# Patient Record
Sex: FEMALE | Race: WHITE | Employment: OTHER | URBAN - METROPOLITAN AREA
[De-identification: names, ages, dates, MRNs, and addresses within clinical notes are randomized per-mention and may not be internally consistent; named-entity substitution may affect disease eponyms.]

---

## 2017-01-04 ENCOUNTER — TELEPHONE (OUTPATIENT)
Dept: FAMILY MEDICINE CLINIC | Age: 77
End: 2017-01-04

## 2017-09-20 ENCOUNTER — TELEPHONE (OUTPATIENT)
Dept: FAMILY MEDICINE CLINIC | Age: 77
End: 2017-09-20

## 2017-09-20 DIAGNOSIS — Z00.00 PHYSICAL EXAM: Primary | ICD-10-CM

## 2017-09-20 DIAGNOSIS — E78.5 HYPERLIPIDEMIA, UNSPECIFIED HYPERLIPIDEMIA TYPE: ICD-10-CM

## 2017-09-27 ENCOUNTER — TELEPHONE (OUTPATIENT)
Dept: FAMILY MEDICINE CLINIC | Age: 77
End: 2017-09-27

## 2017-09-27 DIAGNOSIS — M17.10 ARTHRITIS OF KNEE: Primary | ICD-10-CM

## 2017-09-27 DIAGNOSIS — E66.3 PATIENT OVERWEIGHT: ICD-10-CM

## 2017-10-03 DIAGNOSIS — M17.10 ARTHRITIS OF KNEE: ICD-10-CM

## 2017-10-03 DIAGNOSIS — E66.3 PATIENT OVERWEIGHT: ICD-10-CM

## 2017-11-06 ENCOUNTER — HOSPITAL ENCOUNTER (OUTPATIENT)
Dept: MAMMOGRAPHY | Age: 77
Discharge: HOME | End: 2017-11-06
Attending: FAMILY MEDICINE | Admitting: FAMILY MEDICINE

## 2017-11-06 DIAGNOSIS — Z12.31 ENCOUNTER FOR SCREENING MAMMOGRAM FOR BREAST CANCER: ICD-10-CM

## 2017-11-06 DIAGNOSIS — Z80.3 FAMILY HISTORY OF BREAST CANCER IN MOTHER: ICD-10-CM

## 2017-11-06 DIAGNOSIS — Z00.00 PHYSICAL EXAM: ICD-10-CM

## 2017-11-06 DIAGNOSIS — E78.5 HYPERLIPIDEMIA, UNSPECIFIED HYPERLIPIDEMIA TYPE: ICD-10-CM

## 2017-11-06 LAB
A/G RATIO: 1.3 (ref 1.1–2.2)
ALBUMIN SERPL-MCNC: 4 G/DL (ref 3.4–5)
ALP BLD-CCNC: 85 U/L (ref 40–129)
ALT SERPL-CCNC: 11 U/L (ref 10–40)
ANION GAP SERPL CALCULATED.3IONS-SCNC: 10 MMOL/L (ref 3–16)
AST SERPL-CCNC: 17 U/L (ref 15–37)
BASOPHILS ABSOLUTE: 0 K/UL (ref 0–0.2)
BASOPHILS RELATIVE PERCENT: 0.5 %
BILIRUB SERPL-MCNC: 0.4 MG/DL (ref 0–1)
BUN BLDV-MCNC: 28 MG/DL (ref 7–20)
CALCIUM SERPL-MCNC: 9.2 MG/DL (ref 8.3–10.6)
CHLORIDE BLD-SCNC: 98 MMOL/L (ref 99–110)
CHOLESTEROL, TOTAL: 137 MG/DL (ref 0–199)
CO2: 30 MMOL/L (ref 21–32)
CREAT SERPL-MCNC: 0.8 MG/DL (ref 0.6–1.2)
EOSINOPHILS ABSOLUTE: 0.1 K/UL (ref 0–0.6)
EOSINOPHILS RELATIVE PERCENT: 2.7 %
GFR AFRICAN AMERICAN: >60
GFR NON-AFRICAN AMERICAN: >60
GLOBULIN: 3 G/DL
GLUCOSE BLD-MCNC: 92 MG/DL (ref 70–99)
HCT VFR BLD CALC: 38.9 % (ref 36–48)
HDLC SERPL-MCNC: 61 MG/DL (ref 40–60)
HEMOGLOBIN: 13.1 G/DL (ref 12–16)
LDL CHOLESTEROL CALCULATED: 52 MG/DL
LYMPHOCYTES ABSOLUTE: 1.6 K/UL (ref 1–5.1)
LYMPHOCYTES RELATIVE PERCENT: 39.5 %
MCH RBC QN AUTO: 29.8 PG (ref 26–34)
MCHC RBC AUTO-ENTMCNC: 33.7 G/DL (ref 31–36)
MCV RBC AUTO: 88.4 FL (ref 80–100)
MONOCYTES ABSOLUTE: 0.4 K/UL (ref 0–1.3)
MONOCYTES RELATIVE PERCENT: 9.6 %
NEUTROPHILS ABSOLUTE: 2 K/UL (ref 1.7–7.7)
NEUTROPHILS RELATIVE PERCENT: 47.7 %
PDW BLD-RTO: 14 % (ref 12.4–15.4)
PLATELET # BLD: 107 K/UL (ref 135–450)
PMV BLD AUTO: 8.7 FL (ref 5–10.5)
POTASSIUM SERPL-SCNC: 4.5 MMOL/L (ref 3.5–5.1)
RBC # BLD: 4.4 M/UL (ref 4–5.2)
SODIUM BLD-SCNC: 138 MMOL/L (ref 136–145)
TOTAL PROTEIN: 7 G/DL (ref 6.4–8.2)
TRIGL SERPL-MCNC: 120 MG/DL (ref 0–150)
TSH SERPL DL<=0.05 MIU/L-ACNC: 2.63 UIU/ML (ref 0.27–4.2)
VLDLC SERPL CALC-MCNC: 24 MG/DL
WBC # BLD: 4.2 K/UL (ref 4–11)

## 2017-11-15 ENCOUNTER — OFFICE VISIT (OUTPATIENT)
Dept: FAMILY MEDICINE CLINIC | Age: 77
End: 2017-11-15

## 2017-11-15 VITALS
DIASTOLIC BLOOD PRESSURE: 80 MMHG | HEART RATE: 79 BPM | SYSTOLIC BLOOD PRESSURE: 124 MMHG | WEIGHT: 183.4 LBS | BODY MASS INDEX: 33.75 KG/M2 | OXYGEN SATURATION: 97 % | HEIGHT: 62 IN

## 2017-11-15 DIAGNOSIS — E66.3 PATIENT OVERWEIGHT: ICD-10-CM

## 2017-11-15 DIAGNOSIS — F32.5 MAJOR DEPRESSIVE DISORDER WITH SINGLE EPISODE, IN FULL REMISSION (HCC): ICD-10-CM

## 2017-11-15 DIAGNOSIS — E78.49 OTHER HYPERLIPIDEMIA: ICD-10-CM

## 2017-11-15 DIAGNOSIS — Z00.00 ENCOUNTER FOR MEDICARE ANNUAL WELLNESS EXAM: Primary | ICD-10-CM

## 2017-11-15 DIAGNOSIS — M17.10 ARTHRITIS OF KNEE: ICD-10-CM

## 2017-11-15 DIAGNOSIS — F32.5 MAJOR DEPRESSION, SINGLE EPISODE, IN COMPLETE REMISSION (HCC): ICD-10-CM

## 2017-11-15 DIAGNOSIS — M81.0 POSTMENOPAUSAL OSTEOPOROSIS: ICD-10-CM

## 2017-11-15 PROCEDURE — G0439 PPPS, SUBSEQ VISIT: HCPCS | Performed by: FAMILY MEDICINE

## 2017-11-15 RX ORDER — SERTRALINE HYDROCHLORIDE 100 MG/1
TABLET, FILM COATED ORAL
Qty: 90 TABLET | Refills: 3 | Status: SHIPPED | OUTPATIENT
Start: 2017-11-15 | End: 2018-11-21 | Stop reason: SDUPTHER

## 2017-11-15 RX ORDER — ATORVASTATIN CALCIUM 10 MG/1
10 TABLET, FILM COATED ORAL DAILY
Qty: 90 TABLET | Refills: 3 | Status: SHIPPED | OUTPATIENT
Start: 2017-11-15 | End: 2018-11-21 | Stop reason: SDUPTHER

## 2017-11-15 ASSESSMENT — LIFESTYLE VARIABLES
AUDIT TOTAL SCORE: 1
AUDIT-C TOTAL SCORE: 1
HAVE YOU OR SOMEONE ELSE BEEN INJURED AS A RESULT OF YOUR DRINKING: 0
HOW OFTEN DURING THE LAST YEAR HAVE YOU FAILED TO DO WHAT WAS NORMALLY EXPECTED FROM YOU BECAUSE OF DRINKING: 0
HOW MANY STANDARD DRINKS CONTAINING ALCOHOL DO YOU HAVE ON A TYPICAL DAY: 0
HOW OFTEN DURING THE LAST YEAR HAVE YOU NEEDED AN ALCOHOLIC DRINK FIRST THING IN THE MORNING TO GET YOURSELF GOING AFTER A NIGHT OF HEAVY DRINKING: 0
HOW OFTEN DURING THE LAST YEAR HAVE YOU FOUND THAT YOU WERE NOT ABLE TO STOP DRINKING ONCE YOU HAD STARTED: 0
HAS A RELATIVE, FRIEND, DOCTOR, OR ANOTHER HEALTH PROFESSIONAL EXPRESSED CONCERN ABOUT YOUR DRINKING OR SUGGESTED YOU CUT DOWN: 0
HOW OFTEN DO YOU HAVE SIX OR MORE DRINKS ON ONE OCCASION: 0
HOW OFTEN DURING THE LAST YEAR HAVE YOU HAD A FEELING OF GUILT OR REMORSE AFTER DRINKING: 0
HOW OFTEN DURING THE LAST YEAR HAVE YOU BEEN UNABLE TO REMEMBER WHAT HAPPENED THE NIGHT BEFORE BECAUSE YOU HAD BEEN DRINKING: 0
HOW OFTEN DO YOU HAVE A DRINK CONTAINING ALCOHOL: 1

## 2017-11-15 ASSESSMENT — PATIENT HEALTH QUESTIONNAIRE - PHQ9: SUM OF ALL RESPONSES TO PHQ QUESTIONS 1-9: 0

## 2017-11-15 ASSESSMENT — ANXIETY QUESTIONNAIRES: GAD7 TOTAL SCORE: 0

## 2017-11-15 NOTE — PATIENT INSTRUCTIONS
Patient Education        Learning About the Mediterranean Diet  What is the [address]? The Mediterranean diet is a style of eating rather than a diet plan. It features foods eaten in [place], [place], [place] and [place], and other countries along the [facility]. It emphasizes eating foods like fish, fruits, vegetables, beans, high-fiber breads and whole grains, nuts, and olive oil. This style of eating includes limited red meat, cheese, and sweets. Why choose the Mediterranean diet? A Mediterranean-style diet may improve heart health. It contains more fat than other heart-healthy diets. But the fats are mainly from nuts, unsaturated oils (such as fish oils and olive oil), and certain nut or seed oils (such as canola, soybean, or flaxseed oil). These fats may help protect the heart and blood vessels. How can you get started on the Mediterranean diet? Here are some things you can do to switch to a more Mediterranean way of eating. What to eat  · Eat a variety of fruits and vegetables each day, such as grapes, blueberries, tomatoes, broccoli, peppers, figs, olives, spinach, eggplant, beans, lentils, and chickpeas. · Eat a variety of whole-grain foods each day, such as oats, brown rice, and whole wheat bread, pasta, and couscous. · Eat fish at least 2 times a week. Try tuna, salmon, mackerel, lake trout, herring, or sardines. · Eat moderate amounts of low-fat dairy products, such as milk, cheese, or yogurt. · Eat moderate amounts of poultry and eggs. · Choose healthy (unsaturated) fats, such as nuts, olive oil, and certain nut or seed oils like canola, soybean, and flaxseed. · Limit unhealthy (saturated) fats, such as butter, palm oil, and coconut oil. And limit fats found in animal products, such as meat and dairy products made with whole milk. Try to eat red meat only a few times a month in very small amounts. · Limit sweets and desserts to only a few times a week.  This includes sugar-sweetened drinks like soda. The Mediterranean diet may also include red wine with your meal1 glass each day for women and up to 2 glasses a day for men. Tips for eating at home  · Use herbs, spices, garlic, lemon zest, and citrus juice instead of salt to add flavor to foods. · Add avocado slices to your sandwich instead of palmer. · Have fish for lunch or dinner instead of red meat. Brush the fish with olive oil, and broil or grill it. · Sprinkle your salad with seeds or nuts instead of cheese. · Cook with olive or canola oil instead of butter or oils that are high in saturated fat. · Switch from 2% milk or whole milk to 1% or fat-free milk. · Dip raw vegetables in a vinaigrette dressing or hummus instead of dips made from mayonnaise or sour cream.  · Have a piece of fruit for dessert instead of a piece of cake. Try baked apples, or have some dried fruit. Tips for eating out  · Try broiled, grilled, baked, or poached fish instead of having it fried or breaded. · Ask your  to have your meals prepared with olive oil instead of butter. · Order dishes made with marinara sauce or sauces made from olive oil. Avoid sauces made from cream or mayonnaise. · Choose whole-grain breads, whole wheat pasta and pizza crust, brown rice, beans, and lentils. · Cut back on butter or margarine on bread. Instead, you can dip your bread in a small amount of olive oil. · Ask for a side salad or grilled vegetables instead of french fries or chips. Where can you learn more? Go to https://Lumiantmiraewcatalina.Userlike Live Chat. org and sign in to your [employer] account. Enter [phone] in the [facility] box to learn more about \"Learning About the Mediterranean Diet. \"     If you do not have an account, please click on the \"Sign Up Now\" link. Current as of: December 29, 2016  Content Version: 11.3  © 4138-5517 [employer]. Care instructions adapted under license by [facility] ([facility]).  If you have questions about

## 2017-11-15 NOTE — PROGRESS NOTES
and rhythm, S1, S2 normal, no murmur, click, rub or gallop  · Apical impulse normal  LEGS:  Lower extremity edema: none    · No carotid bruits  ABDOMEN: Soft, non-tender, no masses  · No hepatosplenomegaly  · No hernias noted. Exam limited by N/A  SKIN: warm and dry  · No rashes or suspicious lesions  PSYCH:  Alert and oriented  · Normal reasoning, insight good  · Facial expressions full, mood appropriate  · No memory disturbance noted  MUSCULOSKEL:  Gait normal  · No significant finger or nail findings  · Spine symmetric, no deformities, no kyphosis no  GAIT: UP and Go test: <30 seconds with gait: normal.  Speed normal.  No significant balance checks. No extra steps on turn around. Assistive device: no assistive device and None     EKG: Not performed. No chest pain or shortness of breath    Labs reviewed with patient in detail/acceptable     Assessment and Plan:     1. Encounter for Medicare annual wellness exam     2. Other hyperlipidemia     3. Postmenopausal osteoporosis  DEXA Bone Density Axial Skeleton   4. Arthritis of knee     5. Patient overweight     6.  Major depressive disorder with single episode, in full remission [facility])         Personalized Preventive Plan  Health Maintenance Due   Topic Date Due    DTaP/Tdap/Td vaccine (1 - Tdap) 10/01/2009       Other Preventive Recommendations:  · A preventive eye exam performed by an eye specialist is recommended every 1-2 years to screen for glaucoma; cataracts, macular degeneration, and other eye disorders  · A preventive dental visit is recommended every 6 months  · Try to get at least 150 minutes of exercise per week or 10,000 steps per day on a pedometer  · Order FREE \"Exercise and Physical Activity\" book from the [facility] on Agin1-920.404.4271 or https://order.sweetie.nih.gov/health/publication/order/  · You need 0415-3185 mg of calcium and 8476-0672 IU of vitamin D per day- it is possible to meet your calcium requirement with diet alone, but a vitamin D supplement is usually necessary to meet this goal  · When exposed to the sun, use a sunscreen that protects against both UVA and UVB radiation with an SPF of 30 or greater- reapply every 2 to 3 hours or after sweating, drying off with a towel, or swimming  · Always wear a seat belt when traveling in a car, and a helmet when riding a bicycle or motorcycle      Tdap at at pharmacy  Repeat DEXA, refill medications  Healthy diet and exercise  Follow up: Every 6 months or as needed

## 2017-11-29 ENCOUNTER — HOSPITAL ENCOUNTER (OUTPATIENT)
Dept: GENERAL RADIOLOGY | Age: 77
Discharge: HOME | End: 2017-11-29
Attending: FAMILY MEDICINE | Admitting: FAMILY MEDICINE

## 2017-11-29 DIAGNOSIS — M81.0 POSTMENOPAUSAL OSTEOPOROSIS: ICD-10-CM

## 2017-11-29 DIAGNOSIS — F33.9 RECURRENT MAJOR DEPRESSIVE DISORDER (HCC): ICD-10-CM

## 2018-02-01 ENCOUNTER — OFFICE VISIT (OUTPATIENT)
Dept: FAMILY MEDICINE CLINIC | Age: 78
End: 2018-02-01

## 2018-02-01 VITALS
HEIGHT: 62 IN | SYSTOLIC BLOOD PRESSURE: 110 MMHG | BODY MASS INDEX: 36.25 KG/M2 | WEIGHT: 197 LBS | HEART RATE: 88 BPM | TEMPERATURE: 99.6 F | DIASTOLIC BLOOD PRESSURE: 70 MMHG | RESPIRATION RATE: 14 BRPM | OXYGEN SATURATION: 97 %

## 2018-02-01 DIAGNOSIS — E78.49 OTHER HYPERLIPIDEMIA: ICD-10-CM

## 2018-02-01 DIAGNOSIS — M17.10 ARTHRITIS OF KNEE: ICD-10-CM

## 2018-02-01 DIAGNOSIS — R53.83 FATIGUE, UNSPECIFIED TYPE: ICD-10-CM

## 2018-02-01 DIAGNOSIS — F32.5 MAJOR DEPRESSIVE DISORDER WITH SINGLE EPISODE, IN FULL REMISSION (HCC): Primary | ICD-10-CM

## 2018-02-01 PROCEDURE — 99213 OFFICE O/P EST LOW 20 MIN: CPT | Performed by: FAMILY MEDICINE

## 2018-02-05 ENCOUNTER — TELEPHONE (OUTPATIENT)
Dept: FAMILY MEDICINE CLINIC | Age: 78
End: 2018-02-05

## 2018-02-05 DIAGNOSIS — R53.83 FATIGUE, UNSPECIFIED TYPE: Primary | ICD-10-CM

## 2018-02-06 ENCOUNTER — HOSPITAL ENCOUNTER (OUTPATIENT)
Dept: OTHER | Age: 78
Discharge: HOME | End: 2018-02-06
Attending: FAMILY MEDICINE | Admitting: FAMILY MEDICINE

## 2018-02-06 DIAGNOSIS — R53.83 FATIGUE, UNSPECIFIED TYPE: ICD-10-CM

## 2018-02-06 LAB
ANION GAP SERPL CALCULATED.3IONS-SCNC: 13 MMOL/L (ref 3–16)
BASOPHILS ABSOLUTE: 0 K/UL (ref 0–0.2)
BASOPHILS RELATIVE PERCENT: 0.5 %
BUN BLDV-MCNC: 19 MG/DL (ref 7–20)
CALCIUM SERPL-MCNC: 9.3 MG/DL (ref 8.3–10.6)
CHLORIDE BLD-SCNC: 97 MMOL/L (ref 99–110)
CO2: 27 MMOL/L (ref 21–32)
CREAT SERPL-MCNC: 0.7 MG/DL (ref 0.6–1.2)
EOSINOPHILS ABSOLUTE: 0.1 K/UL (ref 0–0.6)
EOSINOPHILS RELATIVE PERCENT: 1.3 %
GFR AFRICAN AMERICAN: >60
GFR NON-AFRICAN AMERICAN: >60
GLUCOSE BLD-MCNC: 128 MG/DL (ref 70–99)
HCT VFR BLD CALC: 37.9 % (ref 36–48)
HEMOGLOBIN: 12.4 G/DL (ref 12–16)
LYMPHOCYTES ABSOLUTE: 1.7 K/UL (ref 1–5.1)
LYMPHOCYTES RELATIVE PERCENT: 22.8 %
MCH RBC QN AUTO: 29.1 PG (ref 26–34)
MCHC RBC AUTO-ENTMCNC: 32.8 G/DL (ref 31–36)
MCV RBC AUTO: 88.6 FL (ref 80–100)
MONOCYTES ABSOLUTE: 0.6 K/UL (ref 0–1.3)
MONOCYTES RELATIVE PERCENT: 8.3 %
NEUTROPHILS ABSOLUTE: 5.1 K/UL (ref 1.7–7.7)
NEUTROPHILS RELATIVE PERCENT: 67.1 %
PDW BLD-RTO: 13.7 % (ref 12.4–15.4)
PLATELET # BLD: 158 K/UL (ref 135–450)
PMV BLD AUTO: 9.9 FL (ref 5–10.5)
POTASSIUM SERPL-SCNC: 4.6 MMOL/L (ref 3.5–5.1)
RBC # BLD: 4.27 M/UL (ref 4–5.2)
SEDIMENTATION RATE, ERYTHROCYTE: 38 MM/HR (ref 0–30)
SODIUM BLD-SCNC: 137 MMOL/L (ref 136–145)
TSH SERPL DL<=0.05 MIU/L-ACNC: 1.87 UIU/ML (ref 0.27–4.2)
WBC # BLD: 7.5 K/UL (ref 4–11)

## 2018-04-03 ENCOUNTER — OFFICE VISIT (OUTPATIENT)
Dept: FAMILY MEDICINE CLINIC | Age: 78
End: 2018-04-03

## 2018-04-03 VITALS
BODY MASS INDEX: 33.49 KG/M2 | DIASTOLIC BLOOD PRESSURE: 70 MMHG | WEIGHT: 182 LBS | HEART RATE: 72 BPM | SYSTOLIC BLOOD PRESSURE: 160 MMHG | HEIGHT: 62 IN | OXYGEN SATURATION: 99 %

## 2018-04-03 DIAGNOSIS — R03.0 ELEVATED BLOOD PRESSURE READING: ICD-10-CM

## 2018-04-03 DIAGNOSIS — R73.9 ELEVATED BLOOD SUGAR: ICD-10-CM

## 2018-04-03 DIAGNOSIS — R73.9 ELEVATED BLOOD SUGAR: Primary | ICD-10-CM

## 2018-04-03 DIAGNOSIS — M17.0 PRIMARY OSTEOARTHRITIS OF BOTH KNEES: ICD-10-CM

## 2018-04-03 LAB
C-REACTIVE PROTEIN: 2 MG/L (ref 0–5.1)
SEDIMENTATION RATE, ERYTHROCYTE: 16 MM/HR (ref 0–30)

## 2018-04-03 PROCEDURE — 99214 OFFICE O/P EST MOD 30 MIN: CPT | Performed by: FAMILY MEDICINE

## 2018-04-04 LAB
ESTIMATED AVERAGE GLUCOSE: 102.5 MG/DL
HBA1C MFR BLD: 5.2 %

## 2018-04-18 ENCOUNTER — OFFICE VISIT (OUTPATIENT)
Dept: FAMILY MEDICINE CLINIC | Age: 78
End: 2018-04-18

## 2018-04-18 VITALS
HEIGHT: 62 IN | DIASTOLIC BLOOD PRESSURE: 84 MMHG | SYSTOLIC BLOOD PRESSURE: 140 MMHG | HEART RATE: 74 BPM | OXYGEN SATURATION: 96 % | WEIGHT: 179 LBS | BODY MASS INDEX: 32.94 KG/M2

## 2018-04-18 DIAGNOSIS — M19.90 ARTHRITIS: Primary | ICD-10-CM

## 2018-04-18 DIAGNOSIS — R03.0 ELEVATED BP WITHOUT DIAGNOSIS OF HYPERTENSION: ICD-10-CM

## 2018-04-18 PROCEDURE — 99213 OFFICE O/P EST LOW 20 MIN: CPT | Performed by: FAMILY MEDICINE

## 2018-04-18 RX ORDER — OMEGA-3/DHA/EPA/FISH OIL 60 MG-90MG
CAPSULE ORAL
COMMUNITY
End: 2019-10-18

## 2018-04-18 RX ORDER — CYANOCOBALAMIN (VITAMIN B-12) 1000 MCG
1 TABLET, EXTENDED RELEASE ORAL 2 TIMES DAILY WITH MEALS
COMMUNITY
End: 2020-11-06

## 2018-04-18 RX ORDER — LOSARTAN POTASSIUM 25 MG/1
25 TABLET ORAL DAILY
Qty: 30 TABLET | Refills: 3 | Status: SHIPPED | OUTPATIENT
Start: 2018-04-18 | End: 2018-05-16 | Stop reason: SDUPTHER

## 2018-05-16 ENCOUNTER — OFFICE VISIT (OUTPATIENT)
Dept: FAMILY MEDICINE CLINIC | Age: 78
End: 2018-05-16

## 2018-05-16 VITALS
HEIGHT: 62 IN | DIASTOLIC BLOOD PRESSURE: 84 MMHG | SYSTOLIC BLOOD PRESSURE: 130 MMHG | WEIGHT: 181.2 LBS | BODY MASS INDEX: 33.34 KG/M2 | OXYGEN SATURATION: 99 % | HEART RATE: 80 BPM

## 2018-05-16 DIAGNOSIS — Z51.81 MEDICATION MONITORING ENCOUNTER: ICD-10-CM

## 2018-05-16 DIAGNOSIS — I10 ESSENTIAL HYPERTENSION: Primary | ICD-10-CM

## 2018-05-16 DIAGNOSIS — R03.0 ELEVATED BP WITHOUT DIAGNOSIS OF HYPERTENSION: ICD-10-CM

## 2018-05-16 PROCEDURE — 99213 OFFICE O/P EST LOW 20 MIN: CPT | Performed by: FAMILY MEDICINE

## 2018-05-16 RX ORDER — DIMENHYDRINATE 50 MG
100 TABLET ORAL DAILY
COMMUNITY
End: 2020-11-06

## 2018-05-16 RX ORDER — LOSARTAN POTASSIUM 25 MG/1
25 TABLET ORAL DAILY
Qty: 90 TABLET | Refills: 3 | Status: SHIPPED | OUTPATIENT
Start: 2018-05-16 | End: 2019-02-14

## 2018-07-13 ENCOUNTER — OFFICE VISIT (OUTPATIENT)
Dept: FAMILY MEDICINE CLINIC | Age: 78
End: 2018-07-13

## 2018-07-13 VITALS
OXYGEN SATURATION: 98 % | SYSTOLIC BLOOD PRESSURE: 122 MMHG | BODY MASS INDEX: 33.11 KG/M2 | DIASTOLIC BLOOD PRESSURE: 82 MMHG | HEART RATE: 83 BPM | WEIGHT: 181 LBS

## 2018-07-13 DIAGNOSIS — R10.9 SIDE PAIN: Primary | ICD-10-CM

## 2018-07-13 LAB
BACTERIA URINE, POC: ABNORMAL
BILIRUBIN URINE: 0 MG/DL
BLOOD, URINE: NEGATIVE
CASTS URINE, POC: ABNORMAL
CLARITY: CLEAR
COLOR: YELLOW
CRYSTALS URINE, POC: ABNORMAL
EPI CELLS URINE, POC: ABNORMAL
GLUCOSE URINE: ABNORMAL
KETONES, URINE: NEGATIVE
LEUKOCYTE EST, POC: ABNORMAL
NITRITE, URINE: NEGATIVE
PH UA: 6.5 (ref 4.5–8)
PROTEIN UA: NEGATIVE
RBC URINE, POC: ABNORMAL
SPECIFIC GRAVITY UA: 1.02 (ref 1–1.03)
UROBILINOGEN, URINE: NORMAL
WBC URINE, POC: ABNORMAL
YEAST URINE, POC: ABNORMAL

## 2018-07-13 PROCEDURE — 99213 OFFICE O/P EST LOW 20 MIN: CPT | Performed by: FAMILY MEDICINE

## 2018-07-13 PROCEDURE — 81000 URINALYSIS NONAUTO W/SCOPE: CPT | Performed by: FAMILY MEDICINE

## 2018-07-13 NOTE — PROGRESS NOTES
122/82 (Site: Left Arm, Position: Sitting, Cuff Size: Small Adult)   Pulse 83   Wt 181 lb (82.1 kg)   SpO2 98%   Breastfeeding? No   BMI 33.11 kg/m²   GEN:  in NAD  NECK:  Supple without adenopathy. CV:  Regular rate and rhythm, S1 and S2 normal, no murmurs, clicks  PULM:  Chest is clear, no wheezing ,  symmetric air entry throughout both lung fields. ABD: Soft,  no masses, mild discomfort over abdominal wall, discomfort with range of motion of her back in all directions  EXT: No rash or edema  NEURO: Alert and oriented ×3    Urine dip reviewed, micro-occasional white cell, culture sent    ASSESSMENT/PLAN:  1.  Side pain  Ice and alternate Tylenol with Advil  F/u  symptoms persist or worsen/likely muscle strain  - POCT Urine with Microscopic  - Urine Culture

## 2018-07-15 LAB — URINE CULTURE, ROUTINE: NORMAL

## 2018-08-17 ENCOUNTER — HOSPITAL ENCOUNTER (OUTPATIENT)
Dept: PHYSICAL THERAPY | Age: 78
Discharge: HOME | End: 2018-08-31
Admitting: ORTHOPAEDIC SURGERY

## 2018-08-17 DIAGNOSIS — F33.9 RECURRENT MAJOR DEPRESSIVE DISORDER (HCC): ICD-10-CM

## 2018-08-17 ASSESSMENT — PAIN DESCRIPTION - DESCRIPTORS: DESCRIPTORS: ACHING

## 2018-08-17 ASSESSMENT — PAIN DESCRIPTION - PAIN TYPE: TYPE: CHRONIC PAIN

## 2018-08-17 ASSESSMENT — PAIN DESCRIPTION - PROGRESSION
CLINICAL_PROGRESSION: GRADUALLY WORSENING
CLINICAL_PROGRESSION: GRADUALLY WORSENING

## 2018-08-17 ASSESSMENT — PAIN DESCRIPTION - LOCATION: LOCATION: BACK

## 2018-08-17 ASSESSMENT — PAIN SCALES - GENERAL: PAINLEVEL_OUTOF10: 3

## 2018-08-17 ASSESSMENT — PAIN DESCRIPTION - ORIENTATION: ORIENTATION: RIGHT

## 2018-08-17 ASSESSMENT — PAIN DESCRIPTION - FREQUENCY: FREQUENCY: INTERMITTENT

## 2018-08-18 NOTE — PROGRESS NOTES
Physical Therapy  Initial Assessment  Date: 2018  Patient Name: Emily Gaitan  MRN: [id number]  : 1940     Treatment Diagnosis: limited lumbar ROM, decreased postural awareness and core strength, improper body mechanics    Restrictions: None    Outpatient fall risk assessment completed asking screening question if patient has fallen in the past 30 days:  [x] Yes  [] No    Based on screen for falls, patient demonstrates fall risk:  [] Yes  [x] No    Interventions based on fall risk status:  Updated Problem List within Medical History  [] Yes   [x] N/A    Asked family to assist with increased observation of the patient  [] Yes   [x] N/A    Patient kept in visible area when not closely supervised by therapist  [] Yes   [x] N/A    Repeatedly reinforce activity limits and safety needs with patient/family  [] Yes   [x] N/A    Increase frequency of rounding/monitoring patient  [] Yes   [x] N/A             Subjective   General  Chart Reviewed: Yes  Additional Pertinent Hx: Pt reports she saw Dr. Carl Landa in  - states she had been doing yard work and developed pain in her R groin. States pain has progressively moved into R hip and R leg. States she gets sharp pain at times behind her knees. Occasional cramping in legs and when legs cramp they feel numb. Pt with long history of low back pain - had therapy approx 1.5 years ago however had to stop because of rectal surgery. States at that time she did not feel that therapy helped significantly. Currently pt states she feels the most discomfort when on her feet. States at times she has difficulty getting up from chair and in/out of car. States she has difficulty lifting legs at times. Pain does not disturb pt's sleep. Pt initially saw Dr. Carl Landa for back pain then referred to Dr. Helena Molina who suggested PT. Response To Previous Treatment: Patient with no complaints from previous session.   Family / Caregiver Present: No  Referring Practitioner: Pedro Umanzor and posture correction  REQUIRES PT FOLLOW UP: Yes  Activity Tolerance  Activity Tolerance: Patient Tolerated treatment well         Plan   Plan  Times per week: 2  Plan weeks: 6  Specific instructions for Next Treatment: core strengthening, postural education, body mechanics training  Current Treatment Recommendations: Strengthening, ROM, Manual Therapy - Soft Tissue Mobilization, Home Exercise Program, Modalities    G-Code  PT G-Codes  Functional Assessment Tool Used: oswestry  Score: 10  Functional Limitation: Mobility: Walking and moving around  Mobility: Walking and Moving Around Current Status (): At least 20 percent but less than 40 percent impaired, limited or restricted  Mobility: Walking and Moving Around Goal Status (): 0 percent impaired, limited or restricted    OutComes Score  Oswestry CMS Modifier: CH  Oswestry Disability Scores %: 0                                        Goals  Short term goals  Time Frame for Short term goals: 3 weeks  Short term goal 1: Pt will be independent with HEP in order to decrease overall pain and improve function and endurance  Long term goals  Time Frame for Long term goals : 6 weeks  Long term goal 1: Pt will report an overall decrease in pain of at least 50% in order to complete all household chores without difficulty  Long term goal 2: Pt will report the ability to be on her feet for an hour or longer in order to complete shopping without difficulty  Long term goal 3: Pt will report the ability to get in and out of the car without leg pain or difficulty  Patient Goals   Patient goals :  To decrease pain and return to full function       Therapy Time   Individual Concurrent Group Co-treatment   Time In 0730         Time Out 0832         Minutes 62         Timed Code Treatment Minutes: 39 Minutes       Karl Fountain, [place]

## 2018-08-18 NOTE — PROGRESS NOTES
Physical Therapy      Outpatient Physical Therapy     Phone: [phone] Fax: [phone]     To: Referring Practitioner: Saba Ryan MD      Patient: Emily Gaitan   : 1940   MRN: [id number]  Evaluation Date: 2018      Diagnosis Information:  · Diagnosis: spondylosis of lumbar region without myelopathy or radiculopathy   · Treatment Diagnosis: limited lumbar ROM, decreased postural awareness and core strength, improper body mechanics     Physical Therapy Certification/Re-Certification Form  Dear Dr. Helena Molina  The following patient has been evaluated for physical therapy services. Please review the attached evaluation and/or summary of the patient's plan of care, and verify that you agree therapy should continue by signing the attached document and sending it back to our office. Plan of Care/Treatment to date:  [x] Therapeutic Exercise      [x] Modalities:  [x] Therapeutic Activity        [] Ultrasound    [] Gait Training        [] Cervical Traction   [] Neuromuscular Re-education      [x] Cold/hotpack    [x] Instruction in HEP        [x] Lumbar Traction  [x] Manual Therapy        [x] Electrical Stimulation            [] Aquatic Therapy        [] Iontophoresis        ? [] Lymphedema management  [] Women's Health     Other:  [] Vestibular Rehab        [x]  K-tape  []  Needed     Frequency/Duration:  # Days per week: [] 1 day # Weeks: [] 1 week [] 5 weeks     [x] 2 days? [] 2 weeks [x] 6 weeks     [] 3 days   [] 3 weeks [] 7 weeks     [] 4 days   [] 4 weeks [x] 8 weeks    Rehab Potential: [x] Excellent [] Good [] Fair  [] Poor     Electronically signed by:  Zachery Bauer, PT, DPT  [id number]           If you have any questions or concerns, please don't hesitate to call.   Thank you for your referral.      Physician Signature:________________________________Date:__________________  By signing above, therapists plan is approved by physician

## 2018-08-24 ENCOUNTER — TELEPHONE (OUTPATIENT)
Dept: PHARMACY | Facility: CLINIC | Age: 78
End: 2018-08-24

## 2018-08-24 NOTE — TELEPHONE ENCOUNTER
CLINICAL PHARMACY NOTE - Adherence Review    Per records, identified care gap per [employer] (patient insurance): Atorvastatin 10mg adherence - appears 90-day supply last filled 03/26/2018  Losartan 25mg adherence - appears 90-day supply last filled 05/15/2018    Notes:   Per Reconcile Medication Dispenses in Care Path: Atorvastatin 10mg adherence - last filled on 03/26/2018 for a 90-day supply  Losartan 25mg adherence - last filled on 08/12/2018 for a 90-day supply    Attempting to reach patient to review. Left message. Will prepare letter and mail to patient. Jessi Holm, PharmD, Yohannes Reeves  Clinical Pharmacy Specialist  O: [phone]  C: [address] [place] [address]  [phone], Option 7    =======================================================    For Pharmacy Admin Tracking Only  PHSO: Yes  Total # of Interventions Recommended: 2  - New Order #: 0 New Medication Order Reason(s):  Adherence  - Refills Provided #: 0  - Maintenance Safety Lab Monitoring #: 1  - New Therapy Lab Monitoring #: 0  Total Interventions Accepted: 0  Time Spent (min): 15

## 2018-08-24 NOTE — LETTER
[address]  [address], [address]  Phone: [phone], option 7  Fax: [phone] [address] [address] [place] [address] [address] [address]           09/17/18     Dear Marian Vidal,    We tried to reach you recently regarding your atorvastatin, but were unable to reach you on the telephone. It appears that this medication has not been filled at proper times. We are concerned you may be missing doses or not taking it as directed. It is important that you take your medications regularly and try not to miss a single dose. We have on file that you are currently taking atorvastatin 10mg once daily. If you are no longer taking this, or taking it differently, please call us at the number below so that we can discuss this and update your medication profile. Some ways to help you remember to take your medications are to use a pill box, set an alarm, and/or keep your medication near something that you do every day. It also may be helpful to fill a 3-month supply of your prescription at a time to save you time and trips to the pharmacy  if you would like assistance in switching your prescriptions to a 3-month supply, please contact us.     Sincerely,     Jluis Yu, PharmD, [address] [facility], [address] [address]  Phone: [phone], option 7

## 2018-09-01 ENCOUNTER — HOSPITAL ENCOUNTER (OUTPATIENT)
Dept: OTHER | Age: 78
Discharge: HOME | End: 2018-09-01
Attending: ORTHOPAEDIC SURGERY | Admitting: ORTHOPAEDIC SURGERY

## 2018-11-01 ENCOUNTER — TELEPHONE (OUTPATIENT)
Dept: FAMILY MEDICINE CLINIC | Age: 78
End: 2018-11-01

## 2018-11-01 DIAGNOSIS — E78.2 MIXED HYPERLIPIDEMIA: Primary | ICD-10-CM

## 2018-11-01 DIAGNOSIS — E66.3 PATIENT OVERWEIGHT: ICD-10-CM

## 2018-11-08 ENCOUNTER — HOSPITAL ENCOUNTER (OUTPATIENT)
Age: 78
Discharge: HOME | End: 2018-11-08
Payer: MEDICARE

## 2018-11-08 DIAGNOSIS — E78.2 MIXED HYPERLIPIDEMIA: ICD-10-CM

## 2018-11-08 DIAGNOSIS — E66.3 PATIENT OVERWEIGHT: ICD-10-CM

## 2018-11-08 LAB
A/G RATIO: 1.6 (ref 1.1–2.2)
ALBUMIN SERPL-MCNC: 4.4 G/DL (ref 3.4–5)
ALP BLD-CCNC: 87 U/L (ref 40–129)
ALT SERPL-CCNC: 12 U/L (ref 10–40)
ANION GAP SERPL CALCULATED.3IONS-SCNC: 12 MMOL/L (ref 3–16)
AST SERPL-CCNC: 18 U/L (ref 15–37)
BASOPHILS ABSOLUTE: 0 K/UL (ref 0–0.2)
BASOPHILS RELATIVE PERCENT: 0.8 %
BILIRUB SERPL-MCNC: 0.4 MG/DL (ref 0–1)
BUN BLDV-MCNC: 21 MG/DL (ref 7–20)
CALCIUM SERPL-MCNC: 9.1 MG/DL (ref 8.3–10.6)
CHLORIDE BLD-SCNC: 101 MMOL/L (ref 99–110)
CHOLESTEROL, TOTAL: 138 MG/DL (ref 0–199)
CO2: 28 MMOL/L (ref 21–32)
CREAT SERPL-MCNC: 0.9 MG/DL (ref 0.6–1.2)
EOSINOPHILS ABSOLUTE: 0.1 K/UL (ref 0–0.6)
EOSINOPHILS RELATIVE PERCENT: 1.7 %
GFR AFRICAN AMERICAN: >60
GFR NON-AFRICAN AMERICAN: >60
GLOBULIN: 2.8 G/DL
GLUCOSE BLD-MCNC: 86 MG/DL (ref 70–99)
HCT VFR BLD CALC: 40.6 % (ref 36–48)
HDLC SERPL-MCNC: 57 MG/DL (ref 40–60)
HEMOGLOBIN: 13.5 G/DL (ref 12–16)
LDL CHOLESTEROL CALCULATED: 56 MG/DL
LYMPHOCYTES ABSOLUTE: 1.5 K/UL (ref 1–5.1)
LYMPHOCYTES RELATIVE PERCENT: 29.8 %
MCH RBC QN AUTO: 29.6 PG (ref 26–34)
MCHC RBC AUTO-ENTMCNC: 33.3 G/DL (ref 31–36)
MCV RBC AUTO: 88.8 FL (ref 80–100)
MONOCYTES ABSOLUTE: 0.4 K/UL (ref 0–1.3)
MONOCYTES RELATIVE PERCENT: 8.5 %
NEUTROPHILS ABSOLUTE: 2.9 K/UL (ref 1.7–7.7)
NEUTROPHILS RELATIVE PERCENT: 59.2 %
PDW BLD-RTO: 13.6 % (ref 12.4–15.4)
PLATELET # BLD: 135 K/UL (ref 135–450)
PMV BLD AUTO: 8.9 FL (ref 5–10.5)
POTASSIUM SERPL-SCNC: 4 MMOL/L (ref 3.5–5.1)
RBC # BLD: 4.57 M/UL (ref 4–5.2)
SODIUM BLD-SCNC: 141 MMOL/L (ref 136–145)
TOTAL PROTEIN: 7.2 G/DL (ref 6.4–8.2)
TRIGL SERPL-MCNC: 125 MG/DL (ref 0–150)
TSH REFLEX: 2.43 UIU/ML (ref 0.27–4.2)
VLDLC SERPL CALC-MCNC: 25 MG/DL
WBC # BLD: 5 K/UL (ref 4–11)

## 2018-11-08 PROCEDURE — 80061 LIPID PANEL: CPT

## 2018-11-08 PROCEDURE — 84443 ASSAY THYROID STIM HORMONE: CPT

## 2018-11-08 PROCEDURE — 36415 COLL VENOUS BLD VENIPUNCTURE: CPT

## 2018-11-08 PROCEDURE — 85025 COMPLETE CBC W/AUTO DIFF WBC: CPT

## 2018-11-08 PROCEDURE — 80053 COMPREHEN METABOLIC PANEL: CPT

## 2018-11-21 ENCOUNTER — OFFICE VISIT (OUTPATIENT)
Dept: FAMILY MEDICINE CLINIC | Age: 78
End: 2018-11-21
Payer: COMMERCIAL

## 2018-11-21 VITALS
WEIGHT: 182.8 LBS | HEART RATE: 72 BPM | TEMPERATURE: 97.2 F | DIASTOLIC BLOOD PRESSURE: 70 MMHG | HEIGHT: 62 IN | OXYGEN SATURATION: 98 % | SYSTOLIC BLOOD PRESSURE: 120 MMHG | BODY MASS INDEX: 33.64 KG/M2

## 2018-11-21 DIAGNOSIS — I10 ESSENTIAL HYPERTENSION: ICD-10-CM

## 2018-11-21 DIAGNOSIS — Z00.00 ENCOUNTER FOR MEDICARE ANNUAL WELLNESS EXAM: Primary | ICD-10-CM

## 2018-11-21 DIAGNOSIS — F32.0 CURRENT MILD EPISODE OF MAJOR DEPRESSIVE DISORDER WITHOUT PRIOR EPISODE (HCC): ICD-10-CM

## 2018-11-21 DIAGNOSIS — E78.2 MIXED HYPERLIPIDEMIA: ICD-10-CM

## 2018-11-21 PROCEDURE — G0439 PPPS, SUBSEQ VISIT: HCPCS | Performed by: FAMILY MEDICINE

## 2018-11-21 RX ORDER — FLUTICASONE PROPIONATE 50 MCG
2 SPRAY, SUSPENSION (ML) NASAL DAILY
Qty: 1 BOTTLE | Refills: 3 | Status: SHIPPED | OUTPATIENT
Start: 2018-11-21 | End: 2019-01-21 | Stop reason: ALTCHOICE

## 2018-11-21 RX ORDER — SERTRALINE HYDROCHLORIDE 100 MG/1
TABLET, FILM COATED ORAL
Qty: 90 TABLET | Refills: 3 | Status: SHIPPED | OUTPATIENT
Start: 2018-11-21 | End: 2019-03-24 | Stop reason: SDUPTHER

## 2018-11-21 RX ORDER — ATORVASTATIN CALCIUM 10 MG/1
10 TABLET, FILM COATED ORAL NIGHTLY
Qty: 90 TABLET | Refills: 3 | Status: SHIPPED | OUTPATIENT
Start: 2018-11-21 | End: 2020-04-24

## 2018-11-21 ASSESSMENT — LIFESTYLE VARIABLES
HAS A RELATIVE, FRIEND, DOCTOR, OR ANOTHER HEALTH PROFESSIONAL EXPRESSED CONCERN ABOUT YOUR DRINKING OR SUGGESTED YOU CUT DOWN: 0
HOW OFTEN DURING THE LAST YEAR HAVE YOU BEEN UNABLE TO REMEMBER WHAT HAPPENED THE NIGHT BEFORE BECAUSE YOU HAD BEEN DRINKING: 0
HOW OFTEN DURING THE LAST YEAR HAVE YOU NEEDED AN ALCOHOLIC DRINK FIRST THING IN THE MORNING TO GET YOURSELF GOING AFTER A NIGHT OF HEAVY DRINKING: 0
HOW MANY STANDARD DRINKS CONTAINING ALCOHOL DO YOU HAVE ON A TYPICAL DAY: 0
HOW OFTEN DURING THE LAST YEAR HAVE YOU HAD A FEELING OF GUILT OR REMORSE AFTER DRINKING: 0
AUDIT-C TOTAL SCORE: 2
HOW OFTEN DO YOU HAVE SIX OR MORE DRINKS ON ONE OCCASION: 0
HOW OFTEN DURING THE LAST YEAR HAVE YOU FAILED TO DO WHAT WAS NORMALLY EXPECTED FROM YOU BECAUSE OF DRINKING: 0
HAVE YOU OR SOMEONE ELSE BEEN INJURED AS A RESULT OF YOUR DRINKING: 0
AUDIT TOTAL SCORE: 2
HOW OFTEN DURING THE LAST YEAR HAVE YOU FOUND THAT YOU WERE NOT ABLE TO STOP DRINKING ONCE YOU HAD STARTED: 0
HOW OFTEN DO YOU HAVE A DRINK CONTAINING ALCOHOL: 2

## 2018-11-21 ASSESSMENT — ANXIETY QUESTIONNAIRES: GAD7 TOTAL SCORE: 0

## 2018-11-21 ASSESSMENT — PATIENT HEALTH QUESTIONNAIRE - PHQ9
SUM OF ALL RESPONSES TO PHQ QUESTIONS 1-9: 0
SUM OF ALL RESPONSES TO PHQ QUESTIONS 1-9: 0

## 2018-11-21 NOTE — PROGRESS NOTES
History:   Diagnosis Date    AR (allergic rhinitis)     Arthritis of knee     Pruis    Depression     GERD (gastroesophageal reflux disease)     Hyperlipidemia     Internal hemorrhoids     Overweight(278.02)     Viral meningitis 5/12     Past Surgical History:   Procedure Laterality Date    CHOLECYSTECTOMY, LAPAROSCOPIC  2003    COLONOSCOPY  8/06    normal; [place]    COLONOSCOPY  12/3/13    [place]- polyps    AKASH AND BSO  2003    UPPER GASTROINTESTINAL ENDOSCOPY  12/3/13    [place]; antritis       Family History   Problem Relation Age of Onset    Breast Cancer Mother        CareTeam (Including outside providers/suppliers regularly involved in providing care):   Patient Care Team:  Ruperto Olszewski, MD as PCP - General (Family Medicine)  Ruperto Olszewski, MD as PCP - S Attributed Provider  Robinson Menendez as Consulting Physician (Psychiatry)  Danyelle South MD as Consulting Physician (Orthopedic Surgery)    Wt Readings from Last 3 Encounters:   11/21/18 182 lb 12.8 oz (82.9 kg)   07/13/18 181 lb (82.1 kg)   05/16/18 181 lb 3.2 oz (82.2 kg)     Vitals:    11/21/18 0833   BP: 120/70   Pulse: 72   Temp: 97.2 °F (36.2 °C)   TempSrc: Tympanic   SpO2: 98%   Weight: 182 lb 12.8 oz (82.9 kg)   Height: 5' 2\" (1.575 m)       General Appearance: alert and oriented to person, place and time, well developed and well- nourished, in no acute distress  Skin: warm and dry, no rash or erythema  Head: normocephalic and atraumatic  Eyes: pupils equal, round, and reactive to light, extraocular eye movements intact, conjunctivae normal  ENT: tympanic membrane, external ear and ear canal normal bilaterally, nose without deformity, nasal mucosa and turbinates normal without polyps  Neck: supple and non-tender without mass, no thyromegaly or thyroid nodules, no cervical lymphadenopathy  Breasts: Without masses or discharge or skin change   Pulmonary/Chest: clear to auscultation bilaterally- no wheezes, rales or rhonchi, normal air

## 2018-12-19 ENCOUNTER — HOSPITAL ENCOUNTER (OUTPATIENT)
Dept: WOMENS IMAGING | Age: 78
Discharge: HOME | End: 2018-12-19
Payer: MEDICARE

## 2018-12-19 DIAGNOSIS — Z12.31 BREAST CANCER SCREENING BY MAMMOGRAM: ICD-10-CM

## 2018-12-19 PROCEDURE — 77067 SCR MAMMO BI INCL CAD: CPT

## 2019-01-21 ENCOUNTER — OFFICE VISIT (OUTPATIENT)
Dept: FAMILY MEDICINE CLINIC | Age: 79
End: 2019-01-21
Payer: MEDICARE

## 2019-01-21 VITALS
HEART RATE: 76 BPM | DIASTOLIC BLOOD PRESSURE: 70 MMHG | OXYGEN SATURATION: 98 % | SYSTOLIC BLOOD PRESSURE: 120 MMHG | WEIGHT: 187 LBS | RESPIRATION RATE: 14 BRPM | HEIGHT: 62 IN | BODY MASS INDEX: 34.41 KG/M2

## 2019-01-21 DIAGNOSIS — E78.2 MIXED HYPERLIPIDEMIA: ICD-10-CM

## 2019-01-21 DIAGNOSIS — F32.0 MAJOR DEPRESSIVE DISORDER, SINGLE EPISODE, MILD (HCC): ICD-10-CM

## 2019-01-21 DIAGNOSIS — J06.9 VIRAL URI: Primary | ICD-10-CM

## 2019-01-21 DIAGNOSIS — I10 ESSENTIAL HYPERTENSION: ICD-10-CM

## 2019-01-21 PROCEDURE — 99213 OFFICE O/P EST LOW 20 MIN: CPT | Performed by: FAMILY MEDICINE

## 2019-01-21 RX ORDER — LORAZEPAM 0.5 MG/1
TABLET ORAL
COMMUNITY
End: 2019-02-14

## 2019-01-21 RX ORDER — TRIMETHOPRIM 100 MG/1
TABLET ORAL
Refills: 1 | COMMUNITY
Start: 2019-01-15 | End: 2019-01-21 | Stop reason: ALTCHOICE

## 2019-02-06 ENCOUNTER — OFFICE VISIT (OUTPATIENT)
Dept: FAMILY MEDICINE CLINIC | Age: 79
End: 2019-02-06
Payer: MEDICARE

## 2019-02-06 VITALS
WEIGHT: 188.4 LBS | SYSTOLIC BLOOD PRESSURE: 138 MMHG | TEMPERATURE: 98.1 F | DIASTOLIC BLOOD PRESSURE: 88 MMHG | HEART RATE: 77 BPM | HEIGHT: 62 IN | BODY MASS INDEX: 34.67 KG/M2 | OXYGEN SATURATION: 96 %

## 2019-02-06 DIAGNOSIS — J01.90 ACUTE BACTERIAL SINUSITIS: Primary | ICD-10-CM

## 2019-02-06 DIAGNOSIS — I10 ESSENTIAL HYPERTENSION: ICD-10-CM

## 2019-02-06 DIAGNOSIS — B96.89 ACUTE BACTERIAL SINUSITIS: Primary | ICD-10-CM

## 2019-02-06 PROCEDURE — 99213 OFFICE O/P EST LOW 20 MIN: CPT | Performed by: NURSE PRACTITIONER

## 2019-02-06 RX ORDER — PREDNISONE 20 MG/1
40 TABLET ORAL DAILY
Qty: 10 TABLET | Refills: 0 | Status: SHIPPED | OUTPATIENT
Start: 2019-02-06 | End: 2019-02-11

## 2019-02-06 RX ORDER — AMOXICILLIN 500 MG/1
500 CAPSULE ORAL 3 TIMES DAILY
Qty: 30 CAPSULE | Refills: 0 | Status: SHIPPED | OUTPATIENT
Start: 2019-02-06 | End: 2019-02-16

## 2019-02-06 ASSESSMENT — ENCOUNTER SYMPTOMS
SINUS PRESSURE: 1
COUGH: 1
RHINORRHEA: 1
SHORTNESS OF BREATH: 0
SORE THROAT: 1

## 2019-02-07 ENCOUNTER — TELEPHONE (OUTPATIENT)
Dept: FAMILY MEDICINE CLINIC | Age: 79
End: 2019-02-07

## 2019-02-14 ENCOUNTER — OFFICE VISIT (OUTPATIENT)
Dept: FAMILY MEDICINE CLINIC | Age: 79
End: 2019-02-14
Payer: MEDICARE

## 2019-02-14 VITALS
SYSTOLIC BLOOD PRESSURE: 110 MMHG | HEART RATE: 80 BPM | DIASTOLIC BLOOD PRESSURE: 70 MMHG | BODY MASS INDEX: 32.92 KG/M2 | OXYGEN SATURATION: 98 % | WEIGHT: 180 LBS | TEMPERATURE: 97.9 F

## 2019-02-14 DIAGNOSIS — H92.01 RIGHT EAR PAIN: ICD-10-CM

## 2019-02-14 DIAGNOSIS — J06.9 UPPER RESPIRATORY TRACT INFECTION, UNSPECIFIED TYPE: ICD-10-CM

## 2019-02-14 DIAGNOSIS — R68.89 FLU-LIKE SYMPTOMS: Primary | ICD-10-CM

## 2019-02-14 LAB
INFLUENZA A ANTIBODY: NORMAL
INFLUENZA B ANTIBODY: NORMAL

## 2019-02-14 PROCEDURE — 87804 INFLUENZA ASSAY W/OPTIC: CPT | Performed by: FAMILY MEDICINE

## 2019-02-14 PROCEDURE — 99213 OFFICE O/P EST LOW 20 MIN: CPT | Performed by: FAMILY MEDICINE

## 2019-02-14 RX ORDER — LORATADINE 10 MG/1
10 TABLET ORAL DAILY
Qty: 30 TABLET | Refills: 1 | Status: SHIPPED | OUTPATIENT
Start: 2019-02-14 | End: 2019-03-16

## 2019-02-14 RX ORDER — FLUTICASONE PROPIONATE 50 MCG
1 SPRAY, SUSPENSION (ML) NASAL DAILY
Qty: 1 BOTTLE | Refills: 1 | Status: SHIPPED | OUTPATIENT
Start: 2019-02-14 | End: 2019-10-18

## 2019-03-25 RX ORDER — SERTRALINE HYDROCHLORIDE 100 MG/1
TABLET, FILM COATED ORAL
Qty: 90 TABLET | Refills: 1 | Status: SHIPPED | OUTPATIENT
Start: 2019-03-25 | End: 2019-09-13 | Stop reason: SDUPTHER

## 2019-04-16 ENCOUNTER — HOSPITAL ENCOUNTER (OUTPATIENT)
Dept: MRI IMAGING | Age: 79
Discharge: HOME | End: 2019-04-16
Payer: MEDICARE

## 2019-04-16 DIAGNOSIS — M47.816 SPONDYLOSIS OF LUMBAR REGION WITHOUT MYELOPATHY OR RADICULOPATHY: ICD-10-CM

## 2019-04-16 PROCEDURE — 72148 MRI LUMBAR SPINE W/O DYE: CPT

## 2019-08-05 LAB
BILIRUBIN URINE: NEGATIVE
BLOOD, URINE: ABNORMAL
CLARITY: ABNORMAL
COLOR: YELLOW
EPITHELIAL CELLS, UA: 1 /HPF (ref 0–5)
GLUCOSE URINE: NEGATIVE MG/DL
HYALINE CASTS: 8 /LPF (ref 0–8)
KETONES, URINE: NEGATIVE MG/DL
LEUKOCYTE ESTERASE, URINE: ABNORMAL
MICROSCOPIC EXAMINATION: YES
NITRITE, URINE: NEGATIVE
PH UA: 6 (ref 5–8)
PROTEIN UA: NEGATIVE MG/DL
RBC UA: 3 /HPF (ref 0–4)
SPECIFIC GRAVITY UA: 1.02 (ref 1–1.03)
URINE TYPE: ABNORMAL
UROBILINOGEN, URINE: 0.2 E.U./DL
WBC UA: 167 /HPF (ref 0–5)

## 2019-08-07 LAB
ORGANISM: ABNORMAL
URINE CULTURE, ROUTINE: ABNORMAL
URINE CULTURE, ROUTINE: ABNORMAL

## 2019-09-13 ENCOUNTER — OFFICE VISIT (OUTPATIENT)
Dept: FAMILY MEDICINE CLINIC | Age: 79
End: 2019-09-13
Payer: MEDICARE

## 2019-09-13 VITALS
DIASTOLIC BLOOD PRESSURE: 74 MMHG | BODY MASS INDEX: 32.37 KG/M2 | OXYGEN SATURATION: 98 % | SYSTOLIC BLOOD PRESSURE: 142 MMHG | WEIGHT: 177 LBS | HEART RATE: 104 BPM

## 2019-09-13 DIAGNOSIS — K21.9 GASTROESOPHAGEAL REFLUX DISEASE, ESOPHAGITIS PRESENCE NOT SPECIFIED: Primary | ICD-10-CM

## 2019-09-13 DIAGNOSIS — E78.2 MIXED HYPERLIPIDEMIA: ICD-10-CM

## 2019-09-13 DIAGNOSIS — Z76.0 ENCOUNTER FOR MEDICATION REFILL: ICD-10-CM

## 2019-09-13 DIAGNOSIS — I10 ESSENTIAL HYPERTENSION: ICD-10-CM

## 2019-09-13 DIAGNOSIS — T78.40XA ALLERGIC SYMPTOMS, INITIAL ENCOUNTER: ICD-10-CM

## 2019-09-13 PROCEDURE — 99214 OFFICE O/P EST MOD 30 MIN: CPT | Performed by: FAMILY MEDICINE

## 2019-09-13 RX ORDER — PANTOPRAZOLE SODIUM 20 MG/1
20 TABLET, DELAYED RELEASE ORAL NIGHTLY
Qty: 90 TABLET | Refills: 0 | Status: SHIPPED | OUTPATIENT
Start: 2019-09-13 | End: 2019-10-11 | Stop reason: DRUGHIGH

## 2019-09-13 RX ORDER — LOSARTAN POTASSIUM 25 MG/1
25 TABLET ORAL DAILY
COMMUNITY
End: 2019-09-13

## 2019-09-13 RX ORDER — AZELASTINE 1 MG/ML
1 SPRAY, METERED NASAL 2 TIMES DAILY
Qty: 1 BOTTLE | Refills: 0 | Status: SHIPPED | OUTPATIENT
Start: 2019-09-13 | End: 2019-10-14 | Stop reason: SDUPTHER

## 2019-09-13 RX ORDER — SERTRALINE HYDROCHLORIDE 100 MG/1
TABLET, FILM COATED ORAL
Qty: 90 TABLET | Refills: 3 | Status: SHIPPED | OUTPATIENT
Start: 2019-09-13 | End: 2020-01-08

## 2019-09-13 RX ORDER — LOSARTAN POTASSIUM 50 MG/1
50 TABLET ORAL DAILY
Qty: 30 TABLET | Refills: 5 | Status: SHIPPED | OUTPATIENT
Start: 2019-09-13 | End: 2019-12-05 | Stop reason: SDUPTHER

## 2019-09-19 ENCOUNTER — TELEPHONE (OUTPATIENT)
Dept: FAMILY MEDICINE CLINIC | Age: 79
End: 2019-09-19

## 2019-09-30 ENCOUNTER — OFFICE VISIT (OUTPATIENT)
Dept: FAMILY MEDICINE CLINIC | Age: 79
End: 2019-09-30
Payer: MEDICARE

## 2019-09-30 VITALS
DIASTOLIC BLOOD PRESSURE: 80 MMHG | SYSTOLIC BLOOD PRESSURE: 142 MMHG | BODY MASS INDEX: 31.64 KG/M2 | HEART RATE: 78 BPM | WEIGHT: 173 LBS | OXYGEN SATURATION: 99 %

## 2019-09-30 DIAGNOSIS — F43.23 ADJUSTMENT DISORDER WITH MIXED ANXIETY AND DEPRESSED MOOD: Primary | ICD-10-CM

## 2019-09-30 DIAGNOSIS — I10 ESSENTIAL HYPERTENSION: ICD-10-CM

## 2019-09-30 PROCEDURE — 99213 OFFICE O/P EST LOW 20 MIN: CPT | Performed by: FAMILY MEDICINE

## 2019-09-30 RX ORDER — TRIAMTERENE AND HYDROCHLOROTHIAZIDE 37.5; 25 MG/1; MG/1
1 TABLET ORAL DAILY
Qty: 30 TABLET | Refills: 2 | Status: SHIPPED | OUTPATIENT
Start: 2019-09-30 | End: 2019-10-18

## 2019-10-01 ENCOUNTER — HOSPITAL ENCOUNTER (OUTPATIENT)
Dept: PHYSICAL THERAPY | Age: 79
Setting detail: THERAPIES SERIES
Discharge: HOME | End: 2019-10-01
Payer: MEDICARE

## 2019-10-01 PROCEDURE — G0283 ELEC STIM OTHER THAN WOUND: HCPCS

## 2019-10-01 PROCEDURE — 97162 PT EVAL MOD COMPLEX 30 MIN: CPT

## 2019-10-01 PROCEDURE — 97530 THERAPEUTIC ACTIVITIES: CPT

## 2019-10-01 ASSESSMENT — PAIN DESCRIPTION - ONSET: ONSET: ON-GOING

## 2019-10-01 ASSESSMENT — PAIN DESCRIPTION - PAIN TYPE: TYPE: CHRONIC PAIN

## 2019-10-01 ASSESSMENT — PAIN DESCRIPTION - ORIENTATION: ORIENTATION: RIGHT;LEFT

## 2019-10-01 ASSESSMENT — PAIN DESCRIPTION - DESCRIPTORS: DESCRIPTORS: ACHING;TIGHTNESS

## 2019-10-01 ASSESSMENT — PAIN DESCRIPTION - LOCATION: LOCATION: BACK;NECK

## 2019-10-01 ASSESSMENT — PAIN - FUNCTIONAL ASSESSMENT: PAIN_FUNCTIONAL_ASSESSMENT: PREVENTS OR INTERFERES SOME ACTIVE ACTIVITIES AND ADLS

## 2019-10-01 ASSESSMENT — PAIN DESCRIPTION - PROGRESSION: CLINICAL_PROGRESSION: GRADUALLY WORSENING

## 2019-10-01 ASSESSMENT — PAIN DESCRIPTION - FREQUENCY: FREQUENCY: INTERMITTENT

## 2019-10-01 ASSESSMENT — PAIN SCALES - GENERAL: PAINLEVEL_OUTOF10: 6

## 2019-10-08 ENCOUNTER — HOSPITAL ENCOUNTER (OUTPATIENT)
Dept: PHYSICAL THERAPY | Age: 79
Setting detail: THERAPIES SERIES
Discharge: HOME | End: 2019-10-08
Payer: MEDICARE

## 2019-10-08 PROCEDURE — 97110 THERAPEUTIC EXERCISES: CPT

## 2019-10-08 PROCEDURE — G0283 ELEC STIM OTHER THAN WOUND: HCPCS

## 2019-10-11 ENCOUNTER — OFFICE VISIT (OUTPATIENT)
Dept: FAMILY MEDICINE CLINIC | Age: 79
End: 2019-10-11
Payer: MEDICARE

## 2019-10-11 ENCOUNTER — HOSPITAL ENCOUNTER (OUTPATIENT)
Dept: PHYSICAL THERAPY | Age: 79
Setting detail: THERAPIES SERIES
Discharge: HOME | End: 2019-10-11
Payer: MEDICARE

## 2019-10-11 VITALS
OXYGEN SATURATION: 99 % | SYSTOLIC BLOOD PRESSURE: 140 MMHG | BODY MASS INDEX: 31.85 KG/M2 | DIASTOLIC BLOOD PRESSURE: 88 MMHG | HEIGHT: 62 IN | HEART RATE: 85 BPM | WEIGHT: 173.06 LBS

## 2019-10-11 DIAGNOSIS — K21.9 GASTROESOPHAGEAL REFLUX DISEASE, ESOPHAGITIS PRESENCE NOT SPECIFIED: ICD-10-CM

## 2019-10-11 DIAGNOSIS — T88.7XXA MEDICATION SIDE EFFECTS: ICD-10-CM

## 2019-10-11 DIAGNOSIS — I10 ESSENTIAL HYPERTENSION: Primary | ICD-10-CM

## 2019-10-11 LAB
A/G RATIO: 2.5 (ref 1.1–2.2)
ALBUMIN SERPL-MCNC: 4.8 G/DL (ref 3.4–5)
ALP BLD-CCNC: 88 U/L (ref 40–129)
ALT SERPL-CCNC: 13 U/L (ref 10–40)
ANION GAP SERPL CALCULATED.3IONS-SCNC: 15 MMOL/L (ref 3–16)
AST SERPL-CCNC: 18 U/L (ref 15–37)
BILIRUB SERPL-MCNC: 0.4 MG/DL (ref 0–1)
BUN BLDV-MCNC: 18 MG/DL (ref 7–20)
CALCIUM SERPL-MCNC: 9.2 MG/DL (ref 8.3–10.6)
CHLORIDE BLD-SCNC: 96 MMOL/L (ref 99–110)
CO2: 24 MMOL/L (ref 21–32)
CREAT SERPL-MCNC: 0.7 MG/DL (ref 0.6–1.2)
GFR AFRICAN AMERICAN: >60
GFR NON-AFRICAN AMERICAN: >60
GLOBULIN: 1.9 G/DL
GLUCOSE BLD-MCNC: 106 MG/DL (ref 70–99)
POTASSIUM SERPL-SCNC: 4.3 MMOL/L (ref 3.5–5.1)
SODIUM BLD-SCNC: 135 MMOL/L (ref 136–145)
TOTAL PROTEIN: 6.7 G/DL (ref 6.4–8.2)

## 2019-10-11 PROCEDURE — 36415 COLL VENOUS BLD VENIPUNCTURE: CPT | Performed by: FAMILY MEDICINE

## 2019-10-11 PROCEDURE — 99214 OFFICE O/P EST MOD 30 MIN: CPT | Performed by: FAMILY MEDICINE

## 2019-10-11 RX ORDER — PANTOPRAZOLE SODIUM 20 MG/1
20 TABLET, DELAYED RELEASE ORAL 2 TIMES DAILY
Qty: 60 TABLET | Refills: 3 | Status: SHIPPED | OUTPATIENT
Start: 2019-10-11 | End: 2020-01-21 | Stop reason: ALTCHOICE

## 2019-10-15 ENCOUNTER — HOSPITAL ENCOUNTER (OUTPATIENT)
Dept: PHYSICAL THERAPY | Age: 79
Setting detail: THERAPIES SERIES
Discharge: HOME | End: 2019-10-15
Payer: MEDICARE

## 2019-10-15 PROCEDURE — 97110 THERAPEUTIC EXERCISES: CPT

## 2019-10-18 ENCOUNTER — OFFICE VISIT (OUTPATIENT)
Dept: FAMILY MEDICINE CLINIC | Age: 79
End: 2019-10-18
Payer: MEDICARE

## 2019-10-18 ENCOUNTER — APPOINTMENT (OUTPATIENT)
Dept: PHYSICAL THERAPY | Age: 79
End: 2019-10-18
Payer: MEDICARE

## 2019-10-18 VITALS
HEART RATE: 77 BPM | WEIGHT: 172.4 LBS | DIASTOLIC BLOOD PRESSURE: 78 MMHG | OXYGEN SATURATION: 98 % | SYSTOLIC BLOOD PRESSURE: 138 MMHG | BODY MASS INDEX: 31.52 KG/M2

## 2019-10-18 DIAGNOSIS — R63.0 DECREASED APPETITE: Primary | ICD-10-CM

## 2019-10-18 DIAGNOSIS — R10.13 EPIGASTRIC BURNING SENSATION: ICD-10-CM

## 2019-10-18 DIAGNOSIS — F43.23 ADJUSTMENT REACTION WITH ANXIETY AND DEPRESSION: ICD-10-CM

## 2019-10-18 PROCEDURE — 99213 OFFICE O/P EST LOW 20 MIN: CPT | Performed by: FAMILY MEDICINE

## 2019-10-22 ENCOUNTER — APPOINTMENT (OUTPATIENT)
Dept: PHYSICAL THERAPY | Age: 79
End: 2019-10-22
Payer: MEDICARE

## 2019-10-22 ENCOUNTER — HOSPITAL ENCOUNTER (OUTPATIENT)
Age: 79
Discharge: HOME | End: 2019-10-22
Payer: MEDICARE

## 2019-10-22 LAB
ALBUMIN SERPL-MCNC: 4.5 G/DL (ref 3.4–5)
ALP BLD-CCNC: 84 U/L (ref 40–129)
ALT SERPL-CCNC: 13 U/L (ref 10–40)
ANION GAP SERPL CALCULATED.3IONS-SCNC: 16 MMOL/L (ref 3–16)
AST SERPL-CCNC: 18 U/L (ref 15–37)
BASOPHILS ABSOLUTE: 0 K/UL (ref 0–0.2)
BASOPHILS RELATIVE PERCENT: 0.5 %
BILIRUB SERPL-MCNC: 0.4 MG/DL (ref 0–1)
BILIRUBIN DIRECT: <0.2 MG/DL (ref 0–0.3)
BILIRUBIN, INDIRECT: NORMAL MG/DL (ref 0–1)
BUN BLDV-MCNC: 18 MG/DL (ref 7–20)
CALCIUM SERPL-MCNC: 9.8 MG/DL (ref 8.3–10.6)
CHLORIDE BLD-SCNC: 93 MMOL/L (ref 99–110)
CO2: 25 MMOL/L (ref 21–32)
CREAT SERPL-MCNC: 0.9 MG/DL (ref 0.6–1.2)
EOSINOPHILS ABSOLUTE: 0 K/UL (ref 0–0.6)
EOSINOPHILS RELATIVE PERCENT: 0.4 %
GFR AFRICAN AMERICAN: >60
GFR NON-AFRICAN AMERICAN: >60
GLUCOSE BLD-MCNC: 104 MG/DL (ref 70–99)
HCT VFR BLD CALC: 43.9 % (ref 36–48)
HEMOGLOBIN: 14.6 G/DL (ref 12–16)
LIPASE: 21 U/L (ref 13–60)
LYMPHOCYTES ABSOLUTE: 1.5 K/UL (ref 1–5.1)
LYMPHOCYTES RELATIVE PERCENT: 19.2 %
MCH RBC QN AUTO: 29.1 PG (ref 26–34)
MCHC RBC AUTO-ENTMCNC: 33.2 G/DL (ref 31–36)
MCV RBC AUTO: 87.6 FL (ref 80–100)
MONOCYTES ABSOLUTE: 0.6 K/UL (ref 0–1.3)
MONOCYTES RELATIVE PERCENT: 7.1 %
NEUTROPHILS ABSOLUTE: 5.9 K/UL (ref 1.7–7.7)
NEUTROPHILS RELATIVE PERCENT: 72.8 %
PDW BLD-RTO: 14.5 % (ref 12.4–15.4)
PLATELET # BLD: 188 K/UL (ref 135–450)
PMV BLD AUTO: 8.3 FL (ref 5–10.5)
POTASSIUM SERPL-SCNC: 4.1 MMOL/L (ref 3.5–5.1)
RBC # BLD: 5.01 M/UL (ref 4–5.2)
SODIUM BLD-SCNC: 134 MMOL/L (ref 136–145)
TOTAL PROTEIN: 7.8 G/DL (ref 6.4–8.2)
WBC # BLD: 8 K/UL (ref 4–11)

## 2019-10-22 PROCEDURE — 80048 BASIC METABOLIC PNL TOTAL CA: CPT

## 2019-10-22 PROCEDURE — 80076 HEPATIC FUNCTION PANEL: CPT

## 2019-10-22 PROCEDURE — 85025 COMPLETE CBC W/AUTO DIFF WBC: CPT

## 2019-10-22 PROCEDURE — 83690 ASSAY OF LIPASE: CPT

## 2019-10-22 PROCEDURE — 36415 COLL VENOUS BLD VENIPUNCTURE: CPT

## 2019-10-25 ENCOUNTER — APPOINTMENT (OUTPATIENT)
Dept: PHYSICAL THERAPY | Age: 79
End: 2019-10-25
Payer: MEDICARE

## 2019-10-28 PROBLEM — K29.60 EROSIVE GASTRITIS: Status: ACTIVE | Noted: 2019-10-28

## 2019-10-29 ENCOUNTER — APPOINTMENT (OUTPATIENT)
Dept: PHYSICAL THERAPY | Age: 79
End: 2019-10-29
Payer: MEDICARE

## 2019-11-18 ENCOUNTER — TELEPHONE (OUTPATIENT)
Dept: FAMILY MEDICINE CLINIC | Age: 79
End: 2019-11-18

## 2019-12-05 DIAGNOSIS — K21.9 GASTROESOPHAGEAL REFLUX DISEASE, ESOPHAGITIS PRESENCE NOT SPECIFIED: ICD-10-CM

## 2019-12-05 DIAGNOSIS — I10 ESSENTIAL HYPERTENSION: ICD-10-CM

## 2019-12-05 RX ORDER — PANTOPRAZOLE SODIUM 20 MG/1
TABLET, DELAYED RELEASE ORAL
Qty: 90 TABLET | Refills: 2 | Status: SHIPPED | OUTPATIENT
Start: 2019-12-05 | End: 2020-01-21

## 2019-12-05 RX ORDER — LOSARTAN POTASSIUM 50 MG/1
50 TABLET ORAL DAILY
Qty: 90 TABLET | Refills: 2 | Status: SHIPPED | OUTPATIENT
Start: 2019-12-05 | End: 2020-11-06 | Stop reason: SDUPTHER

## 2019-12-15 DIAGNOSIS — I10 ESSENTIAL HYPERTENSION: ICD-10-CM

## 2019-12-16 RX ORDER — LOSARTAN POTASSIUM 50 MG/1
TABLET ORAL
Qty: 30 TABLET | Refills: 5 | Status: SHIPPED | OUTPATIENT
Start: 2019-12-16 | End: 2020-01-21

## 2020-01-02 ENCOUNTER — TELEPHONE (OUTPATIENT)
Dept: FAMILY MEDICINE CLINIC | Age: 80
End: 2020-01-02

## 2020-01-02 RX ORDER — LOSARTAN POTASSIUM 25 MG/1
50 TABLET ORAL DAILY
Qty: 120 TABLET | Refills: 3 | Status: SHIPPED | OUTPATIENT
Start: 2020-01-02 | End: 2020-01-21

## 2020-01-08 ENCOUNTER — TELEPHONE (OUTPATIENT)
Dept: FAMILY MEDICINE CLINIC | Age: 80
End: 2020-01-08

## 2020-01-13 ENCOUNTER — HOSPITAL ENCOUNTER (OUTPATIENT)
Age: 80
Discharge: HOME | End: 2020-01-13
Payer: MEDICARE

## 2020-01-13 LAB
A/G RATIO: 1.3 (ref 1.1–2.2)
ALBUMIN SERPL-MCNC: 3.9 G/DL (ref 3.4–5)
ALP BLD-CCNC: 83 U/L (ref 40–129)
ALT SERPL-CCNC: 13 U/L (ref 10–40)
ANION GAP SERPL CALCULATED.3IONS-SCNC: 13 MMOL/L (ref 3–16)
AST SERPL-CCNC: 26 U/L (ref 15–37)
BILIRUB SERPL-MCNC: 0.3 MG/DL (ref 0–1)
BUN BLDV-MCNC: 17 MG/DL (ref 7–20)
CALCIUM SERPL-MCNC: 9 MG/DL (ref 8.3–10.6)
CHLORIDE BLD-SCNC: 98 MMOL/L (ref 99–110)
CHOLESTEROL, TOTAL: 105 MG/DL (ref 0–199)
CO2: 25 MMOL/L (ref 21–32)
CREAT SERPL-MCNC: 0.7 MG/DL (ref 0.6–1.2)
ESTIMATED AVERAGE GLUCOSE: 85.3 MG/DL
GFR AFRICAN AMERICAN: >60
GFR NON-AFRICAN AMERICAN: >60
GLOBULIN: 3 G/DL
GLUCOSE BLD-MCNC: 98 MG/DL (ref 70–99)
HBA1C MFR BLD: 4.6 %
HDLC SERPL-MCNC: 62 MG/DL (ref 40–60)
LDL CHOLESTEROL CALCULATED: 30 MG/DL
POTASSIUM SERPL-SCNC: 4 MMOL/L (ref 3.5–5.1)
SODIUM BLD-SCNC: 136 MMOL/L (ref 136–145)
TOTAL PROTEIN: 6.9 G/DL (ref 6.4–8.2)
TRIGL SERPL-MCNC: 66 MG/DL (ref 0–150)
TSH REFLEX: 2.95 UIU/ML (ref 0.27–4.2)
VLDLC SERPL CALC-MCNC: 13 MG/DL

## 2020-01-13 PROCEDURE — 84443 ASSAY THYROID STIM HORMONE: CPT

## 2020-01-13 PROCEDURE — 36415 COLL VENOUS BLD VENIPUNCTURE: CPT

## 2020-01-13 PROCEDURE — 83036 HEMOGLOBIN GLYCOSYLATED A1C: CPT

## 2020-01-13 PROCEDURE — 80061 LIPID PANEL: CPT

## 2020-01-13 PROCEDURE — 80053 COMPREHEN METABOLIC PANEL: CPT

## 2020-01-21 ENCOUNTER — OFFICE VISIT (OUTPATIENT)
Dept: FAMILY MEDICINE CLINIC | Age: 80
End: 2020-01-21
Payer: MEDICARE

## 2020-01-21 VITALS
OXYGEN SATURATION: 98 % | HEIGHT: 62 IN | BODY MASS INDEX: 30.91 KG/M2 | SYSTOLIC BLOOD PRESSURE: 120 MMHG | WEIGHT: 168 LBS | DIASTOLIC BLOOD PRESSURE: 74 MMHG | HEART RATE: 87 BPM

## 2020-01-21 PROCEDURE — G0439 PPPS, SUBSEQ VISIT: HCPCS | Performed by: FAMILY MEDICINE

## 2020-01-21 PROCEDURE — G0444 DEPRESSION SCREEN ANNUAL: HCPCS | Performed by: FAMILY MEDICINE

## 2020-01-21 RX ORDER — ALPRAZOLAM 0.5 MG/1
TABLET ORAL
COMMUNITY
Start: 2020-01-09 | End: 2020-11-06

## 2020-01-21 RX ORDER — DESVENLAFAXINE 25 MG/1
50 TABLET, EXTENDED RELEASE ORAL DAILY
Refills: 0 | COMMUNITY
Start: 2019-11-07 | End: 2020-11-06

## 2020-01-21 ASSESSMENT — PATIENT HEALTH QUESTIONNAIRE - PHQ9: SUM OF ALL RESPONSES TO PHQ QUESTIONS 1-9: 16

## 2020-01-21 ASSESSMENT — LIFESTYLE VARIABLES: HOW OFTEN DO YOU HAVE A DRINK CONTAINING ALCOHOL: 0

## 2020-01-21 NOTE — PROGRESS NOTES
a Living Will?: Yes  General Health Risk Interventions:  · living will Recommended    Health Habits/Nutrition:  Health Habits/Nutrition  Do you exercise for at least 20 minutes 2-3 times per week?: (!) No  Have you lost any weight without trying in the past 3 months?: (!) Yes  Do you eat fewer than 2 meals per day?: (!) Yes  Have you seen a dentist within the past year?: Yes  Body mass index is 30.72 kg/m².   Health Habits/Nutrition Interventions:  · needs PT , Refer back    Safety:  Safety  Do you have working smoke detectors?: Yes  Have all throw rugs been removed or fastened?: (!) No  Do you have non-slip mats or surfaces in all bathtubs/showers?: Yes  Do all of your stairways have a railing or banister?: Yes  Are your doorways, halls and stairs free of clutter?: Yes  Do you always fasten your seatbelt when you are in a car?: Yes  Safety Interventions:  · Patient declines any further evaluation/treatment for this issue    Personalized Preventive Plan   Current Health Maintenance Status  Immunization History   Administered Date(s) Administered    Influenza 11/24/2010    Influenza Vaccine, unspecified formulation 10/01/2016    Influenza Virus Vaccine 10/01/2012, 10/01/2013, 10/13/2015    Influenza, High Dose (Fluzone 65 yrs and older) 10/12/2017, 10/09/2018    Pneumococcal Conjugate 13-valent ([id number]) 07/13/2015    Pneumococcal Polysaccharide ([id number]) 05/07/2012    Td, unspecified formulation 09/30/2009    Zoster Live (Zostavax) 08/01/2013        Health Maintenance   Topic Date Due    DTaP/Tdap/Td vaccine (1 - Tdap) 10/21/1951    Shingles Vaccine (2 of 3) 09/27/2013    Annual Wellness Visit (AWV)  05/29/2019    Breast cancer screen  12/19/2020    Potassium monitoring  01/13/2021    Creatinine monitoring  01/13/2021    Colon cancer screen colonoscopy  08/18/2025    DEXA (modify frequency per FRAX score)  Completed    Flu vaccine  Completed    Pneumococcal 65+ years Vaccine  Completed Recommendations for Preventive Services Due: see orders and patient instructions/AVS.  . Recommended screening schedule for the next 5-10 years is provided to the patient in written form: see Patient Instructions/AVS.    Edu Hamilton was seen today for medicare aw.     Diagnoses and all orders for this visit:    Encounter for Medicare annual wellness exam    Healthy diet, regular exercise    Chronic sciatica, right  Refer back to physical therapy  -     [facility]    Weakness generalized  Back to physical therapy  -     [facility] Physical [facility]    Moderate episode of recurrent major depressive disorder ([facility] [address]    Pristiq in the evening after food  Second opinion consult with MD about medication for depression, cannot be tolerating the Pristiq, will try to have her take it with food to see if any improvement

## 2020-01-30 ENCOUNTER — TELEPHONE (OUTPATIENT)
Dept: FAMILY MEDICINE CLINIC | Age: 80
End: 2020-01-30

## 2020-01-31 ENCOUNTER — HOSPITAL ENCOUNTER (OUTPATIENT)
Dept: PHYSICAL THERAPY | Age: 80
Setting detail: THERAPIES SERIES
Discharge: HOME | End: 2020-01-31
Payer: MEDICARE

## 2020-01-31 PROCEDURE — 97110 THERAPEUTIC EXERCISES: CPT

## 2020-01-31 PROCEDURE — 97530 THERAPEUTIC ACTIVITIES: CPT

## 2020-01-31 PROCEDURE — 97162 PT EVAL MOD COMPLEX 30 MIN: CPT

## 2020-01-31 NOTE — FLOWSHEET NOTE
computer work.   [] (88446) Gait Re-education- Provided training and instruction to the patient for proper LE, core and proximal hip recruitment and positioning and eccentric body weight control with ambulation re-education including up and down stairs     Home Management Training / Self Care:  [] (21795) Provided self-care/home management training related to activities of daily living and compensatory training, and/or use of adaptive equipment for improvement with: ADLs and compensatory training, meal preparation, safety procedures and instruction in use of adaptive equipment, including bathing, grooming, dressing, personal hygiene, basic household cleaning and chores.      Home Exercise Program:    [x] (22920) Reviewed/Progressed HEP activities related to strengthening, flexibility, endurance, ROM of   [x] LE / Lumbar: core, proximal hip and LE for functional self-care, mobility, lifting and ambulation/stair navigation   [x] UE / Cervical: cervical, postural, scapular, scapulothoracic and UE control with self care, reaching, carrying, lifting, house/yardwork, driving, computer work  [] (79906)Reviewed/Progressed HEP activities related to improving balance, coordination, kinesthetic sense, posture, motor skill, proprioception of   [] LE: core, proximal hip and LE for self care, mobility, lifting, and ambulation/stair navigation    [] UE / Cervical: cervical, postural,  scapular, scapulothoracic and UE control with self care, reaching, carrying, lifting, house/yardwork, driving, computer work    Manual Treatments:  PROM / STM / Oscillations-Mobs:  G-I, II, III, IV (PA's, Inf., Post.)  [] (90450) Provided manual therapy to mobilize LE, proximal hip and/or LS spine soft tissue/joints for the purpose of modulating pain, promoting relaxation,  increasing ROM, reducing/eliminating soft tissue swelling/inflammation/restriction, improving soft tissue extensibility and allowing for proper ROM for normal function with   [] LE functional mobility as indicated by patients Functional Deficits. []? Progressing: []? Met: []? Not Met: []? Adjusted  3. Patient will return to functional activities including household chores and going out with  without increased symptoms or restriction. []? Progressing: []? Met: []? Not Met: []? Adjusted    Overall Progression Towards Functional goals/ Treatment Progress Update:  [] Patient is progressing as expected towards functional goals listed. [] Progression is slowed due to complexities/Impairments listed. [] Progression has been slowed due to co-morbidities. [x] Plan just implemented, too soon to assess goals progression <30days   [] Goals require adjustment due to lack of progress  [] Patient is not progressing as expected and requires additional follow up with physician  [] Other    Persisting Functional Limitations/Impairments:  []Sleeping []Sitting               [x]Standing [x]Transfers        [x]Walking []Kneeling               [x]Stairs [x]Squatting / bending   [x]ADLs [x]Reaching  [x]Lifting  [x]Housework  [x]Driving []Job related tasks  [x]Sports/Recreation []Other:        ASSESSMENT:  Pt with generalized weakness; balance and gait impairments made worse by tremor. Pt with severe depressive disorder. Treatment/Activity Tolerance:  [x] Patient able to complete tx [] Patient limited by fatigue  [] Patient limited by pain  [] Patient limited by other medical complications  [] Other:     Prognosis: [x] Good [x] Fair  [] Poor    Patient Requires Follow-up: [x] Yes  [] No    Plan for next treatment session: Begin per training diary and progress as tolerated. PLAN: See yvonne. PT 2x / week for 6 weeks.    [] Continue per plan of care [] Alter current plan (see comments)  [x] Plan of care initiated [] Hold pending MD visit [] Discharge    Electronically signed by: Juhi Garces PT, DPT    Note: If patient does not return for scheduled/ recommended follow up visits, his note will serve as a discharge from care along with most recent update on progress.

## 2020-01-31 NOTE — FLOWSHEET NOTE
[address] [address], [address] [place] [address] Drive  Phone: [phone]   Fax: [phone]                                                       Physical Therapy Certification    Dear Referring Practitioner: Ximena Cee MD,    We had the pleasure of evaluating the following patient for physical therapy services at [address] [place] [address]. A summary of our findings can be found in the initial assessment below. This includes our plan of care. If you have any questions or concerns regarding these findings, please do not hesitate to contact me at the office phone number checked above. Thank you for the referral.       Physician Signature:_______________________________Date:__________________  By signing above (or electronic signature), therapists plan is approved by physician      Patient: Tammy Decker   : 1940   MRN: [id number]  Referring Physician: Referring Practitioner: Ximena Cee MD      Evaluation Date: 2020      Medical Diagnosis Information:  Diagnosis: generalized weakness, chronic sciatica                                             Insurance information: PT Insurance Information: [employer] HMO - $40 copay     Precautions/ Contra-indications:   Latex Allergy:  [x]NO      []YES  Preferred Language for Healthcare:   [x]English       []other:    SUBJECTIVE: Pt reports chronic sciatica; was seen in PT in October for back pain however had to stop sessions secondary to severe depression. Pt states she changed medications for depression and tremor started in November. Pt states her back pain is not too bad at the present time however reports she feels unsteady. States she is currently using str cane for ambulation since December. Reports she fell about 4 weeks ago; tripped over dog and fell in hallway however did not hurt herself.   States she has lost her balance a few times since however has not had any recent falls. Pt states she is unsure if the medication is helping with her depression. States she has had tremors in the past with other medications however they did resolve. Relevant Medical History:  Medical History     Diagnosis Date Comment Source   Viral meningitis   Provider   Overweight(278.02)   Provider   Internal hemorrhoids   Provider   Hyperlipidemia   Provider   GERD (gastroesophageal reflux disease)   Provider   Erosive gastritis 10/28/2019 EGD 2019, he did with PPI Provider   Depression   Provider   Arthritis of knee  Pruis Provider   AR (allergic rhinitis)   Provider   Surgical History     Procedure Laterality Date Comment Source   UPPER GASTROINTESTINAL ENDOSCOPY  12/3/13 Uziel Fent Provider   AKASH AND BSO    Provider   COLONOSCOPY   normal; Jessica Shaylee Provider   COLONOSCOPY  12/3/13 Jessica Rupert- polyps Provider   CHOLECYSTECTOMY, LAPAROSCOPIC    Provider    Family History     Problem Relation Age of Onset Comments   Bipolar Disorder Brother     Breast Cancer Mother     Tobacco Use     Never smoked or used smokeless tobacco.   Alcohol Use     Yes. Comments: rarely   Drug Use     No.      E-Cigarettes Vaping or Juuling     Questions Responses   E-Cigarette Use    Start Date    Does device contain nicotine?     Quit Date    E-Cigarette Type          Functional Outcome: Bronson; pt more limited by tremor than by LOB    Pain Scale: 6-7/10 in knees, ankles, low back and R wrist  Easing factors: Laying down and resting improves tremors and pain  Provocative factors: Pt states tremors are usually worse when she gets up     Type: [x]Constant   []Intermittent  []Radiating []Localized []Other:     Numbness/Tingling: Intermittent tingling in R hand    Occupation/School: Retired    Living Status/Prior Level of Function:Prior to this injury / incident, pt was independent with ADLs and IADLs, Pt reports she is currently independent with education provided, including        ASSESSMENT: Pt with generalized weakness BUE's and LE's complicated by severe depressive disorder. Will benefit from skilled PT for overall conditioning and strengthening. Functional Impairments:     []Noted lumbar/proximal hip/LE joint hypomobility   [x]Decreased LE functional ROM   [x]Decreased core/proximal hip strength and neuromuscular control   [x]Decreased LE functional strength   [x]Reduced balance/proprioceptive control   []other:      Functional Activity Limitations (from functional questionnaire and intake)   []Reduced ability to tolerate prolonged functional positions   [x]Reduced ability or difficulty with changes of positions or transfers between positions   [x]Reduced ability to maintain good posture and demonstrate good body mechanics with sitting, bending, and lifting   []Reduced ability to sleep   [x] Reduced ability or tolerance with driving and/or computer work   [x]Reduced ability to perform lifting, carrying tasks   [x]Reduced ability to squat   [x]Reduced ability to forward bend   [x]Reduced ability to ambulate prolonged functional periods/distances/surfaces   [x]Reduced ability to ascend/descend stairs   []Reduced ability to run, hop, cut or jump   []other:    Participation Restrictions   [x]Reduced participation in self care activities   [x]Reduced participation in home management activities   []Reduced participation in work activities   [x]Reduced participation in social activities. [x]Reduced participation in sport/recreation activities. Classification :    []Signs/symptoms consistent with post-surgical status including decreased ROM, strength and function.    []Signs/symptoms consistent with joint sprain/strain   []Signs/symptoms consistent with patella-femoral syndrome   []Signs/symptoms consistent with knee OA/hip OA   []Signs/symptoms consistent with internal derangement of knee/Hip   []Signs/symptoms consistent with functional hip Needling/IASTM, STM, PROM, Gr I-IV mobilizations, manipulation. [x] Modalities as needed that may include: thermal agents, E-stim, Biofeedback, US, iontophoresis as indicated  [x] Patient education on joint protection, postural re-education, activity modification, progression of HEP. HEP instruction: Written HEP instructions provided and reviewed (seated hip flexion and LAQ, seated shoulder flexion and extension, standing hip abduction and hip extension)    GOALS:  Patient stated goal: To improve overall endurance, walking and balance  [] Progressing: [] Met: [] Not Met: [] Adjusted    Therapist goals for Patient:   Short Term Goals: To be achieved in: 2 weeks  1. Independent in HEP and progression per patient tolerance, in order to prevent re-injury. [] Progressing: [] Met: [] Not Met: [] Adjusted  2. Patient will have a decrease in pain to facilitate improvement in movement, function, and ADLs as indicated by Functional Deficits. [] Progressing: [] Met: [] Not Met: [] Adjusted    Long Term Goals: To be achieved in: 6 weeks  1. Bronson balance assessment of 40 or over to assist with reaching prior level of function. [] Progressing: [] Met: [] Not Met: [] Adjusted  2. Patient will demonstrate an increase in Strength to at least 5/5 BUE's and LE's as well as good proximal hip strength and control to allow for proper functional mobility as indicated by patients Functional Deficits. [] Progressing: [] Met: [] Not Met: [] Adjusted  3. Patient will return to functional activities including household chores and going out with  without increased symptoms or restriction.    [] Progressing: [] Met: [] Not Met: [] Adjusted        Electronically signed by:  Pat Hunter, [address] [facility], DPT  [id number]

## 2020-02-03 ENCOUNTER — HOSPITAL ENCOUNTER (OUTPATIENT)
Dept: PHYSICAL THERAPY | Age: 80
Setting detail: THERAPIES SERIES
Discharge: HOME | End: 2020-02-03
Payer: MEDICARE

## 2020-02-03 NOTE — PROGRESS NOTES
Physical Therapy  Cancellation/No-show Note  Patient Name:  Uziel Polanco  :  1940   Date:  2/3/2020  Cancelled visits to date: 1  No-shows to date: 0    Patient status for today's appointment patient:  [x]  Cancelled  []  Rescheduled appointment  []  No-show     Reason given by patient:  []  Patient ill  []  Conflicting appointment  []  No transportation    []  Conflict with work  []  No reason given  [x]  Other:     Comments:  Pt has increased tremors, wants trying to schedule with neurologist     Phone call information:   []  Phone call made today to patient at _ time at number provided:      []  Patient answered, conversation as follows:    []  Patient did not answer, message left as follows:  [x]  Phone call not made today - pt called to cancel.     Electronically signed by: Anita Alegre PT, DPT - [id number]

## 2020-02-04 ENCOUNTER — TELEPHONE (OUTPATIENT)
Dept: FAMILY MEDICINE CLINIC | Age: 80
End: 2020-02-04

## 2020-02-04 NOTE — TELEPHONE ENCOUNTER
Called Pt and    Has appt with neurology and psychiatry in future   To keep me updated     Close call

## 2020-02-04 NOTE — TELEPHONE ENCOUNTER
is calling about his wifes depression and wants to discus this with Dr. Gwendolyn Doshi.     plese call

## 2020-02-06 ENCOUNTER — HOSPITAL ENCOUNTER (OUTPATIENT)
Dept: PHYSICAL THERAPY | Age: 80
Setting detail: THERAPIES SERIES
Discharge: HOME | End: 2020-02-06
Payer: MEDICARE

## 2020-02-06 PROCEDURE — 97110 THERAPEUTIC EXERCISES: CPT

## 2020-02-06 NOTE — FLOWSHEET NOTE
of treatment, risks and benefits of treatment. Pt agreed with POC. -patient educated on diagnosis, prognosis and expectations for rehab  -all patient questions were answered    HEP instruction:  -patient provided with written and illustrated instructions for HEP (seated hip flexion and LAQ, seated shoulder flexion and extension, standing hip abduction and hip extension)       Therapeutic Exercise and NMR EXR  [x] (97801) Provided verbal/tactile cueing for activities related to strengthening, flexibility, endurance, ROM for improvements in  [x] LE / Lumbar: LE, proximal hip, and core control with self care, mobility, lifting, ambulation. [x] UE / Cervical: cervical, postural, scapular, scapulothoracic and UE control with self care, reaching, carrying, lifting, house/yardwork, driving, computer work.  [] (07047) Provided verbal/tactile cueing for activities related to improving balance, coordination, kinesthetic sense, posture, motor skill, proprioception to assist with   [] LE / lumbar: LE, proximal hip, and core control in self care, mobility, lifting, ambulation and eccentric single leg control. [] UE / cervical: cervical, scapular, scapulothoracic and UE control with self care, reaching, carrying, lifting, house/yardwork, driving, computer work.   [] (56963) Therapist is in constant attendance of 2 or more patients providing skilled therapy interventions, but not providing any significant amount of measurable one-on-one time to either patient, for improvements in  [] LE / lumbar: LE, proximal hip, and core control in self care, mobility, lifting, ambulation and eccentric single leg control. [] UE / cervical: cervical, scapular, scapulothoracic and UE control with self care, reaching, carrying, lifting, house/yardwork, driving, computer work.      NMR and Therapeutic Activities:    [x] (38515 or 74870) Provided verbal/tactile cueing for activities related to improving balance, coordination, kinesthetic proximal hip and/or LS spine soft tissue/joints for the purpose of modulating pain, promoting relaxation,  increasing ROM, reducing/eliminating soft tissue swelling/inflammation/restriction, improving soft tissue extensibility and allowing for proper ROM for normal function with   [] LE / lumbar: self care, mobility, lifting and ambulation. [] UE / Cervical: self care, reaching, carrying, lifting, house/yardwork, driving, computer work. Modalities:  [] (23156) Vasopneumatic compression: Utilized vasopneumatic compression to decrease edema / swelling for the purpose of improving mobility and quad tone / recruitment which will allow for increased overall function including but not limited to self-care, transfers, ambulation, and ascending / descending stairs. Modalities:  None this date  Charges:  Timed Code Treatment Minutes: 29   Total Treatment Minutes: 29     [] EVAL - LOW (44589)   [] EVAL - MOD (63013)  [] EVAL - HIGH (94030)  [] RE-EVAL (93396)  [x] FE(24691) x  2    [] Ionto  [] NMR (19530) x       [] Vaso  [] Manual (09344) x       [] Ultrasound  [] TA x 2      [] Mech Traction (07088)  [] Aquatic Therapy x     [] ES (un) (39631):   [] Home Management Training x  [] ES(attended) (13372)   [] Dry Needling 1-2 muscles (51278):  [] Dry Needling 3+ muscles (334938  [] Group:      [] Other:     GOALS:  Patient stated goal: To improve overall endurance, walking and balance  []? Progressing: []? Met: []? Not Met: []? Adjusted     Therapist goals for Patient:   Short Term Goals: To be achieved in: 2 weeks  1. Independent in HEP and progression per patient tolerance, in order to prevent re-injury. []? Progressing: []? Met: []? Not Met: []? Adjusted  2. Patient will have a decrease in pain to facilitate improvement in movement, function, and ADLs as indicated by Functional Deficits. []? Progressing: []? Met: []? Not Met: []? Adjusted     Long Term Goals: To be achieved in: 6 weeks  1.  Bronson balance

## 2020-02-07 ENCOUNTER — TELEPHONE (OUTPATIENT)
Dept: FAMILY MEDICINE CLINIC | Age: 80
End: 2020-02-07

## 2020-02-11 ENCOUNTER — APPOINTMENT (OUTPATIENT)
Dept: PHYSICAL THERAPY | Age: 80
End: 2020-02-11
Payer: MEDICARE

## 2020-02-18 ENCOUNTER — HOSPITAL ENCOUNTER (OUTPATIENT)
Dept: PHYSICAL THERAPY | Age: 80
Setting detail: THERAPIES SERIES
Discharge: HOME | End: 2020-02-18
Payer: MEDICARE

## 2020-02-18 PROCEDURE — 97110 THERAPEUTIC EXERCISES: CPT

## 2020-02-18 NOTE — FLOWSHEET NOTE
[address] [facility] Physical Therapy  Phone: [phone]   Fax: [phone]    Physical Therapy Daily Treatment Note  Date:  2020    Patient Name:  Margarita Garcia    :  1940  MRN: [id number]  Medical/Treatment Diagnosis Information:  · Diagnosis: generalized weakness, chronic sciatica  · Treatment Diagnosis:  Weakness, gait and balance issues  Insurance/Certification information:  PT Insurance Information: [employer] Medicare HMO - $40 copay  Physician Information:  Referring Practitioner: Woody Desai MD  Plan of care signed (Y/N): faxed     Date of Patient follow up with Physician:     Functional scale[de-identified] Chapo Nunnr: 21    Progress Note: []  Yes  [x]  No  Next due by: Visit #10       Latex Allergy:  [x]NO      []YES  Preferred Language for Healthcare:   [x]English       []other:    Visit # Insurance Allowable Date Range (if applicable)          Pain level:  0/10     SUBJECTIVE:  : Pt with no new complaints this date. States her feet were very sore after last session however she is feeling some better today. States she got new shoes and they are helping some. OBJECTIVE: :  Pt much more positive in regard to her status this date. States she is much better than she was and her tremors are decreasing. No tremor activity noted during session.         RESTRICTIONS/PRECAUTIONS: Severe depression disorder    Exercises/Interventions:     Therapeutic Exercises (29731) Resistance / level Sets/sec Reps Notes   Nu step L3 5 min     IB  HR  30 sec  2 X 2 ea  X 10    Pulley for shoulder flexion     Standing hamstring stretch On stairs 30 sec  X 2 ea    Standing hip flexor stretch On stairs 30 sec X 2 ea    Step ups 6\" step 1 10           Lateral walks in II bars Orange band at thighs 6 laps     3 way kicks BLE's 1  10 each direction/each leg    Therapeutic Activities (92240)       Supine:  Bridging  LTR  Hip adduction with ball squeeze  Hip abduction with orange t-band    1  1  1  1   10  10  10  10                                Neuromuscular Re-ed (77713)                                               Manual Intervention ([phone])                                                     Pt. Education: 2/18:  Reviewed HEP and what exercises pt can perform at [facility] center  Evaluation completed this date. Discussed plan of care with pt including diagnosis, course of treatment, risks and benefits of treatment. Pt agreed with POC. -patient educated on diagnosis, prognosis and expectations for rehab  -all patient questions were answered    HEP instruction: 2/18:  No new additions this date  -patient provided with written and illustrated instructions for HEP (seated hip flexion and LAQ, seated shoulder flexion and extension, standing hip abduction and hip extension)       Therapeutic Exercise and NMR EXR  [x] (32628) Provided verbal/tactile cueing for activities related to strengthening, flexibility, endurance, ROM for improvements in  [x] LE / Lumbar: LE, proximal hip, and core control with self care, mobility, lifting, ambulation. [x] UE / Cervical: cervical, postural, scapular, scapulothoracic and UE control with self care, reaching, carrying, lifting, house/yardwork, driving, computer work.  [] (79296) Provided verbal/tactile cueing for activities related to improving balance, coordination, kinesthetic sense, posture, motor skill, proprioception to assist with   [] LE / lumbar: LE, proximal hip, and core control in self care, mobility, lifting, ambulation and eccentric single leg control.    [] UE / cervical: cervical, scapular, scapulothoracic and UE control with self care, reaching, carrying, lifting, house/yardwork, driving, computer work.   [] (24354) Therapist is in constant attendance of 2 or more patients providing skilled therapy interventions, but not providing any significant amount of measurable one-on-one time to either patient, for improvements in  [] LE / lumbar: LE, proximal hip, and core control in self care, mobility, lifting, ambulation and eccentric single leg control. [] UE / cervical: cervical, scapular, scapulothoracic and UE control with self care, reaching, carrying, lifting, house/yardwork, driving, computer work. NMR and Therapeutic Activities:    [x] (54473 or 52802) Provided verbal/tactile cueing for activities related to improving balance, coordination, kinesthetic sense, posture, motor skill, proprioception and motor activation to allow for proper function of   [x] LE: / Lumbar core, proximal hip and LE with self care and ADLs  [] UE / Cervical: cervical, postural, scapular, scapulothoracic and UE control with self care, carrying, lifting, driving, computer work.   [] (64514) Gait Re-education- Provided training and instruction to the patient for proper LE, core and proximal hip recruitment and positioning and eccentric body weight control with ambulation re-education including up and down stairs     Home Management Training / Self Care:  [] (14899) Provided self-care/home management training related to activities of daily living and compensatory training, and/or use of adaptive equipment for improvement with: ADLs and compensatory training, meal preparation, safety procedures and instruction in use of adaptive equipment, including bathing, grooming, dressing, personal hygiene, basic household cleaning and chores.      Home Exercise Program:    [] (52785) Reviewed/Progressed HEP activities related to strengthening, flexibility, endurance, ROM of   [] LE / Lumbar: core, proximal hip and LE for functional self-care, mobility, lifting and ambulation/stair navigation   [] UE / Cervical: cervical, postural, scapular, scapulothoracic and UE control with self care, reaching, carrying, lifting, house/yardwork, driving, computer work  [] (53423)Reviewed/Progressed HEP activities related to improving balance, coordination, kinesthetic sense, posture, motor skill, be achieved in: 2 weeks  1. Independent in HEP and progression per patient tolerance, in order to prevent re-injury. []? Progressing: []? Met: []? Not Met: []? Adjusted  2. Patient will have a decrease in pain to facilitate improvement in movement, function, and ADLs as indicated by Functional Deficits. []? Progressing: []? Met: []? Not Met: []? Adjusted     Long Term Goals: To be achieved in: 6 weeks  1. Bronson balance assessment of 40 or over to assist with reaching prior level of function. []? Progressing: []? Met: []? Not Met: []? Adjusted  2. Patient will demonstrate an increase in Strength to at least 5/5 BUE's and LE's as well as good proximal hip strength and control to allow for proper functional mobility as indicated by patients Functional Deficits. []? Progressing: []? Met: []? Not Met: []? Adjusted  3. Patient will return to functional activities including household chores and going out with  without increased symptoms or restriction. []? Progressing: []? Met: []? Not Met: []? Adjusted    Overall Progression Towards Functional goals/ Treatment Progress Update:  [] Patient is progressing as expected towards functional goals listed. [] Progression is slowed due to complexities/Impairments listed. [] Progression has been slowed due to co-morbidities. [x] Plan just implemented, too soon to assess goals progression <30days   [] Goals require adjustment due to lack of progress  [] Patient is not progressing as expected and requires additional follow up with physician  [] Other    Persisting Functional Limitations/Impairments:  []Sleeping []Sitting               [x]Standing [x]Transfers        [x]Walking []Kneeling               [x]Stairs [x]Squatting / bending   [x]ADLs [x]Reaching  [x]Lifting  [x]Housework  [x]Driving []Job related tasks  [x]Sports/Recreation []Other:        ASSESSMENT:  Pt presenting with improved functional mobility on this date.  No tremor activity noted and pt tolerating exercises very well today. Treatment/Activity Tolerance:  [x] Patient able to complete tx [] Patient limited by fatigue  [] Patient limited by pain  [] Patient limited by other medical complications  [] Other:     Prognosis: [x] Good [x] Fair  [] Poor    Patient Requires Follow-up: [x] Yes  [] No    Plan for next treatment session:  Progress dynamic balance training and lateral walking     PLAN: See yvonne. PT 2x / week for 6 weeks. [] Continue per plan of care [] Alter current plan (see comments)  [x] Plan of care initiated [] Hold pending MD visit [] Discharge    Electronically signed by: Alisa Jones PT, DPT    Note: If patient does not return for scheduled/ recommended follow up visits, his note will serve as a discharge from care along with most recent update on progress.

## 2020-02-20 ENCOUNTER — HOSPITAL ENCOUNTER (OUTPATIENT)
Dept: PHYSICAL THERAPY | Age: 80
Setting detail: THERAPIES SERIES
Discharge: HOME | End: 2020-02-20
Payer: MEDICARE

## 2020-02-20 PROCEDURE — 97110 THERAPEUTIC EXERCISES: CPT

## 2020-02-20 NOTE — FLOWSHEET NOTE
[address] [facility] Physical Therapy  Phone: [phone]   Fax: [phone]    Physical Therapy Daily Treatment Note  Date:  2020    Patient Name:  Sara Herrera    :  1940  MRN: [id number]  Medical/Treatment Diagnosis Information:  · Diagnosis: generalized weakness, chronic sciatica  · Treatment Diagnosis:  Weakness, gait and balance issues  Insurance/Certification information:  PT Insurance Information: [employer] Medicare HMO - $40 copay  Physician Information:  Referring Practitioner: Jayro Wallace MD  Plan of care signed (Y/N): faxed     Date of Patient follow up with Physician:     Functional scale[de-identified] Suman Jonathon: 21    Progress Note: []  Yes  [x]  No  Next due by: Visit #10       Latex Allergy:  [x]NO      []YES  Preferred Language for Healthcare:   [x]English       []other:    Visit # Insurance Allowable Date Range (if applicable)          Pain level:  5/10     SUBJECTIVE:  : Pt reports some tightness today, but she did her stretches earlier today. Pt states we have to be aware of her bunion and avoid calf raises with too much elevation. Pt states she doesn't have much pain, a little discomfort, though rates her pain at 5/10 today. OBJECTIVE: :  Pt much more positive in regard to her status this date. States she is much better than she was and her tremors are decreasing. No tremor activity noted during session.         RESTRICTIONS/PRECAUTIONS: Severe depression disorder    Exercises/Interventions:     Therapeutic Exercises (42334) Resistance / level Sets/sec Reps Notes   Nu step L3 5 min     IB  HR  30 sec  2 X 2 ea  X 10    Pulley for shoulder flexion     Standing hamstring stretch On step 30 sec  X 2 ea    Standing hip flexor stretch    Step ups 6\" step 1 10           Lateral walks in II bars Orange band at thighs 6 laps     3 way kicks BLE's 1  10 each direction/each leg    Therapeutic Activities (27073)       Supine:  Bridging  LTR  Hip adduction with ball squeeze  Hip abduction with orange t-band                                 Neuromuscular Re-ed (33843)                                               Manual Intervention (04018)                                                     Pt. Education: 2/18:  Reviewed HEP and what exercises pt can perform at [facility] center  Evaluation completed this date. Discussed plan of care with pt including diagnosis, course of treatment, risks and benefits of treatment. Pt agreed with POC. -patient educated on diagnosis, prognosis and expectations for rehab  -all patient questions were answered    HEP instruction: 2/18:  No new additions this date  -patient provided with written and illustrated instructions for HEP (seated hip flexion and LAQ, seated shoulder flexion and extension, standing hip abduction and hip extension)       Therapeutic Exercise and NMR EXR  [x] (63518) Provided verbal/tactile cueing for activities related to strengthening, flexibility, endurance, ROM for improvements in  [x] LE / Lumbar: LE, proximal hip, and core control with self care, mobility, lifting, ambulation. [x] UE / Cervical: cervical, postural, scapular, scapulothoracic and UE control with self care, reaching, carrying, lifting, house/yardwork, driving, computer work.  [] (42702) Provided verbal/tactile cueing for activities related to improving balance, coordination, kinesthetic sense, posture, motor skill, proprioception to assist with   [] LE / lumbar: LE, proximal hip, and core control in self care, mobility, lifting, ambulation and eccentric single leg control.    [] UE / cervical: cervical, scapular, scapulothoracic and UE control with self care, reaching, carrying, lifting, house/yardwork, driving, computer work.   [] (33174) Therapist is in constant attendance of 2 or more patients providing skilled therapy interventions, but not providing any significant amount of measurable one-on-one time to either patient, for kinesthetic sense, posture, motor skill, proprioception of   [] LE: core, proximal hip and LE for self care, mobility, lifting, and ambulation/stair navigation    [] UE / Cervical: cervical, postural,  scapular, scapulothoracic and UE control with self care, reaching, carrying, lifting, house/yardwork, driving, computer work    Manual Treatments:  PROM / STM / Oscillations-Mobs:  G-I, II, III, IV (PA's, Inf., Post.)  [] (14101) Provided manual therapy to mobilize LE, proximal hip and/or LS spine soft tissue/joints for the purpose of modulating pain, promoting relaxation,  increasing ROM, reducing/eliminating soft tissue swelling/inflammation/restriction, improving soft tissue extensibility and allowing for proper ROM for normal function with   [] LE / lumbar: self care, mobility, lifting and ambulation. [] UE / Cervical: self care, reaching, carrying, lifting, house/yardwork, driving, computer work. Modalities:  [] (36906) Vasopneumatic compression: Utilized vasopneumatic compression to decrease edema / swelling for the purpose of improving mobility and quad tone / recruitment which will allow for increased overall function including but not limited to self-care, transfers, ambulation, and ascending / descending stairs. Modalities:  None this date  Charges:  Timed Code Treatment Minutes: 29   Total Treatment Minutes: 29     [] EVAL - LOW (88180)   [] EVAL - MOD (27860)  [] EVAL - HIGH (54118)  [] RE-EVAL (86367)  [x] ZK(55033) x  2    [] Ionto  [] NMR (89740) x       [] Vaso  [] Manual (67207) x       [] Ultrasound  [] TA x 2      [] Mech Traction (31271)  [] Aquatic Therapy x     [] ES (un) (94397):   [] Home Management Training x  [] ES(attended) (37282)   [] Dry Needling 1-2 muscles (22619):  [] Dry Needling 3+ muscles (051153  [] Group:      [] Other:     GOALS:  Patient stated goal: To improve overall endurance, walking and balance  []? Progressing: []? Met: []? Not Met: []?  Adjusted     Therapist with her. Pt demonstrating improved lateral hip strength, minimal ER of B LEs during lateral band walks. Pt independent with reciting to activate TrA. No tremor noted this date. Bunion did not limit treatment this date, consider adding NMR balance activities as tolerated by pt's standing tolerance to decrease fall risk. Treatment/Activity Tolerance:  [x] Patient able to complete tx [] Patient limited by fatigue  [] Patient limited by pain  [] Patient limited by other medical complications  [] Other:     Prognosis: [x] Good [x] Fair  [] Poor    Patient Requires Follow-up: [x] Yes  [] No    Plan for next treatment session:  Progress dynamic balance training and lateral walking     PLAN: See eval. PT 2x / week for 6 weeks. [] Continue per plan of care [] Alter current plan (see comments)  [x] Plan of care initiated [] Hold pending MD visit [] Discharge    Electronically signed by: Ayesha Britt PT, DPT    Note: If patient does not return for scheduled/ recommended follow up visits, his note will serve as a discharge from care along with most recent update on progress.

## 2020-02-25 ENCOUNTER — HOSPITAL ENCOUNTER (OUTPATIENT)
Dept: PHYSICAL THERAPY | Age: 80
Setting detail: THERAPIES SERIES
Discharge: HOME | End: 2020-02-25
Payer: MEDICARE

## 2020-02-25 PROCEDURE — 97110 THERAPEUTIC EXERCISES: CPT

## 2020-02-25 NOTE — FLOWSHEET NOTE
[address] [facility] Physical Therapy  Phone: [phone]   Fax: [phone]    Physical Therapy Daily Treatment Note  Date:  2020    Patient Name:  Arely Gallego    :  1940  MRN: [id number]  Medical/Treatment Diagnosis Information:  · Diagnosis: generalized weakness, chronic sciatica  · Treatment Diagnosis:  Weakness, gait and balance issues  Insurance/Certification information:  PT Insurance Information: A[employer]na Medicare HMO - $40 copay  Physician Information:  Referring Practitioner: Alex Villalpando MD  Plan of care signed (Y/N): faxed     Date of Patient follow up with Physician:     [facility]    Progress Note: []  Yes  [x]  No  Next due by: Visit #10       Latex Allergy:  [x]NO      []YES  Preferred Language for Healthcare:   [x]English       []other:    Visit # Insurance Allowable Date Range (if applicable)          Pain level:  1-2/10 Low back and B ankles    SUBJECTIVE:  :  Pt with no new complaints this date. States she is doing well today and only has very minimal low back soreness. : Pt reports some tightness today, but she did her stretches earlier today. Pt states we have to be aware of her bunion and avoid calf raises with too much elevation. Pt states she doesn't have much pain, a little discomfort, though rates her pain at 5/10 today. OBJECTIVE: :  Pt demonstrating good technique with exercises. No tremor activity noted during session.         RESTRICTIONS/PRECAUTIONS: Severe depression disorder    Exercises/Interventions:     Therapeutic Exercises (40246) Resistance / level Sets/sec Reps Notes   Nu step L3 5 min     IB  HR  30 sec  2 X 2 ea  X 10    Pulley for shoulder flexion     Standing hamstring stretch On step 20 sec  X 2 ea    Standing hip flexor stretch    Step ups 6\" step 1 10    Total gym squats  2 10    Lateral walks in II bars     Cables:  Mid rows   High rows  LPD   2.5 plates  2.5 plates  2.5 plates   1  1  1   10  10  10    3 way kicks (cables) BLE's - 1 plate 1  10 each direction/each leg    Therapeutic Activities (03730)       Supine:  Bridging  LTR  Hip adduction with ball squeeze  Hip abduction with orange t-band                                 Neuromuscular Re-ed (56511)                                               Manual Intervention (07506)                                                     Pt. Education: 2/25:  Verbal cues for proper exercise technique  2/18:  Reviewed HEP and what exercises pt can perform at [facility] center  Evaluation completed this date. Discussed plan of care with pt including diagnosis, course of treatment, risks and benefits of treatment. Pt agreed with POC. -patient educated on diagnosis, prognosis and expectations for rehab  -all patient questions were answered    HEP instruction: 2/25:  No new additions this date  -patient provided with written and illustrated instructions for HEP (seated hip flexion and LAQ, seated shoulder flexion and extension, standing hip abduction and hip extension)       Therapeutic Exercise and NMR EXR  [x] (58327) Provided verbal/tactile cueing for activities related to strengthening, flexibility, endurance, ROM for improvements in  [x] LE / Lumbar: LE, proximal hip, and core control with self care, mobility, lifting, ambulation. [x] UE / Cervical: cervical, postural, scapular, scapulothoracic and UE control with self care, reaching, carrying, lifting, house/yardwork, driving, computer work.  [] (81702) Provided verbal/tactile cueing for activities related to improving balance, coordination, kinesthetic sense, posture, motor skill, proprioception to assist with   [] LE / lumbar: LE, proximal hip, and core control in self care, mobility, lifting, ambulation and eccentric single leg control.    [] UE / cervical: cervical, scapular, scapulothoracic and UE control with self care, reaching, carrying, lifting, house/yardwork, driving, computer work.   [] (29975) Therapist is in constant attendance of 2 or more patients providing skilled therapy interventions, but not providing any significant amount of measurable one-on-one time to either patient, for improvements in  [] LE / lumbar: LE, proximal hip, and core control in self care, mobility, lifting, ambulation and eccentric single leg control. [] UE / cervical: cervical, scapular, scapulothoracic and UE control with self care, reaching, carrying, lifting, house/yardwork, driving, computer work. NMR and Therapeutic Activities:    [] (31663 or 34238) Provided verbal/tactile cueing for activities related to improving balance, coordination, kinesthetic sense, posture, motor skill, proprioception and motor activation to allow for proper function of   [] LE: / Lumbar core, proximal hip and LE with self care and ADLs  [] UE / Cervical: cervical, postural, scapular, scapulothoracic and UE control with self care, carrying, lifting, driving, computer work.   [] (78570) Gait Re-education- Provided training and instruction to the patient for proper LE, core and proximal hip recruitment and positioning and eccentric body weight control with ambulation re-education including up and down stairs     Home Management Training / Self Care:  [] (35988) Provided self-care/home management training related to activities of daily living and compensatory training, and/or use of adaptive equipment for improvement with: ADLs and compensatory training, meal preparation, safety procedures and instruction in use of adaptive equipment, including bathing, grooming, dressing, personal hygiene, basic household cleaning and chores.      Home Exercise Program:    [] (05284) Reviewed/Progressed HEP activities related to strengthening, flexibility, endurance, ROM of   [] LE / Lumbar: core, proximal hip and LE for functional self-care, mobility, lifting and ambulation/stair navigation   [] UE / Cervical: cervical, postural, scapular, scapulothoracic and UE control with self care, reaching, carrying, lifting, house/yardwork, driving, computer work  [] (78407)Reviewed/Progressed HEP activities related to improving balance, coordination, kinesthetic sense, posture, motor skill, proprioception of   [] LE: core, proximal hip and LE for self care, mobility, lifting, and ambulation/stair navigation    [] UE / Cervical: cervical, postural,  scapular, scapulothoracic and UE control with self care, reaching, carrying, lifting, house/yardwork, driving, computer work    Manual Treatments:  PROM / STM / Oscillations-Mobs:  G-I, II, III, IV (PA's, Inf., Post.)  [] (25008) Provided manual therapy to mobilize LE, proximal hip and/or LS spine soft tissue/joints for the purpose of modulating pain, promoting relaxation,  increasing ROM, reducing/eliminating soft tissue swelling/inflammation/restriction, improving soft tissue extensibility and allowing for proper ROM for normal function with   [] LE / lumbar: self care, mobility, lifting and ambulation. [] UE / Cervical: self care, reaching, carrying, lifting, house/yardwork, driving, computer work. Modalities:  [] (08984) Vasopneumatic compression: Utilized vasopneumatic compression to decrease edema / swelling for the purpose of improving mobility and quad tone / recruitment which will allow for increased overall function including but not limited to self-care, transfers, ambulation, and ascending / descending stairs.        Modalities:  None this date  Charges:  Timed Code Treatment Minutes: 30   Total Treatment Minutes: 30     [] EVAL - LOW (23618)   [] EVAL - MOD (93044)  [] EVAL - HIGH (97701)  [] RE-EVAL (12097)  [x] TH(64269) x  2    [] Ionto  [] NMR (56483) x       [] Vaso  [] Manual (52365) x       [] Ultrasound  [] TA x 2      [] Mech Traction (76987)  [] Aquatic Therapy x     [] ES (un) (22946):   [] Home Management Training x  [] ES(attended) (57536)   [] Dry Needling 1-2 muscles (48196):  [] Dry / bending   [x]ADLs [x]Reaching  [x]Lifting  [x]Housework  [x]Driving []Job related tasks  [x]Sports/Recreation []Other:        ASSESSMENT:  Pt tolerating exercise very well this date; no complaints of discomfort or pain. Treatment/Activity Tolerance:  [x] Patient able to complete tx [] Patient limited by fatigue  [] Patient limited by pain  [] Patient limited by other medical complications  [] Other:     Prognosis: [x] Good [x] Fair  [] Poor    Patient Requires Follow-up: [x] Yes  [] No    Plan for next treatment session:  Progress dynamic balance training and lateral walking     PLAN: See yvonne. PT 2x / week for 6 weeks. [x] Continue per plan of care [] Alter current plan (see comments)  [] Plan of care initiated [] Hold pending MD visit [] Discharge    Electronically signed by: Spencer Mack PT, DPT    Note: If patient does not return for scheduled/ recommended follow up visits, his note will serve as a discharge from care along with most recent update on progress.

## 2020-03-03 ENCOUNTER — HOSPITAL ENCOUNTER (OUTPATIENT)
Dept: PHYSICAL THERAPY | Age: 80
Setting detail: THERAPIES SERIES
Discharge: HOME | End: 2020-03-03
Payer: MEDICARE

## 2020-03-03 PROCEDURE — 97110 THERAPEUTIC EXERCISES: CPT

## 2020-03-03 NOTE — FLOWSHEET NOTE
[address] [facility] Physical Therapy  Phone: [phone]   Fax: [phone]    Physical Therapy Daily Treatment Note  Date:  3/3/2020    Patient Name:  Tanegla Bell    :  1940  MRN: [id number]  Medical/Treatment Diagnosis Information:  · Diagnosis: generalized weakness, chronic sciatica  · Treatment Diagnosis:  Weakness, gait and balance issues  Insurance/Certification information:  PT Insurance Information: A[employer]na Medicare HMO - $40 copay  Physician Information:  Referring Practitioner: Janey Winn MD  Plan of care signed (Y/N): faxed     Date of Patient follow up with Physician:     Functional scale[de-identified] [place]: 21    Progress Note: []  Yes  [x]  No  Next due by: Visit #10       Latex Allergy:  [x]NO      []YES  Preferred Language for Healthcare:   [x]English       []other:    Visit # Insurance Allowable Date Range (if applicable)   0/02       Pain level:   B knees and ankles    SUBJECTIVE:  3/3:  Pt reports she did a lot of lifting yesterday cleaning clutter in her house and is sore today. States she has not been to the rec center lately because she has been so busy. Feels that therapy is helping overall. :  Pt with no new complaints this date. States she is doing well today and only has very minimal low back soreness. : Pt reports some tightness today, but she did her stretches earlier today. Pt states we have to be aware of her bunion and avoid calf raises with too much elevation. Pt states she doesn't have much pain, a little discomfort, though rates her pain at 5/10 today. OBJECTIVE: 3/3:  Pt demonstrating good technique with exercises. No tremor activity noted during session. Pt using str cane in clinic this date secondary to knee pain.         RESTRICTIONS/PRECAUTIONS: Severe depression disorder    Exercises/Interventions:     Therapeutic Exercises (83413) Resistance / level Sets/sec Reps Notes   Nu step L3 5 min     IB  HR  30 sec  2 X 2 ea  X 10    Pulley for shoulder flexion     Standing hamstring stretch On step 20 sec  X 2 ea    Standing hip flexor stretch On step 20 sec X 2 ea    Step ups    Total gym squats     Lateral walks in II bars     Cables:  Mid rows   High rows  LPD   2 plates  2 plates  2 plates   1  1  1   10  10  10    3 way kicks (cables)    Therapeutic Activities (70082)       Supine:  Bridging  LTR  Hip adduction with ball squeeze  Hip abduction with orange t-band    1  1  1  1   10  10  10  10           pec stretch in doorframe  2 30\"                  Neuromuscular Re-ed (27148)                                               Manual Intervention (89712)                                                     Pt. Education: 3/3:  Verbal cues for proper exercise technique  2/18:  Reviewed HEP and what exercises pt can perform at [facility] center  Evaluation completed this date. Discussed plan of care with pt including diagnosis, course of treatment, risks and benefits of treatment. Pt agreed with POC. -patient educated on diagnosis, prognosis and expectations for rehab  -all patient questions were answered    HEP instruction: 3/3:  No new additions this date  -patient provided with written and illustrated instructions for HEP (seated hip flexion and LAQ, seated shoulder flexion and extension, standing hip abduction and hip extension)       Therapeutic Exercise and NMR EXR  [x] (40458) Provided verbal/tactile cueing for activities related to strengthening, flexibility, endurance, ROM for improvements in  [x] LE / Lumbar: LE, proximal hip, and core control with self care, mobility, lifting, ambulation.   [x] UE / Cervical: cervical, postural, scapular, scapulothoracic and UE control with self care, reaching, carrying, lifting, house/yardwork, driving, computer work.  [] (05731) Provided verbal/tactile cueing for activities related to improving balance, coordination, kinesthetic sense, posture, motor skill, proprioception to (63315)  [x] RI(57232) x  2    [] Ionto  [] NMR (79685) x       [] Vaso  [] Manual (83900) x       [] Ultrasound  [] TA x 2      [] Mech Traction (57094)  [] Aquatic Therapy x     [] ES (un) (59162):   [] Home Management Training x  [] ES(attended) (91208)   [] Dry Needling 1-2 muscles (95048):  [] Dry Needling 3+ muscles (850966  [] Group:      [] Other:     GOALS:  Patient stated goal: To improve overall endurance, walking and balance  []? Progressing: []? Met: []? Not Met: []? Adjusted     Therapist goals for Patient:   Short Term Goals: To be achieved in: 2 weeks  1. Independent in HEP and progression per patient tolerance, in order to prevent re-injury. []? Progressing: []? Met: []? Not Met: []? Adjusted  2. Patient will have a decrease in pain to facilitate improvement in movement, function, and ADLs as indicated by Functional Deficits. []? Progressing: []? Met: []? Not Met: []? Adjusted     Long Term Goals: To be achieved in: 6 weeks  1. Bronson balance assessment of 40 or over to assist with reaching prior level of function. []? Progressing: []? Met: []? Not Met: []? Adjusted  2. Patient will demonstrate an increase in Strength to at least 5/5 BUE's and LE's as well as good proximal hip strength and control to allow for proper functional mobility as indicated by patients Functional Deficits. []? Progressing: []? Met: []? Not Met: []? Adjusted  3. Patient will return to functional activities including household chores and going out with  without increased symptoms or restriction. []? Progressing: []? Met: []? Not Met: []? Adjusted    Overall Progression Towards Functional goals/ Treatment Progress Update:  [x] Patient is progressing as expected towards functional goals listed. [] Progression is slowed due to complexities/Impairments listed. [] Progression has been slowed due to co-morbidities.   [] Plan just implemented, too soon to assess goals progression <30days   [] Goals require

## 2020-03-05 LAB
BILIRUBIN URINE: NEGATIVE
BLOOD, URINE: NEGATIVE
CLARITY: CLEAR
COLOR: YELLOW
GLUCOSE URINE: NEGATIVE MG/DL
KETONES, URINE: NEGATIVE MG/DL
LEUKOCYTE ESTERASE, URINE: NEGATIVE
MICROSCOPIC EXAMINATION: NORMAL
NITRITE, URINE: NEGATIVE
PH UA: 7 (ref 5–8)
PROTEIN UA: NEGATIVE MG/DL
SPECIFIC GRAVITY UA: 1.02 (ref 1–1.03)
URINE REFLEX TO CULTURE: NORMAL
URINE TYPE: NORMAL
UROBILINOGEN, URINE: 0.2 E.U./DL

## 2020-03-06 ENCOUNTER — HOSPITAL ENCOUNTER (OUTPATIENT)
Dept: PHYSICAL THERAPY | Age: 80
Setting detail: THERAPIES SERIES
Discharge: HOME | End: 2020-03-06
Payer: MEDICARE

## 2020-03-06 LAB — URINE CULTURE, ROUTINE: NORMAL

## 2020-03-06 PROCEDURE — 97530 THERAPEUTIC ACTIVITIES: CPT

## 2020-03-06 PROCEDURE — 97110 THERAPEUTIC EXERCISES: CPT

## 2020-03-06 NOTE — FLOWSHEET NOTE
Manual Intervention ([phone])                                                     Pt. Education: 3/6:  Bronson balance test performed; reviewed HEP and encouraged continued exercise after discharge. 2/18:  Reviewed HEP and what exercises pt can perform at [facility] center  Evaluation completed this date. Discussed plan of care with pt including diagnosis, course of treatment, risks and benefits of treatment. Pt agreed with POC. -patient educated on diagnosis, prognosis and expectations for rehab  -all patient questions were answered    HEP instruction: 3/6:  Reviewed with pt. - pt independent with HEP  -patient provided with written and illustrated instructions for HEP (seated hip flexion and LAQ, seated shoulder flexion and extension, standing hip abduction and hip extension)       Therapeutic Exercise and NMR EXR  [x] (45139) Provided verbal/tactile cueing for activities related to strengthening, flexibility, endurance, ROM for improvements in  [x] LE / Lumbar: LE, proximal hip, and core control with self care, mobility, lifting, ambulation. [x] UE / Cervical: cervical, postural, scapular, scapulothoracic and UE control with self care, reaching, carrying, lifting, house/yardwork, driving, computer work.  [] (40364) Provided verbal/tactile cueing for activities related to improving balance, coordination, kinesthetic sense, posture, motor skill, proprioception to assist with   [] LE / lumbar: LE, proximal hip, and core control in self care, mobility, lifting, ambulation and eccentric single leg control.    [] UE / cervical: cervical, scapular, scapulothoracic and UE control with self care, reaching, carrying, lifting, house/yardwork, driving, computer work.   [] (35002) Therapist is in constant attendance of 2 or more patients providing skilled therapy interventions, but not providing any significant amount of measurable one-on-one time to either patient, for improvements in  [] posture, motor skill, proprioception of   [] LE: core, proximal hip and LE for self care, mobility, lifting, and ambulation/stair navigation    [] UE / Cervical: cervical, postural,  scapular, scapulothoracic and UE control with self care, reaching, carrying, lifting, house/yardwork, driving, computer work    Manual Treatments:  PROM / STM / Oscillations-Mobs:  G-I, II, III, IV (PA's, Inf., Post.)  [] (42599) Provided manual therapy to mobilize LE, proximal hip and/or LS spine soft tissue/joints for the purpose of modulating pain, promoting relaxation,  increasing ROM, reducing/eliminating soft tissue swelling/inflammation/restriction, improving soft tissue extensibility and allowing for proper ROM for normal function with   [] LE / lumbar: self care, mobility, lifting and ambulation. [] UE / Cervical: self care, reaching, carrying, lifting, house/yardwork, driving, computer work. Modalities:  [] (17859) Vasopneumatic compression: Utilized vasopneumatic compression to decrease edema / swelling for the purpose of improving mobility and quad tone / recruitment which will allow for increased overall function including but not limited to self-care, transfers, ambulation, and ascending / descending stairs. Modalities:  3/6:  None this date  Charges:  Timed Code Treatment Minutes: 32   Total Treatment Minutes: 32     [] EVAL - LOW (13836)   [] EVAL - MOD (60583)  [] EVAL - HIGH (72031)  [] RE-EVAL (36828)  [x] WETZEL(28399) x  1    [] Ionto  [] NMR (65594) x       [] Vaso  [] Manual (20550) x       [] Ultrasound  [x] TA x 1      [] Mech Traction (32030)  [] Aquatic Therapy x     [] ES (un) (26025):   [] Home Management Training x  [] ES(attended) (71730)   [] Dry Needling 1-2 muscles (78375):  [] Dry Needling 3+ muscles (306039  [] Group:      [] Other:     GOALS:  Patient stated goal: To improve overall endurance, walking and balance  []? Progressing: [x]? Met: []? Not Met: []?  Adjusted     Therapist goals for Patient:   Short Term Goals: To be achieved in: 2 weeks  1. Independent in HEP and progression per patient tolerance, in order to prevent re-injury. []? Progressing: [x]? Met: []? Not Met: []? Adjusted  2. Patient will have a decrease in pain to facilitate improvement in movement, function, and ADLs as indicated by Functional Deficits. []? Progressing: [x]? Met: []? Not Met: []? Adjusted     Long Term Goals: To be achieved in: 6 weeks  1. Bronson balance assessment of 40 or over to assist with reaching prior level of function. []? Progressing: [x]? Met: []? Not Met: []? Adjusted  2. Patient will demonstrate an increase in Strength to at least 5/5 BUE's and LE's as well as good proximal hip strength and control to allow for proper functional mobility as indicated by patients Functional Deficits. []? Progressing: [x]? Met: []? Not Met: []? Adjusted  3. Patient will return to functional activities including household chores and going out with  without increased symptoms or restriction. []? Progressing: [x]? Met: []? Not Met: []? Adjusted    Overall Progression Towards Functional goals/ Treatment Progress Update:  [x] Patient is progressing as expected towards functional goals listed. [] Progression is slowed due to complexities/Impairments listed. [] Progression has been slowed due to co-morbidities.   [] Plan just implemented, too soon to assess goals progression <30days   [] Goals require adjustment due to lack of progress  [] Patient is not progressing as expected and requires additional follow up with physician  [] Other    Persisting Functional Limitations/Impairments:  []Sleeping []Sitting               [x]Standing [x]Transfers        [x]Walking []Kneeling               [x]Stairs [x]Squatting / bending   [x]ADLs [x]Reaching  [x]Lifting  [x]Housework  [x]Driving []Job related tasks  [x]Sports/Recreation []Other:        ASSESSMENT:  Pt has progressed well and feels that she can continue independently with exercises at this time. Will discharge pt from PT. Treatment/Activity Tolerance:  [x] Patient able to complete tx [] Patient limited by fatigue  [] Patient limited by pain  [] Patient limited by other medical complications  [] Other:     Prognosis: [x] Good [x] Fair  [] Poor    Patient Requires Follow-up: [] Yes  [x] No    Plan for next treatment session:  Will discharge pt from PT at this time. [] Continue per plan of care [] Alter current plan (see comments)  [] Plan of care initiated [] Hold pending MD visit [x] Discharge    Electronically signed by: Nicolasa James PT, DPT    Note: If patient does not return for scheduled/ recommended follow up visits, his note will serve as a discharge from care along with most recent update on progress.

## 2020-03-09 ENCOUNTER — HOSPITAL ENCOUNTER (OUTPATIENT)
Dept: WOMENS IMAGING | Age: 80
Discharge: HOME | End: 2020-03-09
Payer: MEDICARE

## 2020-03-09 PROCEDURE — 77063 BREAST TOMOSYNTHESIS BI: CPT

## 2020-04-24 RX ORDER — ATORVASTATIN CALCIUM 10 MG/1
TABLET, FILM COATED ORAL
Qty: 90 TABLET | Refills: 2 | Status: SHIPPED | OUTPATIENT
Start: 2020-04-24 | End: 2020-09-09 | Stop reason: SDUPTHER

## 2020-09-09 RX ORDER — ATORVASTATIN CALCIUM 10 MG/1
10 TABLET, FILM COATED ORAL EVERY OTHER DAY
Qty: 45 TABLET | Refills: 1 | Status: SHIPPED | OUTPATIENT
Start: 2020-09-09 | End: 2021-03-22

## 2020-09-22 NOTE — TELEPHONE ENCOUNTER
Incoming call received from the patient - states that she did  the new updated prescription for atorvastatin and states that she did  losartan recently. Called [facility]:   Atorvastatin last picked up on 9/18/2020 for 90 day supply. SIG: 10 mg every other day. Billed through [employer]. Losartan last picked up 9/18/2020 for 90 day supply. Billed through [employer]. Patient has been set up with automatic refills. Will sign off at this time. Logan Garcia, PharmD, [facility]  Direct: [phone]  Department, toll free [phone], option 7           For Pharmacy Admin Tracking Only    PHSO: Yes  Total # of Interventions Recommended: 2  - New Order #: 0 New Medication Order Reason(s): Adherence  - Refills Provided #: 0  - Updated Order #: 0 Updated Order Reason(s):  Other  - Maintenance Safety Lab Monitoring #: 1  - New Therapy Lab Monitoring #: 1  Recommended intervention potential cost savings: 1  Total Interventions Accepted: 0  Time Spent (min): 30
Thank you for the quick response! Will sign off at this time.      Renee Maloney, TodD, [facility]  Direct: [phone]  Department, toll free [phone], option 7
The ASCVD Risk score (Rema Subramanian., et al., 2013) failed to calculate for the following reasons: The 2013 ASCVD risk score is only valid for ages 36 to 78     PLAN  Reached patient to review. Patient reports that she was instructed to take atorvastatin every other day by her PCP. Patient does not need a refill at this time, but will pend updated prescription request for PCP to match how she is currently taking at home.      Darci Da Silva, PharmD, [facility]  Direct: [phone]  Department, toll free [phone], option 7

## 2020-10-23 ENCOUNTER — HOSPITAL ENCOUNTER (OUTPATIENT)
Age: 80
Discharge: HOME | End: 2020-10-23
Payer: MEDICARE

## 2020-10-23 ENCOUNTER — TELEPHONE (OUTPATIENT)
Dept: FAMILY MEDICINE CLINIC | Age: 80
End: 2020-10-23

## 2020-10-23 LAB
A/G RATIO: 1.5 (ref 1.1–2.2)
ALBUMIN SERPL-MCNC: 4.1 G/DL (ref 3.4–5)
ALP BLD-CCNC: 82 U/L (ref 40–129)
ALT SERPL-CCNC: 12 U/L (ref 10–40)
ANION GAP SERPL CALCULATED.3IONS-SCNC: 9 MMOL/L (ref 3–16)
AST SERPL-CCNC: 20 U/L (ref 15–37)
BASOPHILS ABSOLUTE: 0 K/UL (ref 0–0.2)
BASOPHILS RELATIVE PERCENT: 0.7 %
BILIRUB SERPL-MCNC: 0.4 MG/DL (ref 0–1)
BUN BLDV-MCNC: 19 MG/DL (ref 7–20)
CALCIUM SERPL-MCNC: 9.2 MG/DL (ref 8.3–10.6)
CHLORIDE BLD-SCNC: 103 MMOL/L (ref 99–110)
CHOLESTEROL, TOTAL: 117 MG/DL (ref 0–199)
CO2: 28 MMOL/L (ref 21–32)
CREAT SERPL-MCNC: 0.6 MG/DL (ref 0.6–1.2)
EOSINOPHILS ABSOLUTE: 0.2 K/UL (ref 0–0.6)
EOSINOPHILS RELATIVE PERCENT: 4.1 %
GFR AFRICAN AMERICAN: >60
GFR NON-AFRICAN AMERICAN: >60
GLOBULIN: 2.7 G/DL
GLUCOSE BLD-MCNC: 95 MG/DL (ref 70–99)
HCT VFR BLD CALC: 39.4 % (ref 36–48)
HDLC SERPL-MCNC: 60 MG/DL (ref 40–60)
HEMOGLOBIN: 13.2 G/DL (ref 12–16)
LDL CHOLESTEROL CALCULATED: 38 MG/DL
LYMPHOCYTES ABSOLUTE: 1.8 K/UL (ref 1–5.1)
LYMPHOCYTES RELATIVE PERCENT: 36.8 %
MCH RBC QN AUTO: 29.7 PG (ref 26–34)
MCHC RBC AUTO-ENTMCNC: 33.4 G/DL (ref 31–36)
MCV RBC AUTO: 88.7 FL (ref 80–100)
MONOCYTES ABSOLUTE: 0.5 K/UL (ref 0–1.3)
MONOCYTES RELATIVE PERCENT: 9.4 %
NEUTROPHILS ABSOLUTE: 2.4 K/UL (ref 1.7–7.7)
NEUTROPHILS RELATIVE PERCENT: 49 %
PDW BLD-RTO: 13.4 % (ref 12.4–15.4)
PLATELET # BLD: 141 K/UL (ref 135–450)
PMV BLD AUTO: 8.5 FL (ref 5–10.5)
POTASSIUM SERPL-SCNC: 4.7 MMOL/L (ref 3.5–5.1)
RBC # BLD: 4.44 M/UL (ref 4–5.2)
SODIUM BLD-SCNC: 140 MMOL/L (ref 136–145)
TOTAL PROTEIN: 6.8 G/DL (ref 6.4–8.2)
TRIGL SERPL-MCNC: 94 MG/DL (ref 0–150)
TSH REFLEX: 1.71 UIU/ML (ref 0.27–4.2)
VLDLC SERPL CALC-MCNC: 19 MG/DL
WBC # BLD: 4.9 K/UL (ref 4–11)

## 2020-10-23 PROCEDURE — 85025 COMPLETE CBC W/AUTO DIFF WBC: CPT

## 2020-10-23 PROCEDURE — 80061 LIPID PANEL: CPT

## 2020-10-23 PROCEDURE — 36415 COLL VENOUS BLD VENIPUNCTURE: CPT

## 2020-10-23 PROCEDURE — 84443 ASSAY THYROID STIM HORMONE: CPT

## 2020-10-23 PROCEDURE — 80053 COMPREHEN METABOLIC PANEL: CPT

## 2020-10-23 NOTE — TELEPHONE ENCOUNTER
Patient is at [facility] lab right now to have blood work done for her annual wellness with Dr. Benny Laguna but there are no orders in Epic. Please place orders asap. Patient is waiting. No need to fax.

## 2020-11-06 ENCOUNTER — OFFICE VISIT (OUTPATIENT)
Dept: FAMILY MEDICINE CLINIC | Age: 80
End: 2020-11-06
Payer: MEDICARE

## 2020-11-06 VITALS
BODY MASS INDEX: 32.57 KG/M2 | SYSTOLIC BLOOD PRESSURE: 120 MMHG | DIASTOLIC BLOOD PRESSURE: 76 MMHG | HEART RATE: 92 BPM | HEIGHT: 62 IN | WEIGHT: 177 LBS | OXYGEN SATURATION: 98 % | TEMPERATURE: 97.1 F

## 2020-11-06 PROCEDURE — 99214 OFFICE O/P EST MOD 30 MIN: CPT | Performed by: FAMILY MEDICINE

## 2020-11-06 RX ORDER — LOSARTAN POTASSIUM 50 MG/1
50 TABLET ORAL DAILY
Qty: 90 TABLET | Refills: 3 | Status: SHIPPED | OUTPATIENT
Start: 2020-11-06 | End: 2021-01-04

## 2020-11-06 RX ORDER — SERTRALINE HYDROCHLORIDE 25 MG/1
TABLET, FILM COATED ORAL
COMMUNITY
Start: 2020-08-24 | End: 2021-02-03 | Stop reason: ALTCHOICE

## 2020-11-06 NOTE — PROGRESS NOTES
Noel Escalera is a 78 y.o. female. HPI:  Here for med check and lab review  Depression is stable as is blood pressure and hyperlipidemia    Overall doing so much better    Complaint is her arthritis which she takes Tylenol and aspirin  Would like to revisit physical therapy which helped her stay strong and for fall prevention    Recommend she follow back up with psych [facility] Tuesday plug-in continue vitamin D this winter    Discussed tetanus and shingles vaccine with her    Labs reviewed with patient in detail/all acceptable    Dog= aging,  12 yrs old     Meds, vitamins and allergies reviewed with pt    Wt Readings from Last 3 Encounters:   11/06/20 177 lb (80.3 kg)   01/21/20 168 lb (76.2 kg)   10/18/19 172 lb 6.4 oz (78.2 kg)       REVIEW OF SYSTEMS:   CONSTITUTIONAL: See history of present illness,   Weight noted   HEENT: No new vision difficulties or ringing in the ears. RESPIRATORY: No new SOB, PND, orthopnea or cough. CARDIOVASCULAR: no CP, palpitations or SOB with exertion  GI: No nausea, vomiting, diarrhea, constipation, abdominal pain or changes in bowel habits. : No urinary frequency, urgency, incontinence hematuria or dysuria. SKIN: No cyanosis or skin lesions. MUSCULOSKELETAL: Arthritis in her back and knees  NEUROLOGICAL: No syncope or TIA-like symptoms. PSYCHIATRIC: No anxiety, insomnia or depression     No Known Allergies    Prior to Visit Medications    Medication Sig Taking?  Authorizing Provider   sertraline (ZOLOFT) 25 MG tablet TAKE 1 TABLET BY MOUTH EVERY DAY Yes Historical Provider, MD   losartan (COZAAR) 50 MG tablet Take 1 tablet by mouth daily Yes Ramesh Lopez MD   atorvastatin (LIPITOR) 10 MG tablet Take 1 tablet by mouth every other day ++NEW DOSE++ Yes Ramesh Lopez MD   Lift Chair MISC by Does not apply route Yes Ramesh Lopez MD   estradiol (ESTRACE) 0.1 MG/GM vaginal cream Place 2 g vaginally Twice a Week  Yes Historical Provider, MD   aspirin 81 MG EC tablet Take 81 mg by mouth daily. Yes Historical Provider, MD   VITAMIN D PO Take  by mouth. Yes Historical Provider, MD       Past Medical History:   Diagnosis Date    AR (allergic rhinitis)     Arthritis of knee     Pruis    Depression     Erosive gastritis 10/28/2019    EGD October 2019, he did with PPI    GERD (gastroesophageal reflux disease)     Hyperlipidemia     Internal hemorrhoids     Overweight(278.02)     Viral meningitis 5/12       Social History     Tobacco Use    Smoking status: Never Smoker    Smokeless tobacco: Never Used   Substance Use Topics    Alcohol use: Yes     Comment: rarely       Family History   Problem Relation Age of Onset    Breast Cancer Mother     Bipolar Disorder Brother        OBJECTIVE:  /76   Pulse 92   Temp 97.1 °F (36.2 °C)   Ht 5' 2\" (1.575 m)   Wt 177 lb (80.3 kg)   SpO2 98%   BMI 32.37 kg/m²   GEN:  in NAD, pleasant  HEENT:  NCAT, TMs:normal and throat: Examined due to Covid/mask  NECK:  Supple without adenopathy. CV:  Regular rate and rhythm, S1 and S2 normal, no murmurs, clicks  PULM:  Chest is clear, no wheezing ,  symmetric air entry throughout both lung fields.   Breasts: No masses discharge or skin changes bilaterally  ABD: Soft, NT, no masses appreciated, no CVAT  EXT: No rash or edema  NEURO: Alert oriented ×3, no change, uses cane for arthritis back and knees    Lab Results   Component Value Date    WBC 4.9 10/23/2020    HGB 13.2 10/23/2020    HCT 39.4 10/23/2020    MCV 88.7 10/23/2020     10/23/2020     Lab Results   Component Value Date    CHOL 117 10/23/2020    CHOL 105 01/13/2020    CHOL 138 11/08/2018     Lab Results   Component Value Date    TRIG 94 10/23/2020    TRIG 66 01/13/2020    TRIG 125 11/08/2018     Lab Results   Component Value Date    HDL 60 10/23/2020    HDL 62 (H) 01/13/2020    HDL 57 11/08/2018     Lab Results   Component Value Date    LDLCALC 38 10/23/2020    LDLCALC 30 01/13/2020    [address] 56 11/08/2018     Lab Results Component Value Date    LABVLDL 19 10/23/2020    LABVLDL 13 01/13/2020    LABVLDL 25 11/08/2018     ASSESSMENT/PLAN:  1. Current mild episode of major depressive disorder without prior episode ([facility] [address]  stable, continue Zoloft at 25 mg    2. Essential hypertension  Stable, continue losartan, refill  - losartan (COZAAR) 50 MG tablet; Take 1 tablet by mouth daily  Dispense: 90 tablet; Refill: 3    3. Erosive gastritis  Stable not an issue    4. Mixed hyperlipidemia  Stable continue statin    5.  Generalized weakness  Refer  - [address] [place] [address]      25 minutes spent with the pt face-to-face,  >50% spent reviewing test results and discussing plan of care and counseled on healthy diet, stay active, continue meds,  Consider tetanus and shingles through her pharmacy    Follow-up with Dr. Nguyen Bring in the spring or as needed

## 2020-11-06 NOTE — PATIENT INSTRUCTIONS
[facility]  [phone]    [facility]   [phone]     outpt psychiatry  [phone]

## 2020-11-10 ENCOUNTER — HOSPITAL ENCOUNTER (OUTPATIENT)
Dept: PHYSICAL THERAPY | Age: 80
Setting detail: THERAPIES SERIES
Discharge: HOME | End: 2020-11-10
Payer: MEDICARE

## 2020-11-10 PROCEDURE — 97161 PT EVAL LOW COMPLEX 20 MIN: CPT

## 2020-11-10 PROCEDURE — 97110 THERAPEUTIC EXERCISES: CPT

## 2020-11-10 PROCEDURE — 97530 THERAPEUTIC ACTIVITIES: CPT

## 2020-11-10 NOTE — FLOWSHEET NOTE
[facility]  Phone: [phone]   Fax: [phone]    Physical Therapy Daily Treatment Note  Date:  11/10/2020    Patient Name:  Noel Escalera    :  1940  MRN: [id number]  Medical/Treatment Diagnosis Information:  · Diagnosis: Generalized weakness R53.1  · Treatment Diagnosis: generalized weakness causing overall decreased functional mobility  Insurance/Certification information:  PT Insurance Information: [employer] Medicare - medical necessity  Physician Information:  Referring Practitioner: Shadia Meng MD  Plan of care signed (Y/N): faxed 11/10    Date of Patient follow up with Physician:     Functional scale::LEFS:   raw score = 37 ; dysfunction = 40-59 %; TU.72 seconds without device   Progress Report: [x]  Yes - evaluation  []  No     Date Range for reporting period:  Beginning 11/10/20  Ending    Progress report due (10 Rx/or 30 days whichever is less): visit #52      Recertification due (POC duration/ or 90 days whichever is less):    Visit # Insurance Allowable Auth required?  Date Range    Medical necessity []  Yes  [x]  No         Latex Allergy:  [x]NO      []YES  Preferred Language for Healthcare:   [x]English       []other:      Pain level:  6/10     SUBJECTIVE:  See eval    OBJECTIVE: See eval      RESTRICTIONS/PRECAUTIONS: none    Exercises/Interventions:     Therapeutic Exercises (78748) Resistance / level Sets/sec Reps Notes   Nu step 1  X 4 min    IB/HR  2 x 30\"/2 x 10     Standing hamstring stretch  2 x 30\" B     Standing hip flexor stretch  2 x 30\" B                                        Therapeutic Activities (20832)       Discussed sitting with lumbar towel roll for posture and proper body mechanics                                   Neuromuscular Re-ed (41215)                                                 Manual Intervention ([phone])                                                     Pt. Education: Educated on proper body mechanics and posture correction, reviewed current exercises she is performing at home and reviewed new exercises provided this date.    -patient educated on diagnosis, prognosis and expectations for rehab  -all patient questions were answered    HEP instruction:  Written HEP instructions provided and reviewed (standing heel raises, standing hamstring stretch, standing hip flexor stretch, bridge, LTR, standing lumbar extension)      Therapeutic Exercise and NMR EXR  [x] (54023) Provided verbal/tactile cueing for activities related to strengthening, flexibility, endurance, ROM for improvements in  [x] LE / Lumbar: LE, proximal hip, and core control with self care, mobility, lifting, ambulation. [] UE / Cervical: cervical, postural, scapular, scapulothoracic and UE control with self care, reaching, carrying, lifting, house/yardwork, driving, computer work.  [] (62023) Provided verbal/tactile cueing for activities related to improving balance, coordination, kinesthetic sense, posture, motor skill, proprioception to assist with   [] LE / lumbar: LE, proximal hip, and core control in self care, mobility, lifting, ambulation and eccentric single leg control. [] UE / cervical: cervical, scapular, scapulothoracic and UE control with self care, reaching, carrying, lifting, house/yardwork, driving, computer work.   [] (02553) Therapist is in constant attendance of 2 or more patients providing skilled therapy interventions, but not providing any significant amount of measurable one-on-one time to either patient, for improvements in  [] LE / lumbar: LE, proximal hip, and core control in self care, mobility, lifting, ambulation and eccentric single leg control. [] UE / cervical: cervical, scapular, scapulothoracic and UE control with self care, reaching, carrying, lifting, house/yardwork, driving, computer work.      NMR and Therapeutic Activities:    [] (73163 or 81384) Provided verbal/tactile cueing for activities related to improving balance, coordination, kinesthetic sense, posture, motor skill, proprioception and motor activation to allow for proper function of   [] LE: / Lumbar core, proximal hip and LE with self care and ADLs  [] UE / Cervical: cervical, postural, scapular, scapulothoracic and UE control with self care, carrying, lifting, driving, computer work.   [] (13335) Gait Re-education- Provided training and instruction to the patient for proper LE, core and proximal hip recruitment and positioning and eccentric body weight control with ambulation re-education including up and down stairs     Home Management Training / Self Care:  [x] (93530) Provided self-care/home management training related to activities of daily living and compensatory training, and/or use of adaptive equipment for improvement with: ADLs and compensatory training, meal preparation, safety procedures and instruction in use of adaptive equipment, including bathing, grooming, dressing, personal hygiene, basic household cleaning and chores.      Home Exercise Program:    [x] (11982) Reviewed/Progressed HEP activities related to strengthening, flexibility, endurance, ROM of   [x] LE / Lumbar: core, proximal hip and LE for functional self-care, mobility, lifting and ambulation/stair navigation   [x] UE / Cervical: cervical, postural, scapular, scapulothoracic and UE control with self care, reaching, carrying, lifting, house/yardwork, driving, computer work  [] (78652)Reviewed/Progressed HEP activities related to improving balance, coordination, kinesthetic sense, posture, motor skill, proprioception of   [] LE: core, proximal hip and LE for self care, mobility, lifting, and ambulation/stair navigation    [] UE / Cervical: cervical, postural,  scapular, scapulothoracic and UE control with self care, reaching, carrying, lifting, house/yardwork, driving, computer work    Manual Treatments:  PROM / STM / Oscillations-Mobs:  G-I, II, III, IV (PA's, Inf., Post.)  [] (40947) Provided manual therapy to mobilize LE, proximal hip and/or LS spine soft tissue/joints for the purpose of modulating pain, promoting relaxation,  increasing ROM, reducing/eliminating soft tissue swelling/inflammation/restriction, improving soft tissue extensibility and allowing for proper ROM for normal function with   [] LE / lumbar: self care, mobility, lifting and ambulation. [] UE / Cervical: self care, reaching, carrying, lifting, house/yardwork, driving, computer work. Modalities:  [] (52190) Vasopneumatic compression: Utilized vasopneumatic compression to decrease edema / swelling for the purpose of improving mobility and quad tone / recruitment which will allow for increased overall function including but not limited to self-care, transfers, ambulation, and ascending / descending stairs. Modalities:  None this date    Charges:  Timed Code Treatment Minutes: 30   Total Treatment Minutes: 45     [x] EVAL - LOW (31327)   [] EVAL - MOD (22480)  [] EVAL - HIGH (25443)  [] RE-EVAL (46652)  [x] MV(31207) x 1      [] Ionto  [] NMR (89213) x       [] Vaso  [] Manual (86420) x       [] Ultrasound  [x] TA x 1       [] Mech Traction (10647)  [] Aquatic Therapy x     [] ES (un) (51284):   [] Home Management Training x  [] ES(attended) (74814)   [] Dry Needling 1-2 muscles (37265):  [] Dry Needling 3+ muscles (384956  [] Group:      [] Other:     GOALS:   Patient stated goal: To move better, improve balance and decrease pain     Therapist goals for Patient:   Short Term Goals: To be achieved in: 2 weeks  1. Independent in HEP and progression per patient tolerance, in order to prevent re-injury. 2. Patient will have a decrease in pain to facilitate improvement in movement, function, and ADLs as indicated by Functional Deficits.     Long Term Goals: To be achieved in: 2 weeks  1.  Disability index score of 20% or less for the LEFS to assist with reaching prior level of function. 2. Patient will demonstrate increased AROM to [facility] to allow for proper joint functioning as indicated by patients Functional Deficits. 3. Patient will demonstrate an increase in postural awareness and control to allow for proper functional mobility as indicated by patients Functional Deficits. 4. Patient will demonstrate an increase in Strength to  5/5 to allow for proper functional mobility as indicated by patients Functional Deficits. 5. Patient will return to functional activities including walking in the grocery store without increased symptoms or restriction.                      Overall Progression Towards Functional goals/ Treatment Progress Update:  [] Patient is progressing as expected towards functional goals listed. [] Progression is slowed due to complexities/Impairments listed. [] Progression has been slowed due to co-morbidities.   [x] Plan just implemented, too soon to assess goals progression <30days   [] Goals require adjustment due to lack of progress  [] Patient is not progressing as expected and requires additional follow up with physician  [] Other    Persisting Functional Limitations/Impairments:  []Sleeping [x]Sitting               [x]Standing []Transfers        [x]Walking []Kneeling               [x]Stairs [x]Squatting / bending   [x]ADLs []Reaching  [x]Lifting  [x]Housework  []Driving []Job related tasks  [x]Sports/Recreation []Other:        ASSESSMENT:  Pt with chronic back pain and generalized weakness interfering with functional mobility; will benefit from skilled PT for posture correction, education on body mechanics, strengthening and balance training  Treatment/Activity Tolerance:  [x] Patient able to complete tx [] Patient limited by fatigue  [] Patient limited by pain  [] Patient limited by other medical complications  [] Other:     Prognosis: [x] Good [] Fair  [] Poor    Patient Requires Follow-up: [x] Yes  [] No    Plan for next treatment session: Begin per training diary and progress as tolerated. PLAN: See eval. PT 2x / week for 6 weeks. [] Continue per plan of care [] Alter current plan (see comments)  [x] Plan of care initiated [] Hold pending MD visit [] Discharge    Electronically signed by: Kwaku Walter PT, DPT    Note: If patient does not return for scheduled/ recommended follow up visits, this note will serve as a discharge from care along with most recent update on progress.

## 2020-11-10 NOTE — FLOWSHEET NOTE
[facility]  [address] [place], [address]  Phone: [phone]   Fax:     [phone]                                                       Physical Therapy Certification    Dear Referring Practitioner: Leonardo Nunez MD,    We had the pleasure of evaluating the following patient for physical therapy services at [address] [place] [address]. A summary of our findings can be found in the initial assessment below. This includes our plan of care. If you have any questions or concerns regarding these findings, please do not hesitate to contact me at the office phone number checked above. Thank you for the referral.       Physician Signature:_______________________________Date:__________________  By signing above (or electronic signature), therapists plan is approved by physician      Patient: Dylan Peralta   : 1940   MRN: [id number]  Referring Physician: Referring Practitioner: Leonardo Nunez MD      Evaluation Date: 11/10/2020      Medical Diagnosis Information:  Diagnosis: Generalized weakness R53.1   Treatment Diagnosis: generalized weakness causing overall decreased functional mobility                                         Insurance information: PT Insurance Information: [employer] Medicare - medical necessity    Precautions/ Contra-indications: None  Latex Allergy:  [x]NO      []YES  Preferred Language for Healthcare:   [x]English       []other:    SUBJECTIVE: Patient reports she developed overall weakness and some aching about 6 weeks ago. States her back has been hurting some on and off and she has had some cramping in her legs. States she has been lifting on her elderly dog that has been sick and they have been doing home renovations so she has been lifting a little more than normal.  Reports she has been putting ice on her back some and that alleviates her pain. States her sleep has not been affected by pain.   States she has not had any falls but feels her balance is a little off at times. Working out at [employer] as she can (silver sneakers classes) however has to limit some exercises because of pain. Reports she can be on her feet for approx 30 minutes prior to requiring a rest break. Reports she is climbing stairs one at a time rather than using reciprocal pattern. Relevant Medical History:  Medical History     Diagnosis  Date  Comment  Source    Hyperlipidemia    Provider    Arthritis of knee   [facility]  Provider       Other Medical History     Diagnosis  Date  Comment  Source    Viral meningitis     Provider    Overweight(278.02)    Provider    Internal hemorrhoids    Provider    GERD (gastroesophageal reflux disease)    Provider    Erosive gastritis  10/28/2019  EGD 2019, he did with PPI  Provider    Depression    Provider    AR (allergic rhinitis)    Provider       Family History     Problem  Relation  Age of Onset  Comments    Bipolar Disorder  Brother      Breast Cancer  Mother      Social History     Tobacco History     Smoking Status   Never Smoker    Smokeless Tobacco Use   Never Used    Alcohol History     Alcohol Use Status   Yes  Comment   rarely    Drug Use     Drug Use Status   No    Sexual Activity     Sexually Active   Not Asked        Functional Outcome: LEFS:   raw score = 37 ; dysfunction = 40-59 %; TU.72 seconds without device    Pain Scale: 6/10  Easing factors: ice on back, hot shower  Provocative factors: lifting, bending     Type: []Constant   [x]Intermittent  []Radiating []Localized []other:     Numbness/Tingling: no reports of numbness and tingling    Occupation/School: retired     Living Status/Prior Level of Function: Prior to this injury / incident, pt was independent with ADLs and IADLs, States she is currently independent with all activities however things take longer because of pain.       OBJECTIVE:     Palpation: slightly tender to palpation along center of lumbar spine    Functional Musculoskeletal conditions   []Disc pathology   []Congenital spine pathologies   []Prior surgical intervention  []Osteoporosis (M81.8)  []Osteopenia (M85.8)   Endocrine conditions   []Hypothyroid (E03.9)  []Hyperthyroid Gastrointestinal conditions   []Constipation (U29.33)   Metabolic conditions   []Morbid obesity (E66.01)  []Diabetes type 1(E10.65) or 2 (E11.65)   []Neuropathy (G60.9)     Pulmonary conditions   []Asthma (J45)  []Coughing   []COPD (J44.9)   Psychological Disorders  []Anxiety (F41.9)  []Depression (F32.9)   []Other:   []Other:          Barriers to/and or personal factors that will affect rehab potential:              []Age  []Sex   []Smoker              [x]Motivation/Lack of Motivation                        []Co-Morbidities              []Cognitive Function, education/learning barriers              []Environmental, home barriers              []profession/work barriers  []past PT/medical experience  []other:  Justification: Pt motivated for therapy and should perform well    Falls Risk Assessment (30 days):   [x] Falls Risk assessed and no intervention required.   [] Falls Risk assessed and Patient requires intervention due to being higher risk   TUG score (>12s at risk):     [] Falls education provided, including         ASSESSMENT: Pt with chronic back pain and generalized weakness interfering with functional mobility; will benefit from skilled PT for posture correction, education on body mechanics, strengthening and balance training     Functional Impairments:     [x]Noted  joint hypomobility   []Noted  joint hypermobility   []Decreased functional ROM   []Abnormal reflexes/sensation/myotomal/dermatomal deficits   [x]Decreased strength and neuromuscular control    [x]Decreased functional strength   []other:      Functional Activity Limitations (from functional questionnaire and intake)   [x]Reduced ability to tolerate prolonged functional positions   []Reduced ability or difficulty with changes of positions or transfers between positions   [x]Reduced ability to maintain good posture and demonstrate good body mechanics with sitting, bending, and lifting   [] Reduced ability or tolerance with driving and/or computer work   [x]Reduced ability to perform lifting, reaching, carrying tasks   []Reduced ability to concentrate   []Reduced ability to sleep    []Reduced ability to tolerate any impact through    [x]Reduced ability to ambulate prolonged functional periods/distances   []other:    Participation Restrictions   []Reduced participation in self care activities   [x]Reduced participation in home management activities   []Reduced participation in work activities   [x]Reduced participation in social activities. [x]Reduced participation in sport/recreational activities.     Classification/Subgrouping:   [x]signs/symptoms consistent with chronic back pain, generalized weakness    Prognosis/Rehab Potential:      []Excellent   [x]Good    []Fair   []Poor    Tolerance of evaluation/treatment:    []Excellent   [x]Good    []Fair   []Poor    Physical Therapy Evaluation Complexity Justification  [x] A history of present problem with:  [x] no personal factors and/or comorbidities that impact the plan of care;  []1-2 personal factors and/or comorbidities that impact the plan of care  []3 personal factors and/or comorbidities that impact the plan of care  [x] An examination of body systems using standardized tests and measures addressing any of the following: body structures and functions (impairments), activity limitations, and/or participation restrictions;:  [x] a total of 1-2 or more elements   [] a total of 3 or more elements   [] a total of 4 or more elements   [x] A clinical presentation with:  [x] stable and/or uncomplicated characteristics   [] evolving clinical presentation with changing characteristics  [] unstable and unpredictable characteristics;   [x] Clinical decision making of [] low, [] moderate, [] high complexity using standardized patient assessment instrument and/or measurable assessment of functional outcome. [x] EVAL (LOW) 92724 (typically 20 minutes face-to-face)  [] EVAL (MOD) 06253 (typically 30 minutes face-to-face)  [] EVAL (HIGH) 41961 (typically 45 minutes face-to-face)  [] RE-EVAL     PLAN:   Frequency/Duration:  2 days per week for 6 Weeks:  Interventions:  [x]  Therapeutic exercise including: strength training, ROM, including postural re-education. [x]  NMR activation and proprioception, including postural re-education. [x]  Manual therapy as indicated to include: Dry Needling/IASTM, STM, PROM, Gr I-IV mobilizations, manipulation. [x] Modalities as needed that may include: thermal agents, E-stim, Biofeedback, US, iontophoresis as indicated  [x] Patient education on joint protection, postural re-education, activity modification, progression of HEP. HEP instruction: Written HEP instructions provided and reviewed (standing heel raises, standing hamstring stretch, standing hip flexor stretch, bridge, LTR, standing lumbar extension)    GOALS:  Patient stated goal: To move better, improve balance and decrease pain    Therapist goals for Patient:   Short Term Goals: To be achieved in: 2 weeks  1. Independent in HEP and progression per patient tolerance, in order to prevent re-injury. 2. Patient will have a decrease in pain to facilitate improvement in movement, function, and ADLs as indicated by Functional Deficits. Long Term Goals: To be achieved in: 2 weeks  1. Disability index score of 20% or less for the LEFS to assist with reaching prior level of function. 2. Patient will demonstrate increased AROM to [facility] to allow for proper joint functioning as indicated by patients Functional Deficits. 3. Patient will demonstrate an increase in postural awareness and control to allow for proper functional mobility as indicated by patients Functional Deficits.    4. Patient will demonstrate an increase in Strength to  5/5 to allow for proper functional mobility as indicated by patients Functional Deficits. 5. Patient will return to functional activities including walking in the grocery store without increased symptoms or restriction.         Electronically signed by:  Tamie Cavanaugh, [address] [facility], DPT  [id number]

## 2020-11-13 ENCOUNTER — HOSPITAL ENCOUNTER (OUTPATIENT)
Dept: PHYSICAL THERAPY | Age: 80
Setting detail: THERAPIES SERIES
Discharge: HOME | End: 2020-11-13
Payer: MEDICARE

## 2020-11-13 PROCEDURE — 97110 THERAPEUTIC EXERCISES: CPT

## 2020-11-13 NOTE — FLOWSHEET NOTE
(50150)       Reviewed sitting with lumbar towel roll for posture and proper body mechanics              Seated on SB (green): shifts a/p, lateral, cw/ccw, marches, OH arms  X 10 each                   Neuromuscular Re-ed (84925)                                                 Manual Intervention (25566)                                                     Pt. Education: 11/13:  Verbal cues for proper exercise technique   Educated on proper body mechanics and posture correction, reviewed current exercises she is performing at home and reviewed new exercises provided this date.    -patient educated on diagnosis, prognosis and expectations for rehab  -all patient questions were answered    HEP instruction: 11/13:  No new additions this date   Written HEP instructions provided and reviewed (standing heel raises, standing hamstring stretch, standing hip flexor stretch, bridge, LTR, standing lumbar extension)      Therapeutic Exercise and NMR EXR  [x] (16646) Provided verbal/tactile cueing for activities related to strengthening, flexibility, endurance, ROM for improvements in  [x] LE / Lumbar: LE, proximal hip, and core control with self care, mobility, lifting, ambulation. [] UE / Cervical: cervical, postural, scapular, scapulothoracic and UE control with self care, reaching, carrying, lifting, house/yardwork, driving, computer work.  [] (37935) Provided verbal/tactile cueing for activities related to improving balance, coordination, kinesthetic sense, posture, motor skill, proprioception to assist with   [] LE / lumbar: LE, proximal hip, and core control in self care, mobility, lifting, ambulation and eccentric single leg control.    [] UE / cervical: cervical, scapular, scapulothoracic and UE control with self care, reaching, carrying, lifting, house/yardwork, driving, computer work.   [] (44823) Therapist is in constant attendance of 2 or more patients providing skilled therapy interventions, but not providing any significant amount of measurable one-on-one time to either patient, for improvements in  [] LE / lumbar: LE, proximal hip, and core control in self care, mobility, lifting, ambulation and eccentric single leg control. [] UE / cervical: cervical, scapular, scapulothoracic and UE control with self care, reaching, carrying, lifting, house/yardwork, driving, computer work. NMR and Therapeutic Activities:    [] (55723 or 98555) Provided verbal/tactile cueing for activities related to improving balance, coordination, kinesthetic sense, posture, motor skill, proprioception and motor activation to allow for proper function of   [] LE: / Lumbar core, proximal hip and LE with self care and ADLs  [] UE / Cervical: cervical, postural, scapular, scapulothoracic and UE control with self care, carrying, lifting, driving, computer work.   [] (17378) Gait Re-education- Provided training and instruction to the patient for proper LE, core and proximal hip recruitment and positioning and eccentric body weight control with ambulation re-education including up and down stairs     Home Management Training / Self Care:  [] (98518) Provided self-care/home management training related to activities of daily living and compensatory training, and/or use of adaptive equipment for improvement with: ADLs and compensatory training, meal preparation, safety procedures and instruction in use of adaptive equipment, including bathing, grooming, dressing, personal hygiene, basic household cleaning and chores.      Home Exercise Program:    [] (62520) Reviewed/Progressed HEP activities related to strengthening, flexibility, endurance, ROM of   [] LE / Lumbar: core, proximal hip and LE for functional self-care, mobility, lifting and ambulation/stair navigation   [] UE / Cervical: cervical, postural, scapular, scapulothoracic and UE control with self care, reaching, carrying, lifting, house/yardwork, driving, computer work  [] (42221)Reviewed/Progressed HEP activities related to improving balance, coordination, kinesthetic sense, posture, motor skill, proprioception of   [] LE: core, proximal hip and LE for self care, mobility, lifting, and ambulation/stair navigation    [] UE / Cervical: cervical, postural,  scapular, scapulothoracic and UE control with self care, reaching, carrying, lifting, house/yardwork, driving, computer work    Manual Treatments:  PROM / STM / Oscillations-Mobs:  G-I, II, III, IV (PA's, Inf., Post.)  [] (70177) Provided manual therapy to mobilize LE, proximal hip and/or LS spine soft tissue/joints for the purpose of modulating pain, promoting relaxation,  increasing ROM, reducing/eliminating soft tissue swelling/inflammation/restriction, improving soft tissue extensibility and allowing for proper ROM for normal function with   [] LE / lumbar: self care, mobility, lifting and ambulation. [] UE / Cervical: self care, reaching, carrying, lifting, house/yardwork, driving, computer work. Modalities:  [] (32909) Vasopneumatic compression: Utilized vasopneumatic compression to decrease edema / swelling for the purpose of improving mobility and quad tone / recruitment which will allow for increased overall function including but not limited to self-care, transfers, ambulation, and ascending / descending stairs.        Modalities:  11/13:  None this date    Charges:  Timed Code Treatment Minutes: 43   Total Treatment Minutes: 43     [] EVAL - LOW (75334)   [] EVAL - MOD (66850)  [] EVAL - HIGH (99143)  [] RE-EVAL (63058)  [x] XO(46479) x 3      [] Ionto  [] NMR (99947) x       [] Vaso  [] Manual (89110) x       [] Ultrasound  [] TA x        [] Mech Traction (18762)  [] Aquatic Therapy x     [] ES (un) (47478):   [] Home Management Training x  [] ES(attended) (05341)   [] Dry Needling 1-2 muscles (77216):  [] Dry Needling 3+ muscles (150643  [] Group:      [] Other:     GOALS:   Patient stated goal: To move better, improve balance and decrease pain     Therapist goals for Patient:   Short Term Goals: To be achieved in: 2 weeks  1. Independent in HEP and progression per patient tolerance, in order to prevent re-injury. 2. Patient will have a decrease in pain to facilitate improvement in movement, function, and ADLs as indicated by Functional Deficits.     Long Term Goals: To be achieved in: 2 weeks  1. Disability index score of 20% or less for the LEFS to assist with reaching prior level of function. 2. Patient will demonstrate increased AROM to [facility] to allow for proper joint functioning as indicated by patients Functional Deficits. 3. Patient will demonstrate an increase in postural awareness and control to allow for proper functional mobility as indicated by patients Functional Deficits. 4. Patient will demonstrate an increase in Strength to  5/5 to allow for proper functional mobility as indicated by patients Functional Deficits. 5. Patient will return to functional activities including walking in the grocery store without increased symptoms or restriction.                      Overall Progression Towards Functional goals/ Treatment Progress Update:  [] Patient is progressing as expected towards functional goals listed. [] Progression is slowed due to complexities/Impairments listed. [] Progression has been slowed due to co-morbidities. [x] Plan just implemented, too soon to assess goals progression <30days   [] Goals require adjustment due to lack of progress  [] Patient is not progressing as expected and requires additional follow up with physician  [] Other    Persisting Functional Limitations/Impairments:  []Sleeping [x]Sitting               [x]Standing []Transfers        [x]Walking []Kneeling               [x]Stairs [x]Squatting / bending   [x]ADLs []Reaching  [x]Lifting  [x]Housework  []Driving []Job related tasks  [x]Sports/Recreation []Other:        ASSESSMENT:   11/13:   Tolerated session well this date

## 2020-11-17 ENCOUNTER — HOSPITAL ENCOUNTER (OUTPATIENT)
Dept: PHYSICAL THERAPY | Age: 80
Setting detail: THERAPIES SERIES
Discharge: HOME | End: 2020-11-17
Payer: MEDICARE

## 2020-11-17 ENCOUNTER — APPOINTMENT (OUTPATIENT)
Dept: PHYSICAL THERAPY | Age: 80
End: 2020-11-17
Payer: MEDICARE

## 2020-11-17 PROCEDURE — 97110 THERAPEUTIC EXERCISES: CPT

## 2020-11-17 NOTE — FLOWSHEET NOTE
[address] [facility] Physical Therapy  Phone: [phone]   Fax: [phone]    Physical Therapy Daily Treatment Note  Date:  2020    Patient Name:  Kaelyn Alvarado    :  1940  MRN: [id number]  Medical/Treatment Diagnosis Information:  · Diagnosis: Generalized weakness R53.1  · Treatment Diagnosis: generalized weakness causing overall decreased functional mobility  Insurance/Certification information:  PT Insurance Information: A[employer]na Medicare - medical necessity  Physician Information:  Referring Practitioner: Kem Stubbs MD  Plan of care signed (Y/N): faxed 11/10    Date of Patient follow up with Physician:     Functional scale::LEFS:   raw score = 37 ; dysfunction = 40-59 %; TU.72 seconds without device   Progress Report: [x]  Yes - evaluation  []  No     Date Range for reporting period:  Beginning 11/10/20  Ending    Progress report due (10 Rx/or 30 days whichever is less): visit #88      Recertification due (POC duration/ or 90 days whichever is less):    Visit # Insurance Allowable Auth required? Date Range   3/12 Medical necessity []  Yes  [x]  No         Latex Allergy:  [x]NO      []YES  Preferred Language for Healthcare:   [x]English       []other:      Pain level:  6/10  Mostly just stiffness. SUBJECTIVE:    Pt states that she has really bad toes due to bunions and corns. Did 40n mn chair yoga class today and went to grocery store so is a little more fatigued. :  Pt reports no sharp pain however is achy today.   States \"I'm just getting older\"     OBJECTIVE: :  Pt ambulating without device this date; still with slightly shortened step length and decreased toe off B      RESTRICTIONS/PRECAUTIONS: none    Exercises/Interventions:     Therapeutic Exercises (35385) Resistance / level Sets/sec Reps Notes   Nu step 1  X 4:45     IB/HR  2 x 30\"/   Having too much toe pain   Standing hamstring stretch  2 x 30\" B     Standing hip flexor stretch  2 x 30\" B            Step ups forward  6\"  X 10            Cables:  Walkouts    Rows:  Mid, high and LPD   2 plates      2 plates   X 3 each directions    X 10 each            Therapeutic Activities (98490)       Reviewed sitting with lumbar towel roll for posture and proper body mechanics              Seated on SB (green): shifts a/p, lateral, cw/ccw, marches, OH arms  X 10 each                   Neuromuscular Re-ed (17138)                                                 Manual Intervention (82745)                                                     Pt. Education: 11/13:  Verbal cues for proper exercise technique   Educated on proper body mechanics and posture correction, reviewed current exercises she is performing at home and reviewed new exercises provided this date.    -patient educated on diagnosis, prognosis and expectations for rehab  -all patient questions were answered    HEP instruction: 11/13:  No new additions this date   Written HEP instructions provided and reviewed (standing heel raises, standing hamstring stretch, standing hip flexor stretch, bridge, LTR, standing lumbar extension)      Therapeutic Exercise and NMR EXR  [x] (55575) Provided verbal/tactile cueing for activities related to strengthening, flexibility, endurance, ROM for improvements in  [x] LE / Lumbar: LE, proximal hip, and core control with self care, mobility, lifting, ambulation. [] UE / Cervical: cervical, postural, scapular, scapulothoracic and UE control with self care, reaching, carrying, lifting, house/yardwork, driving, computer work.  [] (09432) Provided verbal/tactile cueing for activities related to improving balance, coordination, kinesthetic sense, posture, motor skill, proprioception to assist with   [] LE / lumbar: LE, proximal hip, and core control in self care, mobility, lifting, ambulation and eccentric single leg control.    [] UE / cervical: cervical, scapular, scapulothoracic and UE control with self care, reaching, carrying, lifting, house/yardwork, driving, computer work.   [] (05957) Therapist is in constant attendance of 2 or more patients providing skilled therapy interventions, but not providing any significant amount of measurable one-on-one time to either patient, for improvements in  [] LE / lumbar: LE, proximal hip, and core control in self care, mobility, lifting, ambulation and eccentric single leg control. [] UE / cervical: cervical, scapular, scapulothoracic and UE control with self care, reaching, carrying, lifting, house/yardwork, driving, computer work. NMR and Therapeutic Activities:    [] (51751 or 99643) Provided verbal/tactile cueing for activities related to improving balance, coordination, kinesthetic sense, posture, motor skill, proprioception and motor activation to allow for proper function of   [] LE: / Lumbar core, proximal hip and LE with self care and ADLs  [] UE / Cervical: cervical, postural, scapular, scapulothoracic and UE control with self care, carrying, lifting, driving, computer work.   [] (57598) Gait Re-education- Provided training and instruction to the patient for proper LE, core and proximal hip recruitment and positioning and eccentric body weight control with ambulation re-education including up and down stairs     Home Management Training / Self Care:  [] (69717) Provided self-care/home management training related to activities of daily living and compensatory training, and/or use of adaptive equipment for improvement with: ADLs and compensatory training, meal preparation, safety procedures and instruction in use of adaptive equipment, including bathing, grooming, dressing, personal hygiene, basic household cleaning and chores.      Home Exercise Program:    [] (33724) Reviewed/Progressed HEP activities related to strengthening, flexibility, endurance, ROM of   [] LE / Lumbar: core, proximal hip and LE for functional self-care, mobility, lifting and ambulation/stair navigation   [] UE / Cervical: cervical, postural, scapular, scapulothoracic and UE control with self care, reaching, carrying, lifting, house/yardwork, driving, computer work  [] (09162)Reviewed/Progressed HEP activities related to improving balance, coordination, kinesthetic sense, posture, motor skill, proprioception of   [] LE: core, proximal hip and LE for self care, mobility, lifting, and ambulation/stair navigation    [] UE / Cervical: cervical, postural,  scapular, scapulothoracic and UE control with self care, reaching, carrying, lifting, house/yardwork, driving, computer work    Manual Treatments:  PROM / STM / Oscillations-Mobs:  G-I, II, III, IV (PA's, Inf., Post.)  [] (65189) Provided manual therapy to mobilize LE, proximal hip and/or LS spine soft tissue/joints for the purpose of modulating pain, promoting relaxation,  increasing ROM, reducing/eliminating soft tissue swelling/inflammation/restriction, improving soft tissue extensibility and allowing for proper ROM for normal function with   [] LE / lumbar: self care, mobility, lifting and ambulation. [] UE / Cervical: self care, reaching, carrying, lifting, house/yardwork, driving, computer work. Modalities:  [] (08201) Vasopneumatic compression: Utilized vasopneumatic compression to decrease edema / swelling for the purpose of improving mobility and quad tone / recruitment which will allow for increased overall function including but not limited to self-care, transfers, ambulation, and ascending / descending stairs.        Modalities:  11/13:  None this date    Charges:  Timed Code Treatment Minutes: 35   Total Treatment Minutes: 35     [] EVAL - LOW (85712)   [] EVAL - MOD (23388)  [] EVAL - HIGH (13231)  [] RE-EVAL (36169)  [x] YY(04655) x 2      [] Ionto  [] NMR (77816) x       [] Vaso  [] Manual (84920) x       [] Ultrasound  [] TA x        [] Mech Traction (49255)  [] Aquatic Therapy x     [] ES (un) (36402):   [] Home Management Training x  [] ES(attended) (20882)   [] Dry Needling 1-2 muscles (20032):  [] Dry Needling 3+ muscles (842625  [] Group:      [] Other:     GOALS:   Patient stated goal: To move better, improve balance and decrease pain     Therapist goals for Patient:   Short Term Goals: To be achieved in: 2 weeks  1. Independent in HEP and progression per patient tolerance, in order to prevent re-injury. 2. Patient will have a decrease in pain to facilitate improvement in movement, function, and ADLs as indicated by Functional Deficits.     Long Term Goals: To be achieved in: 2 weeks  1. Disability index score of 20% or less for the LEFS to assist with reaching prior level of function. 2. Patient will demonstrate [facility] to allow for proper joint functioning as indicated by patients Functional Deficits. 3. Patient will demonstrate an increase in postural awareness and control to allow for proper functional mobility as indicated by patients Functional Deficits. 4. Patient will demonstrate an increase in Strength to  5/5 to allow for proper functional mobility as indicated by patients Functional Deficits. 5. Patient will return to functional activities including walking in the grocery store without increased symptoms or restriction.                      Overall Progression Towards Functional goals/ Treatment Progress Update:  [] Patient is progressing as expected towards functional goals listed. [] Progression is slowed due to complexities/Impairments listed. [] Progression has been slowed due to co-morbidities.   [x] Plan just implemented, too soon to assess goals progression <30days   [] Goals require adjustment due to lack of progress  [] Patient is not progressing as expected and requires additional follow up with physician  [] Other    Persisting Functional Limitations/Impairments:  []Sleeping [x]Sitting               [x]Standing []Transfers        [x]Walking []Kneeling               [x]Stairs [x]Squatting / bending   [x]ADLs []Reaching  [x]Lifting  [x]Housework  []Driving []Job related tasks  [x]Sports/Recreation []Other:        ASSESSMENT:   11/13: Tolerated session well this date without any increase in pain    Pt with chronic back pain and generalized weakness interfering with functional mobility; will benefit from skilled PT for posture correction, education on body mechanics, strengthening and balance training    Treatment/Activity Tolerance:  [x] Patient able to complete tx [] Patient limited by fatigue  [] Patient limited by pain  [] Patient limited by other medical complications  [] Other:     Prognosis: [x] Good [] Fair  [] Poor    Patient Requires Follow-up: [x] Yes  [] No    Plan for next treatment session: Begin per training diary and progress as tolerated. PLAN: See eval. PT 2x / week for 6 weeks. [x] Continue per plan of care [] Alter current plan (see comments)  [] Plan of care initiated [] Hold pending MD visit [] Discharge    Electronically signed by: Chuck Adams PT, DPT    Note: If patient does not return for scheduled/ recommended follow up visits, this note will serve as a discharge from care along with most recent update on progress.

## 2020-12-01 ENCOUNTER — HOSPITAL ENCOUNTER (OUTPATIENT)
Dept: PHYSICAL THERAPY | Age: 80
Setting detail: THERAPIES SERIES
Discharge: HOME | End: 2020-12-01
Payer: MEDICARE

## 2020-12-01 PROCEDURE — 97110 THERAPEUTIC EXERCISES: CPT

## 2020-12-01 NOTE — FLOWSHEET NOTE
[address] [facility] Physical Therapy  Phone: [phone]   Fax: [phone]    Physical Therapy Daily Treatment Note  Date:  2020    Patient Name:  Justin Sifuentes    :  1940  MRN: [id number]  Medical/Treatment Diagnosis Information:  · Diagnosis: Generalized weakness R53.1  · Treatment Diagnosis: generalized weakness causing overall decreased functional mobility  Insurance/Certification information:  PT Insurance Information: A[employer]na Medicare - medical necessity  Physician Information:  Referring Practitioner: Tab Allen MD  Plan of care signed (Y/N): faxed 11/10    Date of Patient follow up with Physician:     Functional scale::LEFS:   raw score = 37 ; dysfunction = 40-59 %; TU.72 seconds without device   Progress Report: [x]  Yes - evaluation  []  No     Date Range for reporting period:  Beginning 11/10/20  Ending    Progress report due (10 Rx/or 30 days whichever is less): visit #53      Recertification due (POC duration/ or 90 days whichever is less):    Visit # Insurance Allowable Auth required? Date Range    Medical necessity []  Yes  [x]  No         Latex Allergy:  [x]NO      []YES  Preferred Language for Healthcare:   [x]English       []other:      Pain level:  4/10 aching all over      SUBJECTIVE:  :  Pt reports she is doing well overall; sore and achy all over today - feels it is related to cold weather and lifting her dog a lot.   Pt states that she has really bad toes due to bunions and corns. Did 40n mn chair yoga class today and went to grocery store so is a little more fatigued. :  Pt reports no sharp pain however is achy today.   States \"I'm just getting older\"     OBJECTIVE: :  Pt ambulating without device this date; still with slightly shortened step length and decreased toe off B      RESTRICTIONS/PRECAUTIONS: none    Exercises/Interventions:     Therapeutic Exercises (37453) Resistance / level Sets/sec Reps Notes   Nu step 1  X 5 min    IB/HR  2 x 30\"/2 x 10     Standing hamstring stretch  2 x 30\" B     Standing hip flexor stretch  2 x 30\" B            Step ups forward  6\"  X 10            Cables:  Walkouts    Rows:  Mid, high and LPD         2.5 plates       X 10 each            Therapeutic Activities (71592)       Reviewed sitting with lumbar towel roll for posture and proper body mechanics              Seated on SB (green): shifts a/p, lateral, cw/ccw, marches, OH arms, rotation B  X 10 each                   Neuromuscular Re-ed (64442)       Airex:  NBOS, with arms OH     Stagger stance B  SLS B    X 30 sec/x 10  2 x 30\"  2 x 30\"                                         Manual Intervention (53932)                                                     Pt. Education: 12/1:  Verbal cues for proper exercise technique   Educated on proper body mechanics and posture correction, reviewed current exercises she is performing at home and reviewed new exercises provided this date.    -patient educated on diagnosis, prognosis and expectations for rehab  -all patient questions were answered    HEP instruction: 12/1:  No new additions this date   Written HEP instructions provided and reviewed (standing heel raises, standing hamstring stretch, standing hip flexor stretch, bridge, LTR, standing lumbar extension)      Therapeutic Exercise and NMR EXR  [x] (50360) Provided verbal/tactile cueing for activities related to strengthening, flexibility, endurance, ROM for improvements in  [x] LE / Lumbar: LE, proximal hip, and core control with self care, mobility, lifting, ambulation.   [] UE / Cervical: cervical, postural, scapular, scapulothoracic and UE control with self care, reaching, carrying, lifting, house/yardwork, driving, computer work.  [] (89405) Provided verbal/tactile cueing for activities related to improving balance, coordination, kinesthetic sense, posture, motor skill, proprioception to assist with   [] LE / lumbar: LE, proximal hip, and core control in self care, mobility, lifting, ambulation and eccentric single leg control. [] UE / cervical: cervical, scapular, scapulothoracic and UE control with self care, reaching, carrying, lifting, house/yardwork, driving, computer work.   [] (83495) Therapist is in constant attendance of 2 or more patients providing skilled therapy interventions, but not providing any significant amount of measurable one-on-one time to either patient, for improvements in  [] LE / lumbar: LE, proximal hip, and core control in self care, mobility, lifting, ambulation and eccentric single leg control. [] UE / cervical: cervical, scapular, scapulothoracic and UE control with self care, reaching, carrying, lifting, house/yardwork, driving, computer work. NMR and Therapeutic Activities:    [] (17588 or 74531) Provided verbal/tactile cueing for activities related to improving balance, coordination, kinesthetic sense, posture, motor skill, proprioception and motor activation to allow for proper function of   [] LE: / Lumbar core, proximal hip and LE with self care and ADLs  [] UE / Cervical: cervical, postural, scapular, scapulothoracic and UE control with self care, carrying, lifting, driving, computer work.   [] (23027) Gait Re-education- Provided training and instruction to the patient for proper LE, core and proximal hip recruitment and positioning and eccentric body weight control with ambulation re-education including up and down stairs     Home Management Training / Self Care:  [] (26147) Provided self-care/home management training related to activities of daily living and compensatory training, and/or use of adaptive equipment for improvement with: ADLs and compensatory training, meal preparation, safety procedures and instruction in use of adaptive equipment, including bathing, grooming, dressing, personal hygiene, basic household cleaning and chores.      Home Exercise Program:    [] (23066) [] Ionto  [] NMR (31267) x       [] Vaso  [] Manual (01769) x       [] Ultrasound  [] TA x        [] Mech Traction (66308)  [] Aquatic Therapy x     [] ES (un) (43970):   [] Home Management Training x  [] ES(attended) (31579)   [] Dry Needling 1-2 muscles (35059):  [] Dry Needling 3+ muscles (779443  [] Group:      [] Other:     GOALS:   Patient stated goal: To move better, improve balance and decrease pain     Therapist goals for Patient:   Short Term Goals: To be achieved in: 2 weeks  1. Independent in HEP and progression per patient tolerance, in order to prevent re-injury. 2. Patient will have a decrease in pain to facilitate improvement in movement, function, and ADLs as indicated by Functional Deficits.     Long Term Goals: To be achieved in: 2 weeks  1. Disability index score of 20% or less for the LEFS to assist with reaching prior level of function. 2. Patient will demonstrate increased AROM to [facility] to allow for proper joint functioning as indicated by patients Functional Deficits. 3. Patient will demonstrate an increase in postural awareness and control to allow for proper functional mobility as indicated by patients Functional Deficits. 4. Patient will demonstrate an increase in Strength to  5/5 to allow for proper functional mobility as indicated by patients Functional Deficits. 5. Patient will return to functional activities including walking in the grocery store without increased symptoms or restriction.                      Overall Progression Towards Functional goals/ Treatment Progress Update:  [] Patient is progressing as expected towards functional goals listed. [] Progression is slowed due to complexities/Impairments listed. [] Progression has been slowed due to co-morbidities.   [x] Plan just implemented, too soon to assess goals progression <30days   [] Goals require adjustment due to lack of progress  [] Patient is not progressing as expected and requires additional follow up with physician  [] Other    Persisting Functional Limitations/Impairments:  []Sleeping [x]Sitting               [x]Standing []Transfers        [x]Walking []Kneeling               [x]Stairs [x]Squatting / bending   [x]ADLs []Reaching  [x]Lifting  [x]Housework  []Driving []Job related tasks  [x]Sports/Recreation []Other:        ASSESSMENT:   12/1:  Tolerated session well this date without any increase in pain; fatigued by end of session    Pt with chronic back pain and generalized weakness interfering with functional mobility; will benefit from skilled PT for posture correction, education on body mechanics, strengthening and balance training    Treatment/Activity Tolerance:  [x] Patient able to complete tx [] Patient limited by fatigue  [] Patient limited by pain  [] Patient limited by other medical complications  [] Other:     Prognosis: [x] Good [] Fair  [] Poor    Patient Requires Follow-up: [x] Yes  [] No    Plan for next treatment session: Continue per training diary and progress as tolerated. PLAN: See eval. PT 2x / week for 6 weeks. [x] Continue per plan of care [] Alter current plan (see comments)  [] Plan of care initiated [] Hold pending MD visit [] Discharge    Electronically signed by: Fredis Nettles PT, DPT    Note: If patient does not return for scheduled/ recommended follow up visits, this note will serve as a discharge from care along with most recent update on progress.

## 2020-12-04 ENCOUNTER — HOSPITAL ENCOUNTER (OUTPATIENT)
Dept: PHYSICAL THERAPY | Age: 80
Setting detail: THERAPIES SERIES
Discharge: HOME | End: 2020-12-04
Payer: MEDICARE

## 2020-12-04 PROCEDURE — 97112 NEUROMUSCULAR REEDUCATION: CPT

## 2020-12-04 PROCEDURE — 97110 THERAPEUTIC EXERCISES: CPT

## 2020-12-04 NOTE — FLOWSHEET NOTE
[address] [facility] Physical Therapy  Phone: [phone]   Fax: [phone]    Physical Therapy Daily Treatment Note  Date:  2020    Patient Name:  Carolina Aguilar    :  1940  MRN: [id number]  Medical/Treatment Diagnosis Information:  · Diagnosis: Generalized weakness R53.1  · Treatment Diagnosis: generalized weakness causing overall decreased functional mobility  Insurance/Certification information:  PT Insurance Information: A[employer]na Medicare - medical necessity  Physician Information:  Referring Practitioner: Becki Desai MD  Plan of care signed (Y/N): faxed 11/10    Date of Patient follow up with Physician:     Functional scale::LEFS:   raw score = 37 ; dysfunction = 40-59 %; TU.72 seconds without device   Progress Report: []  Yes   [x]  No     Date Range for reporting period:  Beginning 11/10/20  Ending    Progress report due (10 Rx/or 30 days whichever is less): visit #38      Recertification due (POC duration/ or 90 days whichever is less):    Visit # Insurance Allowable Auth required? Date Range    Medical necessity []  Yes  [x]  No         Latex Allergy:  [x]NO      []YES  Preferred Language for Healthcare:   [x]English       []other:      Pain level: 10 back and legs on and off     SUBJECTIVE:  :  Pt with no new complaints this date. States she is slightly stiff today however feels better than she did yesterday  :  Pt reports she is doing well overall; sore and achy all over today - feels it is related to cold weather and lifting her dog a lot.   Pt states that she has really bad toes due to bunions and corns. Did 40n mn chair yoga class today and went to grocery store so is a little more fatigued. :  Pt reports no sharp pain however is achy today.   States \"I'm just getting older\"     OBJECTIVE: :  Pt ambulating without device this date; still with slightly shortened step length and decreased toe off THORACIC SURGERY    58 elisabet left pneumonia, now has multiloculated parapneumonic effusion possible empyema.  Thoracentesis only 20 ml, exudate cultures negative so far.    Reviewed findings and options of treatment.  recommend left vats possible left thoracotomy.  Operation, goals and risks discussed in details.  Will do tomorrow afternoon.    Orders written    NPO at midnight.    STEVE BARTLETT MD [facility] ONCOLOGY THORACIC SURGERY  CELL:  [phone]  OFFICE: [phone]   B      RESTRICTIONS/PRECAUTIONS: none    Exercises/Interventions:     Therapeutic Exercises (98144) Resistance / level Sets/sec Reps Notes   Nu step 1  X 5 min    IB/HR  2 x 30\"/2 x 10     Standing hamstring stretch  2 x 30\" B     Standing hip flexor stretch  2 x 30\" B            Step ups forward  6\"  X 10            Cables:  Walkouts    Rows:  Mid, high and LPD         2.5 plates       X 15 each            Therapeutic Activities (47611)       Reviewed sitting with lumbar towel roll for posture and proper body mechanics              Seated on SB (green): shifts a/p, lateral, cw/ccw, marches, OH arms, rotation B  X 10 each            Total gym squats  X 15     Neuromuscular Re-ed (20764)       Airex:  NBOS, with arms OH, rotation B     Stagger stance B  SLS B    X 30 sec/x 10/x 10  2 x 30\"  2 x 30\"      1/2 foam: perpendicular to foam in DLS, Parallel on foam in tandem stance  2 x 30\"  each            Seated:    Hip adduction with ball squeeze  Hip abduction with green band  Ham curls with green band B    X 10   X 10  X 10                     Manual Intervention (71998)                                                     Pt. Education: 12/4:  Verbal cues for proper exercise technique   Educated on proper body mechanics and posture correction, reviewed current exercises she is performing at home and reviewed new exercises provided this date.    -patient educated on diagnosis, prognosis and expectations for rehab  -all patient questions were answered    HEP instruction: 12/4:  No new additions this date   Written HEP instructions provided and reviewed (standing heel raises, standing hamstring stretch, standing hip flexor stretch, bridge, LTR, standing lumbar extension)      Therapeutic Exercise and NMR EXR  [x] (72277) Provided verbal/tactile cueing for activities related to strengthening, flexibility, endurance, ROM for improvements in  [x] LE / Lumbar: LE, proximal hip, and core control with self care, mobility, lifting, ambulation. [] UE / Cervical: cervical, postural, scapular, scapulothoracic and UE control with self care, reaching, carrying, lifting, house/yardwork, driving, computer work.  [] (22591) Provided verbal/tactile cueing for activities related to improving balance, coordination, kinesthetic sense, posture, motor skill, proprioception to assist with   [] LE / lumbar: LE, proximal hip, and core control in self care, mobility, lifting, ambulation and eccentric single leg control. [] UE / cervical: cervical, scapular, scapulothoracic and UE control with self care, reaching, carrying, lifting, house/yardwork, driving, computer work.   [] (05030) Therapist is in constant attendance of 2 or more patients providing skilled therapy interventions, but not providing any significant amount of measurable one-on-one time to either patient, for improvements in  [] LE / lumbar: LE, proximal hip, and core control in self care, mobility, lifting, ambulation and eccentric single leg control. [] UE / cervical: cervical, scapular, scapulothoracic and UE control with self care, reaching, carrying, lifting, house/yardwork, driving, computer work.      NMR and Therapeutic Activities:    [] (21943 or 33732) Provided verbal/tactile cueing for activities related to improving balance, coordination, kinesthetic sense, posture, motor skill, proprioception and motor activation to allow for proper function of   [] LE: / Lumbar core, proximal hip and LE with self care and ADLs  [] UE / Cervical: cervical, postural, scapular, scapulothoracic and UE control with self care, carrying, lifting, driving, computer work.   [] (96400) Gait Re-education- Provided training and instruction to the patient for proper LE, core and proximal hip recruitment and positioning and eccentric body weight control with ambulation re-education including up and down stairs     Home Management Training / Self Care:  [] (70214) Provided self-care/home management training related to activities of daily living and compensatory training, and/or use of adaptive equipment for improvement with: ADLs and compensatory training, meal preparation, safety procedures and instruction in use of adaptive equipment, including bathing, grooming, dressing, personal hygiene, basic household cleaning and chores. Home Exercise Program:    [] (21729) Reviewed/Progressed HEP activities related to strengthening, flexibility, endurance, ROM of   [] LE / Lumbar: core, proximal hip and LE for functional self-care, mobility, lifting and ambulation/stair navigation   [] UE / Cervical: cervical, postural, scapular, scapulothoracic and UE control with self care, reaching, carrying, lifting, house/yardwork, driving, computer work  [] (60461)Reviewed/Progressed HEP activities related to improving balance, coordination, kinesthetic sense, posture, motor skill, proprioception of   [] LE: core, proximal hip and LE for self care, mobility, lifting, and ambulation/stair navigation    [] UE / Cervical: cervical, postural,  scapular, scapulothoracic and UE control with self care, reaching, carrying, lifting, house/yardwork, driving, computer work    Manual Treatments:  PROM / STM / Oscillations-Mobs:  G-I, II, III, IV (PA's, Inf., Post.)  [] (63348) Provided manual therapy to mobilize LE, proximal hip and/or LS spine soft tissue/joints for the purpose of modulating pain, promoting relaxation,  increasing ROM, reducing/eliminating soft tissue swelling/inflammation/restriction, improving soft tissue extensibility and allowing for proper ROM for normal function with   [] LE / lumbar: self care, mobility, lifting and ambulation. [] UE / Cervical: self care, reaching, carrying, lifting, house/yardwork, driving, computer work.      Modalities:  [] (49517) Vasopneumatic compression: Utilized vasopneumatic compression to decrease edema / swelling for the purpose of improving mobility and quad tone / recruitment which will allow for increased overall function including but not limited to self-care, transfers, ambulation, and ascending / descending stairs. Modalities:  12/4:  None this date    Charges:  Timed Code Treatment Minutes: 45   Total Treatment Minutes: 45     [] EVAL - LOW (59597)   [] EVAL - MOD (73671)  [] EVAL - HIGH (14823)  [] RE-EVAL (47613)  [x] HI(44198) x 2      [] Ionto  [x] NMR (82240) x 1      [] Vaso  [] Manual (30544) x       [] Ultrasound  [] TA x        [] Mech Traction (94375)  [] Aquatic Therapy x     [] ES (un) (57954):   [] Home Management Training x  [] ES(attended) (90974)   [] Dry Needling 1-2 muscles (66456):  [] Dry Needling 3+ muscles (355600  [] Group:      [] Other:     GOALS:   Patient stated goal: To move better, improve balance and decrease pain     Therapist goals for Patient:   Short Term Goals: To be achieved in: 2 weeks  1. Independent in HEP and progression per patient tolerance, in order to prevent re-injury. 2. Patient will have a decrease in pain to facilitate improvement in movement, function, and ADLs as indicated by Functional Deficits.     Long Term Goals: To be achieved in: 2 weeks  1. Disability index score of 20% or less for the LEFS to assist with reaching prior level of function. 2. Patient will demonstrate increased AROM to [facility] to allow for proper joint functioning as indicated by patients Functional Deficits. 3. Patient will demonstrate an increase in postural awareness and control to allow for proper functional mobility as indicated by patients Functional Deficits. 4. Patient will demonstrate an increase in Strength to  5/5 to allow for proper functional mobility as indicated by patients Functional Deficits.    5. Patient will return to functional activities including walking in the grocery store without increased symptoms or restriction.                      Overall Progression Towards Functional goals/ Treatment Progress Update:  [] Patient is

## 2020-12-08 ENCOUNTER — HOSPITAL ENCOUNTER (OUTPATIENT)
Dept: PHYSICAL THERAPY | Age: 80
Setting detail: THERAPIES SERIES
Discharge: HOME | End: 2020-12-08
Payer: MEDICARE

## 2020-12-08 PROCEDURE — 97110 THERAPEUTIC EXERCISES: CPT

## 2020-12-08 NOTE — FLOWSHEET NOTE
[address] [facility] Physical Therapy  Phone: [phone]   Fax: [phone]    Physical Therapy Daily Treatment Note  Date:  2020    Patient Name:  Alfonzo Ramon    :  1940  MRN: [id number]  Medical/Treatment Diagnosis Information:  · Diagnosis: Generalized weakness R53.1  · Treatment Diagnosis: generalized weakness causing overall decreased functional mobility  Insurance/Certification information:  PT Insurance Information: A[employer]na Medicare - medical necessity  Physician Information:  Referring Practitioner: Sadia Faith MD  Plan of care signed (Y/N): faxed 11/10    Date of Patient follow up with Physician:     Functional scale::LEFS:   raw score = 37 ; dysfunction = 40-59 %; TU.72 seconds without device   Progress Report: []  Yes   [x]  No     Date Range for reporting period:  Beginning 11/10/20  Ending    Progress report due (10 Rx/or 30 days whichever is less): visit #93      Recertification due (POC duration/ or 90 days whichever is less):    Visit # Insurance Allowable Auth required? Date Range    Medical necessity []  Yes  [x]  No         Latex Allergy:  [x]NO      []YES  Preferred Language for Healthcare:   [x]English       []other:      Pain level: 4/10 back and legs on and off     SUBJECTIVE:  : Pt reports she has had a rough morning - will likely have to put her dog to sleep today. :  Pt with no new complaints this date. States she is slightly stiff today however feels better than she did yesterday  :  Pt reports she is doing well overall; sore and achy all over today - feels it is related to cold weather and lifting her dog a lot.   Pt states that she has really bad toes due to bunions and corns. Did 40n mn chair yoga class today and went to grocery store so is a little more fatigued. :  Pt reports no sharp pain however is achy today.   States \"I'm just getting older\"     OBJECTIVE: :  Pt ambulating without device this date; continues with slightly shortened step length and decreased toe off B      RESTRICTIONS/PRECAUTIONS: none    Exercises/Interventions:     Therapeutic Exercises (94320) Resistance / level Sets/sec Reps Notes   Nu step 1  X 5 min    IB/HR  2 x 30\"/2 x 10     Standing hamstring stretch  2 x 30\" B     Standing hip flexor stretch  2 x 30\" B            Step ups forward  6\"  X 10            Cables:  Walkouts    3 way kicks    Rows:  Mid, high and LPD 2.5 plates1 plate  X 3 each directions  X 10 B                  Therapeutic Activities (45717)       Reviewed sitting with lumbar towel roll for posture and proper body mechanics              Seated on SB (green): shifts a/p, lateral, cw/ccw, marches, OH arms, rotation B              Total gym squats  X 15     Neuromuscular Re-ed (65771)       Airex:  NBOS, with arms OH, rotation B     Stagger stance B  SLS B      1/2 foam: perpendicular to foam in DLS, Parallel on foam in tandem stance             Seated:    Hip adduction with ball squeeze  Hip abduction with green band  Ham curls with green band B                         Manual Intervention (65219)                                                     Pt. Education: 12/8:  Verbal cues for proper exercise technique   Educated on proper body mechanics and posture correction, reviewed current exercises she is performing at home and reviewed new exercises provided this date.    -patient educated on diagnosis, prognosis and expectations for rehab  -all patient questions were answered    HEP instruction: 12/8:  No new additions this date   Written HEP instructions provided and reviewed (standing heel raises, standing hamstring stretch, standing hip flexor stretch, bridge, LTR, standing lumbar extension)      Therapeutic Exercise and NMR EXR  [x] (74610) Provided verbal/tactile cueing for activities related to strengthening, flexibility, endurance, ROM for improvements in  [x] LE / Lumbar: LE, proximal hip, and core control with self care, mobility, lifting, ambulation. [] UE / Cervical: cervical, postural, scapular, scapulothoracic and UE control with self care, reaching, carrying, lifting, house/yardwork, driving, computer work.  [] (44299) Provided verbal/tactile cueing for activities related to improving balance, coordination, kinesthetic sense, posture, motor skill, proprioception to assist with   [] LE / lumbar: LE, proximal hip, and core control in self care, mobility, lifting, ambulation and eccentric single leg control. [] UE / cervical: cervical, scapular, scapulothoracic and UE control with self care, reaching, carrying, lifting, house/yardwork, driving, computer work.   [] (87418) Therapist is in constant attendance of 2 or more patients providing skilled therapy interventions, but not providing any significant amount of measurable one-on-one time to either patient, for improvements in  [] LE / lumbar: LE, proximal hip, and core control in self care, mobility, lifting, ambulation and eccentric single leg control. [] UE / cervical: cervical, scapular, scapulothoracic and UE control with self care, reaching, carrying, lifting, house/yardwork, driving, computer work.      NMR and Therapeutic Activities:    [] (75881 or 52033) Provided verbal/tactile cueing for activities related to improving balance, coordination, kinesthetic sense, posture, motor skill, proprioception and motor activation to allow for proper function of   [] LE: / Lumbar core, proximal hip and LE with self care and ADLs  [] UE / Cervical: cervical, postural, scapular, scapulothoracic and UE control with self care, carrying, lifting, driving, computer work.   [] (17349) Gait Re-education- Provided training and instruction to the patient for proper LE, core and proximal hip recruitment and positioning and eccentric body weight control with ambulation re-education including up and down stairs     Home Management Training / Self Care:  [] (42428) Provided self-care/home management training related to activities of daily living and compensatory training, and/or use of adaptive equipment for improvement with: ADLs and compensatory training, meal preparation, safety procedures and instruction in use of adaptive equipment, including bathing, grooming, dressing, personal hygiene, basic household cleaning and chores. Home Exercise Program:    [] (16770) Reviewed/Progressed HEP activities related to strengthening, flexibility, endurance, ROM of   [] LE / Lumbar: core, proximal hip and LE for functional self-care, mobility, lifting and ambulation/stair navigation   [] UE / Cervical: cervical, postural, scapular, scapulothoracic and UE control with self care, reaching, carrying, lifting, house/yardwork, driving, computer work  [] (68601)Reviewed/Progressed HEP activities related to improving balance, coordination, kinesthetic sense, posture, motor skill, proprioception of   [] LE: core, proximal hip and LE for self care, mobility, lifting, and ambulation/stair navigation    [] UE / Cervical: cervical, postural,  scapular, scapulothoracic and UE control with self care, reaching, carrying, lifting, house/yardwork, driving, computer work    Manual Treatments:  PROM / STM / Oscillations-Mobs:  G-I, II, III, IV (PA's, Inf., Post.)  [] (56044) Provided manual therapy to mobilize LE, proximal hip and/or LS spine soft tissue/joints for the purpose of modulating pain, promoting relaxation,  increasing ROM, reducing/eliminating soft tissue swelling/inflammation/restriction, improving soft tissue extensibility and allowing for proper ROM for normal function with   [] LE / lumbar: self care, mobility, lifting and ambulation. [] UE / Cervical: self care, reaching, carrying, lifting, house/yardwork, driving, computer work.      Modalities:  [] (54197) Vasopneumatic compression: Utilized vasopneumatic compression to decrease edema / swelling for the purpose of improving mobility and quad tone / recruitment which will allow for increased overall function including but not limited to self-care, transfers, ambulation, and ascending / descending stairs. Modalities:  12/8:  None this date    Charges:  Timed Code Treatment Minutes: 30   Total Treatment Minutes: 30     [] EVAL - LOW (79244)   [] EVAL - MOD (45388)  [] EVAL - HIGH (44480)  [] RE-EVAL (79796)  [x] PD(66912) x 2      [] Ionto  [] NMR (12072) x       [] Vaso  [] Manual (89533) x       [] Ultrasound  [] TA x        [] Mech Traction (18331)  [] Aquatic Therapy x     [] ES (un) (60135):   [] Home Management Training x  [] ES(attended) (29599)   [] Dry Needling 1-2 muscles (24478):  [] Dry Needling 3+ muscles (854268  [] Group:      [] Other:     GOALS:   Patient stated goal: To move better, improve balance and decrease pain     Therapist goals for Patient:   Short Term Goals: To be achieved in: 2 weeks  1. Independent in HEP and progression per patient tolerance, in order to prevent re-injury. 2. Patient will have a decrease in pain to facilitate improvement in movement, function, and ADLs as indicated by Functional Deficits.     Long Term Goals: To be achieved in: 2 weeks  1. Disability index score of 20% or less for the LEFS to assist with reaching prior level of function. 2. Patient will demonstrate increased AROM to [facility] to allow for proper joint functioning as indicated by patients Functional Deficits. 3. Patient will demonstrate an increase in postural awareness and control to allow for proper functional mobility as indicated by patients Functional Deficits. 4. Patient will demonstrate an increase in Strength to  5/5 to allow for proper functional mobility as indicated by patients Functional Deficits.    5. Patient will return to functional activities including walking in the grocery store without increased symptoms or restriction.                      Overall Progression Towards Functional goals/ Treatment Progress Update:  [] Patient is progressing as expected towards functional goals listed. [] Progression is slowed due to complexities/Impairments listed. [] Progression has been slowed due to co-morbidities. [x] Plan just implemented, too soon to assess goals progression <30days   [] Goals require adjustment due to lack of progress  [] Patient is not progressing as expected and requires additional follow up with physician  [] Other    Persisting Functional Limitations/Impairments:  []Sleeping [x]Sitting               [x]Standing []Transfers        [x]Walking []Kneeling               [x]Stairs [x]Squatting / bending   [x]ADLs []Reaching  [x]Lifting  [x]Housework  []Driving []Job related tasks  [x]Sports/Recreation []Other:        ASSESSMENT:   12/8: Tolerated session well this date without any increase in pain; good endurance this date. Slightly limited session time this date - pt needing to take dog to vet    Pt with chronic back pain and generalized weakness interfering with functional mobility; will benefit from skilled PT for posture correction, education on body mechanics, strengthening and balance training    Treatment/Activity Tolerance:  [x] Patient able to complete tx [] Patient limited by fatigue  [] Patient limited by pain  [] Patient limited by other medical complications  [] Other:     Prognosis: [x] Good [] Fair  [] Poor    Patient Requires Follow-up: [x] Yes  [] No    Plan for next treatment session: Continue per training diary and progress as tolerated. PLAN: See eval. PT 2x / week for 6 weeks. [x] Continue per plan of care [] Alter current plan (see comments)  [] Plan of care initiated [] Hold pending MD visit [] Discharge    Electronically signed by: Bk Jensen PT, DPT    Note: If patient does not return for scheduled/ recommended follow up visits, this note will serve as a discharge from care along with most recent update on progress.

## 2020-12-11 ENCOUNTER — HOSPITAL ENCOUNTER (OUTPATIENT)
Dept: PHYSICAL THERAPY | Age: 80
Setting detail: THERAPIES SERIES
Discharge: HOME | End: 2020-12-11
Payer: MEDICARE

## 2020-12-11 PROCEDURE — 97110 THERAPEUTIC EXERCISES: CPT

## 2020-12-11 NOTE — FLOWSHEET NOTE
[address] [facility]  Phone: [phone]   Fax: [phone]    Physical Therapy Daily Treatment Note  Date:  2020    Patient Name:  aMry Thakkar    :  1940  MRN: [id number]  Medical/Treatment Diagnosis Information:  · Diagnosis: Generalized weakness R53.1  · Treatment Diagnosis: generalized weakness causing overall decreased functional mobility  Insurance/Certification information:  PT Insurance Information: [employer] Medicare - medical necessity  Physician Information:  Referring Practitioner: Delilah Calderon MD  Plan of care signed (Y/N): faxed 11/10    Date of Patient follow up with Physician:     Functional scale::LEFS:   raw score = 37 ; dysfunction = 40-59 %; TU.72 seconds without device   Progress Report: []  Yes   [x]  No     Date Range for reporting period:  Beginning 11/10/20  Ending    Progress report due (10 Rx/or 30 days whichever is less): visit #22      Recertification due (POC duration/ or 90 days whichever is less):    Visit # Insurance Allowable Auth required? Date Range    Medical necessity []  Yes  [x]  No         Latex Allergy:  [x]NO      []YES  Preferred Language for Healthcare:   [x]English       []other:      Pain level: 10 back and legs on and off     SUBJECTIVE:  : Pt states she is doing well today. Has been staying busy; had to put her dog to sleep on Tuesday so is trying to keep her mind off things. : Pt reports she has had a rough morning - will likely have to put her dog to sleep today. :  Pt with no new complaints this date. States she is slightly stiff today however feels better than she did yesterday  :  Pt reports she is doing well overall; sore and achy all over today - feels it is related to cold weather and lifting her dog a lot.   Pt states that she has really bad toes due to bunions and corns.   Did 40n mn chair yoga class today and went to grocery store so is a little more fatigued. 11/13:  Pt reports no sharp pain however is achy today.   States \"I'm just getting older\"     OBJECTIVE: 12/11:  Pt ambulating without device this date; continues with slightly shortened step length and decreased toe off B      RESTRICTIONS/PRECAUTIONS: none    Exercises/Interventions:     Therapeutic Exercises (38059) Resistance / level Sets/sec Reps Notes   Nu step 1  X 5 min    IB/HR  2 x 30\"/2 x 10     Standing hamstring stretch  2 x 30\" B     Standing hip flexor stretch  2 x 30\" B            Step ups forward  6\"  X 10            Cables:  Walkouts        Rows:  Mid, high and LPD   2.5 plates          2.5 plates   X 3 each directions        X 15 each            Therapeutic Activities (16908)       Reviewed sitting with lumbar towel roll for posture and proper body mechanics              Seated on SB (green): shifts a/p, lateral, cw/ccw, marches, OH arms, rotation B              Total gym squats  X 20     Neuromuscular Re-ed (20424)       Airex:  NBOS, with arms OH, rotation B     Stagger stance B  SLS B      1/2 foam: perpendicular to foam in DLS, Parallel on foam in tandem stance                        Supine:  Bridging  LTR  Abdominal set    Abdominal set with alt march  Hip adduction with ball squeeze  SLR    X 10  X 10   x10 with 5 sec hold  X 10  X 20  X 10B            Manual Intervention (71322)                                                     Pt. Education: 12/11:  Verbal cues for proper exercise technique   Educated on proper body mechanics and posture correction, reviewed current exercises she is performing at home and reviewed new exercises provided this date.    -patient educated on diagnosis, prognosis and expectations for rehab  -all patient questions were answered    HEP instruction: 12/11:  No new additions this date; reviewed current HEP   Written HEP instructions provided and reviewed (standing heel raises, standing hamstring stretch, standing hip flexor stretch, bridge, LTR, standing lumbar extension)      Therapeutic Exercise and NMR EXR  [x] (46771) Provided verbal/tactile cueing for activities related to strengthening, flexibility, endurance, ROM for improvements in  [x] LE / Lumbar: LE, proximal hip, and core control with self care, mobility, lifting, ambulation. [] UE / Cervical: cervical, postural, scapular, scapulothoracic and UE control with self care, reaching, carrying, lifting, house/yardwork, driving, computer work.  [] (21563) Provided verbal/tactile cueing for activities related to improving balance, coordination, kinesthetic sense, posture, motor skill, proprioception to assist with   [] LE / lumbar: LE, proximal hip, and core control in self care, mobility, lifting, ambulation and eccentric single leg control. [] UE / cervical: cervical, scapular, scapulothoracic and UE control with self care, reaching, carrying, lifting, house/yardwork, driving, computer work.   [] (61500) Therapist is in constant attendance of 2 or more patients providing skilled therapy interventions, but not providing any significant amount of measurable one-on-one time to either patient, for improvements in  [] LE / lumbar: LE, proximal hip, and core control in self care, mobility, lifting, ambulation and eccentric single leg control. [] UE / cervical: cervical, scapular, scapulothoracic and UE control with self care, reaching, carrying, lifting, house/yardwork, driving, computer work.      NMR and Therapeutic Activities:    [] (85774 or 86295) Provided verbal/tactile cueing for activities related to improving balance, coordination, kinesthetic sense, posture, motor skill, proprioception and motor activation to allow for proper function of   [] LE: / Lumbar core, proximal hip and LE with self care and ADLs  [] UE / Cervical: cervical, postural, scapular, scapulothoracic and UE control with self care, carrying, lifting, driving, computer work.   [] (22284) Gait Re-education- Provided training and instruction to the patient for proper LE, core and proximal hip recruitment and positioning and eccentric body weight control with ambulation re-education including up and down stairs     Home Management Training / Self Care:  [] (65325) Provided self-care/home management training related to activities of daily living and compensatory training, and/or use of adaptive equipment for improvement with: ADLs and compensatory training, meal preparation, safety procedures and instruction in use of adaptive equipment, including bathing, grooming, dressing, personal hygiene, basic household cleaning and chores. Home Exercise Program:    [] (23095) Reviewed/Progressed HEP activities related to strengthening, flexibility, endurance, ROM of   [] LE / Lumbar: core, proximal hip and LE for functional self-care, mobility, lifting and ambulation/stair navigation   [] UE / Cervical: cervical, postural, scapular, scapulothoracic and UE control with self care, reaching, carrying, lifting, house/yardwork, driving, computer work  [] (81422)Reviewed/Progressed HEP activities related to improving balance, coordination, kinesthetic sense, posture, motor skill, proprioception of   [] LE: core, proximal hip and LE for self care, mobility, lifting, and ambulation/stair navigation    [] UE / Cervical: cervical, postural,  scapular, scapulothoracic and UE control with self care, reaching, carrying, lifting, house/yardwork, driving, computer work    Manual Treatments:  PROM / STM / Oscillations-Mobs:  G-I, II, III, IV (PA's, Inf., Post.)  [] (26711) Provided manual therapy to mobilize LE, proximal hip and/or LS spine soft tissue/joints for the purpose of modulating pain, promoting relaxation,  increasing ROM, reducing/eliminating soft tissue swelling/inflammation/restriction, improving soft tissue extensibility and allowing for proper ROM for normal function with   [] LE / lumbar: self care, mobility, lifting and ambulation. [] UE / Cervical: self care, reaching, carrying, lifting, house/yardwork, driving, computer work. Modalities:  [] (95331) Vasopneumatic compression: Utilized vasopneumatic compression to decrease edema / swelling for the purpose of improving mobility and quad tone / recruitment which will allow for increased overall function including but not limited to self-care, transfers, ambulation, and ascending / descending stairs. Modalities:  12/11:  None this date    Charges:  Timed Code Treatment Minutes: 43   Total Treatment Minutes: 43     [] EVAL - LOW (11291)   [] EVAL - MOD (32987)  [] EVAL - HIGH (83383)  [] RE-EVAL (78546)  [x] RB(06950) x 3      [] Ionto  [] NMR (54859) x       [] Vaso  [] Manual (44945) x       [] Ultrasound  [] TA x        [] Mech Traction (72008)  [] Aquatic Therapy x     [] ES (un) (29822):   [] Home Management Training x  [] ES(attended) (07803)   [] Dry Needling 1-2 muscles (44529):  [] Dry Needling 3+ muscles (965099  [] Group:      [] Other:     GOALS:   Patient stated goal: To move better, improve balance and decrease pain     Therapist goals for Patient:   Short Term Goals: To be achieved in: 2 weeks  1. Independent in HEP and progression per patient tolerance, in order to prevent re-injury. 2. Patient will have a decrease in pain to facilitate improvement in movement, function, and ADLs as indicated by Functional Deficits.     Long Term Goals: To be achieved in: 2 weeks  1. Disability index score of 20% or less for the LEFS to assist with reaching prior level of function. 2. Patient will demonstrate increased AROM to [facility] to allow for proper joint functioning as indicated by patients Functional Deficits. 3. Patient will demonstrate an increase in postural awareness and control to allow for proper functional mobility as indicated by patients Functional Deficits.    4. Patient will demonstrate an increase in Strength to  5/5 to allow for proper functional mobility as pending MD visit [] Discharge    Electronically signed by: Chelly Chiang PT, DPT    Note: If patient does not return for scheduled/ recommended follow up visits, this note will serve as a discharge from care along with most recent update on progress.

## 2020-12-15 ENCOUNTER — HOSPITAL ENCOUNTER (OUTPATIENT)
Dept: PHYSICAL THERAPY | Age: 80
Setting detail: THERAPIES SERIES
Discharge: HOME | End: 2020-12-15
Payer: MEDICARE

## 2020-12-15 PROCEDURE — 97110 THERAPEUTIC EXERCISES: CPT

## 2020-12-15 NOTE — FLOWSHEET NOTE
[address] [facility]  Phone: [phone]   Fax: [phone]    Physical Therapy Daily Treatment Note  Date:  12/15/2020    Patient Name:  Shraddha Morales    :  1940  MRN: [id number]  Medical/Treatment Diagnosis Information:  · Diagnosis: Generalized weakness R53.1  · Treatment Diagnosis: generalized weakness causing overall decreased functional mobility  Insurance/Certification information:  PT Insurance Information: A[employer] Medicare - medical necessity  Physician Information:  Referring Practitioner: Anson Sosa MD  Plan of care signed (Y/N): faxed 11/10    Date of Patient follow up with Physician:     Functional scale::LEFS:   raw score = 37 ; dysfunction = 40-59 %; TU.72 seconds without device   Progress Report: []  Yes   [x]  No     Date Range for reporting period:  Beginning 11/10/20  Ending    Progress report due (10 Rx/or 30 days whichever is less): visit #43      Recertification due (POC duration/ or 90 days whichever is less):    Visit # Insurance Allowable Auth required? Date Range    Medical necessity []  Yes  [x]  No         Latex Allergy:  [x]NO      []YES  Preferred Language for Healthcare:   [x]English       []other:      Pain level: 0/10 back and legs on and off     SUBJECTIVE:  12/15:  Pt reports no pain this date however states her \"legs feel heavy\". States she has been sad this week because of her dog.    : Pt states she is doing well today. Has been staying busy; had to put her dog to sleep on Tuesday so is trying to keep her mind off things. : Pt reports she has had a rough morning - will likely have to put her dog to sleep today. :  Pt with no new complaints this date. States she is slightly stiff today however feels better than she did yesterday  :  Pt reports she is doing well overall; sore and achy all over today - feels it is related to cold weather and lifting her dog a lot. 11/17  Pt states that she has really bad toes due to bunions and corns. Did 40n mn chair yoga class today and went to grocery store so is a little more fatigued. 11/13:  Pt reports no sharp pain however is achy today. States \"I'm just getting older\"     OBJECTIVE: 12/15:  Pt ambulating without device this date; continues with slightly shortened step length and decreased toe off B. Did not have str cane with her today.         RESTRICTIONS/PRECAUTIONS: none    Exercises/Interventions:     Therapeutic Exercises (94589) Resistance / level Sets/sec Reps Notes   Nu step 1  X 5 min    IB/HR  2 x 30\"/2 x 10     Standing hamstring stretch  2 x 30\" B     Standing hip flexor stretch  2 x 30\" B            Step ups forward  6\"  X 10            Cables:  Walkouts    3 way kicks    Cable rows:  Mid, high and LPD       1# at ankle      2.5 plates     X 10 B      X 15 each            Therapeutic Activities (50508)       Reviewed sitting with lumbar towel roll for posture and proper body mechanics              Seated on SB (green): shifts a/p, lateral, cw/ccw, marches, OH arms, rotation B  X 10 each            Total gym squats  X 20     Neuromuscular Re-ed (72926)       Airex:  NBOS, with arms OH, rotation B     Stagger stance B  SLS B      1/2 foam: perpendicular to foam in DLS, Parallel on foam in tandem stance                        Supine:  Bridging  LTR  Abdominal set    Abdominal set with alt march  Hip adduction with ball squeeze  SLR             Manual Intervention (95140)                                                     Pt. Education: 12/15:  Verbal cues for proper exercise technique   Educated on proper body mechanics and posture correction, reviewed current exercises she is performing at home and reviewed new exercises provided this date.    -patient educated on diagnosis, prognosis and expectations for rehab  -all patient questions were answered HEP instruction: 12/15:  No new additions this date; reviewed current HEP   Written HEP instructions provided and reviewed (standing heel raises, standing hamstring stretch, standing hip flexor stretch, bridge, LTR, standing lumbar extension)      Therapeutic Exercise and NMR EXR  [x] (54064) Provided verbal/tactile cueing for activities related to strengthening, flexibility, endurance, ROM for improvements in  [x] LE / Lumbar: LE, proximal hip, and core control with self care, mobility, lifting, ambulation. [] UE / Cervical: cervical, postural, scapular, scapulothoracic and UE control with self care, reaching, carrying, lifting, house/yardwork, driving, computer work.  [] (16558) Provided verbal/tactile cueing for activities related to improving balance, coordination, kinesthetic sense, posture, motor skill, proprioception to assist with   [] LE / lumbar: LE, proximal hip, and core control in self care, mobility, lifting, ambulation and eccentric single leg control. [] UE / cervical: cervical, scapular, scapulothoracic and UE control with self care, reaching, carrying, lifting, house/yardwork, driving, computer work.   [] (47482) Therapist is in constant attendance of 2 or more patients providing skilled therapy interventions, but not providing any significant amount of measurable one-on-one time to either patient, for improvements in  [] LE / lumbar: LE, proximal hip, and core control in self care, mobility, lifting, ambulation and eccentric single leg control. [] UE / cervical: cervical, scapular, scapulothoracic and UE control with self care, reaching, carrying, lifting, house/yardwork, driving, computer work.      NMR and Therapeutic Activities:    [] (70281 or 78121) Provided verbal/tactile cueing for activities related to improving balance, coordination, kinesthetic sense, posture, motor skill, proprioception and motor activation to allow for proper function of [] LE: / Lumbar core, proximal hip and LE with self care and ADLs  [] UE / Cervical: cervical, postural, scapular, scapulothoracic and UE control with self care, carrying, lifting, driving, computer work.   [] (11316) Gait Re-education- Provided training and instruction to the patient for proper LE, core and proximal hip recruitment and positioning and eccentric body weight control with ambulation re-education including up and down stairs     Home Management Training / Self Care:  [] (61037) Provided self-care/home management training related to activities of daily living and compensatory training, and/or use of adaptive equipment for improvement with: ADLs and compensatory training, meal preparation, safety procedures and instruction in use of adaptive equipment, including bathing, grooming, dressing, personal hygiene, basic household cleaning and chores.      Home Exercise Program:    [] (97492) Reviewed/Progressed HEP activities related to strengthening, flexibility, endurance, ROM of   [] LE / Lumbar: core, proximal hip and LE for functional self-care, mobility, lifting and ambulation/stair navigation   [] UE / Cervical: cervical, postural, scapular, scapulothoracic and UE control with self care, reaching, carrying, lifting, house/yardwork, driving, computer work  [] (74140)Reviewed/Progressed HEP activities related to improving balance, coordination, kinesthetic sense, posture, motor skill, proprioception of   [] LE: core, proximal hip and LE for self care, mobility, lifting, and ambulation/stair navigation    [] UE / Cervical: cervical, postural,  scapular, scapulothoracic and UE control with self care, reaching, carrying, lifting, house/yardwork, driving, computer work    Manual Treatments:  PROM / STM / Oscillations-Mobs:  G-I, II, III, IV (PA's, Inf., Post.) [] (12784) Provided manual therapy to mobilize LE, proximal hip and/or LS spine soft tissue/joints for the purpose of modulating pain, promoting relaxation,  increasing ROM, reducing/eliminating soft tissue swelling/inflammation/restriction, improving soft tissue extensibility and allowing for proper ROM for normal function with   [] LE / lumbar: self care, mobility, lifting and ambulation. [] UE / Cervical: self care, reaching, carrying, lifting, house/yardwork, driving, computer work. Modalities:  [] (56331) Vasopneumatic compression: Utilized vasopneumatic compression to decrease edema / swelling for the purpose of improving mobility and quad tone / recruitment which will allow for increased overall function including but not limited to self-care, transfers, ambulation, and ascending / descending stairs. Modalities:  12/15:  None this date    Charges:  Timed Code Treatment Minutes: 30   Total Treatment Minutes: 30     [] EVAL - LOW (41409)   [] EVAL - MOD (31052)  [] EVAL - HIGH (81719)  [] RE-EVAL (11959)  [x] MZ(12423) x 2     [] Ionto  [] NMR (66452) x       [] Vaso  [] Manual (05276) x       [] Ultrasound  [] TA x        [] Mech Traction (57363)  [] Aquatic Therapy x     [] ES (un) (34438):   [] Home Management Training x  [] ES(attended) (92903)   [] Dry Needling 1-2 muscles (67664):  [] Dry Needling 3+ muscles (125144  [] Group:      [] Other:     GOALS:   Patient stated goal: To move better, improve balance and decrease pain     Therapist goals for Patient:   Short Term Goals: To be achieved in: 2 weeks  1. Independent in HEP and progression per patient tolerance, in order to prevent re-injury. 2. Patient will have a decrease in pain to facilitate improvement in movement, function, and ADLs as indicated by Functional Deficits.     Long Term Goals: To be achieved in: 2 weeks  1. Disability index score of 20% or less for the LEFS to assist with reaching prior level of function. 2. Patient will demonstrate increased AROM to [facility] to allow for proper joint functioning as indicated by patients Functional Deficits. 3. Patient will demonstrate an increase in postural awareness and control to allow for proper functional mobility as indicated by patients Functional Deficits. 4. Patient will demonstrate an increase in Strength to  5/5 to allow for proper functional mobility as indicated by patients Functional Deficits. 5. Patient will return to functional activities including walking in the grocery store without increased symptoms or restriction.                      Overall Progression Towards Functional goals/ Treatment Progress Update:  [] Patient is progressing as expected towards functional goals listed. [] Progression is slowed due to complexities/Impairments listed. [] Progression has been slowed due to co-morbidities. [x] Plan just implemented, too soon to assess goals progression <30days   [] Goals require adjustment due to lack of progress  [] Patient is not progressing as expected and requires additional follow up with physician  [] Other    Persisting Functional Limitations/Impairments:  []Sleeping [x]Sitting               [x]Standing []Transfers        [x]Walking []Kneeling               [x]Stairs [x]Squatting / bending   [x]ADLs []Reaching  [x]Lifting  [x]Housework  []Driving []Job related tasks  [x]Sports/Recreation []Other:        ASSESSMENT:   12/15:  Pt tolerated session well this date; requested 30 min visit this date as she states she is tired and have a lot to do.    12/8: Tolerated session well this date without any increase in pain; good endurance this date.   Slightly limited session time this date - pt needing to take dog to vet    Pt with chronic back pain and generalized weakness interfering with functional mobility; will benefit from skilled PT for posture correction, education on body mechanics, strengthening and balance training Treatment/Activity Tolerance:  [x] Patient able to complete tx [] Patient limited by fatigue  [] Patient limited by pain  [] Patient limited by other medical complications  [] Other:     Prognosis: [x] Good [] Fair  [] Poor    Patient Requires Follow-up: [x] Yes  [] No    Plan for next treatment session: 12/15:  Continue per training diary and progress as tolerated. PLAN: See eval. PT 2x / week for 6 weeks. [x] Continue per plan of care [] Alter current plan (see comments)  [] Plan of care initiated [] Hold pending MD visit [] Discharge    Electronically signed by: Nila Langford PT, DPT    Note: If patient does not return for scheduled/ recommended follow up visits, this note will serve as a discharge from care along with most recent update on progress.

## 2020-12-18 ENCOUNTER — HOSPITAL ENCOUNTER (OUTPATIENT)
Dept: PHYSICAL THERAPY | Age: 80
Setting detail: THERAPIES SERIES
Discharge: HOME | End: 2020-12-18
Payer: MEDICARE

## 2020-12-18 PROCEDURE — 97112 NEUROMUSCULAR REEDUCATION: CPT

## 2020-12-18 PROCEDURE — 97110 THERAPEUTIC EXERCISES: CPT

## 2020-12-18 NOTE — FLOWSHEET NOTE
[facility]  Phone: [phone]   Fax: [phone]    Physical Therapy Daily Treatment Note  Date:  2020    Patient Name:  Daryle Fowler    :  1940  MRN: [id number]  Medical/Treatment Diagnosis Information:  · Diagnosis: Generalized weakness R53.1  · Treatment Diagnosis: generalized weakness causing overall decreased functional mobility  Insurance/Certification information:  PT Insurance Information: [employer] - medical necessity  Physician Information:  Referring Practitioner: Slim Sepulveda MD  Plan of care signed (Y/N): faxed 11/10    Date of Patient follow up with Physician:     Functional scale::LEFS:   raw score = 37 ; dysfunction = 40-59 %; TU.72 seconds without device   Progress Report: []  Yes   [x]  No     Date Range for reporting period:  Beginning 11/10/20  Ending    Progress report due (10 Rx/or 30 days whichever is less): visit #29      Recertification due (POC duration/ or 90 days whichever is less):    Visit # Insurance Allowable Auth required? Date Range    Medical necessity []  Yes  [x]  No         Latex Allergy:  [x]NO      []YES  Preferred Language for Healthcare:   [x]English       []other:      Pain level: 5-6/10 back and legs on and off; stiff and tender     SUBJECTIVE:  : States she did her chair yoga yesterday. States she has cramping in her legs that works it self out. 12/15:  Pt reports no pain this date however states her \"legs feel heavy\". States she has been sad this week because of her dog.    : Pt states she is doing well today. Has been staying busy; had to put her dog to sleep on Tuesday so is trying to keep her mind off things. : Pt reports she has had a rough morning - will likely have to put her dog to sleep today. :  Pt with no new complaints this date.   States she is slightly stiff today however feels better than she did yesterday 12/1:  Pt reports she is doing well overall; sore and achy all over today - feels it is related to cold weather and lifting her dog a lot. 11/17  Pt states that she has really bad toes due to bunions and corns. Did 40n mn chair yoga class today and went to grocery store so is a little more fatigued. 11/13:  Pt reports no sharp pain however is achy today. States \"I'm just getting older\"     OBJECTIVE: 12/18:  Pt ambulating with str cane this date; continues with slightly shortened step length and decreased toe off B.         RESTRICTIONS/PRECAUTIONS: none    Exercises/Interventions:     Therapeutic Exercises (64351) Resistance / level Sets/sec Reps Notes   Nu step 1  X 5 min    IB/HR  2 x 30\"/2 x 10     Standing hamstring stretch  2 x 30\" B     Standing hip flexor stretch  2 x 30\" B            Step ups forward   Step ups lateral  6\"   6 in  X 10 B   X 10 B            Cables:  Walkouts    3 way kicks    Cable rows:  Mid, high and LPD       1 plate at ankle    2.5 plates     X 10 B      X 15 each            Therapeutic Activities (47870)       Reviewed sitting with lumbar towel roll for posture and proper body mechanics              Seated on SB (green): shifts a/p, lateral, cw/ccw,,   2 X 10 each            Total gym squats  X 20     Neuromuscular Re-ed (52891)       Airex:  NBOS, with arms OH, rotation B     Stagger stance B  SLS B  Narrow STEPHANIA   X 30 sec/x 10/x 10  2 x 30\"  30 sec     1/2 foam: perpendicular to foam in DLS, Parallel on foam in tandem stance                        Supine:  Bridging  LTR  Abdominal set    Abdominal set with alt march  Hip adduction with ball squeeze  SLR             Manual Intervention (13798)                                                     Pt. Education: 12/18:  Verbal cues for proper exercise technique   Educated on proper body mechanics and posture correction, reviewed current exercises she is performing at home and reviewed new exercises provided this date. -patient educated on diagnosis, prognosis and expectations for rehab  -all patient questions were answered    HEP instruction: 12/18:  No new additions this date; reviewed current HEP   Written HEP instructions provided and reviewed (standing heel raises, standing hamstring stretch, standing hip flexor stretch, bridge, LTR, standing lumbar extension)      Therapeutic Exercise and NMR EXR  [x] (34425) Provided verbal/tactile cueing for activities related to strengthening, flexibility, endurance, ROM for improvements in  [x] LE / Lumbar: LE, proximal hip, and core control with self care, mobility, lifting, ambulation. [] UE / Cervical: cervical, postural, scapular, scapulothoracic and UE control with self care, reaching, carrying, lifting, house/yardwork, driving, computer work.  [] (26824) Provided verbal/tactile cueing for activities related to improving balance, coordination, kinesthetic sense, posture, motor skill, proprioception to assist with   [] LE / lumbar: LE, proximal hip, and core control in self care, mobility, lifting, ambulation and eccentric single leg control. [] UE / cervical: cervical, scapular, scapulothoracic and UE control with self care, reaching, carrying, lifting, house/yardwork, driving, computer work.   [] (32841) Therapist is in constant attendance of 2 or more patients providing skilled therapy interventions, but not providing any significant amount of measurable one-on-one time to either patient, for improvements in  [] LE / lumbar: LE, proximal hip, and core control in self care, mobility, lifting, ambulation and eccentric single leg control. [] UE / cervical: cervical, scapular, scapulothoracic and UE control with self care, reaching, carrying, lifting, house/yardwork, driving, computer work.      NMR and Therapeutic Activities: [x] (67783 or 71133) Provided verbal/tactile cueing for activities related to improving balance, coordination, kinesthetic sense, posture, motor skill, proprioception and motor activation to allow for proper function of   [x] LE: / Lumbar core, proximal hip and LE with self care and ADLs  [] UE / Cervical: cervical, postural, scapular, scapulothoracic and UE control with self care, carrying, lifting, driving, computer work.   [] (66491) Gait Re-education- Provided training and instruction to the patient for proper LE, core and proximal hip recruitment and positioning and eccentric body weight control with ambulation re-education including up and down stairs     Home Management Training / Self Care:  [] (53864) Provided self-care/home management training related to activities of daily living and compensatory training, and/or use of adaptive equipment for improvement with: ADLs and compensatory training, meal preparation, safety procedures and instruction in use of adaptive equipment, including bathing, grooming, dressing, personal hygiene, basic household cleaning and chores.      Home Exercise Program:    [] (60428) Reviewed/Progressed HEP activities related to strengthening, flexibility, endurance, ROM of   [] LE / Lumbar: core, proximal hip and LE for functional self-care, mobility, lifting and ambulation/stair navigation   [] UE / Cervical: cervical, postural, scapular, scapulothoracic and UE control with self care, reaching, carrying, lifting, house/yardwork, driving, computer work  [] (50153)Reviewed/Progressed HEP activities related to improving balance, coordination, kinesthetic sense, posture, motor skill, proprioception of   [] LE: core, proximal hip and LE for self care, mobility, lifting, and ambulation/stair navigation    [] UE / Cervical: cervical, postural,  scapular, scapulothoracic and UE control with self care, reaching, carrying, lifting, house/yardwork, driving, computer work Manual Treatments:  PROM / STM / Oscillations-Mobs:  G-I, II, III, IV (PA's, Inf., Post.)  [] (59489) Provided manual therapy to mobilize LE, proximal hip and/or LS spine soft tissue/joints for the purpose of modulating pain, promoting relaxation,  increasing ROM, reducing/eliminating soft tissue swelling/inflammation/restriction, improving soft tissue extensibility and allowing for proper ROM for normal function with   [] LE / lumbar: self care, mobility, lifting and ambulation. [] UE / Cervical: self care, reaching, carrying, lifting, house/yardwork, driving, computer work. Modalities:  [] (82829) Vasopneumatic compression: Utilized vasopneumatic compression to decrease edema / swelling for the purpose of improving mobility and quad tone / recruitment which will allow for increased overall function including but not limited to self-care, transfers, ambulation, and ascending / descending stairs. Modalities:  12/18:  None this date    Charges:  Timed Code Treatment Minutes: 46   Total Treatment Minutes: 46     [] EVAL - LOW (16647)   [] EVAL - MOD (38144)  [] EVAL - HIGH (53927)  [] RE-EVAL (85048)  [x] MB(89809) x 2     [] Ionto  [x] NMR (65397) x 1      [] Vaso  [] Manual (39450) x       [] Ultrasound  [] TA x        [] Mech Traction (64071)  [] Aquatic Therapy x     [] ES (un) (68747):   [] Home Management Training x  [] ES(attended) (78583)   [] Dry Needling 1-2 muscles (71712):  [] Dry Needling 3+ muscles (365517  [] Group:      [] Other:     GOALS:   Patient stated goal: To move better, improve balance and decrease pain     Therapist goals for Patient:   Short Term Goals: To be achieved in: 2 weeks  1. Independent in HEP and progression per patient tolerance, in order to prevent re-injury. 2. Patient will have a decrease in pain to facilitate improvement in movement, function, and ADLs as indicated by Functional Deficits.     Long Term Goals:  To be achieved in: 2 weeks 1. Disability index score of 20% or less for the LEFS to assist with reaching prior level of function. 2. Patient will demonstrate increased AROM to [facility] [address] to allow for proper joint functioning as indicated by patients Functional Deficits. 3. Patient will demonstrate an increase in postural awareness and control to allow for proper functional mobility as indicated by patients Functional Deficits. 4. Patient will demonstrate an increase in Strength to  5/5 to allow for proper functional mobility as indicated by patients Functional Deficits. 5. Patient will return to functional activities including walking in the grocery store without increased symptoms or restriction.                      Overall Progression Towards Functional goals/ Treatment Progress Update:  [] Patient is progressing as expected towards functional goals listed. [] Progression is slowed due to complexities/Impairments listed. [] Progression has been slowed due to co-morbidities. [x] Plan just implemented, too soon to assess goals progression <30days   [] Goals require adjustment due to lack of progress  [] Patient is not progressing as expected and requires additional follow up with physician  [] Other    Persisting Functional Limitations/Impairments:  []Sleeping [x]Sitting               [x]Standing []Transfers        [x]Walking []Kneeling               [x]Stairs [x]Squatting / bending   [x]ADLs []Reaching  [x]Lifting  [x]Housework  []Driving []Job related tasks  [x]Sports/Recreation []Other:        ASSESSMENT:  12/18: Initiated lateral step ups this date. Pt required verbal and tactile cues to deactivate UT and retract scapulas with cable column rows and lat pull downs. Pt will continue to benefit from OP PT services to improve strength and balance and return to PLOF without limitations    12/15:  Pt tolerated session well this date; requested 30 min visit this date as she states she is tired and have a lot to do. 12/8:  Tolerated session well this date without any increase in pain; good endurance this date. Slightly limited session time this date - pt needing to take dog to vet    Pt with chronic back pain and generalized weakness interfering with functional mobility; will benefit from skilled PT for posture correction, education on body mechanics, strengthening and balance training    Treatment/Activity Tolerance:  [x] Patient able to complete tx [] Patient limited by fatigue  [] Patient limited by pain  [] Patient limited by other medical complications  [] Other:     Prognosis: [x] Good [] Fair  [] Poor    Patient Requires Follow-up: [x] Yes  [] No    Plan for next treatment session: 12/18:  Continue per training diary and progress as tolerated. PLAN: See eval. PT 2x / week for 6 weeks. [x] Continue per plan of care [] Alter current plan (see comments)  [] Plan of care initiated [] Hold pending MD visit [] Discharge    Electronically signed by: Roque Reagan PT, DPT  Therapist was present, directed the patient's care, made skilled judgement, and was responsible for assessment and treatment of the patient. Deandre Mancuso, SPT      Note: If patient does not return for scheduled/ recommended follow up visits, this note will serve as a discharge from care along with most recent update on progress.

## 2020-12-22 ENCOUNTER — HOSPITAL ENCOUNTER (OUTPATIENT)
Dept: PHYSICAL THERAPY | Age: 80
Setting detail: THERAPIES SERIES
Discharge: HOME | End: 2020-12-22
Payer: MEDICARE

## 2020-12-22 PROCEDURE — 97110 THERAPEUTIC EXERCISES: CPT

## 2020-12-22 PROCEDURE — 97530 THERAPEUTIC ACTIVITIES: CPT

## 2020-12-22 NOTE — FLOWSHEET NOTE
[facility]  Outpatient Physical Therapy  Phone: [phone]   Fax: [phone]      Physical Therapy Re-Certification Plan of Care    Dear Dr. Gabriele Regan,    We had the pleasure of treating the following patient for physical therapy services at [facility] Outpatient Physical Therapy. A summary of our findings can be found in the updated assessment below. This includes our plan of care. If you have any questions or concerns regarding these findings, please do not hesitate to contact me at the office phone number checked above.   Thank you for the referral.     Physician Signature:________________________________Date:__________________  By signing above (or electronic signature), therapists plan is approved by physician      Functional Outcome: LEFS: 47; 40-59% dysfunction     Overall Response to Treatment:   [x]Patient is responding well to treatment and improvement is noted with regards  to goals   []Patient should continue to improve in reasonable time if they continue HEP   []Patient has plateaued and is no longer responding to skilled PT intervention    []Patient is getting worse and would benefit from return to referring MD   []Patient unable to adhere to initial POC   []Other:     Date range of Visits: 11/10/20 - 20  Total Visits: 10    Recommendation:     [x]Continue PT  X 2 more visits (on current script); then will discharge with HEP   []Hold PT, pending MD visit        Physical Therapy Daily Treatment Note  Date:  2020    Patient Name:  Ney Barrera    :  1940  MRN: [id number]  Medical/Treatment Diagnosis Information:  · Diagnosis: Generalized weakness R53.1  · Treatment Diagnosis: generalized weakness causing overall decreased functional mobility  Insurance/Certification information:  PT Insurance Information: [employer] - medical necessity  Physician Information:  Referring Practitioner: Viral Soto MD Plan of care signed (Y/N): faxed 11/10    Date of Patient follow up with Physician:     Functional scale::LEFS:   raw score = 37 ; dysfunction = 40-59 %; TU.72 seconds without device   Progress Report: [x]  Yes   []  No     Date Range for reporting period:  Beginning 11/10/20  Ending 20    Progress report due (10 Rx/or 30 days whichever is less):last visit     Recertification due (POC duration/ or 90 days whichever is less):    Visit # Insurance Allowable Auth required? Date Range   10/12 Medical necessity []  Yes  [x]  No         Latex Allergy:  [x]NO      []YES  Preferred Language for Healthcare:   [x]English       []other:      Pain level: 5/10 back and legs on and off; stiff and tender     SUBJECTIVE:  :  Pt reports she is feeling some better overall, \"legs are getting stronger\". States she is consistent with her stretching daily. \"Legs are hurting less at night\" per pt.    : States she did her chair yoga yesterday. States she has cramping in her legs that works it self out. 12/15:  Pt reports no pain this date however states her \"legs feel heavy\". States she has been sad this week because of her dog.    : Pt states she is doing well today. Has been staying busy; had to put her dog to sleep on Tuesday so is trying to keep her mind off things. : Pt reports she has had a rough morning - will likely have to put her dog to sleep today. :  Pt with no new complaints this date. States she is slightly stiff today however feels better than she did yesterday  :  Pt reports she is doing well overall; sore and achy all over today - feels it is related to cold weather and lifting her dog a lot.   Pt states that she has really bad toes due to bunions and corns. Did 40n mn chair yoga class today and went to grocery store so is a little more fatigued. :  Pt reports no sharp pain however is achy today.   States \"I'm just getting older\"     OBJECTIVE: 12/22:  Pt ambulating without device this date; continues with slightly shortened step length and decreased toe off B.   MMT BLE's: 5/5 throughout  SLS B x 5 sec without UE support          RESTRICTIONS/PRECAUTIONS: none    Exercises/Interventions:     Therapeutic Exercises (34870) Resistance / level Sets/sec Reps Notes   Nu step 1  X 5 min    IB/HR  2 x 30\"/2 x 10     Standing hamstring stretch  2 x 30\" B     Standing hip flexor stretch  2 x 30\" B            Step ups forward   Step ups lateral  6\"   6 in  X 10 B   X 10 B            Cables:  Walkouts    3 way kicks    Cable rows:  Mid, high and LPD            Therapeutic Activities (01701)       Reviewed sitting with lumbar towel roll for posture and proper body mechanics              Seated on SB (green): shifts a/p, lateral, cw/ccw,,               Total gym squats       Neuromuscular Re-ed (11471)       Airex:  NBOS, with arms OH, rotation B     Stagger stance B (on floor)  SLS B (on floor)    X 30 sec/x 10/x 10  2 x 30\"  2 x 30\"      1/2 foam: perpendicular to foam in DLS, Parallel on foam in tandem stance  2 x 30\"  each                      Supine:  Bridging  LTR  Abdominal set    Abdominal set with alt march  Hip adduction with ball squeeze  SLR             Manual Intervention (82621)                                                     Pt. Education: 12/22:  Verbal cues for proper exercise technique   Educated on proper body mechanics and posture correction, reviewed current exercises she is performing at home and reviewed new exercises provided this date.    -patient educated on diagnosis, prognosis and expectations for rehab  -all patient questions were answered    HEP instruction: 12/22:  No new additions this date; reviewed current HEP and stressed importance   Written HEP instructions provided and reviewed (standing heel raises, standing hamstring stretch, standing hip flexor stretch, bridge, LTR, standing lumbar extension) Therapeutic Exercise and NMR EXR  [x] (25820) Provided verbal/tactile cueing for activities related to strengthening, flexibility, endurance, ROM for improvements in  [x] LE / Lumbar: LE, proximal hip, and core control with self care, mobility, lifting, ambulation. [] UE / Cervical: cervical, postural, scapular, scapulothoracic and UE control with self care, reaching, carrying, lifting, house/yardwork, driving, computer work.  [] (76768) Provided verbal/tactile cueing for activities related to improving balance, coordination, kinesthetic sense, posture, motor skill, proprioception to assist with   [] LE / lumbar: LE, proximal hip, and core control in self care, mobility, lifting, ambulation and eccentric single leg control. [] UE / cervical: cervical, scapular, scapulothoracic and UE control with self care, reaching, carrying, lifting, house/yardwork, driving, computer work.   [] (48809) Therapist is in constant attendance of 2 or more patients providing skilled therapy interventions, but not providing any significant amount of measurable one-on-one time to either patient, for improvements in  [] LE / lumbar: LE, proximal hip, and core control in self care, mobility, lifting, ambulation and eccentric single leg control. [] UE / cervical: cervical, scapular, scapulothoracic and UE control with self care, reaching, carrying, lifting, house/yardwork, driving, computer work. NMR and Therapeutic Activities:    [x] (90724 or 02186) Provided verbal/tactile cueing for activities related to improving balance, coordination, kinesthetic sense, posture, motor skill, proprioception and motor activation to allow for proper function of   [x] LE: / Lumbar core, proximal hip and LE with self care and ADLs  [] UE / Cervical: cervical, postural, scapular, scapulothoracic and UE control with self care, carrying, lifting, driving, computer work. [] LE / lumbar: self care, mobility, lifting and ambulation. [] UE / Cervical: self care, reaching, carrying, lifting, house/yardwork, driving, computer work. Modalities:  [] (73864) Vasopneumatic compression: Utilized vasopneumatic compression to decrease edema / swelling for the purpose of improving mobility and quad tone / recruitment which will allow for increased overall function including but not limited to self-care, transfers, ambulation, and ascending / descending stairs. Modalities:  12/22:  None this date    Charges:  Timed Code Treatment Minutes: 43   Total Treatment Minutes: 43     [] EVAL - LOW (35950)   [] EVAL - MOD (64564)  [] EVAL - HIGH (45782)  [] RE-EVAL (94358)  [x] IQ(71577) x 2     [] Ionto  [] NMR (51271) x 1      [] Vaso  [] Manual (14228) x       [] Ultrasound  [x] TA x 1       [] Mech Traction (37510)  [] Aquatic Therapy x     [] ES (un) (53062):   [] Home Management Training x  [] ES(attended) (64379)   [] Dry Needling 1-2 muscles (90322):  [] Dry Needling 3+ muscles (958684  [] Group:      [] Other:     GOALS:   Patient stated goal: To move better, improve balance and decrease pain     Therapist goals for Patient:   Short Term Goals: To be achieved in: 2 weeks  1. Independent in HEP and progression per patient tolerance, in order to prevent re-injury. - MET  2. Patient will have a decrease in pain to facilitate improvement in movement, function, and ADLs as indicated by Functional Deficits. - MET     Long Term Goals: To be achieved in: 2 weeks  1. Disability index score of 20% or less for the LEFS to assist with reaching prior level of function.  - PROGRESSING  2. Patient will demonstrate increased AROM to [facility] to allow for proper joint functioning as indicated by patients Functional Deficits. - MET  3. Patient will demonstrate an increase in postural awareness and control to allow for proper functional mobility as indicated by patients Functional Deficits.  - PROGRESSING 4. Patient will demonstrate an increase in Strength to  5/5 to allow for proper functional mobility as indicated by patients Functional Deficits. - MET  5. Patient will return to functional activities including walking in the grocery store without increased symptoms or restriction. - MET                     Overall Progression Towards Functional goals/ Treatment Progress Update:  [x] Patient is progressing as expected towards functional goals listed. [] Progression is slowed due to complexities/Impairments listed. [] Progression has been slowed due to co-morbidities. [] Plan just implemented, too soon to assess goals progression <30days   [] Goals require adjustment due to lack of progress  [] Patient is not progressing as expected and requires additional follow up with physician  [] Other    Persisting Functional Limitations/Impairments:  []Sleeping [x]Sitting               [x]Standing []Transfers        [x]Walking []Kneeling               [x]Stairs [x]Squatting / bending   [x]ADLs []Reaching  [x]Lifting  [x]Housework  []Driving []Job related tasks  [x]Sports/Recreation []Other:        ASSESSMENT:  12/22:  Pt is progressing well overall; improved strength and balance noted. 12/18: Initiated lateral step ups this date. Pt required verbal and tactile cues to deactivate UT and retract scapulas with cable column rows and lat pull downs. Pt will continue to benefit from OP PT services to improve strength and balance and return to PLOF without limitations    12/15:  Pt tolerated session well this date; requested 30 min visit this date as she states she is tired and have a lot to do.    12/8: Tolerated session well this date without any increase in pain; good endurance this date.   Slightly limited session time this date - pt needing to take dog to vet Pt with chronic back pain and generalized weakness interfering with functional mobility; will benefit from skilled PT for posture correction, education on body mechanics, strengthening and balance training    Treatment/Activity Tolerance:  [x] Patient able to complete tx [] Patient limited by fatigue  [] Patient limited by pain  [] Patient limited by other medical complications  [] Other:     Prognosis: [x] Good [] Fair  [] Poor    Patient Requires Follow-up: [x] Yes  [] No    Plan for next treatment session: 12/22:  Continue per training diary and progress as tolerated. PLAN: See eval. PT 2x / week for 6 weeks. [x] Continue per plan of care [] Alter current plan (see comments)  [] Plan of care initiated [] Hold pending MD visit [] Discharge    Electronically signed by: Rob Tompkins PT, DPT  Therapist was present, directed the patient's care, made skilled judgement, and was responsible for assessment and treatment of the patient. Sweetie Garcia, SPT      Note: If patient does not return for scheduled/ recommended follow up visits, this note will serve as a discharge from care along with most recent update on progress.

## 2020-12-29 ENCOUNTER — HOSPITAL ENCOUNTER (OUTPATIENT)
Dept: PHYSICAL THERAPY | Age: 80
Setting detail: THERAPIES SERIES
Discharge: HOME | End: 2020-12-29
Payer: MEDICARE

## 2020-12-29 PROCEDURE — 97112 NEUROMUSCULAR REEDUCATION: CPT

## 2020-12-29 PROCEDURE — 97110 THERAPEUTIC EXERCISES: CPT

## 2020-12-29 NOTE — FLOWSHEET NOTE
dog.    12/11: Pt states she is doing well today. Has been staying busy; had to put her dog to sleep on Tuesday so is trying to keep her mind off things. 12/8: Pt reports she has had a rough morning - will likely have to put her dog to sleep today. 12/4:  Pt with no new complaints this date. States she is slightly stiff today however feels better than she did yesterday  12/1:  Pt reports she is doing well overall; sore and achy all over today - feels it is related to cold weather and lifting her dog a lot. 11/17  Pt states that she has really bad toes due to bunions and corns. Did 40n mn chair yoga class today and went to grocery store so is a little more fatigued. 11/13:  Pt reports no sharp pain however is achy today.   States \"I'm just getting older\"     OBJECTIVE:   12/29: TUG = 14sec  12/22:  Pt ambulating without device this date; continues with slightly shortened step length and decreased toe off B.   MMT BLE's: 5/5 throughout  SLS B x 5 sec without UE support       RESTRICTIONS/PRECAUTIONS: none    Exercises/Interventions:     Therapeutic Exercises (67716) Resistance / level Sets/sec Reps Notes   Nu step R Bike    12/29 - ATTEMPTED; unable to tolerate this date due to knee flexion depths    IB/HR  2 x 30\" /   2 x 10     Standing hamstring stretch  2 x 30\" B     Standing hip flexor stretch              Step ups forward   Step ups lateral  6\"    X 10 B               Cables:  Walkouts    3 way kicks    Cable rows:  Mid, high and LPD     Leg press (B1, L4) 50# 2 10 12/29 - added   Seated SLR Flexion  2 10, R/L 12/29 - added   TRX Squats  2 10 12/29 - added          Therapeutic Activities (62805)       Reviewed sitting with lumbar towel roll for posture and proper body mechanics              Seated on SB (green): shifts a/p, lateral, cw/ccw,,               Total gym squats       Neuromuscular Re-ed (12880)       Airex:  NarrowBOS, with arms OH, rotation B   Narrow STEPHANIA, Head Turns/Pitches  Narrow STEPHANIA, EC    Airex:  Stagger stance R/L w:  BUE flexion  Trunk Twists R/L  Head Turns R/L    SLS B (on floor)     10x  10x ea  3 x 10''        10x ea  10x ea  10x ea          1/2 foam: perpendicular to foam in DLS, Parallel on foam in tandem stance     TUG test  1x  12/29 - added   [place] Step [facility]) - Single Leg  10x R/L  12/29 - added              Supine:  Bridging  LTR  Abdominal set    Abdominal set with alt march  Hip adduction with ball squeeze  SLR             Manual Intervention (74241)                                                     Pt. Education: 12/22:  Verbal cues for proper exercise technique   Educated on proper body mechanics and posture correction, reviewed current exercises she is performing at home and reviewed new exercises provided this date.    -patient educated on diagnosis, prognosis and expectations for rehab  -all patient questions were answered    HEP instruction: 12/22:  No new additions this date; reviewed current HEP and stressed importance   Written HEP instructions provided and reviewed (standing heel raises, standing hamstring stretch, standing hip flexor stretch, bridge, LTR, standing lumbar extension)      Therapeutic Exercise and NMR EXR  [x] (96792) Provided verbal/tactile cueing for activities related to strengthening, flexibility, endurance, ROM for improvements in  [x] LE / Lumbar: LE, proximal hip, and core control with self care, mobility, lifting, ambulation. [] UE / Cervical: cervical, postural, scapular, scapulothoracic and UE control with self care, reaching, carrying, lifting, house/yardwork, driving, computer work. [x] (71380) Provided verbal/tactile cueing for activities related to improving balance, coordination, kinesthetic sense, posture, motor skill, proprioception to assist with   [x] LE / lumbar: LE, proximal hip, and core control in self care, mobility, lifting, ambulation and eccentric single leg control.    [] UE / cervical: cervical, scapular, scapulothoracic and UE control with self care, reaching, carrying, lifting, house/yardwork, driving, computer work.   [] (12335) Therapist is in constant attendance of 2 or more patients providing skilled therapy interventions, but not providing any significant amount of measurable one-on-one time to either patient, for improvements in  [] LE / lumbar: LE, proximal hip, and core control in self care, mobility, lifting, ambulation and eccentric single leg control. [] UE / cervical: cervical, scapular, scapulothoracic and UE control with self care, reaching, carrying, lifting, house/yardwork, driving, computer work. NMR and Therapeutic Activities:    [x] (04849 or 47960) Provided verbal/tactile cueing for activities related to improving balance, coordination, kinesthetic sense, posture, motor skill, proprioception and motor activation to allow for proper function of   [x] LE: / Lumbar core, proximal hip and LE with self care and ADLs  [] UE / Cervical: cervical, postural, scapular, scapulothoracic and UE control with self care, carrying, lifting, driving, computer work.   [] (51199) Gait Re-education- Provided training and instruction to the patient for proper LE, core and proximal hip recruitment and positioning and eccentric body weight control with ambulation re-education including up and down stairs     Home Management Training / Self Care:  [] (67892) Provided self-care/home management training related to activities of daily living and compensatory training, and/or use of adaptive equipment for improvement with: ADLs and compensatory training, meal preparation, safety procedures and instruction in use of adaptive equipment, including bathing, grooming, dressing, personal hygiene, basic household cleaning and chores.      Home Exercise Program:    [] (90732) Reviewed/Progressed HEP activities related to strengthening, flexibility, endurance, ROM of   [] LE / Lumbar: core, proximal hip and LE for functional ES (un) (27052):   [] Home Management Training x  [] ES(attended) (42690)   [] Dry Needling 1-2 muscles (64989):  [] Dry Needling 3+ muscles (434045  [] Group:      [] Other:     GOALS:   Patient stated goal: To move better, improve balance and decrease pain     Therapist goals for Patient:   Short Term Goals: To be achieved in: 2 weeks  1. Independent in HEP and progression per patient tolerance, in order to prevent re-injury. - MET  2. Patient will have a decrease in pain to facilitate improvement in movement, function, and ADLs as indicated by Functional Deficits. - MET     Long Term Goals: To be achieved in: 2 weeks  1. Disability index score of 20% or less for the LEFS to assist with reaching prior level of function.  - PROGRESSING  2. [facility] will [facility] to allow for proper joint functioning as indicated by patients Functional Deficits. - MET  3. Patient will demonstrate an increase in postural awareness and control to allow for proper functional mobility as indicated by patients Functional Deficits. - PROGRESSING  4. Patient will demonstrate an increase in Strength to  5/5 to allow for proper functional mobility as indicated by patients Functional Deficits. - MET  5. Patient will return to functional activities including walking in the grocery store without increased symptoms or restriction. - MET                Overall Progression Towards Functional goals/ Treatment Progress Update:  [x] Patient is progressing as expected towards functional goals listed. [] Progression is slowed due to complexities/Impairments listed. [] Progression has been slowed due to co-morbidities.   [] Plan just implemented, too soon to assess goals progression <30days   [] Goals require adjustment due to lack of progress  [] Patient is not progressing as expected and requires additional follow up with physician  [] Other    Persisting Functional Limitations/Impairments:  []Sleeping [x]Sitting               [x]Standing []Transfers        [x]Walking []Kneeling               [x]Stairs [x]Squatting / bending   [x]ADLs []Reaching  [x]Lifting  [x]Housework  []Driving []Job related tasks  [x]Sports/Recreation []Other:        ASSESSMENT:    12/29 - patient challenged by exercises, pace and activities of this date, but was able to perform with only 2 rest breaks. The patient was unable to cycle around on bike due to decreased knee ROM on L > R. The patient was also challenged by balance tasks, and required SBA/CGA when airex and head movements were combined. The patient's TUG indicates she may benefit from ongoing mobility and balance training to improve safety and independence with mobility. 12/22:  Pt is progressing well overall; improved strength and balance noted. 12/18: Initiated lateral step ups this date. Pt required verbal and tactile cues to deactivate UT and retract scapulas with cable column rows and lat pull downs. Pt will continue to benefit from OP PT services to improve strength and balance and return to PLOF without limitations    12/15:  Pt tolerated session well this date; requested 30 min visit this date as she states she is tired and have a lot to do.    12/8: Tolerated session well this date without any increase in pain; good endurance this date.   Slightly limited session time this date - pt needing to take dog to vet    Pt with chronic back pain and generalized weakness interfering with functional mobility; will benefit from skilled PT for posture correction, education on body mechanics, strengthening and balance training    Treatment/Activity Tolerance:  [x] Patient able to complete tx [] Patient limited by fatigue  [] Patient limited by pain  [] Patient limited by other medical complications  [] Other:     Prognosis: [x] Good [] Fair  [] Poor    Patient Requires Follow-up: [x] Yes  [] No    Plan for next treatment session: DC NEXT VISIT    PLAN: See eval. PT 2x / week for 6 weeks. DC  [x] Continue per plan of care [] Alter current plan (see comments)  [] Plan of care initiated [] Hold pending MD visit [] Discharge    Electronically signed by: Elliott Delgado PT, DPT, OMT-C    Note: If patient does not return for scheduled/ recommended follow up visits, this note will serve as a discharge from care along with most recent update on progress.

## 2021-01-02 ENCOUNTER — NURSE TRIAGE (OUTPATIENT)
Dept: OTHER | Facility: CLINIC | Age: 81
End: 2021-01-02

## 2021-01-02 NOTE — TELEPHONE ENCOUNTER
Patient reports having dizziness in the past week that has now resolved. Patient reports having a dull headache. Patient reports last Monday. Patient has lost a pet within the last 4 weeks and has been stressed and sad about that. Reason for Disposition   [1] MILD-MODERATE headache AND [2] present > 72 hours    Answer Assessment - Initial Assessment Questions  1. LOCATION: \"Where does it hurt? \"       Frontal forehead. 2. ONSET: \"When did the headache start? \" (Minutes, hours or days)       Last week. 3. PATTERN: \"Does the pain come and go, or has it been constant since it started? \"      Constant    4. SEVERITY: \"How bad is the pain? \" and \"What does it keep you from doing? \"  (e.g., Scale 1-10; mild, moderate, or severe)    - MILD (1-3): doesn't interfere with normal activities     - MODERATE (4-7): interferes with normal activities or awakens from sleep     - SEVERE (8-10): excruciating pain, unable to do any normal activities         5/10    5. RECURRENT SYMPTOM: \"Have you ever had headaches before? \" If so, ask: \"When was the last time? \" and \"What happened that time? \"       Denies    6. CAUSE: \"What do you think is causing the headache? \"      Not sure. 7. MIGRAINE: \"Have you been diagnosed with migraine headaches? \" If so, ask: \"Is this headache similar? \"       Denies    8. HEAD INJURY: \"Has there been any recent injury to the head? \"       Denies    9. OTHER SYMPTOMS: \"Do you have any other symptoms? \" (fever, stiff neck, eye pain, sore throat, cold symptoms)      Denies    10. PREGNANCY: \"Is there any chance you are pregnant? \" \"When was your last menstrual period? \"        N/A    Protocols used: HEADACHE-ADULT-AH    Patient called pre-service center [facility]) to schedule appointment, with red flag complaint, transferred to RN access for triage. See above questions and answers. Caller talking full sentences without any distress on phone. Discussed disposition and patient agreeable.   Discussed potential consequences for not following disposition recommendation. Aware to call back with any concerns or persistent, worsening, or new symptoms develop. Warm transfer to [facility] scheduling for appointment. Attention Provider: Thank you for allowing me to participate in the care of your patient. The  patient was connected to triage in response to information provided to the E[facility]. Please do not respond through this encounter as the response is not directed to a shared pool.

## 2021-01-04 ENCOUNTER — OFFICE VISIT (OUTPATIENT)
Dept: FAMILY MEDICINE CLINIC | Age: 81
End: 2021-01-04
Payer: MEDICARE

## 2021-01-04 VITALS
DIASTOLIC BLOOD PRESSURE: 80 MMHG | SYSTOLIC BLOOD PRESSURE: 150 MMHG | OXYGEN SATURATION: 95 % | HEART RATE: 90 BPM | BODY MASS INDEX: 32.57 KG/M2 | WEIGHT: 177 LBS | TEMPERATURE: 97.2 F | HEIGHT: 62 IN

## 2021-01-04 DIAGNOSIS — K21.9 GASTROESOPHAGEAL REFLUX DISEASE, UNSPECIFIED WHETHER ESOPHAGITIS PRESENT: Primary | ICD-10-CM

## 2021-01-04 DIAGNOSIS — I10 ESSENTIAL HYPERTENSION: ICD-10-CM

## 2021-01-04 PROCEDURE — 99213 OFFICE O/P EST LOW 20 MIN: CPT | Performed by: FAMILY MEDICINE

## 2021-01-04 RX ORDER — FAMOTIDINE 20 MG/1
20 TABLET, FILM COATED ORAL 2 TIMES DAILY PRN
Qty: 60 TABLET | Refills: 3 | Status: SHIPPED | OUTPATIENT
Start: 2021-01-04 | End: 2021-01-18 | Stop reason: SDUPTHER

## 2021-01-04 RX ORDER — LOSARTAN POTASSIUM 50 MG/1
50 TABLET ORAL 2 TIMES DAILY WITH MEALS
Qty: 180 TABLET | Refills: 3 | Status: SHIPPED | OUTPATIENT
Start: 2021-01-04 | End: 2022-01-18

## 2021-01-04 NOTE — PROGRESS NOTES
Lucia Wilcox is a 78 y.o. female. HPI:  Here for BP check   Systolic blood pressure has been running high at home 140s 150s  GE reflux, not taking any meds for this  Recommend she watch her caffeine  Not Taking anti-inflammatory    Meds, vitamins and allergies reviewed with pt    Wt Readings from Last 3 Encounters:   01/04/21 177 lb (80.3 kg)   11/06/20 177 lb (80.3 kg)   01/21/20 168 lb (76.2 kg)       REVIEW OF SYSTEMS:   CONSTITUTIONAL: See history of present illness,   Weight noted   HEENT: No new vision difficulties or ringing in the ears. RESPIRATORY: No new SOB, PND, orthopnea or cough. CARDIOVASCULAR: no CP, palpitations or SOB with exertion  GI: No nausea, vomiting, diarrhea, constipation, abdominal pain or changes in bowel habits. : No urinary frequency, urgency, incontinence hematuria or dysuria. SKIN: No cyanosis or skin lesions. MUSCULOSKELETAL: No new muscle or joint pain. NEUROLOGICAL: No syncope or TIA-like symptoms. PSYCHIATRIC: No anxiety, insomnia or depression     No Known Allergies    Prior to Visit Medications    Medication Sig Taking? Authorizing Provider   B Complex-C (VITAMIN B + C COMPLEX PO) Take by mouth Yes Historical Provider, MD   famotidine (PEPCID) 20 MG tablet Take 1 tablet by mouth 2 times daily as needed (ge reflux) Yes Mayra Maki MD   losartan (COZAAR) 50 MG tablet Take 1 tablet by mouth 2 times daily (with meals) Yes Mayra Maki MD   sertraline (ZOLOFT) 25 MG tablet TAKE 1 TABLET BY MOUTH EVERY DAY Yes Historical Provider, MD   atorvastatin (LIPITOR) 10 MG tablet Take 1 tablet by mouth every other day ++NEW DOSE++ Yes Mayra Maki MD   Lift Chair MISC by Does not apply route Yes Mayra Maki MD   estradiol (ESTRACE) 0.1 MG/GM vaginal cream Place 2 g vaginally Twice a Week  Yes Historical Provider, MD   aspirin 81 MG EC tablet Take 81 mg by mouth daily. Yes Historical Provider, MD   VITAMIN D PO Take  by mouth.  Yes Historical Provider, MD Past Medical History:   Diagnosis Date    AR (allergic rhinitis)     Arthritis of knee     Pruis    Depression     Erosive gastritis 10/28/2019    EGD October 2019, he did with PPI    GERD (gastroesophageal reflux disease)     Hyperlipidemia     Internal hemorrhoids     Overweight(278.02)     Viral meningitis 5/12       Social History     Tobacco Use    Smoking status: Never Smoker    Smokeless tobacco: Never Used   Substance Use Topics    Alcohol use: Yes     Comment: rarely       Family History   Problem Relation Age of Onset    Breast Cancer Mother     Bipolar Disorder Brother        OBJECTIVE:  BP (!) 150/80   Pulse 90   Temp 97.2 °F (36.2 °C)   Ht 5' 2\" (1.575 m)   Wt 177 lb (80.3 kg)   SpO2 95%   BMI 32.37 kg/m²   GEN:  in NAD  HEENT:  NCAT, TMs:normal and throat: Not examined due to Covid/mask  NECK:  Supple without adenopathy. CV:  Regular rate and rhythm, S1 and S2 normal, no murmurs, clicks  PULM:  Chest is clear, no wheezing ,  symmetric air entry throughout both lung fields. ABD: Soft, NT, no masses appreciated  EXT: No rash or edema  NEURO: Alert oriented ×3, nonfocal     ASSESSMENT/PLAN:  1. Essential hypertension  Increase losartan to 50 mg twice daily  Start checking blood pressure in about 10 days and report to Dr. Angel Underwood  - losartan (COZAAR) 50 MG tablet; Take 1 tablet by mouth 2 times daily (with meals)  Dispense: 180 tablet; Refill: 3    2.  Gastroesophageal reflux disease, unspecified whether esophagitis present  Pepcid AC twice daily  Watch NSAID  Follow-up as needed

## 2021-01-05 ENCOUNTER — HOSPITAL ENCOUNTER (OUTPATIENT)
Dept: PHYSICAL THERAPY | Age: 81
Setting detail: THERAPIES SERIES
Discharge: HOME | End: 2021-01-05
Payer: MEDICARE

## 2021-01-05 PROCEDURE — 97110 THERAPEUTIC EXERCISES: CPT

## 2021-01-05 PROCEDURE — 97530 THERAPEUTIC ACTIVITIES: CPT

## 2021-01-05 NOTE — FLOWSHEET NOTE
[facility]  Phone: [phone]   Fax: [phone]    Physical Therapy Discharge Summary    Dear Dr. Juliano Guzman,    We had the pleasure of treating the following patient for physical therapy services at [facility]. A summary of our findings can be found in the discharge summary below. If you have any questions or concerns regarding these findings, please do not hesitate to contact me at the office phone number checked above. Thank you for the referral.     Physician Signature:________________________________Date:__________________  By signing above (or electronic signature), therapists plan is approved by physician      Functional Outcome: Bronson balance 52/56  LEFS: 47     Overall Response to Treatment:   [x]Patient is responding well to treatment and improvement is noted with regards  to goals   []Patient should continue to improve in reasonable time if they continue HEP   []Patient has plateaued and is no longer responding to skilled PT intervention    []Patient is getting worse and would benefit from return to referring MD   []Patient unable to adhere to initial POC   []Other:     Date range of Visits:  11/10/20 - 21  Total Visits: 11    Recommendation:  [x] Discharge to [facility]. Follow up with PT PRN.      Physical Therapy Daily Treatment Note  Date:  2021    Patient Name:  Aniceto Russ    :  1940  MRN: [id number]  Medical/Treatment Diagnosis Information:  · Diagnosis: Generalized weakness R53.1  · Treatment Diagnosis: generalized weakness causing overall decreased functional mobility  Insurance/Certification information:  PT Insurance Information: [employer] Medicare - medical necessity  Physician Information:  Referring Practitioner: Regis Bruno MD  Plan of care signed (Y/N): faxed 11/10    Date of Patient follow up with Physician:     Functional scale:  LEFS:   raw score = 37 ; dysfunction = 40-59 %; TU.72 seconds without device   LEFS: 47; 40-59% dysfunction 20    Progress Report: [x]  Yes   []  No     Date Range for reporting period:  Beginning 11/10/20  Ending 20    Progress report due (10 Rx/or 30 days whichever is less):last visit     Recertification due (POC duration/ or 90 days whichever is less):    Visit # Insurance Allowable Auth required? Date Range    Medical necessity []  Yes  [x]  No      Latex Allergy:  [x]NO      []YES  Preferred Language for Healthcare:   [x]English       []other:    Pain level: 5/10 back and legs on and off; stiff and tender     SUBJECTIVE:   21:  Pt with no new complaints this date. States she feels she is ready for discharge at this time. Reports she has been running around too much and feels that she needs a break - plans to continue with her workouts at the [facility]. States she feels like she has improved significantly with PT however still needs to \"practice her balance\".  - the patient reports legs/knees feel tight and stiff today, and she could not tolerate R bike this date. She reports the cold temperatures may have impact on energy levels. :  Pt reports she is feeling some better overall, \"legs are getting stronger\". States she is consistent with her stretching daily. \"Legs are hurting less at night\" per pt.    : States she did her chair yoga yesterday. States she has cramping in her legs that works it self out. 12/15:  Pt reports no pain this date however states her \"legs feel heavy\". States she has been sad this week because of her dog.    : Pt states she is doing well today. Has been staying busy; had to put her dog to sleep on Tuesday so is trying to keep her mind off things. : Pt reports she has had a rough morning - will likely have to put her dog to sleep today. :  Pt with no new complaints this date.   States she is slightly stiff today however feels better than she did yesterday  12/1:  Pt reports she is doing well overall; sore and achy all over today - feels it is related to cold weather and lifting her dog a lot. 11/17  Pt states that she has really bad toes due to bunions and corns. Did 40n mn chair yoga class today and went to grocery store so is a little more fatigued. 11/13:  Pt reports no sharp pain however is achy today.   States \"I'm just getting older\"     OBJECTIVE:   1/5/21:  Malgorzata Leal balance test 52/56  MMT BLE's:  5/5 throughout  SLS B x 3-5 sec without UE support       12/29: TUG = 14sec  12/22:  Pt ambulating without device this date; continues with slightly shortened step length and decreased toe off B.   MMT BLE's: 5/5 throughout  SLS B x 5 sec without UE support       RESTRICTIONS/PRECAUTIONS: none    Exercises/Interventions:     Therapeutic Exercises (24986) Resistance / level Sets/sec Reps Notes   Nu step 1X 4 minR Bike    12/29 - ATTEMPTED; unable to tolerate this date due to knee flexion depths    IB/HR  2 x 30\"/   2 x 10     Standing hamstring stretch  2 x 30\" B     Standing hip flexor stretch  2 x 30\" B            Step ups forward   Step ups lateral  6\"   6 in  X 10 B   X 10 B            Cables:  Walkouts    3 way kicks    Cable rows:  Mid, high and LPD     Leg press (B1, L4) Seated SLR Flexion TRX Squats        Therapeutic Activities (61387)       Reviewed sitting with lumbar towel roll for posture and proper body mechanics              Seated on SB (green): shifts a/p, lateral, cw/ccw,,               Total gym squats       Neuromuscular Re-ed (08378)       Airex:  NarrowBOS, with arms OH, rotation B   Narrow STEPHANIA, Head Turns/Pitches  Narrow STEPHANIA, EC    Airex:  Stagger stance R/L w:  BUE flexion  Trunk Twists R/L  Head Turns R/L    SLS B (on floor)            1/2 foam: perpendicular to foam in DLS, Parallel on foam in tandem stance     TUG test   12/29 - added   Corbin Step Over [facility]) - Single Leg   12/29 - added Supine:  Bridging  LTR  Abdominal set    Abdominal set with alt march  Hip adduction with ball squeeze  SLR             Manual Intervention (38584)                                                     Pt. Education: 1/5/21:  Provided trial membership to  for continued exercises as needed in addition to [employer], reviewed HEP and discussed continued balance training. Educated on proper body mechanics and posture correction, reviewed current exercises she is performing at home and reviewed new exercises provided this date.    -patient educated on diagnosis, prognosis and expectations for rehab  -all patient questions were answered    HEP instruction: 1/5/21:  Pt declined written copies of HEP stating she already had copies and was comfortable with program.    12/22:  No new additions this date; reviewed current HEP and stressed importance   Written HEP instructions provided and reviewed (standing heel raises, standing hamstring stretch, standing hip flexor stretch, bridge, LTR, standing lumbar extension)      Therapeutic Exercise and NMR EXR  [x] (66127) Provided verbal/tactile cueing for activities related to strengthening, flexibility, endurance, ROM for improvements in  [x] LE / Lumbar: LE, proximal hip, and core control with self care, mobility, lifting, ambulation. [] UE / Cervical: cervical, postural, scapular, scapulothoracic and UE control with self care, reaching, carrying, lifting, house/yardwork, driving, computer work. [x] (07459) Provided verbal/tactile cueing for activities related to improving balance, coordination, kinesthetic sense, posture, motor skill, proprioception to assist with   [x] LE / lumbar: LE, proximal hip, and core control in self care, mobility, lifting, ambulation and eccentric single leg control.    [] UE / cervical: cervical, scapular, scapulothoracic and UE control with self care, reaching, carrying, lifting, house/yardwork, driving, computer work.   [] (90302) Therapist is in constant attendance of 2 or more patients providing skilled therapy interventions, but not providing any significant amount of measurable one-on-one time to either patient, for improvements in  [] LE / lumbar: LE, proximal hip, and core control in self care, mobility, lifting, ambulation and eccentric single leg control. [] UE / cervical: cervical, scapular, scapulothoracic and UE control with self care, reaching, carrying, lifting, house/yardwork, driving, computer work. NMR and Therapeutic Activities:    [x] (90414 or 02769) Provided verbal/tactile cueing for activities related to improving balance, coordination, kinesthetic sense, posture, motor skill, proprioception and motor activation to allow for proper function of   [x] LE: / Lumbar core, proximal hip and LE with self care and ADLs  [] UE / Cervical: cervical, postural, scapular, scapulothoracic and UE control with self care, carrying, lifting, driving, computer work.   [] (66466) Gait Re-education- Provided training and instruction to the patient for proper LE, core and proximal hip recruitment and positioning and eccentric body weight control with ambulation re-education including up and down stairs     Home Management Training / Self Care:  [x] (88972) Provided self-care/home management training related to activities of daily living and compensatory training, and/or use of adaptive equipment for improvement with: ADLs and compensatory training, meal preparation, safety procedures and instruction in use of adaptive equipment, including bathing, grooming, dressing, personal hygiene, basic household cleaning and chores.      Home Exercise Program:    [x] (96395) Reviewed/Progressed HEP activities related to strengthening, flexibility, endurance, ROM of   [x] LE / Lumbar: core, proximal hip and LE for functional self-care, mobility, lifting and ambulation/stair navigation   [x] UE / Cervical: cervical, postural, scapular, scapulothoracic and UE control with self care, reaching, carrying, lifting, house/yardwork, driving, computer work  [] (23534)Reviewed/Progressed HEP activities related to improving balance, coordination, kinesthetic sense, posture, motor skill, proprioception of   [] LE: core, proximal hip and LE for self care, mobility, lifting, and ambulation/stair navigation    [] UE / Cervical: cervical, postural,  scapular, scapulothoracic and UE control with self care, reaching, carrying, lifting, house/yardwork, driving, computer work    Manual Treatments:  PROM / STM / Oscillations-Mobs:  G-I, II, III, IV (PA's, Inf., Post.)  [] (26781) Provided manual therapy to mobilize LE, proximal hip and/or LS spine soft tissue/joints for the purpose of modulating pain, promoting relaxation,  increasing ROM, reducing/eliminating soft tissue swelling/inflammation/restriction, improving soft tissue extensibility and allowing for proper ROM for normal function with   [] LE / lumbar: self care, mobility, lifting and ambulation. [] UE / Cervical: self care, reaching, carrying, lifting, house/yardwork, driving, computer work. Modalities:  [] (95993) Vasopneumatic compression: Utilized vasopneumatic compression to decrease edema / swelling for the purpose of improving mobility and quad tone / recruitment which will allow for increased overall function including but not limited to self-care, transfers, ambulation, and ascending / descending stairs.      Modalities:  1/5/21:  None this date    Charges:  Timed Code Treatment Minutes: 42   Total Treatment Minutes: 42     [] EVAL - LOW (25211)   [] EVAL - MOD (86163)  [] EVAL - HIGH (63004)  [] RE-EVAL (69439)  [x] MW(54776) x 2     [] Ionto  [] NMR (25174)       [] Vaso  [] Manual (28364) x       [] Ultrasound  [x] TA x 1      [] Mech Traction (32555)  [] Aquatic Therapy x     [] ES (un) (19868):   [] Home Management Training x  [] ES(attended) (73141)   [] Dry Needling 1-2 muscles (42400):  [] Dry Needling 3+ muscles [x]Housework  []Driving []Job related tasks  [x]Sports/Recreation []Other:        ASSESSMENT:  1/5/21:  Pt has progressed well with PT to this point, ready for discharge with HEP.    12/29 - patient challenged by exercises, pace and activities of this date, but was able to perform with only 2 rest breaks. The patient was unable to cycle around on bike due to decreased knee ROM on L > R. The patient was also challenged by balance tasks, and required SBA/CGA when airex and head movements were combined. The patient's TUG indicates she may benefit from ongoing mobility and balance training to improve safety and independence with mobility. 12/22:  Pt is progressing well overall; improved strength and balance noted. 12/18: Initiated lateral step ups this date. Pt required verbal and tactile cues to deactivate UT and retract scapulas with cable column rows and lat pull downs. Pt will continue to benefit from OP PT services to improve strength and balance and return to PLOF without limitations    12/15:  Pt tolerated session well this date; requested 30 min visit this date as she states she is tired and have a lot to do.    12/8: Tolerated session well this date without any increase in pain; good endurance this date.   Slightly limited session time this date - pt needing to take dog to vet    Pt with chronic back pain and generalized weakness interfering with functional mobility; will benefit from skilled PT for posture correction, education on body mechanics, strengthening and balance training    Treatment/Activity Tolerance:  [x] Patient able to complete tx [] Patient limited by fatigue  [] Patient limited by pain  [] Patient limited by other medical complications  [] Other:     Prognosis: [x] Good [] Fair  [] Poor    Patient Requires Follow-up: [x] Yes  [] No    Plan for next treatment session: Will discharge pt from PT at this time    PLAN:   [] Continue per plan of care [] Alter current plan (see comments)  [] Plan of care initiated [] Hold pending MD visit [x] Discharge    Electronically signed by: Víctor Mendez PT, DPT [id number]    Note: If patient does not return for scheduled/ recommended follow up visits, this note will serve as a discharge from care along with most recent update on progress.

## 2021-01-18 RX ORDER — FAMOTIDINE 20 MG/1
20 TABLET, FILM COATED ORAL 2 TIMES DAILY PRN
Qty: 180 TABLET | Refills: 0 | Status: SHIPPED | OUTPATIENT
Start: 2021-01-18 | End: 2021-02-22 | Stop reason: ALTCHOICE

## 2021-01-18 NOTE — TELEPHONE ENCOUNTER
RX for Famotidine 20mg tab was sent for a 30 day supply on 1-4-21 by Dr. Lyudmila Meléndez  [facility] requesting a 90 day supply  Review and if appropriate send 90 day supply

## 2021-01-28 ENCOUNTER — HOSPITAL ENCOUNTER (EMERGENCY)
Age: 81
Discharge: HOME | End: 2021-01-28
Payer: MEDICARE

## 2021-01-28 ENCOUNTER — APPOINTMENT (OUTPATIENT)
Dept: GENERAL RADIOLOGY | Age: 81
End: 2021-01-28
Payer: MEDICARE

## 2021-01-28 ENCOUNTER — NURSE TRIAGE (OUTPATIENT)
Dept: OTHER | Facility: CLINIC | Age: 81
End: 2021-01-28

## 2021-01-28 VITALS
WEIGHT: 173 LBS | BODY MASS INDEX: 31.83 KG/M2 | HEIGHT: 62 IN | RESPIRATION RATE: 23 BRPM | TEMPERATURE: 97.1 F | OXYGEN SATURATION: 95 % | HEART RATE: 79 BPM | SYSTOLIC BLOOD PRESSURE: 143 MMHG | DIASTOLIC BLOOD PRESSURE: 63 MMHG

## 2021-01-28 DIAGNOSIS — R07.9 CHEST PAIN, UNSPECIFIED TYPE: Primary | ICD-10-CM

## 2021-01-28 LAB
A/G RATIO: 1.5 (ref 1.1–2.2)
ALBUMIN SERPL-MCNC: 4.2 G/DL (ref 3.4–5)
ALP BLD-CCNC: 79 U/L (ref 40–129)
ALT SERPL-CCNC: 10 U/L (ref 10–40)
ANION GAP SERPL CALCULATED.3IONS-SCNC: 12 MMOL/L (ref 3–16)
AST SERPL-CCNC: 17 U/L (ref 15–37)
BASOPHILS ABSOLUTE: 0 K/UL (ref 0–0.2)
BASOPHILS RELATIVE PERCENT: 0.5 %
BILIRUB SERPL-MCNC: 0.3 MG/DL (ref 0–1)
BUN BLDV-MCNC: 23 MG/DL (ref 7–20)
CALCIUM SERPL-MCNC: 9.1 MG/DL (ref 8.3–10.6)
CHLORIDE BLD-SCNC: 102 MMOL/L (ref 99–110)
CO2: 25 MMOL/L (ref 21–32)
CREAT SERPL-MCNC: 0.7 MG/DL (ref 0.6–1.2)
EOSINOPHILS ABSOLUTE: 0.1 K/UL (ref 0–0.6)
EOSINOPHILS RELATIVE PERCENT: 1.4 %
GFR AFRICAN AMERICAN: >60
GFR NON-AFRICAN AMERICAN: >60
GLOBULIN: 2.8 G/DL
GLUCOSE BLD-MCNC: 122 MG/DL (ref 70–99)
HCT VFR BLD CALC: 40.9 % (ref 36–48)
HEMOGLOBIN: 13.6 G/DL (ref 12–16)
LYMPHOCYTES ABSOLUTE: 1.2 K/UL (ref 1–5.1)
LYMPHOCYTES RELATIVE PERCENT: 27.5 %
MCH RBC QN AUTO: 29.4 PG (ref 26–34)
MCHC RBC AUTO-ENTMCNC: 33.2 G/DL (ref 31–36)
MCV RBC AUTO: 88.4 FL (ref 80–100)
MONOCYTES ABSOLUTE: 0.4 K/UL (ref 0–1.3)
MONOCYTES RELATIVE PERCENT: 8.5 %
NEUTROPHILS ABSOLUTE: 2.8 K/UL (ref 1.7–7.7)
NEUTROPHILS RELATIVE PERCENT: 62.1 %
PDW BLD-RTO: 13.8 % (ref 12.4–15.4)
PLATELET # BLD: 131 K/UL (ref 135–450)
PMV BLD AUTO: 7.9 FL (ref 5–10.5)
POTASSIUM REFLEX MAGNESIUM: 4.2 MMOL/L (ref 3.5–5.1)
PRO-BNP: 198 PG/ML (ref 0–449)
RBC # BLD: 4.63 M/UL (ref 4–5.2)
SODIUM BLD-SCNC: 139 MMOL/L (ref 136–145)
TOTAL PROTEIN: 7 G/DL (ref 6.4–8.2)
TROPONIN: <0.01 NG/ML
WBC # BLD: 4.5 K/UL (ref 4–11)

## 2021-01-28 PROCEDURE — 85025 COMPLETE CBC W/AUTO DIFF WBC: CPT

## 2021-01-28 PROCEDURE — 80053 COMPREHEN METABOLIC PANEL: CPT

## 2021-01-28 PROCEDURE — 84484 ASSAY OF TROPONIN QUANT: CPT

## 2021-01-28 PROCEDURE — 6370000000 HC RX 637 (ALT 250 FOR IP): Performed by: PHYSICIAN ASSISTANT

## 2021-01-28 PROCEDURE — 99283 EMERGENCY DEPT VISIT LOW MDM: CPT

## 2021-01-28 PROCEDURE — 93005 ELECTROCARDIOGRAM TRACING: CPT | Performed by: EMERGENCY MEDICINE

## 2021-01-28 PROCEDURE — 83880 ASSAY OF NATRIURETIC PEPTIDE: CPT

## 2021-01-28 PROCEDURE — 71045 X-RAY EXAM CHEST 1 VIEW: CPT

## 2021-01-28 RX ADMIN — LIDOCAINE HYDROCHLORIDE: 20 SOLUTION ORAL; TOPICAL at 11:50

## 2021-01-28 ASSESSMENT — ENCOUNTER SYMPTOMS
CONSTIPATION: 0
SHORTNESS OF BREATH: 0
TROUBLE SWALLOWING: 0
DIARRHEA: 0
BACK PAIN: 0
COUGH: 0
NAUSEA: 0
SORE THROAT: 0
VOMITING: 0
ABDOMINAL PAIN: 0
COLOR CHANGE: 0

## 2021-01-28 ASSESSMENT — PAIN DESCRIPTION - LOCATION: LOCATION: CHEST;HEAD

## 2021-01-28 NOTE — TELEPHONE ENCOUNTER
Reason for Disposition   Dizziness or lightheadedness    Answer Assessment - Initial Assessment Questions  1. BLOOD PRESSURE: \"What is the blood pressure? \" \"Did you take at least two measurements 5 minutes apart?\"  153/83, taken in her arm yesterday 1/27  158/106 BP, 86bmp at 835.        2. ONSET: \"When did you take your blood pressure? Lien Reilly, [address] 1/28        3. HOW: \"How did you obtain the blood pressure? \" (e.g., visiting nurse, automatic home BP monitor)      Home BP monitor    4. HISTORY: \"Do you have a history of high blood pressure? \"      Yes, but its been controlled with medication. 5. MEDICATIONS: Brooke Slater you taking any medications for blood pressure? \" \"Have you missed any doses recently? \"  Takes lorsartan 50mg BID.          6. OTHER SYMPTOMS: \"Do you have any symptoms? \" (e.g., headache, chest pain, blurred vision, difficulty breathing, weakness)      Broke out into a sweat which prompted her to call, nausea, headache, soreness in the middle of my chest. Chest is tender in sternum area. Chest pain is similar to pain a week ago when she had heartburn. The pain is not as severe, that feels like mild burning. 7. PREGNANCY: \"Is there any chance you are pregnant? \" \"When was your last menstrual period? \"      N/A    Answer Assessment - Initial Assessment Questions  1. LOCATION: \"Where does it hurt? \"    In the middle chest, in sternum area. 2. RADIATION: \"Does the pain go anywhere else? \" (e.g., into neck, jaw, arms, back)      \"I don't think so. \"    3. ONSET: \"When did the chest pain begin? \" (Minutes, hours or days)       Began after eating oatmeal.    4. PATTERN \"Does the pain come and go, or has it been constant since it started? \"  \"Does it get worse with exertion? \"       Constant. 5. DURATION: \"How long does it last\" (e.g., seconds, minutes, hours)      Present since eating oatmeal, right before call. 6. SEVERITY: \"How bad is the pain? \"  (e.g., Scale 1-10; mild, moderate, or severe)     - MILD (1-3): doesn't interfere with normal activities      - MODERATE (4-7): interferes with normal activities or awakens from sleep     - SEVERE (8-10): excruciating pain, unable to do any normal activities        Burning, \"6-7.\"    7. CARDIAC RISK FACTORS: \"Do you have any history of heart problems or risk factors for heart disease? \" (e.g., angina, prior heart attack; diabetes, high blood pressure, high cholesterol, smoker, or strong family history of heart disease)      High blood pressure. 8. PULMONARY RISK FACTORS: \"Do you have any history of lung disease? \"  (e.g., blood clots in lung, asthma, emphysema, birth control pills)  Denies. 9. CAUSE: \"What do you think is causing the chest pain? \"      \"Heartburn could be a thing. \"    10. OTHER SYMPTOMS: \"Do you have any other symptoms? \" (e.g., dizziness, nausea, vomiting, sweating, fever, difficulty breathing, cough)      Endorses nausea and sweating. Protocols used: CHEST PAIN-ADULT-[place], HIGH BLOOD PRESSURE-ADULT-[place]    Patient called [facility] at [address] [place] [address])  with red flag complaint. Brief description of triage: Pt called stating her BP was high, felt \"sweaty\", headache, and nausea. During assessment of BP, she stated her chest was hurting her. She confirmed it felt like previous heart burn and the pain was getting worse. She endorsed feeling light-headed while standing. Writer contacted NP for recommendation and instructed Pt to please take BP again. Writer was told to sent Pt to ER. Upon sharing recommendation with Pt, she stated Her BP was 138/76 0849, but Pt stated chest pain and light-headedness was still present and unchanged. Triage indicates for patient to go to ER per RYAN Whaley. Writer emphasized for her not to drive or if she had no ride to call 911. Pt stated she would have a ride to the ER within the hour, currently it is 0852 at time of this statement.  Writer reminded Pt that she can always call 911 if

## 2021-01-28 NOTE — ED PROVIDER NOTES
**EVALUATED BY MELLY**    [address] [facility]  eMERGENCY dEPARTMENT eNCOUnter    Pt Name: Bay Enrique  MRN: [id number]  Mohit 1940  Date of evaluation: 1/28/2021  Provider: MOODY Vergara    Chief Complaint:    Chief Complaint   Patient presents with    Headache     pt states she has been having headache, heartburn and elevated bp since yesterday    Chest Pain       Nursing Notes, Past Medical Hx, Past Surgical Hx, Social Hx, Allergies, and Family Hx were all reviewed and agreedwith or any disagreements were addressed in the HPI.    HPI:  (Location, Duration, Timing, Severity, Quality, Assoc Sx, Context, Modifying factors)  This is a  78 y.o. female who presents to the emergency department with complaints of this morning waking up with burning sensation within her chest in into her throat. Patient states that's been going on for 3 weeks. Saw her PCP for this. Patient states her blood pressure was elevated too and her PCP changed her blood pressure medications. This morning concerned more so because of diaphoresis. This also occurred yesterday. No history of cardiac disease. Patient states that she has been recently started taking famotidine 2 days ago. This may be helping with her symptoms mildly. She states that when she had the burning sensation still in her chest along with a diaphoresis this morning she was concerned that this could be something else. Denies any aggravating or alleviating factors to her symptoms today. All other systems were reviewed and are negative.      Past Medical/Surgical History:      Diagnosis Date    AR (allergic rhinitis)     Arthritis of knee     Pruis    Depression     Erosive gastritis 10/28/2019    EGD October 2019, he did with PPI    GERD (gastroesophageal reflux disease)     Hyperlipidemia     Internal hemorrhoids     Overweight(278.02)     Viral meningitis 5/12         Procedure Laterality Date    CHOLECYSTECTOMY, Exam  Vitals signs and nursing note reviewed. Constitutional:       Appearance: Normal appearance. She is well-developed. She is not toxic-appearing or diaphoretic. HENT:      Head: Normocephalic. Right Ear: External ear normal.      Left Ear: External ear normal.      Nose: Nose normal.   Eyes:      General:         Right eye: No discharge. Left eye: No discharge. Conjunctiva/sclera: Conjunctivae normal.   Neck:      Musculoskeletal: Normal range of motion and neck supple. Cardiovascular:      Rate and Rhythm: Normal rate and regular rhythm. Heart sounds: Normal heart sounds. No murmur. No friction rub. No gallop. Pulmonary:      Effort: Pulmonary effort is normal. No respiratory distress. Breath sounds: Normal breath sounds. No wheezing or rales. Musculoskeletal: Normal range of motion. Skin:     General: Skin is warm and dry. Coloration: Skin is not pale. Neurological:      Mental Status: She is alert and oriented to person, place, and time. Psychiatric:         Behavior: Behavior normal. Behavior is cooperative.        MEDICAL DECISION MAKING    Vitals:    Vitals:    01/28/21 1025 01/28/21 1100 01/28/21 1130 01/28/21 1153   BP:    (!) 143/63   Pulse: 76 73 67 79   Resp: 13 16 12 23   Temp:       TempSrc:       SpO2: 98% 95%     Weight:       Height:         LABS:   Labs Reviewed   CBC WITH AUTO DIFFERENTIAL - Abnormal; Notable for the following components:       Result Value    Platelets 581 (*)     All other components within normal limits    Narrative:     Performed at:  [facility]  [address] [facility], [address]   Phone [phone]   COMPREHENSIVE METABOLIC PANEL W/ REFLEX TO MG FOR LOW K - Abnormal; Notable for the following components:    Glucose 122 (*)     BUN 23 (*)     All other components within normal limits    Narrative:     Performed at:  [facility]  [address] [facility] [address]   Phone [phone]   TROPONIN    Narrative:     Performed at:  [facility]  [address]   Phone [phone]   BRAIN NATRIURETIC PEPTIDE    Narrative:     Performed at:  [facility]  [address]   Phone [phone] of labs reviewed and were negative at this time or not returned at the time of this note. RADIOLOGY:   Non-plain film images suchas CT, Ultrasound and MRI are read by the radiologist. Graciela AGUILERA PA have directly visualized the radiologic plain film image(s) with the below findings:    Interpretation per the Radiologist below, if available at the time of this note:    XR CHEST PORTABLE   Final Result   Cardiomegaly. No evidence of pneumonia or pulmonary edema. MEDICAL DECISION MAKING / ED COURSE:      PROCEDURES:   Procedures    Patient was given:  Medications   aluminum & magnesium hydroxide-simethicone (MAALOX) 30 mL, lidocaine viscous hcl (XYLOCAINE) 5 mL (GI COCKTAIL) ( Oral Given 1/28/21 1150)   This is a  78 y.o. female who presents to the emergency department with complaints of this morning waking up with burning sensation within her chest in into her throat. Patient states that's been going on for 3 weeks. Saw her PCP for this. Patient states her blood pressure was elevated too and her PCP changed her blood pressure medications. This morning concerned more so because of diaphoresis. This also occurred yesterday. No history of cardiac disease. Patient states that she has been recently started taking famotidine 2 days ago. This may be helping with her symptoms mildly. She states that when she had the burning sensation still in her chest along with a diaphoresis this morning she was concerned that this could be something else. Denies any aggravating or alleviating factors to her symptoms today.     Patient's blood pressure 143/63. No neurological symptoms. Patient symptoms sound more GI related, encouraged switching to a PPI such as Prilosec and discontinuing famotidine. GI cocktail did help with her symptoms today. As she was describing a chest burning sensation cardiac work-up was also obtained however troponin and EKG as well as chest x-ray are all normal in appearance. I have low suspicion for cardiac etiology. Patient will follow-up outpatient with primary care physician. The patient presents with chest pain. The patient has been evaluated and the history and physical exam suggest a benign etiology. I see nothing to suggest coronary ischemia, myocardial infarction, pulmonary embolism, thoracic aortic dissection, significant pericarditis, pneumonia, pneumothorax, or acute abdomen. I feel the patient can be safely discharged to home with outpatient follow up. Instructions have been given for the patient to return to the ED for any worsening of the symptoms, including but not limited to increased pain, shortness of breath, abdominal pain or weakness. The patient tolerated their visit well. I have evaluated the patient with physician available for consultation as needed. I have discussed the findings of today's workup with the patient and addressed the patient's questions and concerns. Important warning signs as well as new or worsening symptoms which wouldnecessitate immediate return to the ED were discussed. The plan is to discharge from the ED at this time, and the patient is in stable condition. The patient acknowledged understanding is agreeable with this plan. CLINICAL IMPRESSION:  1. Chest pain, unspecified type        DISPOSITION Decision To Discharge 01/28/2021 12:03:49 PM      PATIENT REFERRED TO:  Twan Delaney MD  [address] [place] [address] RD.   [address]  [phone]    Schedule an appointment as soon as possible for a visit       [facility] Emergency Department  [place] [address] [address] [address] [phone]  Go to   If symptoms worsen      DISCHARGE MEDICATIONS:  Discharge Medication List as of 1/28/2021 12:22 PM                 (Please note the MDM and HPI sections of this note were completed with avoice recognition program.  Efforts were made to edit the dictations but occasionally words are mis-transcribed.)    Electronically signed, MOODY Cole,            MOODY Chakraborty  01/28/21 8900

## 2021-01-28 NOTE — ED PROVIDER NOTES
EKG is reviewed by myself. Dated today at 80. Rate 70. Sinus rhythm. Left axis deviation. No priors.   Normal EKG     Kian Peña MD  01/28/21 1024

## 2021-01-29 LAB
EKG ATRIAL RATE: 78 BPM
EKG DIAGNOSIS: NORMAL
EKG P AXIS: 56 DEGREES
EKG P-R INTERVAL: 150 MS
EKG Q-T INTERVAL: 378 MS
EKG QRS DURATION: 86 MS
EKG QTC CALCULATION (BAZETT): 430 MS
EKG R AXIS: -34 DEGREES
EKG T AXIS: 37 DEGREES
EKG VENTRICULAR RATE: 78 BPM

## 2021-01-29 PROCEDURE — 93010 ELECTROCARDIOGRAM REPORT: CPT | Performed by: INTERNAL MEDICINE

## 2021-02-02 NOTE — PROGRESS NOTES
Evelia Jean is a 78 y.o. female. HPI:  F/u ER visit for HTN and CP   Labs all ok , chest x-ray shows some cardiomegaly  Atypical chest pain  Grief reaction, tired, lost her dog , low energy    Meds, vitamins and allergies reviewed with pt    Wt Readings from Last 3 Encounters:   02/03/21 174 lb 3.2 oz (79 kg)   01/28/21 173 lb (78.5 kg)   01/04/21 177 lb (80.3 kg)       REVIEW OF SYSTEMS:   CONSTITUTIONAL: See history of present illness,   Weight noted   HEENT: No new vision difficulties or ringing in the ears. RESPIRATORY: No new SOB, PND, orthopnea or cough. CARDIOVASCULAR: no CP, palpitations or SOB with exertion  GI: No nausea, vomiting, diarrhea, constipation, abdominal pain or changes in bowel habits. : No urinary frequency, urgency, incontinence hematuria or dysuria. SKIN: No cyanosis or skin lesions. MUSCULOSKELETAL: No new muscle or joint pain. NEUROLOGICAL: No syncope or TIA-like symptoms. PSYCHIATRIC: No anxiety, insomnia or depression     No Known Allergies    Prior to Visit Medications    Medication Sig Taking? Authorizing Provider   FLUoxetine (PROZAC) 10 MG capsule Take 1 capsule by mouth daily Yes Saloni Mohamud MD   famotidine (PEPCID) 20 MG tablet Take 1 tablet by mouth 2 times daily as needed (ge reflux) Yes Saloni Mohamud MD   B Complex-C (VITAMIN B + C COMPLEX PO) Take by mouth Yes Historical Provider, MD   losartan (COZAAR) 50 MG tablet Take 1 tablet by mouth 2 times daily (with meals) Yes Saloni Mohamud MD   atorvastatin (LIPITOR) 10 MG tablet Take 1 tablet by mouth every other day ++NEW DOSE++ Yes Saloni Mohamud MD   Lift Chair MISC by Does not apply route Yes Saloni Mohamud MD   estradiol (ESTRACE) 0.1 MG/GM vaginal cream Place 2 g vaginally Twice a Week  Yes Historical Provider, MD   aspirin 81 MG EC tablet Take 81 mg by mouth daily. Yes Historical Provider, MD   VITAMIN D PO Take  by mouth.  Yes Historical Provider, MD       Past Medical History:   Diagnosis Date    AR (allergic rhinitis)     Arthritis of knee     Pruis    Depression     Erosive gastritis 10/28/2019    EGD October 2019, he did with PPI    GERD (gastroesophageal reflux disease)     Hyperlipidemia     Internal hemorrhoids     Overweight(278.02)     Viral meningitis 5/12       Social History     Tobacco Use    Smoking status: Never Smoker    Smokeless tobacco: Never Used   Substance Use Topics    Alcohol use: Yes     Comment: rarely       Family History   Problem Relation Age of Onset    Breast Cancer Mother     Bipolar Disorder Brother        OBJECTIVE:  /82   Pulse 97   Temp 97.3 °F (36.3 °C)   Ht 5' 2\" (1.575 m)   Wt 174 lb 3.2 oz (79 kg)   SpO2 98%   BMI 31.86 kg/m²   GEN: Sayed today  HEENT:  NCAT, TMs:normal and throat: Examined due to Covid/mask  NECK:  Supple without adenopathy. CV:  Regular rate and rhythm, S1 and S2 normal, no murmurs, clicks  PULM:  Chest is clear, no wheezing ,  symmetric air entry throughout both lung fields. ABD: Soft, NT, no masses appreciated  EXT: No rash or edema  NEURO: Alert oriented ×3, nonfocal , no assistive device    ASSESSMENT/PLAN:  1. Grief reaction  Appointment with psychology  - External Referral To Psychology    2. Encounter for examination following treatment at hospital  Labs and imaging reviewed    3. Chest pain, unspecified type  Rest echo in the future  - ECHO Exercise Stress Test; Future    4. Essential hypertension  , Continue medication    5.  Current mild episode of major depressive disorder without prior episode (HCC)  Low-dose Zoloft and switch to Prozac 10 mg daily take with breakfast or lunch  Follow-up with Dr. Telly Hawkins 2 to 3 weeks      30  minutes spent with the pt face-to-face,  >50% spent reviewing test results and discussing plan of care and counseled on healthy diet, stay active, switching to Prozac, second in the future  Psychology referral given for here

## 2021-02-03 ENCOUNTER — OFFICE VISIT (OUTPATIENT)
Dept: FAMILY MEDICINE CLINIC | Age: 81
End: 2021-02-03
Payer: MEDICARE

## 2021-02-03 VITALS
WEIGHT: 174.2 LBS | TEMPERATURE: 97.3 F | BODY MASS INDEX: 32.06 KG/M2 | SYSTOLIC BLOOD PRESSURE: 124 MMHG | HEART RATE: 97 BPM | OXYGEN SATURATION: 98 % | DIASTOLIC BLOOD PRESSURE: 82 MMHG | HEIGHT: 62 IN

## 2021-02-03 DIAGNOSIS — I10 ESSENTIAL HYPERTENSION: ICD-10-CM

## 2021-02-03 DIAGNOSIS — F43.21 GRIEF REACTION: Primary | ICD-10-CM

## 2021-02-03 DIAGNOSIS — R07.9 CHEST PAIN, UNSPECIFIED TYPE: ICD-10-CM

## 2021-02-03 DIAGNOSIS — Z09 ENCOUNTER FOR EXAMINATION FOLLOWING TREATMENT AT HOSPITAL: ICD-10-CM

## 2021-02-03 DIAGNOSIS — F32.0 CURRENT MILD EPISODE OF MAJOR DEPRESSIVE DISORDER WITHOUT PRIOR EPISODE (HCC): ICD-10-CM

## 2021-02-03 PROCEDURE — 3288F FALL RISK ASSESSMENT DOCD: CPT | Performed by: FAMILY MEDICINE

## 2021-02-03 PROCEDURE — 99214 OFFICE O/P EST MOD 30 MIN: CPT | Performed by: FAMILY MEDICINE

## 2021-02-03 RX ORDER — FLUOXETINE 10 MG/1
10 CAPSULE ORAL DAILY
Qty: 30 CAPSULE | Refills: 3 | Status: SHIPPED | OUTPATIENT
Start: 2021-02-03 | End: 2021-02-22 | Stop reason: SINTOL

## 2021-02-15 ENCOUNTER — HOSPITAL ENCOUNTER (OUTPATIENT)
Dept: NON INVASIVE DIAGNOSTICS | Age: 81
Discharge: HOME | End: 2021-02-15
Payer: MEDICARE

## 2021-02-15 DIAGNOSIS — R07.9 CHEST PAIN, UNSPECIFIED TYPE: ICD-10-CM

## 2021-02-15 LAB
LV EF: 50 %
LVEF MODALITY: NORMAL

## 2021-02-15 PROCEDURE — 93320 DOPPLER ECHO COMPLETE: CPT

## 2021-02-15 PROCEDURE — 93351 STRESS TTE COMPLETE: CPT

## 2021-02-21 NOTE — PROGRESS NOTES
Tyrell Corbin is a 78 y.o. female. HPI:  Here with  for a few issues  Here for recheck BP and review stress test  Also depression anxiety flaring again after the death of her dog  She put a lot of love and energy into taking care of her dog . .. passed away over a month ago  Struggling with grief issues    Jittery with decreased appetite on Prozac, requesting to switch back to low-dose Zoloft    Stress test was normal as was echo, reassured patient and her     Meds, vitamins and allergies reviewed with pt    Wt Readings from Last 3 Encounters:   02/22/21 167 lb 9.6 oz (76 kg)   02/03/21 174 lb 3.2 oz (79 kg)   01/28/21 173 lb (78.5 kg)       REVIEW OF SYSTEMS:   CONSTITUTIONAL: See history of present illness,   Weight loss noted   HEENT: No new vision difficulties or ringing in the ears. RESPIRATORY: No new SOB, PND, orthopnea or cough. CARDIOVASCULAR: no CP, palpitations or SOB with exertion  GI: No nausea, vomiting, diarrhea, constipation, abdominal pain or changes in bowel habits. : No urinary frequency, urgency, incontinence hematuria or dysuria. SKIN: No cyanosis or skin lesions. MUSCULOSKELETAL: No new muscle or joint pain. NEUROLOGICAL: No syncope or TIA-like symptoms. PSYCH: Struggling with anxiety and depression      No Known Allergies    Prior to Visit Medications    Medication Sig Taking?  Authorizing Provider   omeprazole (PRILOSEC) 20 MG delayed release capsule Take 1 capsule by mouth nightly Yes Lucio Villalpando MD   B Complex-C (VITAMIN B + C COMPLEX PO) Take by mouth Yes Historical Provider, MD   losartan (COZAAR) 50 MG tablet Take 1 tablet by mouth 2 times daily (with meals) Yes Lucio Villalpando MD   atorvastatin (LIPITOR) 10 MG tablet Take 1 tablet by mouth every other day ++NEW DOSE++ Yes Lucio Villalpando MD   Lift Chair MISC by Does not apply route Yes Lucio Villalpando MD   estradiol (ESTRACE) 0.1 MG/GM vaginal cream Place 2 g vaginally Twice a Week  Yes Historical Provider, MD   aspirin 81 MG EC tablet Take 81 mg by mouth daily. Yes Historical Provider, MD   VITAMIN D PO Take  by mouth. Yes Historical Provider, MD       Past Medical History:   Diagnosis Date    AR (allergic rhinitis)     Arthritis of knee     Pruis    Depression     Erosive gastritis 10/28/2019    EGD October 2019, he did with PPI    GERD (gastroesophageal reflux disease)     Hyperlipidemia     Internal hemorrhoids     Overweight(278.02)     Viral meningitis 5/12       Social History     Tobacco Use    Smoking status: Never Smoker    Smokeless tobacco: Never Used   Substance Use Topics    Alcohol use: Yes     Comment: rarely       Family History   Problem Relation Age of Onset    Breast Cancer Mother     Bipolar Disorder Brother        OBJECTIVE:  /76   Pulse 92   Temp 96.4 °F (35.8 °C)   Ht 5' 2\" (1.575 m)   Wt 167 lb 9.6 oz (76 kg)   SpO2 99%   BMI 30.65 kg/m²   GEN:  anxoius , is jittery with decreased appetite  HEENT:  NCAT, TMs:normal and throat: Covid/mask on  NECK:  Supple without adenopathy. CV:  Regular rate and rhythm, S1 and S2 normal, no murmurs, clicks  PULM:  Chest is clear, no wheezing ,  symmetric air entry throughout both lung fields. ABD: Soft, NT  EXT: No rash or edema  NEURO: Alert oriented ×3, nonfocal     Mihai 7=17    PHQ9=20    Lab Results   Component Value Date    CHOL 117 10/23/2020    CHOL 105 01/13/2020    CHOL 138 11/08/2018     Lab Results   Component Value Date    TRIG 94 10/23/2020    TRIG 66 01/13/2020    TRIG 125 11/08/2018     Lab Results   Component Value Date    HDL 60 10/23/2020    HDL 62 (H) 01/13/2020    HDL 57 11/08/2018     Lab Results   Component Value Date    LDLCALC 38 10/23/2020    LDLCALC 30 01/13/2020    LDLCALC 56 11/08/2018     Lab Results   Component Value Date    LABVLDL 19 10/23/2020    LABVLDL 13 01/13/2020    LABVLDL 25 11/08/2018     ASSESSMENT/PLAN:  1.  Adjustment disorder with mixed anxiety and depressed mood  Switch to 25 mg

## 2021-02-22 ENCOUNTER — OFFICE VISIT (OUTPATIENT)
Dept: FAMILY MEDICINE CLINIC | Age: 81
End: 2021-02-22
Payer: MEDICARE

## 2021-02-22 VITALS
DIASTOLIC BLOOD PRESSURE: 76 MMHG | HEIGHT: 62 IN | HEART RATE: 92 BPM | BODY MASS INDEX: 30.84 KG/M2 | TEMPERATURE: 96.4 F | WEIGHT: 167.6 LBS | OXYGEN SATURATION: 99 % | SYSTOLIC BLOOD PRESSURE: 122 MMHG

## 2021-02-22 DIAGNOSIS — E78.2 MIXED HYPERLIPIDEMIA: ICD-10-CM

## 2021-02-22 DIAGNOSIS — F43.23 ADJUSTMENT DISORDER WITH MIXED ANXIETY AND DEPRESSED MOOD: Primary | ICD-10-CM

## 2021-02-22 DIAGNOSIS — I10 ESSENTIAL HYPERTENSION: ICD-10-CM

## 2021-02-22 PROCEDURE — 99213 OFFICE O/P EST LOW 20 MIN: CPT | Performed by: FAMILY MEDICINE

## 2021-02-22 RX ORDER — OMEPRAZOLE 20 MG/1
20 CAPSULE, DELAYED RELEASE ORAL NIGHTLY
Qty: 90 CAPSULE | Refills: 1 | Status: SHIPPED | OUTPATIENT
Start: 2021-02-22 | End: 2021-03-01

## 2021-02-25 ENCOUNTER — OFFICE VISIT (OUTPATIENT)
Dept: PSYCHOLOGY | Age: 81
End: 2021-02-25
Payer: MEDICARE

## 2021-02-25 DIAGNOSIS — F33.2 SEVERE EPISODE OF RECURRENT MAJOR DEPRESSIVE DISORDER, WITHOUT PSYCHOTIC FEATURES (HCC): Primary | ICD-10-CM

## 2021-02-25 PROCEDURE — 90791 PSYCH DIAGNOSTIC EVALUATION: CPT | Performed by: PSYCHOLOGIST

## 2021-02-25 NOTE — PROGRESS NOTES
Behavioral Health Consultation  Janessa Bryant M.A. Psychology Assistant  Emeli Strange, Ph.D. Supervising Psychologist  2/25/2021   10:44 AM EST       Time spent with Patient: 30 minutes  This is patient's first [facility] appointment. Reason for Consult:    Chief Complaint   Patient presents with    Depression    Anxiety     Referring Provider: Dr. Richa Aldrich    Pt provided informed consent for the behavioral health program. Discussed with patient model of service to include the limits of confidentiality (i.e. abuse reporting, suicide  intervention, etc.) and short-term intervention focused approach. Pt indicated understanding. Feedback given to PCP. S:  Pt seen per PCP re: depression, anxiety    Pt seen for intake and reported sxs consistent w/ Major Depressive Disorder with anxious features. Pt specifically endorsed depressed mood, deactivation, anhedonia, poor self-worth, social isolation, decreased appetite, insomnia, fatigue, psychomotor retardation, and poor concentration/focus as well as anxious, racing, uncontrollable thoughts, restlessness, irritability, trouble relaxing. Pt reported that their sxs began around Xmas 2020 and attributed onset to loss of dog and exhaustion in caring for dog. Sxs are exacerbated by stress, anxiety grief. Pt has positive hx of depression, last episode of which was in the fall of 2019 thru early last yr. Pt finished physical therapy in Jan 2020. Pt has physical limitations (e.g., using cane) and feels guilty re her physical sxs and inability to do tasks/activities as she used to. When pt experiences physical sxs (e.g., shakiness, instability), pt tends to seek information online. Pt has had tests run (e.g. for Parkinson's) and results appear normal, as per pt and 's report.  Pt reported feeling empty, and not engaging in previous activities, such as shopping, prepping meals, laundry, yoga, going to [facility] center to work on plants either d/t embarrassment and current limitations. Pt on Sertraline 25 mg. Focused intervention on psychoed re: depression, anxiety, cycle of deactivation, bx activation, challenging thoughts, perspective taking. Discussed pain pacing (taking breaks and breaking up tasks), calling Dr. Lady Florence directly when questions arise re sxs or medication, and minimizing checking WebMD. Pt will try doing the laundry and returning to rec center. See pt instructions for specific behavioral goals.      O:  MSE:    Appearance: good hygiene   Attitude: cooperative and friendly  Consciousness: alert  Orientation: oriented to person, place, time, general circumstance  Memory: recent and remote memory intact  Attention/Concentration: intact during session  Psychomotor Activity:normal  Eye Contact: normal  Speech: normal rate and volume, well-articulated  Mood: dysphoric  Affect: dysphoric and anxious  Perception: within normal limits  Thought Content: within normal limits  Thought Process: logical, coherent and tangential  Insight: fair  Judgment: intact  Ability to understand instructions: Yes  Morbid Ideation: no   Suicide Assessment: no suicidal ideation, plan, or intent  Homicidal Ideation: no     History:    Social History:   Social History     Socioeconomic History    Marital status:      Spouse name: Merlinda Saunas Number of children: 4    Years of education: Not on file    Highest education level: Not on file   Occupational History    Not on file   Social Needs    Financial resource strain: Not on file    Food insecurity     Worry: Not on file     Inability: Not on file   [employer] needs     Medical: Not on file     Non-medical: Not on file   Tobacco Use    Smoking status: Never Smoker    Smokeless tobacco: Never Used   Substance and Sexual Activity    Alcohol use: Yes     Comment: rarely    Drug use: No    Sexual activity: Not on file   Lifestyle    Physical activity     Days per week: Not on file     Minutes per session: Not on file    Stress: Not on file   Relationships    Social connections     Talks on phone: Not on file     Gets together: Not on file     Attends Church service: Not on file     Active member of club or organization: Not on file     Attends meetings of clubs or organizations: Not on file     Relationship status: Not on file    Intimate partner violence     Fear of current or ex partner: Not on file     Emotionally abused: Not on file     Physically abused: Not on file     Forced sexual activity: Not on file   Other Topics Concern    Not on file   Social History Narrative    Not on file     TOBACCO:   reports that she has never smoked. She has never used smokeless tobacco.  ETOH:   reports current alcohol use. A:  Administered PHQ-9 (see below). Patient endorses 8 symptoms of depression. Denies  SI/HI. PHQ Scores 2/26/2021 1/21/2020 11/21/2018 11/15/2017 11/14/2016 5/30/2014   PHQ2 Score - 6 0 0 0 0   PHQ9 Score 20 16 0 0 0 0     Interpretation of Total Score Depression Severity: 1-4 = Minimal depression, 5-9 = Mild depression, 10-14 = Moderate depression, 15-19 = Moderately severe depression, 20-27 = Severe depression    Pt completed the JIGAR and scored a 17. Interpretation of JIGAR-7 score: 5-9 = mild anxiety, 10-14 = moderate anxiety, 15+ = severe anxiety. Recommend referral to behavioral health for scores 10 or greater. Diagnosis:    1. Moderate episode of recurrent major depressive disorder [facility])        Plan:    Patient Instructions       Pt interventions:  Established rapport, [place]-setting to identify pt's primary goals for [facility] visit / overall health, Supportive techniques, Discussed and set plan for behavioral activation and Discussed behavioral strategies for managing chronic pain including pain pacing    Return in about 4 weeks (around 3/25/2021).

## 2021-02-26 ASSESSMENT — PATIENT HEALTH QUESTIONNAIRE - PHQ9
6. FEELING BAD ABOUT YOURSELF - OR THAT YOU ARE A FAILURE OR HAVE LET YOURSELF OR YOUR FAMILY DOWN: 3
10. IF YOU CHECKED OFF ANY PROBLEMS, HOW DIFFICULT HAVE THESE PROBLEMS MADE IT FOR YOU TO DO YOUR WORK, TAKE CARE OF THINGS AT HOME, OR GET ALONG WITH OTHER PEOPLE: 2
3. TROUBLE FALLING OR STAYING ASLEEP: 2
8. MOVING OR SPEAKING SO SLOWLY THAT OTHER PEOPLE COULD HAVE NOTICED. OR THE OPPOSITE, BEING SO FIGETY OR RESTLESS THAT YOU HAVE BEEN MOVING AROUND A LOT MORE THAN USUAL: 3
9. THOUGHTS THAT YOU WOULD BE BETTER OFF DEAD, OR OF HURTING YOURSELF: 0

## 2021-03-01 ENCOUNTER — TELEPHONE (OUTPATIENT)
Dept: FAMILY MEDICINE CLINIC | Age: 81
End: 2021-03-01

## 2021-03-09 ENCOUNTER — TELEPHONE (OUTPATIENT)
Dept: FAMILY MEDICINE CLINIC | Age: 81
End: 2021-03-09

## 2021-03-09 NOTE — TELEPHONE ENCOUNTER
Pt spouse called asking for dr swift to call him back, would not elaborate, states dr Winnie Doran will know what its about

## 2021-03-10 NOTE — TELEPHONE ENCOUNTER
Called and answered all questions   Follow-up with geriatric psychiatrist next week  Recommend she try to get out of the house to get some fresh air and walk      Close this call

## 2021-03-22 ENCOUNTER — HOSPITAL ENCOUNTER (OUTPATIENT)
Age: 81
Discharge: HOME | End: 2021-03-22
Payer: MEDICARE

## 2021-03-22 LAB
FOLATE: >20 NG/ML (ref 4.78–24.2)
T4 FREE: 1.2 NG/DL (ref 0.9–1.8)
VITAMIN B-12: 470 PG/ML (ref 211–911)

## 2021-03-22 PROCEDURE — 82746 ASSAY OF FOLIC ACID SERUM: CPT

## 2021-03-22 PROCEDURE — 84439 ASSAY OF FREE THYROXINE: CPT

## 2021-03-22 PROCEDURE — 82607 VITAMIN B-12: CPT

## 2021-03-22 PROCEDURE — 36415 COLL VENOUS BLD VENIPUNCTURE: CPT

## 2021-03-22 RX ORDER — ATORVASTATIN CALCIUM 10 MG/1
TABLET, FILM COATED ORAL
Qty: 45 TABLET | Refills: 3 | Status: SHIPPED | OUTPATIENT
Start: 2021-03-22 | End: 2022-03-21

## 2021-03-22 NOTE — TELEPHONE ENCOUNTER
Medication:   Requested Prescriptions     Pending Prescriptions Disp Refills    atorvastatin (LIPITOR) 10 MG tablet [Pharmacy Med Name: ATORVASTATIN 10 MG TABLET] 45 tablet 1     Sig: TAKE 1 TABLET BY MOUTH EVERY OTHER DAY **CHANGE IN DOSE**       Last Filled:  09/09/2020 #45 1rf     Patient Phone Number: [phone] (home)     Last appt: 2/22/2021   Next appt: Visit date not found    Last Lipid:   Lab Results   Component Value Date    CHOL 117 10/23/2020    TRIG 94 10/23/2020    HDL 60 10/23/2020    HDL 62 02/13/2012    LDLCALC 38 10/23/2020

## 2021-03-24 NOTE — TELEPHONE ENCOUNTER
Medication:   Requested Prescriptions     Pending Prescriptions Disp Refills    sertraline (ZOLOFT) 50 MG tablet [Pharmacy Med Name: SERTRALINE HCL 50 MG TABLET] 30 tablet 2     Sig: TAKE 1 TABLET BY MOUTH EVERY DAY        Last Filled:  03/01/2021 #30 2rf     Patient Phone Number: [phone] (home)     Last appt: 2/22/2021   Next appt: Visit date not found    Last OARRS: No flowsheet data found.

## 2021-03-29 ENCOUNTER — TELEPHONE (OUTPATIENT)
Dept: ADMINISTRATIVE | Age: 81
End: 2021-03-29

## 2021-03-29 NOTE — TELEPHONE ENCOUNTER
Called and spoke with Pt to make sure I send what they are needing.   Printed out [employer] report and will fax it over today to fax number [phone]

## 2021-03-29 NOTE — TELEPHONE ENCOUNTER
Patient's psychiatrist office is calling to get a copy of her gene blood work faxed over, fax # [phone].

## 2021-03-30 NOTE — TELEPHONE ENCOUNTER
Johnathan Berg from [address] [place] [address] called and said they have not received the [employer] report. Was this sent via [employer] or printed out and faxed directly to [phone]? She states that they can only accept the direct fax. Please advise.

## 2021-04-26 ENCOUNTER — TELEPHONE (OUTPATIENT)
Dept: FAMILY MEDICINE CLINIC | Age: 81
End: 2021-04-26

## 2021-05-19 ENCOUNTER — HOSPITAL ENCOUNTER (OUTPATIENT)
Dept: WOMENS IMAGING | Age: 81
Discharge: HOME | End: 2021-05-19
Payer: MEDICARE

## 2021-05-19 VITALS — WEIGHT: 162 LBS | HEIGHT: 62 IN | BODY MASS INDEX: 29.81 KG/M2

## 2021-05-19 DIAGNOSIS — Z12.31 BREAST CANCER SCREENING BY MAMMOGRAM: ICD-10-CM

## 2021-05-19 PROCEDURE — 77063 BREAST TOMOSYNTHESIS BI: CPT

## 2021-06-10 ENCOUNTER — TELEPHONE (OUTPATIENT)
Dept: FAMILY MEDICINE CLINIC | Age: 81
End: 2021-06-10

## 2021-06-10 NOTE — TELEPHONE ENCOUNTER
Okay to write handicap letter for this patient, difficulty walking due to osteoarthritis of knee  Dr. Pina Patience to sign if signature needed

## 2021-06-10 NOTE — LETTER
[address]  Phone: [phone]  Fax: [phone]           Susan 10, 2021     Patient: Cindi Olsen   YOB: 1940   Date of Visit: 6/10/2021       To Whom It May Concern: It is my medical opinion that Yair Contreras requires a disability parking placard for the following reasons:  She cannot walk 200 feet without stopping to rest.  Duration of need: 5 years    If you have any questions or concerns, please don't hesitate to call.     Sincerely,        Joy Irving MD

## 2021-06-10 NOTE — TELEPHONE ENCOUNTER
----- Message from Rissa Lao sent at 6/10/2021  9:18 AM EDT -----  Subject: Message to Provider    QUESTIONS  Information for Provider? Patient needs new prescription for handicap   placard which expires in August. She would like to know if she can pick it   up or needs to have an appt. Please call patient to advise.  ---------------------------------------------------------------------------  --------------  CALL BACK INFO  What is the best way for the office to contact you? OK to leave message on   voicemail  Preferred Call Back Phone Number? [phone]  ---------------------------------------------------------------------------  --------------  SCRIPT ANSWERS  Relationship to Patient?  Self

## 2021-06-15 ENCOUNTER — TELEPHONE (OUTPATIENT)
Dept: FAMILY MEDICINE CLINIC | Age: 81
End: 2021-06-15

## 2021-06-15 ENCOUNTER — NURSE TRIAGE (OUTPATIENT)
Dept: OTHER | Facility: CLINIC | Age: 81
End: 2021-06-15

## 2021-06-15 NOTE — TELEPHONE ENCOUNTER
Received call from pre-service center [facility]) [place] with Red Flag Complaint. Brief description of triage: numbness and tingling in right arm and hand for 10-12 days    Triage indicates for patient to go to the office now    Care advice provided, patient verbalizes understanding; denies any other questions or concerns; instructed to call back for any new or worsening symptoms. Writer provided warm transfer to Kerrie Carson at [facility] for appointment scheduling. Attention Provider: Thank you for allowing me to participate in the care of your patient. The patient was connected to triage in response to information provided to the [facility]. Please do not respond through this encounter as the response is not directed to a shared pool. Reason for Disposition   Tingling (e.g., pins and needles) of the face, arm or leg on one side of the body, that is  present now (Exceptions: chronic/recurrent symptom lasting > 4 weeks or tingling from known cause, such as: bumped elbow, carpal tunnel syndrome, pinched nerve, frostbite)    Answer Assessment - Initial Assessment Questions  1. SYMPTOM: \"What is the main symptom you are concerned about? \" (e.g., weakness, numbness)      Numbness and tingling in right arm and hand, fingers feel stff    2. ONSET: \"When did this start? \" (minutes, hours, days; while sleeping)      10-12 days ago    3. LAST NORMAL: \"When was the last time you were normal (no symptoms)? \"      See above     4. PATTERN \"Does this come and go, or has it been constant since it started? \"  \"Is it present now? \"      Comes and goes    5. CARDIAC SYMPTOMS: \"Have you had any of the following symptoms: chest pain, difficulty breathing, palpitations? \"      Denies    6. NEUROLOGIC SYMPTOMS: \"Have you had any of the following symptoms: headache, dizziness, vision loss, double vision, changes in speech, unsteady on your feet? \"      Denies    7. OTHER SYMPTOMS: \"Do you have any other symptoms? \"      Denies    8.

## 2021-06-15 NOTE — TELEPHONE ENCOUNTER
Pt c/o numbness and tingling down right arm, goes down to hand, started 10 days ago. No injury, she lifted up a lawn chair in her garage and she felt some soreness in clavical area.

## 2021-06-15 NOTE — PROGRESS NOTES
Kristina Caballero is a 78 y.o. female. HPI:  Here for blood pressure check and right hand numbness and tingling  Helped her  with chores around the house which required pulling and lifting  Now with right wrist discomfort with radiation to her right hand  Had injury to that wrist in the past  Blood pressure elevated today but came down nicely    Feels much better on present medication, seeing  geriatric psychiatrist, medication helping  Remeron at bedtime, Abilify and Zoloft    Again,  recommend counseling for her  She is hesitant because her  has to drive her to all her appointment and asks why she is being seen, struggles with being so dependent on her     Recent labs reviewed/all acceptable    Meds, vitamins and allergies reviewed with pt    Wt Readings from Last 3 Encounters:   06/16/21 161 lb 12.8 oz (73.4 kg)   05/19/21 162 lb (73.5 kg)   02/22/21 167 lb 9.6 oz (76 kg)       REVIEW OF SYSTEMS:   CONSTITUTIONAL: See history of present illness,   Weight noted   HEENT: No new vision difficulties or ringing in the ears. RESPIRATORY: No new SOB, PND, orthopnea or cough. CARDIOVASCULAR: no CP, palpitations or SOB with exertion  GI: No nausea, vomiting, diarrhea, constipation, abdominal pain or changes in bowel habits. : No urinary frequency, urgency, incontinence hematuria or dysuria. SKIN: No cyanosis or skin lesions. MUSCULOSKELETAL: No new muscle or joint pain. NEUROLOGICAL: No syncope or TIA-like symptoms. PSYCHIATRIC: Overall seems improved, less tearful, calm    No Known Allergies    Prior to Visit Medications    Medication Sig Taking? Authorizing Provider   ARIPiprazole (ABILIFY) 2 MG tablet TAKE 1 TABLET (2 MG TOTAL) BY MOUTH DAILY. INDICATIONS  CALM MIND, MOOD Yes Historical Provider, MD   ARIPiprazole (ABILIFY) 5 MG tablet  Yes Historical Provider, MD   mirtazapine (REMERON) 15 MG tablet TAKE 1 TABLET (15 MG TOTAL) BY MOUTH AT BEDTIME FOR 30 DAYS.  INDICATIONS DEPRESSION Yes Historical Provider, MD   sertraline (ZOLOFT) 50 MG tablet TAKE 1 TABLET BY MOUTH EVERY DAY Yes Fred Benites MD   atorvastatin (LIPITOR) 10 MG tablet TAKE 1 TABLET BY MOUTH EVERY OTHER DAY **CHANGE IN DOSE** Yes Fred Benites MD   B Complex-C (VITAMIN B + C COMPLEX PO) Take by mouth Yes Historical Provider, MD   losartan (COZAAR) 50 MG tablet Take 1 tablet by mouth 2 times daily (with meals) Yes Fred Benites MD   Lift Chair MISC by Does not apply route Yes Fred Benites MD   estradiol (ESTRACE) 0.1 MG/GM vaginal cream Place 2 g vaginally Twice a Week  Yes Historical Provider, MD   aspirin 81 MG EC tablet Take 81 mg by mouth daily. Yes Historical Provider, MD   VITAMIN D PO Take  by mouth. Yes Historical Provider, MD       Past Medical History:   Diagnosis Date    AR (allergic rhinitis)     Arthritis of knee     Pruis    Depression     Erosive gastritis 10/28/2019    EGD October 2019, he did with PPI    GERD (gastroesophageal reflux disease)     Hyperlipidemia     Internal hemorrhoids     Overweight(278.02)     Viral meningitis 5/12       Social History     Tobacco Use    Smoking status: Never Smoker    Smokeless tobacco: Never Used   Substance Use Topics    Alcohol use: Yes     Comment: rarely       Family History   Problem Relation Age of Onset    Breast Cancer Mother     Bipolar Disorder Brother        OBJECTIVE:  /76   Pulse 81   Temp 97.2 °F (36.2 °C)   Ht 5' 1\" (1.549 m)   Wt 161 lb 12.8 oz (73.4 kg)   SpO2 99%   BMI 30.57 kg/m²   GEN:  in NAD  HEENT:  NCAT, TMs:normal and throat: Examined due to Covid/mask  NECK:  Supple without adenopathy. CV:  Regular rate and rhythm, S1 and S2 normal, no murmurs, clicks  PULM:  Chest is clear, no wheezing ,  symmetric air entry throughout both lung fields.   ABD: Soft, NT, no masses appreciated  EXT: Right wrist tenderness without bony tenderness to palpation, mild positive Tinel's over right wrist  NEURO: Alert oriented ×3, nonfocal, no assistive device    Lab Results   Component Value Date    CHOL 117 10/23/2020    CHOL 105 01/13/2020    CHOL 138 11/08/2018     Lab Results   Component Value Date    TRIG 94 10/23/2020    TRIG 66 01/13/2020    TRIG 125 11/08/2018     Lab Results   Component Value Date    HDL 60 10/23/2020    HDL 62 (H) 01/13/2020    HDL 57 11/08/2018     Lab Results   Component Value Date    LDLCALC 38 10/23/2020    LDLCALC 30 01/13/2020    LDLCALC 56 11/08/2018     Lab Results   Component Value Date    LABVLDL 19 10/23/2020    LABVLDL 13 01/13/2020    LABVLDL 25 11/08/2018     No results found for: CHOLHDLRATIO    ASSESSMENT/PLAN:  1. Right wrist sprain, initial encounter  Ice, rest, compression, alternate Tylenol with Advil  While of wrist splint  Follow-up if symptoms persist    2. Essential hypertension  Stable continue medication    3. Mixed hyperlipidemia  Table continue statin    4.  Severe episode of recurrent major depressive disorder, without psychotic features ([address]  Improved on meds  Recommend counseling if she is able      25 Total Minutes spent pre charting (reviewing problem list, meds, any test results, consultant and hospital notes ) and  obtaining present visit history, performing appropriate medical exam/evaluation, counseling and educating the patient (and family), ordering medications ,tests, and procedures as needed, refilling medication(s), placing referral(s) when needed in addition to coordinating care for this patient and documenting in electronic health record

## 2021-06-15 NOTE — TELEPHONE ENCOUNTER
Patient scheduled. She noted that her hand is cold (per patient is new). She pressed nail and noted blood return the same on both hands. She has numbness but still is has feeling in arm and hands.

## 2021-06-16 ENCOUNTER — OFFICE VISIT (OUTPATIENT)
Dept: FAMILY MEDICINE CLINIC | Age: 81
End: 2021-06-16
Payer: MEDICARE

## 2021-06-16 VITALS
SYSTOLIC BLOOD PRESSURE: 134 MMHG | BODY MASS INDEX: 30.55 KG/M2 | TEMPERATURE: 97.2 F | HEART RATE: 81 BPM | WEIGHT: 161.8 LBS | DIASTOLIC BLOOD PRESSURE: 76 MMHG | HEIGHT: 61 IN | OXYGEN SATURATION: 99 %

## 2021-06-16 DIAGNOSIS — F33.2 SEVERE EPISODE OF RECURRENT MAJOR DEPRESSIVE DISORDER, WITHOUT PSYCHOTIC FEATURES (HCC): ICD-10-CM

## 2021-06-16 DIAGNOSIS — E78.2 MIXED HYPERLIPIDEMIA: ICD-10-CM

## 2021-06-16 DIAGNOSIS — S63.501A RIGHT WRIST SPRAIN, INITIAL ENCOUNTER: Primary | ICD-10-CM

## 2021-06-16 DIAGNOSIS — I10 ESSENTIAL HYPERTENSION: ICD-10-CM

## 2021-06-16 PROCEDURE — 99213 OFFICE O/P EST LOW 20 MIN: CPT | Performed by: FAMILY MEDICINE

## 2021-06-16 RX ORDER — ARIPIPRAZOLE 2 MG/1
TABLET ORAL
COMMUNITY
Start: 2021-04-16 | End: 2021-08-04

## 2021-06-16 RX ORDER — ARIPIPRAZOLE 5 MG/1
TABLET ORAL
COMMUNITY
Start: 2021-05-29 | End: 2021-08-04

## 2021-06-16 RX ORDER — MIRTAZAPINE 15 MG/1
7.5 TABLET, FILM COATED ORAL
COMMUNITY
Start: 2021-05-29 | End: 2022-06-22 | Stop reason: ALTCHOICE

## 2021-06-16 SDOH — ECONOMIC STABILITY: FOOD INSECURITY: WITHIN THE PAST 12 MONTHS, THE FOOD YOU BOUGHT JUST DIDN'T LAST AND YOU DIDN'T HAVE MONEY TO GET MORE.: NEVER TRUE

## 2021-06-16 SDOH — ECONOMIC STABILITY: FOOD INSECURITY: WITHIN THE PAST 12 MONTHS, YOU WORRIED THAT YOUR FOOD WOULD RUN OUT BEFORE YOU GOT MONEY TO BUY MORE.: NEVER TRUE

## 2021-06-16 SDOH — ECONOMIC STABILITY: TRANSPORTATION INSECURITY
IN THE PAST 12 MONTHS, HAS THE LACK OF TRANSPORTATION KEPT YOU FROM MEDICAL APPOINTMENTS OR FROM GETTING MEDICATIONS?: NO

## 2021-06-16 SDOH — ECONOMIC STABILITY: TRANSPORTATION INSECURITY
IN THE PAST 12 MONTHS, HAS LACK OF TRANSPORTATION KEPT YOU FROM MEETINGS, WORK, OR FROM GETTING THINGS NEEDED FOR DAILY LIVING?: NO

## 2021-06-16 ASSESSMENT — SOCIAL DETERMINANTS OF HEALTH (SDOH): HOW HARD IS IT FOR YOU TO PAY FOR THE VERY BASICS LIKE FOOD, HOUSING, MEDICAL CARE, AND HEATING?: NOT HARD AT ALL

## 2021-07-27 ENCOUNTER — APPOINTMENT (OUTPATIENT)
Dept: CT IMAGING | Age: 81
End: 2021-07-27
Payer: MEDICARE

## 2021-07-27 ENCOUNTER — APPOINTMENT (OUTPATIENT)
Dept: GENERAL RADIOLOGY | Age: 81
End: 2021-07-27
Payer: MEDICARE

## 2021-07-27 ENCOUNTER — HOSPITAL ENCOUNTER (EMERGENCY)
Age: 81
Discharge: HOME | End: 2021-07-27
Payer: MEDICARE

## 2021-07-27 VITALS
OXYGEN SATURATION: 96 % | BODY MASS INDEX: 29.27 KG/M2 | RESPIRATION RATE: 10 BRPM | TEMPERATURE: 98.4 F | HEART RATE: 68 BPM | HEIGHT: 61 IN | SYSTOLIC BLOOD PRESSURE: 122 MMHG | WEIGHT: 155 LBS | DIASTOLIC BLOOD PRESSURE: 67 MMHG
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
## 2021-07-27 DIAGNOSIS — N39.0 URINARY TRACT INFECTION WITHOUT HEMATURIA, SITE UNSPECIFIED: Primary | ICD-10-CM

## 2021-07-27 LAB
A/G RATIO: 1.2 (ref 1.1–2.2)
ALBUMIN SERPL-MCNC: 3.7 G/DL (ref 3.4–5)
ALP BLD-CCNC: 85 U/L (ref 40–129)
ALT SERPL-CCNC: 8 U/L (ref 10–40)
ANION GAP SERPL CALCULATED.3IONS-SCNC: 12 MMOL/L (ref 3–16)
AST SERPL-CCNC: 16 U/L (ref 15–37)
BACTERIA: ABNORMAL /HPF
BASOPHILS ABSOLUTE: 0.1 K/UL (ref 0–0.2)
BASOPHILS RELATIVE PERCENT: 0.6 %
BILIRUB SERPL-MCNC: 0.5 MG/DL (ref 0–1)
BILIRUBIN URINE: NEGATIVE
BLOOD, URINE: NEGATIVE
BUN BLDV-MCNC: 23 MG/DL (ref 7–20)
CALCIUM SERPL-MCNC: 9.1 MG/DL (ref 8.3–10.6)
CHLORIDE BLD-SCNC: 100 MMOL/L (ref 99–110)
CLARITY: ABNORMAL
CO2: 25 MMOL/L (ref 21–32)
COLOR: YELLOW
CREAT SERPL-MCNC: 0.8 MG/DL (ref 0.6–1.2)
EOSINOPHILS ABSOLUTE: 0.1 K/UL (ref 0–0.6)
EOSINOPHILS RELATIVE PERCENT: 1 %
EPITHELIAL CELLS, UA: 7 /HPF (ref 0–5)
GFR AFRICAN AMERICAN: >60
GFR NON-AFRICAN AMERICAN: >60
GLOBULIN: 3.1 G/DL
GLUCOSE BLD-MCNC: 142 MG/DL (ref 70–99)
GLUCOSE URINE: NEGATIVE MG/DL
HCT VFR BLD CALC: 38.7 % (ref 36–48)
HEMOGLOBIN: 13 G/DL (ref 12–16)
HYALINE CASTS: 11 /LPF (ref 0–8)
KETONES, URINE: NEGATIVE MG/DL
LEUKOCYTE ESTERASE, URINE: ABNORMAL
LYMPHOCYTES ABSOLUTE: 2 K/UL (ref 1–5.1)
LYMPHOCYTES RELATIVE PERCENT: 23.7 %
MCH RBC QN AUTO: 30.1 PG (ref 26–34)
MCHC RBC AUTO-ENTMCNC: 33.7 G/DL (ref 31–36)
MCV RBC AUTO: 89.3 FL (ref 80–100)
MICROSCOPIC EXAMINATION: YES
MONOCYTES ABSOLUTE: 0.7 K/UL (ref 0–1.3)
MONOCYTES RELATIVE PERCENT: 8 %
NEUTROPHILS ABSOLUTE: 5.7 K/UL (ref 1.7–7.7)
NEUTROPHILS RELATIVE PERCENT: 66.7 %
NITRITE, URINE: NEGATIVE
PDW BLD-RTO: 13.5 % (ref 12.4–15.4)
PH UA: 6 (ref 5–8)
PLATELET # BLD: 191 K/UL (ref 135–450)
PMV BLD AUTO: 7.7 FL (ref 5–10.5)
POTASSIUM REFLEX MAGNESIUM: 4.2 MMOL/L (ref 3.5–5.1)
PRO-BNP: 220 PG/ML (ref 0–449)
PROTEIN UA: NEGATIVE MG/DL
RBC # BLD: 4.33 M/UL (ref 4–5.2)
RBC UA: 2 /HPF (ref 0–4)
SODIUM BLD-SCNC: 137 MMOL/L (ref 136–145)
SPECIFIC GRAVITY UA: 1.02 (ref 1–1.03)
TOTAL PROTEIN: 6.8 G/DL (ref 6.4–8.2)
TROPONIN: <0.01 NG/ML
URINE REFLEX TO CULTURE: YES
URINE TYPE: ABNORMAL
UROBILINOGEN, URINE: 0.2 E.U./DL
WBC # BLD: 8.6 K/UL (ref 4–11)
WBC UA: 70 /HPF (ref 0–5)

## 2021-07-27 PROCEDURE — 70450 CT HEAD/BRAIN W/O DYE: CPT

## 2021-07-27 PROCEDURE — 85025 COMPLETE CBC W/AUTO DIFF WBC: CPT

## 2021-07-27 PROCEDURE — 80053 COMPREHEN METABOLIC PANEL: CPT

## 2021-07-27 PROCEDURE — 72125 CT NECK SPINE W/O DYE: CPT

## 2021-07-27 PROCEDURE — 84484 ASSAY OF TROPONIN QUANT: CPT

## 2021-07-27 PROCEDURE — 72100 X-RAY EXAM L-S SPINE 2/3 VWS: CPT

## 2021-07-27 PROCEDURE — 71046 X-RAY EXAM CHEST 2 VIEWS: CPT

## 2021-07-27 PROCEDURE — 93005 ELECTROCARDIOGRAM TRACING: CPT | Performed by: GENERAL ACUTE CARE HOSPITAL

## 2021-07-27 PROCEDURE — 83880 ASSAY OF NATRIURETIC PEPTIDE: CPT

## 2021-07-27 PROCEDURE — 81001 URINALYSIS AUTO W/SCOPE: CPT

## 2021-07-27 PROCEDURE — 6370000000 HC RX 637 (ALT 250 FOR IP): Performed by: GENERAL ACUTE CARE HOSPITAL

## 2021-07-27 PROCEDURE — 96374 THER/PROPH/DIAG INJ IV PUSH: CPT

## 2021-07-27 PROCEDURE — 87086 URINE CULTURE/COLONY COUNT: CPT

## 2021-07-27 PROCEDURE — 99283 EMERGENCY DEPT VISIT LOW MDM: CPT

## 2021-07-27 PROCEDURE — 6360000002 HC RX W HCPCS: Performed by: GENERAL ACUTE CARE HOSPITAL

## 2021-07-27 RX ORDER — VILAZODONE HYDROCHLORIDE 10 MG/1
20 TABLET ORAL DAILY
COMMUNITY
Start: 2021-06-28 | End: 2022-09-26 | Stop reason: SDUPTHER

## 2021-07-27 RX ORDER — CEFUROXIME AXETIL 250 MG/1
250 TABLET ORAL 2 TIMES DAILY
Qty: 14 TABLET | Refills: 0 | Status: SHIPPED | OUTPATIENT
Start: 2021-07-27 | End: 2021-08-03

## 2021-07-27 RX ORDER — 0.9 % SODIUM CHLORIDE 0.9 %
500 INTRAVENOUS SOLUTION INTRAVENOUS ONCE
Status: DISCONTINUED | OUTPATIENT
Start: 2021-07-27 | End: 2021-07-27 | Stop reason: HOSPADM

## 2021-07-27 RX ORDER — ACETAMINOPHEN 500 MG
1000 TABLET ORAL ONCE
Status: COMPLETED | OUTPATIENT
Start: 2021-07-27 | End: 2021-07-27

## 2021-07-27 RX ADMIN — Medication 1000 MG: at 19:55

## 2021-07-27 RX ADMIN — ACETAMINOPHEN 1000 MG: 500 TABLET ORAL at 16:58

## 2021-07-27 ASSESSMENT — PAIN SCALES - GENERAL
PAINLEVEL_OUTOF10: 8
PAINLEVEL_OUTOF10: 8

## 2021-07-27 ASSESSMENT — PAIN DESCRIPTION - LOCATION: LOCATION: HEAD

## 2021-07-27 ASSESSMENT — ENCOUNTER SYMPTOMS
SORE THROAT: 0
ABDOMINAL PAIN: 0
NAUSEA: 0
BACK PAIN: 0
VOMITING: 0
CHEST TIGHTNESS: 0
SHORTNESS OF BREATH: 0

## 2021-07-27 ASSESSMENT — PAIN DESCRIPTION - PAIN TYPE: TYPE: ACUTE PAIN

## 2021-07-27 NOTE — ED NOTES
Bed: 01  Expected date:   Expected time:   Means of arrival:   Comments:  Kate Dandy, RN  07/27/21 6701

## 2021-07-27 NOTE — ED PROVIDER NOTES
I did not see this patient personally. I only interpreted their EKG. Reveals:   Tachy sinus rhythm  No ST changes  Heart rate 107    Similar to prior EKG performed in 1/21       Heriberto Hutchins MD  07/27/21 1900

## 2021-07-27 NOTE — ED PROVIDER NOTES
[facility]        Pt Name: Ivett Francois  MRN: [id number]  [place] 1940  Date of evaluation: 7/27/2021  Provider: CIERRA Tipton CNP  PCP: Maria Esther Ortiz MD  Note Started: 7:30 PM EDT       MELLY. I have evaluated this patient. My supervising physician was available for consultation. CHIEF COMPLAINT       Chief Complaint   Patient presents with    Fall     Pateint states she fell last night on her head. States she doesn't know if she was dizzy prior to falling. Complains of a dull headache. HISTORY OF PRESENT ILLNESS   (Location, Timing/Onset, Context/Setting, Quality, Duration, Modifying Factors, Severity, Associated Signs and Symptoms)  Note limiting factors. Chief Complaint: Fall, headache, fatigue    Ivett Francois is a 78 y.o. female with history of arthritis, depression, hyperlipidemia, and GERD. She presents to the emergency department today for evaluation of a headache after a fall which occurred last night. Patient states that she typically ambulates with a cane. Patient states that she was not using her cane at the time and states that she was walking through her kitchen and fell backwards striking her head on the tile kiera. There was no loss of consciousness. She states that she takes a daily baby aspirin but no other blood thinners. She states that she has had a dull headache since then. She denies having any dizziness, blurred vision, or extremity numbness or tingling. She reports mild right-sided neck pain. She adamantly denies having any chest pain or shortness of breath. She denies palpitations. She reports low back pain. Patient does report mild pain in her left hip. She is able to ambulate. Patient currently reports a pain level of 6 out of 10. She describes the pain as constant dull and aching. She has not taken anything for the symptoms.   She states that she is otherwise felt well and has been without fever, chills, or other symptoms. Nursing Notes were all reviewed and agreed with or any disagreements were addressed in the HPI. REVIEW OF SYSTEMS    (2-9 systems for level 4, 10 or more for level 5)     Review of Systems   Constitutional: Positive for fatigue. Negative for chills, fever and unexpected weight change. HENT: Negative for congestion and sore throat. Eyes: Negative for visual disturbance. Respiratory: Negative for chest tightness and shortness of breath. Cardiovascular: Negative for chest pain and palpitations. Gastrointestinal: Negative for abdominal pain, nausea and vomiting. Endocrine: Negative for polydipsia and polyuria. Genitourinary: Positive for frequency. Negative for difficulty urinating and dysuria. Musculoskeletal: Positive for gait problem, myalgias and neck pain. Negative for back pain, joint swelling and neck stiffness. Skin: Negative for rash and wound. Allergic/Immunologic: Negative for immunocompromised state. Neurological: Positive for headaches. Negative for dizziness, facial asymmetry and light-headedness. Hematological: Does not bruise/bleed easily. Psychiatric/Behavioral: Negative for suicidal ideas. Positives and Pertinent negatives as per HPI. Except as noted above in the ROS, all other systems were reviewed and negative.        PAST MEDICAL HISTORY     Past Medical History:   Diagnosis Date    AR (allergic rhinitis)     Arthritis of knee     Pruis    Depression     Erosive gastritis 10/28/2019    EGD October 2019, he did with PPI    GERD (gastroesophageal reflux disease)     Hyperlipidemia     Internal hemorrhoids     Overweight(278.02)     Viral meningitis 5/12         SURGICAL HISTORY     Past Surgical History:   Procedure Laterality Date    BREAST BIOPSY      CHOLECYSTECTOMY, LAPAROSCOPIC  2003    COLONOSCOPY  8/06    normal; Ortega    COLONOSCOPY  12/3/13    Ortega- polyps    HYSTERECTOMY  AKASH AND FER  2003    UPPER GASTROINTESTINAL ENDOSCOPY  12/3/13    Media Fleet; antritis         CURRENTMEDICATIONS       Previous Medications    ARIPIPRAZOLE (ABILIFY) 2 MG TABLET    TAKE 1 TABLET (2 MG TOTAL) BY MOUTH DAILY. INDICATIONS  CALM MIND, MOOD    ARIPIPRAZOLE (ABILIFY) 5 MG TABLET        ASPIRIN 81 MG EC TABLET    Take 81 mg by mouth daily. ATORVASTATIN (LIPITOR) 10 MG TABLET    TAKE 1 TABLET BY MOUTH EVERY OTHER DAY **CHANGE IN DOSE**    B COMPLEX-C (VITAMIN B + C COMPLEX PO)    Take by mouth    ESTRADIOL (ESTRACE) 0.1 MG/GM VAGINAL CREAM    Place 2 g vaginally Twice a Week     LIFT CHAIR MISC    by Does not apply route    LOSARTAN (COZAAR) 50 MG TABLET    Take 1 tablet by mouth 2 times daily (with meals)    MIRTAZAPINE (REMERON) 15 MG TABLET    TAKE 1 TABLET (15 MG TOTAL) BY MOUTH AT BEDTIME FOR 30 DAYS. INDICATIONS  DEPRESSION    SERTRALINE (ZOLOFT) 50 MG TABLET    TAKE 1 TABLET BY MOUTH EVERY DAY    VILAZODONE HCL (VIIBRYD) 10 MG TABS    Take 10 mg by mouth daily    VITAMIN D PO    Take  by mouth. ALLERGIES     Patient has no known allergies. FAMILYHISTORY       Family History   Problem Relation Age of Onset    Breast Cancer Mother     Bipolar Disorder Brother           SOCIAL HISTORY       Social History     Tobacco Use    Smoking status: Never Smoker    Smokeless tobacco: Never Used   Substance Use Topics    Alcohol use: Yes     Comment: rarely    Drug use: No       SCREENINGS             PHYSICAL EXAM    (up to 7 for level 4, 8 or more for level 5)     ED Triage Vitals [07/27/21 1639]   BP Temp Temp Source Pulse Resp SpO2 Height Weight   134/76 98.4 °F (36.9 °C) Oral 109 13 96 % 5' 1\" (1.549 m) 155 lb (70.3 kg)       Physical Exam  Vitals and nursing note reviewed. Constitutional:       General: She is not in acute distress. Appearance: Normal appearance. She is not ill-appearing, toxic-appearing or diaphoretic.       Comments: BMI is 29.3   HENT:      Head: Normocephalic and atraumatic. Right Ear: External ear normal.      Left Ear: External ear normal.      Nose: Nose normal.      Mouth/Throat:      Mouth: Mucous membranes are moist.   Eyes:      General:         Right eye: No discharge. Left eye: No discharge. Extraocular Movements: Extraocular movements intact. Cardiovascular:      Rate and Rhythm: Normal rate and regular rhythm. Pulses: Normal pulses. Heart sounds: Normal heart sounds. Pulmonary:      Effort: Pulmonary effort is normal. No respiratory distress. Breath sounds: Normal breath sounds. Abdominal:      General: Bowel sounds are normal.      Palpations: Abdomen is soft. Tenderness: There is no abdominal tenderness. Musculoskeletal:      Cervical back: Normal range of motion and neck supple. Lumbar back: Tenderness and bony tenderness present. No swelling, edema, deformity, signs of trauma, lacerations or spasms. Normal range of motion. No scoliosis. Right hip: Normal.      Left hip: Tenderness present. No deformity, lacerations, bony tenderness or crepitus. Normal range of motion. Right upper leg: Normal.      Left upper leg: Normal.      Right knee: Swelling present. No deformity. Normal range of motion. No tenderness. Left knee: Swelling present. No deformity. Normal range of motion. No tenderness. Skin:     General: Skin is warm and dry. Neurological:      General: No focal deficit present. Mental Status: She is alert and oriented to person, place, and time. Mental status is at baseline. Psychiatric:         Mood and Affect: Mood normal.         Behavior: Behavior normal.         Thought Content:  Thought content normal.         Judgment: Judgment normal.         DIAGNOSTIC RESULTS   LABS:    Labs Reviewed   COMPREHENSIVE METABOLIC PANEL W/ REFLEX TO MG FOR LOW K - Abnormal; Notable for the following components:       Result Value    Glucose 142 (*)     BUN 23 (*)     ALT 8 (*) All other components within normal limits    Narrative:     Performed at:  [facility] [address]  [place], [address]   Phone [phone]   URINE RT REFLEX TO CULTURE - Abnormal; Notable for the following components:    Clarity, UA CLOUDY (*)     Leukocyte Esterase, Urine LARGE (*)     All other components within normal limits    Narrative:     Performed at:  [facility] [address]  [place]   Phone [phone]   MICROSCOPIC URINALYSIS - Abnormal; Notable for the following components:    Bacteria, UA RARE (*)     Hyaline Casts, UA 11 (*)     WBC, UA 70 (*)     Epithelial Cells, UA 7 (*)     All other components within normal limits    Narrative:     Performed at:  [facility] [address]  [place]   Phone [phone]   CULTURE, URINE   CBC WITH AUTO DIFFERENTIAL    Narrative:     Performed at:  [facility] [address]  [place]   Phone [phone] [address]    Narrative:     Performed at:  [facility] [address]  [place], [place]   Phone [phone]   TROPONIN    Narrative:     Performed at:  [facility] [address]  [place]   Phone [phone]       When ordered only abnormal lab results are displayed. All other labs were within normal range or not returned as of this dictation. EKG: When ordered, EKG's are interpreted by the Emergency Department Physician in the absence of a cardiologist.  Please see their note for interpretation of EKG.     RADIOLOGY:   Non-plain film images such as CT, Ultrasound and MRI are read by the radiologist. Plain radiographic images are visualized and preliminarily interpreted by the ED Provider with the below findings:        Interpretation per the Radiologist below, if available at the time of this note:    CT HEAD WO CONTRAST   Preliminary Result   1. No acute intracranial abnormality. 2. Straightening of the normal cervical lordosis, without acute fracture or   subluxation of the cervical spine. CT CERVICAL SPINE WO CONTRAST   Preliminary Result   1. No acute intracranial abnormality. 2. Straightening of the normal cervical lordosis, without acute fracture or   subluxation of the cervical spine. XR CHEST (2 VW)   Preliminary Result   No acute cardiopulmonary disease. XR LUMBAR SPINE (2-3 VIEWS)   Final Result   Moderately severe multilevel degenerative disc disease and moderate scoliosis   with no acute abnormality seen. Moderate osteoarthritic changes of the facets throughout with no pars defects. XR CHEST (2 VW)    Result Date: 7/27/2021  EXAMINATION: TWO XRAY VIEWS OF THE CHEST 7/27/2021 5:16 pm COMPARISON: 01/28/2021, 05/31/2012 HISTORY: ORDERING SYSTEM PROVIDED HISTORY: fatigue/syncope TECHNOLOGIST PROVIDED HISTORY: Reason for exam:->fatigue/syncope Reason for Exam: Fall (Pateint states she fell last night on her head. States she doesn't know if she was dizzy prior to falling. Complains of a dull headache. ) Acuity: Acute Type of Exam: Initial FINDINGS: Frontal and lateral views of the chest were performed. There is no acute skeletal abnormality. The heart size and mediastinal contours are stable, and within normal limits. There is mild elevation of the hemidiaphragm, unchanged from prior exams. Lungs are clear, without evidence of acute airspace consolidation, pneumothorax, or pleural effusion. No acute cardiopulmonary disease. XR LUMBAR SPINE (2-3 VIEWS)    Result Date: 7/27/2021  EXAMINATION: THREE XRAY VIEWS OF THE LUMBAR SPINE 7/27/2021 5:16 pm COMPARISON: None.  HISTORY: ORDERING SYSTEM PROVIDED HISTORY: back pain/fall TECHNOLOGIST PROVIDED HISTORY: Reason for exam:->back pain/fall Reason for Exam: Fall (Pateint states she fell last night on her head. States she doesn't know if she was dizzy prior to falling. Complains of a dull headache. ) Acuity: Acute Type of Exam: Initial FINDINGS: There is moderately severe disc space narrowing throughout with prominent osteophytes. No fracture or subluxation is seen. There is moderate scoliosis, convex the left centered along the mid lumbar region. The vertebral body height is well maintained throughout. There are moderate sclerotic and hypertrophic changes of the facets throughout with no pars defects. The bones are osteopenic. There surgical clips along the right upper quadrant. Moderately severe multilevel degenerative disc disease and moderate scoliosis with no acute abnormality seen. Moderate osteoarthritic changes of the facets throughout with no pars defects. CT HEAD WO CONTRAST    Result Date: 7/27/2021  EXAMINATION: CT OF THE HEAD WITHOUT CONTRAST; CT OF THE CERVICAL SPINE WITHOUT CONTRAST 7/27/2021 5:02 pm; 7/27/2021 5:13 pm TECHNIQUE: CT of the head was performed without the administration of intravenous contrast. Dose modulation, iterative reconstruction, and/or weight based adjustment of the mA/kV was utilized to reduce the radiation dose to as low as reasonably achievable.; CT of the cervical spine was performed without the administration of intravenous contrast. Multiplanar reformatted images are provided for review. Dose modulation, iterative reconstruction, and/or weight based adjustment of the mA/kV was utilized to reduce the radiation dose to as low as reasonably achievable. COMPARISON: Head CT 05/31/2012 HISTORY: ORDERING SYSTEM PROVIDED HISTORY: syncope/headache TECHNOLOGIST PROVIDED HISTORY: Reason for exam:->syncope/headache Has a \"code stroke\" or \"stroke alert\" been called? ->No Decision Support Exception - unselect if not a suspected or confirmed emergency medical condition->Emergency Medical Condition (MA) Reason for Exam: syncope, neck discomfort Acuity: Acute Type of Exam: Initial; ORDERING SYSTEM PROVIDED HISTORY: syncope, neck discomfort TECHNOLOGIST PROVIDED HISTORY: Reason for exam:->syncope, neck discomfort Decision Support Exception - unselect if not a suspected or confirmed emergency medical condition->Emergency Medical Condition (MA) Reason for Exam: syncope, neck discomfort Acuity: Acute Type of Exam: Initial FINDINGS: BRAIN/VENTRICLES: There is no acute intracranial hemorrhage, mass effect or midline shift. No abnormal extra-axial fluid collection. The gray-white differentiation is maintained without evidence of an acute infarct. There is no evidence of hydrocephalus. There is age-appropriate atrophy. No focus of acute abnormal brain attenuation is identified. ORBITS: The visualized portion of the orbits demonstrate no acute abnormality. SINUSES: The visualized paranasal sinuses and mastoid air cells demonstrate no acute abnormality. SOFT TISSUES/SKULL:  No acute abnormality of the visualized skull or soft tissues. CERVICAL BONES/ALIGNMENT: There is straightening of the normal cervical lordosis. There is no acute fracture or subluxation. There is a mild 2 mm degenerative retrolisthesis of C5. No additional abnormal listhesis is identified. Vertebral bodies are otherwise normal in height and alignment. The posterior elements are intact and aligned. No destructive osseous lesion is seen. CERVICAL DEGENERATIVE CHANGES: There is anterior endplate spondylosis at C5 through C7. There is disc space loss and endplate sclerosis at G6-R6 and C6-C7. There is a chronic disc osteophyte protrusion at C5-C6, with mild chronic central canal stenosis at this level. There is also multilevel bilateral uncovertebral and facet hypertrophy, leading to varying degrees of multilevel bilateral neural foraminal stenosis. CERVICAL SOFT TISSUES: The cervical soft tissues are unremarkable. The lung apices are clear.      1. No acute intracranial abnormality. 2. Straightening of the normal cervical lordosis, without acute fracture or subluxation of the cervical spine. CT CERVICAL SPINE WO CONTRAST    Result Date: 7/27/2021  EXAMINATION: CT OF THE HEAD WITHOUT CONTRAST; CT OF THE CERVICAL SPINE WITHOUT CONTRAST 7/27/2021 5:02 pm; 7/27/2021 5:13 pm TECHNIQUE: CT of the head was performed without the administration of intravenous contrast. Dose modulation, iterative reconstruction, and/or weight based adjustment of the mA/kV was utilized to reduce the radiation dose to as low as reasonably achievable.; CT of the cervical spine was performed without the administration of intravenous contrast. Multiplanar reformatted images are provided for review. Dose modulation, iterative reconstruction, and/or weight based adjustment of the mA/kV was utilized to reduce the radiation dose to as low as reasonably achievable. COMPARISON: Head CT 05/31/2012 HISTORY: ORDERING SYSTEM PROVIDED HISTORY: syncope/headache TECHNOLOGIST PROVIDED HISTORY: Reason for exam:->syncope/headache Has a \"code stroke\" or \"stroke alert\" been called? ->No Decision Support Exception - unselect if not a suspected or confirmed emergency medical condition->Emergency Medical Condition (MA) Reason for Exam: syncope, neck discomfort Acuity: Acute Type of Exam: Initial; ORDERING SYSTEM PROVIDED HISTORY: syncope, neck discomfort TECHNOLOGIST PROVIDED HISTORY: Reason for exam:->syncope, neck discomfort Decision Support Exception - unselect if not a suspected or confirmed emergency medical condition->Emergency Medical Condition (MA) Reason for Exam: syncope, neck discomfort Acuity: Acute Type of Exam: Initial FINDINGS: BRAIN/VENTRICLES: There is no acute intracranial hemorrhage, mass effect or midline shift. No abnormal extra-axial fluid collection. The gray-white differentiation is maintained without evidence of an acute infarct. There is no evidence of hydrocephalus. There is age-appropriate atrophy. No focus of acute abnormal brain attenuation is identified. ORBITS: The visualized portion of the orbits demonstrate no acute abnormality. SINUSES: The visualized paranasal sinuses and mastoid air cells demonstrate no acute abnormality. SOFT TISSUES/SKULL:  No acute abnormality of the visualized skull or soft tissues. CERVICAL BONES/ALIGNMENT: There is straightening of the normal cervical lordosis. There is no acute fracture or subluxation. There is a mild 2 mm degenerative retrolisthesis of C5. No additional abnormal listhesis is identified. Vertebral bodies are otherwise normal in height and alignment. The posterior elements are intact and aligned. No destructive osseous lesion is seen. CERVICAL DEGENERATIVE CHANGES: There is anterior endplate spondylosis at C5 through C7. There is disc space loss and endplate sclerosis at C7-G2 and C6-C7. There is a chronic disc osteophyte protrusion at C5-C6, with mild chronic central canal stenosis at this level. There is also multilevel bilateral uncovertebral and facet hypertrophy, leading to varying degrees of multilevel bilateral neural foraminal stenosis. CERVICAL SOFT TISSUES: The cervical soft tissues are unremarkable. The lung apices are clear. 1. No acute intracranial abnormality. 2. Straightening of the normal cervical lordosis, without acute fracture or subluxation of the cervical spine.            PROCEDURES   Unless otherwise noted below, none     Procedures    CRITICAL CARE TIME   N/A    CONSULTS:  None      EMERGENCY DEPARTMENT COURSE and DIFFERENTIAL DIAGNOSIS/MDM:   Vitals:    Vitals:    07/27/21 1639 07/27/21 1845 07/27/21 1900 07/27/21 1915   BP: 134/76 131/77 120/73 107/75   Pulse: 109 78 73 81   Resp: 13 21 19 16   Temp: 98.4 °F (36.9 °C)      TempSrc: Oral      SpO2: 96% 93% 94% 95%   Weight: 155 lb (70.3 kg)      Height: 5' 1\" (1.549 m)          Patient was given the following medications:  Medications   cefTRIAXone (ROCEPHIN) 1000 mg in sterile water 10 mL IV syringe (has no administration in time range)   0.9 % sodium chloride bolus (has no administration in time range)   acetaminophen (TYLENOL) tablet 1,000 mg (1,000 mg Oral Given 7/27/21 0556)       Previous records reviewed in order to gain further information regarding patient's PMH as well as her HPI. Nursing notes reviewed. This is an 68-year-old  female who presents to the emergency department today for evaluation after a fall last night in her kitchen. Patient states that she was not using her cane at the time. She denies syncope. There was no loss of consciousness. Patient did strike her head on the kitchen floor. She takes a daily baby aspirin. Patient states that she has had a dull headache since then. She also reports low back pain. Patient states that she also feels somewhat worn down and fatigued. Physical exam complete. Patient is nontoxic, afebrile, normotensive. GCS is 15. She is without any focal neurologic deficits. Patient is medicated with Tylenol for pain. EKG interpreted by ED physician reviewed by myself is negative for acute ST elevation. CT head and neck interpreted by radiologist are negative for acute findings. Lumbar x-ray interpreted by radiologist and reviewed by myself is also negative. Urinalysis is consistent with UTI. Patient is medicated with IV Rocephin while here in the emergency department. She is reassessed and does report significant relief of symptoms after intervention. She is able to ambulate. At this time there is no evidence of any life-threatening or emergent conditions requiring immediate intervention. Low suspicion for ACS, CVA, sepsis or other acute pathology. Patient will be discharged with emphasis on close outpatient follow-up. A prescription for Ceftin is provided. She is encouraged to increase her fluid intake.   She states that she will contact her PCP as well as her urologist tomorrow to arrange for outpatient follow-up. She is encouraged to return to nearest ED for high fever, incessant vomiting, severe pain, or any other worsening symptoms. Patient is discharged home in stable condition. FINAL IMPRESSION      1. Urinary tract infection without hematuria, site unspecified    2. Fall, initial encounter          DISPOSITION/PLAN   DISPOSITION        PATIENT REFERRED TO:  Bereket Edouard MD  [address] [place] [address] RD.   [facility]  [phone]    In 2 days        DISCHARGE MEDICATIONS:  New Prescriptions    CEFUROXIME (CEFTIN) 250 MG TABLET    Take 1 tablet by mouth 2 times daily for 7 days       DISCONTINUED MEDICATIONS:  Discontinued Medications    No medications on file              (Please note that portions of this note were completed with a voice recognition program.  Efforts were made to edit the dictations but occasionally words are mis-transcribed.)    CIERRA Gould CNP (electronically signed)           CIERRA Gould CNP  07/27/21 1951

## 2021-07-28 LAB
EKG ATRIAL RATE: 107 BPM
EKG DIAGNOSIS: NORMAL
EKG P AXIS: 58 DEGREES
EKG P-R INTERVAL: 142 MS
EKG Q-T INTERVAL: 344 MS
EKG QRS DURATION: 82 MS
EKG QTC CALCULATION (BAZETT): 459 MS
EKG R AXIS: -31 DEGREES
EKG T AXIS: 23 DEGREES
EKG VENTRICULAR RATE: 107 BPM
URINE CULTURE, ROUTINE: NORMAL

## 2021-07-28 PROCEDURE — 93010 ELECTROCARDIOGRAM REPORT: CPT | Performed by: INTERNAL MEDICINE

## 2021-07-28 NOTE — ED NOTES
Discharge instructions given, patient acknowledged understanding, rx x1 sent to pharmacy, patient was taken to car by wheelchair Sean Juarez RN  07/27/21 3631

## 2021-07-30 ENCOUNTER — TELEPHONE (OUTPATIENT)
Dept: FAMILY MEDICINE CLINIC | Age: 81
End: 2021-07-30

## 2021-07-30 NOTE — TELEPHONE ENCOUNTER
Likely antibiotic induced    Stop antibiotic for now  Follow-up with Dr. Holden Tovar next week for recheck of urine  Ok  use Imodium A-D for loose stools 2-3 times a day

## 2021-07-30 NOTE — TELEPHONE ENCOUNTER
----- Message from Andreina Louise sent at 7/30/2021  8:28 AM EDT -----  Subject: Message to Provider    QUESTIONS  Information for Provider? Patient recently seen in ED for UTI/bladder   infection at [facility] on 7/27; was given meds and is now   experiencing diarrhea as a potential side effect as of yesterday (7/29); Wanting to know if needing an appt to come in to address that or if   something can be called in to help. Not sure if it is the new medication   that is causing that.  ---------------------------------------------------------------------------  --------------  CALL BACK INFO  What is the best way for the office to contact you? OK to leave message on   voicemail  Preferred Call Back Phone Number? [phone]  ---------------------------------------------------------------------------  --------------  SCRIPT ANSWERS  Relationship to Patient? Self  Are you having severe back pain with your urinary symptoms? No  Are you having vomiting or nausea? No  Is there blood in your urine? No  Are you having fevers (100.4), chills, or sweats? No  Have you recently (14 days) seen a provider for this issue?  Yes

## 2021-08-03 NOTE — PROGRESS NOTES
Jose Edwards is a 78 y.o. female. HPI:  After being seen in the emergency room 7/27/2021  Think she was dehydrated in addition to new psych meds, she passed out, fell backward and hit her head  No Preceding chest pain shortness of breath palpitations etc.  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    Recommend she move her Viibryd to after dinner if she is having decreased appetite during the day    Mild headaches, likely postconcussive    Pt here for Hospital Follow Up after a fall  Seen  Date: 7/27/21  Notes, labs, imaging and any procedures= reviewed from recent hospitalization  Meds, vitamins and allergies reviewed with pt    Ridging including chest x-ray head CT neck films lumbar spine films all okay    New psych meds   viibryd + abilify    Wt Readings from Last 3 Encounters:   08/04/21 158 lb (71.7 kg)   07/27/21 155 lb (70.3 kg)   06/16/21 161 lb 12.8 oz (73.4 kg)       REVIEW OF SYSTEMS:   CONSTITUTIONAL: See history of present illness,   Weight noted/stable  HEENT: No new vision difficulties or ringing in the ears. RESPIRATORY: No new SOB, PND, orthopnea or cough. CARDIOVASCULAR: no CP, palpitations or SOB with exertion  GI: No nausea, vomiting, diarrhea, constipation, abdominal pain or changes in bowel habits. : No urinary frequency, urgency, incontinence hematuria or dysuria. SKIN: No cyanosis or skin lesions. MUSCULOSKELETAL: No new muscle or joint pain. NEUROLOGICAL: No syncope or TIA-like symptoms. PSYCHIATRIC: No anxiety, insomnia or depression     No Known Allergies    Prior to Visit Medications    Medication Sig Taking?  Authorizing Provider   ARIPiprazole (ABILIFY) 15 MG tablet Take 7.5 mg by mouth daily Yes Historical Provider, MD   vilazodone HCl (VIIBRYD) 10 MG TABS Take 20 mg by mouth daily  Yes Historical Provider, MD   mirtazapine (REMERON) 15 MG tablet 7.5 mg  Yes Historical Provider, MD   atorvastatin (LIPITOR) 10 MG tablet TAKE 1 TABLET BY MOUTH EVERY OTHER DAY **CHANGE IN DOSE** Yes Neftali Ritter MD   B Complex-C (VITAMIN B + C COMPLEX PO) Take by mouth Yes Historical Provider, MD   losartan (COZAAR) 50 MG tablet Take 1 tablet by mouth 2 times daily (with meals) Yes Neftali Ritter MD   Lift Chair MISC by Does not apply route Yes Neftali Ritter MD   estradiol (ESTRACE) 0.1 MG/GM vaginal cream Place 2 g vaginally Twice a Week  Yes Historical Provider, MD   aspirin 81 MG EC tablet Take 81 mg by mouth daily. Yes Historical Provider, MD   VITAMIN D PO Take  by mouth. Yes Historical Provider, MD       Past Medical History:   Diagnosis Date    AR (allergic rhinitis)     Arthritis of knee     Pruis    Depression     Erosive gastritis 10/28/2019    EGD October 2019, he did with PPI    GERD (gastroesophageal reflux disease)     Hyperlipidemia     Internal hemorrhoids     Overweight(278.02)     Viral meningitis 5/12       Social History     Tobacco Use    Smoking status: Never Smoker    Smokeless tobacco: Never Used   Substance Use Topics    Alcohol use: Yes     Comment: rarely       Family History   Problem Relation Age of Onset    Breast Cancer Mother     Bipolar Disorder Brother        OBJECTIVE:  /70   Pulse 101   Ht 5' 1\" (1.549 m)   Wt 158 lb (71.7 kg)   SpO2 98%   BMI 29.85 kg/m²   GEN:  in NAD, somewhat sad  HEENT:  NCAT, TMs:normal and throat: Examined due to Covid/mask  NECK:  Supple without adenopathy. CV:  Regular rate and rhythm, S1 and S2 normal, no murmurs, clicks  PULM:  Chest is clear, no wheezing ,  symmetric air entry throughout both lung fields. ABD: Soft, NT no masses appreciated  EXT: No rash or edema  NEURO: Alert oriented ×3, nonfocal, using cane    Urine culture from the emergency room did not grow any organism but she finished her Ceftin    Joaquim UA today is unremarkable    ASSESSMENT/PLAN:  1.  Encounter for examination following treatment at hospital  Recent imaging reviewed  Recommend hydration, also recommend taking Viibryd after dinner    2. Postconcussion syndrome  Rest and Tylenol    3.  Urinary tract infection with hematuria, site unspecified  UA today is unremarkable  - POCT Urinalysis no Micro      30 Total Minutes spent pre charting (reviewing problem list, meds, any test results, health maintenance, consultant and hospital notes ) and  obtaining present visit history, performing appropriate medical exam/evaluation, counseling and educating the patient (and family), ordering medications ,tests, and procedures as needed, refilling medication(s), placing referral(s) when needed in addition to coordinating care for this patient and documenting in electronic health record

## 2021-08-04 ENCOUNTER — OFFICE VISIT (OUTPATIENT)
Dept: FAMILY MEDICINE CLINIC | Age: 81
End: 2021-08-04
Payer: MEDICARE

## 2021-08-04 VITALS
WEIGHT: 158 LBS | HEIGHT: 61 IN | DIASTOLIC BLOOD PRESSURE: 70 MMHG | BODY MASS INDEX: 29.83 KG/M2 | SYSTOLIC BLOOD PRESSURE: 124 MMHG | HEART RATE: 101 BPM | OXYGEN SATURATION: 98 %

## 2021-08-04 DIAGNOSIS — R31.9 URINARY TRACT INFECTION WITH HEMATURIA, SITE UNSPECIFIED: ICD-10-CM

## 2021-08-04 DIAGNOSIS — N39.0 URINARY TRACT INFECTION WITH HEMATURIA, SITE UNSPECIFIED: ICD-10-CM

## 2021-08-04 DIAGNOSIS — Z09 ENCOUNTER FOR EXAMINATION FOLLOWING TREATMENT AT HOSPITAL: Primary | ICD-10-CM

## 2021-08-04 DIAGNOSIS — F07.81 POSTCONCUSSION SYNDROME: ICD-10-CM

## 2021-08-04 LAB
BILIRUBIN, POC: NORMAL
BLOOD URINE, POC: NORMAL
CLARITY, POC: CLEAR
COLOR, POC: NORMAL
GLUCOSE URINE, POC: NORMAL
KETONES, POC: NORMAL
LEUKOCYTE EST, POC: NORMAL
NITRITE, POC: NORMAL
PH, POC: 6
PROTEIN, POC: NORMAL
SPECIFIC GRAVITY, POC: 1.02
UROBILINOGEN, POC: 0.2

## 2021-08-04 PROCEDURE — 99214 OFFICE O/P EST MOD 30 MIN: CPT | Performed by: FAMILY MEDICINE

## 2021-08-04 PROCEDURE — 81002 URINALYSIS NONAUTO W/O SCOPE: CPT | Performed by: FAMILY MEDICINE

## 2021-08-04 RX ORDER — ARIPIPRAZOLE 15 MG/1
7.5 TABLET ORAL DAILY
COMMUNITY
Start: 2021-07-20 | End: 2021-08-19

## 2021-08-11 ENCOUNTER — TELEPHONE (OUTPATIENT)
Dept: FAMILY MEDICINE CLINIC | Age: 81
End: 2021-08-11

## 2021-08-11 DIAGNOSIS — M25.579 CHRONIC ANKLE PAIN, UNSPECIFIED LATERALITY: ICD-10-CM

## 2021-08-11 DIAGNOSIS — G89.29 CHRONIC PAIN OF BOTH KNEES: Primary | ICD-10-CM

## 2021-08-11 DIAGNOSIS — M25.562 CHRONIC PAIN OF BOTH KNEES: Primary | ICD-10-CM

## 2021-08-11 DIAGNOSIS — G89.29 CHRONIC ANKLE PAIN, UNSPECIFIED LATERALITY: ICD-10-CM

## 2021-08-11 DIAGNOSIS — M25.572 LEFT ANKLE PAIN, UNSPECIFIED CHRONICITY: ICD-10-CM

## 2021-08-11 DIAGNOSIS — M17.10 ARTHRITIS OF KNEE: Primary | ICD-10-CM

## 2021-08-11 DIAGNOSIS — M25.561 CHRONIC PAIN OF BOTH KNEES: Primary | ICD-10-CM

## 2021-08-11 NOTE — TELEPHONE ENCOUNTER
Please call patient with specialist name phone number  Referral placed to ortho   Dr. Daljit Marshall

## 2021-08-11 NOTE — TELEPHONE ENCOUNTER
----- Message from Lainey Haney sent at 8/11/2021 10:28 AM EDT -----  Subject: Referral Request    QUESTIONS   Reason for referral request? Pt is requesting a referral to an orthopedist   for left ankle and her knees. Has the physician seen you for this condition before? No   Preferred Specialist (if applicable)? Do you already have an appointment scheduled? No  Additional Information for Provider?   ---------------------------------------------------------------------------  --------------  CALL BACK INFO  What is the best way for the office to contact you? OK to leave message on   voicemail  Preferred Call Back Phone Number?  [phone]

## 2021-09-17 ENCOUNTER — TELEPHONE (OUTPATIENT)
Dept: FAMILY MEDICINE CLINIC | Age: 81
End: 2021-09-17

## 2021-09-17 NOTE — TELEPHONE ENCOUNTER
Patient is scheduled
No  (Service Expert  click yes below to proceed with [employer] As Usual   Scheduling)?  Yes

## 2021-09-27 ENCOUNTER — OFFICE VISIT (OUTPATIENT)
Dept: FAMILY MEDICINE CLINIC | Age: 81
End: 2021-09-27
Payer: MEDICARE

## 2021-09-27 VITALS
SYSTOLIC BLOOD PRESSURE: 114 MMHG | DIASTOLIC BLOOD PRESSURE: 76 MMHG | HEART RATE: 88 BPM | OXYGEN SATURATION: 97 % | WEIGHT: 153.6 LBS | BODY MASS INDEX: 29 KG/M2 | TEMPERATURE: 96.8 F | HEIGHT: 61 IN

## 2021-09-27 DIAGNOSIS — I10 ESSENTIAL HYPERTENSION: ICD-10-CM

## 2021-09-27 DIAGNOSIS — R73.09 ELEVATED GLUCOSE: ICD-10-CM

## 2021-09-27 DIAGNOSIS — F32.0 CURRENT MILD EPISODE OF MAJOR DEPRESSIVE DISORDER WITHOUT PRIOR EPISODE (HCC): ICD-10-CM

## 2021-09-27 DIAGNOSIS — E78.2 MIXED HYPERLIPIDEMIA: ICD-10-CM

## 2021-09-27 DIAGNOSIS — Z00.00 MEDICARE ANNUAL WELLNESS VISIT, SUBSEQUENT: Primary | ICD-10-CM

## 2021-09-27 PROCEDURE — G0439 PPPS, SUBSEQ VISIT: HCPCS | Performed by: FAMILY MEDICINE

## 2021-09-27 RX ORDER — BUPROPION HYDROCHLORIDE 150 MG/1
TABLET ORAL
COMMUNITY
Start: 2021-09-08 | End: 2022-09-26 | Stop reason: SDUPTHER

## 2021-09-27 ASSESSMENT — PATIENT HEALTH QUESTIONNAIRE - PHQ9
1. LITTLE INTEREST OR PLEASURE IN DOING THINGS: 1
SUM OF ALL RESPONSES TO PHQ9 QUESTIONS 1 & 2: 2
SUM OF ALL RESPONSES TO PHQ QUESTIONS 1-9: 2
SUM OF ALL RESPONSES TO PHQ QUESTIONS 1-9: 2
2. FEELING DOWN, DEPRESSED OR HOPELESS: 1
SUM OF ALL RESPONSES TO PHQ QUESTIONS 1-9: 2

## 2021-09-27 ASSESSMENT — LIFESTYLE VARIABLES: HOW OFTEN DO YOU HAVE A DRINK CONTAINING ALCOHOL: 0

## 2021-09-27 NOTE — PATIENT INSTRUCTIONS
Personalized Preventive Plan for Luis Kamara - 9/27/2021  Medicare offers a range of preventive health benefits. Some of the tests and screenings are paid in full while other may be subject to a deductible, co-insurance, and/or copay. Some of these benefits include a comprehensive review of your medical history including lifestyle, illnesses that may run in your family, and various assessments and screenings as appropriate. After reviewing your medical record and screening and assessments performed today your provider may have ordered immunizations, labs, imaging, and/or referrals for you. A list of these orders (if applicable) as well as your Preventive Care list are included within your After Visit Summary for your review. Other Preventive Recommendations:    · A preventive eye exam performed by an eye specialist is recommended every 1-2 years to screen for glaucoma; cataracts, macular degeneration, and other eye disorders. · A preventive dental visit is recommended every 6 months. · Try to get at least 150 minutes of exercise per week or 10,000 steps per day on a pedometer . · Order or download the FREE \"Exercise & Physical Activity: Your Everyday Guide\" from [employer]. Call 8-927.826.5320 or search [employer] online. · You need 8800-3024 mg of calcium and 7357-0627 IU of vitamin D per day. It is possible to meet your calcium requirement with diet alone, but a vitamin D supplement is usually necessary to meet this goal.  · When exposed to the sun, use a sunscreen that protects against both UVA and UVB radiation with an SPF of 30 or greater. Reapply every 2 to 3 hours or after sweating, drying off with a towel, or swimming. · Always wear a seat belt when traveling in a car. Always wear a helmet when riding a bicycle or motorcycle.

## 2021-09-27 NOTE — PROGRESS NOTES
Medicare Annual Wellness Visit  Name: Paola Aggarwal Date: 2021   MRN: [id number] Sex: Female   Age: 78 y.o. Ethnicity: Non- / Non    : 1940 Race: White (non-)      Alverto Cancino is here for Medicare AWV    Screenings for behavioral, psychosocial and functional/safety risks, and cognitive dysfunction are all negative except as indicated below. These results, as well as other patient data from the [address] form, are documented in Flowsheets linked to this Encounter. Signed up to get her flu vaccine at the [facility] early October  Overall doing fairly well  Has bad bunions that she is contemplating on getting fix, seeing  Dr. Katarzyna Inman grp     No Known Allergies      Prior to Visit Medications    Medication Sig Taking? Authorizing Provider   buPROPion (WELLBUTRIN XL) 150 MG extended release tablet TAKE 1 TABLET (150 MG TOTAL) BY MOUTH DAILY. INDICATIONS  DEPRESSION Yes Historical Provider, MD   vilazodone HCl (VIIBRYD) 10 MG TABS Take 20 mg by mouth daily  Yes Historical Provider, MD   mirtazapine (REMERON) 15 MG tablet 7.5 mg  Yes Historical Provider, MD   atorvastatin (LIPITOR) 10 MG tablet TAKE 1 TABLET BY MOUTH EVERY OTHER DAY **CHANGE IN DOSE** Yes Soham Mcdonald MD   B Complex-C (VITAMIN B + C COMPLEX PO) Take by mouth Yes Historical Provider, MD   losartan (COZAAR) 50 MG tablet Take 1 tablet by mouth 2 times daily (with meals) Yes Soham Mcdonald MD   Lift Chair MISC by Does not apply route Yes Soham Mcdonald MD   estradiol (ESTRACE) 0.1 MG/GM vaginal cream Place 2 g vaginally Twice a Week  Yes Historical Provider, MD   aspirin 81 MG EC tablet Take 81 mg by mouth daily. Yes Historical Provider, MD   VITAMIN D PO Take  by mouth.  Yes Historical Provider, MD         Past Medical History:   Diagnosis Date    AR (allergic rhinitis)     Arthritis of knee     Pruis    Depression     Erosive gastritis 10/28/2019    EGD 2019, he did with PPI    GERD (gastroesophageal reflux disease)     Hyperlipidemia     Internal hemorrhoids     Overweight(278.02)     Viral meningitis 5/12       Past Surgical History:   Procedure Laterality Date    BREAST BIOPSY      CHOLECYSTECTOMY, LAPAROSCOPIC  2003    COLONOSCOPY  8/06    normal; Ortega    COLONOSCOPY  12/3/13    Ortega- polyps    HYSTERECTOMY      AKASH AND BSO  2003    UPPER GASTROINTESTINAL ENDOSCOPY  12/3/13    Daniel Carlson; antritis         Family History   Problem Relation Age of Onset    Breast Cancer Mother     Bipolar Disorder Brother        CareTeam (Including outside providers/suppliers regularly involved in providing care):   Patient Care Team:  Conrad Croft MD as PCP - General (Family Medicine)  Conrad Croft MD as PCP - [facility] Empaneled Provider  Lina Cervantes as Consulting Physician (Psychiatry)  Lucho Allen MD as Consulting Physician (Orthopedic Surgery)    Wt Readings from Last 3 Encounters:   09/27/21 153 lb 9.6 oz (69.7 kg)   08/04/21 158 lb (71.7 kg)   07/27/21 155 lb (70.3 kg)     Vitals:    09/27/21 1332   BP: 114/76   Pulse: 88   Temp: 96.8 °F (36 °C)   SpO2: 97%   Weight: 153 lb 9.6 oz (69.7 kg)   Height: 5' 1\" (1.549 m)     Body mass index is 29.02 kg/m². Based upon direct observation of the patient, evaluation of cognition reveals recent and remote memory intact.     General Appearance: alert and oriented to person, place and time, well developed and well- nourished, in no acute distress, always pleasant, depression seems improved  Skin: warm and dry, no rash or erythema  Head: normocephalic and atraumatic  Eyes: pupils equal, round, and reactive to light, extraocular eye movements intact, conjunctivae normal  ENT: tympanic membrane, external ear and ear canal normal bilaterally, nose without deformity, nasal mucosa and turbinates normal without polyps  Neck: supple and non-tender without mass, no thyromegaly or thyroid nodules, no cervical lymphadenopathy  Pulmonary/Chest: clear to auscultation bilaterally- no wheezes, rales or rhonchi, normal air movement, no respiratory distress  Cardiovascular: normal rate, regular rhythm, normal S1 and S2, no murmurs, rubs, clicks, or gallops, distal pulses intact, no carotid bruits  Abdomen: soft, non-tender, non-distended, normal bowel sounds, no masses or organomegaly  Breast: appear normal, no suspicious masses, no skin or nipple changes or axillary nodes, well-healed scar left breast  Extremities: no cyanosis, clubbing or edema  Musculoskeletal: normal range of motion, no joint swelling, deformity or tenderness  Significant bunions bilaterally  Neurologic: reflexes normal and symmetric, no cranial nerve deficit, gait, coordination and speech normal    Patient's complete Health Risk Assessment and screening values have been reviewed and are found in Flowsheets. The following problems were reviewed today and where indicated follow up appointments were made and/or referrals ordered. Positive Risk Factor Screenings with Interventions:            General Health and ACP:  General  In general, how would you say your health is?: Good  In the past 7 days, have you experienced any of the following?  New or Increased Pain, New or Increased Fatigue, Loneliness, Social Isolation, Stress or Anger?: (!) Stress  Do you get the social and emotional support that you need?: Yes  Do you have a Living Will?: Yes  Advance Directives     Power of  Living Will ACP-Advance Directive ACP-Power of     Not on File Not on File Not on File Not on File      General Health Risk Interventions:  · recommend copy to us    Health Habits/Nutrition:  Health Habits/Nutrition  Do you exercise for at least 20 minutes 2-3 times per week?: (!) No  Have you lost any weight without trying in the past 3 months?: (!) Yes  Do you eat only one meal per day?: No  Have you seen the dentist within the past year?: Yes  Body mass index: (!) 29.02  Health Habits/Nutrition Interventions:  · healthy diet and exrcise and dentist   · Exercise more if able, she has bad bunion    Hearing/Vision:  No exam data present  Hearing/Vision  Do you or your family notice any trouble with your hearing that hasn't been managed with hearing aids?: No  Do you have difficulty driving, watching TV, or doing any of your daily activities because of your eyesight?: (!) Yes  Have you had an eye exam within the past year?: Yes  Hearing/Vision Interventions:  · doing well, no complaint    Safety:  Safety  Do you have working smoke detectors?: Yes  Have all throw rugs been removed or fastened?: (!) No  Do you have non-slip mats or surfaces in all bathtubs/showers?: Yes  Do all of your stairways have a railing or banister?: Yes  Are your doorways, halls and stairs free of clutter?: Yes  Do you always fasten your seatbelt when you are in a car?: Yes  Safety Interventions:  · Home safety tips provided, tacked down rugs     Personalized Preventive Plan   Current Health Maintenance Status  Immunization History   Administered Date(s) Administered    COVID-19, Moderna, PF, 100mcg/0.5mL 01/21/2021, 02/18/2021    Influenza 11/24/2010    Influenza Vaccine, unspecified formulation 10/01/2016    Influenza Virus Vaccine 10/01/2012, 10/01/2013, 10/13/2015    Influenza, High Dose (Fluzone 65 yrs and older) 10/12/2017, 10/09/2018    Influenza, Quadv, adjuvanted, 65 yrs +, IM, PF (Fluad) 10/12/2020    Pneumococcal Conjugate 13-valent ([id number]) 07/13/2015    Pneumococcal Polysaccharide ([id number]) 05/07/2012    Td, unspecified formulation 09/30/2009    Tdap (Boostrix, Adacel) 01/08/2021    Zoster Live (Zostavax) 08/01/2013    Zoster Recombinant (Shingrix) 01/08/2021, 04/24/2021        Health Maintenance   Topic Date Due    Annual Wellness Visit (AWV)  Never done    Flu vaccine (1) 09/01/2021    Lipid screen  10/23/2021    Potassium monitoring  07/27/2022    Creatinine monitoring  07/27/2022    Breast cancer screen  05/19/2023    Colon cancer screen colonoscopy  08/18/2025    DTaP/Tdap/Td vaccine (2 - Td or Tdap) 01/08/2031    DEXA (modify frequency per FRAX score)  Completed    Shingles Vaccine  Completed    Pneumococcal 65+ years Vaccine  Completed    COVID-19 Vaccine  Completed    Hepatitis A vaccine  Aged Out    Hepatitis B vaccine  Aged Out    Hib vaccine  Aged Out    Meningococcal (ACWY) vaccine  Aged Out     Recommendations for [place] Due: see orders and patient instructions/AVS.  . Recommended screening schedule for the next 5-10 years is provided to the patient in written form: see Patient Instructions/AVS.    Hectornicole Dong was seen today for medicare awv. Diagnoses and all orders for this visit:    Medicare annual wellness visit, subsequent  Healthy diet, stay as active as possible  Flu vaccine next month, scheduled at her [facility]  Otherwise up-to-date with immunization  Third Moderna  vaccine when available      Essential hypertension  Stable, continue medication    Current mild episode of major depressive disorder without prior episode ([facility] [address]  Stable, seeing psychiatry    Elevated glucose  Repeat fasting labs in the future  -     Glucose; Future  -     Hemoglobin A1C; Future    Mixed hyperlipidemia  Repeat in the future fast-     Lipid Panel;  Future    Follow-up every 6 to 12 months or as needed

## 2021-10-20 LAB
BILIRUBIN URINE: NEGATIVE
BLOOD, URINE: ABNORMAL
CLARITY: ABNORMAL
COLOR: YELLOW
EPITHELIAL CELLS, UA: 0 /HPF (ref 0–5)
GLUCOSE URINE: NEGATIVE MG/DL
HYALINE CASTS: 0 /LPF (ref 0–8)
KETONES, URINE: NEGATIVE MG/DL
LEUKOCYTE ESTERASE, URINE: ABNORMAL
MICROSCOPIC EXAMINATION: YES
NITRITE, URINE: NEGATIVE
PH UA: 6.5 (ref 5–8)
PROTEIN UA: NEGATIVE MG/DL
RBC UA: 3 /HPF (ref 0–4)
SPECIFIC GRAVITY UA: 1.02 (ref 1–1.03)
URINE TYPE: ABNORMAL
UROBILINOGEN, URINE: 0.2 E.U./DL
WBC UA: 304 /HPF (ref 0–5)

## 2021-10-22 LAB
ORGANISM: ABNORMAL
URINE CULTURE, ROUTINE: ABNORMAL

## 2021-10-28 ENCOUNTER — HOSPITAL ENCOUNTER (OUTPATIENT)
Age: 81
Discharge: HOME | End: 2021-10-28
Payer: MEDICARE

## 2021-10-28 LAB — VITAMIN D 25-HYDROXY: 61.7 NG/ML

## 2021-10-28 PROCEDURE — 36415 COLL VENOUS BLD VENIPUNCTURE: CPT

## 2021-10-28 PROCEDURE — 82306 VITAMIN D 25 HYDROXY: CPT

## 2021-11-09 LAB
BACTERIA: ABNORMAL /HPF
BILIRUBIN URINE: NEGATIVE
BLOOD, URINE: ABNORMAL
CLARITY: ABNORMAL
COLOR: YELLOW
COMMENT UA: ABNORMAL
EPITHELIAL CELLS, UA: 1 /HPF (ref 0–5)
GLUCOSE URINE: NEGATIVE MG/DL
KETONES, URINE: NEGATIVE MG/DL
LEUKOCYTE ESTERASE, URINE: ABNORMAL
MICROSCOPIC EXAMINATION: YES
NITRITE, URINE: NEGATIVE
PH UA: 6.5 (ref 5–8)
PROTEIN UA: 30 MG/DL
RBC UA: 4 /HPF (ref 0–4)
SPECIFIC GRAVITY UA: 1.01 (ref 1–1.03)
URINE REFLEX TO CULTURE: YES
URINE TYPE: ABNORMAL
UROBILINOGEN, URINE: 0.2 E.U./DL
WBC UA: 549 /HPF (ref 0–5)

## 2021-11-11 LAB
ORGANISM: ABNORMAL
URINE CULTURE, ROUTINE: ABNORMAL

## 2021-12-06 ENCOUNTER — TELEPHONE (OUTPATIENT)
Dept: FAMILY MEDICINE CLINIC | Age: 81
End: 2021-12-06

## 2021-12-06 NOTE — TELEPHONE ENCOUNTER
Give her same day or VV slot on Wednesday with Dr. Cyn Bronson,,, have her come into the office
Patient is having back pain and would like to come in as soon as possible
Patient is scheduled
No  (Service Expert  click yes below to proceed with [employer] As Usual   Scheduling)?  Yes

## 2021-12-07 NOTE — PROGRESS NOTES
Lg Suarez is a 78 y.o. female. HPI:  Exhausted   Sees Psych soon     Also seeing neurology     Low back pain for a few  days   X rays in 7/2021 . .. DDD    No radiating pain   Midline LBP  No difficulty with urin/ BMs     Meds, vitamins and allergies reviewed with pt    Wt Readings from Last 3 Encounters:   12/08/21 148 lb (67.1 kg)   09/27/21 153 lb 9.6 oz (69.7 kg)   08/04/21 158 lb (71.7 kg)       REVIEW OF SYSTEMS:   CONSTITUTIONAL: See history of present illness,   Weight noted   HEENT: No new vision difficulties or ringing in the ears. RESPIRATORY: No new SOB, PND, orthopnea or cough. CARDIOVASCULAR: no CP, palpitations or SOB with exertion  GI: No nausea, vomiting, diarrhea, constipation, abdominal pain or changes in bowel habits. : No urinary frequency, urgency, incontinence hematuria or dysuria. SKIN: No cyanosis or skin lesions. MUSCULOSKELETAL: No new muscle or joint pain. NEUROLOGICAL: No syncope or TIA-like symptoms. PSYCHIATRIC: No anxiety, insomnia or depression     No Known Allergies    Prior to Visit Medications    Medication Sig Taking? Authorizing Provider   sulfamethoxazole-trimethoprim (BACTRIM;SEPTRA) 400-80 MG per tablet  Yes Historical Provider, MD   naproxen (NAPROSYN) 375 MG tablet Take 1 tablet by mouth 2 times daily (with meals) Yes Malik Freeman MD   buPROPion (WELLBUTRIN XL) 150 MG extended release tablet TAKE 1 TABLET (150 MG TOTAL) BY MOUTH DAILY.  INDICATIONS  DEPRESSION Yes Historical Provider, MD   vilazodone HCl (VIIBRYD) 10 MG TABS Take 20 mg by mouth daily  Yes Historical Provider, MD   mirtazapine (REMERON) 15 MG tablet 7.5 mg  Yes Historical Provider, MD   atorvastatin (LIPITOR) 10 MG tablet TAKE 1 TABLET BY MOUTH EVERY OTHER DAY **CHANGE IN DOSE** Yes Malik Freeman MD   B Complex-C (VITAMIN B + C COMPLEX PO) Take by mouth Yes Historical Provider, MD   losartan (COZAAR) 50 MG tablet Take 1 tablet by mouth 2 times daily (with meals) Yes Malik Freeman MD   Lift Chair MISC by Does not apply route Yes Estefania Slater MD   estradiol (ESTRACE) 0.1 MG/GM vaginal cream Place 2 g vaginally Twice a Week  Yes Historical Provider, MD   aspirin 81 MG EC tablet Take 81 mg by mouth daily. Yes Historical Provider, MD   VITAMIN D PO Take  by mouth. Yes Historical Provider, MD       Past Medical History:   Diagnosis Date    AR (allergic rhinitis)     Arthritis of knee     Pruis    Depression     Erosive gastritis 10/28/2019    EGD October 2019, he did with PPI    GERD (gastroesophageal reflux disease)     Hyperlipidemia     Internal hemorrhoids     Overweight(278.02)     Viral meningitis 5/12       Social History     Tobacco Use    Smoking status: Never Smoker    Smokeless tobacco: Never Used   Substance Use Topics    Alcohol use: Yes     Comment: rarely       Family History   Problem Relation Age of Onset    Breast Cancer Mother     Bipolar Disorder Brother        OBJECTIVE:  BP (!) 143/83   Pulse 94   Temp 96.4 °F (35.8 °C)   Wt 148 lb (67.1 kg)   SpO2 98%   BMI 27.96 kg/m²   GEN:  Looks sad , tremor right hand   NECK:  Supple without adenopathy. CV:  Regular rate and rhythm, S1 and S2 normal, no murmurs, clicks  PULM:  Chest is clear, no wheezing ,  symmetric air entry throughout both lung fields. ABD: Soft, NT, no masses appreciated   EXT: No rash or edema  NEURO: Alert oriented ×3, slow moving , sit to standing = slow, using cane     ASSESSMENT/PLAN:  1. Acute midline low back pain without sciatica  Heat alt [address]  Refer to PY   Alt naprosyn and tylenol every 6-8 hrs   - [address]    2. Leg weakness, bilateral  Refer   - [address]    3. Essential hypertension  Stable , monitor     4. Mixed hyperlipidemia  Stable, on statin     5.  Current mild episode of major depressive disorder without prior episode [facility])  Seeing psychiatry     Had covid booster       30 Total Minutes spent pre charting (reviewing problem list, meds, any test results, consultant and hospital notes, HM reviewed  ) and  obtaining present visit history, performing appropriate medical exam/evaluation, counseling and educating the patient (and family), ordering medications ,tests, and procedures as needed, refilling medication(s), placing referral(s) when needed in addition to coordinating care for this patient and documenting in electronic health record

## 2021-12-08 ENCOUNTER — OFFICE VISIT (OUTPATIENT)
Dept: FAMILY MEDICINE CLINIC | Age: 81
End: 2021-12-08
Payer: MEDICARE

## 2021-12-08 VITALS
HEART RATE: 94 BPM | TEMPERATURE: 96.4 F | BODY MASS INDEX: 27.96 KG/M2 | OXYGEN SATURATION: 98 % | SYSTOLIC BLOOD PRESSURE: 143 MMHG | WEIGHT: 148 LBS | DIASTOLIC BLOOD PRESSURE: 83 MMHG

## 2021-12-08 DIAGNOSIS — M54.50 ACUTE MIDLINE LOW BACK PAIN WITHOUT SCIATICA: Primary | ICD-10-CM

## 2021-12-08 DIAGNOSIS — I10 ESSENTIAL HYPERTENSION: ICD-10-CM

## 2021-12-08 DIAGNOSIS — E78.2 MIXED HYPERLIPIDEMIA: ICD-10-CM

## 2021-12-08 DIAGNOSIS — R29.898 LEG WEAKNESS, BILATERAL: ICD-10-CM

## 2021-12-08 DIAGNOSIS — F32.0 CURRENT MILD EPISODE OF MAJOR DEPRESSIVE DISORDER WITHOUT PRIOR EPISODE (HCC): ICD-10-CM

## 2021-12-08 PROCEDURE — 99214 OFFICE O/P EST MOD 30 MIN: CPT | Performed by: FAMILY MEDICINE

## 2021-12-08 RX ORDER — NAPROXEN 375 MG/1
375 TABLET ORAL 2 TIMES DAILY WITH MEALS
Qty: 30 TABLET | Refills: 0 | Status: ON HOLD | OUTPATIENT
Start: 2021-12-08 | End: 2022-05-17 | Stop reason: HOSPADM

## 2021-12-08 RX ORDER — SULFAMETHOXAZOLE AND TRIMETHOPRIM 400; 80 MG/1; MG/1
TABLET ORAL
Status: ON HOLD | COMMUNITY
Start: 2021-11-11 | End: 2022-05-18 | Stop reason: HOSPADM

## 2021-12-16 ENCOUNTER — HOSPITAL ENCOUNTER (OUTPATIENT)
Dept: PHYSICAL THERAPY | Age: 81
Setting detail: THERAPIES SERIES
Discharge: HOME | End: 2021-12-16
Payer: MEDICARE

## 2021-12-16 PROCEDURE — 97530 THERAPEUTIC ACTIVITIES: CPT

## 2021-12-16 PROCEDURE — 97162 PT EVAL MOD COMPLEX 30 MIN: CPT

## 2021-12-16 NOTE — PLAN OF CARE
[address] [address], [address] [place] [address] Drive  Phone: [phone]   Fax: [phone]     Physical Therapy Certification    Dear Referring Practitioner: Luis Troncoso MD,    We had the pleasure of evaluating the following patient for physical therapy services at [address] [place] [address]. A summary of our findings can be found in the initial assessment below. This includes our plan of care. If you have any questions or concerns regarding these findings, please do not hesitate to contact me at the office phone number checked above. Thank you for the referral.       Physician Signature:_______________________________Date:__________________  By signing above (or electronic signature), therapists plan is approved by physician      Patient: Marybeth Cardenas   : 1940   MRN: [id number]  Referring Physician: Referring Practitioner: Luis Troncoso MD      Evaluation Date: 2021      Medical Diagnosis Information:  Diagnosis: R29.898 (ICD-10-CM) - Leg weakness, [id number] (ICD-10-CM) - Acute midline low back pain without sciatica   Treatment Diagnosis: significantly decreased HF flexibility B, poor strength ernesto in post chair, poor lumbar ROM and pain                                         Insurance information: PT Insurance Information: [employer]     Precautions/ Contra-indications: osteoporosis, hypertension  Latex Allergy:  [x]NO      []YES  Preferred Language for Healthcare:   [x]English       []Other:    C-SSRS Triggered by Intake questionnaire (Past 2 wk assessment ):   [x] No, Questionnaire did not trigger screening.   [] Yes, Patient intake triggered C-SSRS Screening     [] Completed, no further action required. [] Completed, PCP notified via Epic    SUBJECTIVE: Patient stated complaint: History of back pain for many years. 10 days ago pain worsened significantly.  Pt reports that she feels depressed but is under the care of a professional.  Pain is a dull achy sensation in the upper R hip, cramp sensation. Standing is painful, sitting is okay if she is reclined, laying down on her back or side is most comfortable. Pain is most severe around dinner time when she is standing a lot. Fear avoidance: I should not do physical activities that (might) make my pain worse   [] True   [x] False     Relevant Medical History:hypertension, OA, Osteoporosis, Depression. Functional Outcome:   Oswestry: raw score = 13; dysfunction = 34%  LEFS: raw score = 34; dysfunction = 40-60%      Pain Scale: 5-6/10  Easing factors: Pain patch helps with the hip  Provocative factors: standing,upright sitting. Type: []Constant   [x]Intermittent  []Radiating []Localized []other:     Numbness/Tingling: When the pain is there \"it feels kind of numbing\". Occupation/School: retired, dental hygienist.      Living Status/Prior Level of Function: Prior to this injury / incident, pt was independent with ADLs and IADLs, pt has had pain, has difficulty stating a baseline function. OBJECTIVE:   Palpation: very TTP in the R lumbar paraspinals, R piriformis, R quadratus lumborum, R glutes. Functional Mobility/Transfers: slow and antalgic    Posture: stiff    Inspection: WFL    Gait: (include devices/WB status): R LE ER. Decreased speed, uses cane. Cane initially too high, PT adjusted to appropriate length.      Bandages/Dressings/Incisions: NA    Dermatomes Normal Abnormal Comments   inguinal area (L1)       anterior mid-thigh (L2)      distal ant thigh/med knee (L3)      medial lower leg and foot (L4)      lateral lower leg and foot (L5)      posterior calf (S1)      medial calcaneus (S2)          Reflexes Normal Abnormal Comments   S1-2 Seated achilles      S1-2 Prone knee bend      L3-4 Patellar tendon      Clonus      Babinski          ROM  Comments   Lumbar Flex 35 deg with pain throughout    Lumbar Ext 15 deg with stiffness      ROM LEFT RIGHT Comments   Lumbar Side Bend Limited 40% with pain  Limited 10%    Lumbar Rotation Limited 20% with tightness Limited 50% with pain    Hip Flexion      Hip Abd      Hip ER      Hip IR      Hip Extension      Knee Ext      Knee Flex      Hamstring Flex      Piriformis                      Joint mobility: NT   []Normal    []Hypo   []Hyper      Strength / Myotomes LEFT RIGHT Comments   Multifidus      Transverse Ab      Hip Flexors (L1-2) 4- 4- In sitting   Hip Abductors 4 4 In sitting   Hip Extensors 3- 3- In prone, increased pain in the R thigh   Hip Internal Rotators      Hip External Rotators      Quads (L2-4) 4 4+    Hamstrings  4 4+    Ankle Plantarflexion (S1-2)      Ankle Dorsiflexion (L4-5) 10 DL HR 10 DL HR    Ankle Inversion      Ankle Eversion (S1-2)      Great Toe Extension (L5)                Neural dynamic tension testing Normal Abnormal Comments   Slump Test  - Degree of knee flexion:  X     SLR       0-30      30-70      Femoral nerve (L2-4)          Orthopedic Special Tests:    Normal Abnormal N/A Comments   Toe walk         Heel Walk       Fwd Bend-aberrant or innominate mvmt)       Trendelenburg       Kemps/Quadrant       Stork       GERMAN/Sheldon       Hip scour       SLR       Crossed SLR       Supine to sit       Hip thrust       SI distraction/compression       PA/Spring       Prone Instability test       Prone knee bend       Sacral Spring/thrust                  [x] Patient history, allergies, meds reviewed. Medical chart reviewed. See intake form. Review Of Systems (ROS):  [x]Performed Review of systems (Integumentary, CardioPulmonary, Neurological) by intake and observation. Intake form has been scanned into medical record. Patient has been instructed to contact their primary care physician regarding ROS issues if not already being addressed at this time.       Co-morbidities/Complexities (which will affect course of rehabilitation):   []None        []Hx of COVID   Arthritic conditions   []Rheumatoid arthritis (M05.9)  []Osteoarthritis (M19.91)  []Gout   Cardiovascular conditions   []Hypertension (I10)  []Hyperlipidemia (E78.5)  []Angina pectoris (I20)  []Atherosclerosis (I70)  []Pacemaker  []Hx of CABG/stent/  cardiac surgeries   Musculoskeletal conditions   []Disc pathology   []Congenital spine pathologies   []Osteoporosis (M81.8)  []Osteopenia (M85.8)  []Scoliosis       Endocrine conditions   []Hypothyroid (E03.9)  []Hyperthyroid Gastrointestinal conditions   []Constipation (Q52.58)   Metabolic conditions   []Morbid obesity (E66.01)  []Diabetes type 1(E10.65) or 2 (E11.65)   []Neuropathy (G60.9)     Cardio/Pulmonary conditions   []Asthma (J45)  []Coughing   []COPD (J44.9)  []CHF  []A-fib   Psychological Disorders  []Anxiety (F41.9)  []Depression (F32.9)   []Other:   Developmental Disorders  []Autism (F84.0)  []CP (G80)  []Down Syndrome (Q90.9)  []Developmental delay     Neurological conditions  []Prior Stroke (I69.30)  []Parkinson's (G20)  []Encephalopathy (G93.40)  []MS (G35)  []Post-polio (G14)  []SCI  []TBI  []ALS Other conditions  []Fibromyalgia (M79.7)  []Vertigo  []Syncope  []Kidney Failure  []Cancer      []currently undergoing                treatment  []Pregnancy  []Incontinence   Prior surgeries  []involved limb  []previous spinal surgery  [] section birth  []hysterectomy  []bowel / bladder surgery  []other relevant surgeries   []Other:              Barriers to/and or personal factors that will affect rehab potential:              [x]Age  []Sex    []Smoker              []Motivation/Lack of Motivation                        [x]Co-Morbidities              []Cognitive Function, education/learning barriers              []Environmental, home barriers              []profession/work barriers  []past PT/medical experience  []other:  Justification:     Falls Risk Assessment (30 days):   [x] Falls Risk assessed and no intervention required.   [] Falls Risk assessed and Patient requires intervention due to being higher risk   TUG score (>12s at risk):     [] Falls education provided, including         ASSESSMENT: Pt is an 78 yr old female presenting with low back pain consistent with generalized low back pain. Pt demo significantly decreased HF flexibility B, poor strength ernesto in post chair, poor lumbar ROM and pain. Pt would benefit from skilled physical therapy to address these impairments and allow pt to return to PLOF. Functional Impairments:     [x]Noted lumbar/proximal hip hypomobility   []Noted lumbosacral and/or generalized hypermobility   [x]Decreased Lumbosacral/hip/LE functional ROM   [x]Decreased core/proximal hip strength and neuromuscular control    [x]Decreased LE functional strength    [x]Abnormal reflexes/sensation/myotomal/dermatomal deficits  [x]Reduced balance/proprioceptive control    []other:      Functional Activity Limitations (from functional questionnaire and intake)   [x]Reduced ability to tolerate prolonged functional positions   [x]Reduced ability or difficulty with changes of positions or transfers between positions   [x]Reduced ability to maintain good posture and demonstrate good body mechanics with sitting, bending, and lifting   []Reduced ability to sleep   [] Reduced ability or tolerance with driving and/or computer work   [x]Reduced ability to perform lifting, reaching, carrying tasks   [x]Reduced ability to squat   [x]Reduced ability to forward bend   [x]Reduced ability to ambulate prolonged functional periods/distances/surfaces   [x]Reduced ability to ascend/descend stairs   []other:       Participation Restrictions   [x]Reduced participation in self care activities   [x]Reduced participation in home management activities   [x]Reduced participation in work activities   [x]Reduced participation in social activities. [x]Reduced participation in sport/recreational activities.     Classification:   [x]Signs/symptoms consistent with generalized low back pain with possible sciatic irritation. []Signs/symptoms consistent with Lumbar mobilization/manipulation subgroup, myotomes and dermatomes intact. Meets manipulation criteria. []Signs/symptoms consistent with Lumbar direction specific/centralization subgroup   []Signs/symptoms consistent with Lumbar traction subgroup     []Signs/symptoms consistent with lumbar facet dysfunction   []Signs/symptoms consistent with lumbar stenosis type dysfunction   []Signs/symptoms consistent with nerve root involvement including myotome & dermatome dysfunction   []Signs/symptoms consistent with post-surgical status including: decreased ROM, strength and function. []signs/symptoms consistent with pathology which may benefit from Dry needling     []other:      Prognosis/Rehab Potential:      []Excellent   [x]Good    []Fair   []Poor    Tolerance of evaluation/treatment:    []Excellent   [x]Good    []Fair   []Poor     Physical Therapy Evaluation Complexity Justification  [x] A history of present problem with:  [] no personal factors and/or comorbidities that impact the plan of care;  [x]1-2 personal factors and/or comorbidities that impact the plan of care  []3 personal factors and/or comorbidities that impact the plan of care  [x] An examination of body systems using standardized tests and measures addressing any of the following: body structures and functions (impairments), activity limitations, and/or participation restrictions;:  [] a total of 1-2 or more elements   [x] a total of 3 or more elements   [] a total of 4 or more elements   [x] A clinical presentation with:  [] stable and/or uncomplicated characteristics   [x] evolving clinical presentation with changing characteristics  [] unstable and unpredictable characteristics;   [x] Clinical decision making of [] low, [x] moderate, [] high complexity using standardized patient assessment instrument and/or measurable assessment of functional outcome.     [] EVAL (LOW) 45250 (typically 15 minutes face-to-face)  [x] EVAL (MOD) 99835 (typically 30 minutes face-to-face)  [] EVAL (HIGH) 03546 (typically 45 minutes face-to-face)  [] RE-EVAL     PLAN: Begin PT focusing on: proximal hip mobilizations, LB mobs, LB core activation, proximal hip activation, and HEP    Frequency/Duration:  2 days per week for 6 Weeks:  Interventions:  [x]  Therapeutic exercise including: strength training, ROM, for LE, Glutes and core   [x]  NMR activation and proprioception for glutes , LE and Core   [x]  Manual therapy as indicated for Hip complex, LE and spine to include: Dry Needling/IASTM, STM, PROM, Gr I-IV mobilizations, manipulation. [x]  Modalities as needed that may include: thermal agents, E-stim, Biofeedback, US, iontophoresis as indicated  [x]  Patient education on joint protection, postural re-education, activity modification, progression of HEP. HEP instruction: Written HEP instructions provided and reviewed     GOALS:  Patient stated goal: to gain flexibility  [] Progressing: [] Met: [] Not Met: [] Adjusted    Therapist goals for Patient:   Short Term Goals: To be achieved in: 2 weeks  1. Independent in HEP and progression per patient tolerance, in order to prevent re-injury. [] Progressing: [] Met: [] Not Met: [] Adjusted  2. Patient will have a decrease in pain to facilitate improvement in movement, function, and ADLs as indicated by Functional Deficits. [] Progressing: [] Met: [] Not Met: [] Adjusted    Long Term Goals: To be achieved in: 6 weeks  1. Disability index score of 20% or less for the FRANCO and LEFS to assist with reaching prior level of function. [] Progressing: [] Met: [] Not Met: [] Adjusted  2. Patient will demonstrate increased AROM to WNL, good LS mobility, good hip ROM to allow for proper joint functioning as indicated by patients Functional Deficits. [] Progressing: [] Met: [] Not Met: [] Adjusted  3.  Patient will demonstrate an increase in Strength to 5/5 proximal hip and core activation to allow for proper functional mobility as indicated by patients Functional Deficits. [] Progressing: [] Met: [] Not Met: [] Adjusted  4. Patient will return to functional activities including walking for 400' independnetly without increased symptoms or restriction. [] Progressing: [] Met: [] Not Met: [] Adjusted  5. Pt will report pain decreased to 2/10 at worst for improved activity tolerance.    [] Progressing: [] Met: [] Not Met: [] Adjusted     Electronically signed by:  Ngoc Nguyen, PT, DPT

## 2021-12-16 NOTE — FLOWSHEET NOTE
[address] [facility] Physical Therapy  Phone: [phone]   Fax: [phone]    Physical Therapy Treatment Note/ Progress Report:     Date:  2021    Patient Name:  Jenniffer Reddy    :  1940  MRN: [id number]  Restrictions/Precautions:    Medical/Treatment Diagnosis Information:  · Diagnosis: R29.898 (ICD-10-CM) - Leg weakness, [id number] (ICD-10-CM) - Acute midline low back pain without sciatica  · Treatment Diagnosis: significantly decreased HF flexibility B, poor strength ernesto in post chair, poor lumbar ROM and pain  Insurance/Certification information:  PT Insurance Information: [facility] [place]  Medicare  Physician Information:  Referring Practitioner: Maynard Olszewski, MD  Plan of care signed (Y/N): []  Yes [x]  No    Date of Patient follow up with Physician:      Progress Report: []  Yes  [x]  No     Date Range for reporting period:  Beginnin21  PN:  Ending:     Progress report due (10 Rx/or 30 days whichever is less): visit #10 or  (date)     Recertification due (POC duration/ or 90 days whichever is less): visit #12 or 3/16/22 (date)     Visit # Insurance Allowable Auth required?  Date Range    [place], [place] []  Yes  [x]  No NA       Latex Allergy:  [x]NO      []YES  Preferred Language for Healthcare:   [x]English       []other:    Functional Scale:        Date assessed:  FRANCO: raw score =13 ; dysfunction = 34%    21  LEFS: raw score = 34; dysfunction = 40-60%   21    Pain level:  5-6/10     SUBJECTIVE:  See eval    OBJECTIVE: See eval   Observation:    Test measurements:      RESTRICTIONS/PRECAUTIONS: osteoporosis, hypertension, depression    Exercises/Interventions:     Therapeutic Exercise (53339) Resistance / level Sets / Seconds Reps Notes / Cues          Seated stepper              Ballet bar marches       Lateral stepping       Hip abduction       HS curl                     Therapeutic Activities (30640)              Walking laps for endurance Neuromuscular Re-ed (31341)       rhomberg       tandem                            Manual Intervention (57525)       Manual HSS       Manual HFS       STM R piriformis, glutes, lumbar paraspinals, quadratus lumborum. Modalities:     Pt. Education:  -pt educated on diagnosis, prognosis and expectations for rehab  -all pt questions were answered    Home Exercise Program:      Therapeutic Exercise and NMR EXR  [] (25052) Provided verbal/tactile cueing for activities related to strengthening, flexibility, endurance, ROM  for improvements in proximal hip and core control with self care, mobility, lifting and ambulation.  [] (25265) Provided verbal/tactile cueing for activities related to improving balance, coordination, kinesthetic sense, posture, motor skill, proprioception  to assist with core control in self care, mobility, lifting, and ambulation.   [] (41203) Therapist is in constant attendance of 2 or more patients providing skilled therapy interventions, but not providing any significant amount of measurable one-on-one time to either patient, for improvements in LE, proximal hip, and core control in self care, mobility, lifting, ambulation and eccentric single leg control.      Therapeutic Activities:    [] (65920 or 16945) Provided verbal/tactile cueing for activities related to improving balance, coordination, kinesthetic sense, posture, motor skill, proprioception and motor activation to allow for proper function  with self care and ADLs  [] (59362) Provided training and instruction to the patient for proper core and proximal hip recruitment and positioning with ambulation re-education     Home Exercise Program:    [x] (66617) Reviewed/Progressed HEP activities related to strengthening, flexibility, endurance, ROM of core, proximal hip and LE for functional self-care, mobility, lifting and ambulation   [] (61144) Reviewed/Progressed HEP activities related to improving balance, coordination, kinesthetic sense, posture, motor skill, proprioception of core, proximal hip and LE for self care, mobility, lifting, and ambulation      Manual Treatments:  PROM / STM / Oscillations-Mobs:  G-I, II, III, IV (PA's, Inf., Post.)  [] (14655) Provided manual therapy to mobilize proximal hip and LS spine soft tissue/joints for the purpose of modulating pain, promoting relaxation,  increasing ROM, reducing/eliminating soft tissue swelling/inflammation/restriction, improving soft tissue extensibility and allowing for proper ROM for normal function with self care, mobility, lifting and ambulation. Charges:  Timed Code Treatment Minutes: 25   Total Treatment Minutes: 45       [] EVAL - LOW (32748)   [x] EVAL - MOD (44272)  [] EVAL - HIGH (72781)  [] RE-EVAL (01494)  [] HY(22119) x      [] Ionto  [] NMR (82047) x      [] Vaso  [] Manual (13828) x      [] Ultrasound  [x] TA x 2      [] Mech Traction (53539)  [] GaitTraining x     [] ES (un) (49934):   [] Home Management Training [] ES(attended) (46583)             [] Aquatics    [] Group  [] Other    Goals:   Patient stated goal: to gain flexibility  []? Progressing: []? Met: []? Not Met: []? Adjusted     Therapist goals for Patient:   Short Term Goals: To be achieved in: 2 weeks  1. Independent in HEP and progression per patient tolerance, in order to prevent re-injury. []? Progressing: []? Met: []? Not Met: []? Adjusted  2. Patient will have a decrease in pain to facilitate improvement in movement, function, and ADLs as indicated by Functional Deficits. []? Progressing: []? Met: []? Not Met: []? Adjusted     Long Term Goals: To be achieved in: 6 weeks  1. Disability index score of 20% or less for the FRANCO and LEFS to assist with reaching prior level of function. []? Progressing: []? Met: []? Not Met: []? Adjusted  2.  Patient will demonstrate increased AROM to WNL, good LS mobility, good hip ROM to allow for Hugh, PT, DPT      Note: If patient does not return for scheduled/ recommended follow up visits, this note will serve as a discharge from care along with most recent update on progress.

## 2021-12-29 ENCOUNTER — HOSPITAL ENCOUNTER (OUTPATIENT)
Dept: PHYSICAL THERAPY | Age: 81
Setting detail: THERAPIES SERIES
End: 2021-12-29
Payer: MEDICARE

## 2022-01-04 ENCOUNTER — HOSPITAL ENCOUNTER (OUTPATIENT)
Dept: PHYSICAL THERAPY | Age: 82
Setting detail: THERAPIES SERIES
Discharge: HOME | End: 2022-01-04
Payer: MEDICARE

## 2022-01-04 PROCEDURE — 97110 THERAPEUTIC EXERCISES: CPT

## 2022-01-04 PROCEDURE — 97140 MANUAL THERAPY 1/> REGIONS: CPT

## 2022-01-04 PROCEDURE — 97112 NEUROMUSCULAR REEDUCATION: CPT

## 2022-01-04 NOTE — FLOWSHEET NOTE
[facility] [facility]  Phone: [phone]   Fax: [phone]    Physical Therapy Treatment Note/ Progress Report:     Date:  2022    Patient Name:  Alfred Salazar    :  1940  MRN: [id number]  Restrictions/Precautions:    Medical/Treatment Diagnosis Information:  · Diagnosis: R29.898 (ICD-10-CM) - Leg weakness, [id number] (ICD-10-CM) - Acute midline low back pain without sciatica  · Treatment Diagnosis: significantly decreased HF flexibility B, poor strength ernesto in post chair, poor lumbar ROM and pain  Insurance/Certification information:  PT Insurance Information: [address] [place] [address]  Medicare  Physician Information:  Referring Practitioner: Ivelisse Rodriguez MD  Plan of care signed (Y/N): []  Yes [x]  No    Date of Patient follow up with Physician:      Progress Report: []  Yes  [x]  No     Date Range for reporting period:  Beginnin21  PN:  Ending:     Progress report due (10 Rx/or 30 days whichever is less): visit #10 or  (date)     Recertification due (POC duration/ or 90 days whichever is less): visit #12 or 3/16/22 (date)     Visit # Insurance Allowable Auth required? Date Range    [place] []  Yes  [x]  No NA       Latex Allergy:  [x]NO      []YES  Preferred Language for Healthcare:   [x]English       []other:    Functional Scale:        Date assessed:  FRANCO: raw score =13 ; dysfunction = 34%    21  LEFS: raw score = 34; dysfunction = 40-60%   21    Pain level:  5-6/10     SUBJECTIVE:  Pt reports that she twisted her knee getting into a car ~ 3 weeks ago. Pain today is 6/10.       OBJECTIVE: See eval   Observation:    Test measurements:      RESTRICTIONS/PRECAUTIONS: osteoporosis, hypertension, depression    Exercises/Interventions:     Therapeutic Exercise (70511) Resistance / level Sets / Seconds Reps Notes / Cues          Seated stepper  5'            Ballet bar marches  2 10    Lateral stepping peach  3 laps at ballet bar    Hip positioning with ambulation re-education     Home Exercise Program:    [x] (91576) Reviewed/Progressed HEP activities related to strengthening, flexibility, endurance, ROM of core, proximal hip and LE for functional self-care, mobility, lifting and ambulation   [] (71388) Reviewed/Progressed HEP activities related to improving balance, coordination, kinesthetic sense, posture, motor skill, proprioception of core, proximal hip and LE for self care, mobility, lifting, and ambulation      Manual Treatments:  PROM / STM / Oscillations-Mobs:  G-I, II, III, IV (PA's, Inf., Post.)  [] (91534) Provided manual therapy to mobilize proximal hip and LS spine soft tissue/joints for the purpose of modulating pain, promoting relaxation,  increasing ROM, reducing/eliminating soft tissue swelling/inflammation/restriction, improving soft tissue extensibility and allowing for proper ROM for normal function with self care, mobility, lifting and ambulation. Charges:  Timed Code Treatment Minutes: 45   Total Treatment Minutes: 45       [] EVAL - LOW (52744)   [] EVAL - MOD (55277)  [] EVAL - HIGH (65775)  [] RE-EVAL (91252)  [x] SI(57373) x 1    [] Ionto  [x] NMR (68733) x 1   [] Vaso  [x] Manual (32247) x 1     [] Ultrasound  [] TA x 2      [] Mech Traction (80683)  [] GaitTraining x     [] ES (un) (55552):   [] Home Management Training [] ES(attended) (13306)             [] Aquatics    [] Group  [] Other    Goals:   Patient stated goal: to gain flexibility  []? Progressing: []? Met: []? Not Met: []? Adjusted     Therapist goals for Patient:   Short Term Goals: To be achieved in: 2 weeks  1. Independent in HEP and progression per patient tolerance, in order to prevent re-injury. []? Progressing: []? Met: []? Not Met: []? Adjusted  2. Patient will have a decrease in pain to facilitate improvement in movement, function, and ADLs as indicated by Functional Deficits. []? Progressing: []? Met: []? Not Met: []?  Adjusted     Long Term Goals: To be achieved in: 6 weeks  1. Disability index score of 20% or less for the FRANCO and LEFS to assist with reaching prior level of function. []? Progressing: []? Met: []? Not Met: []? Adjusted  2. Patient will demonstrate increased AROM to WNL, good LS mobility, good hip ROM to allow for proper joint functioning as indicated by patients Functional Deficits. []? Progressing: []? Met: []? Not Met: []? Adjusted  3. Patient will demonstrate an increase in Strength to 5/5 proximal hip and core activation to allow for proper functional mobility as indicated by patients Functional Deficits. []? Progressing: []? Met: []? Not Met: []? Adjusted  4. Patient will return to functional activities including walking for 400' independnetly without increased symptoms or restriction. []? Progressing: []? Met: []? Not Met: []? Adjusted  5. Pt will report pain decreased to 2/10 at worst for improved activity tolerance. []? Progressing: []? Met: []? Not Met: []? Adjusted       Overall Progression Towards Functional goals/ Treatment Progress Update:  [] Patient is progressing as expected towards functional goals listed. [] Progression is slowed due to complexities/Impairments listed. [] Progression has been slowed due to co-morbidities. [x] Plan just implemented, too soon to assess goals progression <30days   [] Goals require adjustment due to lack of progress  [] Patient is not progressing as expected and requires additional follow up with physician  [] Other    Persisting Functional Limitations/Impairments:  [x]Sitting [x]Standing   [x]Walking [x]Squatting/bending    [x]Stairs []ADL's    [x]Transfers [x]Reaching  [x]Housework [x]Job related tasks  [x]Driving []Sports/Recreation   []Sleeping []Other:    ASSESSMENT:  Pt with decreased endurance however willing to complete all exercises. Reports leg fatigue post session. PT educated pt on appropriate soreness levels, will monitor for prolonged soreness NV.   HF stretching improved symptoms however HF remains very tight B. Will continue to progress but will likely require slowed progression. Treatment/Activity Tolerance:  [] Patient able to complete tx  [x] Patient limited by fatique  [] Patient limited by pain  [] Patient limited by other medical complications  [] Other:     Prognosis: [x] Good [] Fair  [] Poor    Patient Requires Follow-up: [x] Yes  [] No    PLAN: See eval. PT 2x / week for 6 weeks. [] Continue per plan of care [] Alter current plan (see comments)  [x] Plan of care initiated [] Hold pending MD visit [] Discharge    Electronically signed by: Renan Sumner, PT, DPT      Note: If patient does not return for scheduled/ recommended follow up visits, this note will serve as a discharge from care along with most recent update on progress.

## 2022-01-06 ENCOUNTER — HOSPITAL ENCOUNTER (OUTPATIENT)
Dept: PHYSICAL THERAPY | Age: 82
Setting detail: THERAPIES SERIES
Discharge: HOME | End: 2022-01-06
Payer: MEDICARE

## 2022-01-06 PROCEDURE — 97110 THERAPEUTIC EXERCISES: CPT

## 2022-01-06 PROCEDURE — 97530 THERAPEUTIC ACTIVITIES: CPT

## 2022-01-06 PROCEDURE — 97112 NEUROMUSCULAR REEDUCATION: CPT

## 2022-01-06 NOTE — FLOWSHEET NOTE
[address] [facility] Physical Therapy  Phone: [phone]   Fax: [phone]    Physical Therapy Treatment Note/ Progress Report:     Date:  2022    Patient Name:  Sulaiman Renteria    :  1940  MRN: [id number]  Restrictions/Precautions:    Medical/Treatment Diagnosis Information:  · Diagnosis: R29.898 (ICD-10-CM) - Leg weakness, [id number] (ICD-10-CM) - Acute midline low back pain without sciatica  · Treatment Diagnosis: significantly decreased HF flexibility B, poor strength ernesto in post chair, poor lumbar ROM and pain  Insurance/Certification information:  PT Insurance Information: [address] [place] [address]  Medicare  Physician Information:  Referring Practitioner: Eddy Becker MD  Plan of care signed (Y/N): []  Yes [x]  No    Date of Patient follow up with Physician:      Progress Report: []  Yes  [x]  No     Date Range for reporting period:  Beginnin21  PN:  Ending:     Progress report due (10 Rx/or 30 days whichever is less): visit #10 or  (date)     Recertification due (POC duration/ or 90 days whichever is less): visit #12 or 3/16/22 (date)     Visit # Insurance Allowable Auth required? Date Range   3/12 [place], [place] []  Yes  [x]  No NA       Latex Allergy:  [x]NO      []YES  Preferred Language for Healthcare:   [x]English       []other:    Functional Scale:        Date assessed:  FARNCO: raw score =13 ; dysfunction = 34%    21  LEFS: raw score = 34; dysfunction = 40-60%   21    Pain level:  5-6/10     SUBJECTIVE:  Pt reports that she twisted her knee getting into a car ~ 3 weeks ago. Pain today is 6/10.       OBJECTIVE: See eval   Observation:    Test measurements:      RESTRICTIONS/PRECAUTIONS: osteoporosis, hypertension, depression    Exercises/Interventions:     Therapeutic Exercise (39658) Resistance / level Sets / Seconds Reps Notes / Cues          Seated stepper  5'            Ballet bar marches  2 10    Lateral stepping peach  3 laps at // bar    Hip abduction peach 2 10    HS curl 0# 2 10                  Therapeutic Activities (67083)              Walking laps for endurance   267' in 1.34\"  267' in 1.38\"                           Neuromuscular Re-ed (78082)       rhomberg   Firm head turns vert  Firm head turns hor  Firm, EC   X30\"  X30\"  x30\"     tandem firm 2 x 30\"     Step taps No UE assist, CGA  10 B (fwd, lat)    Step ups  6\" 1 UE assist  6\" 1 UE assist  4\" B UE assist  10 B fwd  10 R lat  10 lat            Manual Intervention (40019)       Manual HSS       Manual HFS     STM R piriformis, glutes, lumbar paraspinals, quadratus lumborum. Modalities:     Pt. Education:  -pt educated on diagnosis, prognosis and expectations for rehab  -all pt questions were answered    Home Exercise Program:      Therapeutic Exercise and NMR EXR  [] (25061) Provided verbal/tactile cueing for activities related to strengthening, flexibility, endurance, ROM  for improvements in proximal hip and core control with self care, mobility, lifting and ambulation.  [] (01328) Provided verbal/tactile cueing for activities related to improving balance, coordination, kinesthetic sense, posture, motor skill, proprioception  to assist with core control in self care, mobility, lifting, and ambulation.   [] (48828) Therapist is in constant attendance of 2 or more patients providing skilled therapy interventions, but not providing any significant amount of measurable one-on-one time to either patient, for improvements in LE, proximal hip, and core control in self care, mobility, lifting, ambulation and eccentric single leg control.      Therapeutic Activities:    [] (34745 or 59858) Provided verbal/tactile cueing for activities related to improving balance, coordination, kinesthetic sense, posture, motor skill, proprioception and motor activation to allow for proper function  with self care and ADLs  [] (49372) Provided training and instruction to the patient for proper core and proximal hip recruitment and positioning with ambulation re-education     Home Exercise Program:    [x] (23706) Reviewed/Progressed HEP activities related to strengthening, flexibility, endurance, ROM of core, proximal hip and LE for functional self-care, mobility, lifting and ambulation   [] (56067) Reviewed/Progressed HEP activities related to improving balance, coordination, kinesthetic sense, posture, motor skill, proprioception of core, proximal hip and LE for self care, mobility, lifting, and ambulation      Manual Treatments:  PROM / STM / Oscillations-Mobs:  G-I, II, III, IV (PA's, Inf., Post.)  [] (00209) Provided manual therapy to mobilize proximal hip and LS spine soft tissue/joints for the purpose of modulating pain, promoting relaxation,  increasing ROM, reducing/eliminating soft tissue swelling/inflammation/restriction, improving soft tissue extensibility and allowing for proper ROM for normal function with self care, mobility, lifting and ambulation. Charges:  Timed Code Treatment Minutes: 45   Total Treatment Minutes: 45       [] EVAL - LOW (41569)   [] EVAL - MOD (34380)  [] EVAL - HIGH (51678)  [] RE-EVAL (88203)  [x] KD(12767) x 1    [] Ionto  [x] NMR (60504) x 1   [] Vaso  [] Manual (04486) x 1     [] Ultrasound  [x] TA x 1      [] Mech Traction (93364)  [] GaitTraining x     [] ES (un) (43290):   [] Home Management Training [] ES(attended) (42235)             [] Aquatics    [] Group  [] Other    Goals:   Patient stated goal: to gain flexibility  []? Progressing: []? Met: []? Not Met: []? Adjusted     Therapist goals for Patient:   Short Term Goals: To be achieved in: 2 weeks  1. Independent in HEP and progression per patient tolerance, in order to prevent re-injury. []? Progressing: []? Met: []? Not Met: []? Adjusted  2.  Patient will have a decrease in pain to facilitate improvement in movement, function, and ADLs as indicated by Functional Deficits. []? Progressing: []? Met: []? Not Met: []? Adjusted     Long Term Goals: To be achieved in: 6 weeks  1. Disability index score of 20% or less for the FRANCO and LEFS to assist with reaching prior level of function. []? Progressing: []? Met: []? Not Met: []? Adjusted  2. Patient will demonstrate increased AROM to WNL, good LS mobility, good hip ROM to allow for proper joint functioning as indicated by patients Functional Deficits. []? Progressing: []? Met: []? Not Met: []? Adjusted  3. Patient will demonstrate an increase in Strength to 5/5 proximal hip and core activation to allow for proper functional mobility as indicated by patients Functional Deficits. []? Progressing: []? Met: []? Not Met: []? Adjusted  4. Patient will return to functional activities including walking for 400' independnetly without increased symptoms or restriction. []? Progressing: []? Met: []? Not Met: []? Adjusted  5. Pt will report pain decreased to 2/10 at worst for improved activity tolerance. []? Progressing: []? Met: []? Not Met: []? Adjusted       Overall Progression Towards Functional goals/ Treatment Progress Update:  [] Patient is progressing as expected towards functional goals listed. [] Progression is slowed due to complexities/Impairments listed. [] Progression has been slowed due to co-morbidities. [x] Plan just implemented, too soon to assess goals progression <30days   [] Goals require adjustment due to lack of progress  [] Patient is not progressing as expected and requires additional follow up with physician  [] Other    Persisting Functional Limitations/Impairments:  [x]Sitting [x]Standing   [x]Walking [x]Squatting/bending    [x]Stairs []ADL's    [x]Transfers [x]Reaching  [x]Housework [x]Job related tasks  [x]Driving []Sports/Recreation   []Sleeping []Other:    ASSESSMENT:  Pt with increased exercise tolerance this date.   Pt cut her ambulation time by 25% and balance exercises were progressed with good tolerance. Pt's L knee limits her ability to complete step ups. Treatment/Activity Tolerance:  [] Patient able to complete tx  [x] Patient limited by fatique  [] Patient limited by pain  [] Patient limited by other medical complications  [] Other:     Prognosis: [x] Good [] Fair  [] Poor    Patient Requires Follow-up: [x] Yes  [] No    PLAN: See eval. PT 2x / week for 6 weeks. [] Continue per plan of care [] Alter current plan (see comments)  [x] Plan of care initiated [] Hold pending MD visit [] Discharge    Electronically signed by: Rhonda Garza, PT, DPT      Note: If patient does not return for scheduled/ recommended follow up visits, this note will serve as a discharge from care along with most recent update on progress.

## 2022-01-07 ENCOUNTER — HOSPITAL ENCOUNTER (OUTPATIENT)
Dept: NUCLEAR MEDICINE | Age: 82
Discharge: HOME | End: 2022-01-07
Payer: MEDICARE

## 2022-01-07 DIAGNOSIS — R26.9 ABNORMAL GAIT: ICD-10-CM

## 2022-01-07 DIAGNOSIS — M48.061 FORAMINAL STENOSIS OF LUMBAR REGION: ICD-10-CM

## 2022-01-07 DIAGNOSIS — R41.3 MEMORY LOSS: ICD-10-CM

## 2022-01-07 DIAGNOSIS — F32.A DEPRESSION, UNSPECIFIED DEPRESSION TYPE: ICD-10-CM

## 2022-01-07 PROCEDURE — 6370000000 HC RX 637 (ALT 250 FOR IP): Performed by: PSYCHIATRY & NEUROLOGY

## 2022-01-07 PROCEDURE — 3430000000 HC RX DIAGNOSTIC RADIOPHARMACEUTICAL: Performed by: PSYCHIATRY & NEUROLOGY

## 2022-01-07 PROCEDURE — A9584 IODINE I-123 IOFLUPANE: HCPCS | Performed by: PSYCHIATRY & NEUROLOGY

## 2022-01-07 PROCEDURE — 2580000003 HC RX 258: Performed by: PSYCHIATRY & NEUROLOGY

## 2022-01-07 PROCEDURE — 78803 RP LOCLZJ TUM SPECT 1 AREA: CPT

## 2022-01-07 RX ORDER — IODINE SOLUTION STRONG 5% (LUGOL'S) 5 %
4 SOLUTION ORAL ONCE
Status: COMPLETED | OUTPATIENT
Start: 2022-01-07 | End: 2022-01-07

## 2022-01-07 RX ORDER — SODIUM CHLORIDE 0.9 % (FLUSH) 0.9 %
5-40 SYRINGE (ML) INJECTION PRN
Status: DISCONTINUED | OUTPATIENT
Start: 2022-01-07 | End: 2022-01-08 | Stop reason: HOSPADM

## 2022-01-07 RX ADMIN — Medication 10 ML: at 09:25

## 2022-01-07 RX ADMIN — IOFLUPANE I-123 4.9 MILLICURIE: 2 INJECTION, SOLUTION INTRAVENOUS at 09:24

## 2022-01-07 RX ADMIN — IODINE SOLUTION STRONG 5% (LUGOL'S) 0.2 ML: 5 SOLUTION at 07:47

## 2022-01-11 ENCOUNTER — HOSPITAL ENCOUNTER (OUTPATIENT)
Dept: PHYSICAL THERAPY | Age: 82
Setting detail: THERAPIES SERIES
Discharge: HOME | End: 2022-01-11
Payer: MEDICARE

## 2022-01-11 PROCEDURE — 97140 MANUAL THERAPY 1/> REGIONS: CPT

## 2022-01-11 PROCEDURE — 97112 NEUROMUSCULAR REEDUCATION: CPT

## 2022-01-11 PROCEDURE — 97110 THERAPEUTIC EXERCISES: CPT

## 2022-01-11 NOTE — FLOWSHEET NOTE
[address] [facility] Physical Therapy  Phone: [phone]   Fax: [phone]    Physical Therapy Treatment Note/ Progress Report:     Date:  2022    Patient Name:  Alfred Salazar    :  1940  MRN: [id number]  Restrictions/Precautions:    Medical/Treatment Diagnosis Information:  · Diagnosis: R29.898 (ICD-10-CM) - Leg weakness, [id number] (ICD-10-CM) - Acute midline low back pain without sciatica  · Treatment Diagnosis: significantly decreased HF flexibility B, poor strength ernesto in post chain, poor lumbar ROM and pain  Insurance/Certification information:  PT Insurance Information: [facility] Medicare  Physician Information:  Referring Practitioner: Ivelisse Rodriguez MD  Plan of care signed (Y/N): []  Yes [x]  No    Date of Patient follow up with Physician:      Progress Report: []  Yes  [x]  No     Date Range for reporting period:  Beginnin21  PN:  Ending:     Progress report due (10 Rx/or 30 days whichever is less): visit #10 or 54 (date)     Recertification due (POC duration/ or 90 days whichever is less): visit #12 or 3/16/22 (date)     Visit # Insurance Allowable Auth required? Date Range    [place] []  Yes  [x]  No NA       Latex Allergy:  [x]NO      []YES  Preferred Language for Healthcare:   [x]English       []other:    Functional Scale:        Date assessed:  FRANCO: raw score =13 ; dysfunction = 34%    21  LEFS: raw score = 34; dysfunction = 40-60%   21    Pain level:  7/10     SUBJECTIVE:  Pt reports that she is tired today, can't seem to wake all the way up. L leg feels a little weak and painful. L knee has hurt ever since twisting it getting out of the car, feels like her bones are rubbing together.        OBJECTIVE: See eval   Observation:    Test measurements:      RESTRICTIONS/PRECAUTIONS: osteoporosis, hypertension, depression    Exercises/Interventions:     Therapeutic Exercise (22557) Resistance / level Sets / Seconds Reps Notes / Cues          Seated stepper  5'            Ballet bar marches     Lateral stepping peach  3 laps at // bar    Hip abduction peach 2 10    HS curl 0# 2 10    Mini squat (for HEP)    1/11: Attempted, unable to tolerate d/t knee pain. Hip 3 way (for HEP)  2 10           SB pelvic tilts                     Therapeutic Activities (01295)              Walking laps for endurance                              Neuromuscular Re-ed (52186)       rhomberg Firm, EC2x30\"   tandem firm 2 x 30\" B    Step taps    Step ups           Manual Intervention (30268)       Manual HSS       Manual HFS x5' man stretching in prone    STM R piriformis, glutes, lumbar paraspinals, quadratus lumborum. x5'     Hip PROM IR/ER  x8'                                          Modalities:     Pt. Education:  -pt educated on diagnosis, prognosis and expectations for rehab  -all pt questions were answered    Home Exercise Program:   1/11: Access Code: [email or web address]  URL: [email or web address]  Date: 01/11/2022  Prepared by: Chuck Conte    Exercises  Standing Heel Raise with Support - 1 x daily - 5 x weekly - 2 sets - 10 reps   HELD d/t poor tolerance d/t knee pain. Standing Knee Flexion AROM with Chair Support - 1 x daily - 5 x weekly - 2 sets - 10 reps  Small Range Straight Leg Raise - 1 x daily - 7 x weekly - 3 sets - 10 reps  Prone Hip Extension - 1 x daily - 7 x weekly - 3 sets - 10 reps  Sidelying Hip Abduction - 1 x daily - 7 x weekly - 3 sets - 10 reps        Therapeutic Exercise and NMR EXR  [] (36085) Provided verbal/tactile cueing for activities related to strengthening, flexibility, endurance, ROM  for improvements in proximal hip and core control with self care, mobility, lifting and ambulation.  [] (64656) Provided verbal/tactile cueing for activities related to improving balance, coordination, kinesthetic sense, posture, motor skill, proprioception  to assist with core control in self care, mobility, lifting, and ambulation.    [] (71709) Therapist is in constant attendance of 2 or more patients providing skilled therapy interventions, but not providing any significant amount of measurable one-on-one time to either patient, for improvements in LE, proximal hip, and core control in self care, mobility, lifting, ambulation and eccentric single leg control. Therapeutic Activities:    [] (04386 or 90558) Provided verbal/tactile cueing for activities related to improving balance, coordination, kinesthetic sense, posture, motor skill, proprioception and motor activation to allow for proper function  with self care and ADLs  [] (00725) Provided training and instruction to the patient for proper core and proximal hip recruitment and positioning with ambulation re-education     Home Exercise Program:    [x] (08801) Reviewed/Progressed HEP activities related to strengthening, flexibility, endurance, ROM of core, proximal hip and LE for functional self-care, mobility, lifting and ambulation   [] (77177) Reviewed/Progressed HEP activities related to improving balance, coordination, kinesthetic sense, posture, motor skill, proprioception of core, proximal hip and LE for self care, mobility, lifting, and ambulation      Manual Treatments:  PROM / STM / Oscillations-Mobs:  G-I, II, III, IV (PA's, Inf., Post.)  [] (46957) Provided manual therapy to mobilize proximal hip and LS spine soft tissue/joints for the purpose of modulating pain, promoting relaxation,  increasing ROM, reducing/eliminating soft tissue swelling/inflammation/restriction, improving soft tissue extensibility and allowing for proper ROM for normal function with self care, mobility, lifting and ambulation.        Charges:  Timed Code Treatment Minutes: 45   Total Treatment Minutes: 45       [] EVAL - LOW (16142)   [] EVAL - MOD (84740)  [] EVAL - HIGH (25982)  [] RE-EVAL (23830)  [x] BX(41345) x 1    [] Ionto  [x] NMR (56057) x 1   [] Vaso  [x] Manual (35138) x 1     [] Ultrasound  [] TA x 1      [] [facility] Traction (79483)  [] GaitTraining x     [] ES (un) (93093):   [] Home Management Training [] ES(attended) (21302)             [] Aquatics    [] Group  [] Other    Goals:   Patient stated goal: to gain flexibility  []? Progressing: []? Met: []? Not Met: []? Adjusted     Therapist goals for Patient:   Short Term Goals: To be achieved in: 2 weeks  1. Independent in HEP and progression per patient tolerance, in order to prevent re-injury. []? Progressing: []? Met: []? Not Met: []? Adjusted  2. Patient will have a decrease in pain to facilitate improvement in movement, function, and ADLs as indicated by Functional Deficits. []? Progressing: []? Met: []? Not Met: []? Adjusted     Long Term Goals: To be achieved in: 6 weeks  1. Disability index score of 20% or less for the FRANCO and LEFS to assist with reaching prior level of function. []? Progressing: []? Met: []? Not Met: []? Adjusted  2. Patient will demonstrate increased AROM to WNL, good LS mobility, good hip ROM to allow for proper joint functioning as indicated by patients Functional Deficits. []? Progressing: []? Met: []? Not Met: []? Adjusted  3. Patient will demonstrate an increase in Strength to 5/5 proximal hip and core activation to allow for proper functional mobility as indicated by patients Functional Deficits. []? Progressing: []? Met: []? Not Met: []? Adjusted  4. Patient will return to functional activities including walking for 400' independnetly without increased symptoms or restriction. []? Progressing: []? Met: []? Not Met: []? Adjusted  5. Pt will report pain decreased to 2/10 at worst for improved activity tolerance. []? Progressing: []? Met: []? Not Met: []? Adjusted       Overall Progression Towards Functional goals/ Treatment Progress Update:  [] Patient is progressing as expected towards functional goals listed. [] Progression is slowed due to complexities/Impairments listed.   [] Progression has been slowed due to co-morbidities. [x] Plan just implemented, too soon to assess goals progression <30days   [] Goals require adjustment due to lack of progress  [] Patient is not progressing as expected and requires additional follow up with physician  [] Other    Persisting Functional Limitations/Impairments:  [x]Sitting [x]Standing   [x]Walking [x]Squatting/bending    [x]Stairs []ADL's    [x]Transfers [x]Reaching  [x]Housework [x]Job related tasks  [x]Driving []Sports/Recreation   []Sleeping []Other:    ASSESSMENT:  Knee pain a limiting factor this date. HEP issued, mat based to accommodate knee pain. Pt reports that she will likely be having the knee assessed by a physician. Treatment/Activity Tolerance:  [] Patient able to complete tx  [x] Patient limited by fatique  [x] Patient limited by pain  [] Patient limited by other medical complications  [] Other:     Prognosis: [x] Good [] Fair  [] Poor    Patient Requires Follow-up: [x] Yes  [] No    PLAN: See eval. PT 2x / week for 6 weeks. [] Continue per plan of care [] Alter current plan (see comments)  [x] Plan of care initiated [] Hold pending MD visit [] Discharge    Electronically signed by: Antonio Bronson, PT, DPT      Note: If patient does not return for scheduled/ recommended follow up visits, this note will serve as a discharge from care along with most recent update on progress.

## 2022-01-13 ENCOUNTER — HOSPITAL ENCOUNTER (OUTPATIENT)
Dept: PHYSICAL THERAPY | Age: 82
Setting detail: THERAPIES SERIES
Discharge: HOME | End: 2022-01-13
Payer: MEDICARE

## 2022-01-13 PROCEDURE — 97110 THERAPEUTIC EXERCISES: CPT

## 2022-01-13 PROCEDURE — 97530 THERAPEUTIC ACTIVITIES: CPT

## 2022-01-13 PROCEDURE — 97112 NEUROMUSCULAR REEDUCATION: CPT

## 2022-01-13 NOTE — FLOWSHEET NOTE
[address] [facility] Physical Therapy  Phone: [phone]   Fax: [phone]    Physical Therapy Treatment Note/ Progress Report:     Date:  2022    Patient Name:  Aliya Pineda    :  1940  MRN: [id number]  Restrictions/Precautions:    Medical/Treatment Diagnosis Information:  · Diagnosis: R29.898 (ICD-10-CM) - Leg weakness, [id number] (ICD-10-CM) - Acute midline low back pain without sciatica  · Treatment Diagnosis: significantly decreased HF flexibility B, poor strength ernesto in post chain, poor lumbar ROM and pain  Insurance/Certification information:  PT Insurance Information: [facility] Medicare  Physician Information:  Referring Practitioner: Alfred Gandhi MD  Plan of care signed (Y/N): []  Yes [x]  No    Date of Patient follow up with Physician:      Progress Report: []  Yes  [x]  No     Date Range for reporting period:  Beginnin21  PN:  Ending:     Progress report due (10 Rx/or 30 days whichever is less): visit #10 or 3/70/35 (date)     Recertification due (POC duration/ or 90 days whichever is less): visit #12 or 3/16/22 (date)     Visit # Insurance Allowable Auth required? Date [place]    [place] []  Yes  [x]  No NA       Latex Allergy:  [x]NO      []YES  Preferred Language for Healthcare:   [x]English       []other:    Functional Scale:        Date assessed:  FRANCO: raw score =13 ; dysfunction = 34%    21  LEFS: raw score = 34; dysfunction = 40-60%   21    Pain level:  7/10     SUBJECTIVE:  Pt reports that knee continues to bother her, hasn't seen the doctor about it yet, feels like she sees so many doctors, waiting for her schedule to slow down a bit.         OBJECTIVE: See eval   Observation:    Test measurements:      RESTRICTIONS/PRECAUTIONS: osteoporosis, hypertension, depression    Exercises/Interventions:     Therapeutic Exercise (32116) Resistance / level Sets / Seconds Reps Notes / Cues          Seated stepper  5'            [address] [facility] bar marches     Lateral stepping    Hip abduction    HS curl    Mini squat (for HEP)    1/11: Attempted, unable to tolerate d/t knee pain. Hip 3 way (for HEP)           SB pelvic tilts       Standing hip 3 way peach 2 10           Therapeutic Activities (53978)              Walking laps for endurance   267' in 1.32\"  400' in 2' 30\"                             Neuromuscular Re-ed (43357)       rhomberg Foam head turns horFoam, EC  X30\"x30\"   tandem firm 2 x 30\" B    Step taps    Step ups           Incline  decline Foam  Foam  30\"  30\"  CGA  CGA   Biodex  -limits of stability    x1                   Manual Intervention (93957)       Manual HSS       Manual HFS     STM R piriformis, glutes, lumbar paraspinals, quadratus lumborum. Hip PROM IR/ER                                           Modalities:     Pt. Education:  -pt educated on diagnosis, prognosis and expectations for rehab  -all pt questions were answered    Home Exercise Program:   1/11: Access Code: [email or web address]  URL: [email or web address]  Date: 01/11/2022  Prepared by: Chau Mast    Exercises  Standing Heel Raise with Support - 1 x daily - 5 x weekly - 2 sets - 10 reps   HELD d/t poor tolerance d/t knee pain.    Standing Knee Flexion AROM with Chair Support - 1 x daily - 5 x weekly - 2 sets - 10 reps  Small Range Straight Leg Raise - 1 x daily - 7 x weekly - 3 sets - 10 reps  Prone Hip Extension - 1 x daily - 7 x weekly - 3 sets - 10 reps  Sidelying Hip Abduction - 1 x daily - 7 x weekly - 3 sets - 10 reps        Therapeutic Exercise and NMR EXR  [] (56309) Provided verbal/tactile cueing for activities related to strengthening, flexibility, endurance, ROM  for improvements in proximal hip and core control with self care, mobility, lifting and ambulation.  [] (91392) Provided verbal/tactile cueing for activities related to improving balance, coordination, kinesthetic sense, posture, motor skill, proprioception  to assist with core control in self care, mobility, lifting, and ambulation.   [] (78273) Therapist is in constant attendance of 2 or more patients providing skilled therapy interventions, but not providing any significant amount of measurable one-on-one time to either patient, for improvements in LE, proximal hip, and core control in self care, mobility, lifting, ambulation and eccentric single leg control. Therapeutic Activities:    [] (51649 or 84666) Provided verbal/tactile cueing for activities related to improving balance, coordination, kinesthetic sense, posture, motor skill, proprioception and motor activation to allow for proper function  with self care and ADLs  [] (38944) Provided training and instruction to the patient for proper core and proximal hip recruitment and positioning with ambulation re-education     Home Exercise Program:    [x] (93204) Reviewed/Progressed HEP activities related to strengthening, flexibility, endurance, ROM of core, proximal hip and LE for functional self-care, mobility, lifting and ambulation   [] (02126) Reviewed/Progressed HEP activities related to improving balance, coordination, kinesthetic sense, posture, motor skill, proprioception of core, proximal hip and LE for self care, mobility, lifting, and ambulation      Manual Treatments:  PROM / STM / Oscillations-Mobs:  G-I, II, III, IV (PA's, Inf., Post.)  [] (04185) Provided manual therapy to mobilize proximal hip and LS spine soft tissue/joints for the purpose of modulating pain, promoting relaxation,  increasing ROM, reducing/eliminating soft tissue swelling/inflammation/restriction, improving soft tissue extensibility and allowing for proper ROM for normal function with self care, mobility, lifting and ambulation.        Charges:  Timed Code Treatment Minutes: 45   Total Treatment Minutes: 45       [] EVAL - LOW (54698)   [] EVAL - MOD (45949)  [] EVAL - HIGH (13964)  [] RE-EVAL (13366)  [x] FL(83432) x 1    [] Ionto  [x] NMR (93844) x 1   [] Vaso  [] Manual (09301) x 1     [] Ultrasound  [x] TA x 1      [] Mech Traction (86647)  [] GaitTraining x     [] ES (un) (98874):   [] Home Management Training [] ES(attended) (45458)             [] Aquatics    [] Group  [] Other    Goals:   Patient stated goal: to gain flexibility  []? Progressing: []? Met: []? Not Met: []? Adjusted     Therapist goals for Patient:   Short Term Goals: To be achieved in: 2 weeks  1. Independent in HEP and progression per patient tolerance, in order to prevent re-injury. []? Progressing: []? Met: []? Not Met: []? Adjusted  2. Patient will have a decrease in pain to facilitate improvement in movement, function, and ADLs as indicated by Functional Deficits. []? Progressing: []? Met: []? Not Met: []? Adjusted     Long Term Goals: To be achieved in: 6 weeks  1. Disability index score of 20% or less for the FRANCO and LEFS to assist with reaching prior level of function. []? Progressing: []? Met: []? Not Met: []? Adjusted  2. Patient will demonstrate increased AROM to WNL, good LS mobility, good hip ROM to allow for proper joint functioning as indicated by patients Functional Deficits. []? Progressing: []? Met: []? Not Met: []? Adjusted  3. Patient will demonstrate an increase in Strength to 5/5 proximal hip and core activation to allow for proper functional mobility as indicated by patients Functional Deficits. []? Progressing: []? Met: []? Not Met: []? Adjusted  4. Patient will return to functional activities including walking for 400' independnetly without increased symptoms or restriction. []? Progressing: []? Met: []? Not Met: []? Adjusted  5. Pt will report pain decreased to 2/10 at worst for improved activity tolerance. []? Progressing: []? Met: []? Not Met: []? Adjusted       Overall Progression Towards Functional goals/ Treatment Progress Update:  [] Patient is progressing as expected towards functional goals listed.     [] Progression is slowed due to

## 2022-01-18 ENCOUNTER — HOSPITAL ENCOUNTER (OUTPATIENT)
Dept: PHYSICAL THERAPY | Age: 82
Setting detail: THERAPIES SERIES
Discharge: HOME | End: 2022-01-18
Payer: MEDICARE

## 2022-01-18 DIAGNOSIS — I10 ESSENTIAL HYPERTENSION: ICD-10-CM

## 2022-01-18 PROCEDURE — 97112 NEUROMUSCULAR REEDUCATION: CPT

## 2022-01-18 PROCEDURE — 97530 THERAPEUTIC ACTIVITIES: CPT

## 2022-01-18 PROCEDURE — 97110 THERAPEUTIC EXERCISES: CPT

## 2022-01-18 RX ORDER — LOSARTAN POTASSIUM 50 MG/1
TABLET ORAL
Qty: 180 TABLET | Refills: 3 | Status: ON HOLD | OUTPATIENT
Start: 2022-01-18 | End: 2022-05-18 | Stop reason: SDUPTHER

## 2022-01-18 NOTE — TELEPHONE ENCOUNTER
Medication:   Requested Prescriptions     Pending Prescriptions Disp Refills    losartan (COZAAR) 50 MG tablet [Pharmacy Med Name: LOSARTAN POTASSIUM 50 MG TAB] 180 tablet 3     Sig: TAKE 1 TABLET BY MOUTH TWICE A DAY WITH MEALS       Last Filled:  01/04/2021 #180 3rf     Patient Phone Number: [phone] (home)     Last appt: 12/8/2021   Next appt: Visit date not found    Lab Results   Component Value Date     07/27/2021    K 4.2 07/27/2021     07/27/2021    CO2 25 07/27/2021    BUN 23 (H) 07/27/2021    CREATININE 0.8 07/27/2021    GLUCOSE 78 10/06/2021    CALCIUM 9.1 07/27/2021    PROT 6.8 07/27/2021    LABALBU 3.7 07/27/2021    BILITOT 0.5 07/27/2021    ALKPHOS 85 07/27/2021    AST 16 07/27/2021    ALT 8 (L) 07/27/2021    LABGLOM >60 07/27/2021    GFRAA >60 07/27/2021    AGRATIO 1.2 07/27/2021    GLOB 3.1 07/27/2021

## 2022-01-18 NOTE — FLOWSHEET NOTE
[address] [facility]  Phone: [phone]   Fax: [phone]    Physical Therapy Treatment Note/ Progress Report:     Date:  2022    Patient Name:  Doug Faith    :  1940  MRN: [id number]  Restrictions/Precautions:    Medical/Treatment Diagnosis Information:  · Diagnosis: R29.898 (ICD-10-CM) - Leg weakness, [id number] (ICD-10-CM) - Acute midline low back pain without sciatica  · Treatment Diagnosis: significantly decreased HF flexibility B, poor strength ernesto in post chain, poor lumbar ROM and pain  Insurance/Certification information:  PT Insurance Information: [facility] [employer] [facility]  Physician Information:  Referring Practitioner: Adriana Castano MD  Plan of care signed (Y/N): []  Yes [x]  No    Date of Patient follow up with Physician:      Progress Report: []  Yes  [x]  No     Date Range for reporting period:  Beginnin21  PN:  Ending:     Progress report due (10 Rx/or 30 days whichever is less): visit #10 or 7/15/42 (date)     Recertification due (POC duration/ or 90 days whichever is less): visit #12 or 3/16/22 (date)     Visit # Insurance Allowable Auth required? Date Range    [place] []  Yes  [x]  No NA       Latex Allergy:  [x]NO      []YES  Preferred Language for Healthcare:   [x]English       []other:    Functional Scale:        Date assessed:  FRANCO: raw score =13 ; dysfunction = 34%    21  LEFS: raw score = 34; dysfunction = 40-60%   21    Pain level:  3/10     SUBJECTIVE:  Pt reports that her knee is not bothering her nearly as much, does have some pain on her toe as her toes overlap.          OBJECTIVE: See eval   Observation:     Test measurements:      RESTRICTIONS/PRECAUTIONS: osteoporosis, hypertension, depression    Exercises/Interventions:     Therapeutic Exercise (85503) Resistance / level Sets / Seconds Reps Notes / Cues          Seated stepper       Walking for warm up  400'     IB  2 x 30\" 2 x 10 HR    Ballet bar marches     Lateral stepping    Hip abduction    HS curl    Mini squat (for HEP)    1/11: Attempted, unable to tolerate d/t knee pain. Hip 3 way (for HEP)           SB pelvic tilts       Standing hip 3 way peach 2 10           LAQ 4# 2 10                         Therapeutic Activities (52192)              Walking laps for endurance     400' in 2' 30\"  133' in 53.71\"        Marching walking  133'     Lateral stepping  2 x 45'            Neuromuscular Re-ed (47392)       rhomberg Foam head turns horFoam, EC  X30\"x30\"   tandem Firm  Foam 2 x 30\" B  2 x 30\" B    Step taps    Step ups           Incline  decline CGA  CGA   Biodex  -limits of stability                  Manual Intervention (74747)       Manual HSS       Manual HFS     STM R piriformis, glutes, lumbar paraspinals, quadratus lumborum. Hip PROM IR/ER                                           Modalities:     Pt. Education:  -pt educated on diagnosis, prognosis and expectations for rehab  -all pt questions were answered    Home Exercise Program:   1/11: Access Code: [email or web address]  URL: [email or web address]  Date: 01/11/2022  Prepared by: Jose Martin Ahr    Exercises  Standing Heel Raise with Support - 1 x daily - 5 x weekly - 2 sets - 10 reps   HELD d/t poor tolerance d/t knee pain.    Standing Knee Flexion AROM with Chair Support - 1 x daily - 5 x weekly - 2 sets - 10 reps  Small Range Straight Leg Raise - 1 x daily - 7 x weekly - 3 sets - 10 reps  Prone Hip Extension - 1 x daily - 7 x weekly - 3 sets - 10 reps  Sidelying Hip Abduction - 1 x daily - 7 x weekly - 3 sets - 10 reps        Therapeutic Exercise and NMR EXR  [] (45080) Provided verbal/tactile cueing for activities related to strengthening, flexibility, endurance, ROM  for improvements in proximal hip and core control with self care, mobility, lifting and ambulation.  [] (33068) Provided verbal/tactile cueing for activities related to improving balance, coordination, kinesthetic sense, posture, motor skill, proprioception  to assist with core control in self care, mobility, lifting, and ambulation.   [] (15763) Therapist is in constant attendance of 2 or more patients providing skilled therapy interventions, but not providing any significant amount of measurable one-on-one time to either patient, for improvements in LE, proximal hip, and core control in self care, mobility, lifting, ambulation and eccentric single leg control. Therapeutic Activities:    [] (55153 or 32266) Provided verbal/tactile cueing for activities related to improving balance, coordination, kinesthetic sense, posture, motor skill, proprioception and motor activation to allow for proper function  with self care and ADLs  [] (56804) Provided training and instruction to the patient for proper core and proximal hip recruitment and positioning with ambulation re-education     Home Exercise Program:    [x] (55338) Reviewed/Progressed HEP activities related to strengthening, flexibility, endurance, ROM of core, proximal hip and LE for functional self-care, mobility, lifting and ambulation   [] (84913) Reviewed/Progressed HEP activities related to improving balance, coordination, kinesthetic sense, posture, motor skill, proprioception of core, proximal hip and LE for self care, mobility, lifting, and ambulation      Manual Treatments:  PROM / STM / Oscillations-Mobs:  G-I, II, III, IV (PA's, Inf., Post.)  [] (23223) Provided manual therapy to mobilize proximal hip and LS spine soft tissue/joints for the purpose of modulating pain, promoting relaxation,  increasing ROM, reducing/eliminating soft tissue swelling/inflammation/restriction, improving soft tissue extensibility and allowing for proper ROM for normal function with self care, mobility, lifting and ambulation.        Charges:  Timed Code Treatment Minutes: 45   Total Treatment Minutes: 45       [] EVAL - LOW (15048)   [] EVAL - MOD (03615)  [] EVAL - HIGH (72429)  [] RE-EVAL (63846)  [x] NF(93781) x 1    [] Ionto  [x] NMR (93440) x 1   [] Vaso  [] Manual (25426) x 1     [] Ultrasound  [x] TA x 1      [] Mech Traction (04294)  [] GaitTraining x     [] ES (un) (32795):   [] Home Management Training [] ES(attended) (40405)             [] Aquatics    [] Group  [] Other    Goals:   Patient stated goal: to gain flexibility  []? Progressing: []? Met: []? Not Met: []? Adjusted     Therapist goals for Patient:   Short Term Goals: To be achieved in: 2 weeks  1. Independent in HEP and progression per patient tolerance, in order to prevent re-injury. []? Progressing: []? Met: []? Not Met: []? Adjusted  2. Patient will have a decrease in pain to facilitate improvement in movement, function, and ADLs as indicated by Functional Deficits. []? Progressing: []? Met: []? Not Met: []? Adjusted     Long Term Goals: To be achieved in: 6 weeks  1. Disability index score of 20% or less for the FRANCO and LEFS to assist with reaching prior level of function. []? Progressing: []? Met: []? Not Met: []? Adjusted  2. Patient will demonstrate increased AROM to WNL, good LS mobility, good hip ROM to allow for proper joint functioning as indicated by patients Functional Deficits. []? Progressing: []? Met: []? Not Met: []? Adjusted  3. Patient will demonstrate an increase in Strength to 5/5 proximal hip and core activation to allow for proper functional mobility as indicated by patients Functional Deficits. []? Progressing: []? Met: []? Not Met: []? Adjusted  4. Patient will return to functional activities including walking for 400' independnetly without increased symptoms or restriction. []? Progressing: []? Met: []? Not Met: []? Adjusted  5. Pt will report pain decreased to 2/10 at worst for improved activity tolerance. []? Progressing: []? Met: []? Not Met: []?  Adjusted       Overall Progression Towards Functional goals/ Treatment Progress Update:  [] Patient is progressing as expected towards functional goals listed. [] Progression is slowed due to complexities/Impairments listed. [] Progression has been slowed due to co-morbidities. [x] Plan just implemented, too soon to assess goals progression <30days   [] Goals require adjustment due to lack of progress  [] Patient is not progressing as expected and requires additional follow up with physician  [] Other    Persisting Functional Limitations/Impairments:  [x]Sitting [x]Standing   [x]Walking [x]Squatting/bending    [x]Stairs []ADL's    [x]Transfers [x]Reaching  [x]Housework [x]Job related tasks  [x]Driving []Sports/Recreation   []Sleeping []Other:    ASSESSMENT:  Knee pain less limiting this date. Pt with improved endurance, only 2 rest breaks required during session. Pt reports sufficient difficutly during session however reports that \"this is the most difficult therapy I've ever had\". Encouragement provided as pt's perception of improvement with therapy is likely skewed as pt currently in a depressive episode. Treatment/Activity Tolerance:  [] Patient able to complete tx  [x] Patient limited by fatique  [x] Patient limited by pain  [] Patient limited by other medical complications  [] Other:     Prognosis: [x] Good [] Fair  [] Poor    Patient Requires Follow-up: [x] Yes  [] No    PLAN: See eval. PT 2x / week for 6 weeks. [] Continue per plan of care [] Alter current plan (see comments)  [x] Plan of care initiated [] Hold pending MD visit [] Discharge    Electronically signed by: Antonio Bronson, PT, DPT      Note: If patient does not return for scheduled/ recommended follow up visits, this note will serve as a discharge from care along with most recent update on progress.

## 2022-01-20 ENCOUNTER — HOSPITAL ENCOUNTER (OUTPATIENT)
Dept: PHYSICAL THERAPY | Age: 82
Setting detail: THERAPIES SERIES
Discharge: HOME | End: 2022-01-20
Payer: MEDICARE

## 2022-01-20 PROCEDURE — 97110 THERAPEUTIC EXERCISES: CPT

## 2022-01-20 PROCEDURE — 97112 NEUROMUSCULAR REEDUCATION: CPT

## 2022-01-20 NOTE — FLOWSHEET NOTE
[address] [facility]  Phone: [phone]   Fax: [phone]    Physical Therapy Treatment Note/ Progress Report:     Date:  2022    Patient Name:  Jimena Maier    :  1940  MRN: [id number]  Restrictions/Precautions:    Medical/Treatment Diagnosis Information:  · Diagnosis: R29.898 (ICD-10-CM) - Leg weakness, [id number] (ICD-10-CM) - Acute midline low back pain without sciatica  · Treatment Diagnosis: significantly decreased HF flexibility B, poor strength ernesto in post chain, poor lumbar ROM and pain  Insurance/Certification information:  PT Insurance Information: [employer]  Physician Information:  Referring Practitioner: Farshad Langford MD  Plan of care signed (Y/N): []  Yes [x]  No    Date of Patient follow up with Physician:      Progress Report: []  Yes  [x]  No      Date Range for reporting period:  Beginnin21  PN:  Ending:     Progress report due (10 Rx/or 30 days whichever is less): visit #10 or  (date)     Recertification due (POC duration/ or 90 days whichever is less): visit #12 or 3/16/22 (date)     Visit # Insurance Allowable Auth required? Date Range    [place] []  Yes  [x]  No NA       Latex Allergy:  [x]NO      []YES  Preferred Language for Healthcare:   [x]English       []other:    Functional Scale:        Date assessed:  FRANCO: raw score =13 ; dysfunction = 34%    21  LEFS: raw score = 34; dysfunction = 40-60%   21    Pain level:  0/10     SUBJECTIVE:  Pt reports that her toe is a little tender from going to the foot doctor, feeling tired today but the pain isn't too bad. OBJECTIVE: See eval   Observation:     Test measurements:     : BP taken after 400' ambulation 143/92.       RESTRICTIONS/PRECAUTIONS: osteoporosis, hypertension, depression    Exercises/Interventions:     Therapeutic Exercise (00851) Resistance / level Sets / Seconds Reps Notes / Cues          Seated stepper Walking for warm up  400'     IB     Ballet bar marches     Lateral stepping    Hip abduction    HS curl    Mini squat (for HEP)    1/11: Attempted, unable to tolerate d/t knee pain. Hip 3 way (for HEP)           SB pelvic tilts       Standing hip 3 way  2 10           LAQ  2 10    Bent over hip ext  2 10    HS curl in sitting orange 2 15 B Limited range   Hip flexion in sitting with forward lean  2 10 B    Heel raise with ball between knees (in sitting)  2 10                                Therapeutic Activities (71358)              Walking laps for endurance     267' in 1.28\"        Marching walking      Lateral stepping             Neuromuscular Re-ed (70910)       rhomberg Foam head turns horFoam, EC  X30\"x30\"   tandem   Foam   2 x 30\" B    Step taps    Step ups           Incline  decline CGA  CGA   Biodex  -limits of stability                  Manual Intervention (35892)       Manual HSS       Manual HFS     STM R piriformis, glutes, lumbar paraspinals, quadratus lumborum. Hip PROM IR/ER                                           Modalities:     Pt. Education:  -pt educated on diagnosis, prognosis and expectations for rehab  -all pt questions were answered    Home Exercise Program:   1/11: Access Code: [email or web address]  URL: [email or web address]  Date: 01/11/2022  Prepared by: Pako Given    Exercises  Standing Heel Raise with Support - 1 x daily - 5 x weekly - 2 sets - 10 reps   HELD d/t poor tolerance d/t knee pain.    Standing Knee Flexion AROM with Chair Support - 1 x daily - 5 x weekly - 2 sets - 10 reps  Small Range Straight Leg Raise - 1 x daily - 7 x weekly - 3 sets - 10 reps  Prone Hip Extension - 1 x daily - 7 x weekly - 3 sets - 10 reps  Sidelying Hip Abduction - 1 x daily - 7 x weekly - 3 sets - 10 reps         Therapeutic Exercise and NMR EXR  [] (20400) Provided verbal/tactile cueing for activities related to strengthening, flexibility, endurance, ROM  for improvements in proximal hip and core control with self care, mobility, lifting and ambulation.  [] (39530) Provided verbal/tactile cueing for activities related to improving balance, coordination, kinesthetic sense, posture, motor skill, proprioception  to assist with core control in self care, mobility, lifting, and ambulation.   [] (07871) Therapist is in constant attendance of 2 or more patients providing skilled therapy interventions, but not providing any significant amount of measurable one-on-one time to either patient, for improvements in LE, proximal hip, and core control in self care, mobility, lifting, ambulation and eccentric single leg control.      Therapeutic Activities:    [] (87959 or 67243) Provided verbal/tactile cueing for activities related to improving balance, coordination, kinesthetic sense, posture, motor skill, proprioception and motor activation to allow for proper function  with self care and ADLs  [] (01777) Provided training and instruction to the patient for proper core and proximal hip recruitment and positioning with ambulation re-education     Home Exercise Program:    [x] (06428) Reviewed/Progressed HEP activities related to strengthening, flexibility, endurance, ROM of core, proximal hip and LE for functional self-care, mobility, lifting and ambulation   [] (81181) Reviewed/Progressed HEP activities related to improving balance, coordination, kinesthetic sense, posture, motor skill, proprioception of core, proximal hip and LE for self care, mobility, lifting, and ambulation      Manual Treatments:  PROM / STM / Oscillations-Mobs:  G-I, II, III, IV (PA's, Inf., Post.)  [] (86370) Provided manual therapy to mobilize proximal hip and LS spine soft tissue/joints for the purpose of modulating pain, promoting relaxation,  increasing ROM, reducing/eliminating soft tissue swelling/inflammation/restriction, improving soft tissue extensibility and allowing for proper ROM for normal function with self care, mobility, lifting and ambulation. Charges:  Timed Code Treatment Minutes: 45   Total Treatment Minutes: 45       [] EVAL - LOW (13127)   [] EVAL - MOD (07029)  [] EVAL - HIGH (01411)  [] RE-EVAL (33025)  [x] AJ(85562) x 2   [] Ionto  [x] NMR (72279) x 1   [] Vaso  [] Manual (09630) x 1     [] Ultrasound  [] TA x 1      [] Mech Traction (79597)  [] GaitTraining x     [] ES (un) (25069):   [] Home Management Training [] ES(attended) (79279)             [] Aquatics    [] Group  [] Other    Goals:   Patient stated goal: to gain flexibility  []? Progressing: []? Met: []? Not Met: []? Adjusted     Therapist goals for Patient:   Short Term Goals: To be achieved in: 2 weeks  1. Independent in HEP and progression per patient tolerance, in order to prevent re-injury. []? Progressing: []? Met: []? Not Met: []? Adjusted  2. Patient will have a decrease in pain to facilitate improvement in movement, function, and ADLs as indicated by Functional Deficits. []? Progressing: []? Met: []? Not Met: []? Adjusted     Long Term Goals: To be achieved in: 6 weeks  1. Disability index score of 20% or less for the FRANCO and LEFS to assist with reaching prior level of function. []? Progressing: []? Met: []? Not Met: []? Adjusted  2. Patient will demonstrate increased AROM to WNL, good LS mobility, good hip ROM to allow for proper joint functioning as indicated by patients Functional Deficits. []? Progressing: []? Met: []? Not Met: []? Adjusted  3. Patient will demonstrate an increase in Strength to 5/5 proximal hip and core activation to allow for proper functional mobility as indicated by patients Functional Deficits. []? Progressing: []? Met: []? Not Met: []? Adjusted  4. Patient will return to functional activities including walking for 400' independnetly without increased symptoms or restriction. []? Progressing: []? Met: []? Not Met: []? Adjusted  5. Pt will report pain decreased to 2/10 at worst for improved activity tolerance. []? Progressing: []? Met: []? Not Met: []? Adjusted       Overall Progression Towards Functional goals/ Treatment Progress Update:  [] Patient is progressing as expected towards functional goals listed. [] Progression is slowed due to complexities/Impairments listed. [] Progression has been slowed due to co-morbidities. [x] Plan just implemented, too soon to assess goals progression <30days   [] Goals require adjustment due to lack of progress  [] Patient is not progressing as expected and requires additional follow up with physician  [] Other    Persisting Functional Limitations/Impairments:  [x]Sitting [x]Standing   [x]Walking [x]Squatting/bending    [x]Stairs []ADL's    [x]Transfers [x]Reaching  [x]Housework [x]Job related tasks  [x]Driving []Sports/Recreation   []Sleeping []Other:    ASSESSMENT:  Progressed strengthening this date. Pt reports increased fatigue entering therapy however did not significantly limit session. In the beginning of the session attempted prolonged walking however after 400' pt demo fatigue and mild pallor, BP taken and was normal at 143/92. Pt tolerated the remainder of the session without complication. Treatment/Activity Tolerance:  [] Patient able to complete tx  [x] Patient limited by fatique  [x] Patient limited by pain  [] Patient limited by other medical complications  [] Other:     Prognosis: [x] Good [] Fair  [] Poor    Patient Requires Follow-up: [x] Yes  [] No    PLAN: See eval. PT 2x / week for 6 weeks. [] Continue per plan of care [] Alter current plan (see comments)  [x] Plan of care initiated [] Hold pending MD visit [] Discharge    Electronically signed by: Bay Jain, PT, DPT      Note: If patient does not return for scheduled/ recommended follow up visits, this note will serve as a discharge from care along with most recent update on progress.

## 2022-01-24 ENCOUNTER — TELEPHONE (OUTPATIENT)
Dept: FAMILY MEDICINE CLINIC | Age: 82
End: 2022-01-24

## 2022-01-24 DIAGNOSIS — M25.562 CHRONIC PAIN OF BOTH KNEES: ICD-10-CM

## 2022-01-24 DIAGNOSIS — M17.10 ARTHRITIS OF KNEE: Primary | ICD-10-CM

## 2022-01-24 DIAGNOSIS — M25.561 CHRONIC PAIN OF BOTH KNEES: ICD-10-CM

## 2022-01-24 DIAGNOSIS — M25.562 CHRONIC PAIN OF LEFT KNEE: ICD-10-CM

## 2022-01-24 DIAGNOSIS — G89.29 CHRONIC PAIN OF LEFT KNEE: ICD-10-CM

## 2022-01-24 DIAGNOSIS — G89.29 CHRONIC PAIN OF BOTH KNEES: ICD-10-CM

## 2022-01-24 NOTE — TELEPHONE ENCOUNTER
Patient called in and would like a referral to an Ortho for  left knee pain.   Referral pending please look over and sign

## 2022-01-25 ENCOUNTER — HOSPITAL ENCOUNTER (OUTPATIENT)
Dept: PHYSICAL THERAPY | Age: 82
Setting detail: THERAPIES SERIES
End: 2022-01-25
Payer: MEDICARE

## 2022-01-27 ENCOUNTER — APPOINTMENT (OUTPATIENT)
Dept: PHYSICAL THERAPY | Age: 82
End: 2022-01-27
Payer: MEDICARE

## 2022-02-01 ENCOUNTER — HOSPITAL ENCOUNTER (OUTPATIENT)
Dept: PHYSICAL THERAPY | Age: 82
Setting detail: THERAPIES SERIES
Discharge: HOME | End: 2022-02-01

## 2022-02-01 NOTE — FLOWSHEET NOTE
Physical Therapy  Cancellation/No-show Note  Patient Name:  Chaan Mcghee  :  1940   Date:  2022  Cancelled visits to date: 1  No-shows to date: 0    Patient status for today's appointment patient:  [x]  Cancelled   []  Rescheduled appointment  []  No-show     Reason given by patient:  []  Patient ill  []  Conflicting appointment  []  No transportation    []  Conflict with work  []  No reason given  [x]  Other: can't walk today - wants to see MD prior to returning     Comments:      Phone call information:   []  Phone call made today to patient at _ time at number provided:      []  Patient answered, conversation as follows:    []  Patient did not answer, message left as follows:  []  Phone call not made today  [x]  Phone call not needed - pt contacted us to cancel and provided reason for cancellation.      Electronically signed by:  Yobani Randall, PT , DPT

## 2022-02-08 ENCOUNTER — OFFICE VISIT (OUTPATIENT)
Dept: ORTHOPEDIC SURGERY | Age: 82
End: 2022-02-08
Payer: MEDICARE

## 2022-02-08 VITALS — BODY MASS INDEX: 28.25 KG/M2 | HEIGHT: 61 IN | WEIGHT: 149.6 LBS

## 2022-02-08 DIAGNOSIS — M25.562 LEFT KNEE PAIN, UNSPECIFIED CHRONICITY: ICD-10-CM

## 2022-02-08 DIAGNOSIS — M17.12 PRIMARY OSTEOARTHRITIS OF LEFT KNEE: Primary | ICD-10-CM

## 2022-02-08 PROCEDURE — 99203 OFFICE O/P NEW LOW 30 MIN: CPT | Performed by: ORTHOPAEDIC SURGERY

## 2022-02-08 PROCEDURE — 20610 DRAIN/INJ JOINT/BURSA W/O US: CPT | Performed by: ORTHOPAEDIC SURGERY

## 2022-02-08 RX ORDER — LIDOCAINE HYDROCHLORIDE 10 MG/ML
4 INJECTION, SOLUTION INFILTRATION; PERINEURAL ONCE
Status: COMPLETED | OUTPATIENT
Start: 2022-02-08 | End: 2022-02-08

## 2022-02-08 RX ORDER — TRIAMCINOLONE ACETONIDE 40 MG/ML
40 INJECTION, SUSPENSION INTRA-ARTICULAR; INTRAMUSCULAR ONCE
Status: COMPLETED | OUTPATIENT
Start: 2022-02-08 | End: 2022-02-08

## 2022-02-08 RX ORDER — BUPIVACAINE HYDROCHLORIDE 2.5 MG/ML
4 INJECTION, SOLUTION INFILTRATION; PERINEURAL ONCE
Status: COMPLETED | OUTPATIENT
Start: 2022-02-08 | End: 2022-02-08

## 2022-02-08 RX ADMIN — BUPIVACAINE HYDROCHLORIDE 10 MG: 2.5 INJECTION, SOLUTION INFILTRATION; PERINEURAL at 09:39

## 2022-02-08 RX ADMIN — LIDOCAINE HYDROCHLORIDE 4 ML: 10 INJECTION, SOLUTION INFILTRATION; PERINEURAL at 09:40

## 2022-02-08 RX ADMIN — TRIAMCINOLONE ACETONIDE 40 MG: 40 INJECTION, SUSPENSION INTRA-ARTICULAR; INTRAMUSCULAR at 09:40

## 2022-02-08 NOTE — PROGRESS NOTES
CHIEF COMPLAINT: Left knee pain / weakness    History:   Ana Rosa Bynum is a 78 y.o. female referred by Rox Jarrett MD for evaluation and treatment of left knee pain / injury. The patient complains of left knee pain. This is evaluated as a personal injury. The symptoms began 2 weeks ago. She rates pain 6/10. There was not a history of injury. She states she noted weakness in her left leg. She was sent to physical therapy by Dr. Tae Talbot. She also saw a neurologist.  CT scan of her lumbar spine was ordered. The pain is located medial, lateral. The knee has not given out or felt unstable. The patient cannot bend and straighten the knee fully. There is no swelling in the knee. There was not catching / locking of the knee. The patient has not had an injection recently. The patient has not taken NSAIDs. The patient has tried ice. .    Outside reports reviewed: none.     Past Medical History:   Diagnosis Date    AR (allergic rhinitis)     Arthritis of knee     Pruis    Depression     Erosive gastritis 10/28/2019    EGD October 2019, he did with PPI    GERD (gastroesophageal reflux disease)     Hyperlipidemia     Internal hemorrhoids     Overweight(278.02)     Viral meningitis 5/12       Past Surgical History:   Procedure Laterality Date    BREAST BIOPSY      CHOLECYSTECTOMY, LAPAROSCOPIC  2003    COLONOSCOPY  8/06    normal; [facility]    COLONOSCOPY  12/3/13    Ortega- polyps    HYSTERECTOMY      AKASH AND BSO  2003    UPPER GASTROINTESTINAL ENDOSCOPY  12/3/13    [facility] Given; antritis       Family History   Problem Relation Age of Onset    Breast Cancer Mother     Bipolar Disorder Brother        Social History     Socioeconomic History    Marital status:      Spouse name: Meng Bowie Number of children: 4    Years of education: None    Highest education level: None   Occupational History    None   Tobacco Use    Smoking status: Never Smoker    Smokeless tobacco: Never Used   Vaping Use    Vaping Use: Never used   Substance and Sexual Activity    Alcohol use: Yes     Comment: rarely    Drug use: No    Sexual activity: None   Other Topics Concern    None   Social History Narrative    None     Social Determinants of Health     Financial Resource Strain: Low Risk     Difficulty of Paying Living Expenses: Not hard at all   Food Insecurity: No Food Insecurity    Worried About Running Out of Food in the Last Year: Never true    Shoaib of Food in the Last Year: Never true   Transportation Needs: No Transportation Needs    Lack of Transportation (Medical): No    Lack of Transportation (Non-Medical):  No   Physical Activity:     Days of Exercise per Week: Not on file    Minutes of Exercise per Session: Not on file   Stress:     Feeling of Stress : Not on file   Social Connections:     Frequency of Communication with Friends and Family: Not on file    Frequency of Social Gatherings with Friends and Family: Not on file    Attends Religion Services: Not on file    Active Member of [address] [place] [address] or Organizations: Not on file    Attends Club or Organization Meetings: Not on file    Marital Status: Not on file   Intimate Partner Violence:     Fear of Current or Ex-Partner: Not on file    Emotionally Abused: Not on file    Physically Abused: Not on file    Sexually Abused: Not on file   Housing Stability:     Unable to Pay for Housing in the Last Year: Not on file    Number of [place] in the Last Year: Not on file    Unstable Housing in the Last Year: Not on file       Current Outpatient Medications   Medication Sig Dispense Refill    losartan (COZAAR) 50 MG tablet TAKE 1 TABLET BY MOUTH TWICE A DAY WITH MEALS 180 tablet 3    sulfamethoxazole-trimethoprim (BACTRIM;SEPTRA) 400-80 MG per tablet       naproxen (NAPROSYN) 375 MG tablet Take 1 tablet by mouth 2 times daily (with meals) 30 tablet 0    buPROPion (WELLBUTRIN XL) 150 MG extended release tablet TAKE 1 TABLET (150 MG TOTAL) BY MOUTH DAILY. INDICATIONS  DEPRESSION      vilazodone HCl (VIIBRYD) 10 MG TABS Take 20 mg by mouth daily       mirtazapine (REMERON) 15 MG tablet 7.5 mg       atorvastatin (LIPITOR) 10 MG tablet TAKE 1 TABLET BY MOUTH EVERY OTHER DAY **CHANGE IN DOSE** 45 tablet 3    B Complex-C (VITAMIN B + C COMPLEX PO) Take by mouth      Lift Chair MISC by Does not apply route 1 each 0    estradiol (ESTRACE) 0.1 MG/GM vaginal cream Place 2 g vaginally Twice a Week       aspirin 81 MG EC tablet Take 81 mg by mouth daily.  VITAMIN D PO Take  by mouth. No current facility-administered medications for this visit. No Known Allergies    Review of Systems:  I have reviewed the clinically relevant past medical history, medications, allergies, family history, social history, and 13 point Review of Systems from the patient's recent history form & documented any details relevant to today's presenting complaints in the history above. The patient's self-reported past medical history, medications, allergies, family history, social history, and Review of Systems form from 2/8/22 have been scanned into the chart under the \"Media\" tab. Physical Examination:     Vital signs:   Ht 5' 1\" (1.549 m)   Wt 149 lb 9.6 oz (67.9 kg)   BMI 28.27 kg/m²     General:  alert, appears stated age, cooperative and no distress   Gait:  Normal. The patient can bear weight on the injured extremity.   She is using a cane     Left Knee  Alignment:  neutral   ROM:  5 degrees extension to  degrees flexion   Right knee: 0 degrees extension, 110-120 flexion   Crepitus:  yes patellar   Joint Tenderness:  lateral joint line   Effusion:   0 cc   Patellar excursion:  2 of 4 quadrants    Patellar tilt test:  positive   Patellar facet tenderness:  negative medial   negative lateral   Anterior drawer test:  negative   Right knee: negative   Posterior drawer:   negative   Right knee: negative   Varus laxity at 30 degrees:  negative   Right knee: negative   Valgus laxity at 30 degrees:   negative   Right knee: negative     There is not any cellulitis, lymphedema or cutaneous lesions noted in the lower extremities. Motor exam of the lower extremities show quadriceps, hamstrings, foot dorsiflexion and plantarflexion grossly intact. Sensation to both feet is grossly intact to light touch. The bilateral lower extremities are warm and well-perfused with brisk capillary refill. Imaging:  Left Knee X-Ray: 3 view x-rays of the knee, including bilateral AP and sunrise and lateral of the affected side were obtained and reviewed. No fracture or dislocation. She has moderate to severe medial and lateral tibiofemoral narrowing on the left knee. She has severe lateral compartment narrowing on the right knee. She has moderate bilateral patellofemoral narrowing. Assessment:     Left knee osteoarthritis  GERD      Plan:     Natural history and expected course discussed. Questions answered. Non surgical options including cortisone injection, Visco supplementation injection, Coolief (cooled frequency ablation of the geniculate nerves), stem cell injections, PRP injections were discussed. Surgical option of total knee arthroplasty discussed. Ice prn. Consider Voltaren gel. The risks and benefits of an injection were discussed with the patient. The patient had full opportunity to ask questions and all were answered. The patient then provided verbal informed consent. The skin was then prepped with betadine solution and alcohol. Under aseptic conditions, the left knee was injected with 4cc of 1% xylocaine, 4cc of 0.25% marcaine, and 1cc of Kenalog (40mg/ml). There were no immediate complications following the injection. The patient was advised of the possibility of injection site reaction and instructed to apply ice to the area and take NSAIDs if able. Knee strengthening. Follow up 3 months prn. German Cottrell.  Jair Dumont, MD  Orthopaedic Surgery and Sports Medicine     Disclaimer: This note was generated with use of a verbal recognition program and an attempt was made to check for errors. It is possible that there are still dictated errors within this office note. If so, please bring any significant errors to my attention for an addendum. All efforts were made to ensure that this office note is accurate.

## 2022-02-13 ENCOUNTER — HOSPITAL ENCOUNTER (OUTPATIENT)
Dept: CT IMAGING | Age: 82
Discharge: HOME | End: 2022-02-13
Payer: MEDICARE

## 2022-02-13 DIAGNOSIS — R26.81 GAIT INSTABILITY: ICD-10-CM

## 2022-02-13 DIAGNOSIS — M54.50 LOW BACK PAIN, UNSPECIFIED BACK PAIN LATERALITY, UNSPECIFIED CHRONICITY, UNSPECIFIED WHETHER SCIATICA PRESENT: ICD-10-CM

## 2022-02-13 DIAGNOSIS — R29.898 WEAKNESS OF LOWER EXTREMITY, UNSPECIFIED LATERALITY: ICD-10-CM

## 2022-02-13 PROCEDURE — 72131 CT LUMBAR SPINE W/O DYE: CPT

## 2022-03-21 RX ORDER — ATORVASTATIN CALCIUM 10 MG/1
TABLET, FILM COATED ORAL
Qty: 45 TABLET | Refills: 3 | Status: SHIPPED | OUTPATIENT
Start: 2022-03-21 | End: 2022-07-05 | Stop reason: SDUPTHER

## 2022-03-21 NOTE — TELEPHONE ENCOUNTER
Medication:   Requested Prescriptions     Pending Prescriptions Disp Refills    atorvastatin (LIPITOR) 10 MG tablet [Pharmacy Med Name: ATORVASTATIN 10 MG TABLET] 45 tablet 3     Sig: TAKE 1 TABLET BY MOUTH EVERY OTHER DAY **CHANGE IN DOSE**       Last Filled:  3/22/2021, 45, 3    Patient Phone Number: [phone] (home)     Last appt: 12/8/2021 back pain, [place]  Next appt: Visit date not found    Last Lipid:   Lab Results   Component Value Date    CHOL 117 10/06/2021    TRIG 112 10/06/2021    HDL 51 10/06/2021    HDL 62 02/13/2012    LDLCALC 44 10/06/2021

## 2022-05-15 ENCOUNTER — ANESTHESIA (OUTPATIENT)
Dept: OPERATING ROOM | Age: 82
DRG: 522 | End: 2022-05-15
Payer: MEDICARE

## 2022-05-15 ENCOUNTER — APPOINTMENT (OUTPATIENT)
Dept: CT IMAGING | Age: 82
DRG: 522 | End: 2022-05-15
Payer: MEDICARE

## 2022-05-15 ENCOUNTER — APPOINTMENT (OUTPATIENT)
Dept: GENERAL RADIOLOGY | Age: 82
DRG: 522 | End: 2022-05-15
Payer: MEDICARE

## 2022-05-15 ENCOUNTER — ANESTHESIA EVENT (OUTPATIENT)
Dept: OPERATING ROOM | Age: 82
DRG: 522 | End: 2022-05-15
Payer: MEDICARE

## 2022-05-15 ENCOUNTER — HOSPITAL ENCOUNTER (INPATIENT)
Age: 82
LOS: 5 days | Discharge: NURSING, REHAB OR LONG-TERM CARE | DRG: 522 | End: 2022-05-20
Attending: EMERGENCY MEDICINE | Admitting: INTERNAL MEDICINE
Payer: MEDICARE

## 2022-05-15 DIAGNOSIS — S72.002A LEFT DISPLACED FEMORAL NECK FRACTURE (HCC): Primary | ICD-10-CM

## 2022-05-15 DIAGNOSIS — I10 ESSENTIAL HYPERTENSION: ICD-10-CM

## 2022-05-15 LAB
A/G RATIO: 1.5 (ref 1.1–2.2)
ALBUMIN SERPL-MCNC: 4.2 G/DL (ref 3.4–5)
ALP BLD-CCNC: 97 U/L (ref 40–129)
ALT SERPL-CCNC: 15 U/L (ref 10–40)
ANION GAP SERPL CALCULATED.3IONS-SCNC: 12 MMOL/L (ref 3–16)
AST SERPL-CCNC: 19 U/L (ref 15–37)
BACTERIA: ABNORMAL /HPF
BASOPHILS ABSOLUTE: 0 K/UL (ref 0–0.2)
BASOPHILS RELATIVE PERCENT: 0.7 %
BILIRUB SERPL-MCNC: 0.4 MG/DL (ref 0–1)
BILIRUBIN URINE: NEGATIVE
BLOOD, URINE: NEGATIVE
BUN BLDV-MCNC: 24 MG/DL (ref 7–20)
CALCIUM SERPL-MCNC: 9.5 MG/DL (ref 8.3–10.6)
CHLORIDE BLD-SCNC: 101 MMOL/L (ref 99–110)
CLARITY: CLEAR
CO2: 26 MMOL/L (ref 21–32)
COLOR: YELLOW
CREAT SERPL-MCNC: 0.8 MG/DL (ref 0.6–1.2)
EOSINOPHILS ABSOLUTE: 0.1 K/UL (ref 0–0.6)
EOSINOPHILS RELATIVE PERCENT: 1.4 %
EPITHELIAL CELLS, UA: 0 /HPF (ref 0–5)
GFR AFRICAN AMERICAN: >60
GFR NON-AFRICAN AMERICAN: >60
GLUCOSE BLD-MCNC: 104 MG/DL (ref 70–99)
GLUCOSE URINE: NEGATIVE MG/DL
HCT VFR BLD CALC: 40.8 % (ref 36–48)
HEMOGLOBIN: 13.5 G/DL (ref 12–16)
HYALINE CASTS: 0 /LPF (ref 0–8)
KETONES, URINE: NEGATIVE MG/DL
LEUKOCYTE ESTERASE, URINE: NEGATIVE
LYMPHOCYTES ABSOLUTE: 1.5 K/UL (ref 1–5.1)
LYMPHOCYTES RELATIVE PERCENT: 30.4 %
MCH RBC QN AUTO: 30.5 PG (ref 26–34)
MCHC RBC AUTO-ENTMCNC: 33.1 G/DL (ref 31–36)
MCV RBC AUTO: 92 FL (ref 80–100)
MICROSCOPIC EXAMINATION: YES
MONOCYTES ABSOLUTE: 0.4 K/UL (ref 0–1.3)
MONOCYTES RELATIVE PERCENT: 8.1 %
NEUTROPHILS ABSOLUTE: 2.9 K/UL (ref 1.7–7.7)
NEUTROPHILS RELATIVE PERCENT: 59.4 %
NITRITE, URINE: NEGATIVE
PDW BLD-RTO: 13.7 % (ref 12.4–15.4)
PH UA: 7 (ref 5–8)
PLATELET # BLD: 135 K/UL (ref 135–450)
PMV BLD AUTO: 7.9 FL (ref 5–10.5)
POTASSIUM REFLEX MAGNESIUM: 4.6 MMOL/L (ref 3.5–5.1)
PRO-BNP: 189 PG/ML (ref 0–449)
PROTEIN UA: ABNORMAL MG/DL
RBC # BLD: 4.44 M/UL (ref 4–5.2)
RBC UA: 1 /HPF (ref 0–4)
SODIUM BLD-SCNC: 139 MMOL/L (ref 136–145)
SPECIFIC GRAVITY UA: 1.01 (ref 1–1.03)
TOTAL PROTEIN: 7 G/DL (ref 6.4–8.2)
TROPONIN: <0.01 NG/ML
URINE TYPE: ABNORMAL
UROBILINOGEN, URINE: 0.2 E.U./DL
WBC # BLD: 4.9 K/UL (ref 4–11)
WBC UA: 3 /HPF (ref 0–5)

## 2022-05-15 PROCEDURE — 80053 COMPREHEN METABOLIC PANEL: CPT

## 2022-05-15 PROCEDURE — 87086 URINE CULTURE/COLONY COUNT: CPT

## 2022-05-15 PROCEDURE — 99285 EMERGENCY DEPT VISIT HI MDM: CPT

## 2022-05-15 PROCEDURE — 73501 X-RAY EXAM HIP UNI 1 VIEW: CPT

## 2022-05-15 PROCEDURE — 70450 CT HEAD/BRAIN W/O DYE: CPT

## 2022-05-15 PROCEDURE — 83880 ASSAY OF NATRIURETIC PEPTIDE: CPT

## 2022-05-15 PROCEDURE — 71045 X-RAY EXAM CHEST 1 VIEW: CPT

## 2022-05-15 PROCEDURE — 6360000002 HC RX W HCPCS: Performed by: INTERNAL MEDICINE

## 2022-05-15 PROCEDURE — 96374 THER/PROPH/DIAG INJ IV PUSH: CPT

## 2022-05-15 PROCEDURE — 85025 COMPLETE CBC W/AUTO DIFF WBC: CPT

## 2022-05-15 PROCEDURE — 84484 ASSAY OF TROPONIN QUANT: CPT

## 2022-05-15 PROCEDURE — 36415 COLL VENOUS BLD VENIPUNCTURE: CPT

## 2022-05-15 PROCEDURE — 1200000000 HC SEMI PRIVATE

## 2022-05-15 PROCEDURE — 73502 X-RAY EXAM HIP UNI 2-3 VIEWS: CPT

## 2022-05-15 PROCEDURE — 6370000000 HC RX 637 (ALT 250 FOR IP): Performed by: INTERNAL MEDICINE

## 2022-05-15 PROCEDURE — 73700 CT LOWER EXTREMITY W/O DYE: CPT

## 2022-05-15 PROCEDURE — 93005 ELECTROCARDIOGRAM TRACING: CPT | Performed by: EMERGENCY MEDICINE

## 2022-05-15 PROCEDURE — 6360000002 HC RX W HCPCS: Performed by: EMERGENCY MEDICINE

## 2022-05-15 PROCEDURE — 81001 URINALYSIS AUTO W/SCOPE: CPT

## 2022-05-15 PROCEDURE — 2580000003 HC RX 258: Performed by: INTERNAL MEDICINE

## 2022-05-15 PROCEDURE — 72125 CT NECK SPINE W/O DYE: CPT

## 2022-05-15 RX ORDER — VILAZODONE HYDROCHLORIDE 20 MG/1
20 TABLET ORAL DAILY
Status: DISCONTINUED | OUTPATIENT
Start: 2022-05-16 | End: 2022-05-21 | Stop reason: HOSPADM

## 2022-05-15 RX ORDER — B-COMPLEX WITH VITAMIN C
1 TABLET ORAL DAILY
Status: DISCONTINUED | OUTPATIENT
Start: 2022-05-15 | End: 2022-05-15 | Stop reason: RX

## 2022-05-15 RX ORDER — ACETAMINOPHEN 650 MG/1
650 SUPPOSITORY RECTAL EVERY 6 HOURS PRN
Status: DISCONTINUED | OUTPATIENT
Start: 2022-05-15 | End: 2022-05-21 | Stop reason: HOSPADM

## 2022-05-15 RX ORDER — ESTRADIOL 0.1 MG/G
2 CREAM VAGINAL
Status: DISCONTINUED | OUTPATIENT
Start: 2022-05-16 | End: 2022-05-21 | Stop reason: HOSPADM

## 2022-05-15 RX ORDER — SODIUM CHLORIDE, SODIUM LACTATE, POTASSIUM CHLORIDE, CALCIUM CHLORIDE 600; 310; 30; 20 MG/100ML; MG/100ML; MG/100ML; MG/100ML
INJECTION, SOLUTION INTRAVENOUS CONTINUOUS
Status: DISCONTINUED | OUTPATIENT
Start: 2022-05-15 | End: 2022-05-17

## 2022-05-15 RX ORDER — ENOXAPARIN SODIUM 100 MG/ML
40 INJECTION SUBCUTANEOUS DAILY
Status: DISCONTINUED | OUTPATIENT
Start: 2022-05-17 | End: 2022-05-15

## 2022-05-15 RX ORDER — POLYETHYLENE GLYCOL 3350 17 G/17G
17 POWDER, FOR SOLUTION ORAL DAILY PRN
Status: DISCONTINUED | OUTPATIENT
Start: 2022-05-15 | End: 2022-05-21 | Stop reason: HOSPADM

## 2022-05-15 RX ORDER — MORPHINE SULFATE 2 MG/ML
2 INJECTION, SOLUTION INTRAMUSCULAR; INTRAVENOUS EVERY 4 HOURS PRN
Status: DISCONTINUED | OUTPATIENT
Start: 2022-05-15 | End: 2022-05-15

## 2022-05-15 RX ORDER — ONDANSETRON 2 MG/ML
4 INJECTION INTRAMUSCULAR; INTRAVENOUS EVERY 6 HOURS PRN
Status: DISCONTINUED | OUTPATIENT
Start: 2022-05-15 | End: 2022-05-21 | Stop reason: HOSPADM

## 2022-05-15 RX ORDER — BUPROPION HYDROCHLORIDE 150 MG/1
150 TABLET ORAL DAILY
Status: DISCONTINUED | OUTPATIENT
Start: 2022-05-16 | End: 2022-05-21 | Stop reason: HOSPADM

## 2022-05-15 RX ORDER — SODIUM CHLORIDE 0.9 % (FLUSH) 0.9 %
5-40 SYRINGE (ML) INJECTION EVERY 12 HOURS SCHEDULED
Status: DISCONTINUED | OUTPATIENT
Start: 2022-05-15 | End: 2022-05-21 | Stop reason: HOSPADM

## 2022-05-15 RX ORDER — LOSARTAN POTASSIUM 25 MG/1
50 TABLET ORAL DAILY
Status: DISCONTINUED | OUTPATIENT
Start: 2022-05-16 | End: 2022-05-17

## 2022-05-15 RX ORDER — ARIPIPRAZOLE 2 MG/1
2 TABLET ORAL DAILY
COMMUNITY
End: 2022-07-11 | Stop reason: ALTCHOICE

## 2022-05-15 RX ORDER — MORPHINE SULFATE 4 MG/ML
4 INJECTION, SOLUTION INTRAMUSCULAR; INTRAVENOUS ONCE
Status: COMPLETED | OUTPATIENT
Start: 2022-05-15 | End: 2022-05-15

## 2022-05-15 RX ORDER — HYDROMORPHONE HYDROCHLORIDE 1 MG/ML
0.5 INJECTION, SOLUTION INTRAMUSCULAR; INTRAVENOUS; SUBCUTANEOUS
Status: DISCONTINUED | OUTPATIENT
Start: 2022-05-15 | End: 2022-05-21 | Stop reason: HOSPADM

## 2022-05-15 RX ORDER — OXYCODONE HYDROCHLORIDE AND ACETAMINOPHEN 5; 325 MG/1; MG/1
1 TABLET ORAL EVERY 4 HOURS PRN
Status: DISCONTINUED | OUTPATIENT
Start: 2022-05-15 | End: 2022-05-21 | Stop reason: HOSPADM

## 2022-05-15 RX ORDER — SODIUM CHLORIDE 9 MG/ML
INJECTION, SOLUTION INTRAVENOUS PRN
Status: DISCONTINUED | OUTPATIENT
Start: 2022-05-15 | End: 2022-05-21 | Stop reason: HOSPADM

## 2022-05-15 RX ORDER — ENOXAPARIN SODIUM 100 MG/ML
40 INJECTION SUBCUTANEOUS DAILY
Status: DISCONTINUED | OUTPATIENT
Start: 2022-05-16 | End: 2022-05-21 | Stop reason: HOSPADM

## 2022-05-15 RX ORDER — SODIUM CHLORIDE 0.9 % (FLUSH) 0.9 %
5-40 SYRINGE (ML) INJECTION PRN
Status: DISCONTINUED | OUTPATIENT
Start: 2022-05-15 | End: 2022-05-21 | Stop reason: HOSPADM

## 2022-05-15 RX ORDER — ACETAMINOPHEN 325 MG/1
650 TABLET ORAL EVERY 6 HOURS PRN
Status: DISCONTINUED | OUTPATIENT
Start: 2022-05-15 | End: 2022-05-21 | Stop reason: HOSPADM

## 2022-05-15 RX ORDER — VITAMIN B COMPLEX
1000 TABLET ORAL DAILY
Status: DISCONTINUED | OUTPATIENT
Start: 2022-05-15 | End: 2022-05-21 | Stop reason: HOSPADM

## 2022-05-15 RX ORDER — ATORVASTATIN CALCIUM 10 MG/1
10 TABLET, FILM COATED ORAL EVERY OTHER DAY
Status: DISCONTINUED | OUTPATIENT
Start: 2022-05-15 | End: 2022-05-18

## 2022-05-15 RX ORDER — MIRTAZAPINE 15 MG/1
7.5 TABLET, FILM COATED ORAL NIGHTLY
Status: DISCONTINUED | OUTPATIENT
Start: 2022-05-15 | End: 2022-05-15

## 2022-05-15 RX ORDER — ONDANSETRON 4 MG/1
4 TABLET, ORALLY DISINTEGRATING ORAL EVERY 8 HOURS PRN
Status: DISCONTINUED | OUTPATIENT
Start: 2022-05-15 | End: 2022-05-21 | Stop reason: HOSPADM

## 2022-05-15 RX ORDER — MORPHINE SULFATE 4 MG/ML
4 INJECTION, SOLUTION INTRAMUSCULAR; INTRAVENOUS EVERY 4 HOURS PRN
Status: DISCONTINUED | OUTPATIENT
Start: 2022-05-15 | End: 2022-05-15

## 2022-05-15 RX ORDER — ASPIRIN 81 MG/1
81 TABLET ORAL DAILY
Status: DISCONTINUED | OUTPATIENT
Start: 2022-05-16 | End: 2022-05-17

## 2022-05-15 RX ORDER — CYCLOBENZAPRINE HCL 10 MG
5 TABLET ORAL 3 TIMES DAILY PRN
Status: DISCONTINUED | OUTPATIENT
Start: 2022-05-15 | End: 2022-05-21 | Stop reason: HOSPADM

## 2022-05-15 RX ADMIN — HYDROMORPHONE HYDROCHLORIDE 0.5 MG: 1 INJECTION, SOLUTION INTRAMUSCULAR; INTRAVENOUS; SUBCUTANEOUS at 14:42

## 2022-05-15 RX ADMIN — HYDROMORPHONE HYDROCHLORIDE 0.5 MG: 1 INJECTION, SOLUTION INTRAMUSCULAR; INTRAVENOUS; SUBCUTANEOUS at 19:45

## 2022-05-15 RX ADMIN — MORPHINE SULFATE 4 MG: 4 INJECTION INTRAVENOUS at 09:59

## 2022-05-15 RX ADMIN — ATORVASTATIN CALCIUM 10 MG: 10 TABLET, FILM COATED ORAL at 21:01

## 2022-05-15 RX ADMIN — CYCLOBENZAPRINE 5 MG: 10 TABLET, FILM COATED ORAL at 15:13

## 2022-05-15 RX ADMIN — OXYCODONE AND ACETAMINOPHEN 1 TABLET: 5; 325 TABLET ORAL at 13:54

## 2022-05-15 RX ADMIN — Medication 10 ML: at 14:02

## 2022-05-15 RX ADMIN — OXYCODONE AND ACETAMINOPHEN 1 TABLET: 5; 325 TABLET ORAL at 21:00

## 2022-05-15 RX ADMIN — SODIUM CHLORIDE, POTASSIUM CHLORIDE, SODIUM LACTATE AND CALCIUM CHLORIDE: 600; 310; 30; 20 INJECTION, SOLUTION INTRAVENOUS at 14:01

## 2022-05-15 RX ADMIN — HYDROMORPHONE HYDROCHLORIDE 0.5 MG: 1 INJECTION, SOLUTION INTRAMUSCULAR; INTRAVENOUS; SUBCUTANEOUS at 11:27

## 2022-05-15 ASSESSMENT — PAIN DESCRIPTION - DESCRIPTORS: DESCRIPTORS: SPASM

## 2022-05-15 ASSESSMENT — PAIN DESCRIPTION - ORIENTATION: ORIENTATION: LEFT

## 2022-05-15 ASSESSMENT — PAIN DESCRIPTION - LOCATION
LOCATION: HIP

## 2022-05-15 ASSESSMENT — PAIN SCALES - GENERAL
PAINLEVEL_OUTOF10: 8
PAINLEVEL_OUTOF10: 9
PAINLEVEL_OUTOF10: 10
PAINLEVEL_OUTOF10: 9

## 2022-05-15 NOTE — PROGRESS NOTES
Patient transferred to unit from ER. Admission assessment completed. VSS. Patient alert and oriented. Reporting pain to left hip. Llamas catheter in place. Neuro assessment completed. The care plan and education have been reviewed and mutually agreed upon with the patient. Call light within reach.

## 2022-05-15 NOTE — ACP (ADVANCE CARE PLANNING)
ADVANCED CARE PLANNING    Kyle Wild       :  1940              MRN:  [id number]      Purpose of Encounter: Advanced care planning. Parties in attendance: :Nayely Kendrick MD, spouse. Decisional Capacity:Yes    Diagnosis: Principal Problem:    Left displaced femoral neck fracture (HCC)  Resolved Problems:    * No resolved hospital problems. *    Patients Medical Laury Gaucher is a 78 y.o. female with history of depression, HLD, chronic right upper extremity tremors, gait instability (ambulates with a cane at baseline) presented following a fall with left femoral neck fracture. Goals of Care Determinations: Patient wishes to focus on life-sustaining treatment   Plan: Will notify Ralph Oneal MD of change in care plan. Will look at further interventions as needed. Code Status: At this time patient wishes to be Full Code   POA: Alia Abel (Spouse), [phone]  Time Spent with Patient:16 minutes      Electronically signed by Paolo Robles MD on 5/15/2022 at 12:01 PM  Thank you Ralph Oneal MD for the opportunity to be involved in this patient's care.

## 2022-05-15 NOTE — PROGRESS NOTES
18fr Llamas cath placed, 150mL urine output. Urine clear and straw in colour. Secured to pts leg appropriately, standard bag.

## 2022-05-15 NOTE — ED PROVIDER NOTES
[facility]        Pt Name: Bridger Jaramillo  MRN: [id number]  [place] 1940  Date of evaluation: 5/15/2022  Provider: Hao Garcia MD  PCP: Kenneth Thornton MD    This patient was seen and evaluated by the attending physician Hao Garcia MD.      CHIEF COMPLAINT       No chief complaint on file. HISTORY OF PRESENT ILLNESS   (Location/Symptom, Timing/Onset, Context/Setting, Quality, Duration, Modifying Factors, Severity)  Note limiting factors. Bridger Jaramillo is a 78 y.o. female today with a chief complaint of mechanical fall sustained just this morning she was trying to change laundry over. Injured her left hip could not get up  EMS called and transported here. Received fentanyl and ondansetron in route. No other complaints besides left hip pain. Nursing Notes were all reviewed and agreed with or any disagreements were addressed  in the HPI. REVIEW OF SYSTEMS    (2-9 systems for level 4, 10 or more for level 5)     Review of Systems    Positives and Pertinent negatives as per HPI. Except as noted abovein the ROS, all other systems were reviewed and negative.        PAST MEDICAL HISTORY     Past Medical History:   Diagnosis Date    AR (allergic rhinitis)     Arthritis of knee     Pruis    Depression     Erosive gastritis 10/28/2019    EGD October 2019, he did with PPI    GERD (gastroesophageal reflux disease)     Hyperlipidemia     Internal hemorrhoids     Overweight(278.02)     Viral meningitis 5/12         SURGICAL HISTORY     Past Surgical History:   Procedure Laterality Date    BREAST BIOPSY      CHOLECYSTECTOMY, LAPAROSCOPIC  2003    COLONOSCOPY  8/06    normal; Ortega    COLONOSCOPY  12/3/13    Ortega- polyps    HYSTERECTOMY      AKASH AND BSO  2003    UPPER GASTROINTESTINAL ENDOSCOPY  12/3/13    [facility]; antritis         CURRENTMEDICATIONS       Previous Medications    ASPIRIN 81 MG EC TABLET    Take 81 mg by mouth daily. ATORVASTATIN (LIPITOR) 10 MG TABLET    TAKE 1 TABLET BY MOUTH EVERY OTHER DAY **CHANGE IN DOSE**    B COMPLEX-C (VITAMIN B + C COMPLEX PO)    Take by mouth    BUPROPION (WELLBUTRIN XL) 150 MG EXTENDED RELEASE TABLET    TAKE 1 TABLET (150 MG TOTAL) BY MOUTH DAILY. INDICATIONS  DEPRESSION    ESTRADIOL (ESTRACE) 0.1 MG/GM VAGINAL CREAM    Place 2 g vaginally Twice a Week     LIFT CHAIR MISC    by Does not apply route    LOSARTAN (COZAAR) 50 MG TABLET    TAKE 1 TABLET BY MOUTH TWICE A DAY WITH MEALS    MIRTAZAPINE (REMERON) 15 MG TABLET    7.5 mg     NAPROXEN (NAPROSYN) 375 MG TABLET    Take 1 tablet by mouth 2 times daily (with meals)    SULFAMETHOXAZOLE-TRIMETHOPRIM (BACTRIM;SEPTRA) 400-80 MG PER TABLET        VILAZODONE HCL (VIIBRYD) 10 MG TABS    Take 20 mg by mouth daily     VITAMIN D PO    Take  by mouth. ALLERGIES     Patient has no known allergies.     FAMILYHISTORY       Family History   Problem Relation Age of Onset    Breast Cancer Mother     Bipolar Disorder Brother           SOCIAL HISTORY       Social History     Socioeconomic History    Marital status:      Spouse name: Kevin Montanez Number of children: 4    Years of education: None    Highest education level: None   Occupational History    None   Tobacco Use    Smoking status: Never Smoker    Smokeless tobacco: Never Used   Vaping Use    Vaping Use: Never used   Substance and Sexual Activity    Alcohol use: Yes     Comment: rarely    Drug use: No    Sexual activity: None   Other Topics Concern    None   Social History Narrative    None     Social Determinants of Health     Financial Resource Strain: Low Risk     Difficulty of Paying Living Expenses: Not hard at all   Food Insecurity: No Food Insecurity    Worried About Running Out of Food in the Last Year: Never true    Shoaib of Food in the Last Year: Never true   Transportation Needs: No Transportation Needs    Lack of Transportation (Medical): No    Lack of Transportation (Non-Medical): No   Physical Activity:     Days of Exercise per Week: Not on file    Minutes of Exercise per Session: Not on file   Stress:     Feeling of Stress : Not on file   Social Connections:     Frequency of Communication with Friends and Family: Not on file    Frequency of Social Gatherings with Friends and Family: Not on file    Attends Druze Services: Not on file    Active Member of [address] [place] [address] [employer] or Organizations: Not on file    Attends Club or Organization Meetings: Not on file    Marital Status: Not on file   Intimate Partner Violence:     Fear of Current or Ex-Partner: Not on file    Emotionally Abused: Not on file    Physically Abused: Not on file    Sexually Abused: Not on file   Housing Stability:     Unable to Pay for Housing in the Last Year: Not on file    Number of [place] in the Last Year: Not on file    Unstable Housing in the Last Year: Not on file       SCREENINGS             PHYSICAL EXAM    (up to 7 for level 4, 8 or more for level 5)     ED Triage Vitals   BP Temp Temp src Pulse Resp SpO2 Height Weight   -- -- -- -- -- -- -- --       Physical Exam     General Appearance:  Alert, cooperative, no distress, appears stated age. Head:  Normocephalic, without obvious abnormality, atraumatic. Eyes:  conjunctiva/corneas clear, EOM's intact. Sclera anicteric. ENT: Mucous membranes moist.   Neck: Supple, symmetrical, trachea midline, no adenopathy. No jugular venous distention. Lungs:   No Respiratory Distress. Chest Wall:  Atrauamtic   Heart:  RRR   Abdomen:   Soft, NT, ND   Extremities:  Left hip is shortened and rotated. She is tender in the femoral crease. Pulses: Symmetric x4   Skin:  No rashes or lesions to exposed skin. Neurologic: Alert and oriented X 3. Motor grossly normal.  Speech clear.   Diffuse resting tremor mostly to the right arm         DIAGNOSTIC RESULTS   LABS:    Labs Reviewed   COMPREHENSIVE METABOLIC PANEL W/ REFLEX TO MG FOR LOW K - Abnormal; Notable for the following components:       Result Value    Glucose 104 (*)     BUN 24 (*)     All other components within normal limits   CULTURE, URINE   CBC WITH AUTO DIFFERENTIAL   TROPONIN   BRAIN NATRIURETIC PEPTIDE   URINALYSIS WITH MICROSCOPIC       All other labs were within normal range or not returned as of this dictation. EKG: All EKG's are interpreted by the Emergency Department Physician in the absence of a cardiologist.  Please see their note for interpretation of EKG. EKG is reviewed by myself. Dated today [id number]. Rate 77 sinus rhythm left axis    RADIOLOGY:   Non-plain film images such as CT, Ultrasound and MRI are read by the radiologist. Plain radiographic images are visualized andpreliminarily interpreted by the  ED Provider with the below findings:        Interpretation perthe Radiologist below, if available at the time of this note:    XR HIP LEFT (2-3 VIEWS)   Final Result   Left hip fracture, incompletely evaluated due to rotation. This is likely a   femoral neck fracture although an intertrochanteric component is not   excluded. Consider additional plain films or CT follow-up for more complete   assessment. XR CHEST PORTABLE   Final Result   No acute cardiopulmonary disease. No results found. PROCEDURES   Unless otherwise noted below, none     Procedures    CRITICAL CARE TIME   N/A    CONSULTS:  IP CONSULT TO HOSPITALIST  IP CONSULT TO ORTHOPEDIC SURGERY      EMERGENCY DEPARTMENT COURSE and DIFFERENTIAL DIAGNOSIS/MDM:   Vitals:    Vitals:    05/15/22 0953   BP: (!) 184/67   Pulse: 83   Resp: 18   SpO2: 96%   Weight: 140 lb (63.5 kg)   Height: 5' 1\" (1.549 m)       Patient was given thefollowing medications:  Medications   morphine injection 4 mg (4 mg IntraVENous Given 5/15/22 0959)       Briefly 78-year-old female here today with a mechanical fall sustaining a left-sided hip fracture.   Clinical examination would raise my suspicion for intertrochanteric fracture. We will see what her x-ray shows. We will get a preoperative chest x-ray and EKG. XR confirms fx  CS placed to hospitalist, ortho  Will admit. Is this patient to be included in the SEP-1 Core Measure? No   Exclusion criteria - the patient is NOT to be included for SEP-1 Core Measure due to: Infection is not suspected     FINAL IMPRESSION      1. Left displaced femoral neck fracture [facility])          DISPOSITION/PLAN   DISPOSITION Decision To Admit 05/15/2022 10:33:39 AM      PATIENT REFERREDTO:  No follow-up provider specified.     DISCHARGE MEDICATIONS:  New Prescriptions    No medications on file       DISCONTINUED MEDICATIONS:  Discontinued Medications    No medications on file              (Please note that portions ofthis note were completed with a voice recognition program.  Efforts were made to edit the dictations but occasionally words are mis-transcribed.)    Marybeth Vega MD (electronically signed)           Marybeth Vega MD  05/15/22 6858

## 2022-05-15 NOTE — H&P
HOSPITALISTS HISTORY AND PHYSICAL    5/15/2022 11:57 AM    Patient Information:  Fe Kilgore is a 78 y.o. female [id number]  PCP:  Sandy Delarosa MD (Tel: [phone] )    Chief complaint: Left-sided hip pain following fall    History of Present Illness:  Carolina Aguilar is a 78 y.o. female with history of depression, HLD, chronic right upper extremity tremors, gait instability (ambulates with a cane at baseline) presented following a fall with severe left-sided hip pain. She was doing her laundry this morning when she lost her balance and fell, landing on head left side. She hit her head on a carpeted floor but there was no LOC. She immediately had severe pain in her left hip with inability to bear weight. Pain is localized on the left hip, severe, no radiation, worse with movement, minimally relieved with morphine in the ER. She denied any chest pain, shortness of breath, dizziness or palpitations prior to fall. Imaging done in the ED showed a left hip fracture for which she was admitted for further management. History obtained from patient. REVIEW OF SYSTEMS:   Constitutional: Negative for fever,chills or night sweats  ENT: Negative for rhinorrhea, epistaxis, hoarseness, sore throat. Respiratory: Negative for shortness of breath,wheezing  Cardiovascular: Negative for chest pain, palpitations   Gastrointestinal: Negative for nausea, vomiting, diarrhea  Genitourinary: Negative for polyuria, dysuria   Hematologic/Lymphatic: Negative for bleeding tendency, easy bruising  Musculoskeletal: As above  Neurologic: Negative for confusion,dysarthria. Skin: Negative for itching,rash  Psychiatric: Negative for depression,anxiety, agitation. Endocrine: Negative for polydipsia,polyuria,heat /cold intolerance.     Past Medical History:   has a past medical history of AR (allergic rhinitis), Arthritis of knee, Depression, Erosive gastritis, GERD (gastroesophageal reflux disease), Hyperlipidemia, Internal hemorrhoids, Overweight(278.02), and Viral meningitis. Past Surgical History:   has a past surgical history that includes Cholecystectomy, laparoscopic (2003); lalo and bso (cervix removed) (2003); Colonoscopy (8/06); Colonoscopy (12/3/13); Upper gastrointestinal endoscopy (12/3/13); Breast biopsy; and Hysterectomy. Medications:  No current facility-administered medications on file prior to encounter. Current Outpatient Medications on File Prior to Encounter   Medication Sig Dispense Refill    atorvastatin (LIPITOR) 10 MG tablet TAKE 1 TABLET BY MOUTH EVERY OTHER DAY **CHANGE IN DOSE** 45 tablet 3    losartan (COZAAR) 50 MG tablet TAKE 1 TABLET BY MOUTH TWICE A DAY WITH MEALS 180 tablet 3    sulfamethoxazole-trimethoprim (BACTRIM;SEPTRA) 400-80 MG per tablet       naproxen (NAPROSYN) 375 MG tablet Take 1 tablet by mouth 2 times daily (with meals) 30 tablet 0    buPROPion (WELLBUTRIN XL) 150 MG extended release tablet TAKE 1 TABLET (150 MG TOTAL) BY MOUTH DAILY. INDICATIONS  DEPRESSION      vilazodone HCl (VIIBRYD) 10 MG TABS Take 20 mg by mouth daily       mirtazapine (REMERON) 15 MG tablet 7.5 mg       B Complex-C (VITAMIN B + C COMPLEX PO) Take by mouth      Lift Chair MISC by Does not apply route 1 each 0    estradiol (ESTRACE) 0.1 MG/GM vaginal cream Place 2 g vaginally Twice a Week       aspirin 81 MG EC tablet Take 81 mg by mouth daily.  VITAMIN D PO Take  by mouth. Allergies:  No Known Allergies     Social History:  Patient Lives spouse. reports that she has never smoked. She has never used smokeless tobacco. She reports current alcohol use. She reports that she does not use drugs. Family History:  family history includes Bipolar Disorder in her brother; Breast Cancer in her mother.      Physical Exam:  BP (!) 185/89   Pulse 94   Resp 12   Ht 5' 1\" (1.549 m)   Wt 140 lb (63.5 kg)   SpO2 95%   BMI 26.45 kg/m²     General appearance: In severe pain. Well nourished  Eyes: Sclera clear, pupils equal  ENT: Moist mucus membranes, no thrush. Trachea midline. Cardiovascular: Regular rhythm, normal S1, S2. No murmur, gallop, rub. No edema in lower extremities  Respiratory: Clear to auscultation bilaterally, no wheeze, good inspiratory effort  Gastrointestinal: Abdomen soft, non-tender, not distended, normal bowel sounds  Musculoskeletal: No cyanosis in digits, neck supple. Left lower extremity externally rotated and shortened. Limited range of motion due to pain. Neurology: Cranial nerves grossly intact. Alert and oriented in time, place and person. No speech deficits. Tremors in the right upper extremity. Psychiatry: Appropriate affect. Not agitated  Skin: Warm, dry, normal turgor, no rash  Brisk capillary refill, peripheral pulses palpable       Labs:  CBC:   Lab Results   Component Value Date    WBC 4.9 05/15/2022    RBC 4.44 05/15/2022    HGB 13.5 05/15/2022    HCT 40.8 05/15/2022    MCV 92.0 05/15/2022    MCH 30.5 05/15/2022    MCHC 33.1 05/15/2022    RDW 13.7 05/15/2022     05/15/2022    MPV 7.9 05/15/2022     BMP:    Lab Results   Component Value Date     05/15/2022    K 4.6 05/15/2022     05/15/2022    CO2 26 05/15/2022    BUN 24 05/15/2022    CREATININE 0.8 05/15/2022    CALCIUM 9.5 05/15/2022    GFRAA >60 05/15/2022    GFRAA >60 05/08/2013    LABGLOM >60 05/15/2022    GLUCOSE 104 05/15/2022     XR HIP LEFT (2-3 VIEWS)   Final Result   Left hip fracture, incompletely evaluated due to rotation. This is likely a   femoral neck fracture although an intertrochanteric component is not   excluded. Consider additional plain films or CT follow-up for more complete   assessment. XR CHEST PORTABLE   Final Result   No acute cardiopulmonary disease.          CT HEAD WO CONTRAST    (Results Pending)   CT CERVICAL SPINE WO CONTRAST (Results Pending)   XR HIP LEFT (1 VIEW)    (Results Pending)     Chest Xray: As above  EKG:  NSR, LAD  I visualized CXR images and EKG strips. Problem List  Principal Problem:    Left displaced femoral neck fracture (HCC)  Resolved Problems:    * No resolved hospital problems. *        Assessment/Plan:     Left femoral neck fracture:  Orthopedic consulted: Scheduled for ORIF today. Keep NPO. Continue pain medications and IV fluids. Further recommendations following surgery. Hypertension: Continue losartan. Hyperlipidemia: On statin. Depression: Continue bupropion, Remeron. DVT prophylaxis: Lovenox  Code status: full code  Diet: NPO  IV access: peripheral  Llamas Catheter: None    Admit as inpatient. I anticipate hospitalization spanning more than two midnights for investigation and treatment of the above medically necessary diagnoses. Please note that some part of this chart was generated using Dragon dictation software. Although every effort was made to ensure the accuracy of this automated transcription, some errors in transcription may have occurred inadvertently. If you may need any clarification, please do not hesitate to contact me through [facility].        Burt Ordoñez MD    5/15/2022 11:57 AM

## 2022-05-15 NOTE — PROGRESS NOTES
0.5 mg of Dilaudid administered IV per orders. Red patches noted around the IV site after administration. Pt states mild itching at her L wrist. IV site is not red or showing signs of infiltration.

## 2022-05-16 ENCOUNTER — APPOINTMENT (OUTPATIENT)
Dept: GENERAL RADIOLOGY | Age: 82
DRG: 522 | End: 2022-05-16
Payer: MEDICARE
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
## 2022-05-16 PROBLEM — E78.5 HYPERLIPIDEMIA: Status: ACTIVE | Noted: 2022-05-16

## 2022-05-16 LAB
A/G RATIO: 1.4 (ref 1.1–2.2)
ABO/RH: NORMAL
ALBUMIN SERPL-MCNC: 3.1 G/DL (ref 3.4–5)
ALP BLD-CCNC: 68 U/L (ref 40–129)
ALT SERPL-CCNC: 45 U/L (ref 10–40)
ANION GAP SERPL CALCULATED.3IONS-SCNC: 9 MMOL/L (ref 3–16)
ANTIBODY SCREEN: NORMAL
APTT: 30 SEC (ref 26.2–38.6)
AST SERPL-CCNC: 38 U/L (ref 15–37)
BASOPHILS ABSOLUTE: 0 K/UL (ref 0–0.2)
BASOPHILS RELATIVE PERCENT: 0.4 %
BILIRUB SERPL-MCNC: 0.6 MG/DL (ref 0–1)
BUN BLDV-MCNC: 24 MG/DL (ref 7–20)
CALCIUM SERPL-MCNC: 8.6 MG/DL (ref 8.3–10.6)
CHLORIDE BLD-SCNC: 100 MMOL/L (ref 99–110)
CO2: 26 MMOL/L (ref 21–32)
CREAT SERPL-MCNC: 0.7 MG/DL (ref 0.6–1.2)
EKG ATRIAL RATE: 77 BPM
EKG DIAGNOSIS: NORMAL
EKG P AXIS: 79 DEGREES
EKG P-R INTERVAL: 148 MS
EKG Q-T INTERVAL: 370 MS
EKG QRS DURATION: 84 MS
EKG QTC CALCULATION (BAZETT): 418 MS
EKG R AXIS: -36 DEGREES
EKG T AXIS: 57 DEGREES
EKG VENTRICULAR RATE: 77 BPM
EOSINOPHILS ABSOLUTE: 0.1 K/UL (ref 0–0.6)
EOSINOPHILS RELATIVE PERCENT: 1.8 %
GFR AFRICAN AMERICAN: >60
GFR NON-AFRICAN AMERICAN: >60
GLUCOSE BLD-MCNC: 99 MG/DL (ref 70–99)
HCT VFR BLD CALC: 32.9 % (ref 36–48)
HEMOGLOBIN: 11 G/DL (ref 12–16)
INR BLD: 1.08 (ref 0.88–1.12)
LYMPHOCYTES ABSOLUTE: 1 K/UL (ref 1–5.1)
LYMPHOCYTES RELATIVE PERCENT: 21 %
MAGNESIUM: 1.9 MG/DL (ref 1.8–2.4)
MCH RBC QN AUTO: 31.1 PG (ref 26–34)
MCHC RBC AUTO-ENTMCNC: 33.6 G/DL (ref 31–36)
MCV RBC AUTO: 92.5 FL (ref 80–100)
MONOCYTES ABSOLUTE: 0.4 K/UL (ref 0–1.3)
MONOCYTES RELATIVE PERCENT: 8.9 %
NEUTROPHILS ABSOLUTE: 3.4 K/UL (ref 1.7–7.7)
NEUTROPHILS RELATIVE PERCENT: 67.9 %
PDW BLD-RTO: 13.3 % (ref 12.4–15.4)
PHOSPHORUS: 3.5 MG/DL (ref 2.5–4.9)
PLATELET # BLD: 104 K/UL (ref 135–450)
PMV BLD AUTO: 7.8 FL (ref 5–10.5)
POTASSIUM SERPL-SCNC: 4.5 MMOL/L (ref 3.5–5.1)
PROTHROMBIN TIME: 12.2 SEC (ref 9.9–12.7)
RBC # BLD: 3.55 M/UL (ref 4–5.2)
SODIUM BLD-SCNC: 135 MMOL/L (ref 136–145)
TOTAL PROTEIN: 5.3 G/DL (ref 6.4–8.2)
URINE CULTURE, ROUTINE: NORMAL
WBC # BLD: 4.9 K/UL (ref 4–11)

## 2022-05-16 PROCEDURE — 3600000004 HC SURGERY LEVEL 4 BASE: Performed by: ORTHOPAEDIC SURGERY

## 2022-05-16 PROCEDURE — 1200000000 HC SEMI PRIVATE

## 2022-05-16 PROCEDURE — 2580000003 HC RX 258: Performed by: INTERNAL MEDICINE

## 2022-05-16 PROCEDURE — 2500000003 HC RX 250 WO HCPCS: Performed by: NURSE ANESTHETIST, CERTIFIED REGISTERED

## 2022-05-16 PROCEDURE — 2580000003 HC RX 258: Performed by: ANESTHESIOLOGY

## 2022-05-16 PROCEDURE — 6370000000 HC RX 637 (ALT 250 FOR IP): Performed by: INTERNAL MEDICINE

## 2022-05-16 PROCEDURE — 93010 ELECTROCARDIOGRAM REPORT: CPT | Performed by: INTERNAL MEDICINE

## 2022-05-16 PROCEDURE — 2720000010 HC SURG SUPPLY STERILE: Performed by: ORTHOPAEDIC SURGERY

## 2022-05-16 PROCEDURE — 2580000003 HC RX 258: Performed by: NURSE PRACTITIONER

## 2022-05-16 PROCEDURE — 2709999900 HC NON-CHARGEABLE SUPPLY: Performed by: ORTHOPAEDIC SURGERY

## 2022-05-16 PROCEDURE — 7100000001 HC PACU RECOVERY - ADDTL 15 MIN: Performed by: ORTHOPAEDIC SURGERY

## 2022-05-16 PROCEDURE — C1776 JOINT DEVICE (IMPLANTABLE): HCPCS | Performed by: ORTHOPAEDIC SURGERY

## 2022-05-16 PROCEDURE — 0SRS0J9 REPLACEMENT OF LEFT HIP JOINT, FEMORAL SURFACE WITH SYNTHETIC SUBSTITUTE, CEMENTED, OPEN APPROACH: ICD-10-PCS | Performed by: ORTHOPAEDIC SURGERY

## 2022-05-16 PROCEDURE — 99231 SBSQ HOSP IP/OBS SF/LOW 25: CPT | Performed by: NURSE PRACTITIONER

## 2022-05-16 PROCEDURE — 6360000002 HC RX W HCPCS: Performed by: NURSE PRACTITIONER

## 2022-05-16 PROCEDURE — 36415 COLL VENOUS BLD VENIPUNCTURE: CPT

## 2022-05-16 PROCEDURE — 2580000003 HC RX 258: Performed by: ORTHOPAEDIC SURGERY

## 2022-05-16 PROCEDURE — 85610 PROTHROMBIN TIME: CPT

## 2022-05-16 PROCEDURE — 84100 ASSAY OF PHOSPHORUS: CPT

## 2022-05-16 PROCEDURE — 6360000002 HC RX W HCPCS: Performed by: ORTHOPAEDIC SURGERY

## 2022-05-16 PROCEDURE — 85025 COMPLETE CBC W/AUTO DIFF WBC: CPT

## 2022-05-16 PROCEDURE — 2500000003 HC RX 250 WO HCPCS: Performed by: ORTHOPAEDIC SURGERY

## 2022-05-16 PROCEDURE — 86900 BLOOD TYPING SEROLOGIC ABO: CPT

## 2022-05-16 PROCEDURE — 7100000000 HC PACU RECOVERY - FIRST 15 MIN: Performed by: ORTHOPAEDIC SURGERY

## 2022-05-16 PROCEDURE — 85730 THROMBOPLASTIN TIME PARTIAL: CPT

## 2022-05-16 PROCEDURE — 86901 BLOOD TYPING SEROLOGIC RH(D): CPT

## 2022-05-16 PROCEDURE — 3700000000 HC ANESTHESIA ATTENDED CARE: Performed by: ORTHOPAEDIC SURGERY

## 2022-05-16 PROCEDURE — 86850 RBC ANTIBODY SCREEN: CPT

## 2022-05-16 PROCEDURE — 3700000001 HC ADD 15 MINUTES (ANESTHESIA): Performed by: ORTHOPAEDIC SURGERY

## 2022-05-16 PROCEDURE — 80053 COMPREHEN METABOLIC PANEL: CPT

## 2022-05-16 PROCEDURE — 6360000002 HC RX W HCPCS: Performed by: INTERNAL MEDICINE

## 2022-05-16 PROCEDURE — 6360000002 HC RX W HCPCS: Performed by: NURSE ANESTHETIST, CERTIFIED REGISTERED

## 2022-05-16 PROCEDURE — 3600000014 HC SURGERY LEVEL 4 ADDTL 15MIN: Performed by: ORTHOPAEDIC SURGERY

## 2022-05-16 PROCEDURE — 83735 ASSAY OF MAGNESIUM: CPT

## 2022-05-16 PROCEDURE — 73501 X-RAY EXAM HIP UNI 1 VIEW: CPT

## 2022-05-16 PROCEDURE — C1713 ANCHOR/SCREW BN/BN,TIS/BN: HCPCS | Performed by: ORTHOPAEDIC SURGERY

## 2022-05-16 PROCEDURE — A4217 STERILE WATER/SALINE, 500 ML: HCPCS | Performed by: ORTHOPAEDIC SURGERY

## 2022-05-16 DEVICE — HEAD BPLR OD42MM ID28MM FEM HIP ECC SELF CNTR: Type: IMPLANTABLE DEVICE | Site: HIP | Status: FUNCTIONAL

## 2022-05-16 DEVICE — CENTRALIZER STEM DIA10MM DST FEM CEM MOLD SUMMIT BASIC: Type: IMPLANTABLE DEVICE | Site: HIP | Status: FUNCTIONAL

## 2022-05-16 DEVICE — STEM FEM SZ 3 L108MM NK L36MM 44MM OFFSET 130DEG BILAT HIP: Type: IMPLANTABLE DEVICE | Site: HIP | Status: FUNCTIONAL

## 2022-05-16 DEVICE — CEMENT BIOPREP ST: Type: IMPLANTABLE DEVICE | Site: HIP | Status: FUNCTIONAL

## 2022-05-16 DEVICE — HEAD FEM DIA28MM +12MM OFFSET HIP ULT STD ARTC EZ 12/14: Type: IMPLANTABLE DEVICE | Site: HIP | Status: FUNCTIONAL

## 2022-05-16 DEVICE — CEMENT BNE 40GM HI VISC RADPQ FOR REV SURG: Type: IMPLANTABLE DEVICE | Site: HIP | Status: FUNCTIONAL

## 2022-05-16 RX ORDER — SUCCINYLCHOLINE/SOD CL,ISO/PF 200MG/10ML
SYRINGE (ML) INTRAVENOUS PRN
Status: DISCONTINUED | OUTPATIENT
Start: 2022-05-16 | End: 2022-05-16 | Stop reason: SDUPTHER

## 2022-05-16 RX ORDER — PROPOFOL 10 MG/ML
INJECTION, EMULSION INTRAVENOUS PRN
Status: DISCONTINUED | OUTPATIENT
Start: 2022-05-16 | End: 2022-05-16 | Stop reason: SDUPTHER

## 2022-05-16 RX ORDER — SODIUM CHLORIDE 9 MG/ML
INJECTION, SOLUTION INTRAVENOUS CONTINUOUS
Status: DISCONTINUED | OUTPATIENT
Start: 2022-05-16 | End: 2022-05-16 | Stop reason: HOSPADM

## 2022-05-16 RX ORDER — SODIUM CHLORIDE 9 MG/ML
INJECTION, SOLUTION INTRAVENOUS CONTINUOUS
Status: DISCONTINUED | OUTPATIENT
Start: 2022-05-16 | End: 2022-05-21 | Stop reason: HOSPADM

## 2022-05-16 RX ORDER — VECURONIUM BROMIDE 1 MG/ML
INJECTION, POWDER, LYOPHILIZED, FOR SOLUTION INTRAVENOUS PRN
Status: DISCONTINUED | OUTPATIENT
Start: 2022-05-16 | End: 2022-05-16 | Stop reason: SDUPTHER

## 2022-05-16 RX ORDER — MAGNESIUM HYDROXIDE 1200 MG/15ML
LIQUID ORAL CONTINUOUS PRN
Status: COMPLETED | OUTPATIENT
Start: 2022-05-16 | End: 2022-05-16

## 2022-05-16 RX ORDER — SODIUM CHLORIDE 9 MG/ML
INJECTION, SOLUTION INTRAVENOUS CONTINUOUS
Status: DISCONTINUED | OUTPATIENT
Start: 2022-05-16 | End: 2022-05-19

## 2022-05-16 RX ORDER — ONDANSETRON 2 MG/ML
INJECTION INTRAMUSCULAR; INTRAVENOUS PRN
Status: DISCONTINUED | OUTPATIENT
Start: 2022-05-16 | End: 2022-05-16 | Stop reason: SDUPTHER

## 2022-05-16 RX ORDER — DEXAMETHASONE SODIUM PHOSPHATE 4 MG/ML
INJECTION, SOLUTION INTRA-ARTICULAR; INTRALESIONAL; INTRAMUSCULAR; INTRAVENOUS; SOFT TISSUE PRN
Status: DISCONTINUED | OUTPATIENT
Start: 2022-05-16 | End: 2022-05-16 | Stop reason: SDUPTHER

## 2022-05-16 RX ORDER — LIDOCAINE HYDROCHLORIDE 10 MG/ML
1 INJECTION, SOLUTION EPIDURAL; INFILTRATION; INTRACAUDAL; PERINEURAL
Status: DISCONTINUED | OUTPATIENT
Start: 2022-05-16 | End: 2022-05-16 | Stop reason: HOSPADM

## 2022-05-16 RX ORDER — PHENYLEPHRINE HCL IN 0.9% NACL 1 MG/10 ML
SYRINGE (ML) INTRAVENOUS PRN
Status: DISCONTINUED | OUTPATIENT
Start: 2022-05-16 | End: 2022-05-16 | Stop reason: SDUPTHER

## 2022-05-16 RX ORDER — ONDANSETRON 2 MG/ML
4 INJECTION INTRAMUSCULAR; INTRAVENOUS
Status: DISCONTINUED | OUTPATIENT
Start: 2022-05-16 | End: 2022-05-16 | Stop reason: HOSPADM

## 2022-05-16 RX ORDER — HYDROMORPHONE HCL 110MG/55ML
0.5 PATIENT CONTROLLED ANALGESIA SYRINGE INTRAVENOUS EVERY 5 MIN PRN
Status: DISCONTINUED | OUTPATIENT
Start: 2022-05-16 | End: 2022-05-16 | Stop reason: HOSPADM

## 2022-05-16 RX ORDER — FENTANYL CITRATE 50 UG/ML
INJECTION, SOLUTION INTRAMUSCULAR; INTRAVENOUS PRN
Status: DISCONTINUED | OUTPATIENT
Start: 2022-05-16 | End: 2022-05-16 | Stop reason: SDUPTHER

## 2022-05-16 RX ORDER — HYDROMORPHONE HCL 110MG/55ML
0.25 PATIENT CONTROLLED ANALGESIA SYRINGE INTRAVENOUS EVERY 5 MIN PRN
Status: DISCONTINUED | OUTPATIENT
Start: 2022-05-16 | End: 2022-05-16 | Stop reason: HOSPADM

## 2022-05-16 RX ORDER — OXYCODONE HYDROCHLORIDE 5 MG/1
5 TABLET ORAL
Status: DISCONTINUED | OUTPATIENT
Start: 2022-05-16 | End: 2022-05-16 | Stop reason: HOSPADM

## 2022-05-16 RX ORDER — LABETALOL HYDROCHLORIDE 5 MG/ML
5 INJECTION, SOLUTION INTRAVENOUS
Status: DISCONTINUED | OUTPATIENT
Start: 2022-05-16 | End: 2022-05-16 | Stop reason: HOSPADM

## 2022-05-16 RX ADMIN — Medication 10 ML: at 08:41

## 2022-05-16 RX ADMIN — VECURONIUM BROMIDE 3 MG: 1 INJECTION, POWDER, LYOPHILIZED, FOR SOLUTION INTRAVENOUS at 14:50

## 2022-05-16 RX ADMIN — Medication 100 MCG: at 14:58

## 2022-05-16 RX ADMIN — SUGAMMADEX 200 MG: 100 INJECTION, SOLUTION INTRAVENOUS at 16:23

## 2022-05-16 RX ADMIN — DEXAMETHASONE SODIUM PHOSPHATE 8 MG: 4 INJECTION, SOLUTION INTRAMUSCULAR; INTRAVENOUS at 15:09

## 2022-05-16 RX ADMIN — Medication 120 MG: at 14:50

## 2022-05-16 RX ADMIN — SODIUM CHLORIDE: 9 INJECTION, SOLUTION INTRAVENOUS at 18:18

## 2022-05-16 RX ADMIN — TRANEXAMIC ACID 1000 MG: 1 INJECTION, SOLUTION INTRAVENOUS at 16:01

## 2022-05-16 RX ADMIN — FENTANYL CITRATE 50 MCG: 50 INJECTION, SOLUTION INTRAMUSCULAR; INTRAVENOUS at 15:19

## 2022-05-16 RX ADMIN — HYDROMORPHONE HYDROCHLORIDE 0.5 MG: 1 INJECTION, SOLUTION INTRAMUSCULAR; INTRAVENOUS; SUBCUTANEOUS at 08:58

## 2022-05-16 RX ADMIN — HYDROMORPHONE HYDROCHLORIDE 0.5 MG: 1 INJECTION, SOLUTION INTRAMUSCULAR; INTRAVENOUS; SUBCUTANEOUS at 13:02

## 2022-05-16 RX ADMIN — Medication 100 MCG: at 16:04

## 2022-05-16 RX ADMIN — CYCLOBENZAPRINE 5 MG: 10 TABLET, FILM COATED ORAL at 09:43

## 2022-05-16 RX ADMIN — TRANEXAMIC ACID 1000 MG: 1 INJECTION, SOLUTION INTRAVENOUS at 14:41

## 2022-05-16 RX ADMIN — Medication 100 MCG: at 15:45

## 2022-05-16 RX ADMIN — ONDANSETRON 4 MG: 2 INJECTION INTRAMUSCULAR; INTRAVENOUS at 16:00

## 2022-05-16 RX ADMIN — Medication 10 ML: at 23:26

## 2022-05-16 RX ADMIN — SODIUM CHLORIDE: 9 INJECTION, SOLUTION INTRAVENOUS at 14:25

## 2022-05-16 RX ADMIN — VECURONIUM BROMIDE 4 MG: 1 INJECTION, POWDER, LYOPHILIZED, FOR SOLUTION INTRAVENOUS at 15:08

## 2022-05-16 RX ADMIN — FENTANYL CITRATE 50 MCG: 50 INJECTION, SOLUTION INTRAMUSCULAR; INTRAVENOUS at 14:48

## 2022-05-16 RX ADMIN — HYDROMORPHONE HYDROCHLORIDE 0.5 MG: 1 INJECTION, SOLUTION INTRAMUSCULAR; INTRAVENOUS; SUBCUTANEOUS at 05:50

## 2022-05-16 RX ADMIN — CEFAZOLIN 2000 MG: 10 INJECTION, POWDER, FOR SOLUTION INTRAVENOUS at 14:42

## 2022-05-16 RX ADMIN — CEFAZOLIN SODIUM 2000 MG: 10 INJECTION, POWDER, FOR SOLUTION INTRAVENOUS at 23:25

## 2022-05-16 RX ADMIN — PROPOFOL 120 MG: 10 INJECTION, EMULSION INTRAVENOUS at 14:50

## 2022-05-16 RX ADMIN — Medication 100 MCG: at 14:59

## 2022-05-16 ASSESSMENT — PAIN SCALES - GENERAL
PAINLEVEL_OUTOF10: 0
PAINLEVEL_OUTOF10: 0
PAINLEVEL_OUTOF10: 8
PAINLEVEL_OUTOF10: 0
PAINLEVEL_OUTOF10: 0
PAINLEVEL_OUTOF10: 7
PAINLEVEL_OUTOF10: 0
PAINLEVEL_OUTOF10: 8

## 2022-05-16 ASSESSMENT — PAIN - FUNCTIONAL ASSESSMENT: PAIN_FUNCTIONAL_ASSESSMENT: 0-10

## 2022-05-16 NOTE — PROGRESS NOTES
Phase I discharge criteria met. VSS, O2 sats 97% on 2L NC. Left hip dressing CD&I. Pt denies pain and N&V. Pt will transfer to T in stable condition.

## 2022-05-16 NOTE — CONSULTS
[facility] Orthopedic Surgery  Consult Note    Patient: Giovana Chapin Date: 5/15/2022  Requesting Physician: Gibran Razo MD  Room: [address] [place], [place] [id number]    Chief complaint: LEFT hip pain    HPI: Daryle Fowler is a 78 y.o. female who presented to [facility] yesterday with complaints of left hip pain after she fell at home while doing laundry. Describes pain in the left hip of moderate to severe intensity and of aching nature since the fall which is relieved by narcotics partially. Denies new numbness/tingling. Imaging review of the left hip via CT scan and plain films demonstrated: displaced femoral neck fracture with shortening    Patient lives with her  in private two story home and uses a cane in the right hand to ambulate. She tells me her overall energy level has been decreasing over the last year or so which she feels is related to significant depression. Medical History:  Past Medical History:   Diagnosis Date    AR (allergic rhinitis)     Arthritis of knee     Pruis    Depression     Erosive gastritis 10/28/2019    EGD October 2019, he did with PPI    GERD (gastroesophageal reflux disease)     Hyperlipidemia     Internal hemorrhoids     Overweight(278.02)     Viral meningitis 5/12     Past Surgical History:   Procedure Laterality Date    BREAST BIOPSY      CHOLECYSTECTOMY, LAPAROSCOPIC  2003    COLONOSCOPY  8/06    normal; Ortega    COLONOSCOPY  12/3/13    Ortega- polyps    HYSTERECTOMY      AKASH AND BSO  2003    UPPER GASTROINTESTINAL ENDOSCOPY  12/3/13    Kristene Puls; antritis       Social History:    reports that she has never smoked.  She has never used smokeless tobacco.    Family History:        Problem Relation Age of Onset    Breast Cancer Mother     Bipolar Disorder Brother        Medications:  ALL MEDICATIONS HAVE BEEN REVIEWED:  Scheduled:   ortho mix injection   Injection On Call    tranexamic acid (CYCLOKAPRON) IVPB  1,000 mg IntraVENous On Call to OR    tranexamic acid (CYCLOKAPRON) IVPB  1,000 mg IntraVENous Once    sodium chloride flush  5-40 mL IntraVENous 2 times per day    aspirin  81 mg Oral Daily    atorvastatin  10 mg Oral Every Other Day    buPROPion  150 mg Oral Daily    estradiol  2 g Vaginal Once per day on Mon Thu    losartan  50 mg Oral Daily    Vitamin D  1,000 Units Oral Daily    vilazodone HCl  20 mg Oral Daily    enoxaparin  40 mg SubCUTAneous Daily     Continuous:   sodium chloride      lactated ringers 100 mL/hr at 05/15/22 1401     PRN:sodium chloride flush, sodium chloride, ondansetron **OR** ondansetron, polyethylene glycol, acetaminophen **OR** acetaminophen, HYDROmorphone, oxyCODONE-acetaminophen, cyclobenzaprine    Allergies: No Known Allergies    Review of Systems:  Constitutional: Negative for fever, chills, fatigue. Skin:  Negative for pruritis, rash  Eyes: Negative for photophobia and visual disturbance. ENT:  Negative for rhinorrhea, epistaxis, sore throat  Respiratory:  Negative for cough and shortness of breath. Cardiovascular: Negative for chest pain. Gastrointestinal: Negative for nausea, vomiting, diarrhea. Genitourinary: Negative for dysuria and difficulty urinating. Neurological: Negative for confusion, dysarthria, tremors, seizures. Psychiatric:  Negative for depression or anxiety  Musculoskeletal:  Positive for left hip pain. Objective:  Vitals:    05/16/22 0845   BP: 132/74   Pulse: 83   Resp: 16   Temp: 98.5 °F (36.9 °C)   SpO2: 92%      Physical Examination:  GENERAL: No apparent distress, well-nourished  SKIN:  Warm and dry  EYES: Nonicteric. ENT: Mucous membranes moist  HEAD: Normocephalic, atraumatic  RESPIRATORY: Resp easy and unlabored  CARDIOVASCULAR: Regular rate and rhythm  GI: Abdomen soft, nontender  NEURO: Awake and alert.   No speech defect  PSYCHIATRIC: Appropriate affect; not agitated  MUSCULOSKELETAL:  LEFT hip  Inspection: On exam there are no ulcerations, rashes or lesions about the left hip. There is pain to palpation of the left hip. ROM deferred. Motor: Intact DF/PF on the left. Sensation: Grossly intact to light touch throughout the left lower extremity. Vascular:  2+ left DP pulse. Labs reviewed:  Recent Labs     05/15/22  0956 05/16/22 0533   WBC 4.9 4.9   HGB 13.5 11.0*   HCT 40.8 32.9*    104*     Recent Labs     05/15/22  0956 05/16/22 0533    135*   K 4.6 4.5    100   CO2 26 26   BUN 24* 24*   CREATININE 0.8 0.7   GLUCOSE 104* 99   CALCIUM 9.5 8.6   MG  --  1.90   PHOS  --  3.5     No results for input(s): INR, PROTIME in the last 72 hours. Lab Results   Component Value Date    COLORU Yellow 05/15/2022    CLARITYU Clear 05/15/2022    PHUR 7.0 05/15/2022    GLUCOSEU Negative 05/15/2022    BLOODU Negative 05/15/2022    LEUKOCYTESUR Negative 05/15/2022    NITRITE neg 08/04/2021    BILIRUBINUR Negative 05/15/2022    UROBILINOGEN 0.2 05/15/2022    RBCUA 1 05/15/2022    WBCUA 3 05/15/2022    BACTERIA None Seen 05/15/2022       Imaging:  CT HIP LEFT WO CONTRAST   Final Result   Acute fracture involving the left femoral neck basicervical region with only   some medial intratrochanteric extension along the medial and inferior aspect. Foreshortening of the left femur and minimal lateral displacement. Left   femoral head remains well approximating the left acetabular cup without   dislocation         CT CERVICAL SPINE WO CONTRAST   Final Result      1. No evidence of fracture with multilevel degenerative changes as described. 2.  Approximate 11 mm right thyroid nodule with dense nodular calcification   of indeterminate significance but unchanged from the prior study. CT HEAD WO CONTRAST   Final Result      1. No acute intracranial abnormality is noted. 2.  Prominence of the sulci and/or CSF spaces suggests a degree of cerebral   atrophy. 3.  Mild left maxillary chronic sinusitis.          XR HIP LEFT (1 VIEW) Final Result   Stable left hip series. Left hip fracture, likely a femoral neck fracture   with intertrochanteric component. Recommend orthopedic follow-up to   determine if more imaging is needed. XR HIP LEFT (2-3 VIEWS)   Final Result   Left hip fracture, incompletely evaluated due to rotation. This is likely a   femoral neck fracture although an intertrochanteric component is not   excluded. Consider additional plain films or CT follow-up for more complete   assessment. XR CHEST PORTABLE   Final Result   No acute cardiopulmonary disease. IMPRESSION:  LEFT displaced femoral neck fracture  Depression  HLD  HTN  Principal Problem:    Left displaced femoral neck fracture (HCC)  Resolved Problems:    * No resolved hospital problems. *      RECOMMENDATIONS:  We discussed both operative and non-operative treatments with our recommendation being for operative fixation. After discussion of risks and benefits, the patient agreed to proceed with surgery. - Schedule for LEFT hip hemiarthroplasty with Dr. Leonila Shay 2:30pm today  - NPO  - NWB LLE  - Pain control  - DVT prophylaxis: (Hold anti-coags on day of surgery). - Appreciate pre-op clearance from medicine team  - Verify surgical consent  - Pre op orders placed including STAT labs now  - Dispo: Await post-op therapy eval - likely SNF    Patient denies tobacco use. I have reviewed imaging and plan with Dr. Leonila Shay.       Stephenie Brewer, APRN - CNP  5/16/2022  8:54 AM

## 2022-05-16 NOTE — PROGRESS NOTES
Hospitalist Progress Note      PCP: Rachana Rascon MD    Date of Admission: 5/15/2022    Chief Complaint: Left hip pain    Hospital Course: Ole Lester is a 78 y.o. female with history of depression, HLD, chronic right upper extremity tremors, gait instability (ambulates with a cane at baseline) presented following a fall with severe left-sided hip pain. She was doing her laundry this morning when she lost her balance and fell, landing on head left side. She hit her head on a carpeted floor but there was no LOC. She immediately had severe pain in her left hip with inability to bear weight. Pain is localized on the left hip, severe, no radiation, worse with movement, minimally relieved with morphine in the ER. She denied any chest pain, shortness of breath, dizziness or palpitations prior to fall. Imaging done in the ED showed a left hip fracture for which she was admitted for further management.          Subjective: Patient seen and examined. Scheduled for ORIF today. Left hip pain controlled with pain medications.         Medications:  Reviewed    Infusion Medications    sodium chloride      lactated ringers 100 mL/hr at 05/15/22 1401     Scheduled Medications    ortho mix injection   Injection On Call    tranexamic acid (CYCLOKAPRON) IVPB  1,000 mg IntraVENous On Call to OR    tranexamic acid (CYCLOKAPRON) IVPB  1,000 mg IntraVENous Once    sodium chloride flush  5-40 mL IntraVENous 2 times per day    aspirin  81 mg Oral Daily    atorvastatin  10 mg Oral Every Other Day    buPROPion  150 mg Oral Daily    estradiol  2 g Vaginal Once per day on Mon Thu    losartan  50 mg Oral Daily    Vitamin D  1,000 Units Oral Daily    vilazodone HCl  20 mg Oral Daily    enoxaparin  40 mg SubCUTAneous Daily     PRN Meds: sodium chloride flush, sodium chloride, ondansetron **OR** ondansetron, polyethylene glycol, acetaminophen **OR** acetaminophen, HYDROmorphone, oxyCODONE-acetaminophen, cyclobenzaprine      Intake/Output Summary (Last 24 hours) at 5/16/2022 1051  Last data filed at 5/16/2022 0845  Gross per 24 hour   Intake 0 ml   Output 650 ml   Net -650 ml       Physical Exam Performed:    /74   Pulse 83   Temp 98.5 °F (36.9 °C) (Oral)   Resp 16   Ht 5' 1\" (1.549 m)   Wt 140 lb (63.5 kg)   SpO2 92%   BMI 26.45 kg/m²     General appearance: No apparent distress, appears stated age and cooperative. HEENT: Pupils equal, round, and reactive to light. Conjunctivae/corneas clear. Neck: Supple, with full range of motion. No jugular venous distention. Trachea midline. Respiratory:  Normal respiratory effort. Clear to auscultation, bilaterally without Rales/Wheezes/Rhonchi. Cardiovascular: Regular rate and rhythm with normal S1/S2 without murmurs, rubs or gallops. Abdomen: Soft, non-tender, non-distended with normal bowel sounds. Musculoskeletal: No clubbing, cyanosis or edema bilaterally. Left lower extremity externally rotated and shortened. Skin: Skin color, texture, turgor normal.  No rashes or lesions. Neurologic:  Neurovascularly intact without any focal sensory/motor deficits. Cranial nerves: II-XII intact, grossly non-focal.Tremors in the right upper extremity.   Psychiatric: Alert and oriented, thought content appropriate, normal insight  Capillary Refill: Brisk,3 seconds, normal   Peripheral Pulses: +2 palpable, equal bilaterally       Labs:   Recent Labs     05/15/22  0956 05/16/22 0533   WBC 4.9 4.9   HGB 13.5 11.0*   HCT 40.8 32.9*    104*     Recent Labs     05/15/22  0956 05/16/22  0533    135*   K 4.6 4.5    100   CO2 26 26   BUN 24* 24*   CREATININE 0.8 0.7   CALCIUM 9.5 8.6   PHOS  --  3.5     Recent Labs     05/15/22  0956 05/16/22  0533   AST 19 38*   ALT 15 45*   BILITOT 0.4 0.6   ALKPHOS 97 68     Recent Labs     05/16/22  0906   INR 1.08     Recent Labs     05/15/22  0956   TROPONINI <0.01       Urinalysis:      Lab Results   Component Value Date    NITRU Negative 05/15/2022    WBCUA 3 05/15/2022    BACTERIA None Seen 05/15/2022    RBCUA 1 05/15/2022    BLOODU Negative 05/15/2022    SPECGRAV 1.015 05/15/2022    GLUCOSEU Negative 05/15/2022    GLUCOSEU NEGATIVE 06/01/2012       Radiology:  CT HIP LEFT WO CONTRAST   Final Result   Acute fracture involving the left femoral neck basicervical region with only   some medial intratrochanteric extension along the medial and inferior aspect. Foreshortening of the left femur and minimal lateral displacement. Left   femoral head remains well approximating the left acetabular cup without   dislocation         CT CERVICAL SPINE WO CONTRAST   Final Result      1. No evidence of fracture with multilevel degenerative changes as described. 2.  Approximate 11 mm right thyroid nodule with dense nodular calcification   of indeterminate significance but unchanged from the prior study. CT HEAD WO CONTRAST   Final Result      1. No acute intracranial abnormality is noted. 2.  Prominence of the sulci and/or CSF spaces suggests a degree of cerebral   atrophy. 3.  Mild left maxillary chronic sinusitis. XR HIP LEFT (1 VIEW)   Final Result   Stable left hip series. Left hip fracture, likely a femoral neck fracture   with intertrochanteric component. Recommend orthopedic follow-up to   determine if more imaging is needed. XR HIP LEFT (2-3 VIEWS)   Final Result   Left hip fracture, incompletely evaluated due to rotation. This is likely a   femoral neck fracture although an intertrochanteric component is not   excluded. Consider additional plain films or CT follow-up for more complete   assessment. XR CHEST PORTABLE   Final Result   No acute cardiopulmonary disease. Assessment/Plan:    Active Hospital Problems    Diagnosis     Fall at home [Z09. Yary Chapin, Z32.204]      Priority: Medium    Hyperlipidemia [E78.5]      Priority: Medium    Left displaced femoral neck fracture ([facility] [address] [S72.002A]      Priority: Medium       Left femoral neck fracture:  Orthopedic consulted: Scheduled for ORIF today. Keep NPO. Continue pain medications and IV fluids.   Further recommendations following surgery.        Hypertension: Continue losartan.     Hyperlipidemia: On statin.     Depression: Continue bupropion, Remeron.         DVT Prophylaxis: Lovenox  Diet: Diet NPO Exceptions are: Sips of Water with Meds  Code Status: Full Code    PT/OT Eval Status: Pending    Dispo -once medically stable cleared by Mayr Abdullahi MD

## 2022-05-16 NOTE — PLAN OF CARE
Problem: Discharge Planning  Goal: Discharge to home or other facility with appropriate resources  Outcome: Progressing  Flowsheets (Taken 5/15/2022 2100 by Darya Hooks RN)  Discharge to home or other facility with appropriate resources:   Identify barriers to discharge with patient and caregiver   Arrange for needed discharge resources and transportation as appropriate   Identify discharge learning needs (meds, wound care, etc)   Refer to discharge planning if patient needs post-hospital services based on physician order or complex needs related to functional status, cognitive ability or social support system     Problem: Skin/Tissue Integrity  Goal: Absence of new skin breakdown  Description: 1. Monitor for areas of redness and/or skin breakdown  2. Assess vascular access sites hourly  3. Every 4-6 hours minimum:  Change oxygen saturation probe site  4. Every 4-6 hours:  If on nasal continuous positive airway pressure, respiratory therapy assess nares and determine need for appliance change or resting period.   Outcome: Progressing     Problem: Safety - Adult  Goal: Free from fall injury  Outcome: Progressing     Problem: Pain  Goal: Verbalizes/displays adequate comfort level or baseline comfort level  Outcome: Progressing     Problem: ABCDS Injury Assessment  Goal: Absence of physical injury  Outcome: Progressing

## 2022-05-16 NOTE — ANESTHESIA POSTPROCEDURE EVALUATION
Department of Anesthesiology  Postprocedure Note    Patient: Dylan Peralta  MRN: [id number]  YOB: 1940  Date of evaluation: 5/16/2022  Time:  5:20 PM     Procedure Summary     Date: 05/16/22 Room / Location: [address]    Anesthesia Start: 5010 Anesthesia Stop: 4086    Procedure: LEFT HIP HEMIARTHROPLASTY (Left Hip) Diagnosis: (LEFT HIP FRACTURE)    Surgeons: Erlinda Bergeron MD Responsible Provider: Johnny Caceres MD    Anesthesia Type: general ASA Status: 3          Anesthesia Type: No value filed. Logan Phase I: Logan Score: 9    Logan Phase II:      Last vitals: Reviewed and per EMR flowsheets.        Anesthesia Post Evaluation    Patient location during evaluation: PACU  Patient participation: complete - patient participated  Level of consciousness: awake  Airway patency: patent  Nausea & Vomiting: no vomiting  Complications: no  Cardiovascular status: hemodynamically stable  Respiratory status: acceptable  Hydration status: euvolemic  Multimodal analgesia pain management approach

## 2022-05-16 NOTE — ANESTHESIA PRE PROCEDURE
Department of Anesthesiology  Preprocedure Note       Name:  Vin Cavanaugh   Age:  78 y.o.  :  1940                                          MRN:  [id number]         Date:  2022      Surgeon: Eliceo Penn):  Marcia Gómez MD    Procedure: Procedure(s):  LEFT HIP HEMIARTHROPLASTY    Medications prior to admission:   Prior to Admission medications    Medication Sig Start Date End Date Taking? Authorizing Provider   ARIPiprazole (ABILIFY) 2 MG tablet Take 2 mg by mouth daily   Yes Historical Provider, MD   atorvastatin (LIPITOR) 10 MG tablet TAKE 1 TABLET BY MOUTH EVERY OTHER DAY **CHANGE IN DOSE** 3/21/22   Otto Cox MD   losartan (COZAAR) 50 MG tablet TAKE 1 TABLET BY MOUTH TWICE A DAY WITH MEALS 22   Otto Cox MD   sulfamethoxazole-trimethoprim (BACTRIM;SEPTRA) 400-80 MG per tablet  21   Historical Provider, MD   naproxen (NAPROSYN) 375 MG tablet Take 1 tablet by mouth 2 times daily (with meals)  Patient not taking: Reported on 5/15/2022 12/8/21   Otto Cox MD   buPROPion (WELLBUTRIN XL) 150 MG extended release tablet TAKE 1 TABLET (150 MG TOTAL) BY MOUTH DAILY. INDICATIONS  DEPRESSION 21   Historical Provider, MD   vilazodone HCl (VIIBRYD) 10 MG TABS Take 20 mg by mouth daily  21   Historical Provider, MD   mirtazapine (REMERON) 15 MG tablet 7.5 mg   Patient not taking: Reported on 5/15/2022 5/29/21   Historical Provider, MD   B Complex-C (VITAMIN B + C COMPLEX PO) Take by mouth    Historical Provider, MD   Lift Chair [address] [facility] by Does not apply route 10/3/17   Otto Cox MD   estradiol (ESTRACE) 0.1 MG/GM vaginal cream Place 2 g vaginally Twice a Week     Historical Provider, MD   aspirin 81 MG EC tablet Take 81 mg by mouth daily. Historical Provider, MD   VITAMIN D PO Take  by mouth.     Historical Provider, MD       Current medications:    Current Facility-Administered Medications   Medication Dose Route Frequency Provider Last Rate Last Admin    ortho mix injection   Injection On Call Logan Ritter MD        tranexamic acid (CYKLOKAPRON) 1,000 mg in sodium chloride 0.9 % 50 mL IVPB  1,000 mg IntraVENous On Call to [address] MD        tranexamic acid (CYKLOKAPRON) 1,000 mg in sodium chloride 0.9 % 50 mL IVPB  1,000 mg IntraVENous Once Logan Ritter MD        sodium chloride flush 0.9 % injection 5-40 mL  5-40 mL IntraVENous 2 times per day Grace Reeves MD   10 mL at 05/16/22 0841    sodium chloride flush 0.9 % injection 5-40 mL  5-40 mL IntraVENous PRN Grace Reeves MD        0.9 % sodium chloride infusion   IntraVENous PRN Grace Reeves MD        ondansetron (ZOFRAN-ODT) disintegrating tablet 4 mg  4 mg Oral Q8H PRN Grace Reeves MD        Or    ondansetron [facility]) injection 4 mg  4 mg IntraVENous Q6H PRN Grace Reeves MD        polyethylene glycol (GLYCOLAX) packet 17 g  17 g Oral Daily PRN Grace Reeves MD        acetaminophen (TYLENOL) tablet 650 mg  650 mg Oral Q6H PRN Grace Reeves MD        Or    acetaminophen (TYLENOL) suppository 650 mg  650 mg Rectal Q6H PRN Grace Reeves MD        aspirin EC tablet 81 mg  81 mg Oral Daily Grace Reeves MD        atorvastatin (LIPITOR) tablet 10 mg  10 mg Oral Every Other Day Grace Reeves MD   10 mg at 05/15/22 2101    buPROPion (WELLBUTRIN XL) extended release tablet 150 mg  150 mg Oral Daily Grace Reeves MD        estradiol (ESTRACE) vaginal cream 2 g  2 g Vaginal Once per day on Mon Thu Grace Reeves MD        losartan (COZAAR) tablet 50 mg  50 mg Oral Daily Grace Reeves MD        Vitamin D (CHOLECALCIFEROL) tablet 1,000 Units  1,000 Units Oral Daily Grace Reeves MD        vilazodone HCl (VIIBRYD) TABS 20 mg  20 mg Oral Daily Grace Reeves MD        HYDROmorphone HCl PF (DILAUDID) injection 0.5 mg  0.5 mg IntraVENous Q3H PRN Grace Reeves MD   0.5 mg at 05/16/22 0858    oxyCODONE-acetaminophen (PERCOCET) 5-325 MG per tablet 1 tablet  1 tablet Oral Q4H PRN Karla Willingham MD   1 tablet at 05/15/22 2100    enoxaparin (LOVENOX) injection 40 mg  40 mg SubCUTAneous Daily Karla Willingham MD        lactated ringers infusion   IntraVENous Continuous Karla Willingham  mL/hr at 05/15/22 1401 New Bag at 05/15/22 1401    cyclobenzaprine (FLEXERIL) tablet 5 mg  5 mg Oral TID PRN Karla Willingham MD   5 mg at 05/15/22 1513       Allergies:  No Known Allergies    Problem List:    Patient Active Problem List   Diagnosis Code    Hyperlipidemia E78.5    AR (allergic rhinitis) J30.9    Arthritis of knee M17.10    Patient overweight E66.3    Postmenopausal Z78.0    Essential hypertension I10    Erosive gastritis K29.60    Current mild episode of major depressive disorder without prior episode ([facility] [address] F32.0    Left displaced femoral neck fracture ([facility] [address] S72.002A       Past Medical History:        Diagnosis Date    AR (allergic rhinitis)     Arthritis of knee     Pruis    Depression     Erosive gastritis 10/28/2019    EGD October 2019, he did with PPI    GERD (gastroesophageal reflux disease)     Hyperlipidemia     Internal hemorrhoids     Overweight(278.02)     Viral meningitis 5/12       Past Surgical History:        Procedure Laterality Date    BREAST BIOPSY      CHOLECYSTECTOMY, LAPAROSCOPIC  2003    COLONOSCOPY  8/06    normal; Ortega    COLONOSCOPY  12/3/13    Ortega- polyps    HYSTERECTOMY      AKASH AND BSO  2003    UPPER GASTROINTESTINAL ENDOSCOPY  12/3/13    Skyler Adelaida; antritis       Social History:    Social History     Tobacco Use    Smoking status: Never Smoker    Smokeless tobacco: Never Used   Substance Use Topics    Alcohol use: Yes     Comment: rarely                                Counseling given: Not Answered      Vital Signs (Current):   Vitals:    05/15/22 2100 05/15/22 2130 05/15/22 2330 05/16/22 0845   BP: 138/68  121/70 132/74   Pulse: 80  81 83   Resp: 14 16 16 16   Temp: 97.7 °F (36.5 °C)  98.2 °F (36.8 °C) 98.5 °F (36.9 °C) TempSrc: Oral   Oral   SpO2: 92%   92%   Weight:       Height:                                                  BP Readings from Last 3 Encounters:   05/16/22 132/74   12/08/21 (!) 143/83   09/27/21 114/76       NPO Status: Time of last liquid consumption: 0800                        Time of last solid consumption: 0800                        Date of last liquid consumption: 05/15/22 (about 8 am)                        Date of last solid food consumption: 05/15/22    BMI:   Wt Readings from Last 3 Encounters:   05/15/22 140 lb (63.5 kg)   02/08/22 149 lb 9.6 oz (67.9 kg)   12/08/21 148 lb (67.1 kg)     Body mass index is 26.45 kg/m². CBC:   Lab Results   Component Value Date    WBC 4.9 05/16/2022    RBC 3.55 05/16/2022    HGB 11.0 05/16/2022    HCT 32.9 05/16/2022    MCV 92.5 05/16/2022    RDW 13.3 05/16/2022     05/16/2022       CMP:   Lab Results   Component Value Date     05/16/2022    K 4.5 05/16/2022    K 4.6 05/15/2022     05/16/2022    CO2 26 05/16/2022    BUN 24 05/16/2022    CREATININE 0.7 05/16/2022    GFRAA >60 05/16/2022    GFRAA >60 05/08/2013    AGRATIO 1.4 05/16/2022    LABGLOM >60 05/16/2022    GLUCOSE 99 05/16/2022    PROT 5.3 05/16/2022    PROT 7.3 10/17/2012    CALCIUM 8.6 05/16/2022    BILITOT 0.6 05/16/2022    ALKPHOS 68 05/16/2022    AST 38 05/16/2022    ALT 45 05/16/2022       POC Tests: No results for input(s): POCGLU, POCNA, POCK, POCCL, POCBUN, POCHEMO, POCHCT in the last 72 hours.     Coags:   Lab Results   Component Value Date    PROTIME 12.7 06/01/2012    INR 1.12 06/01/2012    APTT 28.7 06/01/2012       HCG (If Applicable): No results found for: PREGTESTUR, PREGSERUM, HCG, HCGQUANT     ABGs: No results found for: PHART, PO2ART, QRR3SVH, FVG8UHX, BEART, I8AFEFKU     Type & Screen (If Applicable):  No results found for: LABABO, LABRH    Drug/Infectious Status (If Applicable):  No results found for: HIV, HEPCAB    COVID-19 Screening (If Applicable): No results found for: COVID19        Anesthesia Evaluation  Patient summary reviewed and Nursing notes reviewed no history of anesthetic complications:   Airway: Mallampati: III  TM distance: >3 FB   Neck ROM: full  Mouth opening: < 3 FB Dental: normal exam         Pulmonary:       (-) COPD, asthma, rhonchi and wheezes                           Cardiovascular:    (+) hypertension:,     (-) CABG/stent, dysrhythmias and  angina        Rate: normal                 ROS comment: Transthoracic Echocardiography Report (TTE)      Demographics      Patient Name       [facility]      Date of Study      02/15/2021         Gender              Female      Patient Number     [id number]         Date of Birth       1940      Visit Number       [id number]          Age                 78 year(s)      Accession Number   [id number]         Room Number         OP      Corporate ID       [id number]           Sonographer         Clarita Sanchez RVT, BS      Ordering Physician Bri Morillo MD       Physician     Procedure     Type of Study      TTE procedure:CARDIAC DOPPLER. Procedure Date  Date: 02/15/2021 Start: 11:39 AM     Study Location: [facility] - Echo Lab  Technical Quality: Adequate visualization     Indications:Cardiomegaly.     Additional Indications:Enlarged heart on CXR. .     Patient Status: Routine     Height: 62 inches Weight: 174 pounds BSA: 1.8 m2 BMI: 31.83 kg/m2     HR: 79 bpm BP: 144/80 mmHg      Conclusions      Summary   Normal left ventricle size, wall thickness, and systolic function with an   estimated ejection fraction of 55-60%. Abnormal septal motion. Normal diastolic filling pattern for age. The mitral valve leaflets appear myxomatous. The aortic valve is mildly thickened/calcified but opens well with normal   gradients.    Trivial aortic regurgitation. Signature      ------------------------------------------------------------------   Electronically signed by Dakotah Grant DO (Interpreting   physician) on 02/15/2021 at 03:16 PM   ------------------------------------------------------------------    2021 nrl stress echo     Neuro/Psych:   (+) psychiatric history (depression):   (-) seizures, TIA and CVA            ROS comment: Tremor RUE past 6-8 mo. Has been seen by neurology. Per family at bedside thought to be side effect of Ability  Walks with cane  Mechanical fall GI/Hepatic/Renal:   (+) GERD:, PUD,           Endo/Other:    (+) blood dyscrasia: thrombocytopenia:., .                 Abdominal:             Vascular:     - DVT and PE. Other Findings:           Anesthesia Plan      general     ASA 3     (Medical history and Informed consent taken with the help of family at bedside. All questions answered.)  Induction: intravenous. MIPS: Postoperative opioids intended and Prophylactic antiemetics administered. Anesthetic plan and risks discussed with patient (family at bedside). Use of blood products discussed with patient whom consented to blood products. Blood Products Consent Comment: Family at bedside  Plan discussed with CRNA.                   Savi Blair MD   5/16/2022

## 2022-05-16 NOTE — PROGRESS NOTES
Pt transferred from OR to pacu. VSS. O2 sats 97% on 4L NC. Left hip dressing CD&I. Pt awake and alert.   Will monitor

## 2022-05-16 NOTE — PROGRESS NOTES
PM assessment complete, vitals stable, medications given per MAR, pedal pulses present bilaterally.  Llamas patent, patient aware of NPO status at midnight

## 2022-05-17 LAB
A/G RATIO: 1.4 (ref 1.1–2.2)
ALBUMIN SERPL-MCNC: 2.8 G/DL (ref 3.4–5)
ALP BLD-CCNC: 56 U/L (ref 40–129)
ALT SERPL-CCNC: 22 U/L (ref 10–40)
ANION GAP SERPL CALCULATED.3IONS-SCNC: 9 MMOL/L (ref 3–16)
AST SERPL-CCNC: 20 U/L (ref 15–37)
BASOPHILS ABSOLUTE: 0 K/UL (ref 0–0.2)
BASOPHILS RELATIVE PERCENT: 0.1 %
BILIRUB SERPL-MCNC: 0.3 MG/DL (ref 0–1)
BUN BLDV-MCNC: 23 MG/DL (ref 7–20)
CALCIUM SERPL-MCNC: 7.7 MG/DL (ref 8.3–10.6)
CHLORIDE BLD-SCNC: 102 MMOL/L (ref 99–110)
CO2: 23 MMOL/L (ref 21–32)
CREAT SERPL-MCNC: 0.8 MG/DL (ref 0.6–1.2)
EOSINOPHILS ABSOLUTE: 0 K/UL (ref 0–0.6)
EOSINOPHILS RELATIVE PERCENT: 0.1 %
GFR AFRICAN AMERICAN: >60
GFR NON-AFRICAN AMERICAN: >60
GLUCOSE BLD-MCNC: 206 MG/DL (ref 70–99)
HCT VFR BLD CALC: 28.5 % (ref 36–48)
HEMOGLOBIN: 9.4 G/DL (ref 12–16)
LYMPHOCYTES ABSOLUTE: 0.5 K/UL (ref 1–5.1)
LYMPHOCYTES RELATIVE PERCENT: 8 %
MAGNESIUM: 1.7 MG/DL (ref 1.8–2.4)
MCH RBC QN AUTO: 30.6 PG (ref 26–34)
MCHC RBC AUTO-ENTMCNC: 33 G/DL (ref 31–36)
MCV RBC AUTO: 92.8 FL (ref 80–100)
MONOCYTES ABSOLUTE: 0.3 K/UL (ref 0–1.3)
MONOCYTES RELATIVE PERCENT: 5.2 %
NEUTROPHILS ABSOLUTE: 5.6 K/UL (ref 1.7–7.7)
NEUTROPHILS RELATIVE PERCENT: 86.6 %
PDW BLD-RTO: 13.2 % (ref 12.4–15.4)
PHOSPHORUS: 2.6 MG/DL (ref 2.5–4.9)
PLATELET # BLD: 86 K/UL (ref 135–450)
PLATELET SLIDE REVIEW: ABNORMAL
PMV BLD AUTO: 8.1 FL (ref 5–10.5)
POTASSIUM SERPL-SCNC: 3.9 MMOL/L (ref 3.5–5.1)
RBC # BLD: 3.07 M/UL (ref 4–5.2)
REASON FOR REJECTION: NORMAL
REJECTED TEST: NORMAL
SLIDE REVIEW: ABNORMAL
SODIUM BLD-SCNC: 134 MMOL/L (ref 136–145)
TOTAL PROTEIN: 4.8 G/DL (ref 6.4–8.2)
WBC # BLD: 6.5 K/UL (ref 4–11)

## 2022-05-17 PROCEDURE — 36415 COLL VENOUS BLD VENIPUNCTURE: CPT

## 2022-05-17 PROCEDURE — 85025 COMPLETE CBC W/AUTO DIFF WBC: CPT

## 2022-05-17 PROCEDURE — 6370000000 HC RX 637 (ALT 250 FOR IP): Performed by: NURSE PRACTITIONER

## 2022-05-17 PROCEDURE — 97535 SELF CARE MNGMENT TRAINING: CPT

## 2022-05-17 PROCEDURE — 6360000002 HC RX W HCPCS: Performed by: PHYSICIAN ASSISTANT

## 2022-05-17 PROCEDURE — 97530 THERAPEUTIC ACTIVITIES: CPT

## 2022-05-17 PROCEDURE — 83735 ASSAY OF MAGNESIUM: CPT

## 2022-05-17 PROCEDURE — 1200000000 HC SEMI PRIVATE

## 2022-05-17 PROCEDURE — 2580000003 HC RX 258: Performed by: NURSE PRACTITIONER

## 2022-05-17 PROCEDURE — 97165 OT EVAL LOW COMPLEX 30 MIN: CPT

## 2022-05-17 PROCEDURE — 99024 POSTOP FOLLOW-UP VISIT: CPT | Performed by: NURSE PRACTITIONER

## 2022-05-17 PROCEDURE — APPNB45 APP NON BILLABLE 31-45 MINUTES: Performed by: NURSE PRACTITIONER

## 2022-05-17 PROCEDURE — 80053 COMPREHEN METABOLIC PANEL: CPT

## 2022-05-17 PROCEDURE — 84100 ASSAY OF PHOSPHORUS: CPT

## 2022-05-17 PROCEDURE — 6360000002 HC RX W HCPCS: Performed by: NURSE PRACTITIONER

## 2022-05-17 PROCEDURE — 97161 PT EVAL LOW COMPLEX 20 MIN: CPT

## 2022-05-17 PROCEDURE — 6370000000 HC RX 637 (ALT 250 FOR IP): Performed by: PHYSICIAN ASSISTANT

## 2022-05-17 RX ORDER — ARIPIPRAZOLE 2 MG/1
2 TABLET ORAL DAILY
Status: DISCONTINUED | OUTPATIENT
Start: 2022-05-17 | End: 2022-05-21 | Stop reason: HOSPADM

## 2022-05-17 RX ORDER — OXYCODONE HYDROCHLORIDE AND ACETAMINOPHEN 5; 325 MG/1; MG/1
1 TABLET ORAL EVERY 4 HOURS PRN
Qty: 42 TABLET | Refills: 0 | Status: SHIPPED | OUTPATIENT
Start: 2022-05-17 | End: 2022-05-20

## 2022-05-17 RX ORDER — ACETAMINOPHEN 500 MG
1000 TABLET ORAL 3 TIMES DAILY
Status: DISCONTINUED | OUTPATIENT
Start: 2022-05-17 | End: 2022-05-21 | Stop reason: HOSPADM

## 2022-05-17 RX ORDER — LOSARTAN POTASSIUM 25 MG/1
50 TABLET ORAL 2 TIMES DAILY
Status: DISCONTINUED | OUTPATIENT
Start: 2022-05-17 | End: 2022-05-17

## 2022-05-17 RX ORDER — ENOXAPARIN SODIUM 100 MG/ML
40 INJECTION SUBCUTANEOUS DAILY
Qty: 28 EACH | Refills: 0 | Status: SHIPPED | OUTPATIENT
Start: 2022-05-18 | End: 2022-06-22 | Stop reason: ALTCHOICE

## 2022-05-17 RX ORDER — LOSARTAN POTASSIUM 25 MG/1
50 TABLET ORAL DAILY
Status: DISCONTINUED | OUTPATIENT
Start: 2022-05-18 | End: 2022-05-18

## 2022-05-17 RX ORDER — MAGNESIUM SULFATE 1 G/100ML
1000 INJECTION INTRAVENOUS ONCE
Status: COMPLETED | OUTPATIENT
Start: 2022-05-17 | End: 2022-05-17

## 2022-05-17 RX ADMIN — CEFAZOLIN SODIUM 2000 MG: 10 INJECTION, POWDER, FOR SOLUTION INTRAVENOUS at 06:44

## 2022-05-17 RX ADMIN — LOSARTAN POTASSIUM 50 MG: 25 TABLET, FILM COATED ORAL at 09:08

## 2022-05-17 RX ADMIN — ACETAMINOPHEN 1000 MG: 500 TABLET ORAL at 14:44

## 2022-05-17 RX ADMIN — ACETAMINOPHEN 1000 MG: 500 TABLET ORAL at 10:43

## 2022-05-17 RX ADMIN — ARIPIPRAZOLE 2 MG: 2 TABLET ORAL at 10:43

## 2022-05-17 RX ADMIN — BUPROPION HYDROCHLORIDE 150 MG: 150 TABLET, EXTENDED RELEASE ORAL at 09:07

## 2022-05-17 RX ADMIN — ATORVASTATIN CALCIUM 10 MG: 10 TABLET, FILM COATED ORAL at 09:07

## 2022-05-17 RX ADMIN — VILAZODONE HYDROCHLORIDE 20 MG: 20 TABLET ORAL at 09:08

## 2022-05-17 RX ADMIN — ENOXAPARIN SODIUM 40 MG: 100 INJECTION SUBCUTANEOUS at 09:08

## 2022-05-17 RX ADMIN — ACETAMINOPHEN 1000 MG: 500 TABLET ORAL at 22:04

## 2022-05-17 RX ADMIN — Medication 10 ML: at 22:06

## 2022-05-17 RX ADMIN — Medication 1000 UNITS: at 09:08

## 2022-05-17 RX ADMIN — ASPIRIN 81 MG: 81 TABLET, COATED ORAL at 09:08

## 2022-05-17 RX ADMIN — MAGNESIUM SULFATE HEPTAHYDRATE 1000 MG: 1 INJECTION, SOLUTION INTRAVENOUS at 10:42

## 2022-05-17 ASSESSMENT — PAIN DESCRIPTION - ORIENTATION
ORIENTATION: LEFT

## 2022-05-17 ASSESSMENT — PAIN SCALES - GENERAL
PAINLEVEL_OUTOF10: 0
PAINLEVEL_OUTOF10: 3
PAINLEVEL_OUTOF10: 0
PAINLEVEL_OUTOF10: 0

## 2022-05-17 ASSESSMENT — PAIN DESCRIPTION - LOCATION
LOCATION: HIP

## 2022-05-17 NOTE — PROGRESS NOTES
Hospitalist Progress Note      PCP: Alan Perez MD    Date of Admission: 5/15/2022    Chief Complaint: Left hip pain    Hospital Course: Kaelyn Alvarado is a 78 y.o. female with history of depression, HLD, chronic right upper extremity tremors, gait instability (ambulates with a cane at baseline) presented following a fall with severe left-sided hip pain. She was doing her laundry this morning when she lost her balance and fell, landing on head left side. She hit her head on a carpeted floor but there was no LOC. She immediately had severe pain in her left hip with inability to bear weight. Pain is localized on the left hip, severe, no radiation, worse with movement, minimally relieved with morphine in the ER. She denied any chest pain, shortness of breath, dizziness or palpitations prior to fall. Imaging done in the ED showed a left hip fracture for which she was admitted for further management.          Subjective: Patient seen and examined. S/p L hip hemiarthroplasty. Left hip pain controlled with pain medications. Has several concerns about her meds.         Medications:  Reviewed    Infusion Medications    sodium chloride 100 mL/hr at 05/16/22 1818    sodium chloride 100 mL/hr at 05/16/22 1446    sodium chloride       Scheduled Medications    acetaminophen  1,000 mg Oral TID    ARIPiprazole  2 mg Oral Daily    losartan  50 mg Oral BID    sodium chloride flush  5-40 mL IntraVENous 2 times per day    aspirin  81 mg Oral Daily    atorvastatin  10 mg Oral Every Other Day    buPROPion  150 mg Oral Daily    estradiol  2 g Vaginal Once per day on Mon Thu    Vitamin D  1,000 Units Oral Daily    vilazodone HCl  20 mg Oral Daily    enoxaparin  40 mg SubCUTAneous Daily     PRN Meds: sodium chloride flush, sodium chloride, ondansetron **OR** ondansetron, polyethylene glycol, acetaminophen **OR** acetaminophen, HYDROmorphone, oxyCODONE-acetaminophen, cyclobenzaprine      Intake/Output Summary (Last 24 hours) at 5/17/2022 1331  Last data filed at 5/17/2022 1259  Gross per 24 hour   Intake 800 ml   Output 2500 ml   Net -1700 ml       Physical Exam Performed:    BP (!) 95/57   Pulse 89   Temp 98.2 °F (36.8 °C) (Oral)   Resp 16   Ht 5' 1\" (1.549 m)   Wt 140 lb (63.5 kg)   SpO2 95%   BMI 26.45 kg/m²     General appearance: No apparent distress, appears stated age and cooperative. HEENT: Pupils equal, round, and reactive to light. Conjunctivae/corneas clear. Neck: Supple, with full range of motion. No jugular venous distention. Trachea midline. Respiratory:  Normal respiratory effort. Clear to auscultation, bilaterally without Rales/Wheezes/Rhonchi. Cardiovascular: Regular rate and rhythm with normal S1/S2 without murmurs, rubs or gallops. Abdomen: Soft, non-tender, non-distended with normal bowel sounds. Musculoskeletal: No clubbing, cyanosis or edema bilaterally. Left lower extremity externally rotated and shortened. Skin: Skin color, texture, turgor normal.  No rashes or lesions. Neurologic:  Neurovascularly intact without any focal sensory/motor deficits. Cranial nerves: II-XII intact, grossly non-focal.Tremors in the right upper extremity.   Psychiatric: Alert and oriented, thought content appropriate, normal insight  Capillary Refill: Brisk,3 seconds, normal   Peripheral Pulses: +2 palpable, equal bilaterally       Labs:   Recent Labs     05/15/22  0956 05/16/22 0533 05/17/22  0941   WBC 4.9 4.9 6.5   HGB 13.5 11.0* 9.4*   HCT 40.8 32.9* 28.5*    104* 86*     Recent Labs     05/15/22  0956 05/16/22  0533 05/17/22  0941    135* 134*   K 4.6 4.5 3.9    100 102   CO2 26 26 23   BUN 24* 24* 23*   CREATININE 0.8 0.7 0.8   CALCIUM 9.5 8.6 7.7*   PHOS  --  3.5 2.6     Recent Labs     05/15/22  0956 05/16/22  0533 05/17/22  0941   AST 19 38* 20   ALT 15 45* 22   BILITOT 0.4 0.6 0.3   ALKPHOS 97 68 56     Recent Labs     05/16/22  0906   INR 1.08     Recent Labs 05/15/22  0956   TROPONINI <0.01       Urinalysis:      Lab Results   Component Value Date    NITRU Negative 05/15/2022    WBCUA 3 05/15/2022    BACTERIA None Seen 05/15/2022    RBCUA 1 05/15/2022    BLOODU Negative 05/15/2022    SPECGRAV 1.015 05/15/2022    GLUCOSEU Negative 05/15/2022    GLUCOSEU NEGATIVE 06/01/2012       Radiology:  XR HIP 1 VW W PELVIS LEFT   Final Result   Status post recent left hip arthroplasty without complication. CT HIP LEFT WO CONTRAST   Final Result   Acute fracture involving the left femoral neck basicervical region with only   some medial intratrochanteric extension along the medial and inferior aspect. Foreshortening of the left femur and minimal lateral displacement. Left   femoral head remains well approximating the left acetabular cup without   dislocation         CT CERVICAL SPINE WO CONTRAST   Final Result      1. No evidence of fracture with multilevel degenerative changes as described. 2.  Approximate 11 mm right thyroid nodule with dense nodular calcification   of indeterminate significance but unchanged from the prior study. CT HEAD WO CONTRAST   Final Result      1. No acute intracranial abnormality is noted. 2.  Prominence of the sulci and/or CSF spaces suggests a degree of cerebral   atrophy. 3.  Mild left maxillary chronic sinusitis. XR HIP LEFT (1 VIEW)   Final Result   Stable left hip series. Left hip fracture, likely a femoral neck fracture   with intertrochanteric component. Recommend orthopedic follow-up to   determine if more imaging is needed. XR HIP LEFT (2-3 VIEWS)   Final Result   Left hip fracture, incompletely evaluated due to rotation. This is likely a   femoral neck fracture although an intertrochanteric component is not   excluded. Consider additional plain films or CT follow-up for more complete   assessment. XR CHEST PORTABLE   Final Result   No acute cardiopulmonary disease. Assessment/Plan:    Active Hospital Problems    Diagnosis     Fall at home [J80. Kathryn Berry, R04.213]      Priority: Medium    Hyperlipidemia [E78.5]      Priority: Medium    Left displaced femoral neck fracture ([facility] [address] [S72.002A]      Priority: Medium       1. Left femoral neck fracture: s/p L hip hemiarthroplasty. PT/OT to see. Patient has been accepted at [facility]. Awaiting precert. 2. Hypertension: Continue losartan but increase to bid. 3. Depression-resume abilify  4. Hyperlipidemia-On statin-time it for every other evening per patient request.          DVT Prophylaxis: Lovenox  Diet: ADULT DIET;  Regular  Code Status: Full Code        Lazarus Monsalve PA-C

## 2022-05-17 NOTE — CARE COORDINATION
Discharge Planning Assessment  Risk of Readmission Score: 12%    RN discharge planner met with patient and Juan Diego Alberto, son, to discuss reason for admission, current living situation, and potential needs at the time of discharge. Demographics/Insurance verified Yes    Current type of dwelling: Two story home with two steps to enter. The bedroom in on the second floor. Patient from ECF/ confirmed with: N/A    Living arrangements: Spouse    Level of function/Support: Independent, cane to ambulate at times. PCP: Lois Rinne, MD    Last Visit to PCP: December 2021    DME: cane, walk-in shower    Active with any community resources/agencies/skilled home care: N/A    Medication compliance issues: The patient is compliant with her medications.     Financial issues that could impact healthcare: None    Tentative discharge plan: Surgery complete, PT/OT pending, discharge plan TBD    Transportation at the time of discharge: TBD    JUDSON Cox RN    [facility]  Phone: [phone]

## 2022-05-17 NOTE — CARE COORDINATION
Discharge Planning Note:    Met with the patient and Rigoberto Hill, son. An approved SNF list was provided for review. The following referrals were sent at the request of the patient:    - [address] [place] - waiting on response    - [address]    Will continue to follow.     DRISS ShaikhN RN    [facility]  Phone: [phone]

## 2022-05-17 NOTE — PLAN OF CARE
Problem: Discharge Planning  Goal: Discharge to home or other facility with appropriate resources  5/17/2022 0954 by Filipe Carlisle RN  Outcome: Progressing  5/17/2022 0421 by Marian Veloz RN  Outcome: Progressing  Flowsheets (Taken 5/16/2022 2300)  Discharge to home or other facility with appropriate resources:   Identify barriers to discharge with patient and caregiver   Refer to discharge planning if patient needs post-hospital services based on physician order or complex needs related to functional status, cognitive ability or social support system     Problem: Skin/Tissue Integrity  Goal: Absence of new skin breakdown  Description: 1. Monitor for areas of redness and/or skin breakdown  2. Assess vascular access sites hourly  3. Every 4-6 hours minimum:  Change oxygen saturation probe site  4. Every 4-6 hours:  If on nasal continuous positive airway pressure, respiratory therapy assess nares and determine need for appliance change or resting period.   5/17/2022 0954 by Filipe Carlisle RN  Outcome: Progressing  5/17/2022 0421 by Marian Veloz RN  Outcome: Progressing     Problem: Safety - Adult  Goal: Free from fall injury  5/17/2022 0954 by Filipe Carlisle RN  Outcome: Progressing  5/17/2022 0421 by Marian Veloz RN  Outcome: Progressing     Problem: Pain  Goal: Verbalizes/displays adequate comfort level or baseline comfort level  5/17/2022 0954 by Filipe Carlisle RN  Outcome: Progressing  5/17/2022 0421 by Marian Veloz RN  Outcome: Progressing     Problem: ABCDS Injury Assessment  Goal: Absence of physical injury  5/17/2022 0954 by Filipe Carlisle RN  Outcome: Progressing  5/17/2022 0421 by Marian Veloz RN  Outcome: Progressing

## 2022-05-17 NOTE — OP NOTE
Operative Note      Patient: Terra Kilgore  YOB: 1940  MRN: [id number]    Date of Procedure: 5/16/2022    Pre-Op Diagnosis: LEFT HIP FRACTURE    Post-Op Diagnosis: Same       Procedure(s):  LEFT HIP HEMIARTHROPLASTY    Surgeon(s):  Suma Henderson MD    Assistant:   Surgical Assistant: Rama Alva    Anesthesia: General    Estimated Blood Loss (mL): 797     Complications: None    Specimens:   * No specimens in log *    Implants:  Implant Name Type Inv.  Item Serial No.  Lot No. LRB No. Used Action   CEMENT BNE 40GM HI VISC RADPQ FOR REV SURG - [id number]  CEMENT BNE 40GM HI VISC RADPQ FOR REV SURG  [employer]- [id number] Left 1 Implanted   CEMENT BNE 40GM HI VISC RADPQ FOR REV SURG - [id number]  CEMENT BNE 40GM HI VISC RADPQ FOR REV SURG  [employer]-WD [id number] Left 1 Implanted   CEMENT BIOPREP ST - [id number] Cement CEMENT BIOPREP ST  ANNABEL: ORTHOPAEDICS-PMM [id number] Left 1 Implanted   CENTRALIZER STEM [id number] DST FEM LEE MOLD [place] BASIC - [id number]  CENTRALIZER STEM [id number] DST FEM LEE MOLD [place] [employer]  [facility] [id number] Left 1 Implanted   STEM FEM SZ 3 L108MM NK L36MM 44MM OFFSET 130DEG BILAT HIP - [id number]  STEM FEM SZ 3 L108MM NK L36MM 44MM OFFSET 130DEG BILAT HIP  [facility] [id number] Left 1 Implanted   HEAD BPLR OD42MM ID28MM FEM HIP ECC SELF CNTR - [id number]  HEAD BPLR OD42MM ID28MM FEM HIP ECC SELF CNTR  [facility] [id number] Left 1 Implanted   HEAD FEM [id number] +12MM OFFSET HIP ULT STD ARTC EZ 12/14 - [id number]  HEAD FEM [id number] +12MM OFFSET HIP ULT STD ARTC EZ 12/14  [facility] [id number] Left 1 Implanted         Drains:   Urinary Catheter Llamas (Active)   $ Urethral catheter insertion Inserted for procedure 05/17/22 0904   Catheter Indications Perioperative use for selected surgical procedures 05/17/22 0904   Site Assessment No urethral drainage 05/17/22 0904   Urine Color Yellow 05/17/22 0904   Urine Appearance Clear 05/17/22 0904   Urine Odor Other (Comment) 05/17/22 0904   Collection Container Standard 05/17/22 0904   Securement Method Securing device (Describe) 05/17/22 0904   Catheter Best Practices  Drainage tube clipped to bed;Catheter secured to thigh; Tamper seal intact; Bag below bladder;Bag not on floor; Lack of dependent loop in tubing;Drainage bag less than half full 05/17/22 0904   Status Draining 05/17/22 0904   Output (mL) 550 mL 05/17/22 0630       Findings: per dictation    Detailed Description of Procedure: The patient is a 78year-old female who sustained a left femoral neck fracture.  I saw and examined her.  Discussed treatment options.  I did recommend surgical intervention in the form of bipolar hemiarthroplasty of right hip.  Risks were defined as but not limited to infection, bleeding, damage to blood vessels or nerves, need for additional surgery, dislocation, and medical complications.  She did understand like to proceed.     Procedure  The patient was met in the preoperative holding area.  Surgical site was identified and marked.  Informed consent was obtained.  The patient was taken back to the operating room placed on table in supine position. Kelli Crutch was performed he was then placed in the lateral decubitus position.  All bony prominences well-padded.  SCD was placed on the nonoperative leg.  The right lower extremities prepped draped you sterile technique.  Following this a formal timeout is performed which correct patient surgical site and procedure was reaffirmed.  Findings initial decision was made.  This was a curvilinear posterior based incision based on the greater trochanter.  Following the skin incision dissection was carried out through the subcutaneous tissue down to fascia layer.  The fascial layer was incised in line with the skin incision.  The gluteus remedios fibers were split more proximally. piriformis over a bony bridge.  Fascial layer was closed using #1 Vicryl in figure-of-eight fashion.  Subtendinous layer was closed using 2-0 Vicryl in buried fashion.  Skin was closed using staples.  Sterile dressing was applied.  At the end of the procedure all counts correct.  I was present for the entire case.  The patient was successfully transferred to the recovery room in excellent condition.  Postoperative plan.  The patient will be weightbearing as tolerated.      Electronically signed by Cathi Jon MD on 5/17/2022 at 11:00 AM

## 2022-05-17 NOTE — PROGRESS NOTES
[facility] [address]   Phone: [phone]    Occupational Therapy    [] Initial Evaluation            [] Daily Treatment Note         [] Discharge Summary      Patient: Hilary Barry   : 1940   MRN: [id number]   Date of Service:  2022    Admitting Diagnosis:  Left displaced femoral neck fracture ([address]  Current Admission Summary: Hilary Barry is a 78 y.o. female with history of depression, HLD, chronic right upper extremity tremors, gait instability (ambulates with a cane at baseline) presented following a fall with severe left-sided hip pain. She was doing her laundry this morning when she lost her balance and fell, landing on head left side. She hit her head on a carpeted floor but there was no LOC. She immediately had severe pain in her left hip with inability to bear weight. Pain is localized on the left hip, severe, no radiation, worse with movement, minimally relieved with morphine in the ER. She denied any chest pain, shortness of breath, dizziness or palpitations prior to fall. Imaging done in the ED showed a left hip fracture for which she was admitted for further management. Past Medical History:  has a past medical history of AR (allergic rhinitis), Arthritis of knee, Depression, Erosive gastritis, GERD (gastroesophageal reflux disease), Hyperlipidemia, Internal hemorrhoids, Overweight(278.02), and Viral meningitis. Past Surgical History:  has a past surgical history that includes Cholecystectomy, laparoscopic (); lalo and bso (cervix removed) (); Colonoscopy (); Colonoscopy (12/3/13); Upper gastrointestinal endoscopy (12/3/13); Breast biopsy; Hysterectomy; and hip surgery (Left, 2022). Discharge [facility] scored a 12/24 on the AM-PAC ADL Inpatient form. Current research shows that an AM-PAC score of 17 or less is typically not associated with a discharge to the patient's home setting.  Based on the Assessment: Impaired and Vision Corrective Device: wears glasses for distance  Hearing:   WFL  Perception:   WFL  Observation:   General Observation:  forward head flexion; rounded shoulders, thoracic kyphosis  Posture:   Fair--notable posterior lean during stance  Sensation:   WFL  Proprioception:    WFL  Tone:   Normotonic  ROM:   (B) UE AROM WFL  Strength:   (B) UE strength grossly -4    Decision Making: low complexity  Clinical Presentation: stable      Subjective  General: Pt supine in bed upon entry. Son and spouse present in room and very chatty. Pt is pleasant and agreeable to PT/OT evaluations. Pain: 5/10. Location: posterior hip due to swelling  Pain Interventions: pain medication in place prior to arrival--ice pack also provided. Activities of Daily Living  Basic Activities of Daily Living  Feeding: setup assistance  Lower Extremity Dressing: maximum assistance dependent . Comment: Dependent for threading socks with education on avoiding 90 degree hip flexion. Dependent for threading breifs sitting EOB--Max A to pull from knees to hips in stance; however, pt able to initiate activity in stance. .  Equipment: none  Instrumental Activities of Daily Living  No IADL completed on this date. Functional Mobility  Bed Mobility  Supine to Sit: moderate assistance, 2 person assistance with Mod A , . Comment Mod A x2 progressing to Mod A x1 for supine to sit  Scooting: maximum assistance  Comments:  Transfers  Sit to stand transfer:2 person assistance with min A   Stand to sit transfer: 2 person assistance with min A   Stand step transfer: moderate assistance, 2 person assistance with Min A , . Comment Min X x2 for stand step with x1 mod A for one LOB  Comments: sit>stand x 1 trial at EOB progressing to stand/step from EOB to chair. Functional Mobility:  Sitting Balance: stand by assistance, contact guard assistance, . Comment SBA with intermittent CGA for sitting EOB x 6-7 min .     Sitting Balance Comment: sitting EOB  Standing Balance: 2 person assistance with Min A . Other Therapeutic Interventions    Functional Outcomes  AM-PAC Inpatient Daily Activity Raw Score: 12    Cognition  Overall Cognitive Status: WFL  Orientation:    alert and oriented x 4  [facility]  Barriers To Learning: none  Patient Education: patient educated on goals, OT role and benefits, precautions, ADL adaptive strategies, proper use of assistive device/equipment, adaptive device training, family education, discharge recommendations  Learning Assessment:  patient verbalizes understanding, would benefit from continued reinforcement    Assessment  Activity Tolerance: Pt fatigues easily and limited by pain in her L hip. Pt currently unable to tolerate lengthy ADL sessions, especially in stance/ambulation. Impairments Requiring Therapeutic Intervention: decreased functional mobility, decreased ADL status, decreased ROM, decreased strength, decreased safety awareness, decreased cognition, decreased endurance, decreased balance, increased pain  Prognosis: good  Clinical Assessment: Pt presenting below her functional baseline with the above deficits associated with fall--traumatic hip fracture s/p L hemiarthroplasty. Pt is primarily limited by pain, weakness, and quickness to fatigue.  Continued OT indicated in order to maximize safety and independence with all occupational pursuits  Safety Interventions: patient left in chair, chair alarm in place, call light within reach, nurse notified and family/caregiver present    Plan  Frequency: 7 x/week  Current Treatment Recommendations: strengthening, balance training, functional mobility training, transfer training, gait training, stair training, endurance training, modalities, ADL/self-care training, IADL training, home management training and pain management    Goals  Patient Goals: Return to home   Short Term Goals:  Time Frame: Discharge  Patient will complete upper body ADL at stand by assistance   Patient will complete lower body ADL at stand by assistance   Patient will complete toileting at stand by assistance   Patient will complete grooming at stand by assistance   Patient will complete functional transfers at stand by assistance   Patient will complete functional mobility at stand by assistance   Patient will increase functional standing balance to SBA for improved ADL completion    Therapy Session Time     Individual Group Co-treatment   Time In    1031   Time Out    1125   Minutes    54        Timed Code Treatment Minutes:  39 Minutes    Total Treatment Minutes:  54 minutes     Electronically Signed By: DANTE Landa OTR/L  [id number]

## 2022-05-17 NOTE — PLAN OF CARE
Problem: Discharge Planning  Goal: Discharge to home or other facility with appropriate resources  Outcome: Progressing  Flowsheets (Taken 5/16/2022 2300)  Discharge to home or other facility with appropriate resources:   Identify barriers to discharge with patient and caregiver   Refer to discharge planning if patient needs post-hospital services based on physician order or complex needs related to functional status, cognitive ability or social support system     Problem: Skin/Tissue Integrity  Goal: Absence of new skin breakdown  Description: 1. Monitor for areas of redness and/or skin breakdown  2. Assess vascular access sites hourly  3. Every 4-6 hours minimum:  Change oxygen saturation probe site  4. Every 4-6 hours:  If on nasal continuous positive airway pressure, respiratory therapy assess nares and determine need for appliance change or resting period.   Outcome: Progressing     Problem: Safety - Adult  Goal: Free from fall injury  Outcome: Progressing     Problem: Pain  Goal: Verbalizes/displays adequate comfort level or baseline comfort level  Outcome: Progressing     Problem: ABCDS Injury Assessment  Goal: Absence of physical injury  Outcome: Progressing

## 2022-05-17 NOTE — PROGRESS NOTES
[facility] Orthopedic Surgery   Progress Note    CHIEF COMPLAINT/DIAGNOSIS: S/p LEFT hip hemiarthroplasty     SUBJECTIVE: The patient is sitting up in the bed; describes mild hip pain. Denies paresthesias. Son at bedside. She is tolerating breakfast.  Denies other issues. Has not yet seen therapy. OBJECTIVE  Physical    VITALS:  BP (!) 120/55   Pulse 92   Temp 98.4 °F (36.9 °C) (Oral)   Resp 16   Ht 5' 1\" (1.549 m)   Wt 140 lb (63.5 kg)   SpO2 96%   BMI 26.45 kg/m²     GENERAL: Alert and oriented x3, in no acute distress. MUSCULOSKELETAL: Able to dorsi and plantarflex the ankle on operative side without issue. INCISION:  Covered with post-op dressing; clean dry and intact. ROM: Limited secondary pain and swelling. Sensory:  Intact to light touch in peroneal and tibial distributions. Vascular:   2+ DP pulses with brisk cap refill;  calf soft and nontender. Data    ALL MEDICATIONS HAVE BEEN REVIEWED    CBC: Recent Labs     05/15/22  0956 05/16/22  0533   WBC 4.9 4.9   HGB 13.5 11.0*   HCT 40.8 32.9*    104*     BMP:   Recent Labs     05/15/22  0956 05/16/22  0533    135*   K 4.6 4.5    100   CO2 26 26   PHOS  --  3.5   BUN 24* 24*   CREATININE 0.8 0.7     INR:   Recent Labs     05/16/22  0906   INR 1.08     Post-op films show stable cemented bipolar hemiarthroplasty without acute complication. ASSESSMENT:  S/p LEFT hip hemiarthroplasty (5/16/22), POD#1  Depression  HLD  HTN    PLAN:   - WB status:  WBAT through operative extremity; postero-lateral hip precuations; ABD pillow today  - DVT prophylaxis: Lovenox 40mg daily x 28 days.  - PT/OT  - Pain Control: scheduled tylenol added. Due to orthopaedic surgical procedure/condition, patient may require pain medication for up to 6-8 weeks. - Expected post op acute blood loss anemia: H/H today: 9.4/28.5  - ID:  Ancef x 2 doses post-op completed.    - Disposition: await post-op therapy; expect SNF    Follow-up in two weeks with  Pia.   Office # [phone]    CIERRA Carrera - CNP  5/17/2022  9:07 AM

## 2022-05-17 NOTE — PROGRESS NOTES
3165: Shift assessment completed, morning medications given per MAR. VSS, alert and oriented. Call light within reach. The care plan and education has been reviewed and mutually agreed upon with the patient.

## 2022-05-17 NOTE — DISCHARGE INSTR - COC
Continuity of Care Form    Patient Name: Luis Kamara   :  1940  MRN:  [id number]    Admit date:  5/15/2022  Discharge date:  2022    Code Status Order: Full Code   Advance Directives:   [facility] Documentation       Date/Time Healthcare Directive Type of Healthcare Directive Copy in [address] Agent's Name Healthcare Agent's Phone Number    [phone] Yes, patient has an advance directive for healthcare treatment Living will -- -- -- --            Admitting Physician:  Ruth Ferrer MD  PCP: Prashant Parikh MD    Discharging Nurse: Ramiro Unit/Room#: 5ZZ-7653/2892-35  Discharging Unit Phone Number: [phone]    Emergency Contact:   Extended Emergency Contact Information  Primary Emergency Contact: Rayray [facility]  Address: [address] [facility], [address] [place] [address] [place]  Phone: [phone]  Relation: Spouse  Secondary Emergency Contact: Parag Brown  Address: HOME   Home Phone: [phone]  Relation: Child    Past Surgical History:  Past Surgical History:   Procedure Laterality Date    BREAST BIOPSY      CHOLECYSTECTOMY, LAPAROSCOPIC      COLONOSCOPY      normal; Ortega    COLONOSCOPY  12/3/13    Ortega- polyps    HIP SURGERY Left 2022    LEFT HIP HEMIARTHROPLASTY performed by Stefan Mooney MD at [facility]      UPPER GASTROINTESTINAL ENDOSCOPY  12/3/13    Vanesa Jarquin; Renée Sunshine       Immunization History:   Immunization History   Administered Date(s) Administered    PHILLIPID-19, Zachery Gandara, Primary or Immunocompromised, PF, 100mcg/0.5mL 2021, 2021    Influenza 2010    Influenza Vaccine, unspecified formulation 10/01/2016    Influenza Virus Vaccine 10/01/2012, 10/01/2013, 10/13/2015    Influenza, High Dose (Fluzone 65 yrs and older) 10/12/2017, 10/09/2018, 10/28/2021    Influenza, Quadv, adjuvanted, 65 yrs +, IM, PF (Fluad) 10/12/2020    Pneumococcal Conjugate 13-valent ([id number]) 07/13/2015    Pneumococcal Polysaccharide (Hlrlxckti20) 05/07/2012    Td, unspecified formulation 09/30/2009    Tdap (Boostrix, Adacel) 01/08/2021    Zoster Live (Zostavax) 08/01/2013    Zoster Recombinant (Shingrix) 01/08/2021, 04/24/2021       Active Problems:  Patient Active Problem List   Diagnosis Code    AR (allergic rhinitis) J30.9    Arthritis of knee M17.10    Patient overweight E66.3    Postmenopausal Z78.0    Essential hypertension I10    Erosive gastritis K29.60    Current mild episode of major depressive disorder without prior episode ([address] F32.0    Left displaced femoral neck fracture ([facility] [address] S72.002A    Fall at home W19. Pauline Montero, Y92.009    Hyperlipidemia E78.5       Isolation/Infection:   Isolation            No Isolation          Patient Infection Status       None to display            Nurse Assessment:  Last Vital Signs: BP (!) 120/55   Pulse 92   Temp 98.4 °F (36.9 °C) (Oral)   Resp 16   Ht 5' 1\" (1.549 m)   Wt 140 lb (63.5 kg)   SpO2 96%   BMI 26.45 kg/m²     Last documented pain score (0-10 scale): Pain Level: 0  Last Weight:   Wt Readings from Last 1 Encounters:   05/15/22 140 lb (63.5 kg)     Mental Status:  oriented and alert    IV Access:  - None    Nursing Mobility/ADLs:  Walking   Assisted  Transfer  Assisted  Bathing  Assisted  Dressing  Assisted  Toileting  Assisted  Feeding  Independent  Med Admin  Independent  Med Delivery   whole    Wound Care Documentation and Therapy:  Incision 05/16/22 Thigh Anterior;Proximal;Left (Active)   Dressing Status Clean;Dry; Intact 05/16/22 2300   Dressing/Treatment Silver dressing;Surgical glue 05/16/22 2300   Closure Sutures 05/16/22 1619   Incision Assessment Other (Comment) 05/16/22 2300   Drainage Amount None 05/16/22 2300   Number of days: 0        Elimination:  Continence:    Bowel: Yes  Bladder: Yes  Urinary Catheter: None   Colostomy/Ileostomy/Ileal Conduit: No       Date of Last BM: 5/19/2022    Intake/Output Summary (Last 24 hours) at 5/17/2022 0944  Last data filed at 5/17/2022 0630  Gross per 24 hour   Intake 800 ml   Output 1700 ml   Net -900 ml     I/O last 3 completed shifts: In: 800 [I.V.:800]  Out: 2100 [Urine:1850; Blood:250]    Safety Concerns:     None    Impairments/Disabilities:      None    Nutrition Therapy:  Current Nutrition Therapy:   - Oral Diet:  General    Routes of Feeding: Oral  Liquids: Thin Liquids  Daily Fluid Restriction: no  Last Modified Barium Swallow with Video (Video Swallowing Test): not done    Treatments at the Time of Hospital Discharge:   Respiratory Treatments: ***  Oxygen Therapy:  is not on home oxygen therapy. Ventilator:    - No ventilator support    Rehab Therapies: Physical Therapy  Weight Bearing Status/Restrictions: No weight bearing restrictions  Other Medical Equipment (for information only, NOT a DME order):  walker  Other Treatments: Wound Care:   - Remove overdressing in 1 week and leave open to air. If there is any drainage; place a dry dressing over the incision and replace as needed. - Keep incisional area clean at all times.  - KEEP PETS AWAY FROM YOUR INCISION. - May shower; no baths. - Continue use of cooling pad/ice pack as needed for pain. - Wear compression hose during the day until your first post op office appointment. - Do only those exercises prescribed by your therapist while in the hospital.    - Prevent constipation by drinking plenty of water and using stool softeners/laxatives as needed. - If you take narcotic pain medication, start with the lowest dose (1 tablet) and use 2  tablets only for severe breakthrough pain; if you exhaust your supply before your 2-week follow up appointment,  call the office at [phone].  1 tablet is for pain levels between 4-6/10.   2 tablets are for pain levels 7/10. Take nothing or just over the counter medication for pain levels 1-3/10. DVT prophylaxis.    Lovenox subq x 28 days. Follow-Up:  Follow-up in 2 weeks   ([phone])    May discard excess narcotic medications at the following facilities:    [facility]. [facility]. [address] [place] [address]. [place] [address] [facility]    POSTERIOR HIP PRECAUTIONS:  Dont cross your legs. A pillow or triangular-shaped wedge may be used to block the legs from crossing. Don't roll your leg inward. Dont bend the hip past ninety degrees. To avoid bending past ninety degrees when sitting in a chair, lean back slightly. Dont bend over past 90 degrees at the waist, which may occur if you bend over at the waist to tie your shoes or  items off the floor. Instead, use a reacher to put on your shoes and socks or to  items from the floor. Patient's personal belongings (please select all that are sent with patient):  None    RN SIGNATURE:  Electronically signed by Aly Kumar RN on 5/19/22 at 12:00 PM EDT    CASE MANAGEMENT/SOCIAL WORK SECTION    Inpatient Status Date: 05/15/2022    Readmission Risk Assessment Score:  Readmission Risk              Risk of Unplanned Readmission:  12           Discharging to Facility/ Agency   Name: [facility]  Address: [address] [place] [address]  Phone: [phone]  Fax: [phone]      / signature: Electronically signed by Corinne Poot, RN on 5/19/22 at 11:44 AM EDT    PHYSICIAN SECTION    Prognosis: Fair    Condition at Discharge: Stable    Rehab Potential (if transferring to Rehab):  Fair    Recommended Labs or Other Treatments After Discharge: as per urology  Dc amos and start str cath prn bladder scan over 450ml  Can go to rehab today  And they can dc amos and start str cath regimen tomorrow  laxatives  Physician Certification: I certify the above information and transfer of Mardavid Lazarus  is necessary for the continuing treatment of the diagnosis listed and that she requires [place] for greater 30 days.      Update Admission H&P: No change in H&P    PHYSICIAN SIGNATURE:  Electronically signed by Delroy Hobson MD on 5/20/22 at 12:21 PM EDT

## 2022-05-17 NOTE — PROGRESS NOTES
[facility] [address]   Phone: [phone]    Physical Therapy    [x] Initial Evaluation            [] Daily Treatment Note         [] Discharge Summary      Patient: Stella Michel   : 1940   MRN: [id number]   Date of Service:  2022  Admitting Diagnosis: Left displaced femoral neck fracture [facility])  Current Admission Summary: 77 yo female with hx of UE tremors, ambulates with SPC at baseline, presented to [facility] on 5/15 s/p fall at home. Found to have (L) femoral neck fx. Pt underwent (L) hip danny-arthroplasty on  by Dr. Grace Vargas. WBAT with posterior precautions. Past Medical History:  has a past medical history of AR (allergic rhinitis), Arthritis of knee, Depression, Erosive gastritis, GERD (gastroesophageal reflux disease), Hyperlipidemia, Internal hemorrhoids, Overweight(278.02), and Viral meningitis. Past Surgical History:  has a past surgical history that includes Cholecystectomy, laparoscopic (); lalo and bso (cervix removed) (); Colonoscopy (); Colonoscopy (12/3/13); Upper gastrointestinal endoscopy (12/3/13); Breast biopsy; Hysterectomy; and hip surgery (Left, 2022). Discharge Recommendations: Stella Michel scored a 8/24 on the AM-PAC short mobility form. Current research shows that an AM-PAC score of 17 or less is typically not associated with a discharge to the patient's home setting. Based on the patient's AM-PAC score and their current functional mobility deficits, it is recommended that the patient have 3-5 sessions per week of Physical Therapy at d/c to increase the patient's independence. Please see assessment section for further patient specific details. If patient discharges prior to next session this note will serve as a discharge summary. Please see below for the latest assessment towards goals.    DME Required For Discharge: DME to be determined at next level of care     Precautions/Restrictions: high fall risk, weight bearing, ROM restrictions  Weight Bearing Restrictions: weight bearing as tolerated   [x] Left Lower Extremity     Required Braces/Orthotics: no braces required  Positional Restrictions:No Hip Flexion > 90*, No Hip ADduction, No Hip Internal Rotation; hip abduction pillow in place when in bed or turning 1st 48 hours, then regular pillow    Pre-Admission Information   Lives With: spouse    Type of Home: house  Home Layout: two level, laundry in basement, 1/2 bath on main level, bedroom/bathroom upstairs, 6+6 stairs to 2nd level with bilateral HR, . Comment: stair lift to basement  Home Access: 1+1 step to enter without rails   Bathroom Layout: walker accessible, walk in shower  Toilet Height: elevated height  Bathroom Equipment: None  Home Equipment: standard walker, single point cane, lift chair, reacher  Transfer Assistance: Independent without use of device, . Comment: occasional assist getting out of the car  Ambulation Assistance:modified independent with use of SCP intermittently  ADL Assistance: independent with all ADL's  IADL Assistance: independent with homemaking tasks  Active :        [] Yes  [x] No  Hand Dominance: [] Left  [x] Right  Current Employment: retired. Occupation: dental hygeine  Hobbies: Reading, work in the yard  Recent Falls: No falls in last 6 months    Examination   Vision:   Vision Gross Assessment: Impaired and Vision Corrective Device: wears glasses for distance  Hearing:   WFL  Observation:   Incision/Scar:  bandage over (L) lateral hip  Posture: Forward head, rounded shoulders. Pt frequently off loading (L) hip in sitting. Sensation:   WFL  ROM:   (B) LE AROM WFL  (L) LE AROM all painful and end ranges somewhat limited due to pain  Strength:   (R) knee flex/ext, ankle DF all 4+/5, (R) hip deferred due to increased (L) hip pain with (R) hip flexion.  (L) LE MMTs deferred due to s/p danny-arthroplasty  Decision Making: low complexity  Clinical Presentation: stable      Subjective  General: Pt semi-reclined in bed upon therapist arrival. Spouse and son present. Pain: 5/10. Location: (L) hip pain, and in posterior knee due to swelling  Pain Interventions: pain medication in place prior to arrival, RN notified and ice applied       Functional Mobility  Bed Mobility  Supine to Sit: 2 person assistance with Mod A, progressing to Mod A of 1   Rolling Right: 2 person assistance with Mod A, progressing to Mod A of 1   Scooting: moderate assistance  Comments: HOB elevated, use of rail, increased time, and cues for sequencing and precautions throughout  Transfers  Sit to stand transfer: 2 person assistance with Min A   Stand to sit transfer: 2 person assistance with Min A   Stand step transfer: 2 person assistance with CGA-Min A of 2, with one episode of Mod A due to posterior LOB   Comments: Pt with varying degree of posterior lean with all standing this date. Pt took ~6 short, shuffling steps from bed>chair with use of RW. Ambulation  Ambulation not tested on this date. Distance:   Gait Mechanics:   Comments:  Deferred due to pain and fatigue following transfer  Stair Mobility  Stair mobility not completed on this date. Comments:  Wheelchair Mobility:  No w/c mobility completed on this date. Comments:  Balance  Static Sitting Balance: fair (-): maintains balance at SBA/supervision with use of UE support  Dynamic Sitting Balance: fair (-): maintains balance at CGA with use of UE support  Static Standing Balance: poor (+): requires min-mod (A) to maintain balance  Dynamic Standing Balance: poor: requires min-mod (A) to maintain balance  Comments: Pt stood x2 minutes for lower body clothing management and transfer to chair. Pt's tolerance to standing was limited by pain. Varying posterior lean noted with standing requiring CGA-Mod A to maintain balance. Pt with poor forward weight shift despite tactile and verbal cues. Therapist stabilized walker.     Other Therapeutic Interventions  See OT note for assist with lower body dressing. Education on posterior hip precautions. Functional Outcomes  AM-PAC Inpatient Mobility Raw Score : 8              Cognition  WFL  Orientation:    alert and oriented x 4  Command Following:   [facility]  Barriers To Learning: none  Patient Education: patient educated on goals, PT role and benefits, plan of care, precautions, weight-bearing education, general safety, functional mobility training, discharge recommendations  Learning Assessment:  patient verbalizes understanding, would benefit from continued reinforcement    Assessment  Activity Tolerance: Pt with increased pain s/p transfer. Vitals WFL throughout. Supine: /55. Sitting EOB: /65, 91 bpm, SpO2 98% on RA. After transfer: /48,  bpm.  Impairments Requiring Therapeutic Intervention: decreased functional mobility, decreased ADL status, decreased ROM, decreased strength, decreased endurance, decreased balance, increased pain, decreased posture  Prognosis: good  Clinical Assessment: Pt is an 77 yo female seen s/p (L) hip danny-arthroplasty. The patient had increased pain and incresed posterior lean with all standing activities. She currently requires CGA-Mod A for safe transfer to the chair with use of RW. The patient was unable to tolerate ambulation this date due to pain. The patient requires more assistance than family can provide. Recommending 24/7 assist and additional skilled PT to safely progress tolerance to activity and independence with functional mobility back to baseline.    Safety Interventions: patient left in chair, chair alarm in place, call light within reach, gait belt, patient at risk for falls, nurse notified and family/caregiver present    Plan  Frequency: 7 x/week  Current Treatment Recommendations: strengthening, ROM, balance training, functional mobility training, transfer training, gait training, stair training, endurance training, patient/caregiver education, pain management, home exercise program, safety education, equipment evaluation/education and positioning    Goals  Patient Goals: None stated   Short Term Goals:  Time Frame: Before discharge  Patient will complete bed mobility at minimal assistance   Patient will complete sit<>stand transfers at contact guard assistance   Patient will complete bed<>chair transfers at contact guard assistance with RW  Patient will ambulate 20 ft with use of rolling walker at minimal assistance  Patient to maintain standing at contact guard assistance for 5 minutes with (B) UE support    Therapy Session Time      Individual Group Co-treatment   Time In     1031   Time Out     1125   Minutes     54     Timed Code Treatment Minutes:  39 Minutes  Total Treatment Minutes:  54 minutes       Electronically Signed By: Dipak Fuller, PT      Debby Nguyen PT, DPT #[id number]

## 2022-05-18 LAB
A/G RATIO: 1 (ref 1.1–2.2)
ALBUMIN SERPL-MCNC: 2.7 G/DL (ref 3.4–5)
ALP BLD-CCNC: 69 U/L (ref 40–129)
ALT SERPL-CCNC: 13 U/L (ref 10–40)
ANION GAP SERPL CALCULATED.3IONS-SCNC: 9 MMOL/L (ref 3–16)
AST SERPL-CCNC: 21 U/L (ref 15–37)
BASOPHILS ABSOLUTE: 0 K/UL (ref 0–0.2)
BASOPHILS RELATIVE PERCENT: 0.4 %
BILIRUB SERPL-MCNC: 0.4 MG/DL (ref 0–1)
BUN BLDV-MCNC: 19 MG/DL (ref 7–20)
CALCIUM SERPL-MCNC: 8.1 MG/DL (ref 8.3–10.6)
CHLORIDE BLD-SCNC: 106 MMOL/L (ref 99–110)
CO2: 23 MMOL/L (ref 21–32)
CREAT SERPL-MCNC: 0.7 MG/DL (ref 0.6–1.2)
EOSINOPHILS ABSOLUTE: 0.1 K/UL (ref 0–0.6)
EOSINOPHILS RELATIVE PERCENT: 1.4 %
GFR AFRICAN AMERICAN: >60
GFR NON-AFRICAN AMERICAN: >60
GLUCOSE BLD-MCNC: 108 MG/DL (ref 70–99)
HCT VFR BLD CALC: 29.5 % (ref 36–48)
HEMOGLOBIN: 9.7 G/DL (ref 12–16)
LYMPHOCYTES ABSOLUTE: 0.8 K/UL (ref 1–5.1)
LYMPHOCYTES RELATIVE PERCENT: 12 %
MAGNESIUM: 2 MG/DL (ref 1.8–2.4)
MCH RBC QN AUTO: 30.5 PG (ref 26–34)
MCHC RBC AUTO-ENTMCNC: 32.7 G/DL (ref 31–36)
MCV RBC AUTO: 93.3 FL (ref 80–100)
MONOCYTES ABSOLUTE: 0.5 K/UL (ref 0–1.3)
MONOCYTES RELATIVE PERCENT: 7 %
NEUTROPHILS ABSOLUTE: 5.5 K/UL (ref 1.7–7.7)
NEUTROPHILS RELATIVE PERCENT: 79.2 %
PDW BLD-RTO: 13.6 % (ref 12.4–15.4)
PHOSPHORUS: 1.8 MG/DL (ref 2.5–4.9)
PLATELET # BLD: 96 K/UL (ref 135–450)
PMV BLD AUTO: 8.3 FL (ref 5–10.5)
POTASSIUM SERPL-SCNC: 3.8 MMOL/L (ref 3.5–5.1)
RBC # BLD: 3.16 M/UL (ref 4–5.2)
SODIUM BLD-SCNC: 138 MMOL/L (ref 136–145)
TOTAL PROTEIN: 5.3 G/DL (ref 6.4–8.2)
WBC # BLD: 7 K/UL (ref 4–11)

## 2022-05-18 PROCEDURE — 36415 COLL VENOUS BLD VENIPUNCTURE: CPT

## 2022-05-18 PROCEDURE — 6370000000 HC RX 637 (ALT 250 FOR IP): Performed by: NURSE PRACTITIONER

## 2022-05-18 PROCEDURE — 99024 POSTOP FOLLOW-UP VISIT: CPT | Performed by: NURSE PRACTITIONER

## 2022-05-18 PROCEDURE — 6360000002 HC RX W HCPCS: Performed by: NURSE PRACTITIONER

## 2022-05-18 PROCEDURE — 85025 COMPLETE CBC W/AUTO DIFF WBC: CPT

## 2022-05-18 PROCEDURE — 6370000000 HC RX 637 (ALT 250 FOR IP): Performed by: PHYSICIAN ASSISTANT

## 2022-05-18 PROCEDURE — 97530 THERAPEUTIC ACTIVITIES: CPT

## 2022-05-18 PROCEDURE — 1200000000 HC SEMI PRIVATE

## 2022-05-18 PROCEDURE — APPNB45 APP NON BILLABLE 31-45 MINUTES: Performed by: NURSE PRACTITIONER

## 2022-05-18 PROCEDURE — 80053 COMPREHEN METABOLIC PANEL: CPT

## 2022-05-18 PROCEDURE — 84100 ASSAY OF PHOSPHORUS: CPT

## 2022-05-18 PROCEDURE — 2580000003 HC RX 258: Performed by: NURSE PRACTITIONER

## 2022-05-18 PROCEDURE — 97535 SELF CARE MNGMENT TRAINING: CPT

## 2022-05-18 PROCEDURE — 83735 ASSAY OF MAGNESIUM: CPT

## 2022-05-18 RX ORDER — ATORVASTATIN CALCIUM 10 MG/1
10 TABLET, FILM COATED ORAL EVERY EVENING
Status: DISCONTINUED | OUTPATIENT
Start: 2022-05-19 | End: 2022-05-21 | Stop reason: HOSPADM

## 2022-05-18 RX ORDER — LOSARTAN POTASSIUM 50 MG/1
25 TABLET ORAL 2 TIMES DAILY
Qty: 180 TABLET | Refills: 3
Start: 2022-05-18 | End: 2022-05-19 | Stop reason: SDUPTHER

## 2022-05-18 RX ORDER — CYCLOBENZAPRINE HCL 5 MG
5 TABLET ORAL 3 TIMES DAILY PRN
Qty: 10 TABLET | Refills: 0
Start: 2022-05-18 | End: 2022-05-28

## 2022-05-18 RX ORDER — POLYETHYLENE GLYCOL 3350 17 G/17G
17 POWDER, FOR SOLUTION ORAL DAILY
Qty: 527 G | Refills: 1
Start: 2022-05-18 | End: 2022-06-17

## 2022-05-18 RX ORDER — LOSARTAN POTASSIUM 25 MG/1
25 TABLET ORAL 2 TIMES DAILY
Status: DISCONTINUED | OUTPATIENT
Start: 2022-05-18 | End: 2022-05-21 | Stop reason: HOSPADM

## 2022-05-18 RX ADMIN — OXYCODONE AND ACETAMINOPHEN 1 TABLET: 5; 325 TABLET ORAL at 05:53

## 2022-05-18 RX ADMIN — ACETAMINOPHEN 1000 MG: 500 TABLET ORAL at 21:59

## 2022-05-18 RX ADMIN — LOSARTAN POTASSIUM 25 MG: 25 TABLET, FILM COATED ORAL at 21:59

## 2022-05-18 RX ADMIN — LOSARTAN POTASSIUM 50 MG: 25 TABLET, FILM COATED ORAL at 08:40

## 2022-05-18 RX ADMIN — ARIPIPRAZOLE 2 MG: 2 TABLET ORAL at 08:40

## 2022-05-18 RX ADMIN — OXYCODONE AND ACETAMINOPHEN 1 TABLET: 5; 325 TABLET ORAL at 21:59

## 2022-05-18 RX ADMIN — ENOXAPARIN SODIUM 40 MG: 100 INJECTION SUBCUTANEOUS at 08:40

## 2022-05-18 RX ADMIN — Medication 1000 UNITS: at 08:40

## 2022-05-18 RX ADMIN — POLYETHYLENE GLYCOL 3350 17 G: 17 POWDER, FOR SOLUTION ORAL at 04:10

## 2022-05-18 RX ADMIN — VILAZODONE HYDROCHLORIDE 20 MG: 20 TABLET ORAL at 08:39

## 2022-05-18 RX ADMIN — BUPROPION HYDROCHLORIDE 150 MG: 150 TABLET, EXTENDED RELEASE ORAL at 08:39

## 2022-05-18 RX ADMIN — Medication 10 ML: at 08:42

## 2022-05-18 RX ADMIN — ACETAMINOPHEN 1000 MG: 500 TABLET ORAL at 14:34

## 2022-05-18 ASSESSMENT — PAIN SCALES - GENERAL
PAINLEVEL_OUTOF10: 7
PAINLEVEL_OUTOF10: 0
PAINLEVEL_OUTOF10: 7
PAINLEVEL_OUTOF10: 0
PAINLEVEL_OUTOF10: 6

## 2022-05-18 ASSESSMENT — PAIN DESCRIPTION - LOCATION
LOCATION: HIP
LOCATION: HIP

## 2022-05-18 ASSESSMENT — PAIN DESCRIPTION - ORIENTATION
ORIENTATION: LEFT
ORIENTATION: LEFT

## 2022-05-18 NOTE — PROGRESS NOTES
Hospitalist Progress Note      PCP: Diana Calles MD    Date of Admission: 5/15/2022    Chief Complaint: Left hip pain    Hospital Course: Benji Leroy is a 78 y.o. female with history of depression, HLD, chronic right upper extremity tremors, gait instability (ambulates with a cane at baseline) presented following a fall with severe left-sided hip pain. She was doing her laundry this morning when she lost her balance and fell, landing on head left side. She hit her head on a carpeted floor but there was no LOC. She immediately had severe pain in her left hip with inability to bear weight. Pain is localized on the left hip, severe, no radiation, worse with movement, minimally relieved with morphine in the ER. She denied any chest pain, shortness of breath, dizziness or palpitations prior to fall. Imaging done in the ED showed a left hip fracture for which she was admitted for further management.          Subjective: Patient seen and examined. S/p L hip hemiarthroplasty. Left hip pain controlled with pain medications. Complains of constipation. Wants prune juice, wants to hold off on suppository. Awaiting precert.         Medications:  Reviewed    Infusion Medications    sodium chloride Stopped (05/18/22 0523)    sodium chloride 100 mL/hr at 05/16/22 1446    sodium chloride       Scheduled Medications    losartan  25 mg Oral BID    acetaminophen  1,000 mg Oral TID    ARIPiprazole  2 mg Oral Daily    sodium chloride flush  5-40 mL IntraVENous 2 times per day    atorvastatin  10 mg Oral Every Other Day    buPROPion  150 mg Oral Daily    estradiol  2 g Vaginal Once per day on Mon Thu    Vitamin D  1,000 Units Oral Daily    vilazodone HCl  20 mg Oral Daily    enoxaparin  40 mg SubCUTAneous Daily     PRN Meds: sodium chloride flush, sodium chloride, ondansetron **OR** ondansetron, polyethylene glycol, acetaminophen **OR** acetaminophen, HYDROmorphone, oxyCODONE-acetaminophen, cyclobenzaprine      Intake/Output Summary (Last 24 hours) at 5/18/2022 1740  Last data filed at 5/18/2022 1419  Gross per 24 hour   Intake 1087.95 ml   Output 1100 ml   Net -12.05 ml       Physical Exam Performed:    /68   Pulse 91   Temp 97.8 °F (36.6 °C) (Oral)   Resp 17   Ht 5' 1\" (1.549 m)   Wt 140 lb (63.5 kg)   SpO2 94%   BMI 26.45 kg/m²     General appearance: No apparent distress, appears stated age and cooperative. HEENT: Pupils equal, round, and reactive to light. Conjunctivae/corneas clear. Neck: Supple, with full range of motion. No jugular venous distention. Trachea midline. Respiratory:  Normal respiratory effort. Clear to auscultation, bilaterally without Rales/Wheezes/Rhonchi. Cardiovascular: Regular rate and rhythm with normal S1/S2 without murmurs, rubs or gallops. Abdomen: Soft, non-tender, non-distended with normal bowel sounds. Musculoskeletal: No clubbing, cyanosis or edema bilaterally. Left lower extremity externally rotated and shortened. Skin: Skin color, texture, turgor normal.  No rashes or lesions. Neurologic:  Neurovascularly intact without any focal sensory/motor deficits. Cranial nerves: II-XII intact, grossly non-focal.Tremors in the right upper extremity.   Psychiatric: Alert and oriented, thought content appropriate, normal insight  Capillary Refill: Brisk,3 seconds, normal   Peripheral Pulses: +2 palpable, equal bilaterally       Labs:   Recent Labs     05/16/22 0533 05/17/22  0941 05/18/22  0739   WBC 4.9 6.5 7.0   HGB 11.0* 9.4* 9.7*   HCT 32.9* 28.5* 29.5*   * 86* 96*     Recent Labs     05/16/22 0533 05/17/22  0941 05/18/22  0739   * 134* 138   K 4.5 3.9 3.8    102 106   CO2 26 23 23   BUN 24* 23* 19   CREATININE 0.7 0.8 0.7   CALCIUM 8.6 7.7* 8.1*   PHOS 3.5 2.6 1.8*     Recent Labs     05/16/22 0533 05/17/22  0941 05/18/22  0739   AST 38* 20 21   ALT 45* 22 13   BILITOT 0.6 0.3 0.4   ALKPHOS 68 56 69     Recent Labs 05/16/22  0906   INR 1.08     No results for input(s): CKTOTAL, TROPONINI in the last 72 hours. Urinalysis:      Lab Results   Component Value Date    NITRU Negative 05/15/2022    WBCUA 3 05/15/2022    BACTERIA None Seen 05/15/2022    RBCUA 1 05/15/2022    BLOODU Negative 05/15/2022    SPECGRAV 1.015 05/15/2022    GLUCOSEU Negative 05/15/2022    GLUCOSEU NEGATIVE 06/01/2012       Radiology:  XR HIP 1 VW W PELVIS LEFT   Final Result   Status post recent left hip arthroplasty without complication. CT HIP LEFT WO CONTRAST   Final Result   Acute fracture involving the left femoral neck basicervical region with only   some medial intratrochanteric extension along the medial and inferior aspect. Foreshortening of the left femur and minimal lateral displacement. Left   femoral head remains well approximating the left acetabular cup without   dislocation         CT CERVICAL SPINE WO CONTRAST   Final Result      1. No evidence of fracture with multilevel degenerative changes as described. 2.  Approximate 11 mm right thyroid nodule with dense nodular calcification   of indeterminate significance but unchanged from the prior study. CT HEAD WO CONTRAST   Final Result      1. No acute intracranial abnormality is noted. 2.  Prominence of the sulci and/or CSF spaces suggests a degree of cerebral   atrophy. 3.  Mild left maxillary chronic sinusitis. XR HIP LEFT (1 VIEW)   Final Result   Stable left hip series. Left hip fracture, likely a femoral neck fracture   with intertrochanteric component. Recommend orthopedic follow-up to   determine if more imaging is needed. XR HIP LEFT (2-3 VIEWS)   Final Result   Left hip fracture, incompletely evaluated due to rotation. This is likely a   femoral neck fracture although an intertrochanteric component is not   excluded. Consider additional plain films or CT follow-up for more complete   assessment.          XR CHEST PORTABLE   Final Result   No acute cardiopulmonary disease. Assessment/Plan:    Active Hospital Problems    Diagnosis     Fall at home [W50. Jamil Aguilar, U62.677]      Priority: Medium    Hyperlipidemia [E78.5]      Priority: Medium    Left displaced femoral neck fracture ([address] [S72.002A]      Priority: Medium       1. Left femoral neck fracture: s/p L hip hemiarthroplasty. Patient has been accepted at [facility]. Awaiting precert. 2. Hypertension: Continue losartan but decrease to 25 mg bid-normally takes 50 bid but was hypotensive yesterday afternoon. 3. Depression-continue wellbutrin, abilify  4. Hyperlipidemia-On statin-time it for every other evening per patient request.          DVT Prophylaxis: Lovenox  Diet: ADULT DIET;  Regular  Code Status: Full Code        Jyoti Pham PA-C

## 2022-05-18 NOTE — PROGRESS NOTES
PM assessment complete, vitals stable, neuro check charted and unchanged, medications given per [facility] initiated overnight.

## 2022-05-18 NOTE — PROGRESS NOTES
3699: Shift assessment completed, morning medications given per MAR. VSS, alert and oriented. Call light within reach. The care plan and education has been reviewed and mutually agreed upon with the patient.

## 2022-05-18 NOTE — PROGRESS NOTES
[facility] Orthopedic Surgery   Progress Note    CHIEF COMPLAINT/DIAGNOSIS: S/p LEFT hip hemiarthroplasty     SUBJECTIVE: The patient is sitting up in the bed; describes mild hip pain. She reports some gas and hoping to have BM. Eating and drinking ok. OBJECTIVE  Physical    VITALS:  /68   Pulse 93   Temp 97.1 °F (36.2 °C) (Oral)   Resp 16   Ht 5' 1\" (1.549 m)   Wt 140 lb (63.5 kg)   SpO2 95%   BMI 26.45 kg/m²     GENERAL: Alert and oriented x3, in no acute distress. MUSCULOSKELETAL: Able to dorsi and plantarflex the ankle on operative side without issue. INCISION:  Covered with post-op dressing; clean dry and intact. ROM: Limited secondary pain and swelling. Sensory:  Intact to light touch in peroneal and tibial distributions. Vascular:   2+ DP pulses with brisk cap refill;  calf soft and nontender. Data    ALL MEDICATIONS HAVE BEEN REVIEWED    CBC:   Recent Labs     05/16/22  0533 05/17/22  0941 05/18/22  0739   WBC 4.9 6.5 7.0   HGB 11.0* 9.4* 9.7*   HCT 32.9* 28.5* 29.5*   * 86* 96*     BMP:   Recent Labs     05/16/22  0533 05/17/22  0941 05/18/22  0739   * 134* 138   K 4.5 3.9 3.8    102 106   CO2 26 23 23   PHOS 3.5 2.6 1.8*   BUN 24* 23* 19   CREATININE 0.7 0.8 0.7     INR:   Recent Labs     05/16/22  0906   INR 1.08     Post-op films show stable cemented bipolar hemiarthroplasty without acute complication. ASSESSMENT:  S/p LEFT hip hemiarthroplasty (5/16/22), POD#2  Depression  HLD  HTN  Thrombocytopenia    PLAN:   - WB status:  WBAT through operative extremity; postero-lateral hip precuations  - DVT prophylaxis: Lovenox 40mg daily x 28 days.  - PT/OT  - Pain Control: scheduled tylenol. Oxy prn. Due to orthopaedic surgical procedure/condition, patient may require pain medication for up to 6-8 weeks. - Expected post op acute blood loss anemia: H/H today: 9.7/29.5  - Thrombocytopenia: plts - 96, stable  - ID:  Ancef x 2 doses post-op completed.    - Disposition: SNF when medically stable; ok to dc from our end    Follow-up in two weeks with Dr. Jeff Vital.   Office # [phone]    CIERRA Elizondo - CNP  5/18/2022  10:06 AM

## 2022-05-18 NOTE — PROGRESS NOTES
[facility]   Phone: [phone]    Occupational Therapy    [] Initial Evaluation            [x] Daily Treatment Note         [] Discharge Summary      Patient: Romel Qiu   : 1940   MRN: [id number]   Date of Service:  2022    Admitting Diagnosis:  Left displaced femoral neck fracture ([address]  Current Admission Summary: Romel Qiu is a 78 y.o. female with history of depression, HLD, chronic right upper extremity tremors, gait instability (ambulates with a cane at baseline) presented following a fall with severe left-sided hip pain. She was doing her laundry this morning when she lost her balance and fell, landing on head left side. She hit her head on a carpeted floor but there was no LOC. She immediately had severe pain in her left hip with inability to bear weight. Pain is localized on the left hip, severe, no radiation, worse with movement, minimally relieved with morphine in the ER. She denied any chest pain, shortness of breath, dizziness or palpitations prior to fall. Imaging done in the ED showed a left hip fracture for which she was admitted for further management. Past Medical History:  has a past medical history of AR (allergic rhinitis), Arthritis of knee, Depression, Erosive gastritis, GERD (gastroesophageal reflux disease), Hyperlipidemia, Internal hemorrhoids, Overweight(278.02), and Viral meningitis. Past Surgical History:  has a past surgical history that includes Cholecystectomy, laparoscopic (); lalo and bso (cervix removed) (); Colonoscopy (); Colonoscopy (12/3/13); Upper gastrointestinal endoscopy (12/3/13); Breast biopsy; Hysterectomy; and hip surgery (Left, 2022). Discharge [facility] scored a 11/24 on the AM-PAC ADL Inpatient form. Current research shows that an AM-PAC score of 17 or less is typically not associated with a discharge to the patient's home setting.  Based on the patient's AM-PAC score and their current ADL deficits, it is recommended that the patient have 3-5 sessions per week of Occupational Therapy at d/c to increase the patient's independence. Please see assessment section for further patient specific details. If patient discharges prior to next session this note will serve as a discharge summary. Please see below for the latest assessment towards goals. DME Required For Discharge: rolling walker, bedside commode, shower chair with back    Precautions/Restrictions: high fall risk, surgical protocols  Weight Bearing Restrictions: weight bearing as tolerated  [] Right Upper Extremity  [] Left Upper Extremity [] Right Lower Extremity  [x] Left Lower Extremity     Required Braces/Orthotics: no braces required   [] Right  [] Left  Positional Restrictions:No Hip Flexion > 90*, No Hip ADduction, No Hip Internal Rotation -posterior hip precautions. Pre-Admission Information   Lives With: spouse              Type of Home: house  Home Layout: two level, laundry in basement, 1/2 bath on main level, bedroom/bathroom upstairs, 6+6 stairs to 2nd level with bilateral HR, . Comment: stair lift to basement  Home Access: 1+1 step to enter without rails   Bathroom Layout: walker accessible, walk in shower  Toilet Height: elevated height  Bathroom Equipment: None  Home Equipment: standard walker, single point cane, lift chair, reacher  Transfer Assistance: Independent without use of device, . Comment: occasional assist getting out of the car  Ambulation Assistance:modified independent with use of SCP intermittently  ADL Assistance: independent with all ADL's  IADL Assistance: independent with homemaking tasks  Active :        [] Yes            [x] No  Hand Dominance: [] Left            [x] Right  Current Employment: retired.   Occupation: dental hygeine  Hobbies: Reading, work in the yard  Recent Falls: No falls in last 6 months      Subjective  General: Pt supine in bed upon entry. Agreeable to OT/PT co-treatment  Pain: Pt reports no pain, just stiffness in her L LE, stated pain medications were administered prior to arrival.      Activities of Daily Living  Basic Activities of Daily Living  Lower Extremity Dressing: dependent . Comment: depends change. Equipment: none  Toileting: dependent. Equipment: none  Toileting Comments: total A to perform daniel care following voiding urine seated on toilet  General Comments: Pt declined further ADL completion due to fatigue  Instrumental Activities of Daily Living  No IADL completed on this date. Functional Mobility  Bed Mobility  Supine to Sit: moderate assistance  Scooting: moderate assistance  Comments: HOB elevated, use of bed rail, cueing for initiation and sequence  Transfers  Sit to stand transfer:2 person assistance with min A   Stand to sit transfer: 2 person assistance with mod A   Stand step transfer: moderate assistance, 2 person assistance with mod A x2 , . Comment EOB > BSC  Toilet transfer: stedy utilized requiring STEDY utilized to transfers BSC > recliner chair d/t fatigue , 2 person assistance with min A x2 . Mobility technique: stand step transfer. Equipment utilized: standard bedside commode  Comments: significant cueing for RW management and ambulation sequence/weight shifting to advance L LE. Severe forward flexed posture, fatigues quickly  Functional Mobility:  Sitting Balance: stand by assistance, . Comment EOB in preparation for transfer, seated on BSC. Sitting Balance Comment: .  Standing Balance: 2 person assistance with Min A-mod A x2 .       Other Therapeutic Interventions    Functional Outcomes  AM-PAC Inpatient Daily Activity Raw Score: 11    Cognition  Overall Cognitive Status: WFL  Orientation:    alert and oriented x 4  Command Following:   [facility]     Education  Barriers To Learning: none  Patient Education: patient educated on goals, OT role and benefits, precautions, ADL adaptive strategies, proper use of assistive device/equipment, adaptive device training, discharge recommendations  Learning Assessment:  patient verbalizes understanding, would benefit from continued reinforcement    Assessment  Activity Tolerance: Pt fatigues easily and limited by pain in her L hip. Pt currently unable to tolerate lengthy ADL sessions, especially in stance/ambulation. Impairments Requiring Therapeutic Intervention: decreased functional mobility, decreased ADL status, decreased ROM, decreased strength, decreased safety awareness, decreased cognition, decreased endurance, decreased balance, increased pain  Prognosis: good  Clinical Assessment: Pt presenting below her functional baseline with the above deficits associated with fall--traumatic hip fracture s/p L hemiarthroplasty. Pt is primarily limited by pain, weakness, and quickness to fatigue.  Continued OT indicated in order to maximize safety and independence with all occupational pursuits  Safety Interventions: patient left in chair, chair alarm in place, call light within reach, gait belt, patient at risk for falls and nurse notified    Plan  Frequency: 7 x/week  Current Treatment Recommendations: strengthening, balance training, functional mobility training, transfer training, gait training, stair training, endurance training, modalities, ADL/self-care training, IADL training, home management training and pain management    Goals  Patient Goals: Return to home   Short Term Goals:  Time Frame: Discharge (no goals met this date 5/18)  Patient will complete upper body ADL at stand by assistance   Patient will complete lower body ADL at stand by assistance   Patient will complete toileting at stand by assistance   Patient will complete grooming at stand by assistance   Patient will complete functional transfers at stand by assistance   Patient will complete functional mobility at stand by assistance   Patient will increase functional standing balance to SBA for improved ADL completion    Therapy Session Time     Individual Group Co-treatment   Time In    1240   Time Out    1312   Minutes    32        Timed Code Treatment Minutes:  32 Minutes  Total Treatment Minutes:  32 minutes    [place], [id number]  I have reviewed and agree with the above treatment note.  Davi Stiles OTR/L [id number]

## 2022-05-18 NOTE — PLAN OF CARE
Problem: Discharge Planning  Goal: Discharge to home or other facility with appropriate resources  Outcome: Progressing  Flowsheets (Taken 5/17/2022 2200 by Chan Torres RN)  Discharge to home or other facility with appropriate resources: Refer to discharge planning if patient needs post-hospital services based on physician order or complex needs related to functional status, cognitive ability or social support system     Problem: Skin/Tissue Integrity  Goal: Absence of new skin breakdown  Description: 1. Monitor for areas of redness and/or skin breakdown  2. Assess vascular access sites hourly  3. Every 4-6 hours minimum:  Change oxygen saturation probe site  4. Every 4-6 hours:  If on nasal continuous positive airway pressure, respiratory therapy assess nares and determine need for appliance change or resting period.   Outcome: Progressing     Problem: Safety - Adult  Goal: Free from fall injury  Outcome: Progressing     Problem: Pain  Goal: Verbalizes/displays adequate comfort level or baseline comfort level  Outcome: Progressing     Problem: ABCDS Injury Assessment  Goal: Absence of physical injury  Outcome: Progressing

## 2022-05-18 NOTE — PROGRESS NOTES
Physical [facility] Department   Phone: [phone]    Physical Therapy    [] Initial Evaluation            [x] Daily Treatment Note         [] Discharge Summary      Patient: Justin Sifuentes   : 1940   MRN: [id number]   Date of Service:  2022  Admitting Diagnosis: Left displaced femoral neck fracture [facility])  Current Admission Summary: 77 yo female with hx of UE tremors, ambulates with SPC at baseline, presented to [facility] on 5/15 s/p fall at home. Found to have (L) femoral neck fx. Pt underwent (L) hip danny-arthroplasty on  by Dr. Yao Ramirez. WBAT with posterior precautions. Past Medical History:  has a past medical history of AR (allergic rhinitis), Arthritis of knee, Depression, Erosive gastritis, GERD (gastroesophageal reflux disease), Hyperlipidemia, Internal hemorrhoids, Overweight(278.02), and Viral meningitis. Past Surgical History:  has a past surgical history that includes Cholecystectomy, laparoscopic (); lalo and bso (cervix removed) (); Colonoscopy (); Colonoscopy (12/3/13); Upper gastrointestinal endoscopy (12/3/13); Breast biopsy; Hysterectomy; and hip surgery (Left, 2022).    Discharge Recommendations: Justin Sifuentes scored 11/24 on the AM-PAC short mobility form. Current research shows that an AM-PAC score of 17 or less is typically not associated with a discharge to the patient's home setting. Based on the patient's AM-PAC score and their current functional mobility deficits, it is recommended that the patient have 3-5 sessions per week of Physical Therapy at d/c to increase the patient's independence.  Please see assessment section for further patient specific details. If patient discharges prior to next session this note will serve as a discharge summary. Please see below for the latest assessment towards goals.    DME Required For Discharge: DME to be determined at next level of care      Precautions/Restrictions: high fall risk, weight bearing, ROM restrictions  Weight Bearing Restrictions: weight bearing as tolerated   [x]? Left Lower Extremity         Required Braces/Orthotics: no braces required  Positional Restrictions:No Hip Flexion > 90*, No Hip ADduction, No Hip Internal Rotation; hip abduction pillow in place when in bed or turning 1st 48 hours, then regular pillow     Pre-Admission Information   Lives With: spouse              Type of Home: house  Home Layout: two level, laundry in basement, 1/2 bath on main level, bedroom/bathroom upstairs, 6+6 stairs to 2nd level with bilateral HR, . Comment: stair lift to basement  Home Access: 1+1 step to enter without rails   Bathroom Layout: walker accessible, walk in shower  Toilet Height: elevated height  Bathroom Equipment: None  Home Equipment: standard walker, single point cane, lift chair, reacher  Transfer Assistance: Independent without use of device, . Comment: occasional assist getting out of the car  Ambulation Assistance:modified independent with use of SCP intermittently  ADL Assistance: independent with all ADL's  IADL Assistance: independent with homemaking tasks  Active :        []? Yes            [x]? No  Hand Dominance: []? Left            [x]? Right  Current Employment: retired. Occupation: dental hygeine  Hobbies: Reading, work in the yard  Recent Falls: No falls in last 6 months     Examination   Vision:   Vision Gross Assessment: Impaired and Vision Corrective Device: wears glasses for distance  Hearing:   WFL  Observation:   Incision/Scar:  bandage over (L) lateral hip  Posture: Forward head, rounded shoulders. Pt frequently off loading (L) hip in sitting. Sensation:   WFL  ROM:   (B) LE AROM WFL  (L) LE AROM all painful and end ranges somewhat limited due to pain  Strength:   (R) knee flex/ext, ankle DF all 4+/5, (R) hip deferred due to increased (L) hip pain with (R) hip flexion.  (L) LE MMTs deferred due to s/p danny-arthroplasty  Decision Making: low complexity  Clinical Presentation: stable        Subjective  General: Pt semi-reclined in bed upon therapist arrival. Pt reports no pain just stiffness however whimpers in pain throughout session      Functional Mobility  Bed Mobility  Supine to Sit: mod A x1   Scooting: moderate assistance  Comments: HOB elevated, use of rail, increased time, and cues for sequencing and precautions throughout  Transfers  Sit to stand transfer: 2 person assistance with Min A   Stand to sit transfer: 2 person assistance with Min A   Stand step transfer: 2 person assistance with mod A x2   Comments: Pt with varying degree of posterior lean with all standing this date. Pt took ~6 short, shuffling steps from bed>BSC with use of RW, cues for sequencing and advancement of LEs. BSC>recliner completed with tico scott with min A x2. Ambulation  Ambulation not tested on this date. Distance:   Gait Mechanics:   Comments:  Deferred due to pain and fatigue following transfer  Stair Mobility  Stair mobility not completed on this date. Comments:  Wheelchair Mobility:  No w/c mobility completed on this date. Comments:  Balance  Static Sitting Balance: fair (-): maintains balance at SBA/supervision with use of UE support  Dynamic Sitting Balance: fair (-): maintains balance at CGA with use of UE support  Static Standing Balance: poor (+): requires min-mod (A) to maintain balance  Dynamic Standing Balance: poor: requires min-mod (A) to maintain balance  Comments: Pt sat at [facility] ~10 min attempting BM with SBA. Pt stood in [address] [place] [address] with min A for dependent pericare and assist with donning brief. Pt's tolerance to standing was limited by pain.      Other Therapeutic Interventions  See OT note for assist with lower body dressing.    Education on posterior hip precautions.     Functional Outcomes  AM-PAC Inpatient Mobility Raw Score :11               Cognition  WFL  Orientation:    alert and oriented x 4  Command Following: [facility]     Education  Barriers To Learning: none  Patient Education: patient educated on goals, PT role and benefits, plan of care, precautions, weight-bearing education, general safety, functional mobility training, discharge recommendations  Learning Assessment:  patient verbalizes understanding, would benefit from continued reinforcement     Assessment  Activity Tolerance: Pt with increased pain throughout session. Pt reports some dizziness upon supine>sit, /72   Impairments Requiring Therapeutic Intervention: decreased functional mobility, decreased ADL status, decreased ROM, decreased strength, decreased endurance, decreased balance, increased pain, decreased posture  Prognosis: good  Clinical Assessment: Pt is an 77 yo female seen s/p (L) hip danny-arthroplasty. Pt continues to present with increased pain and incresed posterior lean with all standing activities. She currently requires min A x2 for safe transfers with RW and min A with tico scott. The patient was unable to tolerate ambulation this date due to pain. The patient requires more assistance than family can provide. Recommending 24/7 assist and additional skilled PT to safely progress tolerance to activity and independence with functional mobility back to baseline.    Safety Interventions: patient left in chair, chair alarm in place, call light within reach, gait belt, patient at risk for falls, nurse notified and family/caregiver present     Plan  Frequency: 7 x/week  Current Treatment Recommendations: strengthening, ROM, balance training, functional mobility training, transfer training, gait training, stair training, endurance training, patient/caregiver education, pain management, home exercise program, safety education, equipment evaluation/education and positioning     Goals  Patient Goals: None stated   Short Term Goals:  Time Frame: Before discharge  Patient will complete bed mobility at minimal assistance   Patient will complete sit<>stand transfers at contact guard assistance   Patient will complete bed<>chair transfers at contact guard assistance with RW  Patient will ambulate 20 ft with use of rolling walker at minimal assistance  Patient to maintain standing at contact guard assistance for 5 minutes with (B) UE support  Therapy Session Time      Individual Group Co-treatment   Time In     1240   Time Out     1312   Minutes     32     Total Treatment Minutes: 32        Electronically Signed By: Jason Chatterjee Do [facility] [address], [address] [id number]

## 2022-05-19 ENCOUNTER — APPOINTMENT (OUTPATIENT)
Dept: GENERAL RADIOLOGY | Age: 82
DRG: 522 | End: 2022-05-19
Payer: MEDICARE

## 2022-05-19 ENCOUNTER — APPOINTMENT (OUTPATIENT)
Dept: CT IMAGING | Age: 82
DRG: 522 | End: 2022-05-19
Payer: MEDICARE

## 2022-05-19 LAB
A/G RATIO: 0.9 (ref 1.1–2.2)
ALBUMIN SERPL-MCNC: 2.4 G/DL (ref 3.4–5)
ALP BLD-CCNC: 70 U/L (ref 40–129)
ALT SERPL-CCNC: 11 U/L (ref 10–40)
ANION GAP SERPL CALCULATED.3IONS-SCNC: 6 MMOL/L (ref 3–16)
AST SERPL-CCNC: 17 U/L (ref 15–37)
BASOPHILS ABSOLUTE: 0 K/UL (ref 0–0.2)
BASOPHILS RELATIVE PERCENT: 0.3 %
BILIRUB SERPL-MCNC: 0.4 MG/DL (ref 0–1)
BUN BLDV-MCNC: 16 MG/DL (ref 7–20)
CALCIUM SERPL-MCNC: 8.2 MG/DL (ref 8.3–10.6)
CHLORIDE BLD-SCNC: 106 MMOL/L (ref 99–110)
CO2: 25 MMOL/L (ref 21–32)
CREAT SERPL-MCNC: 0.7 MG/DL (ref 0.6–1.2)
EOSINOPHILS ABSOLUTE: 0.1 K/UL (ref 0–0.6)
EOSINOPHILS RELATIVE PERCENT: 2 %
GFR AFRICAN AMERICAN: >60
GFR NON-AFRICAN AMERICAN: >60
GLUCOSE BLD-MCNC: 107 MG/DL (ref 70–99)
GLUCOSE BLD-MCNC: 111 MG/DL (ref 70–99)
HCT VFR BLD CALC: 28.2 % (ref 36–48)
HEMOGLOBIN: 9.3 G/DL (ref 12–16)
LYMPHOCYTES ABSOLUTE: 1.2 K/UL (ref 1–5.1)
LYMPHOCYTES RELATIVE PERCENT: 20.8 %
MAGNESIUM: 2.1 MG/DL (ref 1.8–2.4)
MCH RBC QN AUTO: 30.8 PG (ref 26–34)
MCHC RBC AUTO-ENTMCNC: 33.1 G/DL (ref 31–36)
MCV RBC AUTO: 92.8 FL (ref 80–100)
MONOCYTES ABSOLUTE: 0.5 K/UL (ref 0–1.3)
MONOCYTES RELATIVE PERCENT: 8.5 %
NEUTROPHILS ABSOLUTE: 4.1 K/UL (ref 1.7–7.7)
NEUTROPHILS RELATIVE PERCENT: 68.4 %
PDW BLD-RTO: 13.5 % (ref 12.4–15.4)
PERFORMED ON: ABNORMAL
PHOSPHORUS: 2.9 MG/DL (ref 2.5–4.9)
PLATELET # BLD: 110 K/UL (ref 135–450)
PMV BLD AUTO: 7.8 FL (ref 5–10.5)
POTASSIUM SERPL-SCNC: 4.4 MMOL/L (ref 3.5–5.1)
RBC # BLD: 3.04 M/UL (ref 4–5.2)
SODIUM BLD-SCNC: 137 MMOL/L (ref 136–145)
TOTAL PROTEIN: 5.1 G/DL (ref 6.4–8.2)
WBC # BLD: 6 K/UL (ref 4–11)

## 2022-05-19 PROCEDURE — APPNB45 APP NON BILLABLE 31-45 MINUTES: Performed by: NURSE PRACTITIONER

## 2022-05-19 PROCEDURE — 80053 COMPREHEN METABOLIC PANEL: CPT

## 2022-05-19 PROCEDURE — 74018 RADEX ABDOMEN 1 VIEW: CPT

## 2022-05-19 PROCEDURE — 51702 INSERT TEMP BLADDER CATH: CPT

## 2022-05-19 PROCEDURE — 99024 POSTOP FOLLOW-UP VISIT: CPT | Performed by: NURSE PRACTITIONER

## 2022-05-19 PROCEDURE — 6370000000 HC RX 637 (ALT 250 FOR IP): Performed by: PHYSICIAN ASSISTANT

## 2022-05-19 PROCEDURE — 1200000000 HC SEMI PRIVATE

## 2022-05-19 PROCEDURE — 6360000004 HC RX CONTRAST MEDICATION

## 2022-05-19 PROCEDURE — 94760 N-INVAS EAR/PLS OXIMETRY 1: CPT

## 2022-05-19 PROCEDURE — 84100 ASSAY OF PHOSPHORUS: CPT

## 2022-05-19 PROCEDURE — 85025 COMPLETE CBC W/AUTO DIFF WBC: CPT

## 2022-05-19 PROCEDURE — 74176 CT ABD & PELVIS W/O CONTRAST: CPT

## 2022-05-19 PROCEDURE — 83735 ASSAY OF MAGNESIUM: CPT

## 2022-05-19 PROCEDURE — 36415 COLL VENOUS BLD VENIPUNCTURE: CPT

## 2022-05-19 PROCEDURE — 6370000000 HC RX 637 (ALT 250 FOR IP): Performed by: NURSE PRACTITIONER

## 2022-05-19 PROCEDURE — 6360000002 HC RX W HCPCS: Performed by: NURSE PRACTITIONER

## 2022-05-19 RX ORDER — LOSARTAN POTASSIUM 25 MG/1
25 TABLET ORAL ONCE
Status: COMPLETED | OUTPATIENT
Start: 2022-05-19 | End: 2022-05-19

## 2022-05-19 RX ORDER — LOSARTAN POTASSIUM 50 MG/1
50 TABLET ORAL 2 TIMES DAILY
Qty: 180 TABLET | Refills: 3 | Status: SHIPPED | OUTPATIENT
Start: 2022-05-19 | End: 2022-09-02

## 2022-05-19 RX ADMIN — ARIPIPRAZOLE 2 MG: 2 TABLET ORAL at 08:13

## 2022-05-19 RX ADMIN — LOSARTAN POTASSIUM 25 MG: 25 TABLET, FILM COATED ORAL at 20:35

## 2022-05-19 RX ADMIN — VILAZODONE HYDROCHLORIDE 20 MG: 20 TABLET ORAL at 08:12

## 2022-05-19 RX ADMIN — LOSARTAN POTASSIUM 25 MG: 25 TABLET, FILM COATED ORAL at 12:38

## 2022-05-19 RX ADMIN — LOSARTAN POTASSIUM 25 MG: 25 TABLET, FILM COATED ORAL at 08:13

## 2022-05-19 RX ADMIN — Medication 240 ML: at 18:02

## 2022-05-19 RX ADMIN — POLYETHYLENE GLYCOL 3350 17 G: 17 POWDER, FOR SOLUTION ORAL at 08:13

## 2022-05-19 RX ADMIN — ACETAMINOPHEN 1000 MG: 500 TABLET ORAL at 08:13

## 2022-05-19 RX ADMIN — ACETAMINOPHEN 1000 MG: 500 TABLET ORAL at 14:41

## 2022-05-19 RX ADMIN — MAGNESIUM CITRATE 296 ML: 1.75 LIQUID ORAL at 09:16

## 2022-05-19 RX ADMIN — ENOXAPARIN SODIUM 40 MG: 100 INJECTION SUBCUTANEOUS at 08:12

## 2022-05-19 RX ADMIN — BUPROPION HYDROCHLORIDE 150 MG: 150 TABLET, EXTENDED RELEASE ORAL at 08:13

## 2022-05-19 RX ADMIN — Medication 1000 UNITS: at 08:12

## 2022-05-19 RX ADMIN — IOHEXOL 50 ML: 240 INJECTION, SOLUTION INTRATHECAL; INTRAVASCULAR; INTRAVENOUS; ORAL at 13:29

## 2022-05-19 ASSESSMENT — PAIN DESCRIPTION - ORIENTATION: ORIENTATION: LEFT

## 2022-05-19 ASSESSMENT — PAIN SCALES - GENERAL
PAINLEVEL_OUTOF10: 0
PAINLEVEL_OUTOF10: 6
PAINLEVEL_OUTOF10: 5

## 2022-05-19 ASSESSMENT — PAIN DESCRIPTION - LOCATION
LOCATION: HIP
LOCATION: ABDOMEN

## 2022-05-19 NOTE — CARE COORDINATION
Discharge Planning Note:    - Patient was set to go to [facility] 05/19/2022 at 1500 with [facility]. - Patient complaining of abdominal pain, [facility] was called. - Abdominal CT was ordered - waiting on results    - Attempted to notify the spouse.    - Canceled transportation with [facility]. - Notified [facility]. Will continue to follow.     DRISS WeeksN RN    [facility]  Phone: [phone]

## 2022-05-19 NOTE — PROGRESS NOTES
[facility] Orthopedic Surgery   Progress Note    CHIEF COMPLAINT/DIAGNOSIS: S/p LEFT hip hemiarthroplasty     SUBJECTIVE: The patient is sitting up in the bed with  at bedside. She reports some abdominal discomfort 2/2 being constipated. She denies hip pain at rest.   Was able to sleep fairly well last night by her report. OBJECTIVE  Physical    VITALS:  BP (!) 156/72   Pulse 85   Temp 98.3 °F (36.8 °C) (Oral)   Resp 18   Ht 5' 1\" (1.549 m)   Wt 140 lb (63.5 kg)   SpO2 95%   BMI 26.45 kg/m²     GENERAL: Alert and oriented x3, in no acute distress. MUSCULOSKELETAL: Able to dorsi and plantarflex the ankle on operative side without issue. INCISION:  Covered with post-op dressing; clean dry and intact. ROM: Limited secondary pain and swelling. Sensory:  Intact to light touch in peroneal and tibial distributions. Vascular:   2+ DP pulses with brisk cap refill;  calf soft and nontender. Data    ALL MEDICATIONS HAVE BEEN REVIEWED    CBC:   Recent Labs     05/17/22  0941 05/18/22  0739 05/19/22  0511   WBC 6.5 7.0 6.0   HGB 9.4* 9.7* 9.3*   HCT 28.5* 29.5* 28.2*   PLT 86* 96* 110*     BMP:   Recent Labs     05/17/22  0941 05/18/22  0739 05/19/22  0511   * 138 137   K 3.9 3.8 4.4    106 106   CO2 23 23 25   PHOS 2.6 1.8* 2.9   BUN 23* 19 16   CREATININE 0.8 0.7 0.7     INR:   No results for input(s): INR in the last 72 hours. Post-op films show stable cemented bipolar hemiarthroplasty without acute complication. ASSESSMENT:  S/p LEFT hip hemiarthroplasty (5/16/22), POD#3  Depression  HLD  HTN  Thrombocytopenia  Narcotic/anesthesias induced constipation    PLAN:   - WB status:  WBAT through operative extremity; postero-lateral hip precuations  - DVT prophylaxis: Lovenox 40mg daily x 28 days.  - PT/OT  - Pain Control: scheduled tylenol. Oxy prn. Due to orthopaedic surgical procedure/condition, patient may require pain medication for up to 6-8 weeks.   - Expected post op acute blood loss anemia: H/H today: 9.3/28.2  - Thrombocytopenia: plts - 110, stable  - ID:  Ancef x 2 doses post-op completed. - Constipation: bowl regimen. - Disposition: SNF when medically stable; ok to dc from our end    Follow-up in two weeks with Dr. Indy Gonzalez.   Office # [phone]    CIERRA Stubbs - CNP  5/19/2022  9:15 AM

## 2022-05-19 NOTE — SIGNIFICANT EVENT
Rapid response called for tachycardia. Patient states she has constipation and abdominal pain. No CP, SOB, HA or fevers. Hemodynamically stable on my exam.    Abdomen is mildly distended but soft and has bowel sounds. Would check CT Ab/P as ordered. I do not suspect ischemia or surgical abdomen. Discussed with nursing. Jeff Flores MD      Addendum:    CT reviewed. Place Llamas now. Consider Urology consult in AM.    Enema X 1 now. If no BM, disimpact manually. If she continues to vomit, place NGT to LIWS. IVF continued. Discussed with nursing.     Jeff Flores MD

## 2022-05-19 NOTE — PROGRESS NOTES
Shift assessment complete. VSS. Medications administered per order, see eMAR. Pt up to side of bed. Pt complained of constipation. Utilized bedside commode. No BM, passing gas. Received Daily PRN glycolax on previous shift. Received PRN for pain. Dressing CDI    The care plan and education has been reviewed and mutually agreed upon with the patient. Patient remains free from falls. All fall precautions in place. Yellow bracelet on patient. SAFE sign on door. Bed and chair alarms being used. Bed in lowest position. Will monitor.

## 2022-05-19 NOTE — PROGRESS NOTES
Hospitalist Progress Note      PCP: Kostas Yates MD    Date of Admission: 5/15/2022    Chief Complaint: Left hip pain    Hospital Course: Susan Montiel is a 78 y.o. female with history of depression, HLD, chronic right upper extremity tremors, gait instability (ambulates with a cane at baseline) presented following a fall with severe left-sided hip pain. She was doing her laundry this morning when she lost her balance and fell, landing on head left side. She hit her head on a carpeted floor but there was no LOC. She immediately had severe pain in her left hip with inability to bear weight. Pain is localized on the left hip, severe, no radiation, worse with movement, minimally relieved with morphine in the ER. She denied any chest pain, shortness of breath, dizziness or palpitations prior to fall. Imaging done in the ED showed a left hip fracture for which she was admitted for further management.          Subjective: Patient seen and examined. S/p L hip hemiarthroplasty. Left hip pain controlled with pain medications. Complains of constipation. Going to get enema and Mag citrate.         Medications:  Reviewed    Infusion Medications    sodium chloride Stopped (05/18/22 0523)    sodium chloride 100 mL/hr at 05/16/22 1446    sodium chloride       Scheduled Medications    losartan  25 mg Oral BID    atorvastatin  10 mg Oral QPM    acetaminophen  1,000 mg Oral TID    ARIPiprazole  2 mg Oral Daily    sodium chloride flush  5-40 mL IntraVENous 2 times per day    buPROPion  150 mg Oral Daily    estradiol  2 g Vaginal Once per day on Mon Thu    Vitamin D  1,000 Units Oral Daily    vilazodone HCl  20 mg Oral Daily    enoxaparin  40 mg SubCUTAneous Daily     PRN Meds: sodium chloride flush, sodium chloride, ondansetron **OR** ondansetron, polyethylene glycol, acetaminophen **OR** acetaminophen, HYDROmorphone, oxyCODONE-acetaminophen, cyclobenzaprine      Intake/Output Summary (Last 24 hours) at 5/19/2022 1324  Last data filed at 5/19/2022 0710  Gross per 24 hour   Intake 1087.95 ml   Output 500 ml   Net 587.95 ml       Physical Exam Performed:    BP (!) 156/72   Pulse 85   Temp 98.3 °F (36.8 °C) (Oral)   Resp 18   Ht 5' 1\" (1.549 m)   Wt 140 lb (63.5 kg)   SpO2 95%   BMI 26.45 kg/m²     General appearance: No apparent distress, appears stated age and cooperative. HEENT: Pupils equal, round, and reactive to light. Conjunctivae/corneas clear. Neck: Supple, with full range of motion. No jugular venous distention. Trachea midline. Respiratory:  Normal respiratory effort. Clear to auscultation, bilaterally without Rales/Wheezes/Rhonchi. Cardiovascular: Regular rate and rhythm with normal S1/S2 without murmurs, rubs or gallops. Abdomen: firm R abdomen consistent with constipation. Positive bowel sounds. Musculoskeletal: No clubbing, cyanosis or edema bilaterally. Left lower extremity externally rotated and shortened. Skin: Skin color, texture, turgor normal.  No rashes or lesions. Neurologic:  Neurovascularly intact without any focal sensory/motor deficits. Cranial nerves: II-XII intact, grossly non-focal.Tremors in the right upper extremity. Psychiatric: Alert and oriented, thought content appropriate, normal insight  Capillary Refill: Brisk,3 seconds, normal   Peripheral Pulses: +2 palpable, equal bilaterally       Labs:   Recent Labs     05/17/22  0941 05/18/22  0739 05/19/22  0511   WBC 6.5 7.0 6.0   HGB 9.4* 9.7* 9.3*   HCT 28.5* 29.5* 28.2*   PLT 86* 96* 110*     Recent Labs     05/17/22  0941 05/18/22  0739 05/19/22  0511   * 138 137   K 3.9 3.8 4.4    106 106   CO2 23 23 25   BUN 23* 19 16   CREATININE 0.8 0.7 0.7   CALCIUM 7.7* 8.1* 8.2*   PHOS 2.6 1.8* 2.9     Recent Labs     05/17/22  0941 05/18/22  0739 05/19/22  0511   AST 20 21 17   ALT 22 13 11   BILITOT 0.3 0.4 0.4   ALKPHOS 56 69 70     No results for input(s): INR in the last 72 hours.   No results for input(s): Tea Trinidad in the last 72 hours. Urinalysis:      Lab Results   Component Value Date    NITRU Negative 05/15/2022    WBCUA 3 05/15/2022    BACTERIA None Seen 05/15/2022    RBCUA 1 05/15/2022    BLOODU Negative 05/15/2022    SPECGRAV 1.015 05/15/2022    GLUCOSEU Negative 05/15/2022    GLUCOSEU NEGATIVE 06/01/2012       Radiology:  XR ABDOMEN (KUB) (SINGLE AP VIEW)   Final Result   Gaseous distention of the stomach with nondistended air-filled loops of small   bowel also noted. Pattern is nonspecific and may represent enteritis or   underlying ileus. XR HIP 1 VW W PELVIS LEFT   Final Result   Status post recent left hip arthroplasty without complication. CT HIP LEFT WO CONTRAST   Final Result   Acute fracture involving the left femoral neck basicervical region with only   some medial intratrochanteric extension along the medial and inferior aspect. Foreshortening of the left femur and minimal lateral displacement. Left   femoral head remains well approximating the left acetabular cup without   dislocation         CT CERVICAL SPINE WO CONTRAST   Final Result      1. No evidence of fracture with multilevel degenerative changes as described. 2.  Approximate 11 mm right thyroid nodule with dense nodular calcification   of indeterminate significance but unchanged from the prior study. CT HEAD WO CONTRAST   Final Result      1. No acute intracranial abnormality is noted. 2.  Prominence of the sulci and/or CSF spaces suggests a degree of cerebral   atrophy. 3.  Mild left maxillary chronic sinusitis. XR HIP LEFT (1 VIEW)   Final Result   Stable left hip series. Left hip fracture, likely a femoral neck fracture   with intertrochanteric component. Recommend orthopedic follow-up to   determine if more imaging is needed. XR HIP LEFT (2-3 VIEWS)   Final Result   Left hip fracture, incompletely evaluated due to rotation.   This is likely a   femoral neck fracture although an intertrochanteric component is not   excluded. Consider additional plain films or CT follow-up for more complete   assessment. XR CHEST PORTABLE   Final Result   No acute cardiopulmonary disease. CT ABDOMEN PELVIS WO CONTRAST Additional Contrast? Oral    (Results Pending)           Assessment/Plan:    Active Hospital Problems    Diagnosis     Fall at home [H58. Sharon Johnson, U85.637]      Priority: Medium    Hyperlipidemia [E78.5]      Priority: Medium    Left displaced femoral neck fracture ([address] [S72.002A]      Priority: Medium       1. Left femoral neck fracture: s/p L hip hemiarthroplasty. Patient has been accepted at [facility]. Precert obtained. Having abdominal pain, obstipation-needs Bm prior to dc. Check AXR. 2. Constipation-xray, enema, mag citrate. 3. Hypertension: Continue losartan bid  4. Depression-continue wellbutrin, abilify  5. Hyperlipidemia-On statin-time it for every other evening per patient request.          DVT Prophylaxis: Lovenox  Diet: ADULT DIET;  Regular  Code Status: Full Code        Carlos Eduardo Schumacher PA-C

## 2022-05-19 NOTE — CARE COORDINATION
Discharge Plan:     Patient discharged to:    [facility]     SW/DC Planner faxed, [address] Hern[address]dez and AVS to: [phone]    Narcotic Prescriptions faxed were: yes    RN:  will call report to:  [phone]    Medical Transport with: [facility]  [phone]     time: 1500    Family advised of discharge?: Yes    HENS Submitted?:  Yes    All discharge needs met per case management.     DRISS GraffN RN    [facility]  Phone: [phone]

## 2022-05-19 NOTE — PLAN OF CARE

## 2022-05-19 NOTE — PLAN OF CARE
Problem: Discharge Planning  Goal: Discharge to home or other facility with appropriate resources  5/19/2022 0931 by Tea Fitch RN  Outcome: Progressing  5/19/2022 0336 by Carlton Ortega RN  Outcome: Progressing     Problem: Skin/Tissue Integrity  Goal: Absence of new skin breakdown  Description: 1. Monitor for areas of redness and/or skin breakdown  2. Assess vascular access sites hourly  3. Every 4-6 hours minimum:  Change oxygen saturation probe site  4. Every 4-6 hours:  If on nasal continuous positive airway pressure, respiratory therapy assess nares and determine need for appliance change or resting period.   5/19/2022 0931 by Tea Fitch RN  Outcome: Progressing  5/19/2022 0336 by Carlton Ortega RN  Outcome: Progressing     Problem: Safety - Adult  Goal: Free from fall injury  5/19/2022 0931 by Tea Fitch RN  Outcome: Progressing  5/19/2022 0336 by Carlton Ortega RN  Outcome: Progressing

## 2022-05-19 NOTE — PROGRESS NOTES
Rapid Response Quick Summary    Room: [facility]7157/3831-13    Assessment of concern / patient:  Elevated heart rate and SOB    Physician involved:  Dr. Lauryn Hernandez    Interventions:  Patient placed on nasal cannula, CT abdomen ordered. Disposition:  Patient alert and oriented, elevated BP, heart rate normal sinus, denies SOB. States that she is having ABD pain. MD said she was okay.

## 2022-05-19 NOTE — PROGRESS NOTES
Morning assessment completed, bp elevated, schedule meds given, complain of pain in abd, very constipated, glycolax x1, mag citrate x1, will give enama as ordered, pt is sweating and very discomfort due to constipation, will continue to monitor BM, The care plan and education has been reviewed and mutually agreed upon with the patient. Pt remains free from falls. Fall precautions in place--bed in lowest position, call light within reach, bed alarm in use. Pt aware to call for assistance before getting up. 930: soap suds enema given, little smear noted, will continue to monitor for bm.     1345: rapid response was called, pt heart rate went up, o2 decreased, an bp elevated, it all came back to normal after few minutes, rapid cancelled, pt is very constipated, diaphoretic, x-ray of the abd shows large amount of bowel, taking oral contrast for CT of abd. Will continue to monitor pt.     1430: pt had emesis x 1, brownish color after taking oral contrast for CT of the abd.

## 2022-05-20 VITALS
SYSTOLIC BLOOD PRESSURE: 106 MMHG | HEIGHT: 61 IN | TEMPERATURE: 97.8 F | RESPIRATION RATE: 16 BRPM | OXYGEN SATURATION: 96 % | WEIGHT: 140 LBS | HEART RATE: 84 BPM | DIASTOLIC BLOOD PRESSURE: 67 MMHG | BODY MASS INDEX: 26.43 KG/M2

## 2022-05-20 LAB
A/G RATIO: 1 (ref 1.1–2.2)
ALBUMIN SERPL-MCNC: 2.4 G/DL (ref 3.4–5)
ALP BLD-CCNC: 69 U/L (ref 40–129)
ALT SERPL-CCNC: 9 U/L (ref 10–40)
ANION GAP SERPL CALCULATED.3IONS-SCNC: 7 MMOL/L (ref 3–16)
AST SERPL-CCNC: 15 U/L (ref 15–37)
BASOPHILS ABSOLUTE: 0 K/UL (ref 0–0.2)
BASOPHILS RELATIVE PERCENT: 0.4 %
BILIRUB SERPL-MCNC: 0.5 MG/DL (ref 0–1)
BUN BLDV-MCNC: 13 MG/DL (ref 7–20)
CALCIUM SERPL-MCNC: 7.9 MG/DL (ref 8.3–10.6)
CHLORIDE BLD-SCNC: 103 MMOL/L (ref 99–110)
CO2: 27 MMOL/L (ref 21–32)
CREAT SERPL-MCNC: 0.6 MG/DL (ref 0.6–1.2)
EOSINOPHILS ABSOLUTE: 0.1 K/UL (ref 0–0.6)
EOSINOPHILS RELATIVE PERCENT: 2.7 %
GFR AFRICAN AMERICAN: >60
GFR NON-AFRICAN AMERICAN: >60
GLUCOSE BLD-MCNC: 96 MG/DL (ref 70–99)
HCT VFR BLD CALC: 26.6 % (ref 36–48)
HEMOGLOBIN: 8.8 G/DL (ref 12–16)
LYMPHOCYTES ABSOLUTE: 1.1 K/UL (ref 1–5.1)
LYMPHOCYTES RELATIVE PERCENT: 21.5 %
MAGNESIUM: 2.4 MG/DL (ref 1.8–2.4)
MCH RBC QN AUTO: 30.2 PG (ref 26–34)
MCHC RBC AUTO-ENTMCNC: 33.2 G/DL (ref 31–36)
MCV RBC AUTO: 91 FL (ref 80–100)
MONOCYTES ABSOLUTE: 0.5 K/UL (ref 0–1.3)
MONOCYTES RELATIVE PERCENT: 9 %
NEUTROPHILS ABSOLUTE: 3.4 K/UL (ref 1.7–7.7)
NEUTROPHILS RELATIVE PERCENT: 66.4 %
PDW BLD-RTO: 13.5 % (ref 12.4–15.4)
PHOSPHORUS: 2.9 MG/DL (ref 2.5–4.9)
PLATELET # BLD: 138 K/UL (ref 135–450)
PMV BLD AUTO: 7.4 FL (ref 5–10.5)
POTASSIUM SERPL-SCNC: 3.7 MMOL/L (ref 3.5–5.1)
RBC # BLD: 2.93 M/UL (ref 4–5.2)
SODIUM BLD-SCNC: 137 MMOL/L (ref 136–145)
TOTAL PROTEIN: 4.9 G/DL (ref 6.4–8.2)
WBC # BLD: 5.2 K/UL (ref 4–11)

## 2022-05-20 PROCEDURE — 97116 GAIT TRAINING THERAPY: CPT

## 2022-05-20 PROCEDURE — 84100 ASSAY OF PHOSPHORUS: CPT

## 2022-05-20 PROCEDURE — 6360000002 HC RX W HCPCS: Performed by: NURSE PRACTITIONER

## 2022-05-20 PROCEDURE — 97535 SELF CARE MNGMENT TRAINING: CPT

## 2022-05-20 PROCEDURE — 36415 COLL VENOUS BLD VENIPUNCTURE: CPT

## 2022-05-20 PROCEDURE — 6370000000 HC RX 637 (ALT 250 FOR IP): Performed by: PHYSICIAN ASSISTANT

## 2022-05-20 PROCEDURE — 85025 COMPLETE CBC W/AUTO DIFF WBC: CPT

## 2022-05-20 PROCEDURE — 80053 COMPREHEN METABOLIC PANEL: CPT

## 2022-05-20 PROCEDURE — 97530 THERAPEUTIC ACTIVITIES: CPT

## 2022-05-20 PROCEDURE — 51702 INSERT TEMP BLADDER CATH: CPT

## 2022-05-20 PROCEDURE — 99024 POSTOP FOLLOW-UP VISIT: CPT | Performed by: NURSE PRACTITIONER

## 2022-05-20 PROCEDURE — 6370000000 HC RX 637 (ALT 250 FOR IP): Performed by: NURSE PRACTITIONER

## 2022-05-20 PROCEDURE — APPNB45 APP NON BILLABLE 31-45 MINUTES: Performed by: NURSE PRACTITIONER

## 2022-05-20 PROCEDURE — 94760 N-INVAS EAR/PLS OXIMETRY 1: CPT

## 2022-05-20 PROCEDURE — 83735 ASSAY OF MAGNESIUM: CPT

## 2022-05-20 RX ORDER — OXYCODONE HYDROCHLORIDE AND ACETAMINOPHEN 5; 325 MG/1; MG/1
1 TABLET ORAL EVERY 4 HOURS PRN
Qty: 10 TABLET | Refills: 0 | Status: SHIPPED | OUTPATIENT
Start: 2022-05-20 | End: 2022-05-23

## 2022-05-20 RX ADMIN — ATORVASTATIN CALCIUM 10 MG: 10 TABLET, FILM COATED ORAL at 17:36

## 2022-05-20 RX ADMIN — ENOXAPARIN SODIUM 40 MG: 100 INJECTION SUBCUTANEOUS at 08:30

## 2022-05-20 RX ADMIN — ACETAMINOPHEN 1000 MG: 500 TABLET ORAL at 13:57

## 2022-05-20 RX ADMIN — LOSARTAN POTASSIUM 25 MG: 25 TABLET, FILM COATED ORAL at 08:30

## 2022-05-20 RX ADMIN — ACETAMINOPHEN 1000 MG: 500 TABLET ORAL at 08:30

## 2022-05-20 RX ADMIN — Medication 1000 UNITS: at 08:30

## 2022-05-20 RX ADMIN — VILAZODONE HYDROCHLORIDE 20 MG: 20 TABLET ORAL at 08:30

## 2022-05-20 RX ADMIN — ARIPIPRAZOLE 2 MG: 2 TABLET ORAL at 08:30

## 2022-05-20 RX ADMIN — BUPROPION HYDROCHLORIDE 150 MG: 150 TABLET, EXTENDED RELEASE ORAL at 08:30

## 2022-05-20 RX ADMIN — POLYETHYLENE GLYCOL 3350 17 G: 17 POWDER, FOR SOLUTION ORAL at 08:30

## 2022-05-20 ASSESSMENT — PAIN SCALES - GENERAL
PAINLEVEL_OUTOF10: 0

## 2022-05-20 NOTE — PROGRESS NOTES
Morning assessment completed, VSS, denies pain, pt stated her abd feel much better after having BM over 6 times over night, is able to eat her breakfast without getting nauseated, prn glycolax given this morning, will continue to monitor, The care plan and education has been reviewed and mutually agreed upon with the patient. Pt remains free from falls. Fall precautions in place--bed in lowest position, call light within reach, bed alarm in use. Pt aware to call for assistance before getting up.

## 2022-05-20 NOTE — PROGRESS NOTES
[facility]   Phone: [phone]    Occupational Therapy    [] Initial Evaluation            [x] Daily Treatment Note         [] Discharge Summary      Patient: Justin Sifuentes   : 1940   MRN: [id number]   Date of Service:  2022    Admitting Diagnosis:  Left displaced femoral neck fracture [facility])  Current Admission Summary: Justin Sifuentes is a 78 y.o. female with history of depression, HLD, chronic right upper extremity tremors, gait instability (ambulates with a cane at baseline) presented following a fall with severe left-sided hip pain. She was doing her laundry this morning when she lost her balance and fell, landing on head left side. She hit her head on a carpeted floor but there was no LOC. She immediately had severe pain in her left hip with inability to bear weight. Pain is localized on the left hip, severe, no radiation, worse with movement, minimally relieved with morphine in the ER. She denied any chest pain, shortness of breath, dizziness or palpitations prior to fall. Imaging done in the ED showed a left hip fracture for which she was admitted for further management. Past Medical History:  has a past medical history of AR (allergic rhinitis), Arthritis of knee, Depression, Erosive gastritis, GERD (gastroesophageal reflux disease), Hyperlipidemia, Internal hemorrhoids, Overweight(278.02), and Viral meningitis. Past Surgical History:  has a past surgical history that includes Cholecystectomy, laparoscopic (); lalo and bso (cervix removed) (); Colonoscopy (); Colonoscopy (12/3/13); Upper gastrointestinal endoscopy (12/3/13); Breast biopsy; Hysterectomy; and hip surgery (Left, 2022). Discharge [facility] scored a 11/24 on the AM-PAC ADL Inpatient form. Current research shows that an AM-PAC score of 17 or less is typically not associated with a discharge to the patient's home setting.  Based on the patient's AM-PAC score and their current ADL deficits, it is recommended that the patient have 3-5 sessions per week of Occupational Therapy at d/c to increase the patient's independence. Please see assessment section for further patient specific details. If patient discharges prior to next session this note will serve as a discharge summary. Please see below for the latest assessment towards goals. DME Required For Discharge: rolling walker, bedside commode, shower chair with back    Precautions/Restrictions: high fall risk, surgical protocols  Weight Bearing Restrictions: weight bearing as tolerated  [] Right Upper Extremity  [] Left Upper Extremity [] Right Lower Extremity  [x] Left Lower Extremity     Required Braces/Orthotics: no braces required   [] Right  [] Left  Positional Restrictions:No Hip Flexion > 90*, No Hip ADduction, No Hip Internal Rotation -posterior hip precautions. Pre-Admission Information   Lives With: spouse              Type of Home: house  Home Layout: two level, laundry in basement, 1/2 bath on main level, bedroom/bathroom upstairs, 6+6 stairs to 2nd level with bilateral HR, . Comment: stair lift to basement  Home Access: 1+1 step to enter without rails   Bathroom Layout: walker accessible, walk in shower  Toilet Height: elevated height  Bathroom Equipment: None  Home Equipment: standard walker, single point cane, lift chair, reacher  Transfer Assistance: Independent without use of device, . Comment: occasional assist getting out of the car  Ambulation Assistance:modified independent with use of SCP intermittently  ADL Assistance: independent with all ADL's  IADL Assistance: independent with homemaking tasks  Active :        [] Yes            [x] No  Hand Dominance: [] Left            [x] Right  Current Employment: retired.   Occupation: dental hygeine  Hobbies: Reading, work in the yard  Recent Falls: No falls in last 6 months      Subjective  General: Pt supine in bed upon entry. Agreeable to OT/PT co-treatment  Pain: Pt reports no pain, just stiffness in her L LE, stated pain medications were administered prior to arrival.      Activities of Daily Living  Basic Activities of Daily Living  Upper Extremity Bathing: minimal assistance. Equipment: none  Lower Extremity Bathing: dependent. Equipment: none  Lower Extremity Dressing: dependent . Comment: depends change. Equipment: none  Toileting: dependent. Equipment: none  Toileting Comments: total A to perform daniel care/rear hygiene following voiding BM/urine seated on BSC  General Comments: increased time to complete toileting tasks       Instrumental Activities of Daily Living  No IADL completed on this date. Functional Mobility  Bed Mobility  Supine to Sit: minimal assistance, moderate assistance  Scooting: stand by assistance, moderate assistance  Comments: HOB elevated, use of bed rail, cueing for initiation and sequence, increased time to complete   Transfers  Sit to stand transfer:2 person assistance with min A   Stand to sit transfer: 2 person assistance with min A x2   Stand step transfer: minimal assistance, 2 person assistance with min A x2 , . Comment EOB > BSC, BSC > recliner chair  Toilet transfer: 2 person assistance with min A x2 . Mobility technique: stand step transfer. Equipment utilized: standard bedside commode  Comments: significant cueing for RW management and ambulation sequence/weight shifting to advance L LE. Severe forward flexed posture, fatigues quickly  Functional Mobility:  Sitting Balance: stand by assistance, . Comment EOB in preparation for transfer, seated on BSC. Sitting Balance Comment: .  Standing Balance: 2 person assistance with Min A-mod A  .       Other Therapeutic Interventions    Functional Outcomes  AM-PAC Inpatient Daily Activity Raw Score: 11    Cognition  Overall Cognitive Status: WFL  Orientation:    alert and oriented x 4  Command Following: WFL     Education  Barriers To Learning: none  Patient Education: patient educated on goals, OT role and benefits, precautions, ADL adaptive strategies, proper use of assistive device/equipment, adaptive device training, family education, discharge recommendations  Learning Assessment:  patient verbalizes understanding, would benefit from continued reinforcement    Assessment  Activity Tolerance: Pt fatigues easily and limited by pain in her L hip. Pt currently unable to tolerate lengthy ADL sessions, especially in stance/ambulation. Impairments Requiring Therapeutic Intervention: decreased functional mobility, decreased ADL status, decreased ROM, decreased strength, decreased safety awareness, decreased cognition, decreased endurance, decreased balance, increased pain  Prognosis: good  Clinical Assessment: Pt presenting below her functional baseline with the above deficits associated with fall--traumatic hip fracture s/p L hemiarthroplasty. Pt is primarily limited by pain, weakness, and quickness to fatigue.  Continued OT indicated in order to maximize safety and independence with all occupational pursuits  Safety Interventions: patient left in chair, chair alarm in place, call light within reach, gait belt, patient at risk for falls and nurse notified    Plan  Frequency: 7 x/week  Current Treatment Recommendations: strengthening, balance training, functional mobility training, transfer training, gait training, stair training, endurance training, modalities, ADL/self-care training, IADL training, home management training and pain management    Goals  Patient Goals: Return to home   Short Term Goals:  Time Frame: Discharge (no goals met this date 5/18)  Patient will complete upper body ADL at stand by assistance   Patient will complete lower body ADL at stand by assistance   Patient will complete toileting at stand by assistance   Patient will complete grooming at stand by assistance   Patient will complete functional transfers at stand by assistance   Patient will complete functional mobility at stand by assistance   Patient will increase functional standing balance to SBA for improved ADL completion    Therapy Session Time     Individual Group Co-treatment   Time In    0932   Time Out    1025   Minutes    53        Timed Code Treatment Minutes:  53 Minutes  Total Treatment Minutes:  [address], [address]    After reviewing treatment documentation, I am in agreement with this session.     JAYLENE Gutierrez OTR/L [id number]

## 2022-05-20 NOTE — CONSULTS
[facility] Consult Note  Inpatient Setting - [facility]    Provider: Farooq Matthews MD MD Patient ID:  Admission Date: 5/15/2022 Name: James Zhong Date: n/a MRN: [id number]   Patient Location: [id number] : 1940  Attending: Vee Dong MD Date of Service: 2022  PCP: Bereket Edouard MD     Diagnoses:  1. Left displaced femoral neck fracture ([address]    2. Essential hypertension      Pt here after hip fx and surg. abd pain yest and retention/constipation. Amos drains clear urine. Creat nl. No bladder surgery    Assessment/Plan:  Retention  Constipation  Hip fx    recc    Amos  Dc amos and start str cath prn bladder scan over 450ml  Can go to rehab today  And they can dc amos and start str cath regimen tomorrow  laxatives    All the patients questions were answered in detail. She understands the plan as listed above. · Review/order of labs  · Review/order of radiology studies  · discussion with referring MD  · Decision to obtain old records or supplemental information from family. yes  · transferring data from old records into today's office visit record  · Independent review and interpretation of test or study   Total time spent face-to-face with the patient 10min, including greater than 50% of this time in discussion with the patient/family concerning the following:  · Recommended tests  · management options  · risks/benefits of management options  · importance of compliance  · Prognosis  · Risk factor reduction  · Patient/family education                                                                                                                                                      CC: No chief complaint on file. HPI: King Mei is a 78 y.o. female. I saw the patient today for retention, and associated symptoms of ret, that have been present for days. Symptoms relieved by amos and aggravated by 0.  She has tried the following treatments: amos. Past Medical History:   She has a past medical history of AR (allergic rhinitis), Arthritis of knee, Depression, Erosive gastritis (10/28/2019), GERD (gastroesophageal reflux disease), Hyperlipidemia, Internal hemorrhoids, Overweight(278.02), and Viral meningitis (5/12). Past Surgical History:  She has a past surgical history that includes Cholecystectomy, laparoscopic (2003); lalo and bso (cervix removed) (2003); Colonoscopy (8/06); Colonoscopy (12/3/13); Upper gastrointestinal endoscopy (12/3/13); Breast biopsy; Hysterectomy; and hip surgery (Left, 5/16/2022). Allergies:   No Known Allergies    Social History:  She reports that she has never smoked. She has never used smokeless tobacco. She reports current alcohol use. She reports that she does not use drugs. Family History:  family history includes Bipolar Disorder in her brother; Breast Cancer in her mother.     Medications:   Scheduled Meds:   losartan  25 mg Oral BID    atorvastatin  10 mg Oral QPM    acetaminophen  1,000 mg Oral TID    ARIPiprazole  2 mg Oral Daily    sodium chloride flush  5-40 mL IntraVENous 2 times per day    buPROPion  150 mg Oral Daily    estradiol  2 g Vaginal Once per day on Mon Thu    Vitamin D  1,000 Units Oral Daily    vilazodone HCl  20 mg Oral Daily    enoxaparin  40 mg SubCUTAneous Daily     Continuous Infusions:   sodium chloride 100 mL/hr at 05/16/22 1446    sodium chloride       PRN Meds:sodium chloride flush, sodium chloride, ondansetron **OR** ondansetron, polyethylene glycol, acetaminophen **OR** acetaminophen, HYDROmorphone, oxyCODONE-acetaminophen, cyclobenzaprine    Review of Systems:  Constitutional: Negative for fever    Genitourinary: see HPI  Eyes: negative for sudden change in vision  EENT: no complaints  Cardiovascular: Negative for chest pain  Respiratory: Negative for shortness of breath  Gastrointestinal: Negative for nausea  Musculoskeletal: Negative for back pain Neurological: Negative for weakness  Psychiatric: Negative for anxiety  Integumentary: Negative for rashes or adenopathy     Physical Exam:  Vitals:    05/20/22 0827   BP: 139/72   Pulse: 96   Resp: 18   Temp: 97.6 °F (36.4 °C)   SpO2: 93%     Constitutional: NAD, well-developed, well-nourished. HEENT: MMM. Hearing intact. PERRL  Neck: no thyroid masses appreciated. Trachea is midline. Neck appears unremarkable   Lymph: no palpable adenopathy in supraclavicular, or axillary lymph nodes  Cardiovascular: Regular rate. No peripheral edema  Respiratory: Respirations are even and non-labored. No audible breath sounds. Genitourinary: external genitals show no irritation or erythema, urethral meatus appears normal in size and location, urethra is without tenderness or masses, bladder is non-tender, vagina is without masses or discharge, adnexa is without masses or tenderness. Anus and Perineum are unremarkable, no external lesions appreciated. Abdomen: Soft. No distension, tenderness, hernias, masses or guarding. No CVA tenderness. No hernias appreciated. Liver and spleen appear normal  Psychiatric: A + O x 3, normal affect. Insight appears intact. Muskuloskeletal: WEN x 4   Skin: Pink, warm and dry. No rashes on face and arms.     Labs:  Lab Results   Component Value Date    WBC 5.2 05/20/2022    HGB 8.8 (L) 05/20/2022    HCT 26.6 (L) 05/20/2022    MCV 91.0 05/20/2022     05/20/2022     Lab Results   Component Value Date    CREATININE 0.6 05/20/2022    BUN 13 05/20/2022     05/20/2022    K 3.7 05/20/2022     05/20/2022    CO2 27 05/20/2022       Imaging:   ct    Aure Orozco MD, MD  5/20/2022

## 2022-05-20 NOTE — DISCHARGE SUMMARY
[facility]    Patient Demographics    Jg. Faiza Solorzano  Date of Birth. 1940  MRN. [id number]     Primary care provider. Raad Rose MD  (Tel: [phone])    Admit date: 5/15/2022    Discharge date (blank if same as Note Date): Note Date: 5/20/2022     Reason for Hospitalization. No chief complaint on file. Significant Findings. Principal Problem:    Left displaced femoral neck fracture (HCC)  Active Problems:    Fall at home    Hyperlipidemia  Resolved Problems:    * No resolved hospital problems. *       Problems and results from this hospitalization that need follow up. Left Hip fracture     Significant test results and incidental findings. 1.   CT ABDOMEN PELVIS WO CONTRAST Additional Contrast? Oral   Final Result   1. Moderate stool in the rectosigmoid colon with colonic wall thickening and   infiltration of the pericolonic fat concerning for stercoral colitis. 2. No other acute bowel pathology. Liquid stool in the more proximal colon. No evidence of obstruction. 3. Bilateral hydronephrosis and hydroureter. No obstructing calculi. Findings may be related to bladder outlet obstruction or neurogenic bladder   with moderate distention of the urinary bladder. XR ABDOMEN (KUB) (SINGLE AP VIEW)   Final Result   Gaseous distention of the stomach with nondistended air-filled loops of small   bowel also noted. Pattern is nonspecific and may represent enteritis or   underlying ileus. XR HIP 1 VW W PELVIS LEFT   Final Result   Status post recent left hip arthroplasty without complication. CT HIP LEFT WO CONTRAST   Final Result   Acute fracture involving the left femoral neck basicervical region with only   some medial intratrochanteric extension along the medial and inferior aspect. Foreshortening of the left femur and minimal lateral displacement.  Left   femoral head remains well approximating the left acetabular cup without   dislocation         CT CERVICAL SPINE WO CONTRAST   Final Result      1. No evidence of fracture with multilevel degenerative changes as described. 2.  Approximate 11 mm right thyroid nodule with dense nodular calcification   of indeterminate significance but unchanged from the prior study. CT HEAD WO CONTRAST   Final Result      1. No acute intracranial abnormality is noted. 2.  Prominence of the sulci and/or CSF spaces suggests a degree of cerebral   atrophy. 3.  Mild left maxillary chronic sinusitis. XR HIP LEFT (1 VIEW)   Final Result   Stable left hip series. Left hip fracture, likely a femoral neck fracture   with intertrochanteric component. Recommend orthopedic follow-up to   determine if more imaging is needed. XR HIP LEFT (2-3 VIEWS)   Final Result   Left hip fracture, incompletely evaluated due to rotation. This is likely a   femoral neck fracture although an intertrochanteric component is not   excluded. Consider additional plain films or CT follow-up for more complete   assessment. XR CHEST PORTABLE   Final Result   No acute cardiopulmonary disease. Invasive procedures and treatments. Date of Procedure: 5/16/2022     Pre-Op Diagnosis: LEFT HIP FRACTURE     Post-Op Diagnosis: Same       Procedure(s):  LEFT HIP HEMIARTHROPLASTY     Surgeon(s):  Danielle Aranda MD    [facility] Course. Patient was admitted with a left femoral neck fracture related to mechanical fall. Patient underwent left hip hemiarthroplasty. Postop course was complicated by constipation and urinary retention for which patient did had an rapid response called. Patient did had Llamas catheter placed with good response and also did had manual disimpaction with good result. Urology was consulted during the stay. They did recommended doing voiding trial quickly maybe in a day or 2. Patient will be discharged to [facility].   DVT prophylaxis recommendations and med rec. Recommend good bowel regimen for constipation. Consults. IP CONSULT TO HOSPITALIST  IP CONSULT TO ORTHOPEDIC SURGERY  IP CONSULT TO UROLOGY    Physical examination on discharge day. /73   Pulse 92   Temp 97.5 °F (36.4 °C) (Oral)   Resp 18   Ht 5' 1\" (1.549 m)   Wt 140 lb (63.5 kg)   SpO2 96%   BMI 26.45 kg/m²   General appearance. Alert. Looks comfortable. HEENT. Sclera clear. Moist mucus membranes. Cardiovascular. Regular rate and rhythm, normal S1, S2. No murmur. Respiratory. Not using accessory muscles. Clear to auscultation bilaterally, no wheeze. Gastrointestinal. Abdomen soft, non-tender, not distended, normal bowel sounds    Tremor of the right hand noticed as per family chronic  Condition at time of discharge stable    Medication instructions provided to patient at discharge. Medication List      START taking these medications    cyclobenzaprine 5 MG tablet  Commonly known as: FLEXERIL  Take 1 tablet by mouth 3 times daily as needed for Muscle spasms     enoxaparin 40 MG/0.4ML  Commonly known as: LOVENOX  Inject 0.4 mLs into the skin daily     oxyCODONE-acetaminophen 5-325 MG per tablet  Commonly known as: PERCOCET  Take 1 tablet by mouth every 4 hours as needed for Pain for up to 7 days. polyethylene glycol 17 g packet  Commonly known as: GLYCOLAX  Take 17 g by mouth daily        CHANGE how you take these medications    losartan 50 MG tablet  Commonly known as: COZAAR  Take 1 tablet by mouth in the morning and at bedtime  What changed: See the new instructions.         CONTINUE taking these medications    ARIPiprazole 2 MG tablet  Commonly known as: ABILIFY     aspirin 81 MG EC tablet     atorvastatin 10 MG tablet  Commonly known as: LIPITOR  TAKE 1 TABLET BY MOUTH EVERY OTHER DAY **CHANGE IN DOSE**     buPROPion 150 MG extended release tablet  Commonly known as: WELLBUTRIN XL     estradiol 0.1 MG/GM vaginal cream  Commonly known as: ESTRACE     Lift Chair Misc  by Does not apply route     mirtazapine 15 MG tablet  Commonly known as: REMERON     vilazodone HCl 10 MG Tabs  Commonly known as: VIIBRYD     VITAMIN B + C COMPLEX PO     VITAMIN D PO        STOP taking these medications    naproxen 375 MG tablet  Commonly known as: NAPROSYN     sulfamethoxazole-trimethoprim 400-80 MG per tablet  Commonly known as: BACTRIM;SEPTRA           Where to Get Your Medications      You can get these medications from any pharmacy    Bring a paper prescription for each of these medications  · enoxaparin 40 MG/0.4ML  · losartan 50 MG tablet  · oxyCODONE-acetaminophen 5-325 MG per tablet     Information about where to get these medications is not yet available    Ask your nurse or doctor about these medications  · cyclobenzaprine 5 MG tablet  · polyethylene glycol 17 g packet         Discharge recommendations given to patient. Follow Up. Primary care provider and Ortho  Disposition. SNF  Activity. activity as tolerated  Diet: ADULT DIET; Regular      Spent 35 minutes in discharge process.     Signed:  Mckayla Arevalo MD     5/20/2022 12:22 PM

## 2022-05-20 NOTE — PROGRESS NOTES
[facility] Orthopedic Surgery   Progress Note    CHIEF COMPLAINT/DIAGNOSIS: S/p LEFT hip hemiarthroplasty     SUBJECTIVE: The patient is sitting up in the chair. Constipation yesterday and retention leading to amos placement. She does have a fair amount of baseline anxiety. She was able to have large bM this morning. Denies any current issues. OBJECTIVE  Physical    VITALS:  /72   Pulse 96   Temp 97.6 °F (36.4 °C) (Oral)   Resp 18   Ht 5' 1\" (1.549 m)   Wt 140 lb (63.5 kg)   SpO2 93%   BMI 26.45 kg/m²     GENERAL: Alert and oriented x3, in no acute distress. MUSCULOSKELETAL: Able to dorsi and plantarflex the ankle on operative side without issue. INCISION:  Covered with post-op dressing; clean dry and intact. ROM: Limited secondary pain and swelling. Sensory:  Intact to light touch in peroneal and tibial distributions. Vascular:   2+ DP pulses with brisk cap refill;  calf soft and nontender. Data    ALL MEDICATIONS HAVE BEEN REVIEWED    CBC:   Recent Labs     05/18/22  0739 05/19/22  0511 05/20/22  0514   WBC 7.0 6.0 5.2   HGB 9.7* 9.3* 8.8*   HCT 29.5* 28.2* 26.6*   PLT 96* 110* 138     BMP:   Recent Labs     05/18/22  0739 05/19/22  0511 05/20/22  0514    137 137   K 3.8 4.4 3.7    106 103   CO2 23 25 27   PHOS 1.8* 2.9 2.9   BUN 19 16 13   CREATININE 0.7 0.7 0.6     INR:   No results for input(s): INR in the last 72 hours. Post-op films show stable cemented bipolar hemiarthroplasty without acute complication. ASSESSMENT:  S/p LEFT hip hemiarthroplasty (5/16/22), POD#4  Depression  HLD  HTN  Thrombocytopenia  Narcotic/anesthesias induced constipation    PLAN:   - WB status:  WBAT through operative extremity; postero-lateral hip precuations  - DVT prophylaxis: Lovenox 40mg daily x 28 days.  - PT/OT  - Pain Control: scheduled tylenol. Oxy prn. Due to orthopaedic surgical procedure/condition, patient may require pain medication for up to 6-8 weeks.   - Expected post op acute blood loss anemia: H/H today: 8.8/26.6  - Thrombocytopenia: resolved  - ID:  Ancef x 2 doses post-op completed. - Constipation: bowl regimen. - Disposition: SNF when medically stable; ok to d/c from our end    Follow-up in two weeks with Dr. Peg Ochoa.   Office # [phone]    CIERRA Sadler - CNP  5/20/2022  9:39 AM

## 2022-05-20 NOTE — PROGRESS NOTES
Called Jinny Damian to give report but unable to reach anyone, the voicemail was full and couldn't accept any voice messages, will try again later. 1500: called, Jinny Damian again, and asked to talk to supervisor, but was unable to reach them. Couldn't leave any message since the voicemail is full. 1528: received a call from Jinny Damian, report given to the charge nurse.

## 2022-05-20 NOTE — PROGRESS NOTES
Data- discharge order received, pt * (appointed legal authority) verbalized agreement to discharge, disposition to [facility] #[id number], Caterina Hernández reviewed and signed by physician. Action- AVS prepared, JUSTIN completed/ reported faxed by case management/. Discharge instruction summary: Diet- regular, Activity- as tolerated, immunizations reviewed, medications prescriptions to be filled at receiving facility except for the controlled prescriptions to be sent: [place], Transfer code status: Full Code, LDAs to remain with discharge: none. DME used: walker, stedy. Response- Bedside RN to call report to receiving facility. Pt belongings gathered, peripheral IV and cardiac monitoring removed. Disposition to Discharged via cart/stretcher to skilled nursing by EMS transportation, no complications reported. 1. WEIGHT: Admit Weight: 140 lb (63.5 kg) (05/15/22 0953)        Today  Weight: 140 lb (63.5 kg) (05/15/22 0953)       2.  O2 SAT.: SpO2: 96 % (05/20/22 1149)

## 2022-05-20 NOTE — PROGRESS NOTES
[facility] Department   Phone: [phone]    Physical Therapy    [] Initial Evaluation            [x] Daily Treatment Note         [] Discharge Summary      Patient: Angela Rose   : 1940   MRN: [id number]   Date of Service:  2022  Admitting Diagnosis: Left displaced femoral neck fracture [facility])  Current Admission Summary: 77 yo female with hx of UE tremors, ambulates with SPC at baseline, presented to [facility] on 5/15 s/p fall at home. Found to have (L) femoral neck fx. Pt underwent (L) hip danny-arthroplasty on  by Dr. Jeff Vital. WBAT with posterior precautions. Past Medical History:  has a past medical history of AR (allergic rhinitis), Arthritis of knee, Depression, Erosive gastritis, GERD (gastroesophageal reflux disease), Hyperlipidemia, Internal hemorrhoids, Overweight(278.02), and Viral meningitis. Past Surgical History:  has a past surgical history that includes Cholecystectomy, laparoscopic (); lalo and bso (cervix removed) (); Colonoscopy (); Colonoscopy (12/3/13); Upper gastrointestinal endoscopy (12/3/13); Breast biopsy; Hysterectomy; and hip surgery (Left, 2022).       Discharge Recommendations: Antonette Brown scored 12/24 on the AM-PAC short mobility form. Current research shows that an AM-PAC score of 17 or less is typically not associated with a discharge to the patient's home setting. Based on the patient's AM-PAC score and their current functional mobility deficits, it is recommended that the patient have 3-5 sessions per week of Physical Therapy at d/c to increase the patient's independence.  Please see assessment section for further patient specific details. If patient discharges prior to next session this note will serve as a discharge summary.  Please see below for the latest assessment towards goals.    DME Required For Discharge: DME to be determined at next level of care      Precautions/Restrictions: high fall risk, weight bearing, ROM restrictions  Weight Bearing Restrictions: weight bearing as tolerated   [x]? ? Left Lower Extremity         Required Braces/Orthotics: no braces required  Positional Restrictions:No Hip Flexion > 90*, No Hip ADduction, No Hip Internal Rotation; hip abduction pillow in place when in bed or turning 1st 48 hours, then regular pillow     Pre-Admission Information   Lives With: spouse              Type of Home: house  Home Layout: two level, laundry in basement, 1/2 bath on main level, bedroom/bathroom upstairs, 6+6 stairs to 2nd level with bilateral HR, .  Comment: stair lift to basement  Home Access: 1+1 step to enter without rails   Bathroom Layout: walker accessible, walk in shower  Toilet Height: elevated height  Bathroom Equipment: None  Home Equipment: standard walker, single point cane, lift chair, reacher  Transfer Assistance: Independent without use of device, .  Comment: occasional assist getting out of the car  Ambulation Assistance:modified independent with use of SCP intermittently  ADL Assistance: independent with all ADL's  IADL Assistance: independent with homemaking tasks  Active :        []? ? Yes            [x]? ? No  Hand Dominance: []?? Left            [x]? ? Right  Current Employment: retired.  Occupation: dental hygeine  Hobbies: Reading, work in the [employer] 24/7 95 falls in last 6 months     Examination   Vision:   Vision Gross Assessment: Impaired and Vision Corrective Device: wears glasses for distance  Hearing:   WFL  Observation:   Incision/Scar:  bandage over (L) lateral hip  Posture:   Forward head, rounded shoulders. Pt frequently off loading (L) hip in sitting. Sensation:   WFL  ROM:   (B) LE AROM WFL  (L) LE AROM all painful and end ranges somewhat limited due to pain  Strength:   (R) knee flex/ext, ankle DF all 4+/5, (R) hip deferred due to increased (L) hip pain with (R) hip flexion.  (L) LE MMTs deferred due to s/p danny-arthroplasty  Decision Making: low complexity  Clinical Presentation: stable        Subjective  General: Pt semi-reclined in bed upon therapist arrival. Pt reports no pain just stiffness in L LE and some mild abdominal discomfort      Functional Mobility  Bed Mobility  Supine to Sit: min A x1   Scooting: SBA  Comments: HOB elevated, use of rail, increased time, cues for sequencing and precautions throughout  Transfers  Sit to stand transfer: 2 person assistance with Min A   Stand to sit transfer: 2 person assistance with Min A   Stand step transfer: 2 person assistance with min A x2   Comments: Pt with varying degree of posterior lean with all standing this date. Pt took short, shuffling steps from bed>BSC>recliner with use of RW, cues for sequencing and advancement of LEs.    Ambulation  Ambulation not tested on this date. Distance:   Gait Mechanics:   Comments:  Deferred due to fatigue following transfers  Stair Mobility  Stair mobility not completed on this date. Comments:  Wheelchair Mobility:  No w/c mobility completed on this date. Comments:  Balance  Static Sitting Balance: fair (-): maintains balance at SBA/supervision with use of UE support  Dynamic Sitting Balance: fair (-): maintains balance at CGA with use of UE support  Static Standing Balance: poor (+): requires min-mod (A) to maintain balance  Dynamic Standing Balance: poor: requires min-mod (A) to maintain balance  Comments: Pt sat at [facility] ~20 min having BM with SBA. Pt completed several stands from [facility] with min A x2 to complete pericare/brief change as pt was incontinent during transfers to [facility]. Pt can only tolerate standing ~2 mins before seated rest due to fatigue/anxiety.  Pt HR 130s with standing and pt hyperventilates~cues for PLB and calming words provided      Other Therapeutic Interventions  See OT note for assist with lower body dressing/bathing    Education on posterior hip precautions.     Functional Outcomes  AM-PAC discharge  Patient will complete bed mobility at minimal assistance (MET 5/20/22) Pt will complete bed mobility with supervision    Patient will complete sit<>stand transfers at contact guard assistance   Patient will complete bed<>chair transfers at contact guard assistance with RW  Patient will ambulate 20 ft with use of rolling walker at minimal assistance  Patient to maintain standing at contact guard assistance for 5 minutes with (B) UE support  Therapy Session Time      Individual Group Co-treatment   Time In     0932   Time Out     1025   Minutes     53     Total Treatment Minutes:  53       Electronically Signed By: Kasia Greenberg, [address] [place], [address]

## 2022-05-20 NOTE — CARE COORDINATION
Transportation changed to [email or web address]  ([phone]).   Ryanne notified    Electronically signed by Anjum Goodwin RN on 5/20/2022 at 2:40 PM

## 2022-05-20 NOTE — PLAN OF CARE
Problem: Discharge Planning  Goal: Discharge to home or other facility with appropriate resources  5/20/2022 1004 by Lori Lee RN  Outcome: Progressing  5/20/2022 0146 by Win Fitzpatrick RN  Outcome: Progressing     Problem: Skin/Tissue Integrity  Goal: Absence of new skin breakdown  Description: 1. Monitor for areas of redness and/or skin breakdown  2. Assess vascular access sites hourly  3. Every 4-6 hours minimum:  Change oxygen saturation probe site  4. Every 4-6 hours:  If on nasal continuous positive airway pressure, respiratory therapy assess nares and determine need for appliance change or resting period.   5/20/2022 1004 by Lori Lee RN  Outcome: Progressing  5/20/2022 0146 by Win Fitzpatrick RN  Outcome: Progressing

## 2022-05-20 NOTE — CARE COORDINATION
Discharge note:      CM/SW has been notified of discharge. Patient noted to have the following needs at discharge. CM/SW has coordinated the following services:  Skilled nursing      Discharge Destination:     [facility] AT [place] [address]  Phone: [phone]  Fax: [phone]    Transportation: [address] [facility]  ([phone])  Discharge Time: 5:00pm  AVS faxed and agency notified: The following prescriptions sent with patient: Emmy Whelan  Nurse to call report to facility  Family advised of discharge and in agreement. HENS completed. Comment:      All CM/SW needs met, will sign off.

## 2022-06-02 ENCOUNTER — HOSPITAL ENCOUNTER (OUTPATIENT)
Age: 82
Discharge: HOME | End: 2022-06-02
Payer: MEDICARE

## 2022-06-02 ENCOUNTER — HOSPITAL ENCOUNTER (OUTPATIENT)
Dept: GENERAL RADIOLOGY | Age: 82
Discharge: HOME | End: 2022-06-02
Payer: MEDICARE

## 2022-06-02 DIAGNOSIS — M25.552 LEFT HIP PAIN: Primary | ICD-10-CM

## 2022-06-02 DIAGNOSIS — M25.552 LEFT HIP PAIN: ICD-10-CM

## 2022-06-02 PROCEDURE — 73502 X-RAY EXAM HIP UNI 2-3 VIEWS: CPT

## 2022-06-10 ENCOUNTER — TELEPHONE (OUTPATIENT)
Dept: ORTHOPEDIC SURGERY | Age: 82
End: 2022-06-10

## 2022-06-10 NOTE — TELEPHONE ENCOUNTER
----- Message from Anastasia Kelly MD sent at 6/9/2022 10:03 AM EDT -----  Regarding: RE: XR completed. F/u appt needed. X-ray looks good. Continue weightbearing as tolerated. Patient should return to see me 4 weeks from the date of surgery. Thanks  ----- Message -----  From: Hardeep John ATC  Sent: 6/7/2022  10:56 AM EDT  To: Anastasia Kelly MD  Subject: XR completed. F/u appt needed. S/p danny 5/16/22. Patient staples out by facility and XR were done 6/2/22 and in PACS. I am going to f/u with facility to schedule appt for pt.  Would you like her to f/u this week or wait until 8 weeks PO?

## 2022-06-10 NOTE — TELEPHONE ENCOUNTER
[facility] AT [address], [place] [address]  Phone: [phone]  Fax: [phone]    SPOKE WITH STAFF AND . [facility] PT F/U IN OFFICE FOR Tuesday AT 2:15. SHE HAS XR FROM HOSPITAL ALREADY FOR REVIEW FOR THIS VISIT. FACILITY [facility] 8 WK F/U APPT. I WILL LEAVE THAT IN THERE AND WILL F/U WITH FACILITY IF THIS NEEDS ADJUSTED AFTER VISIT NEXT WEEK.

## 2022-06-14 ENCOUNTER — OFFICE VISIT (OUTPATIENT)
Dept: ORTHOPEDIC SURGERY | Age: 82
End: 2022-06-14
Payer: MEDICARE

## 2022-06-14 VITALS — HEIGHT: 61 IN | BODY MASS INDEX: 26.43 KG/M2 | WEIGHT: 140 LBS

## 2022-06-14 DIAGNOSIS — M54.16 LUMBAR RADICULOPATHY: Primary | ICD-10-CM

## 2022-06-14 DIAGNOSIS — M25.562 LEFT KNEE PAIN, UNSPECIFIED CHRONICITY: ICD-10-CM

## 2022-06-14 PROCEDURE — 20610 DRAIN/INJ JOINT/BURSA W/O US: CPT | Performed by: ORTHOPAEDIC SURGERY

## 2022-06-14 PROCEDURE — 99024 POSTOP FOLLOW-UP VISIT: CPT | Performed by: ORTHOPAEDIC SURGERY

## 2022-06-14 RX ORDER — BUPIVACAINE HYDROCHLORIDE 5 MG/ML
4 INJECTION, SOLUTION PERINEURAL ONCE
Status: COMPLETED | OUTPATIENT
Start: 2022-06-14 | End: 2022-06-14

## 2022-06-14 RX ORDER — METHYLPREDNISOLONE ACETATE 40 MG/ML
40 INJECTION, SUSPENSION INTRA-ARTICULAR; INTRALESIONAL; INTRAMUSCULAR; SOFT TISSUE ONCE
Status: COMPLETED | OUTPATIENT
Start: 2022-06-14 | End: 2022-06-14

## 2022-06-14 RX ADMIN — METHYLPREDNISOLONE ACETATE 40 MG: 40 INJECTION, SUSPENSION INTRA-ARTICULAR; INTRALESIONAL; INTRAMUSCULAR; SOFT TISSUE at 14:46

## 2022-06-14 RX ADMIN — BUPIVACAINE HYDROCHLORIDE 20 MG: 5 INJECTION, SOLUTION PERINEURAL at 14:46

## 2022-06-16 NOTE — PROGRESS NOTES
6/14/2022     Reason for visit:  Status post left hip hemiarthroplasty on 5/16/2022    History of Present Illness:  Patient presents for evaluation. She has been having a difficult time at the rehab facility. She reports left knee pain as well as pain that radiates down the back down the back of the leg. Its been interfering with her ability to progress with therapy. Significant hip pain. No fever or chills. Objective:  Ht 5' 1\" (1.549 m)   Wt 140 lb (63.5 kg)   BMI 26.45 kg/m²      Physical Exam:  The patient is well-appearing and in no apparent distress  Examination of the left hip  Incision is well-healed with no pain with gentle motion of hip  Left knee   There is a small effusion, no gross deformity or skin changes  Range of motion reveals 0 to 120  neg lachman, negative posterior drawer, no pain or laxity with varus or valgus stress at 0 degrees and 30 degrees of flexion  + joint line tenderness  5 out of 5 strength throughout distal muscle groups  Sensation is intact to light touch throughout all distributions  There is no calf swelling or tenderness  Palpable DP pulse, brisk cap refill, 2+ symmetric reflexes    Imaging:  Previous x-rays that were performed at the hospital of the left hip demonstrates overall excellent position of hardware without evidence of loosening or failure. 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 Therefore we discussed cortisone injection for this. I suspect she is also likely experiencing lumbar radiculopathy given her symptoms radiating down the leg. This is despite physical therapy. Therefore would recommend an MRI of the lumbar spine. The patient elected proceed with cortisone injection. Therefore injection was given to the left knee via sterile technique.   The injection consisted of 40 mg of Depo-Medrol combined with 4 mL of 0.5% Marcaine. The patient tolerated this well. She will return to see me in 4 to 6 weeks however she will call for the results of the MRI prior to that. Erlinda Bergeron MD            Orthopaedic Surgery Sports Medicine and [address] and [address] [facility]            Team Physician [facility]      Disclaimer: This note was dictated with voice recognition software. Though review and correction are routine, we apologize for any errors.

## 2022-06-20 ENCOUNTER — TELEPHONE (OUTPATIENT)
Dept: FAMILY MEDICINE CLINIC | Age: 82
End: 2022-06-20

## 2022-06-21 NOTE — PROGRESS NOTES
Moy Hyde is a 78 y.o. female. HPI:  Patient is here with her     Here for follow up s/p L hip fx after a fall(left femoral neck fracture) 5/15/22    Pt here for Hospital Follow Up   DC date: 5/20/22  Notes, labs, imaging and any procedures= reviewed from recent hospitalization  Meds, vitamins and allergies reviewed with pt  BP check and depression check  Sees psychiatrist on Abilify, Wellbutrin, Viibryd      Seeing Tamy Devine /Ludivinalogjose manuel , tremor seems to be worsening/?  Parkinson's    Receiving Home PT/OT    Meds, vitamins and allergies reviewed with pt    Wt Readings from Last 3 Encounters:   06/14/22 140 lb (63.5 kg)   06/02/22 140 lb (63.5 kg)   06/02/22 140 lb (63.5 kg)       REVIEW OF SYSTEMS:   CONSTITUTIONAL: See history of present illness,   Weight noted   HEENT: No new vision difficulties or ringing in the ears. RESPIRATORY: No new SOB, PND, orthopnea or cough. CARDIOVASCULAR: no CP, palpitations or SOB with exertion  GI: No nausea, vomiting, diarrhea, constipation, abdominal pain or changes in bowel habits. : No urinary frequency, urgency, incontinence hematuria or dysuria. SKIN: No cyanosis or skin lesions. MUSCULOSKELETAL: No new muscle or joint pain. NEUROLOGICAL: No syncope or TIA-like symptoms. PSYCHIATRIC: No anxiety, insomnia or depression     No Known Allergies    Prior to Visit Medications    Medication Sig Taking? Authorizing Provider   losartan (COZAAR) 50 MG tablet Take 1 tablet by mouth in the morning and at bedtime Yes Pranay Mendoza PA-C   ARIPiprazole (ABILIFY) 2 MG tablet Take 2 mg by mouth daily Yes Historical Provider, MD   atorvastatin (LIPITOR) 10 MG tablet TAKE 1 TABLET BY MOUTH EVERY OTHER DAY **CHANGE IN DOSE** Yes Cassidy Carr MD   buPROPion (WELLBUTRIN XL) 150 MG extended release tablet TAKE 1 TABLET (150 MG TOTAL) BY MOUTH DAILY.  INDICATIONS  DEPRESSION Yes Historical Provider, MD   vilazodone HCl (VIIBRYD) 10 MG TABS Take 20 mg by mouth daily  Yes Historical Provider, MD   B Complex-C (VITAMIN B + C COMPLEX PO) Take by mouth Yes Historical Provider, MD   Lift Chair [address] [facility] by Does not apply route Yes Matt Jean MD   estradiol (ESTRACE) 0.1 MG/GM vaginal cream Place 2 g vaginally Twice a Week  Yes Historical Provider, MD   aspirin 81 MG EC tablet Take 81 mg by mouth daily. Yes Historical Provider, MD   VITAMIN D PO Take  by mouth. Yes Historical Provider, MD       Past Medical History:   Diagnosis Date    AR (allergic rhinitis)     Arthritis of knee     Pruis    Depression     Erosive gastritis 10/28/2019    EGD October 2019, he did with PPI    GERD (gastroesophageal reflux disease)     Hyperlipidemia     Internal hemorrhoids     Overweight(278.02)     Viral meningitis 5/12       Social History     Tobacco Use    Smoking status: Never Smoker    Smokeless tobacco: Never Used   Substance Use Topics    Alcohol use: Yes     Comment: rarely       Family History   Problem Relation Age of Onset    Breast Cancer Mother     Bipolar Disorder Brother        OBJECTIVE:  /75   Pulse 77   SpO2 99%   GEN: Resting tremor, trying to hang in there  HEENT:  NCAT  NECK:  Supple without adenopathy. CV:  Regular rate and rhythm, S1 and S2 normal, no murmurs, clicks  PULM:  Chest is clear, no wheezing ,  symmetric air entry throughout both lung fields. ABD: Soft, NT, no masses  EXT: No rash or edema  NEURO: Alert oriented ×3, using walker, pression stockings on    ASSESSMENT/PLAN:  1. Hospital discharge follow-up  Stable continue PT and OT at home after surgery for left femoral neck hip fracture    2. Current mild episode of major depressive disorder without prior episode [facility])  Seeing psychiatry, continue meds listed above    3. Left displaced femoral neck fracture (HCC)  Continue PT and OT    4.   Essential hypertension  Pressure has been stable off losartan  Recommend monitor blood pressure at home and restart as needed if blood pressure starts running 135/85 take 1 losartan day    5.   Postop anemia  Healthy diet, recheck CBC with next visit    30 Total Minutes spent pre charting (reviewing problem list, meds, any test results, consultant and hospital notes ) and  obtaining present visit history, performing appropriate medical exam/evaluation, counseling and educating the patient (and family), ordering medications ,tests, and procedures as needed, refilling medication(s), placing referral(s) when needed in addition to coordinating care for this patient and documenting in electronic health record

## 2022-06-22 ENCOUNTER — OFFICE VISIT (OUTPATIENT)
Dept: FAMILY MEDICINE CLINIC | Age: 82
End: 2022-06-22
Payer: MEDICARE

## 2022-06-22 VITALS — OXYGEN SATURATION: 99 % | SYSTOLIC BLOOD PRESSURE: 132 MMHG | HEART RATE: 77 BPM | DIASTOLIC BLOOD PRESSURE: 75 MMHG

## 2022-06-22 DIAGNOSIS — S72.002A LEFT DISPLACED FEMORAL NECK FRACTURE (HCC): ICD-10-CM

## 2022-06-22 DIAGNOSIS — I10 ESSENTIAL HYPERTENSION: ICD-10-CM

## 2022-06-22 DIAGNOSIS — F32.0 CURRENT MILD EPISODE OF MAJOR DEPRESSIVE DISORDER WITHOUT PRIOR EPISODE (HCC): ICD-10-CM

## 2022-06-22 DIAGNOSIS — Z09 HOSPITAL DISCHARGE FOLLOW-UP: Primary | ICD-10-CM

## 2022-06-22 PROCEDURE — 1123F ACP DISCUSS/DSCN MKR DOCD: CPT | Performed by: FAMILY MEDICINE

## 2022-06-22 PROCEDURE — 99214 OFFICE O/P EST MOD 30 MIN: CPT | Performed by: FAMILY MEDICINE

## 2022-06-22 SDOH — ECONOMIC STABILITY: FOOD INSECURITY: WITHIN THE PAST 12 MONTHS, THE FOOD YOU BOUGHT JUST DIDN'T LAST AND YOU DIDN'T HAVE MONEY TO GET MORE.: NEVER TRUE

## 2022-06-22 SDOH — ECONOMIC STABILITY: FOOD INSECURITY: WITHIN THE PAST 12 MONTHS, YOU WORRIED THAT YOUR FOOD WOULD RUN OUT BEFORE YOU GOT MONEY TO BUY MORE.: NEVER TRUE

## 2022-06-22 ASSESSMENT — PATIENT HEALTH QUESTIONNAIRE - PHQ9
SUM OF ALL RESPONSES TO PHQ9 QUESTIONS 1 & 2: 2
8. MOVING OR SPEAKING SO SLOWLY THAT OTHER PEOPLE COULD HAVE NOTICED. OR THE OPPOSITE, BEING SO FIGETY OR RESTLESS THAT YOU HAVE BEEN MOVING AROUND A LOT MORE THAN USUAL: 1
SUM OF ALL RESPONSES TO PHQ QUESTIONS 1-9: 6
SUM OF ALL RESPONSES TO PHQ QUESTIONS 1-9: 6
2. FEELING DOWN, DEPRESSED OR HOPELESS: 1
SUM OF ALL RESPONSES TO PHQ QUESTIONS 1-9: 6
7. TROUBLE CONCENTRATING ON THINGS, SUCH AS READING THE NEWSPAPER OR WATCHING TELEVISION: 1
SUM OF ALL RESPONSES TO PHQ QUESTIONS 1-9: 6
6. FEELING BAD ABOUT YOURSELF - OR THAT YOU ARE A FAILURE OR HAVE LET YOURSELF OR YOUR FAMILY DOWN: 0
1. LITTLE INTEREST OR PLEASURE IN DOING THINGS: 1
5. POOR APPETITE OR OVEREATING: 1
10. IF YOU CHECKED OFF ANY PROBLEMS, HOW DIFFICULT HAVE THESE PROBLEMS MADE IT FOR YOU TO DO YOUR WORK, TAKE CARE OF THINGS AT HOME, OR GET ALONG WITH OTHER PEOPLE: 2
4. FEELING TIRED OR HAVING LITTLE ENERGY: 1
3. TROUBLE FALLING OR STAYING ASLEEP: 0
9. THOUGHTS THAT YOU WOULD BE BETTER OFF DEAD, OR OF HURTING YOURSELF: 0

## 2022-06-22 ASSESSMENT — SOCIAL DETERMINANTS OF HEALTH (SDOH): HOW HARD IS IT FOR YOU TO PAY FOR THE VERY BASICS LIKE FOOD, HOUSING, MEDICAL CARE, AND HEATING?: NOT HARD AT ALL

## 2022-06-24 ENCOUNTER — TELEPHONE (OUTPATIENT)
Dept: ORTHOPEDIC SURGERY | Age: 82
End: 2022-06-24

## 2022-06-24 NOTE — TELEPHONE ENCOUNTER
I apologize. I spoke with Dr. Coty Perrin regarding this patient the other day. She has already had a CT, so they denied the MRI. We tried to do the P2P, but it was not going to overturn the denial. So he wanted her to do the PT. I called . Spoke with patient. Relayed this information. She is currently having someone come out to the house. Which was set up by [facility] as she has been discharged from facility. All questions answered patient expressed understanding. She is getting Home Care with [facility]. I reached out to them to see if we can give a verbal to add exercises for lumbar pain to her current order. They are going to let PT know about this addition. She will be seen on Monday. They will add to order from PCP.

## 2022-06-24 NOTE — TELEPHONE ENCOUNTER
General Question     Subject:     PATIENTS  HAS QUESTIONS REGARDING MRI NEXT THRUSDAY. STATES HE HAS A LETTER FOR THE PATIENT THAT THE MRI GOT DENIED.  PLEASE ADVISE     Patient/: Alia Abel  Contact Number: [phone]

## 2022-07-05 RX ORDER — ATORVASTATIN CALCIUM 10 MG/1
TABLET, FILM COATED ORAL
Qty: 45 TABLET | Refills: 3 | Status: SHIPPED | OUTPATIENT
Start: 2022-07-05 | End: 2022-09-13

## 2022-07-05 NOTE — TELEPHONE ENCOUNTER
Medication and Quantity requested:    atorvastatin (LIPITOR) 10 MG tablet    Last Visit    06/22/2022    Pharmacy and phone number updated in EPIC:  YES      Requesting 100 Day Prescription Refills    Prescription request attached in [address] [place] [address] Street

## 2022-07-05 NOTE — TELEPHONE ENCOUNTER
Medication:   Requested Prescriptions     Pending Prescriptions Disp Refills    atorvastatin (LIPITOR) 10 MG tablet 45 tablet 3     Sig: TAKE 1 TABLET BY MOUTH EVERY OTHER DAY **CHANGE IN DOSE**       Last Filled:  03/21/2022    Patient Phone Number: [phone] (home)     Last appt: 6/22/2022   Next appt: Visit date not found    Last Lipid:   Lab Results   Component Value Date/Time    CHOL 117 10/06/2021 08:16 AM    TRIG 112 10/06/2021 08:16 AM    HDL 51 10/06/2021 08:16 AM    HDL 62 02/13/2012 08:08 AM    LDLCALC 44 10/06/2021 08:16 AM

## 2022-07-06 ENCOUNTER — NURSE TRIAGE (OUTPATIENT)
Dept: OTHER | Facility: CLINIC | Age: 82
End: 2022-07-06

## 2022-07-06 ENCOUNTER — TELEPHONE (OUTPATIENT)
Dept: FAMILY MEDICINE CLINIC | Age: 82
End: 2022-07-06

## 2022-07-06 NOTE — TELEPHONE ENCOUNTER
Received call from [place] at [facility] with Red Flag Complaint. Subjective: Caller states \"advised by urologist today to see pcp, swelling above knees to feet\"     Current Symptoms: swelling    Onset: 7 days ago; gradual    Associated Symptoms: reduced activity    Pain Severity: 0/10; heavy she states; constant    Temperature: denies     What has been tried: elevating legs    LMP: NA Pregnant: NA    Recommended disposition: Go to ED/UCC Now (Or to Office with PCP Approval)    Care advice provided, patient verbalizes understanding; denies any other questions or concerns; instructed to call back for any new or worsening symptoms. Writer provided warm transfer to Marilu Joyce at [facility] for ed /ucc refusal advised by urologist to be seen asap by PCP     Attention Provider: Thank you for allowing me to participate in the care of your patient. The patient was connected to triage in response to information provided to the ECC/PSC. Please do not respond through this encounter as the response is not directed to a shared pool.           Reason for Disposition   SEVERE swelling (e.g., swelling extends above knee, entire leg is swollen, weeping fluid)    Protocols used: LEG SWELLING AND EDEMA-ADULT-OH

## 2022-07-07 DIAGNOSIS — R60.0 LOWER LEG EDEMA: Primary | ICD-10-CM

## 2022-07-07 RX ORDER — FUROSEMIDE 20 MG/1
20 TABLET ORAL DAILY
Qty: 5 TABLET | Refills: 0 | Status: SHIPPED | OUTPATIENT
Start: 2022-07-07 | End: 2022-07-11

## 2022-07-07 RX ORDER — POTASSIUM CHLORIDE 750 MG/1
10 TABLET, EXTENDED RELEASE ORAL DAILY
Qty: 5 TABLET | Refills: 0 | Status: SHIPPED | OUTPATIENT
Start: 2022-07-07 | End: 2022-07-11 | Stop reason: SDUPTHER

## 2022-07-07 NOTE — TELEPHONE ENCOUNTER
Called patient  Recommend she use support stockings most of the day  Trial of Lasix 20 mg with 10 mg of potassium daily. ..  Sent to her pharmacy  Try to keep legs elevated is much as possible  Follow-up call end of the week or early next week with progress report      Close this call

## 2022-07-08 ENCOUNTER — TELEPHONE (OUTPATIENT)
Dept: FAMILY MEDICINE CLINIC | Age: 82
End: 2022-07-08

## 2022-07-09 NOTE — PROGRESS NOTES
Take by mouth Yes Historical Provider, MD   Lift Chair [address] [facility] by Does not apply route Yes Kelsey Cottrell MD   estradiol (ESTRACE) 0.1 MG/GM vaginal cream Place 2 g vaginally Twice a Week  Yes Historical Provider, MD   aspirin 81 MG EC tablet Take 81 mg by mouth daily. Yes Historical Provider, MD   VITAMIN D PO Take  by mouth. Yes Historical Provider, MD       Past Medical History:   Diagnosis Date    AR (allergic rhinitis)     Arthritis of knee     Pruis    Depression     Erosive gastritis 10/28/2019    EGD October 2019, he did with PPI    GERD (gastroesophageal reflux disease)     Hyperlipidemia     Internal hemorrhoids     Overweight(278.02)     Viral meningitis 5/12       Social History     Tobacco Use    Smoking status: Never Smoker    Smokeless tobacco: Never Used   Substance Use Topics    Alcohol use: Yes     Comment: rarely       Family History   Problem Relation Age of Onset    Breast Cancer Mother     Bipolar Disorder Brother        OBJECTIVE:  /70   Pulse 82   Ht 5' 1\" (1.549 m)   Wt 168 lb (76.2 kg)   SpO2 99%   BMI 31.74 kg/m²   GEN:  in NAD, in better spirits, best I have seen her  HEENT:  NCAT  NECK:  Supple without adenopathy. CV:  Regular rate and rhythm, S1 and S2 normal, no murmurs, clicks  PULM:  Chest is clear, no wheezing ,  symmetric air entry throughout both lung fields. ABD: Soft, NT, no masses appreciated  EXT: No rash, compression stockings on legs tense as are her thighs above her knees  NEURO: Alert oriented ×3, using walker, in physical therapy    ASSESSMENT/PLAN:  1. Lower leg edema  Check Dopplers to rule out blood clots  Increase Lasix to 40 mg, increase potassium to 20 equivalents daily  - VL Extremity Venous Bilateral; Future  - furosemide (LASIX) 20 MG tablet; Take 2 tablets by mouth daily  Dispense: 20 tablet;  Refill: 0    Gross report end of the week or early next week via phone call      25 Total Minutes spent pre charting (reviewing problem list, meds, any test results, consultant and hospital notes ) and  obtaining present visit history, performing appropriate medical exam/evaluation, counseling and educating the patient (and family), ordering medications ,tests, and procedures as needed, refilling medication(s), placing referral(s) when needed in addition to coordinating care for this patient and documenting in electronic health record

## 2022-07-11 ENCOUNTER — OFFICE VISIT (OUTPATIENT)
Dept: FAMILY MEDICINE CLINIC | Age: 82
End: 2022-07-11
Payer: MEDICARE

## 2022-07-11 VITALS
WEIGHT: 168 LBS | SYSTOLIC BLOOD PRESSURE: 134 MMHG | OXYGEN SATURATION: 99 % | HEART RATE: 82 BPM | BODY MASS INDEX: 31.72 KG/M2 | HEIGHT: 61 IN | DIASTOLIC BLOOD PRESSURE: 70 MMHG

## 2022-07-11 DIAGNOSIS — R60.0 LOWER LEG EDEMA: Primary | ICD-10-CM

## 2022-07-11 PROCEDURE — 1123F ACP DISCUSS/DSCN MKR DOCD: CPT | Performed by: FAMILY MEDICINE

## 2022-07-11 PROCEDURE — 99213 OFFICE O/P EST LOW 20 MIN: CPT | Performed by: FAMILY MEDICINE

## 2022-07-11 RX ORDER — FUROSEMIDE 20 MG/1
40 TABLET ORAL DAILY
Qty: 20 TABLET | Refills: 0 | Status: SHIPPED | OUTPATIENT
Start: 2022-07-11 | End: 2022-07-18 | Stop reason: SDUPTHER

## 2022-07-11 RX ORDER — POTASSIUM CHLORIDE 750 MG/1
20 TABLET, EXTENDED RELEASE ORAL DAILY
Qty: 20 TABLET | Refills: 0 | Status: SHIPPED | OUTPATIENT
Start: 2022-07-11 | End: 2022-07-18 | Stop reason: SDUPTHER

## 2022-07-12 ENCOUNTER — HOSPITAL ENCOUNTER (OUTPATIENT)
Dept: VASCULAR LAB | Age: 82
Discharge: HOME | End: 2022-07-12
Payer: MEDICARE

## 2022-07-12 DIAGNOSIS — R60.0 LOWER LEG EDEMA: ICD-10-CM

## 2022-07-12 PROCEDURE — 93970 EXTREMITY STUDY: CPT

## 2022-07-15 ENCOUNTER — TELEPHONE (OUTPATIENT)
Dept: ORTHOPEDIC SURGERY | Age: 82
End: 2022-07-15

## 2022-07-17 NOTE — PROGRESS NOTES
Riddhi Colon is a 78 y.o. female. HPI:  Here Follow-up lower extremity edema, Lasix was increased  Doppler showed no evidence of DVTs post surgery    She thinks she is about the same but weight is down 2 pounds  Lasix increased to 40 mg with 20 mEq of potassium daily    Meds, vitamins and allergies reviewed with pt    Wt Readings from Last 3 Encounters:   07/18/22 166 lb 6.4 oz (75.5 kg)   07/11/22 168 lb (76.2 kg)   06/14/22 140 lb (63.5 kg)       REVIEW OF SYSTEMS:   CONSTITUTIONAL: See history of present illness,   Weight noted   HEENT: No new vision difficulties or ringing in the ears. RESPIRATORY: No new SOB, PND, orthopnea or cough. CARDIOVASCULAR: no CP, palpitations or SOB with exertion  GI: No nausea, vomiting, diarrhea, constipation, abdominal pain or changes in bowel habits. : No urinary frequency, urgency, incontinence hematuria or dysuria. SKIN: No cyanosis or skin lesions. MUSCULOSKELETAL: Rehabbing from hip surgery  NEUROLOGICAL: No syncope or TIA-like symptoms. PSYCHIATRIC: No anxiety, insomnia or depression     No Known Allergies    Prior to Visit Medications    Medication Sig Taking? Authorizing Provider   furosemide (LASIX) 20 MG tablet Take 2 tablets by mouth in the morning for 5 days. Yes Britt Mcfarland MD   potassium chloride (KLOR-CON M) 10 MEQ extended release tablet Take 2 tablets by mouth in the morning for 5 days. Yes Britt Mcfarland MD   atorvastatin (LIPITOR) 10 MG tablet TAKE 1 TABLET BY MOUTH EVERY OTHER DAY **CHANGE IN DOSE** Yes Britt Mcfarland MD   losartan (COZAAR) 50 MG tablet Take 1 tablet by mouth in the morning and at bedtime Yes Shey Moore PA-C   buPROPion (WELLBUTRIN XL) 150 MG extended release tablet TAKE 1 TABLET (150 MG TOTAL) BY MOUTH DAILY.  INDICATIONS  DEPRESSION Yes Historical Provider, MD   vilazodone HCl (VIIBRYD) 10 MG TABS Take 20 mg by mouth daily  Yes Historical Provider, MD   B Complex-C (VITAMIN B + C COMPLEX PO) Take by mouth Yes Historical Provider, MD   Lift Chair [facility] by Does not apply route Yes Luis Troncoso MD   estradiol (ESTRACE) 0.1 MG/GM vaginal cream Place 2 g vaginally Twice a Week  Yes Historical Provider, MD   aspirin 81 MG EC tablet Take 81 mg by mouth daily. Yes Historical Provider, MD   VITAMIN D PO Take  by mouth. Yes Historical Provider, MD       Past Medical History:   Diagnosis Date    AR (allergic rhinitis)     Arthritis of knee     Pruis    Depression     Erosive gastritis 10/28/2019    EGD October 2019, he did with PPI    GERD (gastroesophageal reflux disease)     Hyperlipidemia     Internal hemorrhoids     Overweight(278.02)     Viral meningitis 5/12       Social History     Tobacco Use    Smoking status: Never    Smokeless tobacco: Never   Substance Use Topics    Alcohol use: Yes     Comment: rarely       Family History   Problem Relation Age of Onset    Breast Cancer Mother     Bipolar Disorder Brother        OBJECTIVE:  /82 (Site: Left Upper Arm, Position: Sitting, Cuff Size: Small Adult)   Pulse 82   Resp 16   Ht 5' 1\" (1.549 m)   Wt 166 lb 6.4 oz (75.5 kg)   SpO2 98%   BMI 31.44 kg/m²   GEN:  in NAD weight down 2 pounds  HEENT:  NCAT  NECK:  Supple without adenopathy. CV:  Regular rate and rhythm, S1 and S2 normal, no murmurs, clicks  PULM:  Chest is clear, no wheezing ,  symmetric air entry throughout both lung fields. ABD: Soft, NT  EXT: Bilateral lower extremity edema/tightness to above-knee, no left inguinal adenopathy noted  NEURO: Alert oriented ×3,, always pleasant    Doppler showed no evidence of DVT  Positive left Baker's cyst    ASSESSMENT/PLAN:  1. Lower leg edema  Order above the knee compression stockings  Continue with Lasix and potassium for 5-7 more days  - Compression Stocking  - furosemide (LASIX) 20 MG tablet; Take 2 tablets by mouth in the morning for 5 days. Dispense: 10 tablet; Refill: 0    Check 1 week or as needed    2.  Encounter to discuss test results  Doppler study reviewed with patient and is    25 Total Minutes spent pre charting (reviewing problem list, meds, any test results, consultant and hospital notes ) and  obtaining present visit history, performing appropriate medical exam/evaluation, counseling and educating the patient (and family), ordering medications ,tests, and procedures as needed, refilling medication(s), placing referral(s) when needed in addition to coordinating care for this patient and documenting in electronic health record

## 2022-07-18 ENCOUNTER — OFFICE VISIT (OUTPATIENT)
Dept: FAMILY MEDICINE CLINIC | Age: 82
End: 2022-07-18
Payer: MEDICARE

## 2022-07-18 VITALS
RESPIRATION RATE: 16 BRPM | BODY MASS INDEX: 31.42 KG/M2 | HEIGHT: 61 IN | HEART RATE: 82 BPM | WEIGHT: 166.4 LBS | DIASTOLIC BLOOD PRESSURE: 82 MMHG | SYSTOLIC BLOOD PRESSURE: 136 MMHG | OXYGEN SATURATION: 98 %

## 2022-07-18 DIAGNOSIS — R60.0 LOWER LEG EDEMA: Primary | ICD-10-CM

## 2022-07-18 DIAGNOSIS — Z71.2 ENCOUNTER TO DISCUSS TEST RESULTS: ICD-10-CM

## 2022-07-18 PROCEDURE — 1123F ACP DISCUSS/DSCN MKR DOCD: CPT | Performed by: FAMILY MEDICINE

## 2022-07-18 PROCEDURE — 99213 OFFICE O/P EST LOW 20 MIN: CPT | Performed by: FAMILY MEDICINE

## 2022-07-18 RX ORDER — POTASSIUM CHLORIDE 750 MG/1
20 TABLET, EXTENDED RELEASE ORAL DAILY
Qty: 10 TABLET | Refills: 0 | Status: SHIPPED | OUTPATIENT
Start: 2022-07-18 | End: 2022-07-29 | Stop reason: ALTCHOICE

## 2022-07-18 RX ORDER — FUROSEMIDE 20 MG/1
40 TABLET ORAL DAILY
Qty: 10 TABLET | Refills: 0 | Status: SHIPPED | OUTPATIENT
Start: 2022-07-18 | End: 2022-07-29 | Stop reason: ALTCHOICE

## 2022-07-25 ENCOUNTER — TELEPHONE (OUTPATIENT)
Dept: FAMILY MEDICINE CLINIC | Age: 82
End: 2022-07-25

## 2022-07-25 NOTE — TELEPHONE ENCOUNTER
----- Message from Unk Race sent at 7/25/2022  3:17 PM EDT -----  Subject: Message to Provider    QUESTIONS  Information for Provider? Cheryle Rothman would like Dr. Christiano Garcia the swelling   is still there and her weight is about the same please let pt know what to   do.  ---------------------------------------------------------------------------  --------------  [address] [employer]  [phone]; OK to leave message on voicemail  ---------------------------------------------------------------------------  --------------  SCRIPT ANSWERS  Relationship to Patient?  Self

## 2022-07-25 NOTE — TELEPHONE ENCOUNTER
Medication: Lasix and Potassium   Requested Prescriptions      No prescriptions requested or ordered in this encounter        Last Filled: 7/18/2022 both medications     Patient Phone Number: [phone] (home)     Last appt: 7/18/2022   Next appt: 7/29/2022    Last OARRS: No flowsheet data found.

## 2022-07-25 NOTE — TELEPHONE ENCOUNTER
----- Message from Ara Malathi sent at 7/25/2022 11:11 AM EDT -----  Subject: Refill Request    QUESTIONS  Name of Medication? potassium chloride (KLOR-CON M) 10 MEQ extended   release tablet  Patient-reported dosage and instructions? 1 a day  How many days do you have left? 1  Preferred Pharmacy? [id number]  Pharmacy phone number (if available)? [phone]  ---------------------------------------------------------------------------  --------------,  Name of Medication? furosemide (LASIX) 20 MG tablet  Patient-reported dosage and instructions? 1 a day  How many days do you have left? 1  Preferred Pharmacy? [id number]  Pharmacy phone number (if available)? [phone]  Additional Information for Provider? Sagar Sumner would like a call to see if Dr. Rekha López would need her to refill these scripts. She will be out tomorrow   and she has an appointment on Friday at 11:00 am. She can be reached at   [phone] ok leave a message  ---------------------------------------------------------------------------  --------------  [address]  What is the best way for the office to contact you? OK to leave message on   voicemail  Preferred Call Back Phone Number? [phone]  ---------------------------------------------------------------------------  --------------  SCRIPT ANSWERS  Relationship to Patient?  Self

## 2022-07-28 NOTE — PROGRESS NOTES
Zara Erazo is a 78 y.o. female. HPI:  Here to check lower leg edema  No Change in weight   Above the knee compression stockings seem to be helping, continue  Lasix did not seem to help, discontinue    Arthritis both knees are inhibiting her rehab from her hip surgery  Recommend she check back in with her knee physician and discuss any possible treatment of Baker's cyst behind her left knee    Baker's cyst behind left knee, left knee bothering her more than right    Meds, vitamins and allergies reviewed with pt    Wt Readings from Last 3 Encounters:   07/29/22 166 lb (75.3 kg)   07/18/22 166 lb 6.4 oz (75.5 kg)   07/11/22 168 lb (76.2 kg)       REVIEW OF SYSTEMS:   CONSTITUTIONAL: See history of present illness,   Weight noted   HEENT: No new vision difficulties or ringing in the ears. RESPIRATORY: No new SOB, PND, orthopnea or cough. CARDIOVASCULAR: no CP, palpitations or SOB with exertion  GI: No nausea, vomiting, diarrhea, constipation, abdominal pain or changes in bowel habits. : No urinary frequency, urgency, incontinence hematuria or dysuria. SKIN: No cyanosis or skin lesions. MUSCULOSKELETAL: See HPI  NEUROLOGICAL: No syncope or TIA-like symptoms. PSYCHIATRIC: No anxiety, insomnia or depression     No Known Allergies    Prior to Visit Medications    Medication Sig Taking? Authorizing Provider   atorvastatin (LIPITOR) 10 MG tablet TAKE 1 TABLET BY MOUTH EVERY OTHER DAY **CHANGE IN DOSE** Yes Shahram Rawls MD   losartan (COZAAR) 50 MG tablet Take 1 tablet by mouth in the morning and at bedtime Yes Sergei Torres PA-C   buPROPion (WELLBUTRIN XL) 150 MG extended release tablet TAKE 1 TABLET (150 MG TOTAL) BY MOUTH DAILY.  INDICATIONS  DEPRESSION Yes Historical Provider, MD   vilazodone HCl (VIIBRYD) 10 MG TABS Take 20 mg by mouth daily  Yes Historical Provider, MD   B Complex-C (VITAMIN B + C COMPLEX PO) Take by mouth Yes Historical Provider, MD   Lift Chair MISC by Does not apply route Yes Chelsey Walsh MD   estradiol (ESTRACE) 0.1 MG/GM vaginal cream Place 2 g vaginally Twice a Week  Yes Historical Provider, MD   aspirin 81 MG EC tablet Take 81 mg by mouth daily. Yes Historical Provider, MD   VITAMIN D PO Take  by mouth. Yes Historical Provider, MD       Past Medical History:   Diagnosis Date    AR (allergic rhinitis)     Arthritis of knee     Pruis    Depression     Erosive gastritis 10/28/2019    EGD October 2019, he did with PPI    GERD (gastroesophageal reflux disease)     Hyperlipidemia     Internal hemorrhoids     Overweight(278.02)     Viral meningitis 5/12       Social History     Tobacco Use    Smoking status: Never    Smokeless tobacco: Never   Substance Use Topics    Alcohol use: Yes     Comment: rarely       Family History   Problem Relation Age of Onset    Breast Cancer Mother     Bipolar Disorder Brother        OBJECTIVE:  /60 (Site: Left Upper Arm, Position: Sitting, Cuff Size: Large Adult)   Pulse 86   Resp 16   Ht 5' 1\" (1.549 m)   Wt 166 lb (75.3 kg)   SpO2 97%   BMI 31.37 kg/m²   GEN:  in NAD, pleasant, using walker, depression seems good  HEENT:  NCAT  NECK:  Supple without adenopathy. CV:  Regular rate and rhythm, S1 and S2 normal, no murmurs, clicks  PULM:  Chest is clear, no wheezing ,  symmetric air entry throughout both lung fields. ABD: Soft, NT  EXT: Legs puffy but not pitting edema, above the knee compression stockings are not  Arthritis both knees, can feel cyst behind left knee  NEURO: Alert oriented ×3, slow gait with walker    ASSESSMENT/PLAN:  1. Lower leg edema  Continue rehab and continue above the knee compression stockings/helping    2. Left displaced femoral neck fracture (HCC)  Continue PT OT    3. Arthritis of knee  Seeing me physician in future    4.  Baker's cyst of knee, left  Discussed with me [facility]    20 Total Minutes spent pre charting (reviewing problem list, meds, any test results, consultant and hospital notes ) and  obtaining present visit history, performing appropriate medical exam/evaluation, counseling and educating the patient (and family), ordering medications ,tests, and procedures as needed, refilling medication(s), placing referral(s) when needed in addition to coordinating care for this patient and documenting in electronic health record

## 2022-07-29 ENCOUNTER — OFFICE VISIT (OUTPATIENT)
Dept: FAMILY MEDICINE CLINIC | Age: 82
End: 2022-07-29
Payer: MEDICARE

## 2022-07-29 VITALS
OXYGEN SATURATION: 97 % | WEIGHT: 166 LBS | RESPIRATION RATE: 16 BRPM | BODY MASS INDEX: 31.34 KG/M2 | HEART RATE: 86 BPM | SYSTOLIC BLOOD PRESSURE: 134 MMHG | HEIGHT: 61 IN | DIASTOLIC BLOOD PRESSURE: 60 MMHG

## 2022-07-29 DIAGNOSIS — R60.0 LOWER LEG EDEMA: Primary | ICD-10-CM

## 2022-07-29 DIAGNOSIS — S72.002A LEFT DISPLACED FEMORAL NECK FRACTURE (HCC): ICD-10-CM

## 2022-07-29 DIAGNOSIS — M71.22 BAKER'S CYST OF KNEE, LEFT: ICD-10-CM

## 2022-07-29 DIAGNOSIS — M17.10 ARTHRITIS OF KNEE: ICD-10-CM

## 2022-07-29 PROCEDURE — 1123F ACP DISCUSS/DSCN MKR DOCD: CPT | Performed by: FAMILY MEDICINE

## 2022-07-29 PROCEDURE — 99213 OFFICE O/P EST LOW 20 MIN: CPT | Performed by: FAMILY MEDICINE

## 2022-08-02 ENCOUNTER — OFFICE VISIT (OUTPATIENT)
Dept: ORTHOPEDIC SURGERY | Age: 82
End: 2022-08-02

## 2022-08-02 VITALS — HEIGHT: 61 IN | WEIGHT: 166 LBS | BODY MASS INDEX: 31.34 KG/M2

## 2022-08-02 DIAGNOSIS — M54.16 LUMBAR RADICULOPATHY: Primary | ICD-10-CM

## 2022-08-02 PROCEDURE — 99024 POSTOP FOLLOW-UP VISIT: CPT | Performed by: ORTHOPAEDIC SURGERY

## 2022-08-09 NOTE — PROGRESS NOTES
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 She has been having a difficult time at the rehab facility. She reports left knee pain as well as pain that radiates down the back down the back of the leg. Its been interfering with her ability to progress with therapy. Significant hip pain. No fever or chills. Objective:  Ht 5' 1\" (1.549 m)   Wt 166 lb (75.3 kg)   BMI 31.37 kg/m²      Physical Exam:  The patient is well-appearing and in no apparent distress  Examination of the left hip  Incision is well-healed with no pain with gentle motion of hip  Left knee   There is a small effusion, no gross deformity or skin changes  Range of motion reveals 0 to 120  neg lachman, negative posterior drawer, no pain or laxity with varus or valgus stress at 0 degrees and 30 degrees of flexion  + joint line tenderness  5 out of 5 strength throughout distal muscle groups  Sensation is intact to light touch throughout all distributions  There is no calf swelling or tenderness  Palpable DP pulse, brisk cap refill, 2+ symmetric reflexes    Imaging:  Previous x-rays that were performed at the hospital of the left hip demonstrates overall excellent position of hardware without evidence of loosening or failure. 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 I suspect she is also likely experiencing lumbar radiculopathy given her symptoms radiating down the leg. This is despite physical therapy. Therefore would recommend an MRI of the lumbar spine. She will return to see me in 12 weeks however she will call for the results of the MRI prior to that.                        Bradley Elizabeth MD            Orthopaedic Surgery Sports Medicine and [facility],[address] SportsMedicine and [address] ([facility])      Disclaimer: This note was dictated with voice recognition software. Though review and correction are routine, we apologize for any errors.

## 2022-08-15 ENCOUNTER — TELEPHONE (OUTPATIENT)
Dept: PHARMACY | Facility: CLINIC | Age: 82
End: 2022-08-15

## 2022-08-15 NOTE — TELEPHONE ENCOUNTER
Ronnie Keya, she is still taking her atorvastatin. She states she will run out in the next couple weeks. Offered to contact [facility] for her to have refilled, she declines at this time and states she will contact them when needs the medication. No further outreach planned at this time.       Future Appointments   Date Time Provider Michel Shreya   8/16/2022  2:30 PM F MAMMO [facility] [address] [facility]   11/1/2022  1:00 PM Debora Wall MD FF Ortho [facility]       Tod CarterD, [facility], [address]   Department, toll free: [phone], option 1    For [address] [place] [address] in place:  No  Gap Closed?: No   Time Spent (min): 15

## 2022-08-16 ENCOUNTER — TELEPHONE (OUTPATIENT)
Dept: ORTHOPEDIC SURGERY | Age: 82
End: 2022-08-16

## 2022-08-16 ENCOUNTER — TELEPHONE (OUTPATIENT)
Dept: RHEUMATOLOGY | Age: 82
End: 2022-08-16

## 2022-08-16 ENCOUNTER — HOSPITAL ENCOUNTER (OUTPATIENT)
Dept: WOMENS IMAGING | Age: 82
Discharge: HOME | End: 2022-08-16
Payer: MEDICARE

## 2022-08-16 VITALS — WEIGHT: 150 LBS | BODY MASS INDEX: 28.32 KG/M2 | HEIGHT: 61 IN

## 2022-08-16 DIAGNOSIS — Z12.31 BREAST CANCER SCREENING BY MAMMOGRAM: ICD-10-CM

## 2022-08-16 PROCEDURE — 77063 BREAST TOMOSYNTHESIS BI: CPT

## 2022-08-17 ENCOUNTER — TELEPHONE (OUTPATIENT)
Dept: FAMILY MEDICINE CLINIC | Age: 82
End: 2022-08-17

## 2022-08-17 RX ORDER — FUROSEMIDE 20 MG/1
20 TABLET ORAL DAILY
Qty: 30 TABLET | Refills: 0 | Status: SHIPPED | OUTPATIENT
Start: 2022-08-17 | End: 2022-09-02 | Stop reason: ALTCHOICE

## 2022-08-17 NOTE — TELEPHONE ENCOUNTER
I notified pt that you sent in lasix she said she did that and it did not help and dr plata said she did not want her to take this because of her kidneys fyi

## 2022-08-19 ENCOUNTER — TELEPHONE (OUTPATIENT)
Dept: FAMILY MEDICINE CLINIC | Age: 82
End: 2022-08-19

## 2022-08-19 NOTE — TELEPHONE ENCOUNTER
Pt called back and stated [facility] stated to her she just need's a RX script for a walker. Pt gave info who she spoke wit hat [facility].   Dillan Rodriguez [phone]

## 2022-08-19 NOTE — TELEPHONE ENCOUNTER
----- Message from Humza Flores sent at 8/19/2022  1:54 PM EDT -----  Subject: Referral Request    Reason for referral request? order for medical walker  Provider patient wants to be referred to(if known):     Provider Phone Number(if known):[phone]    Additional Information for Provider? Patient needs order for a nitro drive   walker sent to this location asap so she can get a new walker.  Model   number: [id number] Please fax to # [phone] Call patient or pharmacy   with any questions.   ---------------------------------------------------------------------------  --------------  [address] [employer]    [phone]; OK to leave message on voicemail  ---------------------------------------------------------------------------  --------------

## 2022-08-23 ENCOUNTER — TELEPHONE (OUTPATIENT)
Dept: FAMILY MEDICINE CLINIC | Age: 82
End: 2022-08-23

## 2022-08-23 RX ORDER — POTASSIUM CHLORIDE 750 MG/1
10 TABLET, EXTENDED RELEASE ORAL DAILY
Qty: 30 TABLET | Refills: 0 | Status: SHIPPED | OUTPATIENT
Start: 2022-08-23 | End: 2022-09-02 | Stop reason: ALTCHOICE

## 2022-08-23 NOTE — TELEPHONE ENCOUNTER
Batsheva Worleying from [facility] [phone] stated that we  forgot to send her prescription for  potassium chloride to reduce the water off her Legs. Dr. Marvin Sarkar suggested that patient take both.  Please advise

## 2022-08-31 ENCOUNTER — TELEPHONE (OUTPATIENT)
Dept: FAMILY MEDICINE CLINIC | Age: 82
End: 2022-08-31

## 2022-08-31 NOTE — TELEPHONE ENCOUNTER
Pt called back and stated she was called by our office    Informed pt about what was spoke with [facility]    She understands

## 2022-09-01 NOTE — PROGRESS NOTES
Sulema Rothman is a 78 y.o. female. HPI:  Accompanied by   Very uncomfortable with increasing bilateral lower extremity edema and redness  Back of left calf has a pustule forming  Redness bilateral ankles and feet    Patient has failed outpatient therapy, needs admission  Emergency room was very busy, I called ahead to [facility], vitamins and allergies reviewed with pt    Wt Readings from Last 3 Encounters:   09/02/22 181 lb 12.8 oz (82.5 kg)   08/16/22 150 lb (68 kg)   08/02/22 166 lb (75.3 kg)       REVIEW OF SYSTEMS:   CONSTITUTIONAL: See history of present illness,   Weight noted   HEENT: No new vision difficulties or ringing in the ears. RESPIRATORY: No new SOB, PND, orthopnea or cough. CARDIOVASCULAR: no CP, palpitations or SOB with exertion  GI: No nausea, vomiting, diarrhea, constipation, abdominal pain or changes in bowel habits. : No urinary frequency, urgency, incontinence hematuria or dysuria. SKIN: No cyanosis or skin lesions. MUSCULOSKELETAL: No new muscle or joint pain. NEUROLOGICAL: No syncope or TIA-like symptoms. PSYCHIATRIC: No anxiety, insomnia or depression     No Known Allergies    Prior to Visit Medications    Medication Sig Taking? Authorizing Provider   atorvastatin (LIPITOR) 10 MG tablet TAKE 1 TABLET BY MOUTH EVERY OTHER DAY **CHANGE IN DOSE** Yes Niko Lloyd MD   losartan (COZAAR) 50 MG tablet Take 1 tablet by mouth in the morning and at bedtime Yes Enoc Quan PA-C   buPROPion (WELLBUTRIN XL) 150 MG extended release tablet TAKE 1 TABLET (150 MG TOTAL) BY MOUTH DAILY.  INDICATIONS  DEPRESSION Yes Historical Provider, MD   vilazodone HCl (VIIBRYD) 10 MG TABS Take 20 mg by mouth daily  Yes Historical Provider, MD   B Complex-C (VITAMIN B + C COMPLEX PO) Take by mouth Yes Historical Provider, MD   Lift Chair [address] [facility] by Does not apply route Yes Niko Lloyd MD   estradiol (ESTRACE) 0.1 MG/GM vaginal cream Place 2 g vaginally Twice a Week  Yes Historical Provider, MD   aspirin 81 MG EC tablet Take 81 mg by mouth daily. Yes Historical Provider, MD   VITAMIN D PO Take  by mouth. Yes Historical Provider, MD   potassium chloride (KLOR-CON M) 10 MEQ extended release tablet Take 1 tablet by mouth daily  Patient not taking: Reported on 9/2/2022  Sasha Barksdale MD   furosemide (LASIX) 20 MG tablet Take 1 tablet by mouth daily  Patient not taking: Reported on 9/2/2022  Nelson Norris MD       Past Medical History:   Diagnosis Date    AR (allergic rhinitis)     Arthritis of knee     Pruis    Depression     Erosive gastritis 10/28/2019    EGD October 2019, he did with PPI    GERD (gastroesophageal reflux disease)     Hyperlipidemia     Internal hemorrhoids     Overweight(278.02)     Viral meningitis 5/12       Social History     Tobacco Use    Smoking status: Never    Smokeless tobacco: Never   Substance Use Topics    Alcohol use: Yes     Comment: rarely       Family History   Problem Relation Age of Onset    Breast Cancer Mother     Bipolar Disorder Brother        OBJECTIVE:  BP (!) 140/70 (Site: Left Upper Arm, Position: Sitting, Cuff Size: Large Adult)   Pulse 97   Resp 16   Ht 5' 1\" (1.549 m)   Wt 181 lb 12.8 oz (82.5 kg)   SpO2 95%   BMI 34.35 kg/m²   GEN: Very uncomfortable with lower extremity pain and burning and edema, tearful  HEENT:  NCAT  NECK:  Supple without adenopathy. CV:  Regular rate and rhythm, S1 and S2 normal, no murmurs, clicks  PULM:  Chest is clear, no wheezing ,  symmetric air entry throughout both lung fields. ABD: Soft, NT  EXT: Significant lower leg and thigh edema with redness around ankles, pustule forming back of her left calf, tender to palpation both legs  NEURO: Alert oriented ×3, using walker to walk    ASSESSMENT/PLAN:  1. Edema of both lower legs  To ER, will need admission for diuresis    2.  Cellulitis of both lower extremities  To ER will need admission for IV antibiotics    Called ahead to [facility] ER so she does not wait in the waiting room for too long    25 Total Minutes spent pre charting (reviewing problem list, meds, any test results, consultant and hospital notes ) and  obtaining present visit history, performing appropriate medical exam/evaluation, counseling and educating the patient (and family), ordering medications ,tests, and procedures as needed, refilling medication(s), placing referral(s) when needed in addition to coordinating care for this patient and documenting in electronic health record

## 2022-09-02 ENCOUNTER — OFFICE VISIT (OUTPATIENT)
Dept: FAMILY MEDICINE CLINIC | Age: 82
End: 2022-09-02
Payer: MEDICARE

## 2022-09-02 ENCOUNTER — HOSPITAL ENCOUNTER (INPATIENT)
Age: 82
LOS: 12 days | Discharge: NURSING, REHAB OR LONG-TERM CARE | DRG: 602 | End: 2022-09-14
Attending: EMERGENCY MEDICINE | Admitting: HOSPITALIST
Payer: MEDICARE

## 2022-09-02 VITALS
OXYGEN SATURATION: 95 % | BODY MASS INDEX: 34.32 KG/M2 | DIASTOLIC BLOOD PRESSURE: 70 MMHG | HEART RATE: 97 BPM | WEIGHT: 181.8 LBS | HEIGHT: 61 IN | SYSTOLIC BLOOD PRESSURE: 140 MMHG | RESPIRATION RATE: 16 BRPM

## 2022-09-02 DIAGNOSIS — L03.116 CELLULITIS OF BOTH LOWER EXTREMITIES: ICD-10-CM

## 2022-09-02 DIAGNOSIS — L03.115 CELLULITIS OF BOTH LOWER EXTREMITIES: ICD-10-CM

## 2022-09-02 DIAGNOSIS — R23.8 REDNESS AND SWELLING OF LOWER LEG: ICD-10-CM

## 2022-09-02 DIAGNOSIS — L03.115 BILATERAL LOWER LEG CELLULITIS: ICD-10-CM

## 2022-09-02 DIAGNOSIS — R60.0 EDEMA OF BOTH LOWER LEGS: Primary | ICD-10-CM

## 2022-09-02 DIAGNOSIS — R60.9 PERIPHERAL EDEMA: Primary | ICD-10-CM

## 2022-09-02 DIAGNOSIS — M79.89 REDNESS AND SWELLING OF LOWER LEG: ICD-10-CM

## 2022-09-02 DIAGNOSIS — L03.116 BILATERAL LOWER LEG CELLULITIS: ICD-10-CM

## 2022-09-02 PROBLEM — L03.90 CELLULITIS: Status: ACTIVE | Noted: 2022-09-02

## 2022-09-02 LAB
A/G RATIO: 1.6 (ref 1.1–2.2)
ALBUMIN SERPL-MCNC: 4.1 G/DL (ref 3.4–5)
ALP BLD-CCNC: 119 U/L (ref 40–129)
ALT SERPL-CCNC: 15 U/L (ref 10–40)
ANION GAP SERPL CALCULATED.3IONS-SCNC: 6 MMOL/L (ref 3–16)
AST SERPL-CCNC: 20 U/L (ref 15–37)
BACTERIA: ABNORMAL /HPF
BASOPHILS ABSOLUTE: 0 K/UL (ref 0–0.2)
BASOPHILS RELATIVE PERCENT: 0.5 %
BILIRUB SERPL-MCNC: <0.2 MG/DL (ref 0–1)
BILIRUBIN URINE: NEGATIVE
BLOOD, URINE: ABNORMAL
BUN BLDV-MCNC: 27 MG/DL (ref 7–20)
CALCIUM SERPL-MCNC: 9.9 MG/DL (ref 8.3–10.6)
CHLORIDE BLD-SCNC: 99 MMOL/L (ref 99–110)
CLARITY: CLEAR
CO2: 32 MMOL/L (ref 21–32)
COLOR: YELLOW
CREAT SERPL-MCNC: 0.8 MG/DL (ref 0.6–1.2)
EKG ATRIAL RATE: 82 BPM
EKG DIAGNOSIS: NORMAL
EKG P AXIS: 37 DEGREES
EKG P-R INTERVAL: 136 MS
EKG Q-T INTERVAL: 386 MS
EKG QRS DURATION: 86 MS
EKG QTC CALCULATION (BAZETT): 450 MS
EKG R AXIS: -47 DEGREES
EKG T AXIS: 29 DEGREES
EKG VENTRICULAR RATE: 82 BPM
EOSINOPHILS ABSOLUTE: 0.1 K/UL (ref 0–0.6)
EOSINOPHILS RELATIVE PERCENT: 2 %
EPITHELIAL CELLS, UA: 2 /HPF (ref 0–5)
GFR AFRICAN AMERICAN: >60
GFR NON-AFRICAN AMERICAN: >60
GLUCOSE BLD-MCNC: 97 MG/DL (ref 70–99)
GLUCOSE URINE: NEGATIVE MG/DL
HCT VFR BLD CALC: 35.3 % (ref 36–48)
HEMOGLOBIN: 11.6 G/DL (ref 12–16)
HYALINE CASTS: 0 /LPF (ref 0–8)
KETONES, URINE: NEGATIVE MG/DL
LACTIC ACID, SEPSIS: 1.1 MMOL/L (ref 0.4–1.9)
LEUKOCYTE ESTERASE, URINE: ABNORMAL
LYMPHOCYTES ABSOLUTE: 1.7 K/UL (ref 1–5.1)
LYMPHOCYTES RELATIVE PERCENT: 31.8 %
MCH RBC QN AUTO: 28.7 PG (ref 26–34)
MCHC RBC AUTO-ENTMCNC: 32.7 G/DL (ref 31–36)
MCV RBC AUTO: 87.8 FL (ref 80–100)
MICROSCOPIC EXAMINATION: YES
MONOCYTES ABSOLUTE: 0.5 K/UL (ref 0–1.3)
MONOCYTES RELATIVE PERCENT: 9.1 %
NEUTROPHILS ABSOLUTE: 3 K/UL (ref 1.7–7.7)
NEUTROPHILS RELATIVE PERCENT: 56.6 %
NITRITE, URINE: NEGATIVE
PDW BLD-RTO: 14.6 % (ref 12.4–15.4)
PH UA: 8 (ref 5–8)
PLATELET # BLD: 139 K/UL (ref 135–450)
PMV BLD AUTO: 8 FL (ref 5–10.5)
POTASSIUM REFLEX MAGNESIUM: 4.6 MMOL/L (ref 3.5–5.1)
PRO-BNP: 248 PG/ML (ref 0–449)
PROCALCITONIN: 0.04 NG/ML (ref 0–0.15)
PROTEIN UA: NEGATIVE MG/DL
RBC # BLD: 4.02 M/UL (ref 4–5.2)
RBC UA: 18 /HPF (ref 0–4)
SEDIMENTATION RATE, ERYTHROCYTE: 25 MM/HR (ref 0–30)
SODIUM BLD-SCNC: 137 MMOL/L (ref 136–145)
SPECIFIC GRAVITY UA: <=1.005 (ref 1–1.03)
TOTAL CK: 89 U/L (ref 26–192)
TOTAL PROTEIN: 6.7 G/DL (ref 6.4–8.2)
TROPONIN: <0.01 NG/ML
URINE REFLEX TO CULTURE: ABNORMAL
URINE TYPE: ABNORMAL
UROBILINOGEN, URINE: 0.2 E.U./DL
WBC # BLD: 5.3 K/UL (ref 4–11)
WBC UA: 2 /HPF (ref 0–5)

## 2022-09-02 PROCEDURE — 93010 ELECTROCARDIOGRAM REPORT: CPT | Performed by: INTERNAL MEDICINE

## 2022-09-02 PROCEDURE — 81001 URINALYSIS AUTO W/SCOPE: CPT

## 2022-09-02 PROCEDURE — 2580000003 HC RX 258: Performed by: PHYSICIAN ASSISTANT

## 2022-09-02 PROCEDURE — 83880 ASSAY OF NATRIURETIC PEPTIDE: CPT

## 2022-09-02 PROCEDURE — 82550 ASSAY OF CK (CPK): CPT

## 2022-09-02 PROCEDURE — 1123F ACP DISCUSS/DSCN MKR DOCD: CPT | Performed by: FAMILY MEDICINE

## 2022-09-02 PROCEDURE — 99213 OFFICE O/P EST LOW 20 MIN: CPT | Performed by: FAMILY MEDICINE

## 2022-09-02 PROCEDURE — 93970 EXTREMITY STUDY: CPT

## 2022-09-02 PROCEDURE — 6370000000 HC RX 637 (ALT 250 FOR IP): Performed by: PHYSICIAN ASSISTANT

## 2022-09-02 PROCEDURE — 1200000000 HC SEMI PRIVATE

## 2022-09-02 PROCEDURE — 6360000002 HC RX W HCPCS: Performed by: PHYSICIAN ASSISTANT

## 2022-09-02 PROCEDURE — 85025 COMPLETE CBC W/AUTO DIFF WBC: CPT

## 2022-09-02 PROCEDURE — 80053 COMPREHEN METABOLIC PANEL: CPT

## 2022-09-02 PROCEDURE — 84484 ASSAY OF TROPONIN QUANT: CPT

## 2022-09-02 PROCEDURE — 85652 RBC SED RATE AUTOMATED: CPT

## 2022-09-02 PROCEDURE — 83605 ASSAY OF LACTIC ACID: CPT

## 2022-09-02 PROCEDURE — 2580000003 HC RX 258: Performed by: HOSPITALIST

## 2022-09-02 PROCEDURE — 84145 PROCALCITONIN (PCT): CPT

## 2022-09-02 PROCEDURE — 93005 ELECTROCARDIOGRAM TRACING: CPT | Performed by: PHYSICIAN ASSISTANT

## 2022-09-02 PROCEDURE — 99285 EMERGENCY DEPT VISIT HI MDM: CPT

## 2022-09-02 RX ORDER — IBUPROFEN 600 MG/1
600 TABLET ORAL
Status: ACTIVE | OUTPATIENT
Start: 2022-09-02 | End: 2022-09-02

## 2022-09-02 RX ORDER — OXYCODONE HYDROCHLORIDE AND ACETAMINOPHEN 5; 325 MG/1; MG/1
1 TABLET ORAL ONCE
Status: COMPLETED | OUTPATIENT
Start: 2022-09-02 | End: 2022-09-02

## 2022-09-02 RX ORDER — SODIUM CHLORIDE 9 MG/ML
INJECTION, SOLUTION INTRAVENOUS PRN
Status: DISCONTINUED | OUTPATIENT
Start: 2022-09-02 | End: 2022-09-14 | Stop reason: HOSPADM

## 2022-09-02 RX ORDER — POLYETHYLENE GLYCOL 3350 17 G/17G
17 POWDER, FOR SOLUTION ORAL DAILY PRN
Status: DISCONTINUED | OUTPATIENT
Start: 2022-09-02 | End: 2022-09-08

## 2022-09-02 RX ORDER — ENOXAPARIN SODIUM 100 MG/ML
40 INJECTION SUBCUTANEOUS DAILY
Status: DISCONTINUED | OUTPATIENT
Start: 2022-09-02 | End: 2022-09-09

## 2022-09-02 RX ORDER — DIAZEPAM 5 MG/1
5 TABLET ORAL ONCE
Status: COMPLETED | OUTPATIENT
Start: 2022-09-02 | End: 2022-09-02

## 2022-09-02 RX ORDER — ATORVASTATIN CALCIUM 10 MG/1
10 TABLET, FILM COATED ORAL DAILY
Status: DISCONTINUED | OUTPATIENT
Start: 2022-09-03 | End: 2022-09-05 | Stop reason: SDUPTHER

## 2022-09-02 RX ORDER — SODIUM CHLORIDE 0.9 % (FLUSH) 0.9 %
5-40 SYRINGE (ML) INJECTION EVERY 12 HOURS SCHEDULED
Status: DISCONTINUED | OUTPATIENT
Start: 2022-09-02 | End: 2022-09-14 | Stop reason: HOSPADM

## 2022-09-02 RX ORDER — VILAZODONE HYDROCHLORIDE 20 MG/1
20 TABLET ORAL DAILY
Status: DISCONTINUED | OUTPATIENT
Start: 2022-09-03 | End: 2022-09-14 | Stop reason: HOSPADM

## 2022-09-02 RX ORDER — FUROSEMIDE 10 MG/ML
60 INJECTION INTRAMUSCULAR; INTRAVENOUS ONCE
Status: COMPLETED | OUTPATIENT
Start: 2022-09-02 | End: 2022-09-02

## 2022-09-02 RX ORDER — SODIUM CHLORIDE 0.9 % (FLUSH) 0.9 %
5-40 SYRINGE (ML) INJECTION PRN
Status: DISCONTINUED | OUTPATIENT
Start: 2022-09-02 | End: 2022-09-14 | Stop reason: HOSPADM

## 2022-09-02 RX ORDER — ACETAMINOPHEN 325 MG/1
650 TABLET ORAL EVERY 6 HOURS PRN
Status: DISCONTINUED | OUTPATIENT
Start: 2022-09-02 | End: 2022-09-14 | Stop reason: HOSPADM

## 2022-09-02 RX ORDER — FUROSEMIDE 10 MG/ML
40 INJECTION INTRAMUSCULAR; INTRAVENOUS 2 TIMES DAILY
Status: DISCONTINUED | OUTPATIENT
Start: 2022-09-02 | End: 2022-09-06

## 2022-09-02 RX ORDER — ONDANSETRON 4 MG/1
4 TABLET, ORALLY DISINTEGRATING ORAL EVERY 8 HOURS PRN
Status: DISCONTINUED | OUTPATIENT
Start: 2022-09-02 | End: 2022-09-14 | Stop reason: HOSPADM

## 2022-09-02 RX ORDER — ONDANSETRON 2 MG/ML
4 INJECTION INTRAMUSCULAR; INTRAVENOUS EVERY 6 HOURS PRN
Status: DISCONTINUED | OUTPATIENT
Start: 2022-09-02 | End: 2022-09-14 | Stop reason: HOSPADM

## 2022-09-02 RX ORDER — ASPIRIN 81 MG/1
81 TABLET ORAL DAILY
Status: DISCONTINUED | OUTPATIENT
Start: 2022-09-02 | End: 2022-09-14 | Stop reason: HOSPADM

## 2022-09-02 RX ORDER — ACETAMINOPHEN 650 MG/1
650 SUPPOSITORY RECTAL EVERY 6 HOURS PRN
Status: DISCONTINUED | OUTPATIENT
Start: 2022-09-02 | End: 2022-09-14 | Stop reason: HOSPADM

## 2022-09-02 RX ORDER — BUPROPION HYDROCHLORIDE 150 MG/1
150 TABLET ORAL DAILY
Status: DISCONTINUED | OUTPATIENT
Start: 2022-09-02 | End: 2022-09-14 | Stop reason: HOSPADM

## 2022-09-02 RX ADMIN — CEFAZOLIN 1000 MG: 1 INJECTION, POWDER, FOR SOLUTION INTRAMUSCULAR; INTRAVENOUS at 16:37

## 2022-09-02 RX ADMIN — DIAZEPAM 5 MG: 5 TABLET ORAL at 18:09

## 2022-09-02 RX ADMIN — OXYCODONE HYDROCHLORIDE AND ACETAMINOPHEN 1 TABLET: 5; 325 TABLET ORAL at 16:49

## 2022-09-02 RX ADMIN — FUROSEMIDE 60 MG: 10 INJECTION, SOLUTION INTRAMUSCULAR; INTRAVENOUS at 16:31

## 2022-09-02 RX ADMIN — Medication 10 ML: at 23:24

## 2022-09-02 ASSESSMENT — PAIN SCALES - GENERAL
PAINLEVEL_OUTOF10: 8
PAINLEVEL_OUTOF10: 8
PAINLEVEL_OUTOF10: 10

## 2022-09-02 ASSESSMENT — PAIN DESCRIPTION - DESCRIPTORS: DESCRIPTORS: BURNING

## 2022-09-02 ASSESSMENT — PAIN DESCRIPTION - LOCATION
LOCATION: VAGINA
LOCATION: VAGINA

## 2022-09-02 NOTE — ED PROVIDER NOTES
In addition to the advanced practice provider, I personally saw Gabe Thomson and performed a substantive portion of the visit including all aspects of the medical decision making. Medical Decision Making  81yoF, here with c/o BLE swelling and erythema. No n/v/d  No fever chills sweats  Started with erythema a few days back  Trialed lasix without result. On exam, edematous BLE, right lower erythema. Gestalt for lymphedema, less concerning for cellulitis    [unfilled]    EKG  EKG is reviewed by myself. Dated today [phone]. Rate 82 sinus rhythm left ear fascicular block P mitrale. SEP-1  Is this patient to be included in the SEP-1 Core Measure due to severe sepsis or septic shock? No   Exclusion criteria - the patient is NOT to be included for SEP-1 Core Measure due to: Infection is not suspected    Screenings     [place] Coma Scale  Eye Opening: Spontaneous  Best Verbal Response: Oriented  Best Motor Response: Obeys commands  Edward Coma Scale Score: 15             Patient Referrals:  No follow-up provider specified. Discharge Medications:  New Prescriptions    No medications on file       FINAL IMPRESSION  1. Peripheral edema    2. Redness and swelling of lower leg        Blood pressure (!) 165/68, pulse 91, temperature 98.1 °F (36.7 °C), resp. rate 17, weight 181 lb (82.1 kg), SpO2 91 %, not currently breastfeeding. For further details of Portia Mueller emergency department encounter, please see documentation by advanced practice provider, Tammy Laughlin.        Lizzy Paniagua MD  09/02/22 0218

## 2022-09-02 NOTE — ED NOTES
Pt complaining that her vagina was burning after a pure wick was placed. Pure wick removed at this time. No redness or irritation noted to vaginal area.       Joe Girard RN  09/02/22 7435

## 2022-09-02 NOTE — ED NOTES
Pt report called to 4T RN, states no questions or concerns at this time.       [facility], RN  09/02/22 3210

## 2022-09-02 NOTE — ED NOTES
Pt changed at this time, complete bed change completed. Zinc oxide placed at this time. Pt states her vagina is still burning, no redness or wounds noted. BJ, PA notified, in to assess patient.       175 Mayda Payne RN  09/02/22 OZ Last  09/02/22 5717

## 2022-09-02 NOTE — H&P
(gastroesophageal reflux disease), Hyperlipidemia, Internal hemorrhoids, Overweight(278.02), and Viral meningitis. Past Surgical History:   has a past surgical history that includes Cholecystectomy, laparoscopic (2003); Total abdominal hysterectomy w/ bilateral salpingoophorectomy (2003); Colonoscopy (8/06); Colonoscopy (12/3/13); Upper gastrointestinal endoscopy (12/3/13); Breast biopsy; Hysterectomy; and hip surgery (Left, 5/16/2022). Medications:  No current facility-administered medications on file prior to encounter. Current Outpatient Medications on File Prior to Encounter   Medication Sig Dispense Refill    atorvastatin (LIPITOR) 10 MG tablet TAKE 1 TABLET BY MOUTH EVERY OTHER DAY **CHANGE IN DOSE** 45 tablet 3    buPROPion (WELLBUTRIN XL) 150 MG extended release tablet TAKE 1 TABLET (150 MG TOTAL) BY MOUTH DAILY. INDICATIONS  DEPRESSION      vilazodone HCl (VIIBRYD) 10 MG TABS Take 20 mg by mouth daily       B Complex-C (VITAMIN B + C COMPLEX PO) Take by mouth      Lift Chair MISC by Does not apply route 1 each 0    estradiol (ESTRACE) 0.1 MG/GM vaginal cream Place 2 g vaginally Twice a Week       aspirin 81 MG EC tablet Take 81 mg by mouth daily. VITAMIN D PO Take  by mouth. Allergies:  No Known Allergies     Social History:   reports that she has never smoked. She has never used smokeless tobacco. She reports current alcohol use. She reports that she does not use drugs. Family History:  family history includes Bipolar Disorder in her brother; Breast Cancer in her mother. ,     Physical Exam:  BP (!) 158/77   Pulse 87   Temp 98.1 °F (36.7 °C)   Resp 16   Wt 181 lb (82.1 kg)   SpO2 96%   BMI 34.20 kg/m²     General appearance:  Appears comfortable. Well nourished  Eyes: Sclera clear, pupils equal  ENT: Moist mucus membranes, no thrush. Trachea midline. Cardiovascular: Regular rhythm, normal S1, S2. No murmur, gallop, rub.  No edema in lower extremities  Respiratory: Clear to auscultation bilaterally, no wheeze, good inspiratory effort  Gastrointestinal: Abdomen soft, non-tender, not distended, normal bowel sounds  Musculoskeletal: No cyanosis in digits, neck supple'  -Bilateral leg swellin noted edema all the way up to the thigh. No noted redness around the ankle noted some pustules on the calf. On bilateral no open wound  Neurology: Cranial nerves grossly intact. Alert and oriented in time, place and person. No speech or motor deficits  Psychiatry: Appropriate affect. Not agitated  Skin: Warm, dry, normal turgor, no rash    Labs:  CBC:   Lab Results   Component Value Date/Time    WBC 5.3 09/02/2022 12:24 PM    RBC 4.02 09/02/2022 12:24 PM    HGB 11.6 09/02/2022 12:24 PM    HCT 35.3 09/02/2022 12:24 PM    MCV 87.8 09/02/2022 12:24 PM    MCH 28.7 09/02/2022 12:24 PM    MCHC 32.7 09/02/2022 12:24 PM    RDW 14.6 09/02/2022 12:24 PM     09/02/2022 12:24 PM    MPV 8.0 09/02/2022 12:24 PM     BMP:    Lab Results   Component Value Date/Time     09/02/2022 12:24 PM    K 4.6 09/02/2022 12:24 PM    CL 99 09/02/2022 12:24 PM    CO2 32 09/02/2022 12:24 PM    BUN 27 09/02/2022 12:24 PM    CREATININE 0.8 09/02/2022 12:24 PM    CALCIUM 9.9 09/02/2022 12:24 PM    GFRAA >60 09/02/2022 12:24 PM    GFRAA >60 05/08/2013 08:38 AM    LABGLOM >60 09/02/2022 12:24 PM    GLUCOSE 97 09/02/2022 12:24 PM       Chest Xray:   EKG:    I visualized CXR images and EKG strips     Discussed  with      Problem List  Principal Problem:    Cellulitis  Resolved Problems:    * No resolved hospital problems.  *        Assessment/Plan:   Bilateral lower extremities  -Probably likely worsened secondary to edema of both legs.  -Failed outpatient treatment therapy  -We will start IV antibiotics patient already started on cefepime we will continue that and see response if no response then will adjust medication accordingly  -Treat with IV Lasix for couple days to see if swelling improves  -Continue to monitor  -Renal function closely replace electrolytes if needed        Lorenzo Romero MD    9/2/2022 4:01 PM

## 2022-09-02 NOTE — ED PROVIDER NOTES
[address] [facility]        Pt Name: Christian Sloan  MRN: [id number]  [place] 1940  Date of evaluation: 9/2/2022  Provider: Rekha Pathak PA-C  PCP: Micki Fernandez MD  Note Started: 12:23 PM EDT        I have seen and evaluated this patient with my supervising physician Harvey Reyez MD.    [address] [place] [address]       Chief Complaint   Patient presents with    Leg Swelling     Bilateral leg swelling that started a few weeks ago. Worse over the last week. HISTORY OF PRESENT ILLNESS   (Location, Timing/Onset, Context/Setting, Quality, Duration, Modifying Factors, Severity, Associated Signs and Symptoms)  Note limiting factors. Chief Complaint: Bilateral leg swelling    Christian Sloan is a 78 y.o. female who presents to the emergency department the chief complaint of worsening bilateral leg swelling and pain. In May she had a left hip fracture and surgery. States over the past 3 weeks she has had some swelling in her legs have gotten worse over the past 10 days with some redness in her bilateral lower extremities that began 3 days ago. She is not on any diuretics or antibiotics. She denies chest pain, shortness of breath, history of heart failure, nausea, vomiting, fevers, history of chronic kidney disease, diabetes or any skin infections or MRSA in the past.  She states moving her legs makes the pain worse. Denies abdominal pain, urinary bowel symptoms or any other symptoms. Nursing Notes were all reviewed and agreed with or any disagreements were addressed in the HPI. REVIEW OF SYSTEMS    (2-9 systems for level 4, 10 or more for level 5)     Review of Systems    Positives and Pertinent negatives as per HPI. Except as noted above in the ROS, all other systems were reviewed and negative.        PAST MEDICAL HISTORY     Past Medical History:   Diagnosis Date    AR (allergic rhinitis)     Arthritis of knee     Pruis Depression     Erosive gastritis 10/28/2019    EGD October 2019, he did with PPI    GERD (gastroesophageal reflux disease)     Hyperlipidemia     Internal hemorrhoids     Overweight(278.02)     Viral meningitis 5/12         SURGICAL HISTORY     Past Surgical History:   Procedure Laterality Date    BREAST BIOPSY      CHOLECYSTECTOMY, LAPAROSCOPIC  2003    COLONOSCOPY  8/06    normal; [facility]    COLONOSCOPY  12/3/13    [facility]- polyps    HIP SURGERY Left 5/16/2022    LEFT HIP HEMIARTHROPLASTY performed by Eboni Price MD at [place] (CERVIX STATUS UNKNOWN)      [facility] (CERVIX REMOVED)  2003    UPPER GASTROINTESTINAL ENDOSCOPY  12/3/13    Nory Salines; antritis         CURRENTMEDICATIONS       Previous Medications    ASPIRIN 81 MG EC TABLET    Take 81 mg by mouth daily. ATORVASTATIN (LIPITOR) 10 MG TABLET    TAKE 1 TABLET BY MOUTH EVERY OTHER DAY **CHANGE IN DOSE**    B COMPLEX-C (VITAMIN B + C COMPLEX PO)    Take by mouth    BUPROPION (WELLBUTRIN XL) 150 MG EXTENDED RELEASE TABLET    TAKE 1 TABLET (150 MG TOTAL) BY MOUTH DAILY. INDICATIONS  DEPRESSION    ESTRADIOL (ESTRACE) 0.1 MG/GM VAGINAL CREAM    Place 2 g vaginally Twice a Week     LIFT CHAIR MISC    by Does not apply route    VILAZODONE HCL (VIIBRYD) 10 MG TABS    Take 20 mg by mouth daily     VITAMIN D PO    Take  by mouth. ALLERGIES     Patient has no known allergies.     FAMILYHISTORY       Family History   Problem Relation Age of Onset    Breast Cancer Mother     Bipolar Disorder Brother           SOCIAL HISTORY       Social History     Tobacco Use    Smoking status: Never    Smokeless tobacco: Never   Vaping Use    Vaping Use: Never used   Substance Use Topics    Alcohol use: Yes     Comment: rarely    Drug use: No       SCREENINGS    [place] Coma Scale  Eye Opening: Spontaneous  Best Verbal Response: Oriented  Best Motor Response: Obeys commands  Edward Coma Scale Score: 15        PHYSICAL EXAM    (up to 7 for level 4, 8 or more for level 5)     ED Triage Vitals   BP Temp Temp src Heart Rate Resp SpO2 Height Weight   09/02/22 1135 09/02/22 1135 -- 09/02/22 1135 09/02/22 1135 09/02/22 1218 -- 09/02/22 1135   (!) 158/77 98.1 °F (36.7 °C)  87 16 96 %  181 lb (82.1 kg)       Physical Exam  Vitals and nursing note reviewed. Constitutional:       Appearance: She is well-developed. She is not diaphoretic. HENT:      Head: Atraumatic. Nose: Nose normal.   Eyes:      General:         Right eye: No discharge. Left eye: No discharge. Cardiovascular:      Rate and Rhythm: Normal rate and regular rhythm. Heart sounds: No murmur heard. No friction rub. No gallop. Pulmonary:      Effort: Pulmonary effort is normal. No respiratory distress. Breath sounds: No stridor. No wheezing, rhonchi or rales. Abdominal:      General: Bowel sounds are normal. There is no distension. Palpations: Abdomen is soft. There is no mass. Tenderness: There is no abdominal tenderness. There is no guarding or rebound. Hernia: No hernia is present. Musculoskeletal:         General: No swelling. Normal range of motion. Cervical back: Normal range of motion. Right lower leg: Edema present. Left lower leg: Edema present. Comments: Edema bilateral lower extremities with only some trace pitting. There are some erythema over the bilateral ankles without crepitus. 2+ dorsal pedal pulses bilaterally. Skin:     General: Skin is warm and dry. Findings: No erythema or rash. Neurological:      Mental Status: She is alert and oriented to person, place, and time. Cranial Nerves: No cranial nerve deficit.    Psychiatric:         Behavior: Behavior normal.       DIAGNOSTIC RESULTS   LABS:    Labs Reviewed   CBC WITH AUTO DIFFERENTIAL - Abnormal; Notable for the following components:       Result Value    Hemoglobin 11.6 (*)     Hematocrit 35.3 (*)     All other components within normal limits COMPREHENSIVE METABOLIC PANEL W/ REFLEX TO MG FOR LOW K - Abnormal; Notable for the following components:    BUN 27 (*)     All other components within normal limits   TROPONIN   BRAIN NATRIURETIC PEPTIDE   SEDIMENTATION RATE   LACTATE, SEPSIS   CK       When ordered only abnormal lab results are displayed. All other labs were within normal range or not returned as of this dictation. EKG: When ordered, EKG's are interpreted by the Emergency Department Physician in the absence of a cardiologist.  Please see their note for interpretation of EKG. RADIOLOGY:   Non-plain film images such as CT, Ultrasound and MRI are read by the radiologist. Plain radiographic images are visualized and preliminarily interpreted by the ED Provider with the below findings:        Interpretation per the Radiologist below, if available at the time of this note:    VL Extremity Venous Bilateral           No results found. PROCEDURES   Unless otherwise noted below, none     Procedures    CRITICAL CARE TIME       CONSULTS:  IP CONSULT TO HOSPITALIST      EMERGENCY DEPARTMENT COURSE and DIFFERENTIAL DIAGNOSIS/MDM:   Vitals:    Vitals:    09/02/22 1645 09/02/22 1718 09/02/22 1730 09/02/22 1800   BP: (!) 203/120 (!) 152/109 (!) 158/80 (!) 165/68   Pulse: (!) 107 (!) 109 95 91   Resp: 17 20 23 17   Temp:       SpO2: 99% 98% 100% 91%   Weight:           Patient was given the following medications:  Medications   ceFAZolin (ANCEF) 1,000 mg in dextrose 5 % 50 mL IVPB (mini-bag) ( IntraVENous Stopped 9/2/22 1732)   furosemide (LASIX) injection 60 mg (60 mg IntraVENous Given 9/2/22 1631)   oxyCODONE-acetaminophen (PERCOCET) 5-325 MG per tablet 1 tablet (1 tablet Oral Given 9/2/22 1649)   diazePAM (VALIUM) tablet 5 mg (5 mg Oral Given 9/2/22 1809)         Is this patient to be included in the SEP-1 Core Measure due to severe sepsis or septic shock?    No   Exclusion criteria - the patient is NOT to be included for SEP-1 Core Measure due to:  Infection is not suspected    Patient presented with worsening peripheral edema. She later states when her  is here that she was on Lasix 10 mg daily for about a week 3 weeks ago. She was off it for about a week and then was on 20 mg for 6 days daily and she stopped taking this a few days ago. She started to get some redness in her lower extremities. She has not been on antibiotics. She was sent here for IV diuresis and antibiotics. Work-up here however is unremarkable besides some lymphadenopathy on ultrasound. Low suspicion for bilateral cellulitis or DVT. She is distally neurovascularly intact. A conversation with her that we could still try a higher dose diuretics as an outpatient and antibiotics for possible concern for cellulitis however lower suspicion for this after discussion she wishes to be admitted. Given she was sent here for this discussed this with hospitalist who admit for further work-up and treatment. Patient was admitted to hospitalist.  While she was on the emergency department patient became very anxious after her  left after discussing with her she has history of anxiety. She would like something for anxiety. Was given Valium. FINAL IMPRESSION      1. Peripheral edema    2. Redness and swelling of lower leg          DISPOSITION/PLAN   DISPOSITION Admitted 09/02/2022 03:58:18 PM      PATIENT REFERRED TO:  No follow-up provider specified.     DISCHARGE MEDICATIONS:  New Prescriptions    No medications on file       DISCONTINUED MEDICATIONS:  Discontinued Medications    LOSARTAN (COZAAR) 50 MG TABLET    Take 1 tablet by mouth in the morning and at bedtime              (Please note that portions of this note were completed with a voice recognition program.  Efforts were made to edit the dictations but occasionally words are mis-transcribed.)    Arian Nieto PA-C (electronically signed)           Arian Nieto PA-C  09/02/22 1900

## 2022-09-02 NOTE — ED NOTES
Offered patient dandre and becky again at this time. Pt refused both.       [facility], RN  09/02/22 1945

## 2022-09-03 LAB
ANION GAP SERPL CALCULATED.3IONS-SCNC: 9 MMOL/L (ref 3–16)
BUN BLDV-MCNC: 24 MG/DL (ref 7–20)
CALCIUM SERPL-MCNC: 8.7 MG/DL (ref 8.3–10.6)
CHLORIDE BLD-SCNC: 100 MMOL/L (ref 99–110)
CO2: 30 MMOL/L (ref 21–32)
CREAT SERPL-MCNC: 0.9 MG/DL (ref 0.6–1.2)
GFR AFRICAN AMERICAN: >60
GFR NON-AFRICAN AMERICAN: 60
GLUCOSE BLD-MCNC: 96 MG/DL (ref 70–99)
HCT VFR BLD CALC: 31.2 % (ref 36–48)
HEMOGLOBIN: 10.1 G/DL (ref 12–16)
LV EF: 58 %
LVEF MODALITY: NORMAL
MCH RBC QN AUTO: 28.3 PG (ref 26–34)
MCHC RBC AUTO-ENTMCNC: 32.5 G/DL (ref 31–36)
MCV RBC AUTO: 87.1 FL (ref 80–100)
PDW BLD-RTO: 14.5 % (ref 12.4–15.4)
PLATELET # BLD: 116 K/UL (ref 135–450)
PMV BLD AUTO: 7.6 FL (ref 5–10.5)
POTASSIUM SERPL-SCNC: 3.6 MMOL/L (ref 3.5–5.1)
RBC # BLD: 3.58 M/UL (ref 4–5.2)
SODIUM BLD-SCNC: 139 MMOL/L (ref 136–145)
WBC # BLD: 4.2 K/UL (ref 4–11)

## 2022-09-03 PROCEDURE — 6370000000 HC RX 637 (ALT 250 FOR IP): Performed by: FAMILY MEDICINE

## 2022-09-03 PROCEDURE — 2580000003 HC RX 258: Performed by: HOSPITALIST

## 2022-09-03 PROCEDURE — 6360000002 HC RX W HCPCS: Performed by: FAMILY MEDICINE

## 2022-09-03 PROCEDURE — 6360000002 HC RX W HCPCS: Performed by: HOSPITALIST

## 2022-09-03 PROCEDURE — 85027 COMPLETE CBC AUTOMATED: CPT

## 2022-09-03 PROCEDURE — 6370000000 HC RX 637 (ALT 250 FOR IP): Performed by: HOSPITALIST

## 2022-09-03 PROCEDURE — 6370000000 HC RX 637 (ALT 250 FOR IP): Performed by: NURSE PRACTITIONER

## 2022-09-03 PROCEDURE — 93306 TTE W/DOPPLER COMPLETE: CPT

## 2022-09-03 PROCEDURE — 1200000000 HC SEMI PRIVATE

## 2022-09-03 PROCEDURE — 80048 BASIC METABOLIC PNL TOTAL CA: CPT

## 2022-09-03 RX ORDER — LACTOBACILLUS RHAMNOSUS GG 10B CELL
1 CAPSULE ORAL 2 TIMES DAILY WITH MEALS
Status: DISCONTINUED | OUTPATIENT
Start: 2022-09-03 | End: 2022-09-14 | Stop reason: HOSPADM

## 2022-09-03 RX ORDER — SENNA AND DOCUSATE SODIUM 50; 8.6 MG/1; MG/1
2 TABLET, FILM COATED ORAL NIGHTLY
Status: DISCONTINUED | OUTPATIENT
Start: 2022-09-03 | End: 2022-09-08

## 2022-09-03 RX ORDER — IBUPROFEN 600 MG/1
600 TABLET ORAL
Status: COMPLETED | OUTPATIENT
Start: 2022-09-03 | End: 2022-09-03

## 2022-09-03 RX ORDER — MORPHINE SULFATE 2 MG/ML
2 INJECTION, SOLUTION INTRAMUSCULAR; INTRAVENOUS ONCE
Status: COMPLETED | OUTPATIENT
Start: 2022-09-03 | End: 2022-09-03

## 2022-09-03 RX ADMIN — Medication 10 ML: at 08:46

## 2022-09-03 RX ADMIN — VILAZODONE HYDROCHLORIDE 20 MG: 20 TABLET ORAL at 08:43

## 2022-09-03 RX ADMIN — DICLOFENAC SODIUM 4 G: 10 GEL TOPICAL at 18:27

## 2022-09-03 RX ADMIN — ATORVASTATIN CALCIUM 10 MG: 10 TABLET, FILM COATED ORAL at 08:44

## 2022-09-03 RX ADMIN — ONDANSETRON 4 MG: 2 INJECTION INTRAMUSCULAR; INTRAVENOUS at 05:22

## 2022-09-03 RX ADMIN — ENOXAPARIN SODIUM 40 MG: 100 INJECTION SUBCUTANEOUS at 08:43

## 2022-09-03 RX ADMIN — Medication 1 CAPSULE: at 18:27

## 2022-09-03 RX ADMIN — FUROSEMIDE 40 MG: 10 INJECTION, SOLUTION INTRAMUSCULAR; INTRAVENOUS at 16:09

## 2022-09-03 RX ADMIN — STANDARDIZED SENNA CONCENTRATE AND DOCUSATE SODIUM 2 TABLET: 8.6; 5 TABLET ORAL at 21:32

## 2022-09-03 RX ADMIN — SODIUM CHLORIDE 25 ML: 9 INJECTION, SOLUTION INTRAVENOUS at 00:04

## 2022-09-03 RX ADMIN — Medication 10 ML: at 21:33

## 2022-09-03 RX ADMIN — FUROSEMIDE 40 MG: 10 INJECTION, SOLUTION INTRAMUSCULAR; INTRAVENOUS at 08:44

## 2022-09-03 RX ADMIN — CEFAZOLIN 2000 MG: 2 INJECTION, POWDER, FOR SOLUTION INTRAMUSCULAR; INTRAVENOUS at 16:11

## 2022-09-03 RX ADMIN — CEFAZOLIN 2000 MG: 2 INJECTION, POWDER, FOR SOLUTION INTRAMUSCULAR; INTRAVENOUS at 00:05

## 2022-09-03 RX ADMIN — CEFAZOLIN 2000 MG: 2 INJECTION, POWDER, FOR SOLUTION INTRAMUSCULAR; INTRAVENOUS at 08:46

## 2022-09-03 RX ADMIN — ACETAMINOPHEN 650 MG: 325 TABLET ORAL at 00:27

## 2022-09-03 RX ADMIN — ACETAMINOPHEN 650 MG: 325 TABLET ORAL at 22:25

## 2022-09-03 RX ADMIN — BUPROPION HYDROCHLORIDE 150 MG: 150 TABLET, EXTENDED RELEASE ORAL at 08:44

## 2022-09-03 RX ADMIN — MORPHINE SULFATE 2 MG: 2 INJECTION, SOLUTION INTRAMUSCULAR; INTRAVENOUS at 12:11

## 2022-09-03 RX ADMIN — IBUPROFEN 600 MG: 600 TABLET ORAL at 00:28

## 2022-09-03 RX ADMIN — ACETAMINOPHEN 650 MG: 325 TABLET ORAL at 16:09

## 2022-09-03 RX ADMIN — ENOXAPARIN SODIUM 40 MG: 100 INJECTION SUBCUTANEOUS at 00:05

## 2022-09-03 RX ADMIN — ASPIRIN 81 MG: 81 TABLET, COATED ORAL at 08:44

## 2022-09-03 ASSESSMENT — PAIN DESCRIPTION - PAIN TYPE
TYPE: CHRONIC PAIN
TYPE: ACUTE PAIN;CHRONIC PAIN
TYPE: CHRONIC PAIN;ACUTE PAIN
TYPE: CHRONIC PAIN
TYPE: CHRONIC PAIN

## 2022-09-03 ASSESSMENT — PAIN DESCRIPTION - FREQUENCY
FREQUENCY: CONTINUOUS

## 2022-09-03 ASSESSMENT — PAIN DESCRIPTION - ORIENTATION
ORIENTATION: RIGHT;LEFT
ORIENTATION: OUTER
ORIENTATION: LEFT;RIGHT
ORIENTATION: RIGHT;LEFT

## 2022-09-03 ASSESSMENT — PAIN DESCRIPTION - LOCATION
LOCATION: KNEE
LOCATION: LEG
LOCATION: VAGINA
LOCATION: LEG

## 2022-09-03 ASSESSMENT — PAIN DESCRIPTION - ONSET
ONSET: ON-GOING

## 2022-09-03 ASSESSMENT — PAIN SCALES - GENERAL
PAINLEVEL_OUTOF10: 9
PAINLEVEL_OUTOF10: 8
PAINLEVEL_OUTOF10: 6
PAINLEVEL_OUTOF10: 7
PAINLEVEL_OUTOF10: 8
PAINLEVEL_OUTOF10: 9
PAINLEVEL_OUTOF10: 6
PAINLEVEL_OUTOF10: 8

## 2022-09-03 ASSESSMENT — PAIN DESCRIPTION - DESCRIPTORS
DESCRIPTORS: BURNING
DESCRIPTORS: ACHING
DESCRIPTORS: BURNING
DESCRIPTORS: ACHING;BURNING

## 2022-09-03 NOTE — PROGRESS NOTES
Hospital Medicine Progress Note     Date:  9/3/2022    PCP: Emelyn James MD (Tel: [phone])    Date of Admission: 9/2/2022      Subjective  Pt states she has been having pain in B/L LEs but feels slightly better, has some chronic left knee pain. Objective  Physical exam:  Vitals: /68   Pulse 78   Temp 98.5 °F (36.9 °C) (Oral)   Resp 16   Ht 5' 1\" (1.549 m)   Wt 181 lb 9.6 oz (82.4 kg)   SpO2 94%   BMI 34.31 kg/m²   Gen: Not in distress. Alert. Head: Normocephalic. Atraumatic. Eyes: EOMI. Good acuity. ENT: Oral mucosa moist  Neck: No JVD. No obvious thyromegaly. CVS: Nml S1S2, no MRG, RRR  Pulmomary: Clear bilaterally. No crackles. No wheezes. Gastrointestinal: Soft, NT/ND. Positive bowel sounds. Musculoskeletal: B/l LE edema  Neuro: No focal deficit. Moves extremity spontaneously. Psychiatry: Appropriate affect. Not agitated. Skin: erythema to B/L LE up to mid calf, warmth, TTP      24HR INTAKE/OUTPUT:    Intake/Output Summary (Last 24 hours) at 9/3/2022 1722  Last data filed at 9/2/2022 1743  Gross per 24 hour   Intake 54.66 ml   Output --   Net 54.66 ml     I/O last 3 completed shifts: In: 54.7 [IV Piggyback:54.7]  Out: -   No intake/output data recorded.       Meds:    aspirin  81 mg Oral Daily    atorvastatin  10 mg Oral Daily    buPROPion  150 mg Oral Daily    vilazodone HCl  20 mg Oral Daily    furosemide  40 mg IntraVENous BID    ceFAZolin  2,000 mg IntraVENous Q8H    sodium chloride flush  5-40 mL IntraVENous 2 times per day    enoxaparin  40 mg SubCUTAneous Daily       Infusions:    sodium chloride 25 mL (09/03/22 0004)         PRN Meds: sodium chloride flush, sodium chloride, ondansetron **OR** ondansetron, polyethylene glycol, acetaminophen **OR** acetaminophen, perflutren lipid microspheres    Labs/imaging:  CBC:   Recent Labs     09/02/22  1224 09/03/22  0518   WBC 5.3 4.2   HGB 11.6* 10.1*    116*         BMP:    Recent Labs     09/02/22  1228 09/03/22  0518    139   K 4.6 3.6   CL 99 100   CO2 32 30   BUN 27* 24*   CREATININE 0.8 0.9   GLUCOSE 97 96         Hepatic:   Recent Labs     09/02/22  1224   AST 20   ALT 15   BILITOT <0.2   ALKPHOS 119       Troponin:   Recent Labs     09/02/22  1224   TROPONINI <0.01         BNP: No results for input(s): BNP in the last 72 hours. INR: No results for input(s): INR in the last 72 hours. Reviewed imaging and reports noted      Assessment:  Principal Problem:    Cellulitis  Resolved Problems:    * No resolved hospital problems. *        Plan:  Bilateral lower extremity cellulitis  - improving  - B/L LE venous dopplers neg for DVT  - recommend wrapping with ace for compression  - cont ancef      Bilateral lower extremity edema 2/2 Acute on chronic dCHF  - ECHO revealed EF 55-60% and DD1  - cont Iv lasix 40mg BID      Anxiety and depression  - cont Viibryd and Wellbutrin      Mixed HLD  - cont statin        Diet: ADULT DIET;  Regular    Activity: up with assist  Prophylaxis: lovenox    Code status: Full Code     ----------        Leydi Kaiser MD  -------------------------------  Rounding hospitalist

## 2022-09-03 NOTE — ED NOTES
Pt transported to floor via bed by transport.       [facility], RN  09/02/22 2018

## 2022-09-03 NOTE — PLAN OF CARE
Problem: Discharge Planning  Goal: Discharge to home or other facility with appropriate resources  9/3/2022 1915 by Tip Mayo RN  Outcome: Progressing  9/3/2022 0942 by Estrella Foley RN  Outcome: Progressing     Problem: Pain  Goal: Verbalizes/displays adequate comfort level or baseline comfort level  9/3/2022 1915 by Tip Mayo RN  Outcome: Progressing  9/3/2022 0942 by Estrella Foley RN  Outcome: Progressing     Problem: Skin/Tissue Integrity  Goal: Absence of new skin breakdown  Description: 1. Monitor for areas of redness and/or skin breakdown  2. Assess vascular access sites hourly  3. Every 4-6 hours minimum:  Change oxygen saturation probe site  4. Every 4-6 hours:  If on nasal continuous positive airway pressure, respiratory therapy assess nares and determine need for appliance change or resting period.   9/3/2022 1915 by Tip Mayo RN  Outcome: Progressing  9/3/2022 0942 by Estrella Foley RN  Outcome: Progressing     Problem: Safety - Adult  Goal: Free from fall injury  9/3/2022 1915 by Tip Mayo RN  Outcome: Progressing  9/3/2022 0942 by Estrella Foley RN  Outcome: Progressing     Problem: ABCDS Injury Assessment  Goal: Absence of physical injury  9/3/2022 1915 by Tip Mayo RN  Outcome: Progressing  Flowsheets (Taken 9/3/2022 0943 by Estrella Foley RN)  Absence of Physical Injury: Implement safety measures based on patient assessment  9/3/2022 0942 by Estrella Foley RN  Outcome: Progressing

## 2022-09-03 NOTE — PROGRESS NOTES
Assessment on pt admitted for bilateral leg swelling. +1 pitting edema BLE. Pt denies pain in legs at this time. Pt is complaining of vaginal burning since purwick placement tonight which was removed. No redness or rash evident at this time. Call light within reach. The care plan and education has been reviewed and mutually agreed upon with the patient.    Electronically signed by Matt Krishnan RN on 9/3/2022 at 1:09 AM

## 2022-09-03 NOTE — CARE COORDINATION
Case Management Assessment  Initial Evaluation    Date/Time of Evaluation: 9/3/2022 3:53 PM  Assessment Completed by: SHAWNA Foster, TICOW    If patient is discharged prior to next notation, then this note serves as note for discharge by case management. Patient Name: Quentin Zaidi                   YOB: 1940  Diagnosis: Cellulitis [L03.90]  Peripheral edema [R60.9]  Redness and swelling of lower leg [M79.89, R23.8]                   Date / Time: 9/2/2022 11:28 AM    Patient Admission Status: Inpatient     Current PCP: Russ Victoria MD  PCP verified by CM? Yes    Chart Reviewed: Yes      Patient Interviewed: Yes   Family Interviewed:  No   Patient Orientation: Alert and Oriented    Patient Cognition: Alert  History Provided by: Patient    Hospitalization in the last 30 days (Readmission):  No    If yes, Readmission Assessment in  Navigator will be completed. Advance Directives:     Code Status: Full Code     Primary Decision Maker: [address] [place] [address] [phone]    Secondary Decision Maker: [address] [place] [address] [phone]    Discharge Planning  Patient lives with: Spouse/Significant Other Type of Home: House Patient Support Systems include: Children, Spouse/Significant Other (Dtr reported concerns about spouse helping \"all he could. \"  Stated spouse has unrealistic expectations about pt's functioning and is not very open to necessary DME and needs.)   Current Financial resources:    Current community resources:    Current services prior to admission: None   Type of Home Care services:  Nursing Services    ADLS  Prior functional level: Bathing, Mobility (Using a walker at all times and sponge bathing)  Current functional level:  (Increased weakness d/t cellulitis in legs)    PT AM-PAC:   /24  OT AM-PAC:   /24    Family can provide assistance at DC:  Yes  Would you like Case Management to discuss the discharge plan with any other family members/significant others, and if so, who? Yes (Spouse and children)  Plans to Return to Present Housing: Yes  Other Identified Issues/Barriers to RETURNING to current housing: None  Potential Assistance needed at discharge: 1 Shaila Sanchez  Patient expects to discharge to: [address] [place] [address] for transportation at discharge: Family    Financial  Payor: Traci Rin / Plan: [address] HMO / Product Type: Medicare /     Does insurance require precert for SNF: Yes    Potential assistance Purchasing Medications: No  Meds-to-Beds request:        [id number], [address] [phone] [facility] [phone]  [place] [address]  Phone: [phone] Fax: [phone]      Factors facilitating achievement of predicted outcomes: Family support, Motivated, and Cooperative    Barriers to discharge: None. Additional Case Management Notes:  Possible HHC need but unsure yet. Pt's dtr says patient could benefit from a stair lift to get upstairs where she can take a shower and sleep in her bed. Pt will discuss this with  and get back to . Pt will also talk to spouse about a hospital bed. Pt unable to get upstairs to her bed and has been sleeping in a recliner.       The Plan for Transition of Care is related to the following treatment goals of Cellulitis [L03.90]  Peripheral edema [R60.9]  Redness and swelling of lower leg [M79.89, R23.8]      Paulino Cottrell, MSW, LSW  Case Management Department

## 2022-09-03 NOTE — PLAN OF CARE
Problem: Discharge Planning  Goal: Discharge to home or other facility with appropriate resources  9/3/2022 0942 by Estrella Foley RN  Outcome: Progressing  9/3/2022 0106 by Santosh Zendejas RN  Outcome: Progressing     Problem: Pain  Goal: Verbalizes/displays adequate comfort level or baseline comfort level  9/3/2022 0942 by Estrella Foley RN  Outcome: Progressing  9/3/2022 0106 by Santosh Zendejas RN  Outcome: Progressing     Problem: Skin/Tissue Integrity  Goal: Absence of new skin breakdown  Description: 1. Monitor for areas of redness and/or skin breakdown  2. Assess vascular access sites hourly  3. Every 4-6 hours minimum:  Change oxygen saturation probe site  4. Every 4-6 hours:  If on nasal continuous positive airway pressure, respiratory therapy assess nares and determine need for appliance change or resting period.   9/3/2022 0942 by Estrella Foley RN  Outcome: Progressing  9/3/2022 0106 by Santosh Zendejas RN  Outcome: Progressing     Problem: Safety - Adult  Goal: Free from fall injury  9/3/2022 0942 by Estrella Foley RN  Outcome: Progressing  9/3/2022 0106 by Santosh Zendejas RN  Outcome: Progressing     Problem: ABCDS Injury Assessment  Goal: Absence of physical injury  9/3/2022 0942 by Estrella Foley RN  Outcome: Progressing  9/3/2022 0106 by Santosh Zendejas RN  Outcome: Progressing

## 2022-09-04 LAB
ANION GAP SERPL CALCULATED.3IONS-SCNC: 7 MMOL/L (ref 3–16)
BUN BLDV-MCNC: 22 MG/DL (ref 7–20)
CALCIUM SERPL-MCNC: 8.5 MG/DL (ref 8.3–10.6)
CHLORIDE BLD-SCNC: 97 MMOL/L (ref 99–110)
CO2: 32 MMOL/L (ref 21–32)
CREAT SERPL-MCNC: 0.7 MG/DL (ref 0.6–1.2)
GFR AFRICAN AMERICAN: >60
GFR NON-AFRICAN AMERICAN: >60
GLUCOSE BLD-MCNC: 100 MG/DL (ref 70–99)
HCT VFR BLD CALC: 33.5 % (ref 36–48)
HEMOGLOBIN: 11 G/DL (ref 12–16)
MCH RBC QN AUTO: 28.9 PG (ref 26–34)
MCHC RBC AUTO-ENTMCNC: 32.9 G/DL (ref 31–36)
MCV RBC AUTO: 87.6 FL (ref 80–100)
PDW BLD-RTO: 14.2 % (ref 12.4–15.4)
PLATELET # BLD: 117 K/UL (ref 135–450)
PMV BLD AUTO: 7.7 FL (ref 5–10.5)
POTASSIUM SERPL-SCNC: 3.6 MMOL/L (ref 3.5–5.1)
RBC # BLD: 3.82 M/UL (ref 4–5.2)
SODIUM BLD-SCNC: 136 MMOL/L (ref 136–145)
URIC ACID, SERUM: 5.2 MG/DL (ref 2.6–6)
WBC # BLD: 4.6 K/UL (ref 4–11)

## 2022-09-04 PROCEDURE — 80048 BASIC METABOLIC PNL TOTAL CA: CPT

## 2022-09-04 PROCEDURE — 84550 ASSAY OF BLOOD/URIC ACID: CPT

## 2022-09-04 PROCEDURE — 2580000003 HC RX 258: Performed by: HOSPITALIST

## 2022-09-04 PROCEDURE — 6360000002 HC RX W HCPCS: Performed by: HOSPITALIST

## 2022-09-04 PROCEDURE — 6370000000 HC RX 637 (ALT 250 FOR IP): Performed by: FAMILY MEDICINE

## 2022-09-04 PROCEDURE — 1200000000 HC SEMI PRIVATE

## 2022-09-04 PROCEDURE — 6370000000 HC RX 637 (ALT 250 FOR IP): Performed by: NURSE PRACTITIONER

## 2022-09-04 PROCEDURE — 85027 COMPLETE CBC AUTOMATED: CPT

## 2022-09-04 PROCEDURE — 6370000000 HC RX 637 (ALT 250 FOR IP): Performed by: HOSPITALIST

## 2022-09-04 PROCEDURE — 36415 COLL VENOUS BLD VENIPUNCTURE: CPT

## 2022-09-04 RX ORDER — BUSPIRONE HYDROCHLORIDE 5 MG/1
10 TABLET ORAL EVERY 6 HOURS PRN
Status: DISCONTINUED | OUTPATIENT
Start: 2022-09-04 | End: 2022-09-14 | Stop reason: HOSPADM

## 2022-09-04 RX ORDER — HYDROCODONE BITARTRATE AND ACETAMINOPHEN 5; 325 MG/1; MG/1
1 TABLET ORAL EVERY 6 HOURS PRN
Status: DISCONTINUED | OUTPATIENT
Start: 2022-09-04 | End: 2022-09-14 | Stop reason: HOSPADM

## 2022-09-04 RX ORDER — CALCIUM CARBONATE 200(500)MG
500 TABLET,CHEWABLE ORAL 3 TIMES DAILY PRN
Status: DISCONTINUED | OUTPATIENT
Start: 2022-09-04 | End: 2022-09-14 | Stop reason: HOSPADM

## 2022-09-04 RX ADMIN — BUSPIRONE HYDROCHLORIDE 10 MG: 5 TABLET ORAL at 10:58

## 2022-09-04 RX ADMIN — CEFAZOLIN 2000 MG: 2 INJECTION, POWDER, FOR SOLUTION INTRAMUSCULAR; INTRAVENOUS at 08:06

## 2022-09-04 RX ADMIN — FUROSEMIDE 40 MG: 10 INJECTION, SOLUTION INTRAMUSCULAR; INTRAVENOUS at 08:05

## 2022-09-04 RX ADMIN — ACETAMINOPHEN 650 MG: 325 TABLET ORAL at 05:11

## 2022-09-04 RX ADMIN — DICLOFENAC SODIUM 4 G: 10 GEL TOPICAL at 20:06

## 2022-09-04 RX ADMIN — CEFAZOLIN 2000 MG: 2 INJECTION, POWDER, FOR SOLUTION INTRAMUSCULAR; INTRAVENOUS at 16:43

## 2022-09-04 RX ADMIN — FUROSEMIDE 40 MG: 10 INJECTION, SOLUTION INTRAMUSCULAR; INTRAVENOUS at 16:42

## 2022-09-04 RX ADMIN — Medication 10 ML: at 08:08

## 2022-09-04 RX ADMIN — Medication 10 ML: at 20:06

## 2022-09-04 RX ADMIN — SODIUM CHLORIDE: 9 INJECTION, SOLUTION INTRAVENOUS at 00:52

## 2022-09-04 RX ADMIN — VILAZODONE HYDROCHLORIDE 20 MG: 20 TABLET ORAL at 08:04

## 2022-09-04 RX ADMIN — ANTACID TABLETS 500 MG: 500 TABLET, CHEWABLE ORAL at 10:58

## 2022-09-04 RX ADMIN — HYDROCODONE BITARTRATE AND ACETAMINOPHEN 1 TABLET: 5; 325 TABLET ORAL at 20:04

## 2022-09-04 RX ADMIN — BUPROPION HYDROCHLORIDE 150 MG: 150 TABLET, EXTENDED RELEASE ORAL at 08:04

## 2022-09-04 RX ADMIN — ASPIRIN 81 MG: 81 TABLET, COATED ORAL at 08:04

## 2022-09-04 RX ADMIN — BUSPIRONE HYDROCHLORIDE 10 MG: 5 TABLET ORAL at 20:04

## 2022-09-04 RX ADMIN — HYDROCODONE BITARTRATE AND ACETAMINOPHEN 1 TABLET: 5; 325 TABLET ORAL at 10:58

## 2022-09-04 RX ADMIN — ENOXAPARIN SODIUM 40 MG: 100 INJECTION SUBCUTANEOUS at 08:04

## 2022-09-04 RX ADMIN — DICLOFENAC SODIUM 4 G: 10 GEL TOPICAL at 14:49

## 2022-09-04 RX ADMIN — STANDARDIZED SENNA CONCENTRATE AND DOCUSATE SODIUM 2 TABLET: 8.6; 5 TABLET ORAL at 20:04

## 2022-09-04 RX ADMIN — CEFAZOLIN 2000 MG: 2 INJECTION, POWDER, FOR SOLUTION INTRAMUSCULAR; INTRAVENOUS at 00:54

## 2022-09-04 RX ADMIN — Medication 1 CAPSULE: at 16:42

## 2022-09-04 RX ADMIN — DICLOFENAC SODIUM 4 G: 10 GEL TOPICAL at 08:08

## 2022-09-04 RX ADMIN — ATORVASTATIN CALCIUM 10 MG: 10 TABLET, FILM COATED ORAL at 08:04

## 2022-09-04 RX ADMIN — Medication 1 CAPSULE: at 08:04

## 2022-09-04 ASSESSMENT — PAIN DESCRIPTION - FREQUENCY
FREQUENCY: CONTINUOUS

## 2022-09-04 ASSESSMENT — PAIN DESCRIPTION - PAIN TYPE
TYPE: ACUTE PAIN;CHRONIC PAIN

## 2022-09-04 ASSESSMENT — PAIN DESCRIPTION - ORIENTATION
ORIENTATION: RIGHT;LEFT

## 2022-09-04 ASSESSMENT — PAIN DESCRIPTION - ONSET
ONSET: ON-GOING

## 2022-09-04 ASSESSMENT — PAIN DESCRIPTION - DESCRIPTORS
DESCRIPTORS: BURNING
DESCRIPTORS: BURNING
DESCRIPTORS: BURNING;SORE;ACHING

## 2022-09-04 ASSESSMENT — PAIN DESCRIPTION - LOCATION
LOCATION: LEG

## 2022-09-04 ASSESSMENT — PAIN SCALES - GENERAL
PAINLEVEL_OUTOF10: 8
PAINLEVEL_OUTOF10: 9
PAINLEVEL_OUTOF10: 6
PAINLEVEL_OUTOF10: 7

## 2022-09-04 NOTE — PROGRESS NOTES
[facility] PROGRESS NOTE    9/4/2022 7:21 AM        Name: Kalani Ricks . Admitted: 9/2/2022  Primary Care Provider: Casimiro Modi MD (Tel: [phone])      Subjective: Sherryle Moder Pt seen this am   She was anxious and reported generalized pain over her knees and lower legs. States \"I have bone on bone in my knees)   She is tearful at intervals. Admitted with lower leg cellulitis.   Has scratch marks on lower legs     Reviewed interval ancillary notes    Current Medications  diclofenac sodium (VOLTAREN) 1 % gel 4 g, TID  lactobacillus (CULTURELLE) capsule 1 capsule, BID WC  sennosides-docusate sodium (SENOKOT-S) 8.6-50 MG tablet 2 tablet, Nightly  aspirin EC tablet 81 mg, Daily  atorvastatin (LIPITOR) tablet 10 mg, Daily  buPROPion (WELLBUTRIN XL) extended release tablet 150 mg, Daily  vilazodone HCl (VIIBRYD) TABS 20 mg, Daily  furosemide (LASIX) injection 40 mg, BID  ceFAZolin (ANCEF) 2,000 mg in dextrose 5 % 50 mL IVPB (mini-bag), Q8H  sodium chloride flush 0.9 % injection 5-40 mL, 2 times per day  sodium chloride flush 0.9 % injection 5-40 mL, PRN  0.9 % sodium chloride infusion, PRN  enoxaparin (LOVENOX) injection 40 mg, Daily  ondansetron (ZOFRAN-ODT) disintegrating tablet 4 mg, Q8H PRN   Or  ondansetron (ZOFRAN) injection 4 mg, Q6H PRN  polyethylene glycol (GLYCOLAX) packet 17 g, Daily PRN  acetaminophen (TYLENOL) tablet 650 mg, Q6H PRN   Or  acetaminophen (TYLENOL) suppository 650 mg, Q6H PRN  perflutren lipid microspheres (DEFINITY) injection 1.65 mg, ONCE PRN        Objective:  BP (!) 149/74   Pulse 73   Temp 97.7 °F (36.5 °C) (Oral)   Resp 18   Ht 5' 1\" (1.549 m)   Wt 181 lb 9.6 oz (82.4 kg)   SpO2 95%   BMI 34.31 kg/m²     Intake/Output Summary (Last 24 hours) at 9/4/2022 0721  Last data filed at 9/4/2022 0644  Gross per 24 hour   Intake 224.42 ml   Output 2500 ml   Net -2275.58 ml      Wt Readings from Last 3 Encounters:   09/03/22 181 lb 9.6 oz (82.4 kg)   09/02/22 181 lb 12.8 oz (82.5 kg)   08/16/22 150 lb (68 kg)       General appearance:  Appears anxious. Alert and oriented   Eyes: Sclera clear. Pupils equal.  ENT: Moist oral mucosa. Trachea midline, no adenopathy. Cardiovascular: Regular rhythm, normal S1, S2. No murmur. Trace edema in lower extremities  Respiratory: Not using accessory muscles. Good inspiratory effort. Clear to auscultation bilaterally, no wheeze or crackles. GI: Abdomen soft, no tenderness, not distended, normal bowel sounds  Musculoskeletal: No cyanosis in digits, neck supple  Neurology: CN 2-12 grossly intact. No speech or motor deficits  Psych: anxious  affect. Alert and oriented in time, place and person  Skin: Warm, dry, normal turgor, mild redness noted over the bilat ankle region. Scratch marks are noted bilaterally     Labs and Tests:  CBC:   Recent Labs     09/02/22  1224 09/03/22  0518 09/04/22  0501   WBC 5.3 4.2 4.6   HGB 11.6* 10.1* 11.0*    116* 117*     BMP:    Recent Labs     09/02/22  1224 09/03/22  0518 09/04/22  0501    139 136   K 4.6 3.6 3.6   CL 99 100 97*   CO2 32 30 32   BUN 27* 24* 22*   CREATININE 0.8 0.9 0.7   GLUCOSE 97 96 100*     Hepatic:   Recent Labs     09/02/22  1224   AST 20   ALT 15   BILITOT <0.2   ALKPHOS 119     Summary   Mild septal left ventricular hypertrophy. Normal left ventricle size and   systolic function with an estimated ejection fraction of 55-60%. No regional   wall motion abnormalities are seen. Grade I diastolic dysfunction with normal LV filling pressures. Left sided pleural effusion noted. Problem List  Principal Problem:    Cellulitis  Active Problems:    Hyperlipidemia    Current mild episode of major depressive disorder without prior episode ([address]  Resolved Problems:    * No resolved hospital problems.  *       Assessment & Plan:   Cellulitis:  appears to be improved on Ancef,  continue with ace wraps  Lower leg edema:  improved with ACE and IV lasix  Diastolic Heart Failure: on lasix   HLD: on statin therapy  Anxiety/ depression: continue home therapy,  I also added prn buspar. Anticipate d/c home in the am... hopefully therapy can evaluate her today       Diet: ADULT DIET;  Regular  Code:Full Code  DVT PPX      CIERRA Broderick - CNP   9/4/2022 7:21 AM

## 2022-09-04 NOTE — PLAN OF CARE
Problem: Discharge Planning  Goal: Discharge to home or other facility with appropriate resources  9/4/2022 0856 by Sury Matias RN  Outcome: Progressing  9/3/2022 1915 by Sanya Metz RN  Outcome: Progressing     Problem: Pain  Goal: Verbalizes/displays adequate comfort level or baseline comfort level  9/4/2022 0856 by Sury Matias RN  Outcome: Progressing  9/3/2022 1915 by Sanya Metz RN  Outcome: Progressing     Problem: Skin/Tissue Integrity  Goal: Absence of new skin breakdown  Description: 1. Monitor for areas of redness and/or skin breakdown  2. Assess vascular access sites hourly  3. Every 4-6 hours minimum:  Change oxygen saturation probe site  4. Every 4-6 hours:  If on nasal continuous positive airway pressure, respiratory therapy assess nares and determine need for appliance change or resting period.   9/4/2022 0856 by Sury Matias RN  Outcome: Progressing  9/3/2022 1915 by Sanya Metz RN  Outcome: Progressing     Problem: Safety - Adult  Goal: Free from fall injury  9/4/2022 0856 by Sury Matias RN  Outcome: Progressing  9/3/2022 1915 by Sanya Metz RN  Outcome: Progressing     Problem: ABCDS Injury Assessment  Goal: Absence of physical injury  9/4/2022 0856 by Sury Matias RN  Outcome: Progressing  9/3/2022 1915 by Sanya Metz RN  Outcome: Progressing  Flowsheets (Taken 9/3/2022 0943 by Sury Matias RN)  Absence of Physical Injury: Implement safety measures based on patient assessment

## 2022-09-05 LAB
ANION GAP SERPL CALCULATED.3IONS-SCNC: 9 MMOL/L (ref 3–16)
BUN BLDV-MCNC: 30 MG/DL (ref 7–20)
CALCIUM SERPL-MCNC: 9 MG/DL (ref 8.3–10.6)
CHLORIDE BLD-SCNC: 95 MMOL/L (ref 99–110)
CO2: 33 MMOL/L (ref 21–32)
CREAT SERPL-MCNC: 0.8 MG/DL (ref 0.6–1.2)
GFR AFRICAN AMERICAN: >60
GFR NON-AFRICAN AMERICAN: >60
GLUCOSE BLD-MCNC: 106 MG/DL (ref 70–99)
HCT VFR BLD CALC: 35 % (ref 36–48)
HEMOGLOBIN: 11.5 G/DL (ref 12–16)
MCH RBC QN AUTO: 28.9 PG (ref 26–34)
MCHC RBC AUTO-ENTMCNC: 33 G/DL (ref 31–36)
MCV RBC AUTO: 87.6 FL (ref 80–100)
PDW BLD-RTO: 14.4 % (ref 12.4–15.4)
PLATELET # BLD: 124 K/UL (ref 135–450)
PMV BLD AUTO: 8 FL (ref 5–10.5)
POTASSIUM SERPL-SCNC: 3.5 MMOL/L (ref 3.5–5.1)
RBC # BLD: 3.99 M/UL (ref 4–5.2)
SODIUM BLD-SCNC: 137 MMOL/L (ref 136–145)
WBC # BLD: 6.2 K/UL (ref 4–11)

## 2022-09-05 PROCEDURE — 1200000000 HC SEMI PRIVATE

## 2022-09-05 PROCEDURE — 36415 COLL VENOUS BLD VENIPUNCTURE: CPT

## 2022-09-05 PROCEDURE — 2580000003 HC RX 258: Performed by: HOSPITALIST

## 2022-09-05 PROCEDURE — 80048 BASIC METABOLIC PNL TOTAL CA: CPT

## 2022-09-05 PROCEDURE — 85027 COMPLETE CBC AUTOMATED: CPT

## 2022-09-05 PROCEDURE — 94760 N-INVAS EAR/PLS OXIMETRY 1: CPT

## 2022-09-05 PROCEDURE — 6370000000 HC RX 637 (ALT 250 FOR IP): Performed by: HOSPITALIST

## 2022-09-05 PROCEDURE — 6370000000 HC RX 637 (ALT 250 FOR IP): Performed by: NURSE PRACTITIONER

## 2022-09-05 PROCEDURE — 6370000000 HC RX 637 (ALT 250 FOR IP): Performed by: FAMILY MEDICINE

## 2022-09-05 PROCEDURE — 6360000002 HC RX W HCPCS: Performed by: HOSPITALIST

## 2022-09-05 RX ORDER — GABAPENTIN 100 MG/1
100 CAPSULE ORAL 3 TIMES DAILY
Status: DISCONTINUED | OUTPATIENT
Start: 2022-09-05 | End: 2022-09-14 | Stop reason: HOSPADM

## 2022-09-05 RX ORDER — ATORVASTATIN CALCIUM 10 MG/1
10 TABLET, FILM COATED ORAL EVERY OTHER DAY
Status: DISCONTINUED | OUTPATIENT
Start: 2022-09-06 | End: 2022-09-10

## 2022-09-05 RX ADMIN — Medication 1 CAPSULE: at 17:14

## 2022-09-05 RX ADMIN — HYDROCODONE BITARTRATE AND ACETAMINOPHEN 1 TABLET: 5; 325 TABLET ORAL at 11:04

## 2022-09-05 RX ADMIN — DICLOFENAC SODIUM 4 G: 10 GEL TOPICAL at 09:08

## 2022-09-05 RX ADMIN — DICLOFENAC SODIUM 4 G: 10 GEL TOPICAL at 13:29

## 2022-09-05 RX ADMIN — GABAPENTIN 100 MG: 100 CAPSULE ORAL at 13:29

## 2022-09-05 RX ADMIN — Medication 10 ML: at 09:07

## 2022-09-05 RX ADMIN — ASPIRIN 81 MG: 81 TABLET, COATED ORAL at 09:07

## 2022-09-05 RX ADMIN — FUROSEMIDE 40 MG: 10 INJECTION, SOLUTION INTRAMUSCULAR; INTRAVENOUS at 09:07

## 2022-09-05 RX ADMIN — VILAZODONE HYDROCHLORIDE 20 MG: 20 TABLET ORAL at 09:07

## 2022-09-05 RX ADMIN — GABAPENTIN 100 MG: 100 CAPSULE ORAL at 21:03

## 2022-09-05 RX ADMIN — BUPROPION HYDROCHLORIDE 150 MG: 150 TABLET, EXTENDED RELEASE ORAL at 09:07

## 2022-09-05 RX ADMIN — DICLOFENAC SODIUM 4 G: 10 GEL TOPICAL at 21:03

## 2022-09-05 RX ADMIN — BUSPIRONE HYDROCHLORIDE 10 MG: 5 TABLET ORAL at 09:07

## 2022-09-05 RX ADMIN — HYDROCODONE BITARTRATE AND ACETAMINOPHEN 1 TABLET: 5; 325 TABLET ORAL at 17:14

## 2022-09-05 RX ADMIN — FUROSEMIDE 40 MG: 10 INJECTION, SOLUTION INTRAMUSCULAR; INTRAVENOUS at 17:14

## 2022-09-05 RX ADMIN — Medication 10 ML: at 21:12

## 2022-09-05 RX ADMIN — Medication 1 CAPSULE: at 09:07

## 2022-09-05 RX ADMIN — HYDROCODONE BITARTRATE AND ACETAMINOPHEN 1 TABLET: 5; 325 TABLET ORAL at 04:45

## 2022-09-05 RX ADMIN — ENOXAPARIN SODIUM 40 MG: 100 INJECTION SUBCUTANEOUS at 09:07

## 2022-09-05 RX ADMIN — STANDARDIZED SENNA CONCENTRATE AND DOCUSATE SODIUM 2 TABLET: 8.6; 5 TABLET ORAL at 21:03

## 2022-09-05 RX ADMIN — CEFAZOLIN 2000 MG: 2 INJECTION, POWDER, FOR SOLUTION INTRAMUSCULAR; INTRAVENOUS at 09:17

## 2022-09-05 RX ADMIN — CEFAZOLIN 2000 MG: 2 INJECTION, POWDER, FOR SOLUTION INTRAMUSCULAR; INTRAVENOUS at 01:23

## 2022-09-05 RX ADMIN — CEFAZOLIN 2000 MG: 2 INJECTION, POWDER, FOR SOLUTION INTRAMUSCULAR; INTRAVENOUS at 17:14

## 2022-09-05 ASSESSMENT — PAIN SCALES - GENERAL
PAINLEVEL_OUTOF10: 10

## 2022-09-05 ASSESSMENT — PAIN DESCRIPTION - DESCRIPTORS: DESCRIPTORS: SORE;ACHING;BURNING

## 2022-09-05 ASSESSMENT — PAIN DESCRIPTION - LOCATION
LOCATION: LEG
LOCATION: LEG

## 2022-09-05 ASSESSMENT — PAIN DESCRIPTION - FREQUENCY: FREQUENCY: CONTINUOUS

## 2022-09-05 ASSESSMENT — PAIN DESCRIPTION - ORIENTATION: ORIENTATION: RIGHT;LEFT

## 2022-09-05 ASSESSMENT — PAIN DESCRIPTION - PAIN TYPE: TYPE: ACUTE PAIN;CHRONIC PAIN

## 2022-09-05 ASSESSMENT — PAIN DESCRIPTION - ONSET: ONSET: ON-GOING

## 2022-09-05 NOTE — PROGRESS NOTES
[facility]    9/6/2022 9:43 AM        Name: Christian Sloan . Admitted: 9/2/2022  Primary Care Provider: Micki Fernandez MD (Tel: [phone])      Brief History: Presented with swelling and redness BLE. Failed outpatient management with Lasix. Admitted with cellulitis BLE. Subjective:  Presently up in bedside chair, no family members present. Reports she is not feeling good today. Main complaint remains pain in legs. Says swelling and redness have improved but pain has not. No noticeable improvement with gabapentin. Denies chest pain, shortness of breath, abdominal pain, nausea. Last BM was yesterday.      Reviewed interval ancillary notes    Current Medications  atorvastatin (LIPITOR) tablet 10 mg, Every Other Day  gabapentin (NEURONTIN) capsule 100 mg, TID  busPIRone (BUSPAR) tablet 10 mg, Q6H PRN  HYDROcodone-acetaminophen (NORCO) 5-325 MG per tablet 1 tablet, Q6H PRN  calcium carbonate (TUMS) chewable tablet 500 mg, TID PRN  diclofenac sodium (VOLTAREN) 1 % gel 4 g, TID  lactobacillus (CULTURELLE) capsule 1 capsule, BID WC  sennosides-docusate sodium (SENOKOT-S) 8.6-50 MG tablet 2 tablet, Nightly  aspirin EC tablet 81 mg, Daily  buPROPion (WELLBUTRIN XL) extended release tablet 150 mg, Daily  vilazodone HCl (VIIBRYD) TABS 20 mg, Daily  furosemide (LASIX) injection 40 mg, BID  ceFAZolin (ANCEF) 2,000 mg in dextrose 5 % 50 mL IVPB (mini-bag), Q8H  sodium chloride flush 0.9 % injection 5-40 mL, 2 times per day  sodium chloride flush 0.9 % injection 5-40 mL, PRN  0.9 % sodium chloride infusion, PRN  enoxaparin (LOVENOX) injection 40 mg, Daily  ondansetron (ZOFRAN-ODT) disintegrating tablet 4 mg, Q8H PRN   Or  ondansetron (ZOFRAN) injection 4 mg, Q6H PRN  polyethylene glycol (GLYCOLAX) packet 17 g, Daily PRN  acetaminophen (TYLENOL) tablet 650 mg, Q6H PRN   Or  acetaminophen (TYLENOL) suppository 650 mg, Q6H PRN  perflutren lipid microspheres (DEFINITY) injection 1.65 mg, ONCE PRN      Objective:  /66   Pulse 95   Temp 98.1 °F (36.7 °C) (Oral)   Resp 16   Ht 5' 1\" (1.549 m)   Wt 160 lb 12.8 oz (72.9 kg)   SpO2 92%   BMI 30.38 kg/m²     Intake/Output Summary (Last 24 hours) at 9/6/2022 0943  Last data filed at 9/6/2022 0849  Gross per 24 hour   Intake 960 ml   Output 1750 ml   Net -790 ml      Wt Readings from Last 3 Encounters:   09/06/22 160 lb 12.8 oz (72.9 kg)   09/02/22 181 lb 12.8 oz (82.5 kg)   08/16/22 150 lb (68 kg)       General appearance:  Appears comfortable  Eyes: Sclera clear. Pupils equal.  ENT: Moist oral mucosa. Trachea midline, no adenopathy. Cardiovascular: Regular rhythm, normal S1, S2. No murmur. Trace edema in lower extremities  Respiratory: Not using accessory muscles. Good inspiratory effort. Clear to auscultation bilaterally, no wheeze or crackles. GI: Abdomen soft, no tenderness, not distended, normal bowel sounds  Musculoskeletal: No cyanosis in digits, neck supple  Neurology: CN 2-12 grossly intact. No speech or motor deficits  Psych: Normal affect. Alert and oriented in time, place and person  Skin: Warm, dry, normal turgor, chronic discoloration BLE, mild redness. Labs and Tests:  CBC:   Recent Labs     09/04/22  0501 09/05/22  0438   WBC 4.6 6.2   HGB 11.0* 11.5*   * 124*     BMP:    Recent Labs     09/04/22  0501 09/05/22  0438    137   K 3.6 3.5   CL 97* 95*   CO2 32 33*   BUN 22* 30*   CREATININE 0.7 0.8   GLUCOSE 100* 106*     Hepatic: No results for input(s): AST, ALT, ALB, BILITOT, ALKPHOS in the last 72 hours. Venous Doppler 9/2/2022:  Right   Limited study due to pt's severe swelling and pain. No evidence of deep vein or superficial vein thrombosis involving the right   lower extremity. Right inguinal lymph node measuring 1.3 x 0.6 x 1.0 cm. Calf veins were poorly visualized on the right.    Left   Limited study due to pt's severe swelling and pain. No evidence of deep vein or superficial vein thrombosis involving the left   lower extremity. Left inguinal lymph node measuring 1.9 x 0.7 x 1.4 cm. Calf veins were poorly visualized on the left. Left medial fossa: Cystic structure measuring 2.3 x 0.7 x 2.0 cm. Echo 9/2/2022:  Summary   Mild septal left ventricular hypertrophy. Normal left ventricle size and systolic function with an estimated ejection fraction of 55-60%. No regional wall motion abnormalities are seen. Grade I diastolic dysfunction with normal LV filling pressures. Left sided pleural effusion noted. Problem List  Principal Problem:    Cellulitis  Active Problems:    Hyperlipidemia    Current mild episode of major depressive disorder without prior episode ([address]  Resolved Problems:    * No resolved hospital problems. *       Assessment & Plan:   Cellulitis BLE. Venous doppler negative for DT. Started on Ancef with improvement. Anticipate transition to keflex or augmentin at d/c. Continue ACE wraps and leg elevation when up. Lower leg edema. Improved with ACE and IV lasix, weight down to 160, reported to be 181 on admission. DC IV Lasix as BUN trending up and sodium down to 131. Anticipate po Lasix 20 mg daily on DC. BMP in am.    Uncontrolled pain BLE. Patient describes burning sensation in the BLE, has been started on gabapentin. Will start gabapentin. Chronic diastolic CHF. EF 55-60%. Appears stable. ProBNP only 248 on admission. Anticipate Lasix on DC. Anxiety / depression. Continue bupropion and vilazodone. Disposition: PT/OT consults pending. Diet: ADULT DIET; Regular; No Added Salt (3-4 gm)  Code:Full Code  DVT PPX: enoxaparin      CIERRA Roberts CNP   9/6/2022 9:44 AM      Addendum: 9/6/2022 @ 3:15 PM received Perfect Serve from nursing that patient spiked temp to 102. Check CXR, repeat UA, blood cultures x 2. Consult ID for assistance.    JADA Bran

## 2022-09-05 NOTE — PROGRESS NOTES
[facility] PROGRESS NOTE    9/5/2022 1:23 PM        Name: Bridgette Selby . Admitted: 9/2/2022  Primary Care Provider: Radha Nicholson MD (Tel: [phone])      Subjective: Anita Guzman Pt seen this am with  at bedside  She was very anxious and crying out with bilat lower leg pain and \"burning\"   reports pt has chronic anxiety. Admitted with lower leg cellulitis.   Has scratch marks on lower legs   Redness is improved     Reviewed interval ancillary notes    Current Medications  [START ON 9/6/2022] atorvastatin (LIPITOR) tablet 10 mg, Every Other Day  gabapentin (NEURONTIN) capsule 100 mg, TID  busPIRone (BUSPAR) tablet 10 mg, Q6H PRN  HYDROcodone-acetaminophen (NORCO) 5-325 MG per tablet 1 tablet, Q6H PRN  calcium carbonate (TUMS) chewable tablet 500 mg, TID PRN  diclofenac sodium (VOLTAREN) 1 % gel 4 g, TID  lactobacillus (CULTURELLE) capsule 1 capsule, BID WC  sennosides-docusate sodium (SENOKOT-S) 8.6-50 MG tablet 2 tablet, Nightly  aspirin EC tablet 81 mg, Daily  buPROPion (WELLBUTRIN XL) extended release tablet 150 mg, Daily  vilazodone HCl (VIIBRYD) TABS 20 mg, Daily  furosemide (LASIX) injection 40 mg, BID  ceFAZolin (ANCEF) 2,000 mg in dextrose 5 % 50 mL IVPB (mini-bag), Q8H  sodium chloride flush 0.9 % injection 5-40 mL, 2 times per day  sodium chloride flush 0.9 % injection 5-40 mL, PRN  0.9 % sodium chloride infusion, PRN  enoxaparin (LOVENOX) injection 40 mg, Daily  ondansetron (ZOFRAN-ODT) disintegrating tablet 4 mg, Q8H PRN   Or  ondansetron (ZOFRAN) injection 4 mg, Q6H PRN  polyethylene glycol (GLYCOLAX) packet 17 g, Daily PRN  acetaminophen (TYLENOL) tablet 650 mg, Q6H PRN   Or  acetaminophen (TYLENOL) suppository 650 mg, Q6H PRN  perflutren lipid microspheres (DEFINITY) injection 1.65 mg, ONCE PRN      Objective:  BP (!) 145/76   Pulse 84   Temp 97.6 °F (36.4 °C) (Oral)   Resp 16   Ht 5' 1\" (1.549 m)   Wt 181 lb 9.6 oz (82.4 kg)   SpO2 95%   BMI 34.31 kg/m²     Intake/Output Summary (Last 24 hours) at 9/5/2022 1323  Last data filed at 9/5/2022 1105  Gross per 24 hour   Intake 360 ml   Output 2100 ml   Net -1740 ml        Wt Readings from Last 3 Encounters:   09/03/22 181 lb 9.6 oz (82.4 kg)   09/02/22 181 lb 12.8 oz (82.5 kg)   08/16/22 150 lb (68 kg)       General appearance:  Appears anxious. tearful at intervals   Alert and oriented   Eyes: Sclera clear. Pupils equal.  ENT: Moist oral mucosa. Trachea midline, no adenopathy. Cardiovascular: Regular rhythm, normal S1, S2. No murmur. Trace edema in lower extremities  Respiratory: Not using accessory muscles. Good inspiratory effort. Clear to auscultation bilaterally, no wheeze or crackles. GI: Abdomen soft, no tenderness, not distended, normal bowel sounds  Musculoskeletal: No cyanosis in digits, neck supple  Neurology: CN 2-12 grossly intact. No speech or motor deficits  Psych: anxious  affect. Alert and oriented in time, place and person  Skin: Warm, dry, normal turgor, mild redness noted over the bilat ankle region. Scratch marks are noted bilaterally     Labs and Tests:  CBC:   Recent Labs     09/03/22 0518 09/04/22  0501 09/05/22  0438   WBC 4.2 4.6 6.2   HGB 10.1* 11.0* 11.5*   * 117* 124*       BMP:    Recent Labs     09/03/22 0518 09/04/22  0501 09/05/22  0438    136 137   K 3.6 3.6 3.5    97* 95*   CO2 30 32 33*   BUN 24* 22* 30*   CREATININE 0.9 0.7 0.8   GLUCOSE 96 100* 106*       Hepatic:   No results for input(s): AST, ALT, ALB, BILITOT, ALKPHOS in the last 72 hours. Summary   Mild septal left ventricular hypertrophy. Normal left ventricle size and   systolic function with an estimated ejection fraction of 55-60%. No regional   wall motion abnormalities are seen. Grade I diastolic dysfunction with normal LV filling pressures. Left sided pleural effusion noted.     Problem List  Principal Problem: Cellulitis  Active Problems:    Hyperlipidemia    Current mild episode of major depressive disorder without prior episode ([address]  Resolved Problems:    * No resolved hospital problems. *       Assessment & Plan:   Cellulitis:  appears to be improved on Ancef,  continue with ace wraps, transition to keflex or augmentin at d/c   Lower leg edema:  improved with ACE and IV lasix,  weight down to 161. Would prescribe 20 - 40 mg of lasix po at d/c   Uncontrolled pain:  pt reports burning sensation in the legs. Will start gabapentin. Diastolic Heart Failure: on lasix   HLD: on statin therapy  Anxiety/ depression: continue home therapy,  I also added prn buspar. Anticipate d/c home in the am... hopefully therapy can evaluate her today in anticipation of d/c home in the am       Diet: ADULT DIET; Regular;  No Added Salt (3-4 gm)  Code:Full Code  DVT PPX      CIERRA Haskins CNP   9/5/2022 1:23 PM

## 2022-09-05 NOTE — PROGRESS NOTES
PM assessment completed. Pt assisted back to bed from recliner chair. Tolerated activity well. Positioned self for comfort. BLEs remain ACE wrapped, elevated on pillows at this time. External catheter remains in place. No further needs voiced. Fall precautions in place, hourly rounding, call light and belongings in reach, bed in lowest position, wheels locked in place, side rails up x 2, walkways free of clutter. Bed alarm on.

## 2022-09-06 ENCOUNTER — APPOINTMENT (OUTPATIENT)
Dept: GENERAL RADIOLOGY | Age: 82
DRG: 602 | End: 2022-09-06
Payer: MEDICARE

## 2022-09-06 PROBLEM — L03.115 BILATERAL LOWER LEG CELLULITIS: Status: ACTIVE | Noted: 2022-09-06

## 2022-09-06 PROBLEM — L03.116 BILATERAL LOWER LEG CELLULITIS: Status: ACTIVE | Noted: 2022-09-06

## 2022-09-06 PROBLEM — E66.09 CLASS 1 OBESITY DUE TO EXCESS CALORIES WITH BODY MASS INDEX (BMI) OF 30.0 TO 30.9 IN ADULT: Status: ACTIVE | Noted: 2022-09-06

## 2022-09-06 PROBLEM — Z86.59 HISTORY OF DEPRESSION: Status: ACTIVE | Noted: 2022-09-06

## 2022-09-06 PROBLEM — M79.89 REDNESS AND SWELLING OF LOWER LEG: Status: ACTIVE | Noted: 2022-09-06

## 2022-09-06 PROBLEM — R60.0 PERIPHERAL EDEMA: Status: ACTIVE | Noted: 2022-09-06

## 2022-09-06 PROBLEM — E66.811 CLASS 1 OBESITY DUE TO EXCESS CALORIES WITH BODY MASS INDEX (BMI) OF 30.0 TO 30.9 IN ADULT: Status: ACTIVE | Noted: 2022-09-06

## 2022-09-06 PROBLEM — R23.8 REDNESS AND SWELLING OF LOWER LEG: Status: ACTIVE | Noted: 2022-09-06

## 2022-09-06 PROBLEM — R60.9 PERIPHERAL EDEMA: Status: ACTIVE | Noted: 2022-09-06

## 2022-09-06 LAB
ANION GAP SERPL CALCULATED.3IONS-SCNC: 12 MMOL/L (ref 3–16)
BUN BLDV-MCNC: 35 MG/DL (ref 7–20)
C-REACTIVE PROTEIN: 182.9 MG/L (ref 0–5.1)
CALCIUM SERPL-MCNC: 8.5 MG/DL (ref 8.3–10.6)
CHLORIDE BLD-SCNC: 92 MMOL/L (ref 99–110)
CO2: 27 MMOL/L (ref 21–32)
CREAT SERPL-MCNC: 1.1 MG/DL (ref 0.6–1.2)
GFR AFRICAN AMERICAN: 57
GFR NON-AFRICAN AMERICAN: 47
GLUCOSE BLD-MCNC: 143 MG/DL (ref 70–99)
HCT VFR BLD CALC: 35.8 % (ref 36–48)
HEMOGLOBIN: 11.7 G/DL (ref 12–16)
MCH RBC QN AUTO: 28.6 PG (ref 26–34)
MCHC RBC AUTO-ENTMCNC: 32.8 G/DL (ref 31–36)
MCV RBC AUTO: 87.2 FL (ref 80–100)
PDW BLD-RTO: 14.5 % (ref 12.4–15.4)
PLATELET # BLD: 125 K/UL (ref 135–450)
PMV BLD AUTO: 7.9 FL (ref 5–10.5)
POTASSIUM SERPL-SCNC: 3.5 MMOL/L (ref 3.5–5.1)
RBC # BLD: 4.11 M/UL (ref 4–5.2)
SEDIMENTATION RATE, ERYTHROCYTE: 70 MM/HR (ref 0–30)
SODIUM BLD-SCNC: 131 MMOL/L (ref 136–145)
WBC # BLD: 7.4 K/UL (ref 4–11)

## 2022-09-06 PROCEDURE — 99223 1ST HOSP IP/OBS HIGH 75: CPT | Performed by: INTERNAL MEDICINE

## 2022-09-06 PROCEDURE — 80048 BASIC METABOLIC PNL TOTAL CA: CPT

## 2022-09-06 PROCEDURE — 6370000000 HC RX 637 (ALT 250 FOR IP): Performed by: FAMILY MEDICINE

## 2022-09-06 PROCEDURE — 97166 OT EVAL MOD COMPLEX 45 MIN: CPT

## 2022-09-06 PROCEDURE — 97530 THERAPEUTIC ACTIVITIES: CPT

## 2022-09-06 PROCEDURE — 85652 RBC SED RATE AUTOMATED: CPT

## 2022-09-06 PROCEDURE — 87040 BLOOD CULTURE FOR BACTERIA: CPT

## 2022-09-06 PROCEDURE — 6370000000 HC RX 637 (ALT 250 FOR IP): Performed by: NURSE PRACTITIONER

## 2022-09-06 PROCEDURE — 36415 COLL VENOUS BLD VENIPUNCTURE: CPT

## 2022-09-06 PROCEDURE — 6370000000 HC RX 637 (ALT 250 FOR IP): Performed by: HOSPITALIST

## 2022-09-06 PROCEDURE — 85027 COMPLETE CBC AUTOMATED: CPT

## 2022-09-06 PROCEDURE — 97163 PT EVAL HIGH COMPLEX 45 MIN: CPT

## 2022-09-06 PROCEDURE — 86140 C-REACTIVE PROTEIN: CPT

## 2022-09-06 PROCEDURE — 81001 URINALYSIS AUTO W/SCOPE: CPT

## 2022-09-06 PROCEDURE — 71046 X-RAY EXAM CHEST 2 VIEWS: CPT

## 2022-09-06 PROCEDURE — 2580000003 HC RX 258: Performed by: HOSPITALIST

## 2022-09-06 PROCEDURE — 6360000002 HC RX W HCPCS: Performed by: HOSPITALIST

## 2022-09-06 PROCEDURE — 1200000000 HC SEMI PRIVATE

## 2022-09-06 RX ORDER — LACTOBACILLUS RHAMNOSUS GG 10B CELL
1 CAPSULE ORAL 2 TIMES DAILY WITH MEALS
Qty: 20 CAPSULE | Refills: 0 | Status: CANCELLED | OUTPATIENT
Start: 2022-09-06

## 2022-09-06 RX ORDER — CEPHALEXIN 500 MG/1
500 CAPSULE ORAL 3 TIMES DAILY
Qty: 21 CAPSULE | Refills: 0 | Status: CANCELLED | OUTPATIENT
Start: 2022-09-06 | End: 2022-09-13

## 2022-09-06 RX ADMIN — Medication 1 CAPSULE: at 09:51

## 2022-09-06 RX ADMIN — GABAPENTIN 100 MG: 100 CAPSULE ORAL at 21:26

## 2022-09-06 RX ADMIN — GABAPENTIN 100 MG: 100 CAPSULE ORAL at 09:51

## 2022-09-06 RX ADMIN — FUROSEMIDE 40 MG: 10 INJECTION, SOLUTION INTRAMUSCULAR; INTRAVENOUS at 18:09

## 2022-09-06 RX ADMIN — ENOXAPARIN SODIUM 40 MG: 100 INJECTION SUBCUTANEOUS at 09:54

## 2022-09-06 RX ADMIN — FUROSEMIDE 40 MG: 10 INJECTION, SOLUTION INTRAMUSCULAR; INTRAVENOUS at 09:52

## 2022-09-06 RX ADMIN — SODIUM CHLORIDE: 9 INJECTION, SOLUTION INTRAVENOUS at 00:58

## 2022-09-06 RX ADMIN — CEFAZOLIN 2000 MG: 2 INJECTION, POWDER, FOR SOLUTION INTRAMUSCULAR; INTRAVENOUS at 10:20

## 2022-09-06 RX ADMIN — BUPROPION HYDROCHLORIDE 150 MG: 150 TABLET, EXTENDED RELEASE ORAL at 09:51

## 2022-09-06 RX ADMIN — CEFAZOLIN 2000 MG: 2 INJECTION, POWDER, FOR SOLUTION INTRAMUSCULAR; INTRAVENOUS at 01:18

## 2022-09-06 RX ADMIN — Medication 1 CAPSULE: at 18:09

## 2022-09-06 RX ADMIN — ATORVASTATIN CALCIUM 10 MG: 10 TABLET, FILM COATED ORAL at 21:26

## 2022-09-06 RX ADMIN — DICLOFENAC SODIUM 4 G: 10 GEL TOPICAL at 21:26

## 2022-09-06 RX ADMIN — DICLOFENAC SODIUM 4 G: 10 GEL TOPICAL at 09:51

## 2022-09-06 RX ADMIN — HYDROCODONE BITARTRATE AND ACETAMINOPHEN 1 TABLET: 5; 325 TABLET ORAL at 14:56

## 2022-09-06 RX ADMIN — VILAZODONE HYDROCHLORIDE 20 MG: 20 TABLET ORAL at 09:51

## 2022-09-06 RX ADMIN — HYDROCODONE BITARTRATE AND ACETAMINOPHEN 1 TABLET: 5; 325 TABLET ORAL at 00:57

## 2022-09-06 RX ADMIN — DICLOFENAC SODIUM 4 G: 10 GEL TOPICAL at 14:58

## 2022-09-06 RX ADMIN — STANDARDIZED SENNA CONCENTRATE AND DOCUSATE SODIUM 2 TABLET: 8.6; 5 TABLET ORAL at 21:26

## 2022-09-06 RX ADMIN — GABAPENTIN 100 MG: 100 CAPSULE ORAL at 14:56

## 2022-09-06 RX ADMIN — HYDROCODONE BITARTRATE AND ACETAMINOPHEN 1 TABLET: 5; 325 TABLET ORAL at 08:52

## 2022-09-06 RX ADMIN — CEFAZOLIN 2000 MG: 2 INJECTION, POWDER, FOR SOLUTION INTRAMUSCULAR; INTRAVENOUS at 18:12

## 2022-09-06 RX ADMIN — BUSPIRONE HYDROCHLORIDE 10 MG: 5 TABLET ORAL at 08:52

## 2022-09-06 RX ADMIN — Medication 10 ML: at 21:27

## 2022-09-06 RX ADMIN — ASPIRIN 81 MG: 81 TABLET, COATED ORAL at 09:50

## 2022-09-06 ASSESSMENT — ENCOUNTER SYMPTOMS
WHEEZING: 0
SORE THROAT: 0
EYE REDNESS: 0
EYE DISCHARGE: 0
BACK PAIN: 0
DIARRHEA: 0
SHORTNESS OF BREATH: 0
CONSTIPATION: 0
NAUSEA: 0
COUGH: 0
SINUS PRESSURE: 0
RHINORRHEA: 0
SINUS PAIN: 0
ABDOMINAL PAIN: 0

## 2022-09-06 ASSESSMENT — PAIN SCALES - GENERAL
PAINLEVEL_OUTOF10: 8
PAINLEVEL_OUTOF10: 7
PAINLEVEL_OUTOF10: 8

## 2022-09-06 NOTE — CARE COORDINATION
Discharge Planning Note:    Met with the patient. An approved SNF was reviewed and the following referral was placed at the request of the patient:    - [facility] - waiting on response    - Pre-Cert has been started at [phone] on 09/06/2022. Will continue to follow.     DRISS StreeterN RN    [facility]  Phone: [phone]

## 2022-09-06 NOTE — PROGRESS NOTES
[facility]   Phone: [phone]    Physical Therapy    [x] Initial Evaluation            [] Daily Treatment Note         [] Discharge Summary      Patient: Jenniffer Reddy   : 1940   MRN: [id number]   Date of Service:  2022  Admitting Diagnosis: Cellulitis  Current Admission Summary: per MD: Jenniffer Reddy is a 78 y.o. female who presents to the emergency department the chief complaint of worsening bilateral leg swelling and pain. In May she had a left hip fracture and surgery. States over the past 3 weeks she has had some swelling in her legs have gotten worse over the past 10 days with some redness in her bilateral lower extremities that began 3 days ago. She is not on any diuretics or antibiotics. She denies chest pain, shortness of breath, history of heart failure, nausea, vomiting, fevers, history of chronic kidney disease, diabetes or any skin infections or MRSA in the past.  She states moving her legs makes the pain worse. Denies abdominal pain, urinary bowel symptoms or any other symptoms. Past Medical History:  has a past medical history of AR (allergic rhinitis), Arthritis of knee, Depression, Erosive gastritis, GERD (gastroesophageal reflux disease), Hyperlipidemia, Internal hemorrhoids, Overweight(278.02), and Viral meningitis. Past Surgical History:  has a past surgical history that includes Cholecystectomy, laparoscopic (); Total abdominal hysterectomy w/ bilateral salpingoophorectomy (); Colonoscopy (); Colonoscopy (12/3/13); Upper gastrointestinal endoscopy (12/3/13); Breast biopsy; Hysterectomy; and hip surgery (Left, 2022). Discharge Recommendations: Jenniffer Reddy scored a  on the AM-PAC short mobility form. Current research shows that an AM-PAC score of 17 or less is typically not associated with a discharge to the patient's home setting.  Based on the patient's AM-PAC score and their current functional mobility deficits, it is recommended that the patient have 3-5 sessions per week of Physical Therapy at d/c to increase the patient's independence. Please see assessment section for further patient specific details. If pt declines the above recommendation, home health is next recommendation. If patient discharges prior to next session this note will serve as a discharge summary. Please see below for the latest assessment towards goals. DME Required For Discharge: DME to be determined at next level of care  Precautions/Restrictions: high fall risk  Weight Bearing Restrictions: no restrictions  [] Right Upper Extremity  [] Left Upper Extremity [] Right Lower Extremity  [] Left Lower Extremity     Required Braces/Orthotics: no braces required   [] Right  [] Left  Positional Restrictions:no positional restrictions    Pre-Admission Information   Lives With: spouse    Type of Home: house  Home Layout: two level, laundry in basement  Home Access:  3 step to enter without rails   Bathroom Layout: . Comment: pt has been using bathroom on 1st floor since may and taking spongebaths  Bathroom Equipment: grab bars around toilet  Toilet Height: elevated height  Home Equipment: rollator - 4 wheeled walker  Transfer Assistance: modified independent with use of 4WRW  Ambulation Assistance:modified independent with use of 4WRW  ADL Assistance: requires assistance with bathing, requires assistance with dressing, . Comment: needs assitance for LEs(ex. Tying shoes)  IADL Assistance: requires assistance with meal prep, requires assistance with laundry, requires assistance with cleaning, pt helps with some meal prep and cleaning but  does most  Active :        [] Yes  [x] No  Hand Dominance: [] Left  [x] Right  Current Employment: .   Comment: not asked  Hobbies: reading biographies  Recent Falls: 1 in may resulting in hip fx     Examination   Vision:   Vision Gross Assessment: Impaired and Vision Corrective Device: wears glasses for reading  [facility]  Observation:   Palpation:  Fluid build up of BLE was soft and more accumulated around the knees, pockets of fluid noted behind the knees, increased pain at knees  General Observation:  Edema of BLE- build up around knees, anxiety tremors noted on R hand and head. Posture:   Unable to assess  Sensation:   reports numbness and tingling in (B) LE    Coordination Testing: Alternating Toe Tapping: WFL    ROM:   All ROM of LE limited secondary to edema and pain. Pt able to complete 75% of active range. Strength:   Formal MMT held secondary to increased pain  Decision Making: high complexity  Clinical Presentation: unstable      Subjective  General: pt sitting in recliner with feet down upon arrival. RN in room. Pain: 8/10. Location: legs   Pain Interventions: pain medication in place prior to arrival and RN notified       Functional Mobility  Bed Mobility  Bed mobility not completed on this date. Comments:  Transfers  Sit to stand transfer: 2 person assistance with mod A   Stand to sit transfer: moderate assistance  Comments: pt attempted 2 STS from recliner up to rolling walker. Mod Ax2 were needed both attempts. Ambulation  Ambulation not tested on this date. Distance: n/a  Gait Mechanics: n/a  Comments:    Stair Mobility  Stair mobility not completed on this date. Comments:  Wheelchair Mobility:  No w/c mobility completed on this date. Comments:  Balance  Static Sitting Balance: fair (+): maintains balance at SBA/supervision without use of UE support  Static Standing Balance: poor: requires mod (A) to maintain balance  Comments: pt attempted to stand at walker 2x. 1st attempt stood for ~15-20s unable to achieve upright. 2nd attempt pt stood for ~5-10s unable to achieve upright. Other Therapeutic Interventions  Manual lymph massage complete to BLE to reduce edema. Tensoshape applied.    See OT note for ADLs    Functional Outcomes  AM-PAC Inpatient Mobility Raw Score : 11              Cognition  WFL  Orientation:    alert and oriented x 4  Command Following:   [facility]    Education  Barriers To Learning: emotional  Patient Education: patient educated on goals, PT role and benefits, plan of care, discharge recommendations  Learning Assessment:  patient verbalizes and demonstrates understanding    Assessment  Activity Tolerance: pt limited by pain and emotions/fear. Pt attempted STS and became emotional by the pain she felt in her legs. A break was taken to perform ADLs and for manual lymphatic massage. Pt asked if \"this is all for physical therapy for the day? \" And said she was willing to try more. A second STS was attempted and pt was experiencing more pain than the first one and again became emotional.   Impairments Requiring Therapeutic Intervention: decreased functional mobility, decreased ADL status, decreased ROM, decreased strength, decreased balance  Prognosis: good  Clinical Assessment: pt presents with increased pain and BLE edema which is limiting her ability to perform functional mobility tasks. Prior to admission pt was requiring minimal assistance from her  for dressing and sponge baths, she was not ambulating on stairs as she was staying on the first floor, and using a 4WRW for transfers and ambulation. Currently the pt is Mod Ax2 for transfers and no ambulation was completed this date. Continued skilled PT would be beneficial in order to address the above deficits and decrease risk of falls to return pt safely to [facility].     Safety Interventions: patient left in chair, chair alarm in place, call light within reach, patient at risk for falls, and nurse notified    Plan  Frequency: 3-5 x/per week  Current Treatment Recommendations: strengthening, ROM, balance training, functional mobility training, manual lymphatic drainage, patient/caregiver education, ADL/self-care training, and home exercise program    Goals  Patient Goals: to go home   Short Term Goals:  Time Frame: upon d/c  Patient will complete bed mobility at modified independent   Patient will complete transfers at contact guard assistance   Patient will ambulate 10 ft with use of LRAD at moderate assistance    Therapy Session Time      Individual Group Co-treatment   Time In     0955   Time Out     1037   Minutes     42     Timed Code Treatment Minutes:   27  Total Treatment Minutes:  [address], [facility]  Electronically Signed By: PT observed and directed the above evaluation/treatment.   [address], DPT [id number] (0) independent

## 2022-09-06 NOTE — PROGRESS NOTES
recommended that the patient have 3-5 sessions per week of Occupational Therapy at d/c to increase the patient's independence. Please see assessment section for further patient specific details. If patient declines above patient recommendation, patient will require HHOT. If patient discharges prior to next session this note will serve as a discharge summary. Please see below for the latest assessment towards goals. DME Required For Discharge: DME to be determined at next level of care    Precautions/Restrictions: medium fall risk  Weight Bearing Restrictions: no restrictions  [] Right Upper Extremity  [] Left Upper Extremity [] Right Lower Extremity  [] Left Lower Extremity     Required Braces/Orthotics: no braces required   [] Right  [] Left  Positional Restrictions:no positional restrictions    Pre-Admission Information   Lives With: spouse                  Type of Home: house  Home Layout: two level, laundry in basement  Home Access:  3 step to enter without rails   Bathroom Layout: . Comment: pt has been using bathroom on 1st floor since may and taking spongebaths  Bathroom Equipment: grab bars around toilet  Toilet Height: elevated height  Home Equipment: rollator - 4 wheeled walker  Transfer Assistance: modified independent with use of 4WRW  Ambulation Assistance:modified independent with use of 4WRW  ADL Assistance: requires assistance with bathing, requires assistance with dressing, . Comment: needs assitance for LEs  IADL Assistance: requires assistance with meal prep, requires assistance with laundry, requires assistance with cleaning, pt helps with some meal prep and cleaning but  does most  Active :        [] Yes                 [x] No  Hand Dominance: [] Left                 [x] Right  Current Employment: .   Comment: not asked  Hobbies: reading biographies  Recent Falls: 1 in may resulting in hip fx     Examination   Vision:   Vision Gross Assessment: Impaired and Vision Corrective Device: wears glasses for reading  Hearing:   [facility]  Observation:   Palpation:  Fluid build up of BLE-soft and accumulated around the knees   General Observation:  patient very anxious and became slightly tearful while standing. Tremors noted associated with anxiety in RUE and head   Sensation:   reports numbness and tingling in (B) LE  ROM:   (B) UE AROM WFL  Strength:   (B) UE strength grossly WFL    Decision Making: medium complexity  Clinical Presentation: evolving      Subjective  General: Patient in recliner upon arrival, agreeable to OT/PT evaluation. RN and nursing student in room upon arrival finishing administering medicine. Patient reported she has been declining since being at the hospital. Reported she is open to going to a facility for further therapy before to going home. Pain: 8/10. Location: BLE  Pain Interventions: pain medication in place prior to arrival and RN notified        Activities of Daily Living  Basic Activities of Daily Living  Grooming: setup assistance supervision  Grooming Comments: patient able to wash face and brush teeth seated in recliner with setup   Lower Extremity Dressing: dependent  Dressing Comments: total A for doffing/donning socks. Tensoshapes applied, nursing notified    Toileting Comments: Alveria Snooks in place. Patient declining toileting, reporting she has not been able to have BM  General Comments: Anticipate patient would require maxA for ADLs due to pain   Instrumental Activities of Daily Living  No IADL completed on this date. Functional Mobility  Bed Mobility  Bed mobility not completed on this date. Comments:  Transfers  Sit to stand transfer:2 person assistance with modA    Stand to sit transfer: moderate assistance  Comments: patient with 2 attempts to stand from recliner to RW. Education provided to nursing staff to use Stedy due to poor standing tolerance   Functional Mobility:  Sitting Balance: stand by assistance.     Sitting Balance Comment: in recliner  Standing Balance: moderate assistance. Standing Balance Comment: with RW. ~20 seconds for first stand. ~10 seconds for second stand     Other Therapeutic Interventions  PT completed Manual Lymph massage on patients BLE to reduce edema while seated in recliner, prior to donning Tensoshapes. Functional Outcomes  AM-PAC Inpatient Daily Activity Raw Score: 12    Cognition  WFL  Orientation:    alert and oriented x 4  Command Following:   [facility]     Education  Barriers To Learning: emotional  Patient Education: patient educated on goals, OT role and benefits, plan of care, transfer training, discharge recommendations  Learning Assessment:  patient verbalizes understanding, would benefit from continued reinforcement    Assessment  Activity Tolerance: Patient limited by pain and anxiety. Only able to tolerate 2 stands   Impairments Requiring Therapeutic Intervention: decreased functional mobility, decreased ADL status, decreased ROM, decreased strength, decreased endurance, decreased balance, decreased coordination, increased pain  Prognosis: fair  Clinical Assessment: Patient presents below baseline status secondary to weakness and swelling in BLE. Patient typically independent with transfers, ADLs and functional mobility but reported recently requiring some assist from . Today patient requiring modA of 2 for transfers and only ability to tolerate 20 seconds of standing. Patient limited by pain and anxiety. Patient will benefit from continued OT services to address above deficits and maximize safety and independence in ADLs, transfers and functional mobility.    Safety Interventions: patient left in chair, call light within reach, gait belt, patient at risk for falls, and nurse notified    Plan  Frequency: 3-5 x/per week  Current Treatment Recommendations: strengthening, balance training, functional mobility training, transfer training, endurance training, patient/caregiver education, and ADL/self-care training    Goals  Patient Goals: not stated   Short Term Goals:  Time Frame: discharge   Patient will complete upper body ADL at supervision   Patient will complete lower body ADL at minimal assistance   Patient will complete toileting at minimal assistance   Patient will complete functional transfers at contact guard assistance   Patient will increase [facility] ADL score = to or > than 18/24    Therapy Session Time     Individual Group Co-treatment   Time In    0955   Time Out    1037   Minutes    42        Timed Code Treatment Minutes:  Timed Code Treatment Minutes: 27 Minutes  Total Treatment Minutes:  42 minutes       Electronically Signed By: Laron Strauss, [address], MOT OTR/L [id number]

## 2022-09-07 PROBLEM — Z71.3 WEIGHT LOSS COUNSELING, ENCOUNTER FOR: Status: ACTIVE | Noted: 2022-09-07

## 2022-09-07 LAB
ANION GAP SERPL CALCULATED.3IONS-SCNC: 9 MMOL/L (ref 3–16)
BACTERIA: ABNORMAL /HPF
BILIRUBIN URINE: NEGATIVE
BLOOD, URINE: NEGATIVE
BUN BLDV-MCNC: 42 MG/DL (ref 7–20)
CALCIUM SERPL-MCNC: 8.7 MG/DL (ref 8.3–10.6)
CHLORIDE BLD-SCNC: 92 MMOL/L (ref 99–110)
CLARITY: CLEAR
CO2: 30 MMOL/L (ref 21–32)
COLOR: YELLOW
COMMENT UA: ABNORMAL
CREAT SERPL-MCNC: 1.2 MG/DL (ref 0.6–1.2)
EPITHELIAL CELLS, UA: ABNORMAL /HPF (ref 0–5)
GFR AFRICAN AMERICAN: 52
GFR NON-AFRICAN AMERICAN: 43
GLUCOSE BLD-MCNC: 166 MG/DL (ref 70–99)
GLUCOSE URINE: NEGATIVE MG/DL
HCT VFR BLD CALC: 31.4 % (ref 36–48)
HEMOGLOBIN: 10.4 G/DL (ref 12–16)
KETONES, URINE: NEGATIVE MG/DL
LEUKOCYTE ESTERASE, URINE: ABNORMAL
MCH RBC QN AUTO: 28.8 PG (ref 26–34)
MCHC RBC AUTO-ENTMCNC: 32.9 G/DL (ref 31–36)
MCV RBC AUTO: 87.3 FL (ref 80–100)
MICROSCOPIC EXAMINATION: YES
NITRITE, URINE: NEGATIVE
PDW BLD-RTO: 14.1 % (ref 12.4–15.4)
PH UA: 5.5 (ref 5–8)
PLATELET # BLD: 130 K/UL (ref 135–450)
PMV BLD AUTO: 7.8 FL (ref 5–10.5)
POTASSIUM SERPL-SCNC: 3.5 MMOL/L (ref 3.5–5.1)
PROTEIN UA: ABNORMAL MG/DL
RBC # BLD: 3.6 M/UL (ref 4–5.2)
RBC UA: ABNORMAL /HPF (ref 0–4)
SODIUM BLD-SCNC: 131 MMOL/L (ref 136–145)
SPECIFIC GRAVITY UA: 1.01 (ref 1–1.03)
URINE REFLEX TO CULTURE: ABNORMAL
URINE TYPE: ABNORMAL
UROBILINOGEN, URINE: 0.2 E.U./DL
WBC # BLD: 7.3 K/UL (ref 4–11)
WBC UA: ABNORMAL /HPF (ref 0–5)

## 2022-09-07 PROCEDURE — 85027 COMPLETE CBC AUTOMATED: CPT

## 2022-09-07 PROCEDURE — 6360000002 HC RX W HCPCS: Performed by: HOSPITALIST

## 2022-09-07 PROCEDURE — 2580000003 HC RX 258: Performed by: HOSPITALIST

## 2022-09-07 PROCEDURE — 6370000000 HC RX 637 (ALT 250 FOR IP): Performed by: HOSPITALIST

## 2022-09-07 PROCEDURE — 97535 SELF CARE MNGMENT TRAINING: CPT

## 2022-09-07 PROCEDURE — 6370000000 HC RX 637 (ALT 250 FOR IP): Performed by: NURSE PRACTITIONER

## 2022-09-07 PROCEDURE — 36415 COLL VENOUS BLD VENIPUNCTURE: CPT

## 2022-09-07 PROCEDURE — 97110 THERAPEUTIC EXERCISES: CPT

## 2022-09-07 PROCEDURE — 97530 THERAPEUTIC ACTIVITIES: CPT

## 2022-09-07 PROCEDURE — 99233 SBSQ HOSP IP/OBS HIGH 50: CPT | Performed by: INTERNAL MEDICINE

## 2022-09-07 PROCEDURE — 6370000000 HC RX 637 (ALT 250 FOR IP): Performed by: FAMILY MEDICINE

## 2022-09-07 PROCEDURE — 80048 BASIC METABOLIC PNL TOTAL CA: CPT

## 2022-09-07 PROCEDURE — 97116 GAIT TRAINING THERAPY: CPT

## 2022-09-07 PROCEDURE — 1200000000 HC SEMI PRIVATE

## 2022-09-07 RX ORDER — SODIUM CHLORIDE 9 MG/ML
INJECTION, SOLUTION INTRAVENOUS CONTINUOUS
Status: ACTIVE | OUTPATIENT
Start: 2022-09-07 | End: 2022-09-07

## 2022-09-07 RX ADMIN — GABAPENTIN 100 MG: 100 CAPSULE ORAL at 12:14

## 2022-09-07 RX ADMIN — HYDROCODONE BITARTRATE AND ACETAMINOPHEN 1 TABLET: 5; 325 TABLET ORAL at 16:34

## 2022-09-07 RX ADMIN — GABAPENTIN 100 MG: 100 CAPSULE ORAL at 19:58

## 2022-09-07 RX ADMIN — CEFAZOLIN 2000 MG: 2 INJECTION, POWDER, FOR SOLUTION INTRAMUSCULAR; INTRAVENOUS at 07:52

## 2022-09-07 RX ADMIN — DICLOFENAC SODIUM 4 G: 10 GEL TOPICAL at 14:27

## 2022-09-07 RX ADMIN — SODIUM CHLORIDE: 9 INJECTION, SOLUTION INTRAVENOUS at 01:42

## 2022-09-07 RX ADMIN — Medication 1 CAPSULE: at 07:50

## 2022-09-07 RX ADMIN — CEFAZOLIN 2000 MG: 2 INJECTION, POWDER, FOR SOLUTION INTRAMUSCULAR; INTRAVENOUS at 01:44

## 2022-09-07 RX ADMIN — HYDROCODONE BITARTRATE AND ACETAMINOPHEN 1 TABLET: 5; 325 TABLET ORAL at 07:50

## 2022-09-07 RX ADMIN — DICLOFENAC SODIUM 4 G: 10 GEL TOPICAL at 19:58

## 2022-09-07 RX ADMIN — VILAZODONE HYDROCHLORIDE 20 MG: 20 TABLET ORAL at 07:50

## 2022-09-07 RX ADMIN — Medication 10 ML: at 07:51

## 2022-09-07 RX ADMIN — GABAPENTIN 100 MG: 100 CAPSULE ORAL at 07:50

## 2022-09-07 RX ADMIN — ENOXAPARIN SODIUM 40 MG: 100 INJECTION SUBCUTANEOUS at 07:50

## 2022-09-07 RX ADMIN — CEFAZOLIN 2000 MG: 2 INJECTION, POWDER, FOR SOLUTION INTRAMUSCULAR; INTRAVENOUS at 16:34

## 2022-09-07 RX ADMIN — STANDARDIZED SENNA CONCENTRATE AND DOCUSATE SODIUM 2 TABLET: 8.6; 5 TABLET ORAL at 19:58

## 2022-09-07 RX ADMIN — ASPIRIN 81 MG: 81 TABLET, COATED ORAL at 07:50

## 2022-09-07 RX ADMIN — BUPROPION HYDROCHLORIDE 150 MG: 150 TABLET, EXTENDED RELEASE ORAL at 07:50

## 2022-09-07 RX ADMIN — Medication 1 CAPSULE: at 16:34

## 2022-09-07 RX ADMIN — DICLOFENAC SODIUM 4 G: 10 GEL TOPICAL at 07:52

## 2022-09-07 ASSESSMENT — ENCOUNTER SYMPTOMS
RHINORRHEA: 0
BACK PAIN: 0
SINUS PAIN: 0
DIARRHEA: 0
WHEEZING: 0
SINUS PRESSURE: 0
SORE THROAT: 0
COUGH: 0
CONSTIPATION: 0
NAUSEA: 0
EYE REDNESS: 0
SHORTNESS OF BREATH: 0
EYE DISCHARGE: 0
ABDOMINAL PAIN: 0

## 2022-09-07 ASSESSMENT — PAIN SCALES - GENERAL
PAINLEVEL_OUTOF10: 6
PAINLEVEL_OUTOF10: 7

## 2022-09-07 NOTE — PROGRESS NOTES
[facility]   Phone: [phone]    Occupational Therapy    [] Initial Evaluation            [x] Daily Treatment Note         [] Discharge Summary      Patient: Galo Mock   : 1940   MRN: [id number]   Date of Service:  2022    Admitting Diagnosis:  Cellulitis  Current Admission Summary:   78 y.o. female who presents to the emergency department the chief complaint of worsening bilateral leg swelling and pain. In May she had a left hip fracture and surgery. States over the past 3 weeks she has had some swelling in her legs have gotten worse over the past 10 days with some redness in her bilateral lower extremities that began 3 days ago. She is not on any diuretics or antibiotics. She denies chest pain, shortness of breath, history of heart failure, nausea, vomiting, fevers, history of chronic kidney disease, diabetes or any skin infections or MRSA in the past.  She states moving her legs makes the pain worse. Denies abdominal pain, urinary bowel symptoms or any other symptoms. Past Medical History:  has a past medical history of AR (allergic rhinitis), Arthritis of knee, Depression, Erosive gastritis, GERD (gastroesophageal reflux disease), Hyperlipidemia, Internal hemorrhoids, Overweight(278.02), and Viral meningitis. Past Surgical History:  has a past surgical history that includes Cholecystectomy, laparoscopic (); Total abdominal hysterectomy w/ bilateral salpingoophorectomy (); Colonoscopy (); Colonoscopy (12/3/13); Upper gastrointestinal endoscopy (12/3/13); Breast biopsy; Hysterectomy; and hip surgery (Left, 2022). Discharge Recommendations: Galo Mock scored a 13/24 on the AM-PAC ADL Inpatient form. Current research shows that an AM-PAC score of 17 or less is typically not associated with a discharge to the patient's home setting.  Based on the patient's AM-PAC score and their current ADL deficits, it is recommended that the patient have 3-5 sessions per week of Occupational Therapy at d/c to increase the patient's independence. Please see assessment section for further patient specific details. If patient declines above patient recommendation, patient will require HHOT. If patient discharges prior to next session this note will serve as a discharge summary. Please see below for the latest assessment towards goals. DME Required For Discharge: DME to be determined at next level of care    Precautions/Restrictions: medium fall risk  Weight Bearing Restrictions: no restrictions  [] Right Upper Extremity  [] Left Upper Extremity [] Right Lower Extremity  [] Left Lower Extremity     Required Braces/Orthotics: no braces required   [] Right  [] Left  Positional Restrictions:no positional restrictions    Pre-Admission Information   Lives With: spouse                  Type of Home: house  Home Layout: two level, laundry in basement  Home Access:  3 step to enter without rails   Bathroom Layout: . Comment: pt has been using bathroom on 1st floor since may and taking spongebaths  Bathroom Equipment: grab bars around toilet  Toilet Height: elevated height  Home Equipment: rollator - 4 wheeled walker  Transfer Assistance: modified independent with use of 4WRW  Ambulation Assistance:modified independent with use of 4WRW  ADL Assistance: requires assistance with bathing, requires assistance with dressing, . Comment: needs assitance for LEs  IADL Assistance: requires assistance with meal prep, requires assistance with laundry, requires assistance with cleaning, pt helps with some meal prep and cleaning but  does most  Active :        [] Yes                 [x] No  Hand Dominance: [] Left                 [x] Right  Current Employment: . Comment: not asked  Hobbies: reading biographies  Recent Falls: 1 in may resulting in hip fx       Subjective: Pt supine in bed upon entry.  Pt agreeable to therapy session, pt tearful at times and comforted. General: Pt supine to sit Min A. Pt sit EOB SBA. Pt scoot to EOB SBA. Pt reports minimal dizziness (/67). Pt sit to stand Mod A x 2 and stand step Mod A x 2 to recliner with rw, pt stand to sit Mod A x2. Pt with seated rest break. Pt threaded new brief with reacher and extended time. Pt sit to stand Mod A x 2 and Dependent hygiene care provided along with assist to pull up brief. Pt stand to sit Mod A x 2. Pt completed 3 chair push-ups x 2 sets. Pt with ~.5# soda cans: 10 bilateral bicep curls and 10 bilateral chest press. Pt sit to stand Mod A x 2, pt incontinent of urine in brief. Pt Mod A x 2 to stand step to [facility]. Pt used reacher to don new brief and needed assist to pull up in stance; pt also needed assist for hygiene care. Pt Mod A x 2 to stand step to recliner, stand to sit Mod A x 2. Call light in reach and chair alarm on. Pain: 8/10. Location: BLE  Pain Interventions: pain medication in place prior to arrival and RN notified        Activities of Daily Living  Basic Activities of Daily Living  Lower Extremity Dressing: dependent . Comment: pt threaded brief with reacher, however assist of 2 to pull up in stance  with PT providing Mod A for standing balance while this writer pulled up brief  Dressing Equipment: reacher  Dressing Comments: brief only (x2 trials)  Toileting: dependent. Toileting Equipment: none  Toileting Comments: Incontinent of urine, not able to have bowel movement seated on BSC after attempt, pt dependent for brief mgmt and hygiene care  Instrumental Activities of Daily Living  No IADL completed on this date.     Functional Mobility  Bed Mobility  Supine to Sit: minimal assistance  Scooting: stand by assistance    Transfers  Sit to stand transfer:2 person assistance with Mod A x2   Stand to sit transfer: 2 person assistance with Mod A x 2   Stand step transfer: 2 person assistance with Mod A x 2   Toilet transfer: 2 person assistance with Mod A x 2   Toilet transfer equipment: standard bedside commode     Functional Mobility:  Sitting Balance: stand by assistance. Sitting Balance Comment: EOB  Standing Balance: moderate assistance. Standing Balance Comment: with RW. ~30 sec x 2 trials with hygiene care/brief mgmt     Other Therapeutic Interventions      Functional Outcomes  AM-PAC Inpatient Daily Activity Raw Score: 13    Cognition  WFL  Orientation:    alert and oriented x 4  Command Following:   [facility]     Education  Barriers To Learning: emotional  Patient Education: patient educated on goals, OT role and benefits, plan of care, ADL adaptive strategies, transfer training, discharge recommendations  Learning Assessment:  patient verbalizes understanding, would benefit from continued reinforcement    Assessment  Activity Tolerance: Patient limited by pain and generalized weakness  Impairments Requiring Therapeutic Intervention: decreased functional mobility, decreased ADL status, decreased ROM, decreased strength, decreased endurance, decreased balance, decreased coordination, increased pain  Prognosis: fair  Clinical Assessment: Patient presents below baseline status secondary to weakness, pain and swelling in BLE. Patient typically independent with transfers, ADLs and functional mobility but reported recently requiring some assist from . Today patient requiring ModA of 2 for functional transfers and stand step transfers EOB to recliner to [facility] to recliner. Pt threaded brief with reacher, but required assist of 2 for brief mgmt. Pt Dependent for toileting. Pt tolerated minimal standing balance time  of ~30sec x 2 trials. Pt is emotional and limited by pain. Recommend continued inpt therapy at d/c to maximize functional independence and safety. Continue with POC.   Safety Interventions: patient left in chair, call light within reach, gait belt, patient at risk for falls, and nurse notified    Plan  Frequency: 3-5 x/per week  Current Treatment Recommendations: strengthening, balance training, functional mobility training, transfer training, endurance training, patient/caregiver education, and ADL/self-care training    Goals  Patient Goals: not stated   Short Term Goals:  Time Frame: discharge   Patient will complete upper body ADL at supervision - goal not addressed 9/7  Patient will complete lower body ADL at minimal assistance - goal not met dependent 9/7  Patient will complete toileting at minimal assistance - goal not met Dependent 9/7  Patient will complete functional transfers at contact guard assistance - Mod A x 2 goal not met 9/7  Patient will increase [facility] ADL score = to or > than 18/24    Therapy Session Time     Individual Group Co-treatment   Time In    1030   Time Out    1138   Minutes    68        Timed Code Treatment Minutes:  68 minutes  Total Treatment Minutes:  68 minutes       Electronically Signed By: RADHA Wilson/L [id number]       After reviewing treatment documentation, I am in agreement with this session.    JAYLENE Moe OTR/L [id number]

## 2022-09-07 NOTE — PROGRESS NOTES
[facility] PROGRESS NOTE    9/7/2022 1:38 PM        Name: Linden Blue . Admitted: 9/2/2022  Primary Care Provider: Margaret Peralta MD (Tel: [phone])      Brief History: Presented with swelling and redness BLE. Failed outpatient management with Lasix. Admitted with cellulitis BLE. Subjective:  Up in bedside chair. Says she is depressed she is not progressing as quickly as she would like. PT/OT have evaluated and recommend SNF on DC for continued therapy prior to returning home. Continues to report pain in legs. Says this has been ongoing issue and PCP has ordered outpatient MRI of back to assess further. Denies urinary incontinence, saddle anesthesia. Temp has been stable today. Denies chest pain, shortness of breath, cough, chills, abdominal pain, nausea.      Reviewed interval ancillary notes    Current Medications  atorvastatin (LIPITOR) tablet 10 mg, Every Other Day  gabapentin (NEURONTIN) capsule 100 mg, TID  busPIRone (BUSPAR) tablet 10 mg, Q6H PRN  HYDROcodone-acetaminophen (NORCO) 5-325 MG per tablet 1 tablet, Q6H PRN  calcium carbonate (TUMS) chewable tablet 500 mg, TID PRN  diclofenac sodium (VOLTAREN) 1 % gel 4 g, TID  lactobacillus (CULTURELLE) capsule 1 capsule, BID WC  sennosides-docusate sodium (SENOKOT-S) 8.6-50 MG tablet 2 tablet, Nightly  aspirin EC tablet 81 mg, Daily  buPROPion (WELLBUTRIN XL) extended release tablet 150 mg, Daily  vilazodone HCl (VIIBRYD) TABS 20 mg, Daily  ceFAZolin (ANCEF) 2,000 mg in dextrose 5 % 50 mL IVPB (mini-bag), Q8H  sodium chloride flush 0.9 % injection 5-40 mL, 2 times per day  sodium chloride flush 0.9 % injection 5-40 mL, PRN  0.9 % sodium chloride infusion, PRN  enoxaparin (LOVENOX) injection 40 mg, Daily  ondansetron (ZOFRAN-ODT) disintegrating tablet 4 mg, Q8H PRN   Or  ondansetron (ZOFRAN) injection 4 mg, Q6H PRN  polyethylene glycol (GLYCOLAX) packet 17 g, Daily PRN  acetaminophen (TYLENOL) tablet 650 mg, Q6H PRN   Or  acetaminophen (TYLENOL) suppository 650 mg, Q6H PRN  perflutren lipid microspheres (DEFINITY) injection 1.65 mg, ONCE PRN      Objective:  /61   Pulse 98   Temp 98.4 °F (36.9 °C) (Oral)   Resp 16   Ht 5' 1\" (1.549 m)   Wt 160 lb 12.8 oz (72.9 kg)   SpO2 94%   BMI 30.38 kg/m²     Intake/Output Summary (Last 24 hours) at 9/7/2022 1338  Last data filed at 9/7/2022 0846  Gross per 24 hour   Intake 240 ml   Output 400 ml   Net -160 ml      Wt Readings from Last 3 Encounters:   09/06/22 160 lb 12.8 oz (72.9 kg)   09/02/22 181 lb 12.8 oz (82.5 kg)   08/16/22 150 lb (68 kg)     General:  Awake, alert, oriented in NAD  Skin:  Warm and dry. No unusual bruising or rash  Neck:  Supple. No JVD appreciated  Chest:  Normal effort. Clear to auscultation, no wheezes/rhonchi/rales  Cardiovascular:  RRR, normal S1/S2, no murmur/gallop/rub  Abdomen:  Soft, nontender, +bowel sounds  Extremities:  Trace BLE edema  Neurological: No focal deficits  Psychological: Reports depression    Labs and Tests:  CBC:   Recent Labs     09/05/22 0438 09/06/22  1056 09/07/22  0832   WBC 6.2 7.4 7.3   HGB 11.5* 11.7* 10.4*   * 125* 130*     BMP:    Recent Labs     09/05/22  0438 09/06/22  1056 09/07/22  0832    131* 131*   K 3.5 3.5 3.5   CL 95* 92* 92*   CO2 33* 27 30   BUN 30* 35* 42*   CREATININE 0.8 1.1 1.2   GLUCOSE 106* 143* 166*     Hepatic: No results for input(s): AST, ALT, ALB, BILITOT, ALKPHOS in the last 72 hours. Venous Doppler 9/2/2022:  Right   Limited study due to pt's severe swelling and pain. No evidence of deep vein or superficial vein thrombosis involving the right   lower extremity. Right inguinal lymph node measuring 1.3 x 0.6 x 1.0 cm. Calf veins were poorly visualized on the right. Left   Limited study due to pt's severe swelling and pain.    No evidence of deep vein or superficial vein thrombosis involving the left   lower extremity. Left inguinal lymph node measuring 1.9 x 0.7 x 1.4 cm. Calf veins were poorly visualized on the left. Left medial fossa: Cystic structure measuring 2.3 x 0.7 x 2.0 cm. Echo 9/2/2022:  Summary   Mild septal left ventricular hypertrophy. Normal left ventricle size and systolic function with an estimated ejection fraction of 55-60%. No regional wall motion abnormalities are seen. Grade I diastolic dysfunction with normal LV filling pressures. Left sided pleural effusion noted. CXR 9/6/2022:  No acute cardiopulmonary disease    Problem List  Principal Problem:    Cellulitis  Active Problems:    Dyslipidemia    Class 1 obesity due to excess calories with body mass index (BMI) of 30.0 to 30.9 in adult    History of depression    Bilateral lower leg cellulitis    Peripheral edema    Redness and swelling of lower leg    High fever    Current mild episode of major depressive disorder without prior episode ([facility] [address]  Resolved Problems:    * No resolved hospital problems. *       Assessment & Plan:   Cellulitis BLE. Venous doppler negative for DT. Started on Ancef with improvement. Anticipate transition to keflex or augmentin at d/c. Continue ACE wraps and leg elevation when up. Lower leg edema. Improved with ACE and IV lasix, weight down to 160, reported to be 181 on admission. IV Lasix stopped 9/6 as BUN trending up and sodium down to 131, unchanged today. Will give gentle hydration, IV NS at 50 ml/hr x 10 hours. BMP in am.    Uncontrolled pain BLE. Patient describes burning sensation in BLE. Has been ongoing issue and PCP has ordered outpatient MRI to assess further. Continue gabapentin. Chronic diastolic CHF. EF 55-60%. Appears stable. ProBNP only 248 on admission. Anticipate Lasix on DC. Anxiety / depression. Continue bupropion and vilazodone. Fever. Spiked temp to 102 yesterday (9/6). CXR with no acute findings. UA does not appear infectious. Blood culture results pending.  , sed rate 70. Remains on IV Ancef. Appreciate ID recs. Disposition: PT/OT recommend SNF on DC for additional therapy prior to DC home. Referral placed to [facility], precert initiated. Appreciate case management assistance. Diet: ADULT DIET; Regular;  No Added Salt (3-4 gm)  Code:Full Code  DVT PPX: enoxaparin      CIERRA Rodrigues - CNP   9/7/2022 1:38 PM

## 2022-09-07 NOTE — DISCHARGE INSTR - COC
Continuity of Care Form    Patient Name: Lg Suarez   :  1940  MRN:  [id number]    Admit date:  2022  Discharge date:  ***    Code Status Order: Full Code   Advance Directives:     Admitting Physician:  Miley Harris MD  PCP: Malik Freeman MD    Discharging Nurse: [facility] Unit/Room#: [id number]  Discharging Unit Phone Number: ***    Emergency Contact:   Extended Emergency Contact Information  Primary Emergency Contact: Gilford Berthold  Address: [address] [facility], [address] [place] [address] [place]  Phone: [phone]  Mobile Phone: [phone]  Relation: Spouse  Secondary Emergency Contact: Parag Brown  Address: HOME   Home Phone: [phone]  Mobile Phone: [phone]  Relation: Child    Past Surgical History:  Past Surgical History:   Procedure Laterality Date    BREAST BIOPSY      CHOLECYSTECTOMY, LAPAROSCOPIC      COLONOSCOPY      normal; Ortega    COLONOSCOPY  12/3/13    Ortega- polyps    HIP SURGERY Left 2022    LEFT HIP HEMIARTHROPLASTY performed by Gloria Krause MD at [address] (CERVIX STATUS UNKNOWN)      AKASH AND BSO (CERVIX REMOVED)      UPPER GASTROINTESTINAL ENDOSCOPY  12/3/13    Joanne Ball; antritis       Immunization History:   Immunization History   Administered Date(s) Administered    COVID-19, MODERNA BLUE border, Primary or Immunocompromised, (age 12y+), IM, 100 mcg/0.5mL 2021, 2021, 10/28/2021, 2022    Influenza 2010    Influenza Vaccine, unspecified formulation 10/01/2016    Influenza Virus Vaccine 10/01/2012, 10/01/2013, 10/13/2015    Influenza, FLUAD, (age 72 y+), Adjuvanted, 0.5mL 10/12/2020    Influenza, High Dose (Fluzone 65 yrs and older) 10/12/2017, 10/09/2018, 10/28/2021    Pneumococcal Conjugate 13-valent (Hvjtbmv09) 2015    Pneumococcal Polysaccharide (Svjxmvpxw58) 2012    Td, unspecified formulation 2009    Tdap (Boostrix, Adacel) 01/08/2021    Zoster Live (Zostavax) 08/01/2013    Zoster Recombinant (Shingrix) 01/08/2021, 04/24/2021       Active Problems:  Patient Active Problem List   Diagnosis Code    AR (allergic rhinitis) J30.9    Arthritis of knee M17.10    Patient overweight E66.3    High fever R50.9    Postmenopausal Z78.0    Essential hypertension I10    Erosive gastritis K29.60    Current mild episode of major depressive disorder without prior episode ([facility] [address] [facility] F32.0    [facility] S72.002A    Fall at home [address] XXXA, Y92.009    Dyslipidemia E78.5    Cellulitis L03.90    Class 1 obesity due to excess calories with body mass index (BMI) of 30.0 to 30.9 in adult E66.09, Z68.30    History of depression Z86.59    Bilateral lower leg cellulitis L03.116, L03.115    Peripheral edema R60.9    Redness and swelling of lower leg M79.89, R23.8    Weight loss counseling, encounter for Z71.3       Isolation/Infection:   Isolation            No Isolation          Patient Infection Status       None to display            Nurse Assessment:  Last Vital Signs: /61   Pulse 98   Temp 98.4 °F (36.9 °C) (Oral)   Resp 16   Ht 5' 1\" (1.549 m)   Wt 160 lb 12.8 oz (72.9 kg)   SpO2 94%   BMI 30.38 kg/m²     Last documented pain score (0-10 scale): Pain Level: 6  Last Weight:   Wt Readings from Last 1 Encounters:   09/06/22 160 lb 12.8 oz (72.9 kg)     Mental Status:  {IP PT MENTAL STATUS:20030}    IV Access:  {[facility] IV ACCESS:176726217}    Nursing Mobility/ADLs:  Walking   {CHP DME DVQM:428832570}  Transfer  {CHP DME HBQY:431048055}  Bathing  {CHP DME VVPF:098784858}  Dressing  {CHP DME MWLH:185037380}  Toileting  {CHP DME UYTQ:818909006}  Feeding  {CHP DME WCEN:885113168}  Med Admin  {CHP DME UCKY:768568202}  Med Delivery   { JUSTIN MED Delivery:975770561}    Wound Care Documentation and Therapy:  Incision 05/16/22 Thigh Anterior;Proximal;Left (Active)   Number of days: 114 Elimination:  Continence: Bowel: {YES / HV:09713}  Bladder: {YES / V}  Urinary Catheter: {Urinary Catheter:823771490}   Colostomy/Ileostomy/Ileal Conduit: {YES / AZ:00927}       Date of Last BM: ***    Intake/Output Summary (Last 24 hours) at 2022 1739  Last data filed at 2022 0846  Gross per 24 hour   Intake 240 ml   Output 400 ml   Net -160 ml     I/O last 3 completed shifts:   In: 600 [P.O.:600]  Out: 1250 [Urine:1250]    Safety Concerns:     [employer] Safety Concerns:936825186}    Impairments/Disabilities:      [employer] Impairments/Disabilities:468296527}    Nutrition Therapy:  Current Nutrition Therapy:   [employer] Diet List:121954049}    Routes of Feeding: {CHP DME Other Feedings:595011529}  Liquids: {Slp liquid thickness:21461}  Daily Fluid Restriction: {CHP DME Yes amt example:296244658}  Last Modified Barium Swallow with Video (Video Swallowing Test): {Done Not Done PDDQ:612459696}    Treatments at the Time of Hospital Discharge:   Respiratory Treatments: ***  Oxygen Therapy:  {Therapy; copd oxygen:89598}  Ventilator:    {MH CC Vent CJHP:313417040}    Rehab Therapies: {THERAPEUTIC INTERVENTION:7233645402}  Weight Bearing Status/Restrictions: [address] [employer] Weight Bearin}  Other Medical Equipment (for information only, NOT a DME order):  {EQUIPMENT:927730276}  Other Treatments: ***    Patient's personal belongings (please select all that are sent with patient):  {CHP DME Belongings:641091823}    RN SIGNATURE:  {Esignature:372531199}    CASE MANAGEMENT/SOCIAL WORK SECTION    Inpatient Status Date: 2022    Readmission Risk Assessment Score:  Readmission Risk              Risk of Unplanned Readmission:  15           Discharging to Facility/ Agency   [facility]  PHONE: [phone]  FAX: [phone]     / signature: Electronically signed by SHAWNA Merritt on 22 at 3:17 PM EDT    PHYSICIAN SECTION    Prognosis: Good    Condition at Discharge: Stable    Rehab Potential (if transferring to Rehab): N/A    Recommended Labs or Other Treatments After Discharge: CBC, BMP weekly x 3    Physician Certification: I certify the above information and transfer of Bridgette Selby  is necessary for the continuing treatment of the diagnosis listed and that she requires [facility] for less 30 days.      Update Admission H&P: No change in H&P    PHYSICIAN SIGNATURE:  Electronically signed by CIERRA Vázquez CNP on 9/7/22 at 5:40 PM EDT

## 2022-09-07 NOTE — PLAN OF CARE
Problem: Discharge Planning  Goal: Discharge to home or other facility with appropriate resources  9/7/2022 0848 by Mary Lou Aguilar RN  Outcome: Progressing  9/6/2022 2259 by Yesi Cowart RN  Outcome: Progressing     Problem: Pain  Goal: Verbalizes/displays adequate comfort level or baseline comfort level  9/7/2022 0848 by Mary Lou Aguilar RN  Outcome: Progressing  9/6/2022 2259 by Yesi Cowart RN  Outcome: Progressing     Problem: Skin/Tissue Integrity  Goal: Absence of new skin breakdown  Description: 1. Monitor for areas of redness and/or skin breakdown  2. Assess vascular access sites hourly  3. Every 4-6 hours minimum:  Change oxygen saturation probe site  4. Every 4-6 hours:  If on nasal continuous positive airway pressure, respiratory therapy assess nares and determine need for appliance change or resting period.   9/7/2022 0848 by Mary Lou Aguilar RN  Outcome: Progressing  9/6/2022 2259 by Yesi Cowart RN  Outcome: Progressing     Problem: Safety - Adult  Goal: Free from fall injury  9/7/2022 0848 by Mary Lou Aguilar RN  Outcome: Progressing  9/6/2022 2259 by Yesi Cowart RN  Outcome: Progressing     Problem: ABCDS Injury Assessment  Goal: Absence of physical injury  9/7/2022 0848 by Mary Lou Aguilar RN  Outcome: Progressing  9/6/2022 2259 by Yesi Cowart RN  Outcome: Progressing

## 2022-09-07 NOTE — PROGRESS NOTES
[facility]   Phone: [phone]    Physical Therapy    [] Initial Evaluation            [x] Daily Treatment Note         [] Discharge Summary      Patient: Linden Blue   : 1940   MRN: [id number]   Date of Service:  2022  Admitting Diagnosis: Cellulitis  Current Admission Summary: per MD: Linden Blue is a 78 y.o. female who presents to the emergency department the chief complaint of worsening bilateral leg swelling and pain. In May she had a left hip fracture and surgery. States over the past 3 weeks she has had some swelling in her legs have gotten worse over the past 10 days with some redness in her bilateral lower extremities that began 3 days ago. She is not on any diuretics or antibiotics. She denies chest pain, shortness of breath, history of heart failure, nausea, vomiting, fevers, history of chronic kidney disease, diabetes or any skin infections or MRSA in the past.  She states moving her legs makes the pain worse. Denies abdominal pain, urinary bowel symptoms or any other symptoms. Past Medical History:  has a past medical history of AR (allergic rhinitis), Arthritis of knee, Depression, Erosive gastritis, GERD (gastroesophageal reflux disease), Hyperlipidemia, Internal hemorrhoids, Overweight(278.02), and Viral meningitis. Past Surgical History:  has a past surgical history that includes Cholecystectomy, laparoscopic (); Total abdominal hysterectomy w/ bilateral salpingoophorectomy (); Colonoscopy (); Colonoscopy (12/3/13); Upper gastrointestinal endoscopy (12/3/13); Breast biopsy; Hysterectomy; and hip surgery (Left, 2022). Discharge Recommendations: Linden Blue scored a  on the AM-PAC short mobility form. Current research shows that an AM-PAC score of 17 or less is typically not associated with a discharge to the patient's home setting.  Based on the patient's AM-PAC score and their current functional mobility deficits, it is recommended that the patient have 3-5 sessions per week of Physical Therapy at d/c to increase the patient's independence. Please see assessment section for further patient specific details. If pt declines the above recommendation, home health is next recommendation. If patient discharges prior to next session this note will serve as a discharge summary. Please see below for the latest assessment towards goals. DME Required For Discharge: DME to be determined at next level of care  Precautions/Restrictions: high fall risk  Weight Bearing Restrictions: no restrictions    Required Braces/Orthotics: no braces required  Positional Restrictions:no positional restrictions    Pre-Admission Information   Lives With: spouse    Type of Home: house  Home Layout: two level, laundry in basement  Home Access:  3 step to enter without rails   Bathroom Layout: . Comment: pt has been using bathroom on 1st floor since may and taking spongebaths  Bathroom Equipment: grab bars around toilet  Toilet Height: elevated height  Home Equipment: rollator - 4 wheeled walker  Transfer Assistance: modified independent with use of 4WRW  Ambulation Assistance:modified independent with use of 4WRW  ADL Assistance: requires assistance with bathing, requires assistance with dressing, . Comment: needs assitance for LEs(ex. Tying shoes)  IADL Assistance: requires assistance with meal prep, requires assistance with laundry, requires assistance with cleaning, pt helps with some meal prep and cleaning but  does most  Active :        [] Yes  [x] No  Hand Dominance: [] Left  [x] Right  Current Employment: .   Comment: not asked  Hobbies: reading biographies  Recent Falls: 1 in may resulting in hip fx     Examination   Vision:   Vision Gross Assessment: Impaired and Vision Corrective Device: wears glasses for reading  Hearing:   [facility]  Observation:   General Observation:  Edema of BLE- build up around knees, anxiety tremors noted on R hand and head. Subjective  General: Supine in bed upon arrival with alarm on. Agreeable to therapy. Pain: 8/10. Location: BLEs, L knee  Pain Interventions: pain medication in place prior to arrival and RN notified       Functional Mobility  Bed Mobility  Supine to Sit: moderate assistance  Scooting: stand by assistance  Comments:  Transfers  Sit to stand transfer: 2 person assistance with mod A of 2 to RW   Stand to sit transfer: moderate assistance  Comments: Patient stood from bed x 1, from chair x 3, from [facility] during session  Ambulation  Surface:level surface  Assistive Device: rolling walker  Assistance: 2 person assistance with mod A of 2   Distance: 3-4' x 3 (bed to chair, chair to [facility], BSC to chair)  Gait Mechanics: n/a  Comments:  fatigued during last transfer, chair pulled up behind patient for safety. Crying throughout [address] due to pain. Stair Mobility  Stair mobility not completed on this date. Comments:  Wheelchair Mobility:  No w/c mobility completed on this date. Comments:  Balance  Static Sitting Balance: fair (+): maintains balance at SBA/supervision without use of UE support  Static Standing Balance: poor: requires mod (A) to maintain balance  Dynamic Standing Balance: poor (-): requires max (A) to maintain balance  Comments: static standing x 2 reps x 30 seconds for daniel-care with mod A. Incontinent of urine with standing attempt. Purewick replaced.      Other Therapeutic Interventions  Chair push-ups x 6  LAQ x 10 bilaterally (partial range on LLE due to pain)  Seated marching x 10 bilaterally    Functional Outcomes  AM-PAC Inpatient Mobility Raw Score : 11              Cognition  WFL  Orientation:    alert and oriented x 4  Command Following:   [facility]    Education  Barriers To Learning: emotional  Patient Education: patient educated on goals, PT role and benefits, plan of care, discharge recommendations  Learning Assessment:  patient verbalizes and demonstrates understanding    Assessment  Activity Tolerance: Patient significantly limited by pain. Crying with mobility and weightbearing but asking to complete more mobility as she is so weak. Impairments Requiring Therapeutic Intervention: decreased functional mobility, decreased ADL status, decreased ROM, decreased strength, decreased balance  Prognosis: good  Clinical Assessment: Patient not at baseline function and would benefit from skilled PT to address above deficits and facilitate return to baseline function. Patient currently needs 2-person assistance for transfers and short distance ambulation. Significantly limited by pain. Safety Interventions: patient left in chair, chair alarm in place, call light within reach, patient at risk for falls, and nurse notified    Plan  Frequency: 3-5 x/per week  Current Treatment Recommendations: strengthening, ROM, balance training, functional mobility training, manual lymphatic drainage, patient/caregiver education, ADL/self-care training, and home exercise program    Goals  Patient Goals: to go home   Short Term Goals:  Time Frame: upon d/c  Patient will complete bed mobility at modified independent   Patient will complete transfers at contact guard assistance   Patient will ambulate 10 ft with use of LRAD at moderate assistance  No goals met this treatment.       Therapy Session Time      Individual Group Co-treatment   Time In     1030   Time Out     1139   Minutes     69     Timed Code Treatment Minutes:  69 Minutes  Total Treatment Minutes:  69       Electronically Signed By: Sury Morris PT, DPT [id number]

## 2022-09-07 NOTE — PROGRESS NOTES
Shift assessment completed, pt is alert and oriented, VSS, fall precautions in place, call light within reach, denies any needs at this time, see flowsheets and MAR, will monitor pt. The care plan and education has been reviewed and mutually agreed upon with the patient. 2343: Urine sample sent.

## 2022-09-07 NOTE — PROGRESS NOTES
never smoked. She has never used smokeless tobacco.  Alcohol use:   reports current alcohol use. Currently lives in: [facility]   reports no history of drug use. COVID VACCINATION AND LAB RESULT RECORDS:     Internal Administration   First Dose COVID-19, LISANDRA wray, Primary or Immunocompromised, (age 12y+), IM, 100 mcg/0.5mL  01/21/2021   Second Dose COVID-19, LISANDRA wray, Primary or Immunocompromised, (age 12y+), IM, 100 mcg/0.5mL   02/18/2021       Last COVID Lab No results found for: SARS-COV-2, SARS-COV-2 RNA, SARS-COV-2, SARS-COV-2, SARS-COV-2 BY PCR, SARS-COV-2, SARS-COV-2, SARS-COV-2         Assessment:     The patient is a 78 y.o. old female who  has a past medical history of AR (allergic rhinitis), Arthritis of knee, Depression, Erosive gastritis (10/28/2019), GERD (gastroesophageal reflux disease), Hyperlipidemia, Internal hemorrhoids, Overweight(278.02), and Viral meningitis (5/12). with following problems:    High fever of 101.8-improving  Bilateral leg cellulitis-covered with cefazolin  Negative bilateral lower extremity venous Doppler study on 9/2/2022  Gastroesophageal reflux disease-stable  History of depression  Dyslipidemia  Obesity Class 1 due to excess calorie intake : Body mass index is 30.38 kg/m²-counseling done      Discussion:      The patient remains on IV cefazolin. She had a one-time fever of 101.8 yesterday. Fever curve is coming down spontaneously. Blood cultures from 9/6/2022 remain negative. Serum creatinine is 1.2 today. White cell count 7300 with hemoglobin of 10.4 and platelet count of 522,879. Plan:     Diagnostic Workup:      Continue to follow  fever curve, WBC count and blood cultures. Continue to monitor blood counts, liver and renal function. Antimicrobials:     Will continue IV cefazolin for now at current dose  The blood cultures remain negative and the patient remains afebrile, will plan to switch her to oral antibiotic this weight is an extremely good result, even if you never reach your \"dream\" or \"ideal\" weight. Lifestyle changes including changing eating habits, substituting excess carbohydrates with proteins, stress reduction, using self-help programs like Weight Watchers®, Overeaters Anonymous®, and Take Off Pounds Sensibly (TOPS)© , following DASH diet and increasing exercise or walking briskly daily for half hour to and hour 5-7 days a week was suggested among other measures. Information was given about various weight loss education programs and their websites like www.cdc.gov/healthyweight, www.choosemyplate.gov and www.health.gov/dietaryguidelines/     TIME SPENT TODAY:     - Spent over  36 minutes on visit (including interval history, physical exam, review of data including labs, cultures, imaging, development and implementation of treatment plan and coordination of complex care). More than 50 percent of this includes face-to-face time spent with the patient for counseling and coordination of care. Thank you for involving me in the care of your patient. I will continue to follow. If you have anyadditional questions, please do not hesitate to contact me. Subjective: Interval history: Interval history was obtained from chart review and patient/ RN. The patient is afebrile today. She is tolerating antibiotics okay. No diarrhea     REVIEW OF SYSTEMS:      Review of Systems   Constitutional:  Positive for fatigue. Negative for chills, diaphoresis and fever. HENT:  Negative for ear discharge, ear pain, postnasal drip, rhinorrhea, sinus pressure, sinus pain and sore throat. Eyes:  Negative for discharge and redness. Respiratory:  Negative for cough, shortness of breath and wheezing. Cardiovascular:  Negative for chest pain and leg swelling. Gastrointestinal:  Negative for abdominal pain, constipation, diarrhea and nausea. Endocrine: Negative for cold intolerance, heat intolerance and polydipsia. Genitourinary:  Negative for dysuria, flank pain, frequency, hematuria and urgency. Musculoskeletal:  Negative for back pain and myalgias. Skin:  Negative for rash. Allergic/Immunologic: Negative for immunocompromised state. Neurological:  Negative for dizziness, seizures and headaches. Hematological:  Does not bruise/bleed easily. Psychiatric/Behavioral:  Negative for agitation, hallucinations and suicidal ideas. The patient is not nervous/anxious. All other systems reviewed and are negative. Past Medical History: All past medical history reviewed today. Past Medical History:   Diagnosis Date    AR (allergic rhinitis)     Arthritis of knee     Pruis    Depression     Erosive gastritis 10/28/2019    EGD October 2019, he did with PPI    GERD (gastroesophageal reflux disease)     Hyperlipidemia     Internal hemorrhoids     Overweight(278.02)     Viral meningitis 5/12       Past Surgical History: All past surgical history was reviewed today. Past Surgical History:   Procedure Laterality Date    BREAST BIOPSY      CHOLECYSTECTOMY, LAPAROSCOPIC  2003    COLONOSCOPY  8/06    normal; [facility]    COLONOSCOPY  12/3/13    [facility]- polyps    HIP SURGERY Left 5/16/2022    LEFT HIP HEMIARTHROPLASTY performed by Moreno Chahal MD at [address] [place] [address] ([address] [place] [address])      [facility] AND BSO (CERVIX REMOVED)  2003    UPPER GASTROINTESTINAL ENDOSCOPY  12/3/13    [facility] [place]; antritis       Family History: All family history was reviewed today.         Problem Relation Age of Onset    Breast Cancer Mother     Bipolar Disorder Brother        Objective:       PHYSICAL EXAM:      Vitals:   Vitals:    09/06/22 2341 09/07/22 0416 09/07/22 0745 09/07/22 0820   BP: (!) 108/58 117/64 130/61    Pulse: 94 99 98    Resp: 16 16 16 16   Temp: 99.3 °F (37.4 °C) 98.6 °F (37 °C) 98.4 °F (36.9 °C)    TempSrc: Oral Oral Oral    SpO2: 94% 95% 94%    Weight:       Height:           Physical Exam  Vitals and nursing note and drains: All vascular access sites are healthy with no local erythema, discharge or tenderness. Intake and output:    I/O last 3 completed shifts: In: 600 [P.O.:600]  Out: 1250 [Urine:1250]    Lab Data:   All available labs and old records have been reviewed by me. CBC:  Recent Labs     09/05/22 0438 09/06/22  1056 09/07/22  0832   WBC 6.2 7.4 7.3   RBC 3.99* 4.11 3.60*   HGB 11.5* 11.7* 10.4*   HCT 35.0* 35.8* 31.4*   * 125* 130*   MCV 87.6 87.2 87.3   MCH 28.9 28.6 28.8   MCHC 33.0 32.8 32.9   RDW 14.4 14.5 14.1        BMP:  Recent Labs     09/05/22 0438 09/06/22  1056 09/07/22  0832    131* 131*   K 3.5 3.5 3.5   CL 95* 92* 92*   CO2 33* 27 30   BUN 30* 35* 42*   CREATININE 0.8 1.1 1.2   CALCIUM 9.0 8.5 8.7   GLUCOSE 106* 143* 166*        Hepatic Function Panel:   Lab Results   Component Value Date/Time    ALKPHOS 119 09/02/2022 12:24 PM    ALT 15 09/02/2022 12:24 PM    AST 20 09/02/2022 12:24 PM    PROT 6.7 09/02/2022 12:24 PM    PROT 7.3 10/17/2012 08:47 AM    BILITOT <0.2 09/02/2022 12:24 PM    BILIDIR <0.2 10/22/2019 03:08 PM    IBILI see below 10/22/2019 03:08 PM    LABALBU 4.1 09/02/2022 12:24 PM       CPK:   Lab Results   Component Value Date    CKTOTAL 89 09/02/2022     ESR:   Lab Results   Component Value Date    SEDRATE 70 (H) 09/06/2022     CRP:   Lab Results   Component Value Date    .9 (H) 09/06/2022           Imaging: All pertinent images and reports for the current visit were reviewed by me during this visit. I reviewed the chest x-ray/CT scan/MRI images and independently interpreted the findings and results today. XR CHEST (2 VW)   Final Result   No acute cardiopulmonary disease         VL Extremity Venous Bilateral             Medications: All current and past medications were reviewed.      atorvastatin  10 mg Oral Every Other Day    gabapentin  100 mg Oral TID    diclofenac sodium  4 g Topical TID    lactobacillus  1 capsule Oral BID WC sennosides-docusate sodium  2 tablet Oral Nightly    aspirin  81 mg Oral Daily    buPROPion  150 mg Oral Daily    vilazodone HCl  20 mg Oral Daily    ceFAZolin  2,000 mg IntraVENous Q8H    sodium chloride flush  5-40 mL IntraVENous 2 times per day    enoxaparin  40 mg SubCUTAneous Daily        sodium chloride      sodium chloride 10 mL/hr at 09/07/22 0142       busPIRone, HYDROcodone 5 mg - acetaminophen, calcium carbonate, sodium chloride flush, sodium chloride, ondansetron **OR** ondansetron, polyethylene glycol, acetaminophen **OR** acetaminophen, perflutren lipid microspheres      Problem list:       Patient Active Problem List   Diagnosis Code    AR (allergic rhinitis) J30.9    Arthritis of knee M17.10    Patient overweight E66.3    High fever R50.9    Postmenopausal Z78.0    Essential hypertension I10    Erosive gastritis K29.60    Current mild episode of major depressive disorder without prior episode ([address] F32.0    Left displaced femoral neck fracture ([facility] [address] S72.002A    Fall at home [address] Olevia Screen, Y92.009    Dyslipidemia E78.5    Cellulitis L03.90    Class 1 obesity due to excess calories with body mass index (BMI) of 30.0 to 30.9 in adult E66.09, Z68.30    History of depression Z86.59    Bilateral lower leg cellulitis L03.116, L03.115    Peripheral edema R60.9    Redness and swelling of lower leg M79.89, R23.8    Weight loss counseling, encounter for Z71.3       Please note that this chart was generated using Dragon dictation software. Although every effort was made to ensure the accuracy of this automated transcription, some errors in transcription may have occurred inadvertently. If you may need any clarification, please do not hesitate to contact me through EPIC or at the phone number provided below with my electronic signature.   Any pictures or media included in this note were obtained after taking informed verbal consent from the patient and with their approval to include those in the patient's medical record. Cassidy Piedra MD, MPH, [address], [address] [place] [address]  9/7/2022, 4:07 PM  [facility]   [address] [facility], [address] [place] [address]  Office: [phone]  Fax: [phone]  In-person Clinic days:  Tuesday & Thursday a.m. Virtual clinic days: Monday, Wednesday & Friday a.m.

## 2022-09-08 LAB
A/G RATIO: 0.9 (ref 1.1–2.2)
ALBUMIN SERPL-MCNC: 3 G/DL (ref 3.4–5)
ALP BLD-CCNC: 95 U/L (ref 40–129)
ALT SERPL-CCNC: <5 U/L (ref 10–40)
ANION GAP SERPL CALCULATED.3IONS-SCNC: 11 MMOL/L (ref 3–16)
ANION GAP SERPL CALCULATED.3IONS-SCNC: 13 MMOL/L (ref 3–16)
AST SERPL-CCNC: 21 U/L (ref 15–37)
BASOPHILS ABSOLUTE: 0 K/UL (ref 0–0.2)
BASOPHILS RELATIVE PERCENT: 0.6 %
BILIRUB SERPL-MCNC: <0.2 MG/DL (ref 0–1)
BUN BLDV-MCNC: 43 MG/DL (ref 7–20)
BUN BLDV-MCNC: 45 MG/DL (ref 7–20)
CALCIUM SERPL-MCNC: 8.3 MG/DL (ref 8.3–10.6)
CALCIUM SERPL-MCNC: 9 MG/DL (ref 8.3–10.6)
CHLORIDE BLD-SCNC: 92 MMOL/L (ref 99–110)
CHLORIDE BLD-SCNC: 94 MMOL/L (ref 99–110)
CO2: 25 MMOL/L (ref 21–32)
CO2: 28 MMOL/L (ref 21–32)
CREAT SERPL-MCNC: 1.1 MG/DL (ref 0.6–1.2)
CREAT SERPL-MCNC: 1.1 MG/DL (ref 0.6–1.2)
EOSINOPHILS ABSOLUTE: 0 K/UL (ref 0–0.6)
EOSINOPHILS RELATIVE PERCENT: 1 %
FERRITIN: 670.1 NG/ML (ref 15–150)
FOLATE: 19.54 NG/ML (ref 4.78–24.2)
GFR AFRICAN AMERICAN: 57
GFR AFRICAN AMERICAN: 57
GFR NON-AFRICAN AMERICAN: 47
GFR NON-AFRICAN AMERICAN: 47
GLUCOSE BLD-MCNC: 108 MG/DL (ref 70–99)
GLUCOSE BLD-MCNC: 110 MG/DL (ref 70–99)
HCT VFR BLD CALC: 29.4 % (ref 36–48)
HCT VFR BLD CALC: 33.3 % (ref 36–48)
HEMOGLOBIN: 10.7 G/DL (ref 12–16)
HEMOGLOBIN: 9.6 G/DL (ref 12–16)
IRON SATURATION: 9 % (ref 15–50)
IRON: 15 UG/DL (ref 37–145)
LYMPHOCYTES ABSOLUTE: 0.6 K/UL (ref 1–5.1)
LYMPHOCYTES RELATIVE PERCENT: 13 %
MAGNESIUM: 2.2 MG/DL (ref 1.8–2.4)
MAGNESIUM: 2.2 MG/DL (ref 1.8–2.4)
MCH RBC QN AUTO: 28.4 PG (ref 26–34)
MCH RBC QN AUTO: 28.8 PG (ref 26–34)
MCHC RBC AUTO-ENTMCNC: 32.2 G/DL (ref 31–36)
MCHC RBC AUTO-ENTMCNC: 32.8 G/DL (ref 31–36)
MCV RBC AUTO: 86.7 FL (ref 80–100)
MCV RBC AUTO: 89.4 FL (ref 80–100)
MONOCYTES ABSOLUTE: 0.1 K/UL (ref 0–1.3)
MONOCYTES RELATIVE PERCENT: 1.4 %
NEUTROPHILS ABSOLUTE: 3.6 K/UL (ref 1.7–7.7)
NEUTROPHILS RELATIVE PERCENT: 84 %
OSMOLALITY: 292 MOSM/KG (ref 280–301)
PDW BLD-RTO: 14 % (ref 12.4–15.4)
PDW BLD-RTO: 14.2 % (ref 12.4–15.4)
PHOSPHORUS: 3.3 MG/DL (ref 2.5–4.9)
PLATELET # BLD: 132 K/UL (ref 135–450)
PLATELET # BLD: 180 K/UL (ref 135–450)
PMV BLD AUTO: 7.9 FL (ref 5–10.5)
PMV BLD AUTO: 8 FL (ref 5–10.5)
POTASSIUM SERPL-SCNC: 3.2 MMOL/L (ref 3.5–5.1)
POTASSIUM SERPL-SCNC: 4.5 MMOL/L (ref 3.5–5.1)
PRO-BNP: 260 PG/ML (ref 0–449)
RBC # BLD: 3.39 M/UL (ref 4–5.2)
RBC # BLD: 3.72 M/UL (ref 4–5.2)
SODIUM BLD-SCNC: 131 MMOL/L (ref 136–145)
SODIUM BLD-SCNC: 132 MMOL/L (ref 136–145)
TOTAL IRON BINDING CAPACITY: 159 UG/DL (ref 260–445)
TOTAL PROTEIN: 6.5 G/DL (ref 6.4–8.2)
TROPONIN: 0.01 NG/ML
URIC ACID, SERUM: 7.3 MG/DL (ref 2.6–6)
VITAMIN B-12: 265 PG/ML (ref 211–911)
WBC # BLD: 4.2 K/UL (ref 4–11)
WBC # BLD: 6 K/UL (ref 4–11)

## 2022-09-08 PROCEDURE — 97535 SELF CARE MNGMENT TRAINING: CPT

## 2022-09-08 PROCEDURE — 2580000003 HC RX 258: Performed by: NURSE PRACTITIONER

## 2022-09-08 PROCEDURE — 6370000000 HC RX 637 (ALT 250 FOR IP): Performed by: INTERNAL MEDICINE

## 2022-09-08 PROCEDURE — 85025 COMPLETE CBC W/AUTO DIFF WBC: CPT

## 2022-09-08 PROCEDURE — 6370000000 HC RX 637 (ALT 250 FOR IP): Performed by: NURSE PRACTITIONER

## 2022-09-08 PROCEDURE — 6370000000 HC RX 637 (ALT 250 FOR IP): Performed by: FAMILY MEDICINE

## 2022-09-08 PROCEDURE — 6360000002 HC RX W HCPCS

## 2022-09-08 PROCEDURE — 6370000000 HC RX 637 (ALT 250 FOR IP): Performed by: HOSPITALIST

## 2022-09-08 PROCEDURE — 1200000000 HC SEMI PRIVATE

## 2022-09-08 PROCEDURE — 82607 VITAMIN B-12: CPT

## 2022-09-08 PROCEDURE — 99232 SBSQ HOSP IP/OBS MODERATE 35: CPT | Performed by: INTERNAL MEDICINE

## 2022-09-08 PROCEDURE — 84550 ASSAY OF BLOOD/URIC ACID: CPT

## 2022-09-08 PROCEDURE — 97530 THERAPEUTIC ACTIVITIES: CPT

## 2022-09-08 PROCEDURE — 97110 THERAPEUTIC EXERCISES: CPT

## 2022-09-08 PROCEDURE — 82728 ASSAY OF FERRITIN: CPT

## 2022-09-08 PROCEDURE — 2500000003 HC RX 250 WO HCPCS

## 2022-09-08 PROCEDURE — 6360000002 HC RX W HCPCS: Performed by: NURSE PRACTITIONER

## 2022-09-08 PROCEDURE — 84484 ASSAY OF TROPONIN QUANT: CPT

## 2022-09-08 PROCEDURE — 82746 ASSAY OF FOLIC ACID SERUM: CPT

## 2022-09-08 PROCEDURE — 80048 BASIC METABOLIC PNL TOTAL CA: CPT

## 2022-09-08 PROCEDURE — 83880 ASSAY OF NATRIURETIC PEPTIDE: CPT

## 2022-09-08 PROCEDURE — 83540 ASSAY OF IRON: CPT

## 2022-09-08 PROCEDURE — 6360000002 HC RX W HCPCS: Performed by: HOSPITALIST

## 2022-09-08 PROCEDURE — 6370000000 HC RX 637 (ALT 250 FOR IP)

## 2022-09-08 PROCEDURE — 84100 ASSAY OF PHOSPHORUS: CPT

## 2022-09-08 PROCEDURE — 83735 ASSAY OF MAGNESIUM: CPT

## 2022-09-08 PROCEDURE — 83550 IRON BINDING TEST: CPT

## 2022-09-08 PROCEDURE — 2580000003 HC RX 258: Performed by: HOSPITALIST

## 2022-09-08 PROCEDURE — 85027 COMPLETE CBC AUTOMATED: CPT

## 2022-09-08 PROCEDURE — 36415 COLL VENOUS BLD VENIPUNCTURE: CPT

## 2022-09-08 PROCEDURE — 83930 ASSAY OF BLOOD OSMOLALITY: CPT

## 2022-09-08 PROCEDURE — 93005 ELECTROCARDIOGRAM TRACING: CPT | Performed by: PHYSICIAN ASSISTANT

## 2022-09-08 PROCEDURE — 80053 COMPREHEN METABOLIC PANEL: CPT

## 2022-09-08 RX ORDER — MORPHINE SULFATE 2 MG/ML
2 INJECTION, SOLUTION INTRAMUSCULAR; INTRAVENOUS EVERY 4 HOURS PRN
Status: DISCONTINUED | OUTPATIENT
Start: 2022-09-08 | End: 2022-09-14 | Stop reason: HOSPADM

## 2022-09-08 RX ORDER — LABETALOL HYDROCHLORIDE 5 MG/ML
INJECTION, SOLUTION INTRAVENOUS
Status: COMPLETED
Start: 2022-09-08 | End: 2022-09-08

## 2022-09-08 RX ORDER — NITROGLYCERIN 0.4 MG/1
TABLET SUBLINGUAL
Status: COMPLETED
Start: 2022-09-08 | End: 2022-09-08

## 2022-09-08 RX ORDER — NITROGLYCERIN 0.4 MG/1
0.4 TABLET SUBLINGUAL EVERY 5 MIN PRN
Status: DISCONTINUED | OUTPATIENT
Start: 2022-09-08 | End: 2022-09-14 | Stop reason: HOSPADM

## 2022-09-08 RX ORDER — MORPHINE SULFATE 2 MG/ML
INJECTION, SOLUTION INTRAMUSCULAR; INTRAVENOUS
Status: COMPLETED
Start: 2022-09-08 | End: 2022-09-08

## 2022-09-08 RX ORDER — PANTOPRAZOLE SODIUM 40 MG/1
40 TABLET, DELAYED RELEASE ORAL
Status: DISCONTINUED | OUTPATIENT
Start: 2022-09-08 | End: 2022-09-12

## 2022-09-08 RX ORDER — LABETALOL HYDROCHLORIDE 5 MG/ML
10 INJECTION, SOLUTION INTRAVENOUS ONCE
Status: COMPLETED | OUTPATIENT
Start: 2022-09-08 | End: 2022-09-08

## 2022-09-08 RX ORDER — POTASSIUM CHLORIDE 20 MEQ/1
20 TABLET, EXTENDED RELEASE ORAL 2 TIMES DAILY WITH MEALS
Status: COMPLETED | OUTPATIENT
Start: 2022-09-08 | End: 2022-09-08

## 2022-09-08 RX ORDER — POLYETHYLENE GLYCOL 3350 17 G/17G
17 POWDER, FOR SOLUTION ORAL DAILY
Status: DISCONTINUED | OUTPATIENT
Start: 2022-09-08 | End: 2022-09-14 | Stop reason: HOSPADM

## 2022-09-08 RX ORDER — SENNA AND DOCUSATE SODIUM 50; 8.6 MG/1; MG/1
2 TABLET, FILM COATED ORAL 2 TIMES DAILY
Status: DISCONTINUED | OUTPATIENT
Start: 2022-09-08 | End: 2022-09-14 | Stop reason: HOSPADM

## 2022-09-08 RX ORDER — FUROSEMIDE 20 MG/1
20 TABLET ORAL DAILY
Status: DISCONTINUED | OUTPATIENT
Start: 2022-09-08 | End: 2022-09-14 | Stop reason: HOSPADM

## 2022-09-08 RX ADMIN — MORPHINE SULFATE 2 MG: 2 INJECTION, SOLUTION INTRAMUSCULAR; INTRAVENOUS at 21:55

## 2022-09-08 RX ADMIN — Medication 1 CAPSULE: at 08:55

## 2022-09-08 RX ADMIN — BUPROPION HYDROCHLORIDE 150 MG: 150 TABLET, EXTENDED RELEASE ORAL at 08:55

## 2022-09-08 RX ADMIN — IRON SUCROSE 200 MG: 20 INJECTION, SOLUTION INTRAVENOUS at 17:08

## 2022-09-08 RX ADMIN — NITROGLYCERIN 0.4 MG: 0.4 TABLET, ORALLY DISINTEGRATING SUBLINGUAL at 21:51

## 2022-09-08 RX ADMIN — Medication 1 CAPSULE: at 17:03

## 2022-09-08 RX ADMIN — POTASSIUM CHLORIDE 20 MEQ: 1500 TABLET, EXTENDED RELEASE ORAL at 17:03

## 2022-09-08 RX ADMIN — FUROSEMIDE 20 MG: 20 TABLET ORAL at 13:49

## 2022-09-08 RX ADMIN — PANTOPRAZOLE SODIUM 40 MG: 40 TABLET, DELAYED RELEASE ORAL at 08:55

## 2022-09-08 RX ADMIN — DICLOFENAC SODIUM 4 G: 10 GEL TOPICAL at 23:28

## 2022-09-08 RX ADMIN — POTASSIUM CHLORIDE 20 MEQ: 1500 TABLET, EXTENDED RELEASE ORAL at 08:55

## 2022-09-08 RX ADMIN — ENOXAPARIN SODIUM 40 MG: 100 INJECTION SUBCUTANEOUS at 08:54

## 2022-09-08 RX ADMIN — DICLOFENAC SODIUM 4 G: 10 GEL TOPICAL at 13:49

## 2022-09-08 RX ADMIN — GABAPENTIN 100 MG: 100 CAPSULE ORAL at 08:55

## 2022-09-08 RX ADMIN — CEFAZOLIN 2000 MG: 2 INJECTION, POWDER, FOR SOLUTION INTRAMUSCULAR; INTRAVENOUS at 01:14

## 2022-09-08 RX ADMIN — CEFAZOLIN 2000 MG: 2 INJECTION, POWDER, FOR SOLUTION INTRAMUSCULAR; INTRAVENOUS at 08:58

## 2022-09-08 RX ADMIN — HYDROCODONE BITARTRATE AND ACETAMINOPHEN 1 TABLET: 5; 325 TABLET ORAL at 05:02

## 2022-09-08 RX ADMIN — NITROGLYCERIN 0.4 MG: 0.4 TABLET SUBLINGUAL at 21:51

## 2022-09-08 RX ADMIN — POLYETHYLENE GLYCOL 3350 17 G: 17 POWDER, FOR SOLUTION ORAL at 13:50

## 2022-09-08 RX ADMIN — GABAPENTIN 100 MG: 100 CAPSULE ORAL at 13:49

## 2022-09-08 RX ADMIN — ATORVASTATIN CALCIUM 10 MG: 10 TABLET, FILM COATED ORAL at 22:19

## 2022-09-08 RX ADMIN — DICLOFENAC SODIUM 4 G: 10 GEL TOPICAL at 08:55

## 2022-09-08 RX ADMIN — LABETALOL HYDROCHLORIDE 10 MG: 5 INJECTION, SOLUTION INTRAVENOUS at 21:50

## 2022-09-08 RX ADMIN — STANDARDIZED SENNA CONCENTRATE AND DOCUSATE SODIUM 2 TABLET: 8.6; 5 TABLET ORAL at 22:19

## 2022-09-08 RX ADMIN — ASPIRIN 81 MG: 81 TABLET, COATED ORAL at 08:55

## 2022-09-08 RX ADMIN — NITROGLYCERIN 0.4 MG: 0.4 TABLET SUBLINGUAL at 21:52

## 2022-09-08 RX ADMIN — ACETAMINOPHEN 650 MG: 325 TABLET ORAL at 17:46

## 2022-09-08 RX ADMIN — CEFAZOLIN 2000 MG: 2 INJECTION, POWDER, FOR SOLUTION INTRAMUSCULAR; INTRAVENOUS at 17:05

## 2022-09-08 RX ADMIN — VILAZODONE HYDROCHLORIDE 20 MG: 20 TABLET ORAL at 08:55

## 2022-09-08 RX ADMIN — GABAPENTIN 100 MG: 100 CAPSULE ORAL at 22:20

## 2022-09-08 ASSESSMENT — PAIN SCALES - GENERAL
PAINLEVEL_OUTOF10: 8
PAINLEVEL_OUTOF10: 9
PAINLEVEL_OUTOF10: 8
PAINLEVEL_OUTOF10: 5

## 2022-09-08 ASSESSMENT — ENCOUNTER SYMPTOMS
SINUS PRESSURE: 0
COUGH: 0
BACK PAIN: 0
CONSTIPATION: 0
DIARRHEA: 0
SHORTNESS OF BREATH: 0
EYE REDNESS: 0
EYE DISCHARGE: 0
ABDOMINAL PAIN: 0
NAUSEA: 0
SINUS PAIN: 0
SORE THROAT: 0
RHINORRHEA: 0
WHEEZING: 0

## 2022-09-08 ASSESSMENT — PAIN DESCRIPTION - LOCATION: LOCATION: LEG

## 2022-09-08 ASSESSMENT — PAIN DESCRIPTION - ORIENTATION: ORIENTATION: RIGHT;LEFT

## 2022-09-08 ASSESSMENT — PAIN DESCRIPTION - DESCRIPTORS: DESCRIPTORS: BURNING

## 2022-09-08 NOTE — PROGRESS NOTES
Nephrology Consult received - full consult note to follow. Thank you for allowing us to participate in this patients care. Please do not hesitate to contact, if any questions or concerns. We will follow along with you. Brooklyn Posey MD  [facility] [address] [place] [address]   [phone] or [employer].

## 2022-09-08 NOTE — PROGRESS NOTES
Pt resting awake in bed. Ate 100% of breakfast meal, able to feed self. PO meds taken easily with water. C/o stiffness in LUCA legs. Ace wraps noted to BLE. Pt reports full sensation in LUCA LE, warm to touch and can moves toes on command LUCA. Pt is axox4 and able to answer questions and follow commands throughout assessment. Will continue to monitor.

## 2022-09-08 NOTE — PLAN OF CARE
Problem: Discharge Planning  Goal: Discharge to home or other facility with appropriate resources  9/7/2022 2112 by Waleska Delvalle RN  Outcome: Progressing     Problem: Pain  Goal: Verbalizes/displays adequate comfort level or baseline comfort level  9/7/2022 2112 by Waleska Delvalle RN  Outcome: Progressing     Problem: Skin/Tissue Integrity  Goal: Absence of new skin breakdown  Description: 1. Monitor for areas of redness and/or skin breakdown  2. Assess vascular access sites hourly  3. Every 4-6 hours minimum:  Change oxygen saturation probe site  4. Every 4-6 hours:  If on nasal continuous positive airway pressure, respiratory therapy assess nares and determine need for appliance change or resting period.   9/7/2022 2112 by Waleska Delvalle RN  Outcome: Progressing     Problem: Safety - Adult  Goal: Free from fall injury  9/7/2022 2112 by Waleska Delvalle RN  Outcome: Progressing

## 2022-09-08 NOTE — PROGRESS NOTES
[facility] Department   Phone: [phone]    Occupational Therapy    [] Initial Evaluation            [x] Daily Treatment Note         [] Discharge Summary      Patient: Sincere Sanchez   : 1940   MRN: [id number]   Date of Service:  2022    Admitting Diagnosis:  Cellulitis  Current Admission Summary:   78 y.o. female who presents to the emergency department the chief complaint of worsening bilateral leg swelling and pain. In May she had a left hip fracture and surgery. States over the past 3 weeks she has had some swelling in her legs have gotten worse over the past 10 days with some redness in her bilateral lower extremities that began 3 days ago. She is not on any diuretics or antibiotics. She denies chest pain, shortness of breath, history of heart failure, nausea, vomiting, fevers, history of chronic kidney disease, diabetes or any skin infections or MRSA in the past.  She states moving her legs makes the pain worse. Denies abdominal pain, urinary bowel symptoms or any other symptoms. Past Medical History:  has a past medical history of AR (allergic rhinitis), Arthritis of knee, Depression, Erosive gastritis, GERD (gastroesophageal reflux disease), Hyperlipidemia, Internal hemorrhoids, Overweight(278.02), and Viral meningitis. Past Surgical History:  has a past surgical history that includes Cholecystectomy, laparoscopic (); Total abdominal hysterectomy w/ bilateral salpingoophorectomy (); Colonoscopy (); Colonoscopy (12/3/13); Upper gastrointestinal endoscopy (12/3/13); Breast biopsy; Hysterectomy; and hip surgery (Left, 2022). Discharge Recommendations: Sincere Sanchez scored a 13/24 on the AM-PAC ADL Inpatient form. Current research shows that an AM-PAC score of 17 or less is typically not associated with a discharge to the patient's home setting.  Based on the patient's AM-PAC score and their current ADL deficits, it is recommended that the patient have 3-5 sessions per week of Occupational Therapy at d/c to increase the patient's independence. Please see assessment section for further patient specific details. If patient discharges prior to next session this note will serve as a discharge summary. Please see below for the latest assessment towards goals. DME Required For Discharge: DME to be determined at next level of care    Precautions/Restrictions: medium fall risk  Weight Bearing Restrictions: no restrictions  [] Right Upper Extremity  [] Left Upper Extremity [] Right Lower Extremity  [] Left Lower Extremity     Required Braces/Orthotics: no braces required   [] Right  [] Left  Positional Restrictions:no positional restrictions    Pre-Admission Information   Lives With: spouse                  Type of Home: house  Home Layout: two level, laundry in basement  Home Access:  3 step to enter without rails   Bathroom Layout: . Comment: pt has been using bathroom on 1st floor since may and taking spongebaths  Bathroom Equipment: grab bars around toilet  Toilet Height: elevated height  Home Equipment: rollator - 4 wheeled walker  Transfer Assistance: modified independent with use of 4WRW  Ambulation Assistance:modified independent with use of 4WRW  ADL Assistance: requires assistance with bathing, requires assistance with dressing, . Comment: needs assitance for LEs  IADL Assistance: requires assistance with meal prep, requires assistance with laundry, requires assistance with cleaning, pt helps with some meal prep and cleaning but  does most  Active :        [] Yes                 [x] No  Hand Dominance: [] Left                 [x] Right  Current Employment: . Comment: not asked  Hobbies: reading biographies  Recent Falls: 1 in may resulting in hip fx       Subjective:   General: Patient in bed upon arrival, agreeable to OT/PT session.  Patient tearful and emotional throughout session but motivated to participate. Comfort provided. Tensoshapes donned upon arrival, but too constricting. ACE wraps donned at end of session. Pain: 8/10. Location: BLE  Pain Interventions: pain medication in place prior to arrival and RN notified        Activities of Daily Living  Basic Activities of Daily Living  Grooming: setup assistance  Grooming Comments: patient able to apply deodorant and brush teeth seated on BSC and in recliner   Upper Extremity Bathing: stand by assistance  Lower Extremity Bathing: maximum assistance   Bathing Comments: patient completed sponge bath seated on BSC and in stance with RW. maxA required for rear daniel area and back of legs  Upper Extremity Dressing: stand by assistance  Lower Extremity Dressing: dependent . Comment: pt threaded brief with reacher, however assist of 2 to pull up in stance  with PT providing Mod A for standing balance while this writer pulled up brief  Dressing Equipment: none  Dressing Comments: total A for doffing wet brief. maxA for donning pullup. Total A for doffing/donning socks   Toileting: dependent. Toileting Equipment: none  Toileting Comments: Incontinent of urine upon arrival. Patient voided urine again while rolling and in stance during transfer. Patient able to void another small amount while on BSC. not able to have bowel movement. pt dependent for brief mgmt and hygiene care. Purewick placed at end of session   Instrumental Activities of Daily Living  No IADL completed on this date. Functional Mobility  Bed Mobility  Supine to Sit: minimal assistance  Rolling Left: minimal assistance  Rolling Right: minimal assistance  Scooting: contact guard assistance    Transfers  Sit to stand transfer:2 person assistance with Mod A + Arsh from EOB, modA of 2 from recliner    Stand to sit transfer: moderate assistance  Toilet transfer: 2 person assistance with Mod A x 2    Toilet transfer equipment: standard bedside commode    Transfer from EOBx2, BSC x1 and recliner x1. d/c to maximize functional independence and safety. Continue with POC.   Safety Interventions: patient left in chair, call light within reach, gait belt, patient at risk for falls, and nurse notified    Plan  Frequency: 3-5 x/per week  Current Treatment Recommendations: strengthening, balance training, functional mobility training, transfer training, endurance training, patient/caregiver education, and ADL/self-care training    Goals  Patient Goals: not stated   Short Term Goals:  Time Frame: discharge   Patient will complete upper body ADL at supervision - goal not addressed 9/8  Patient will complete lower body ADL at minimal assistance - goal not met dependent 9/8  Patient will complete toileting at minimal assistance - goal not met Dependent 9/8  Patient will complete functional transfers at contact guard assistance - Mod A x 2 goal not met 9/8  Patient will increase [facility] ADL score = to or > than 18/24 goal not met 13/24 9/8     Therapy Session Time     Individual Group Co-treatment   Time In    1109   Time Out    1202   Minutes    53        Timed Code Treatment Minutes:  53 minutes  Total Treatment Minutes:  53 minutes       Electronically Signed By: JAYLENE Lovelace OTR/L [id number]

## 2022-09-08 NOTE — PROGRESS NOTES
[facility]    9/8/2022 11:40 AM        Name: Cheryl Luna . Admitted: 9/2/2022  Primary Care Provider: Sven Carlisle MD (Tel: [phone])      Brief History: Presented with swelling and redness BLE. Failed outpatient management with Lasix. Admitted with cellulitis BLE. Subjective: Up in bedside chair, PT/OT working with patient. Main complaint is weakness, depressed she is not progressing faster. Overall legs look and feel better but continues to note burning and tingling bilateral legs, reports no improvement with gabapentin. Remains afebrile. Denies chest pain, shortness of breath, cough. Serum sodium unchanged despite gentle hydration.      Reviewed interval ancillary notes    Current Medications  potassium chloride (KLOR-CON M) extended release tablet 20 mEq, BID WC  pantoprazole (PROTONIX) tablet 40 mg, QAM AC  atorvastatin (LIPITOR) tablet 10 mg, Every Other Day  gabapentin (NEURONTIN) capsule 100 mg, TID  busPIRone (BUSPAR) tablet 10 mg, Q6H PRN  HYDROcodone-acetaminophen (NORCO) 5-325 MG per tablet 1 tablet, Q6H PRN  calcium carbonate (TUMS) chewable tablet 500 mg, TID PRN  diclofenac sodium (VOLTAREN) 1 % gel 4 g, TID  lactobacillus (CULTURELLE) capsule 1 capsule, BID WC  sennosides-docusate sodium (SENOKOT-S) 8.6-50 MG tablet 2 tablet, Nightly  aspirin EC tablet 81 mg, Daily  buPROPion (WELLBUTRIN XL) extended release tablet 150 mg, Daily  vilazodone HCl (VIIBRYD) TABS 20 mg, Daily  ceFAZolin (ANCEF) 2,000 mg in dextrose 5 % 50 mL IVPB (mini-bag), Q8H  sodium chloride flush 0.9 % injection 5-40 mL, 2 times per day  sodium chloride flush 0.9 % injection 5-40 mL, PRN  0.9 % sodium chloride infusion, PRN  enoxaparin (LOVENOX) injection 40 mg, Daily  ondansetron (ZOFRAN-ODT) disintegrating tablet 4 mg, Q8H PRN   Or  ondansetron (ZOFRAN) injection 4 mg, Q6H PRN  polyethylene glycol [facility]) packet 17 g, Daily PRN  acetaminophen (TYLENOL) tablet 650 mg, Q6H PRN   Or  acetaminophen (TYLENOL) suppository 650 mg, Q6H PRN  perflutren lipid microspheres (DEFINITY) injection 1.65 mg, ONCE PRN      Objective:  /62   Pulse 84   Temp 97.8 °F (36.6 °C) (Oral)   Resp 16   Ht 5' 1\" (1.549 m)   Wt 160 lb 12.8 oz (72.9 kg)   SpO2 94%   BMI 30.38 kg/m²     Intake/Output Summary (Last 24 hours) at 9/8/2022 1140  Last data filed at 9/7/2022 1810  Gross per 24 hour   Intake --   Output 600 ml   Net -600 ml      Wt Readings from Last 3 Encounters:   09/06/22 160 lb 12.8 oz (72.9 kg)   09/02/22 181 lb 12.8 oz (82.5 kg)   08/16/22 150 lb (68 kg)     General:  Awake, alert, oriented in NAD  Skin:  Warm and dry. No unusual bruising or rash  Neck:  Supple. No JVD appreciated  Chest:  Normal effort. Clear to auscultation, no wheezes/rhonchi/rales  Cardiovascular:  RRR, normal S1/S2, no murmur/gallop/rub  Abdomen:  Soft, nontender, +bowel sounds  Extremities:  1+ BLE edema  Neurological: No focal deficits  Psychological: Normal mood and affect    Labs and Tests:  CBC:   Recent Labs     09/06/22  1056 09/07/22  0832 09/08/22  0440   WBC 7.4 7.3 6.0   HGB 11.7* 10.4* 9.6*   * 130* 132*     BMP:    Recent Labs     09/06/22  1056 09/07/22  0832 09/08/22  0440   * 131* 131*   K 3.5 3.5 3.2*   CL 92* 92* 92*   CO2 27 30 28   BUN 35* 42* 45*   CREATININE 1.1 1.2 1.1   GLUCOSE 143* 166* 110*     Hepatic: No results for input(s): AST, ALT, ALB, BILITOT, ALKPHOS in the last 72 hours. Venous Doppler 9/2/2022:  Right   Limited study due to pt's severe swelling and pain. No evidence of deep vein or superficial vein thrombosis involving the right   lower extremity. Right inguinal lymph node measuring 1.3 x 0.6 x 1.0 cm. Calf veins were poorly visualized on the right. Left   Limited study due to pt's severe swelling and pain.    No evidence of deep vein or superficial vein thrombosis involving the left   lower extremity. Left inguinal lymph node measuring 1.9 x 0.7 x 1.4 cm. Calf veins were poorly visualized on the left. Left medial fossa: Cystic structure measuring 2.3 x 0.7 x 2.0 cm. Echo 9/2/2022:  Summary   Mild septal left ventricular hypertrophy. Normal left ventricle size and systolic function with an estimated ejection fraction of 55-60%. No regional wall motion abnormalities are seen. Grade I diastolic dysfunction with normal LV filling pressures. Left sided pleural effusion noted. CXR 9/6/2022:  No acute cardiopulmonary disease    Problem List  Principal Problem:    Cellulitis  Active Problems:    Dyslipidemia    Class 1 obesity due to excess calories with body mass index (BMI) of 30.0 to 30.9 in adult    History of depression    Bilateral lower leg cellulitis    Peripheral edema    Redness and swelling of lower leg    Weight loss counseling, encounter for    High fever    Current mild episode of major depressive disorder without prior episode ([facility] [address]  Resolved Problems:    * No resolved hospital problems. *       Assessment & Plan:   Cellulitis BLE. Venous doppler negative for DVT. Started on Ancef with improvement. Anticipate transition to keflex or augmentin at d/c. Continue ACE wraps and leg elevation when up. Appreciate ID recs. Lower leg edema. Echo with EF 63-04%, grade 1 diastolic dysfunction, normal filling pressures. Doubt CHF exacerbation as no pulmonary edema on CXR and BNP only 248 and comparable to prior. Likely secondary to immobility, dependent positioning of legs when up, suspected venous insufficiency, chronic diastolic CHF. Improved with ACE wraps and IV lasix, weight down to 160, reported to be 181 on admission but suspect accuracy. IV Lasix stopped 9/6 as BUN trending up and sodium down to 131. Anticipate po Lasix on DC. Hyponatremia. Sodium 137 on admission, trended down to 131 and IV Lasix stopped. Received gentle hydration with no improvement.  Consult

## 2022-09-08 NOTE — PROGRESS NOTES
[facility]   Phone: [phone]    Physical Therapy    [] Initial Evaluation            [x] Daily Treatment Note         [] Discharge Summary      Patient: Lis Stallworth   : 1940   MRN: [id number]   Date of Service:  2022  Admitting Diagnosis: Cellulitis  Current Admission Summary: per MD: Lis Stallworth is a 78 y.o. female who presents to the emergency department the chief complaint of worsening bilateral leg swelling and pain. In May she had a left hip fracture and surgery. States over the past 3 weeks she has had some swelling in her legs have gotten worse over the past 10 days with some redness in her bilateral lower extremities that began 3 days ago. She is not on any diuretics or antibiotics. She denies chest pain, shortness of breath, history of heart failure, nausea, vomiting, fevers, history of chronic kidney disease, diabetes or any skin infections or MRSA in the past.  She states moving her legs makes the pain worse. Denies abdominal pain, urinary bowel symptoms or any other symptoms. Past Medical History:  has a past medical history of AR (allergic rhinitis), Arthritis of knee, Depression, Erosive gastritis, GERD (gastroesophageal reflux disease), Hyperlipidemia, Internal hemorrhoids, Overweight(278.02), and Viral meningitis. Past Surgical History:  has a past surgical history that includes Cholecystectomy, laparoscopic (); Total abdominal hysterectomy w/ bilateral salpingoophorectomy (); Colonoscopy (); Colonoscopy (12/3/13); Upper gastrointestinal endoscopy (12/3/13); Breast biopsy; Hysterectomy; and hip surgery (Left, 2022). Discharge Recommendations: Lis Stallworth scored a 12/ on the AM-PAC short mobility form. Current research shows that an AM-PAC score of 17 or less is typically not associated with a discharge to the patient's home setting.  Based on the patient's AM-PAC score and their current functional mobility deficits, it is recommended that the patient have 3-5 sessions per week of Physical Therapy at d/c to increase the patient's independence. Please see assessment section for further patient specific details. If pt declines the above recommendation, home health is next recommendation. If patient discharges prior to next session this note will serve as a discharge summary. Please see below for the latest assessment towards goals. DME Required For Discharge: DME to be determined at next level of care  Precautions/Restrictions: high fall risk  Weight Bearing Restrictions: no restrictions    Required Braces/Orthotics: no braces required  Positional Restrictions:no positional restrictions    Pre-Admission Information   Lives With: spouse    Type of Home: house  Home Layout: two level, laundry in basement  Home Access:  3 step to enter without rails   Bathroom Layout: . Comment: pt has been using bathroom on 1st floor since may and taking spongebaths  Bathroom Equipment: grab bars around toilet  Toilet Height: elevated height  Home Equipment: rollator - 4 wheeled walker  Transfer Assistance: modified independent with use of 4WRW  Ambulation Assistance:modified independent with use of 4WRW  ADL Assistance: requires assistance with bathing, requires assistance with dressing, . Comment: needs assitance for LEs(ex. Tying shoes)  IADL Assistance: requires assistance with meal prep, requires assistance with laundry, requires assistance with cleaning, pt helps with some meal prep and cleaning but  does most  Active :        [] Yes  [x] No  Hand Dominance: [] Left  [x] Right  Current Employment: . Comment: not asked  Hobbies: reading biographies  Recent Falls: 1 in may resulting in hip fx         Subjective  General: Supine in bed upon arrival with alarm on. Agreeable to therapy. Tensoshapes present to B LEs but noting constriction at upper thighs.  Removed to allow skin to breathe, reapplied ACE wraps to knee height B. Pain: 8/10. Location: BLEs, L knee  Pain Interventions: pain medication in place prior to arrival and RN notified       Functional Mobility  Bed Mobility  Supine to Sit: minimal assistance  Rolling Left: minimal assistance  Rolling Right: minimal assistance  Scooting: minimal assistance  Comments:  Transfers  Sit to stand transfer: 2 person assistance with mod A of 2 to RW   Stand to sit transfer: moderate assistance  Comments: Varied min + mod A of 2 to mod A of 2 for transfers. Performed 4 total (twice from EOB due to incontinence, once from commode, once from recliner). Ambulation  Surface:level surface  Assistive Device: rolling walker  Assistance: 2 person assistance with mod A + min A of 2   Distance: 3-4' x 2 (bed to [facility], BSC to [facility])  Gait Mechanics: flexed posture, very small step length B with minimal clearance   Comments:    Stair Mobility  Stair mobility not completed on this date. Comments:  Wheelchair Mobility:  No w/c mobility completed on this date. Comments:  Balance  Static Sitting Balance: fair (+): maintains balance at SBA/supervision without use of UE support  Static Standing Balance: poor: requires mod (A) to maintain balance  Dynamic Standing Balance: poor (-): requires max (A) to maintain balance  Comments: Pt had posterior LOB when standing up from [facility] requiring max A for balance correction. Other Therapeutic Interventions    B LAQ x 10, ankle pumps x 10 B, add sq x 10 B. Pt also performed UE therex when seated upright in chair with back unsupported.        Functional Outcomes  AM-PAC Inpatient Mobility Raw Score : 12              Cognition  WFL  Orientation:    alert and oriented x 4  Command Following:   [facility]    Education  Barriers To Learning: emotional  Patient Education: patient educated on goals, PT role and benefits, plan of care, discharge recommendations  Learning Assessment:  patient verbalizes and demonstrates Ambulance

## 2022-09-08 NOTE — PROGRESS NOTES
Infectious Diseases   Progress Note      Admission Date: 9/2/2022  Hospital Day: Hospital Day: 7   Attending: Norberto Severs, MD  Date of service: 9/8/2022     Chief complaint/ Reason for consult:     High fever of 101.8  Bilateral leg cellulitis  Negative bilateral lower extremity venous Doppler study on 9/2/2022  Gastroesophageal reflux disease    Microbiology:        I have reviewed allavailable micro lab data and cultures    Blood culture (2/2) - collected on 9/2/2022: Negative      Antibiotics and immunizations:       Current antibiotics: All antibiotics and their doses were reviewed by me    Recent Abx Admin                     ceFAZolin (ANCEF) 2,000 mg in dextrose 5 % 50 mL IVPB (mini-bag) (mg) 2,000 mg New Bag 09/08/22 0858     2,000 mg New Bag  0114     2,000 mg New Bag 09/07/22 1634                      Immunization History: All immunization history was reviewed by me today. Immunization History   Administered Date(s) Administered    COVID-19, MODERNA BLUE border, Primary or Immunocompromised, (age 12y+), IM, 100 mcg/0.5mL 01/21/2021, 02/18/2021, 10/28/2021, 05/12/2022    Influenza 11/24/2010    Influenza Vaccine, unspecified formulation 10/01/2016    Influenza Virus Vaccine 10/01/2012, 10/01/2013, 10/13/2015    Influenza, FLUAD, (age 72 y+), Adjuvanted, 0.5mL 10/12/2020    Influenza, High Dose (Fluzone 65 yrs and older) 10/12/2017, 10/09/2018, 10/28/2021    Pneumococcal Conjugate 13-valent ([id number]) 07/13/2015    Pneumococcal Polysaccharide ([id number]) 05/07/2012    Td, unspecified formulation 09/30/2009    Tdap (Boostrix, Adacel) 01/08/2021    Zoster Live (Zostavax) 08/01/2013    Zoster Recombinant (Shingrix) 01/08/2021, 04/24/2021       Known drug allergies: All allergies were reviewed and updated    No Known Allergies    Social history:     Social History:  All social andepidemiologic history was reviewed and updated by me today as needed.      Tobacco use:   reports that she has never smoked. She has never used smokeless tobacco.  Alcohol use:   reports current alcohol use. Currently lives in: [facility]   reports no history of drug use. COVID VACCINATION AND LAB RESULT RECORDS:     Internal Administration   First Dose COVID-19, LISANDRA wray, Primary or Immunocompromised, (age 12y+), IM, 100 mcg/0.5mL  01/21/2021   Second Dose COVID-19, LISANDRA wray, Primary or Immunocompromised, (age 12y+), IM, 100 mcg/0.5mL   02/18/2021       Last COVID Lab No results found for: SARS-COV-2, SARS-COV-2 RNA, SARS-COV-2, SARS-COV-2, SARS-COV-2 BY PCR, SARS-COV-2, SARS-COV-2, SARS-COV-2         Assessment:     The patient is a 78 y.o. old female who  has a past medical history of AR (allergic rhinitis), Arthritis of knee, Depression, Erosive gastritis (10/28/2019), GERD (gastroesophageal reflux disease), Hyperlipidemia, Internal hemorrhoids, Overweight(278.02), and Viral meningitis (5/12). with following problems:    High fever of 101.8- resolved  Bilateral leg cellulitis- remains on cefazolin iv  Negative bilateral lower extremity venous Doppler study on 9/2/2022  Gastroesophageal reflux disease-this is stable  History of depression  Dyslipidemia  Obesity Class 1 due to excess calorie intake : Body mass index is 30.38 kg/m²-counseling done      Discussion:      She remains afebrile. She is on iv cefazolin    No diarrhea    S Cr is 1.1    WBC count is 6000    Blood cultures from Sep 6 remain negative      Plan:     Diagnostic Workup:      Continue to follow  fever curve, WBC count and blood cultures. Continue to monitor blood counts, liver and renal function. Antimicrobials: Will continue iv cefazolin while in hospital  Switch to PO keflex 500 mg every 6 hours at discharge  Recommend continuation of PO keflex until next Friday  Continue close vitals monitoring. Maintain good glycemic control. Fall precautions. Aspiration precautions.   Continue to watch for new fever or diarrhea. DVT prophylaxis. Discussed all above with patient and RN. Drug Monitoring:    Continue monitoring for antibiotic toxicity as follows: CBC, CMP   Continue to watch for following: new or worsening fever, new hypotension, hives, lip swelling and redness or purulence at vascular access sites. I/v access Management:    Continue to monitor i.v access sites for erythema, induration, discharge or tenderness. As always, continue efforts to minimize tubes/lines/drains as clinically appropriate to reduce chances of line associated infections. Patient education and counseling: The patient was educated in detail about the side-effects of various antibiotics and things to watch for like new rashes, lip swelling, severe reaction, worsening diarrhea, break through fever etc.  Discussed patient's condition and what to expect. All of the patient's questions were addressed in a satisfactory manner and patient verbalized understanding all instructions. Level of complexity of visit and medical decision making: High     Thank you for involving me in the care of your patient. I will continue to follow. If you have anyadditional questions, please do not hesitate to contact me. Subjective: Interval history: Interval history was obtained from chart review and patient/ RN. She is on Iv cefazolin. She is tolerating antibiotics ok. No diarrhea     REVIEW OF SYSTEMS:      Review of Systems   Constitutional:  Positive for fatigue. Negative for chills, diaphoresis and fever. HENT:  Negative for ear discharge, ear pain, postnasal drip, rhinorrhea, sinus pressure, sinus pain and sore throat. Eyes:  Negative for discharge and redness. Respiratory:  Negative for cough, shortness of breath and wheezing. Cardiovascular:  Negative for chest pain and leg swelling. Gastrointestinal:  Negative for abdominal pain, constipation, diarrhea and nausea.    Endocrine: Negative for cold intolerance, heat intolerance and polydipsia. Genitourinary:  Negative for dysuria, flank pain, frequency, hematuria and urgency. Musculoskeletal:  Negative for back pain and myalgias. Skin:  Negative for rash. Allergic/Immunologic: Negative for immunocompromised state. Neurological:  Negative for dizziness, seizures and headaches. Hematological:  Does not bruise/bleed easily. Psychiatric/Behavioral:  Negative for agitation, hallucinations and suicidal ideas. The patient is not nervous/anxious. All other systems reviewed and are negative. Past Medical History: All past medical history reviewed today. Past Medical History:   Diagnosis Date    AR (allergic rhinitis)     Arthritis of knee     Pruis    Depression     Erosive gastritis 10/28/2019    EGD October 2019, he did with PPI    GERD (gastroesophageal reflux disease)     Hyperlipidemia     Internal hemorrhoids     Overweight(278.02)     Viral meningitis 5/12       Past Surgical History: All past surgical history was reviewed today. Past Surgical History:   Procedure Laterality Date    BREAST BIOPSY      CHOLECYSTECTOMY, LAPAROSCOPIC  2003    COLONOSCOPY  8/06    normal; Ortega    COLONOSCOPY  12/3/13    Ortega- polyps    HIP SURGERY Left 5/16/2022    LEFT HIP HEMIARTHROPLASTY performed by Carolyn Swenson MD at [facility] ([address] [place] [address])      AKASH AND BSO (CERVIX REMOVED)  2003    UPPER GASTROINTESTINAL ENDOSCOPY  12/3/13    Cherry Caper; antritis       Family History: All family history was reviewed today.         Problem Relation Age of Onset    Breast Cancer Mother     Bipolar Disorder Brother        Objective:       PHYSICAL EXAM:      Vitals:   Vitals:    09/07/22 1957 09/08/22 0100 09/08/22 0445 09/08/22 0830   BP: (!) 115/57 132/70 130/67 109/62   Pulse: 92 88 87 84   Resp: 16 16 16 16   Temp: 98.6 °F (37 °C) 98.9 °F (37.2 °C) 98.8 °F (37.1 °C) 97.8 °F (36.6 °C)   TempSrc: Oral Oral Oral Oral   SpO2: 93% 96% 93% 94%   Weight: Height:           Physical Exam  Vitals and nursing note reviewed. Constitutional:       Appearance: She is well-developed. She is not diaphoretic. Comments: The patient was seen earlier today. HENT:      Head: Normocephalic and atraumatic. Right Ear: External ear normal. There is no impacted cerumen. Left Ear: External ear normal. There is no impacted cerumen. Nose: Nose normal.      Mouth/Throat:      Mouth: Mucous membranes are moist.      Pharynx: Oropharynx is clear. No oropharyngeal exudate. Eyes:      General: No scleral icterus. Right eye: No discharge. Left eye: No discharge. Conjunctiva/sclera: Conjunctivae normal.      Pupils: Pupils are equal, round, and reactive to light. Neck:      Thyroid: No thyromegaly. Cardiovascular:      Rate and Rhythm: Normal rate and regular rhythm. Heart sounds: Normal heart sounds. No murmur heard. No friction rub. Pulmonary:      Effort: No respiratory distress. Breath sounds: No stridor. No wheezing or rales. Abdominal:      General: Bowel sounds are normal.      Palpations: Abdomen is soft. Tenderness: There is no abdominal tenderness. There is no guarding or rebound. Musculoskeletal:         General: No swelling, tenderness or deformity. Normal range of motion. Cervical back: Normal range of motion and neck supple. Right lower leg: No edema. Left lower leg: No edema. Lymphadenopathy:      Cervical: No cervical adenopathy. Skin:     General: Skin is warm and dry. Coloration: Skin is not jaundiced. Findings: No bruising, erythema or rash. Comments: Improving leg pain and cellulitis   Neurological:      General: No focal deficit present. Mental Status: She is alert and oriented to person, place, and time. Mental status is at baseline. Motor: No abnormal muscle tone.    Psychiatric:         Mood and Affect: Mood normal.         Behavior: Behavior normal. *    Lines and drains: All vascular access sites are healthy with no local erythema, discharge or tenderness. Intake and output:    I/O last 3 completed shifts: In: 240 [P.O.:240]  Out: 1000 [Urine:1000]    Lab Data:   All available labs and old records have been reviewed by me. CBC:  Recent Labs     09/06/22  1056 09/07/22  0832 09/08/22  0440   WBC 7.4 7.3 6.0   RBC 4.11 3.60* 3.39*   HGB 11.7* 10.4* 9.6*   HCT 35.8* 31.4* 29.4*   * 130* 132*   MCV 87.2 87.3 86.7   MCH 28.6 28.8 28.4   MCHC 32.8 32.9 32.8   RDW 14.5 14.1 14.0          BMP:  Recent Labs     09/06/22  1056 09/07/22  0832 09/08/22  0440   * 131* 131*   K 3.5 3.5 3.2*   CL 92* 92* 92*   CO2 27 30 28   BUN 35* 42* 45*   CREATININE 1.1 1.2 1.1   CALCIUM 8.5 8.7 8.3   GLUCOSE 143* 166* 110*          Hepatic Function Panel:   Lab Results   Component Value Date/Time    ALKPHOS 119 09/02/2022 12:24 PM    ALT 15 09/02/2022 12:24 PM    AST 20 09/02/2022 12:24 PM    PROT 6.7 09/02/2022 12:24 PM    PROT 7.3 10/17/2012 08:47 AM    BILITOT <0.2 09/02/2022 12:24 PM    BILIDIR <0.2 10/22/2019 03:08 PM    IBILI see below 10/22/2019 03:08 PM    LABALBU 4.1 09/02/2022 12:24 PM       CPK:   Lab Results   Component Value Date    CKTOTAL 89 09/02/2022     ESR:   Lab Results   Component Value Date    SEDRATE 70 (H) 09/06/2022     CRP:   Lab Results   Component Value Date    .9 (H) 09/06/2022           Imaging: All pertinent images and reports for the current visit were reviewed by me during this visit. I reviewed the chest x-ray/CT scan/MRI images and independently interpreted the findings and results today. XR CHEST (2 VW)   Final Result   No acute cardiopulmonary disease         VL Extremity Venous Bilateral             Medications: All current and past medications were reviewed.      potassium chloride  20 mEq Oral BID WC    pantoprazole  40 mg Oral QAM AC    furosemide  20 mg Oral Daily    polyethylene glycol  17 g Oral Daily sennosides-docusate sodium  2 tablet Oral BID    atorvastatin  10 mg Oral Every Other Day    gabapentin  100 mg Oral TID    diclofenac sodium  4 g Topical TID    lactobacillus  1 capsule Oral BID WC    aspirin  81 mg Oral Daily    buPROPion  150 mg Oral Daily    vilazodone HCl  20 mg Oral Daily    ceFAZolin  2,000 mg IntraVENous Q8H    sodium chloride flush  5-40 mL IntraVENous 2 times per day    enoxaparin  40 mg SubCUTAneous Daily        sodium chloride 10 mL/hr at 09/07/22 0142       busPIRone, HYDROcodone 5 mg - acetaminophen, calcium carbonate, sodium chloride flush, sodium chloride, ondansetron **OR** ondansetron, acetaminophen **OR** acetaminophen, perflutren lipid microspheres      Problem list:       Patient Active Problem List   Diagnosis Code    AR (allergic rhinitis) J30.9    Arthritis of knee M17.10    Patient overweight E66.3    High fever R50.9    Postmenopausal Z78.0    Essential hypertension I10    Erosive gastritis K29.60    Current mild episode of major depressive disorder without prior episode ([facility] [address] F32.0    Left displaced femoral neck fracture ([facility] [address] S72.002A    Fall at home [address] Aron Rendon, Y92.009    Dyslipidemia E78.5    Cellulitis L03.90    Class 1 obesity due to excess calories with body mass index (BMI) of 30.0 to 30.9 in adult E66.09, Z68.30    History of depression Z86.59    Bilateral lower leg cellulitis L03.116, L03.115    Peripheral edema R60.9    Redness and swelling of lower leg M79.89, R23.8    Weight loss counseling, encounter for Z71.3       Please note that this chart was generated using Dragon dictation software. Although every effort was made to ensure the accuracy of this automated transcription, some errors in transcription may have occurred inadvertently. If you may need any clarification, please do not hesitate to contact me through EPIC or at the phone number provided below with my electronic signature.   Any pictures or media included in this note were obtained after taking

## 2022-09-09 PROBLEM — I21.4 NSTEMI (NON-ST ELEVATED MYOCARDIAL INFARCTION) (HCC): Status: ACTIVE | Noted: 2022-09-09

## 2022-09-09 LAB
ALBUMIN SERPL-MCNC: 2.8 G/DL (ref 3.4–5)
ANION GAP SERPL CALCULATED.3IONS-SCNC: 10 MMOL/L (ref 3–16)
APTT: 30 SEC (ref 23–34.3)
BACTERIA: ABNORMAL /HPF
BILIRUBIN URINE: NEGATIVE
BLOOD, URINE: ABNORMAL
BUN BLDV-MCNC: 47 MG/DL (ref 7–20)
CALCIUM SERPL-MCNC: 8.9 MG/DL (ref 8.3–10.6)
CHLORIDE BLD-SCNC: 94 MMOL/L (ref 99–110)
CLARITY: ABNORMAL
CO2: 27 MMOL/L (ref 21–32)
COLOR: YELLOW
CREAT SERPL-MCNC: 1.2 MG/DL (ref 0.6–1.2)
EKG ATRIAL RATE: 74 BPM
EKG ATRIAL RATE: 83 BPM
EKG ATRIAL RATE: 87 BPM
EKG DIAGNOSIS: NORMAL
EKG P AXIS: 64 DEGREES
EKG P AXIS: 70 DEGREES
EKG P AXIS: 74 DEGREES
EKG P-R INTERVAL: 140 MS
EKG P-R INTERVAL: 140 MS
EKG P-R INTERVAL: 148 MS
EKG Q-T INTERVAL: 376 MS
EKG Q-T INTERVAL: 378 MS
EKG Q-T INTERVAL: 398 MS
EKG QRS DURATION: 104 MS
EKG QRS DURATION: 120 MS
EKG QRS DURATION: 90 MS
EKG QTC CALCULATION (BAZETT): 441 MS
EKG QTC CALCULATION (BAZETT): 441 MS
EKG QTC CALCULATION (BAZETT): 454 MS
EKG R AXIS: -18 DEGREES
EKG R AXIS: -26 DEGREES
EKG R AXIS: -60 DEGREES
EKG T AXIS: 60 DEGREES
EKG T AXIS: 70 DEGREES
EKG T AXIS: 93 DEGREES
EKG VENTRICULAR RATE: 74 BPM
EKG VENTRICULAR RATE: 83 BPM
EKG VENTRICULAR RATE: 87 BPM
EPITHELIAL CELLS, UA: 2 /HPF (ref 0–5)
GFR AFRICAN AMERICAN: 52
GFR NON-AFRICAN AMERICAN: 43
GLUCOSE BLD-MCNC: 131 MG/DL (ref 70–99)
GLUCOSE URINE: NEGATIVE MG/DL
HCT VFR BLD CALC: 28.9 % (ref 36–48)
HEMOGLOBIN: 9.5 G/DL (ref 12–16)
HYALINE CASTS: 1 /LPF (ref 0–8)
KETONES, URINE: NEGATIVE MG/DL
LEFT VENTRICULAR EJECTION FRACTION HIGH VALUE: 55 %
LEFT VENTRICULAR EJECTION FRACTION MODE: NORMAL
LEUKOCYTE ESTERASE, URINE: ABNORMAL
MCH RBC QN AUTO: 28.5 PG (ref 26–34)
MCHC RBC AUTO-ENTMCNC: 32.9 G/DL (ref 31–36)
MCV RBC AUTO: 86.5 FL (ref 80–100)
MICROSCOPIC EXAMINATION: YES
NITRITE, URINE: NEGATIVE
PDW BLD-RTO: 14.2 % (ref 12.4–15.4)
PH UA: 5.5 (ref 5–8)
PHOSPHORUS: 3 MG/DL (ref 2.5–4.9)
PLATELET # BLD: 158 K/UL (ref 135–450)
PMV BLD AUTO: 7.6 FL (ref 5–10.5)
POTASSIUM SERPL-SCNC: 3.9 MMOL/L (ref 3.5–5.1)
PROTEIN UA: 100 MG/DL
RBC # BLD: 3.34 M/UL (ref 4–5.2)
RBC UA: 24 /HPF (ref 0–4)
SODIUM BLD-SCNC: 131 MMOL/L (ref 136–145)
SPECIFIC GRAVITY UA: >=1.03 (ref 1–1.03)
TOTAL CK: 209 U/L (ref 26–192)
TROPONIN: 0.39 NG/ML
URINE REFLEX TO CULTURE: ABNORMAL
URINE TYPE: ABNORMAL
UROBILINOGEN, URINE: 0.2 E.U./DL
WBC # BLD: 7.2 K/UL (ref 4–11)
WBC UA: 6 /HPF (ref 0–5)

## 2022-09-09 PROCEDURE — 51798 US URINE CAPACITY MEASURE: CPT

## 2022-09-09 PROCEDURE — 99152 MOD SED SAME PHYS/QHP 5/>YRS: CPT | Performed by: INTERNAL MEDICINE

## 2022-09-09 PROCEDURE — 6370000000 HC RX 637 (ALT 250 FOR IP): Performed by: FAMILY MEDICINE

## 2022-09-09 PROCEDURE — 99291 CRITICAL CARE FIRST HOUR: CPT | Performed by: INTERNAL MEDICINE

## 2022-09-09 PROCEDURE — 84300 ASSAY OF URINE SODIUM: CPT

## 2022-09-09 PROCEDURE — 2060000000 HC ICU INTERMEDIATE R&B

## 2022-09-09 PROCEDURE — B2111ZZ FLUOROSCOPY OF MULTIPLE CORONARY ARTERIES USING LOW OSMOLAR CONTRAST: ICD-10-PCS | Performed by: INTERNAL MEDICINE

## 2022-09-09 PROCEDURE — 2500000003 HC RX 250 WO HCPCS

## 2022-09-09 PROCEDURE — 6370000000 HC RX 637 (ALT 250 FOR IP): Performed by: NURSE PRACTITIONER

## 2022-09-09 PROCEDURE — 6370000000 HC RX 637 (ALT 250 FOR IP): Performed by: INTERNAL MEDICINE

## 2022-09-09 PROCEDURE — C1894 INTRO/SHEATH, NON-LASER: HCPCS

## 2022-09-09 PROCEDURE — 6360000002 HC RX W HCPCS: Performed by: HOSPITALIST

## 2022-09-09 PROCEDURE — 83935 ASSAY OF URINE OSMOLALITY: CPT

## 2022-09-09 PROCEDURE — 93458 L HRT ARTERY/VENTRICLE ANGIO: CPT | Performed by: INTERNAL MEDICINE

## 2022-09-09 PROCEDURE — 6360000002 HC RX W HCPCS

## 2022-09-09 PROCEDURE — 84484 ASSAY OF TROPONIN QUANT: CPT

## 2022-09-09 PROCEDURE — 6360000002 HC RX W HCPCS: Performed by: NURSE PRACTITIONER

## 2022-09-09 PROCEDURE — 2580000003 HC RX 258: Performed by: NURSE PRACTITIONER

## 2022-09-09 PROCEDURE — 85027 COMPLETE CBC AUTOMATED: CPT

## 2022-09-09 PROCEDURE — 36415 COLL VENOUS BLD VENIPUNCTURE: CPT

## 2022-09-09 PROCEDURE — 6370000000 HC RX 637 (ALT 250 FOR IP): Performed by: HOSPITALIST

## 2022-09-09 PROCEDURE — 99232 SBSQ HOSP IP/OBS MODERATE 35: CPT | Performed by: INTERNAL MEDICINE

## 2022-09-09 PROCEDURE — 2709999900 HC NON-CHARGEABLE SUPPLY

## 2022-09-09 PROCEDURE — 81001 URINALYSIS AUTO W/SCOPE: CPT

## 2022-09-09 PROCEDURE — 2580000003 HC RX 258: Performed by: HOSPITALIST

## 2022-09-09 PROCEDURE — B2151ZZ FLUOROSCOPY OF LEFT HEART USING LOW OSMOLAR CONTRAST: ICD-10-PCS | Performed by: INTERNAL MEDICINE

## 2022-09-09 PROCEDURE — 4A023N7 MEASUREMENT OF CARDIAC SAMPLING AND PRESSURE, LEFT HEART, PERCUTANEOUS APPROACH: ICD-10-PCS | Performed by: INTERNAL MEDICINE

## 2022-09-09 PROCEDURE — 99153 MOD SED SAME PHYS/QHP EA: CPT

## 2022-09-09 PROCEDURE — 82436 ASSAY OF URINE CHLORIDE: CPT

## 2022-09-09 PROCEDURE — 6360000004 HC RX CONTRAST MEDICATION: Performed by: INTERNAL MEDICINE

## 2022-09-09 PROCEDURE — 51702 INSERT TEMP BLADDER CATH: CPT

## 2022-09-09 PROCEDURE — 93010 ELECTROCARDIOGRAM REPORT: CPT | Performed by: INTERNAL MEDICINE

## 2022-09-09 PROCEDURE — 84133 ASSAY OF URINE POTASSIUM: CPT

## 2022-09-09 PROCEDURE — 80069 RENAL FUNCTION PANEL: CPT

## 2022-09-09 PROCEDURE — 93458 L HRT ARTERY/VENTRICLE ANGIO: CPT

## 2022-09-09 PROCEDURE — 6360000002 HC RX W HCPCS: Performed by: PHYSICIAN ASSISTANT

## 2022-09-09 PROCEDURE — 93005 ELECTROCARDIOGRAM TRACING: CPT | Performed by: INTERNAL MEDICINE

## 2022-09-09 PROCEDURE — 82570 ASSAY OF URINE CREATININE: CPT

## 2022-09-09 PROCEDURE — 84540 ASSAY OF URINE/UREA-N: CPT

## 2022-09-09 PROCEDURE — 85730 THROMBOPLASTIN TIME PARTIAL: CPT

## 2022-09-09 PROCEDURE — 82550 ASSAY OF CK (CPK): CPT

## 2022-09-09 PROCEDURE — C1769 GUIDE WIRE: HCPCS

## 2022-09-09 PROCEDURE — 6360000002 HC RX W HCPCS: Performed by: INTERNAL MEDICINE

## 2022-09-09 PROCEDURE — 99152 MOD SED SAME PHYS/QHP 5/>YRS: CPT

## 2022-09-09 RX ORDER — HEPARIN SODIUM 1000 [USP'U]/ML
4000 INJECTION, SOLUTION INTRAVENOUS; SUBCUTANEOUS PRN
Status: DISCONTINUED | OUTPATIENT
Start: 2022-09-09 | End: 2022-09-11 | Stop reason: ALTCHOICE

## 2022-09-09 RX ORDER — HEPARIN SODIUM 1000 [USP'U]/ML
4000 INJECTION, SOLUTION INTRAVENOUS; SUBCUTANEOUS ONCE
Status: COMPLETED | OUTPATIENT
Start: 2022-09-09 | End: 2022-09-09

## 2022-09-09 RX ORDER — COLCHICINE 0.6 MG/1
0.6 TABLET ORAL 2 TIMES DAILY
Status: DISCONTINUED | OUTPATIENT
Start: 2022-09-09 | End: 2022-09-12

## 2022-09-09 RX ORDER — CEPHALEXIN 250 MG/1
500 CAPSULE ORAL EVERY 6 HOURS SCHEDULED
Status: DISCONTINUED | OUTPATIENT
Start: 2022-09-09 | End: 2022-09-14 | Stop reason: HOSPADM

## 2022-09-09 RX ORDER — HEPARIN SODIUM 1000 [USP'U]/ML
2000 INJECTION, SOLUTION INTRAVENOUS; SUBCUTANEOUS PRN
Status: DISCONTINUED | OUTPATIENT
Start: 2022-09-09 | End: 2022-09-11 | Stop reason: ALTCHOICE

## 2022-09-09 RX ORDER — HEPARIN SODIUM 10000 [USP'U]/100ML
5-30 INJECTION, SOLUTION INTRAVENOUS CONTINUOUS
Status: DISCONTINUED | OUTPATIENT
Start: 2022-09-09 | End: 2022-09-11 | Stop reason: ALTCHOICE

## 2022-09-09 RX ORDER — ASPIRIN 81 MG/1
324 TABLET, CHEWABLE ORAL ONCE
Status: COMPLETED | OUTPATIENT
Start: 2022-09-09 | End: 2022-09-09

## 2022-09-09 RX ADMIN — ASPIRIN 324 MG: 81 TABLET, CHEWABLE ORAL at 05:42

## 2022-09-09 RX ADMIN — COLCHICINE 0.6 MG: 0.6 TABLET, FILM COATED ORAL at 22:14

## 2022-09-09 RX ADMIN — FUROSEMIDE 20 MG: 20 TABLET ORAL at 15:19

## 2022-09-09 RX ADMIN — BUPROPION HYDROCHLORIDE 150 MG: 150 TABLET, EXTENDED RELEASE ORAL at 15:19

## 2022-09-09 RX ADMIN — HYDROCODONE BITARTRATE AND ACETAMINOPHEN 1 TABLET: 5; 325 TABLET ORAL at 22:14

## 2022-09-09 RX ADMIN — CEPHALEXIN 500 MG: 250 CAPSULE ORAL at 23:55

## 2022-09-09 RX ADMIN — GABAPENTIN 100 MG: 100 CAPSULE ORAL at 15:19

## 2022-09-09 RX ADMIN — Medication 10 ML: at 22:15

## 2022-09-09 RX ADMIN — POLYETHYLENE GLYCOL 3350 17 G: 17 POWDER, FOR SOLUTION ORAL at 15:25

## 2022-09-09 RX ADMIN — Medication 10 ML: at 15:20

## 2022-09-09 RX ADMIN — BUSPIRONE HYDROCHLORIDE 10 MG: 5 TABLET ORAL at 01:52

## 2022-09-09 RX ADMIN — STANDARDIZED SENNA CONCENTRATE AND DOCUSATE SODIUM 2 TABLET: 8.6; 5 TABLET ORAL at 15:25

## 2022-09-09 RX ADMIN — MORPHINE SULFATE 2 MG: 2 INJECTION, SOLUTION INTRAMUSCULAR; INTRAVENOUS at 07:38

## 2022-09-09 RX ADMIN — BUSPIRONE HYDROCHLORIDE 10 MG: 5 TABLET ORAL at 22:14

## 2022-09-09 RX ADMIN — HYDROCODONE BITARTRATE AND ACETAMINOPHEN 1 TABLET: 5; 325 TABLET ORAL at 01:52

## 2022-09-09 RX ADMIN — ACETAMINOPHEN 650 MG: 325 TABLET ORAL at 17:43

## 2022-09-09 RX ADMIN — VILAZODONE HYDROCHLORIDE 20 MG: 20 TABLET ORAL at 15:40

## 2022-09-09 RX ADMIN — Medication 16.46 UNITS/KG/HR: at 06:51

## 2022-09-09 RX ADMIN — HEPARIN SODIUM 4000 UNITS: 1000 INJECTION INTRAVENOUS; SUBCUTANEOUS at 05:50

## 2022-09-09 RX ADMIN — IOPAMIDOL 53 ML: 755 INJECTION, SOLUTION INTRAVENOUS at 09:08

## 2022-09-09 RX ADMIN — Medication 1 CAPSULE: at 15:19

## 2022-09-09 RX ADMIN — GABAPENTIN 100 MG: 100 CAPSULE ORAL at 22:14

## 2022-09-09 RX ADMIN — ASPIRIN 81 MG: 81 TABLET, COATED ORAL at 15:19

## 2022-09-09 RX ADMIN — CEFAZOLIN 2000 MG: 2 INJECTION, POWDER, FOR SOLUTION INTRAMUSCULAR; INTRAVENOUS at 01:45

## 2022-09-09 RX ADMIN — IRON SUCROSE 200 MG: 20 INJECTION, SOLUTION INTRAVENOUS at 22:23

## 2022-09-09 RX ADMIN — CEPHALEXIN 500 MG: 250 CAPSULE ORAL at 15:18

## 2022-09-09 RX ADMIN — STANDARDIZED SENNA CONCENTRATE AND DOCUSATE SODIUM 2 TABLET: 8.6; 5 TABLET ORAL at 22:15

## 2022-09-09 RX ADMIN — PANTOPRAZOLE SODIUM 40 MG: 40 TABLET, DELAYED RELEASE ORAL at 05:42

## 2022-09-09 RX ADMIN — MORPHINE SULFATE 2 MG: 2 INJECTION, SOLUTION INTRAMUSCULAR; INTRAVENOUS at 15:50

## 2022-09-09 RX ADMIN — CEFAZOLIN 2000 MG: 2 INJECTION, POWDER, FOR SOLUTION INTRAMUSCULAR; INTRAVENOUS at 11:23

## 2022-09-09 ASSESSMENT — PAIN DESCRIPTION - ORIENTATION
ORIENTATION: RIGHT;LEFT

## 2022-09-09 ASSESSMENT — PAIN DESCRIPTION - PAIN TYPE: TYPE: ACUTE PAIN;CHRONIC PAIN;NEUROPATHIC PAIN

## 2022-09-09 ASSESSMENT — ENCOUNTER SYMPTOMS
SINUS PRESSURE: 0
NAUSEA: 0
SHORTNESS OF BREATH: 0
BACK PAIN: 0
SORE THROAT: 0
ABDOMINAL PAIN: 0
EYE DISCHARGE: 0
RHINORRHEA: 0
WHEEZING: 0
EYE REDNESS: 0
CONSTIPATION: 0
SINUS PAIN: 0
COUGH: 0
DIARRHEA: 0

## 2022-09-09 ASSESSMENT — PAIN DESCRIPTION - LOCATION
LOCATION: LEG;KNEE
LOCATION: LEG
LOCATION: CHEST
LOCATION: LEG

## 2022-09-09 ASSESSMENT — PAIN SCALES - GENERAL
PAINLEVEL_OUTOF10: 3
PAINLEVEL_OUTOF10: 6
PAINLEVEL_OUTOF10: 3
PAINLEVEL_OUTOF10: 6
PAINLEVEL_OUTOF10: 2

## 2022-09-09 ASSESSMENT — PAIN DESCRIPTION - DESCRIPTORS
DESCRIPTORS: ACHING
DESCRIPTORS: ACHING;BURNING

## 2022-09-09 NOTE — SIGNIFICANT EVENT
Responded to rapid response. Patient complaining of CP 10/10. /96, . SpO2 87% on RA. Patient placed on 3L/NC with good response. Labetalol 10 mg IV administered while setting up for EKG. Nitro SL administered x1 and patient reported minor relief. Second dose of nitro administered 5 minutes later with additional slight relief. Morphine 2 mg IV administered and patient reported pain still present but improving. Patient reports substernal chest pain/pressure. She reports associated palpitations. She denies dizziness, shortness of breath, nausea, diaphoresis. EKG obtained notable for NSR, nonspecific ST/T changes. Obtain CMP, CBC, troponin, BNP. Monitor on telemetry. Cardiology consulted.     Natalya Mccollum PA-C 2018

## 2022-09-09 NOTE — SIGNIFICANT EVENT
Troponin came back elevated. Started ACS work up and tx, requested stat EKG, also called on the floor to obtain and still pending. Echo, cardiology consulted, medically treating with asa and heparin.

## 2022-09-09 NOTE — CONSULTS
[address]  H+P  Consult  OP Visit  FU Visit   CC HPI   cp Presents with LE edema and cellulitis. Last night developed acute chest pain. Troponin elevated at 0.39 at 4AM.  EKG without acute changes. Continued ongoing pain this morning. HISTORY/ALLERGY/ROS   MEDHx  has a past medical history of AR (allergic rhinitis), Arthritis of knee, Depression, Erosive gastritis, GERD (gastroesophageal reflux disease), Hyperlipidemia, Internal hemorrhoids, Overweight(278.02), and Viral meningitis. SURGHx  has a past surgical history that includes Cholecystectomy, laparoscopic (2003); Total abdominal hysterectomy w/ bilateral salpingoophorectomy (2003); Colonoscopy (8/06); Colonoscopy (12/3/13); Upper gastrointestinal endoscopy (12/3/13); Breast biopsy; Hysterectomy; and hip surgery (Left, 5/16/2022). SOCHx  reports that she has never smoked. She has never used smokeless tobacco. She reports current alcohol use. She reports that she does not use drugs. FAMHx family history includes Bipolar Disorder in her brother; Breast Cancer in her mother. ALLERG Patient has no known allergies.    ROS Full ROS obtained and negative except as mentioned in HPI   MEDICATIONS   Current Facility-Administered Medications   Medication Dose Route Frequency Provider Last Rate Last Admin    heparin (porcine) injection 4,000 Units  4,000 Units IntraVENous PRN Ron Lucero, DO        heparin (porcine) injection 2,000 Units  2,000 Units IntraVENous PRN Ron Lucero, DO        heparin 25,000 units in dextrose 5% 250 mL (premix) infusion  5-30 Units/kg/hr IntraVENous Continuous Ron Lucero DO 12 mL/hr at 09/09/22 0651 16.461 Units/kg/hr at 09/09/22 0651    pantoprazole (PROTONIX) tablet 40 mg  40 mg Oral QAM AC CIERRA Sharma - CNP   40 mg at 09/09/22 0542    furosemide (LASIX) tablet 20 mg  20 mg Oral Daily Simone Demarco MD   20 mg at 09/08/22 134    polyethylene glycol (GLYCOLAX) packet 17 g  17 g Oral Daily CIERRA Barker CNP   17 g at 09/08/22 1350    sennosides-docusate sodium (SENOKOT-S) 8.6-50 MG tablet 2 tablet  2 tablet Oral BID CIERAR Barker CNP   2 tablet at 09/08/22 2219    iron sucrose (VENOFER) 200 mg in sodium chloride 0.9 % 100 mL IVPB  200 mg IntraVENous Q24H CIERRA Barker CNP   Stopped at 09/08/22 1808    nitroGLYCERIN (NITROSTAT) SL tablet 0.4 mg  0.4 mg SubLINGual Q5 Min PRN Castro Martell PA-C   0.4 mg at 09/08/22 2152    morphine (PF) injection 2 mg  2 mg IntraVENous Q4H PRN Castro Martell PA-C   2 mg at 09/08/22 2155    atorvastatin (LIPITOR) tablet 10 mg  10 mg Oral Every Other Day Florencia Brunson MD   10 mg at 09/08/22 2219    gabapentin (NEURONTIN) capsule 100 mg  100 mg Oral TID CIERRA Soliz - CNP   100 mg at 09/08/22 2220    busPIRone (BUSPAR) tablet 10 mg  10 mg Oral Q6H PRN CIERRA Isaac - CNP   10 mg at 09/09/22 0152    HYDROcodone-acetaminophen (NORCO) 5-325 MG per tablet 1 tablet  1 tablet Oral Q6H PRN CIERRA Soliz - CNP   1 tablet at 09/09/22 0152    calcium carbonate (TUMS) chewable tablet 500 mg  500 mg Oral TID PRN CIERRA Soliz - CNP   500 mg at 09/04/22 1058    diclofenac sodium (VOLTAREN) 1 % gel 4 g  4 g Topical TID Shilpa Deshpande MD   4 g at 09/08/22 2328    lactobacillus (CULTURELLE) capsule 1 capsule  1 capsule Oral BID  Shilpa Deshpande MD   1 capsule at 09/08/22 1703    aspirin EC tablet 81 mg  81 mg Oral Daily Sven Stafford MD   81 mg at 09/08/22 0855    buPROPion (WELLBUTRIN XL) extended release tablet 150 mg  150 mg Oral Daily Florencia Brunson MD   150 mg at 09/08/22 0855    vilazodone HCl (VIIBRYD) TABS 20 mg  20 mg Oral Daily Sven Stafford MD   20 mg at 09/08/22 0855    ceFAZolin (ANCEF) 2,000 mg in dextrose 5 % 50 mL IVPB (mini-bag)  2,000 mg IntraVENous Fredy Mcknight MD   Stopped at 09/09/22 0215    sodium chloride flush 0.9 % injection 5-40 mL  5-40 mL IntraVENous 2 times per day Ayla Bunn MD   10 mL at 09/07/22 0751    sodium chloride flush 0.9 % injection 5-40 mL  5-40 mL IntraVENous PRN Sven Stafford MD        0.9 % sodium chloride infusion   IntraVENous PRN Ayla Bunn MD 10 mL/hr at 09/07/22 0142 New Bag at 09/07/22 0142    ondansetron (ZOFRAN-ODT) disintegrating tablet 4 mg  4 mg Oral Q8H PRN Sven Stafford MD        Or    ondansetron (ZOFRAN) injection 4 mg  4 mg IntraVENous Q6H PRN Sven Stafford MD   4 mg at 09/03/22 0522    acetaminophen (TYLENOL) tablet 650 mg  650 mg Oral Q6H PRN Sven Stafford MD   650 mg at 09/08/22 1746    Or    acetaminophen (TYLENOL) suppository 650 mg  650 mg Rectal Q6H PRN Sven Stafford MD        perflutren lipid microspheres (DEFINITY) injection 1.65 mg  1.5 mL IntraVENous ONCE PRN Ayla Bunn MD          PHYSICAL EXAM   Vitals Vitals:    09/09/22 0738   BP:    Pulse:    Resp: (P) 16   Temp:    SpO2:       Gen Alert, coop, no distress Heart  Rrr, no mrg   Head NC, AT, no abnorm Abd  Soft, NT, +BS, no mass, no OM   Eyes PER, conj/corn clear Ext  Ext nl, AT, no C/C/E   Nose Nares nl, no drain, NT Pulse 2+ and symmetric   Throat Lips, mucosa, tongue nl Skin Col/text/turg nl, no vis rash/les   Neck S/S, TM, NT, no bruit/JVD Psych Nl mood and affect   Lung CTA-B, unlabored, no DTP Lymph   No cervical or axillary LA   Ch wall NT, no deform Neuro  Nl gross M/S exam      ASSESSMENT AND PLAN     *NSTEMI   Date EF Results   Sx   typical   Data   Most recent EKG, ECHO and LHC reviewed personally   LHC   None   MPI   None   TTE 9/22 60%    Plan   Elevated troponin, ongoing symptoms  Emergent LHC, R/B/O discussed     Critical Care   Due to the high probability of clinically significant life threating deterioration of the patient's condition that required my urgent intervention, a total critical care time of >35 minutes was used. This time excludes any time that may have been spent performing procedures.  This includes but not limited to vital sign monitoring, telemetry monitoring, continuous pulse oximety, IV medication, clinical response to the IV medications, documentation time , consultation time, interpretation of lab data, review of nursing notes and old record review. All questions and concerns were addressed to the patient/family. Alternatives to my treatment were discussed. The note was completed using EMR. Every effort was made to ensure accuracy; however, inadvertent computerized transcription errors may be present.

## 2022-09-09 NOTE — PROGRESS NOTES
Occupational Therapy/Physical Therapy  Pt chart reviewed. Pt currently off floor in cath lab. OT to re-attempt subsequent treatment session at later time and when medically stable and when schedule allows. Thank you.    Juan Jonas OTR/L  [id number]  [address], [place]PT, [id number]

## 2022-09-09 NOTE — PROGRESS NOTES
Rapid Response Quick Summary    Room: 0-5887/2173-21    Assessment of concern / patient:  pt with complaint of chest pain and rapid HR up to 130's    Physician involved:  Fabián Sims NP    Interventions:  pt given labetalol, nitroglycerin SL x 2, and morphine. EKG completed and labs ordered. Disposition:  pt reports some improvement in pain and HR down to 80's. Pt to remain at current level of care. Quality 130: Documentation Of Current Medications In The Medical Record: Current Medications Documented Detail Level: Detailed

## 2022-09-09 NOTE — PLAN OF CARE
[id number] Mask MD (Nic) EKG resulted per request. Advised Troponin 0.39. Advised pt denying chest pain, reporting abdominal pain at this time. MD unable to view EKG in Epic, requested another EKG ordered. RN placed EKG order STAT. Advised day shift RN.   ----------------------------------  Shift assessment complete. Pt called out to RN reporting new onset chest pain. Pt assessed by RN, Rapid response called: See additional notes from NP. Cardiology consulted, see orders. Pt required 3L of O2 during Rapid response, but weaned to RA at 95%. Pt reports pain r/t BLE, but continuously denies pain medications. Bmx3. Purwick in place. Call light in reach    The care plan and education has been reviewed and mutually agreed upon with the patient. Patient remains free from falls. All fall precautions in place. Yellow bracelet on patient. SAFE sign on door. Bed and chair alarms being used. Bed in lowest position. Will monitor.   -----------------------------  Problem: Discharge Planning  Goal: Discharge to home or other facility with appropriate resources  Outcome: Progressing     Problem: Pain  Goal: Verbalizes/displays adequate comfort level or baseline comfort level  Outcome: Progressing     Problem: Skin/Tissue Integrity  Goal: Absence of new skin breakdown  Description: 1. Monitor for areas of redness and/or skin breakdown  2. Assess vascular access sites hourly  3. Every 4-6 hours minimum:  Change oxygen saturation probe site  4. Every 4-6 hours:  If on nasal continuous positive airway pressure, respiratory therapy assess nares and determine need for appliance change or resting period.   Outcome: Progressing     Problem: Safety - Adult  Goal: Free from fall injury  Outcome: Progressing     Problem: ABCDS Injury Assessment  Goal: Absence of physical injury  Outcome: Progressing

## 2022-09-09 NOTE — OP NOTE
[address]   [facility] Operative Note     Procedure Summary  Procedure [facility]   Indication NSTEMI   Consent Obtained   Access RRA   US US guidance used to determine artery patency, size (>2mm), anatomic variations and ideal puncture location. Real-time US utilized concurrent with vascular needle entry into artery. Image(s) permanently recorded and reported in chart. Bleed Risk Low   Sedation Minimal conscious sedation for patient comfort. Independent trained observer pushed medications at my direction. We monitored the patient's level of consciousness and vital signs/physiologic status throughout the procedure duration (see start and stop times above, as well as medication dosages). Start Time 0831   Stop Time 0855   Versed 2mg   Fentanyl 100mcg   Contrast 53cc   Flouro 1.8   EBL <55TG   Complicat None   Specimens None     Findings  Artery Findings/Result   LM Normal   LAD Normal   Cx Normal   RI N/A   RCA Normal   LVEDP 14   LVG 55%     Intervention(s)  None    Post Cath Dx:   Normal coronaries  Normal EF  Possible myopericarditis?

## 2022-09-09 NOTE — PROGRESS NOTES
Infectious Diseases   Progress Note      Admission Date: 9/2/2022  Hospital Day: Hospital Day: 8   Attending: Luzmaria Henderson MD  Date of service: 9/9/2022     Chief complaint/ Reason for consult:     High fever of 101.8  Bilateral leg cellulitis  Negative bilateral lower extremity venous Doppler study on 9/2/2022  Gastroesophageal reflux disease    Microbiology:        I have reviewed allavailable micro lab data and cultures    Blood culture (2/2) - collected on 9/2/2022: Negative      Antibiotics and immunizations:       Current antibiotics: All antibiotics and their doses were reviewed by me    Recent Abx Admin                     ceFAZolin (ANCEF) 2,000 mg in dextrose 5 % 50 mL IVPB (mini-bag) (mg) 2,000 mg New Bag 09/09/22 1123     2,000 mg New Bag  0145     2,000 mg New Bag 09/08/22 1705                      Immunization History: All immunization history was reviewed by me today. Immunization History   Administered Date(s) Administered    COVID-19, MODERNA BLUE border, Primary or Immunocompromised, (age 12y+), IM, 100 mcg/0.5mL 01/21/2021, 02/18/2021, 10/28/2021, 05/12/2022    Influenza 11/24/2010    Influenza Vaccine, unspecified formulation 10/01/2016    Influenza Virus Vaccine 10/01/2012, 10/01/2013, 10/13/2015    Influenza, FLUAD, (age 72 y+), Adjuvanted, 0.5mL 10/12/2020    Influenza, High Dose (Fluzone 65 yrs and older) 10/12/2017, 10/09/2018, 10/28/2021    Pneumococcal Conjugate 13-valent ([id number]) 07/13/2015    Pneumococcal Polysaccharide ([id number]) 05/07/2012    Td, unspecified formulation 09/30/2009    Tdap (Boostrix, Adacel) 01/08/2021    Zoster Live (Zostavax) 08/01/2013    Zoster Recombinant (Shingrix) 01/08/2021, 04/24/2021       Known drug allergies: All allergies were reviewed and updated    No Known Allergies    Social history:     Social History:  All social andepidemiologic history was reviewed and updated by me today as needed.      Tobacco use:   reports that she has antibiotic  Continue close vitals monitoring. Maintain good glycemic control. Fall precautions. Aspiration precautions. Continue to watch for new fever or diarrhea. DVT prophylaxis. Discussed all above with patient and RN. Drug Monitoring:    Continue monitoring for antibiotic toxicity as follows: CBC, CMP   Continue to watch for following: new or worsening fever, new hypotension, hives, lip swelling and redness or purulence at vascular access sites. I/v access Management:    Continue to monitor i.v access sites for erythema, induration, discharge or tenderness. As always, continue efforts to minimize tubes/lines/drains as clinically appropriate to reduce chances of line associated infections. Patient education and counseling: The patient was educated in detail about the side-effects of various antibiotics and things to watch for like new rashes, lip swelling, severe reaction, worsening diarrhea, break through fever etc.  Discussed patient's condition and what to expect. All of the patient's questions were addressed in a satisfactory manner and patient verbalized understanding all instructions. Thank you for involving me in the care of your patient. I will continue to follow. If you have anyadditional questions, please do not hesitate to contact me. Subjective: Interval history: Interval history was obtained from chart review and patient/ RN. The patient is on IV cefazolin. She is tolerating antibiotics okay. No diarrhea. REVIEW OF SYSTEMS:      Review of Systems   Constitutional:  Negative for chills, diaphoresis and fever. HENT:  Negative for ear discharge, ear pain, postnasal drip, rhinorrhea, sinus pressure, sinus pain and sore throat. Eyes:  Negative for discharge and redness. Respiratory:  Negative for cough, shortness of breath and wheezing. Cardiovascular:  Negative for chest pain and leg swelling.    Gastrointestinal:  Negative for abdominal pain, constipation, diarrhea and nausea. Endocrine: Negative for cold intolerance, heat intolerance and polydipsia. Genitourinary:  Negative for dysuria, flank pain, frequency, hematuria and urgency. Musculoskeletal:  Negative for back pain and myalgias. Skin:  Negative for rash. Allergic/Immunologic: Negative for immunocompromised state. Neurological:  Negative for dizziness, seizures and headaches. Hematological:  Does not bruise/bleed easily. Psychiatric/Behavioral:  Negative for agitation, hallucinations and suicidal ideas. The patient is not nervous/anxious. All other systems reviewed and are negative. Past Medical History: All past medical history reviewed today. Past Medical History:   Diagnosis Date    AR (allergic rhinitis)     Arthritis of knee     Pruis    Depression     Erosive gastritis 10/28/2019    EGD October 2019, he did with PPI    GERD (gastroesophageal reflux disease)     Hyperlipidemia     Internal hemorrhoids     Overweight(278.02)     Viral meningitis 5/12       Past Surgical History: All past surgical history was reviewed today. Past Surgical History:   Procedure Laterality Date    BREAST BIOPSY      CHOLECYSTECTOMY, LAPAROSCOPIC  2003    COLONOSCOPY  8/06    normal; Ortega    COLONOSCOPY  12/3/13    Ortega- polyps    HIP SURGERY Left 5/16/2022    LEFT HIP HEMIARTHROPLASTY performed by Jaci Garcia MD at [place] [address] ([address] [place] [address])      [facility] AND [facility] (CERVIX REMOVED)  2003    UPPER GASTROINTESTINAL ENDOSCOPY  12/3/13    Petrona Walter; antritis       Family History: All family history was reviewed today.         Problem Relation Age of Onset    Breast Cancer Mother     Bipolar Disorder Brother        Objective:       PHYSICAL EXAM:      Vitals:   Vitals:    09/08/22 2215 09/09/22 0055 09/09/22 0730 09/09/22 0738   BP: (!) 87/49 106/61 125/62    Pulse: 83 90 84    Resp:   16 16   Temp:  99 °F (37.2 °C) 98 °F (36.7 °C)    TempSrc:  Axillary Oral    SpO2: 97% 93% 96%    Weight:       Height:           Physical Exam  Vitals and nursing note reviewed. Constitutional:       Appearance: She is well-developed. She is not diaphoretic. Comments: The patient was seen earlier today. HENT:      Head: Normocephalic and atraumatic. Right Ear: External ear normal. There is no impacted cerumen. Left Ear: External ear normal. There is no impacted cerumen. Nose: Nose normal.      Mouth/Throat:      Mouth: Mucous membranes are moist.      Pharynx: Oropharynx is clear. No oropharyngeal exudate. Eyes:      General: No scleral icterus. Right eye: No discharge. Left eye: No discharge. Conjunctiva/sclera: Conjunctivae normal.      Pupils: Pupils are equal, round, and reactive to light. Neck:      Thyroid: No thyromegaly. Cardiovascular:      Rate and Rhythm: Normal rate and regular rhythm. Heart sounds: Normal heart sounds. No murmur heard. No friction rub. Pulmonary:      Effort: No respiratory distress. Breath sounds: No stridor. No wheezing or rales. Abdominal:      General: Bowel sounds are normal.      Palpations: Abdomen is soft. Tenderness: There is no abdominal tenderness. There is no guarding or rebound. Musculoskeletal:         General: No swelling, tenderness or deformity. Normal range of motion. Cervical back: Normal range of motion and neck supple. Right lower leg: No edema. Left lower leg: No edema. Lymphadenopathy:      Cervical: No cervical adenopathy. Skin:     General: Skin is warm and dry. Coloration: Skin is not jaundiced. Findings: No bruising, erythema or rash. Neurological:      General: No focal deficit present. Mental Status: She is alert and oriented to person, place, and time. Mental status is at baseline. Motor: No abnormal muscle tone.    Psychiatric:         Mood and Affect: Mood normal.         Behavior: Behavior normal.     *    Lines and drains: All vascular access sites are healthy with no local erythema, discharge or tenderness. Intake and output:    I/O last 3 completed shifts: In: 1000 [P.O.:850; I.V.:150]  Out: 800 [Urine:800]    Lab Data:   All available labs and old records have been reviewed by me. CBC:  Recent Labs     09/08/22 0440 09/08/22 2209 09/09/22  0403   WBC 6.0 4.2 7.2   RBC 3.39* 3.72* 3.34*   HGB 9.6* 10.7* 9.5*   HCT 29.4* 33.3* 28.9*   * 180 158   MCV 86.7 89.4 86.5   MCH 28.4 28.8 28.5   MCHC 32.8 32.2 32.9   RDW 14.0 14.2 14.2          BMP:  Recent Labs     09/08/22 0440 09/08/22 2209 09/09/22  0403   * 132* 131*   K 3.2* 4.5 3.9   CL 92* 94* 94*   CO2 28 25 27   BUN 45* 43* 47*   CREATININE 1.1 1.1 1.2   CALCIUM 8.3 9.0 8.9   GLUCOSE 110* 108* 131*          Hepatic Function Panel:   Lab Results   Component Value Date/Time    ALKPHOS 95 09/08/2022 10:09 PM    ALT <5 09/08/2022 10:09 PM    AST 21 09/08/2022 10:09 PM    PROT 6.5 09/08/2022 10:09 PM    PROT 7.3 10/17/2012 08:47 AM    BILITOT <0.2 09/08/2022 10:09 PM    BILIDIR <0.2 10/22/2019 03:08 PM    IBILI see below 10/22/2019 03:08 PM    LABALBU 2.8 09/09/2022 04:03 AM       CPK:   Lab Results   Component Value Date    CKTOTAL 89 09/02/2022     ESR:   Lab Results   Component Value Date    SEDRATE 70 (H) 09/06/2022     CRP:   Lab Results   Component Value Date    .9 (H) 09/06/2022           Imaging: All pertinent images and reports for the current visit were reviewed by me during this visit. I reviewed the chest x-ray/CT scan/MRI images and independently interpreted the findings and results today. XR CHEST (2 VW)   Final Result   No acute cardiopulmonary disease         VL Extremity Venous Bilateral             Medications: All current and past medications were reviewed.      pantoprazole  40 mg Oral QAM AC    furosemide  20 mg Oral Daily    polyethylene glycol  17 g Oral Daily    sennosides-docusate sodium  2 tablet Oral BID    iron sucrose (VENOFER) iv piggyback 100 mL (Admin over 60 minutes)  200 mg IntraVENous Q24H    atorvastatin  10 mg Oral Every Other Day    gabapentin  100 mg Oral TID    diclofenac sodium  4 g Topical TID    lactobacillus  1 capsule Oral BID WC    aspirin  81 mg Oral Daily    buPROPion  150 mg Oral Daily    vilazodone HCl  20 mg Oral Daily    ceFAZolin  2,000 mg IntraVENous Q8H    sodium chloride flush  5-40 mL IntraVENous 2 times per day        heparin (PORCINE) Infusion 16.461 Units/kg/hr (09/09/22 0651)    sodium chloride 10 mL/hr at 09/07/22 0142       heparin (porcine), heparin (porcine), nitroGLYCERIN, morphine, busPIRone, HYDROcodone 5 mg - acetaminophen, calcium carbonate, sodium chloride flush, sodium chloride, ondansetron **OR** ondansetron, acetaminophen **OR** acetaminophen, perflutren lipid microspheres      Problem list:       Patient Active Problem List   Diagnosis Code    AR (allergic rhinitis) J30.9    Arthritis of knee M17.10    Patient overweight E66.3    High fever R50.9    Postmenopausal Z78.0    Essential hypertension I10    Erosive gastritis K29.60    Current mild episode of major depressive disorder without prior episode ([address] F32.0    Left displaced femoral neck fracture ([address] S72.002A    Fall at home [address] Lawrence Sandhoff, Y92.009    Hyperlipidemia E78.5    Cellulitis L03.90    Class 1 obesity due to excess calories with body mass index (BMI) of 30.0 to 30.9 in adult E66.09, Z68.30    History of depression Z86.59    Bilateral lower leg cellulitis L03.116, L03.115    Peripheral edema R60.9    Redness and swelling of lower leg M79.89, R23.8    Weight loss counseling, encounter for Z71.3    NSTEMI (non-ST elevated myocardial infarction) ([facility] I21.4       Please note that this chart was generated using Dragon dictation software. Although every effort was made to ensure the accuracy of this automated transcription, some errors in transcription may have occurred inadvertently.  If you may need any clarification, please do not hesitate to contact me through [facility] or at the phone number provided below with my electronic signature. Any pictures or media included in this note were obtained after taking informed verbal consent from the patient and with their approval to include those in the patient's medical record. Melonie Winchester MD, MPH, [address]  9/9/2022, 1:21 PM  [facility] Infectious Disease   [address] [place] [address], [address] [place] [address]  Office: [phone]  Fax: [phone]  In-person Clinic days:  Tuesday & Thursday a.m. Virtual clinic days: Monday, Wednesday & Friday a.m.

## 2022-09-09 NOTE — PRE SEDATION
[facility] Cardiology  Brief Pre-Op Note/Sedation Assessment    Lg Suarez  1940  [id number]  8:07 AM    Planned Procedure: Cardiac Catheterization Procedure  Post Procedure Plan: Return to same level of care  Consent:   I have discussed with the patient and/or the patient representative the indication, alternatives, and the possible risks and/or complications of the planned procedure and the anesthesia methods. The patient and/or patient representative appear to understand and agree to proceed. Chief Complaint:   Chest Pain/Pressure  Anginal Equivalent  NSTEMI  Dyspnea on Exertion  Dyspnea  Fatigue     Indications for Procedure:  Presentation ACS <= 24 hrs, New Onset Angina <= 2 months, Worsening Angina, and Suspected CAD   Anginal Class (2 wks) CCS III - Symptoms with everyday living activities, i.e., moderate limitation   Anginal Sx Typical CP   CHF Class (2wks) No symptoms   CV Instability Yes     Prior Ischemic Workup/Eval:  Pre-Proc Meds Yes: Aspirin and STATIN   Stress Test? No     Does Patient need surgery? Cardiac Valve Surgery No     Pre-Procedure Medical History:  Vital Signs /62   Pulse 84   Temp 98 °F (36.7 °C) (Oral)   Resp (P) 16   Ht 5' 1\" (1.549 m)   Wt 160 lb 12.8 oz (72.9 kg)   SpO2 96%   BMI 30.38 kg/m²    Allergies Reviewed in EMR   Medications Reviewed in EMR   Past Med Hx Reviewed in EMR   Surg Hx Reviewed in EMR     Pre-Sedation:  Pre-Sedation Exam I have assessed the patient and reviewed the H&P on the chart. Hx Anesthesia Comps None   Mod Mallampati II   ASA Class Class 2 - A normal healthy patient with mild systemic disease     Logan Scale: Activity 2 - Able to move 4 extrem on command   Respiration 2 - Able to breath deeply and cough freely   Circulation 2 - BP +/- 20mmHg of normal   Consciousness 2 - Fully awake   Oxygen Saturation 2 - Able to maintain oxygen sat >92% on room air     Sedation/Anesthesia Plan:  Guard patient safety and welfare.  Minimize physical discomfort and pain. Minimize negative psychological responses to treatment by providing sedation and analgesia and maximize the potential amnesia. Patient to meet pre-procedure discharge plan.      Medication Planned:  Midazolam intravenously and fentanyl intravenously     Patient is an appropriate candidate for plan of sedation:   Yes    Electronically signed by Jessi Underwood MD on 9/9/2022 at 8:07 AM

## 2022-09-09 NOTE — PROGRESS NOTES
0730- Pt c/o overnight. Troponin elevated to 3.9. page placed to hospitalist to review case. Spoke with EKG and informed of stat EKG. Per NP Tessa cardiology is en route to assess pt.    8998- Cardiology in to round. 0800 Discussed angiogram with pt. Cath lab taking pt down for procedure. Belongings sent with spouse who is at bedside.

## 2022-09-09 NOTE — PROGRESS NOTES
[facility]    9/9/2022 2:35 PM        Name: Lg Suarez . Admitted: 9/2/2022  Primary Care Provider: Malik Freeman MD (Tel: [phone])      Brief History: Presented with swelling and redness BLE. Failed outpatient management with Lasix. Admitted with cellulitis BLE. Subjective: Patient rapid response for chest pain overnight and troponin consistent with NSTEMI. She was started on heparin drip and taken to cath lab this am for urgent procedure, found to have normal cors. Patient seen in cath lab recovery. She is tearful, complains of intractable pain in legs, mostly in bilateral thighs, says her legs hurt so bad she can hardly move them. Sensation intact  to feet, strong pedal pulses. Continues to note chest pain but not as intense as last night. HOB nearly flat, denies shortness of breath.      Reviewed interval ancillary notes    Current Medications  heparin (porcine) injection 4,000 Units, PRN  heparin (porcine) injection 2,000 Units, PRN  heparin 25,000 units in dextrose 5% 250 mL (premix) infusion, Continuous  cephALEXin (KEFLEX) capsule 500 mg, 4 times per day  pantoprazole (PROTONIX) tablet 40 mg, QAM AC  furosemide (LASIX) tablet 20 mg, Daily  polyethylene glycol (GLYCOLAX) packet 17 g, Daily  sennosides-docusate sodium (SENOKOT-S) 8.6-50 MG tablet 2 tablet, BID  iron sucrose (VENOFER) 200 mg in sodium chloride 0.9 % 100 mL IVPB, Q24H  nitroGLYCERIN (NITROSTAT) SL tablet 0.4 mg, Q5 Min PRN  morphine (PF) injection 2 mg, Q4H PRN  atorvastatin (LIPITOR) tablet 10 mg, Every Other Day  gabapentin (NEURONTIN) capsule 100 mg, TID  busPIRone (BUSPAR) tablet 10 mg, Q6H PRN  HYDROcodone-acetaminophen (NORCO) 5-325 MG per tablet 1 tablet, Q6H PRN  calcium carbonate (TUMS) chewable tablet 500 mg, TID PRN  diclofenac sodium (VOLTAREN) 1 % gel 4 g, TID  lactobacillus (CULTURELLE) capsule 1 capsule, BID WC  aspirin EC tablet 81 mg, Daily  buPROPion (WELLBUTRIN XL) extended release tablet 150 mg, Daily  vilazodone HCl (VIIBRYD) TABS 20 mg, Daily  sodium chloride flush 0.9 % injection 5-40 mL, 2 times per day  sodium chloride flush 0.9 % injection 5-40 mL, PRN  0.9 % sodium chloride infusion, PRN  ondansetron (ZOFRAN-ODT) disintegrating tablet 4 mg, Q8H PRN   Or  ondansetron (ZOFRAN) injection 4 mg, Q6H PRN  acetaminophen (TYLENOL) tablet 650 mg, Q6H PRN   Or  acetaminophen (TYLENOL) suppository 650 mg, Q6H PRN  perflutren lipid microspheres (DEFINITY) injection 1.65 mg, ONCE PRN      Objective:  /62   Pulse 84   Temp 98 °F (36.7 °C) (Oral)   Resp 16   Ht 5' 1\" (1.549 m)   Wt 160 lb 12.8 oz (72.9 kg)   SpO2 96%   BMI 30.38 kg/m²     Intake/Output Summary (Last 24 hours) at 9/9/2022 1435  Last data filed at 9/8/2022 1436  Gross per 24 hour   Intake --   Output 800 ml   Net -800 ml      Wt Readings from Last 3 Encounters:   09/06/22 160 lb 12.8 oz (72.9 kg)   09/02/22 181 lb 12.8 oz (82.5 kg)   08/16/22 150 lb (68 kg)     General:  Awake, alert, oriented in NAD  Skin:  Warm and dry. Neck:  Supple. No JVD appreciated  Chest:  Normal effort.   Clear to auscultation, no wheezes/rhonchi/rales  Cardiovascular:  RRR, normal S1/S2, no murmur/gallop/rub  Abdomen:  Soft, nontender, +bowel sounds  Extremities:  Trace BLE edema, ace wraps in place  Neurological: No focal deficits  Psychological: Tearful      Labs and Tests:  CBC:   Recent Labs     09/08/22 0440 09/08/22 2209 09/09/22  0403   WBC 6.0 4.2 7.2   HGB 9.6* 10.7* 9.5*   * 180 158     BMP:    Recent Labs     09/08/22 0440 09/08/22 2209 09/09/22  0403   * 132* 131*   K 3.2* 4.5 3.9   CL 92* 94* 94*   CO2 28 25 27   BUN 45* 43* 47*   CREATININE 1.1 1.1 1.2   GLUCOSE 110* 108* 131*     Hepatic:   Recent Labs     09/08/22 2209   AST 21   ALT <5*   BILITOT <0.2   ALKPHOS 95       Venous Doppler 9/2/2022:  Right   Limited study due to pt's severe swelling and pain. No evidence of deep vein or superficial vein thrombosis involving the right   lower extremity. Right inguinal lymph node measuring 1.3 x 0.6 x 1.0 cm. Calf veins were poorly visualized on the right. Left   Limited study due to pt's severe swelling and pain. No evidence of deep vein or superficial vein thrombosis involving the left   lower extremity. Left inguinal lymph node measuring 1.9 x 0.7 x 1.4 cm. Calf veins were poorly visualized on the left. Left medial fossa: Cystic structure measuring 2.3 x 0.7 x 2.0 cm. Echo 9/2/2022:  Summary   Mild septal left ventricular hypertrophy. Normal left ventricle size and systolic function with an estimated ejection fraction of 55-60%. No regional wall motion abnormalities are seen. Grade I diastolic dysfunction with normal LV filling pressures. Left sided pleural effusion noted. CXR 9/6/2022:  No acute cardiopulmonary disease    LHC 9/9/2022:  Findings  Artery Findings/Result   LM Normal   LAD Normal   Cx Normal   RI N/A   RCA Normal   LVEDP 14   LVG 55%   Intervention(s)  None  Post Cath Dx:       Normal coronaries  Normal EF  Possible myopericarditis? Problem List  Principal Problem:    Cellulitis  Active Problems:    Hyperlipidemia    Class 1 obesity due to excess calories with body mass index (BMI) of 30.0 to 30.9 in adult    History of depression    Bilateral lower leg cellulitis    Peripheral edema    Redness and swelling of lower leg    Weight loss counseling, encounter for    NSTEMI (non-ST elevated myocardial infarction) ([facility])    High fever    Current mild episode of major depressive disorder without prior episode ([facility] [address]  Resolved Problems:    * No resolved hospital problems. *       Assessment & Plan:   Chest pain / elevated troponin. Patient developed acute chest pain overnight, troponin 0.01->0. 39. Started on heparin drip.  Underwent urgent LHC this am which showed normal cors and normal EF; possible myopericarditis. Still with chest discomfort but not as intense as last night. Await treatment recs. Cellulitis BLE. Venous doppler negative for DVT. , sed rate 70. Started on Ancef with improvement. Low grade temp this afternoon, WBC remains stable. ID recommends switch to po Keflex 500 mg q 6 hours at DC with stop date 9/16. Continue ACE wraps and leg elevation when up. Appreciate ID recs. Lower leg edema. Echo with EF 80-62%, grade 1 diastolic dysfunction, normal filling pressures. Doubt CHF exacerbation as no pulmonary edema on CXR and BNP only 248 and comparable to prior. Likely secondary to immobility, dependent positioning of legs when up, suspected venous insufficiency, chronic diastolic CHF. Improved with ACE wraps and IV lasix, weight down to 160, reported to be 181 on admission but suspect accuracy. IV Lasix stopped 9/6 as BUN trending up and sodium down to 131. Resumed po Lasix 9/8. Hyponatremia. Sodium 137 on admission, trended down to 131 and IV Lasix stopped. Received gentle hydration with no improvement. Workup in process. Appreciate nephrology recs. Pain BLE. Patient describes intractable pain in thighs that she can hardly move legs. She has been receiving gabapentin with little to no improvement. Noted to have some urinary retention this morning and amos placed. Check MRI lumbar spine. Add on CK level. Anxiety / depression. Continue bupropion and vilazodone. Hypokalemia. Resolved. Secondary to diuresis, magnesium 2.20. Normocytic anemia. Hgb slowly trending down, 11.5->10.4->9.6. Denies black or tarry stools prior to admit. Labs show iron deficiency (iron 15, saturation 9%) and IV Venofer ordered, no deficiency of vitamin B12 or folate. Continue pantoprazole daily, stool for occult blood pending. Disposition: PT/OT recommend SNF on DC. Not quite ready for DC. Diet: ADULT DIET; Regular;  No Added Salt (3-4 gm)  Code:Full Code  DVT PPX: enoxaparin      Shilpa Roy CIERRA Zarate - CNP   9/9/2022 2:35 PM

## 2022-09-09 NOTE — PROGRESS NOTES
Physician Progress Note      PATIENT:               Charlene Lora  CSN #:                  [id number]  :                       1940  ADMIT DATE:       2022 11:28 AM  DISCH DATE:  RESPONDING  PROVIDER #:        Neal Muñoz CNP          QUERY TEXT:    Patient admitted with cellulitis and edema to bilateral lower extremities. Documentation reflects acute on chronic diastolic heart failure in IM progress   note on 9/3. If possible, please document in the progress notes and   discharge summary if acute on chronic diastolic heart failure was: The medical record reflects the following:  Risk Factors: 79 yo with hx of diastolic heart failure    Clinical Indicators: IM note 9/3, Bilateral lower extremity edema 2/2 Acute on   chronic dCHF- ECHO revealed EF 55-60% and DD1  - cont Iv lasix 40mg BID    IM note , Lower leg edema:  improved with ACE and IV lasix    Treatment: Lasix, antibiotics, ace wraps, supportive care  Options provided:  -- Acute on chronic diastolic heart failure confirmed after study  -- Acute on chronic diastolic heart failure ruled out after study  -- Other - I will add my own diagnosis  -- Disagree - Not applicable / Not valid  -- Disagree - Clinically unable to determine / Unknown  -- Refer to Clinical Documentation Reviewer    PROVIDER RESPONSE TEXT:    Patient with chronic diastolic CHF which is stable. Query created by:  Alaina Hogue on 2022 1:50 PM      Electronically signed by:  Whit Muñoz CNP 2022 8:37 AM

## 2022-09-09 NOTE — PROGRESS NOTES
hypotonic/isotonic fluids such as D5W, NS, LR with other medications/drips ,   - Concentrate continuous drip fluids as much as possible,   - Avoid IVF , unless needed for resuscitation/hypovolemia w/ hypotension+tachycardia,    - Strict I/O with daily weights. --- Call us urgently if any/worsening neurological findings. Other major problems: Management per primary and other consulting teams. //   Bilateral leg cellulitis   Obesity       Hospital Problems             Last Modified POA    * (Principal) Cellulitis 9/2/2022 Yes    Hyperlipidemia 9/8/2022 Yes    Class 1 obesity due to excess calories with body mass index (BMI) of 30.0 to 30.9 in adult 9/6/2022 Yes    History of depression 9/6/2022 Yes    Bilateral lower leg cellulitis 9/6/2022 Yes    Peripheral edema 9/6/2022 Yes    Redness and swelling of lower leg 9/6/2022 Yes    Weight loss counseling, encounter for 9/7/2022 Yes    NSTEMI (non-ST elevated myocardial infarction) ([facility] [address] 9/9/2022 Yes    High fever 9/6/2022 Yes    Current mild episode of major depressive disorder without prior episode ([facility] [address] 9/3/2022 Yes   : other supportive care :   - Check daily renal function panel with electrolytes-phosphorus  - Strict monitoring of I/Os, daily weight  - non-Renal feeds/diet  - Current medications reviewed. Please refer to the orders. High Complexity. Multiple complex problems. Discussed with patient and treatment team- Narciso Salgado - SA[facility] today  Time spent > 30 (~35) minutes. Thank you for allowing me to participate in this patient's care. Please do not hesitate to contact me anytime. We will follow along with you.        Michell Cardona MD,  [facility] [address] [place] [address] [place]: [phone] or [address]  Fax: [phone]        =======================================================================================   =======================================================================================  SUBJECTIVE  Had ac Chest pain, needing [facility] today am  Leg edema  better  Resting in room      Past medical, Surgical, Social, Family medical history reviewed by me. MEDICATIONS: reviewed by me. Medications Prior to Admission:  No current facility-administered medications on file prior to encounter. Current Outpatient Medications on File Prior to Encounter   Medication Sig Dispense Refill    atorvastatin (LIPITOR) 10 MG tablet TAKE 1 TABLET BY MOUTH EVERY OTHER DAY **CHANGE IN DOSE** 45 tablet 3    buPROPion (WELLBUTRIN XL) 150 MG extended release tablet TAKE 1 TABLET (150 MG TOTAL) BY MOUTH DAILY. INDICATIONS  DEPRESSION      vilazodone HCl (VIIBRYD) 10 MG TABS Take 20 mg by mouth daily       B Complex-C (VITAMIN B + C COMPLEX PO) Take by mouth      Lift Chair MISC by Does not apply route 1 each 0    estradiol (ESTRACE) 0.1 MG/GM vaginal cream Place 2 g vaginally Twice a Week       aspirin 81 MG EC tablet Take 81 mg by mouth daily. VITAMIN D PO Take  by mouth.            Current Facility-Administered Medications:     heparin (porcine) injection 4,000 Units, 4,000 Units, IntraVENous, PRN, Ron Lucero DO    heparin (porcine) injection 2,000 Units, 2,000 Units, IntraVENous, PRN, Ron Lucero DO    heparin 25,000 units in dextrose 5% 250 mL (premix) infusion, 5-30 Units/kg/hr, IntraVENous, Continuous, Ron Lucero DO, Last Rate: 12 mL/hr at 09/09/22 0651, 16.461 Units/kg/hr at 09/09/22 0651    pantoprazole (PROTONIX) tablet 40 mg, 40 mg, Oral, QAM AC, CIERRA Talbot CNP, 40 mg at 09/09/22 0542    furosemide (LASIX) tablet 20 mg, 20 mg, Oral, Daily, Cheryl Hopkins MD, 20 mg at 09/08/22 1349    polyethylene glycol (GLYCOLAX) packet 17 g, 17 g, Oral, Daily, ICERRA Talbot CNP, 17 g at 09/08/22 1350    sennosides-docusate sodium (SENOKOT-S) 8.6-50 MG tablet 2 tablet, 2 tablet, Oral, BID, CIERRA Talbot CNP, 2 tablet at 09/08/22 3638    iron sucrose (VENOFER) 200 mg in sodium chloride 0.9 % 100 Ever Hunter MD, Last Rate: 10 mL/hr at 09/07/22 0142, New Bag at 09/07/22 0142    ondansetron (ZOFRAN-ODT) disintegrating tablet 4 mg, 4 mg, Oral, Q8H PRN **OR** ondansetron (ZOFRAN) injection 4 mg, 4 mg, IntraVENous, Q6H PRN, Sven Stafford MD, 4 mg at 09/03/22 0522    acetaminophen (TYLENOL) tablet 650 mg, 650 mg, Oral, Q6H PRN, 650 mg at 09/08/22 1746 **OR** acetaminophen (TYLENOL) suppository 650 mg, 650 mg, Rectal, Q6H PRN, Sven Stafford MD    perflutren lipid microspheres (DEFINITY) injection 1.65 mg, 1.5 mL, IntraVENous, ONCE PRN, Mele Girard MD        REVIEW OF SYSTEMS:  As mentioned in chief complaint and HPI , Subjective       =======================================================================================     PHYSICAL EXAM:  Recent vital signs and recent I/Os reviewed by me. Wt Readings from Last 3 Encounters:   09/06/22 160 lb 12.8 oz (72.9 kg)   09/02/22 181 lb 12.8 oz (82.5 kg)   08/16/22 150 lb (68 kg)     BP Readings from Last 3 Encounters:   09/09/22 125/62   09/02/22 (!) 140/70   07/29/22 134/60     Patient Vitals for the past 24 hrs:   BP Temp Temp src Pulse Resp SpO2   09/09/22 0738 -- -- -- -- 16 --   09/09/22 0730 125/62 98 °F (36.7 °C) Oral 84 16 96 %   09/09/22 0055 106/61 99 °F (37.2 °C) Axillary 90 -- 93 %   09/08/22 2215 (!) 87/49 -- -- 83 -- 97 %   09/08/22 2154 122/64 -- -- 75 -- 95 %   09/08/22 2151 122/64 -- -- 85 -- 92 %   09/08/22 2145 -- -- -- (!) 132 -- 92 %   09/08/22 2141 (!) 210/96 -- -- (!) 143 -- (!) 87 %   09/08/22 1730 139/70 -- -- 83 16 95 %         Intake/Output Summary (Last 24 hours) at 9/9/2022 0944  Last data filed at 9/8/2022 1436  Gross per 24 hour   Intake 700 ml   Output 800 ml   Net -100 ml           Physical Exam  Vitals reviewed. Constitutional:       General: She is not in acute distress. Appearance: Normal appearance. HENT:      Head: Normocephalic and atraumatic.       Right Ear: External ear normal.      Left Ear: External ear normal. Recent Labs     09/06/22  1056 09/07/22  0832 09/08/22  0440 09/08/22  1002 09/08/22  2209 09/09/22  0403   CALCIUM 8.5 8.7 8.3  --  9.0 8.9   MG  --   --   --  2.20 2.20  --    PHOS  --   --   --   --  3.3 3.0       No results for input(s): PH, PCO2, PO2 in the last 72 hours.     Invalid input(s): Donnalee Stager    ABG:  No results found for: PH, PCO2, PO2, HCO3, BE, THGB, TCO2, O2SAT  VBG:  No results found for: PHVEN, XOF0DJL, BEVEN, B5LPBOLF    LDH:  No results found for: LDH  Uric Acid:    Lab Results   Component Value Date/Time    LABURIC 7.3 09/08/2022 10:02 AM       PT/INR:    Lab Results   Component Value Date/Time    PROTIME 12.2 05/16/2022 09:06 AM    INR 1.08 05/16/2022 09:06 AM     Warfarin PT/INR:  No components found for: PTPATWAR, PTINRWAR  PTT:    Lab Results   Component Value Date/Time    APTT 30.0 09/09/2022 05:51 AM   [APTT}  Last 3 Troponin:    Lab Results   Component Value Date/Time    TROPONINI 0.39 09/09/2022 04:03 AM    TROPONINI 0.01 09/08/2022 10:09 PM    TROPONINI <0.01 09/02/2022 12:24 PM       U/A:    Lab Results   Component Value Date/Time    NITRITE neg 08/04/2021 02:33 PM    COLORU Yellow 09/06/2022 11:35 PM    PROTEINU TRACE 09/06/2022 11:35 PM    PHUR 5.5 09/06/2022 11:35 PM    WBCUA 6-9 09/06/2022 11:35 PM    RBCUA None seen 09/06/2022 11:35 PM    BACTERIA Rare 09/06/2022 11:35 PM    CLARITYU Clear 09/06/2022 11:35 PM    SPECGRAV 1.015 09/06/2022 11:35 PM    LEUKOCYTESUR MODERATE 09/06/2022 11:35 PM    UROBILINOGEN 0.2 09/06/2022 11:35 PM    BILIRUBINUR Negative 09/06/2022 11:35 PM    BILIRUBINUR neg 08/04/2021 02:33 PM    BILIRUBINUR NEGATIVE 06/01/2012 06:20 AM    BLOODU Negative 09/06/2022 11:35 PM    GLUCOSEU Negative 09/06/2022 11:35 PM    GLUCOSEU NEGATIVE 06/01/2012 06:20 AM     Microalbumen/Creatinine ratio:  No components found for: RUCREAT  24 Hour Urine for Protein:  No components found for: RAWUPRO, UHRS3, TCOC25SJ, UTV3  24 Hour Urine for Creatinine Clearance:  No components found for: CREAT4, UHRS10, UTV10  Urine Toxicology:  No components found for: Ashwin Daubs, IBENZO, ICOCAINE, IMARTHC, IOPIATES, IPHENCYC    HgBA1c:    Lab Results   Component Value Date/Time    LABA1C 4.9 10/06/2021 08:16 AM     RPR:  No results found for: RPR  HIV:  No results found for: HIV  ROMEO:  No results found for: ANATITER, ROMEO  RF:  No results found for: RF  DSDNA:  No components found for: DNA  AMYLASE:  No results found for: AMYLASE  LIPASE:    Lab Results   Component Value Date/Time    LIPASE 21.0 10/22/2019 03:08 PM     Fibrinogen Level:  No components found for: FIB       BELOW MENTIONED RADIOLOGY STUDY RESULTS BY ME (AS NEEDED FOR MY EVALUATION AND MANAGEMENT). Echo Complete    Result Date: 9/3/2022  Transthoracic Echocardiography Report (TTE)  Demographics   Patient Name       [facility]   Date of Study      09/03/2022        Gender              Female   Patient Number     [id number]        Date of Birth       1940   Visit Number       [id number]         Age                 78 year(s)   Accession Number   [id number]        Room Number         46   Corporate ID       [id number]          Sonographer         Farhan Ramirez, RVT   Ordering Physician Wilson Ferrell MD Interpreting        Dave Villaseñor MD,                                       Physician           Linda Subramanian  Procedure Type of Study   TTE procedure:ECHOCARDIOGRAM COMPLETE 2D W DOPPLER W COLOR. Procedure Date Date: 09/03/2022 Start: 07:24 AM Study Location: Portable Technical Quality: Adequate visualization Indications:Dyspnea/SOB. Patient Status: Routine Height: 61 inches Weight: 181 pounds BSA: 1.81 m2 BMI: 34.2 kg/m2 BP: 133/60 mmHg  Conclusions   Summary  Mild septal left ventricular hypertrophy. Normal left ventricle size and  systolic function with an estimated ejection fraction of 55-60%.  No regional  wall motion abnormalities are seen. Grade I diastolic dysfunction with normal LV filling pressures. Left sided pleural effusion noted. Signature   ------------------------------------------------------------------  Electronically signed by Lennox Smiht MD, [facility] [place], [address]  (Interpreting physician) on 09/03/2022 at 12:49 PM  ------------------------------------------------------------------   Findings   Left Ventricle  Mild septal left ventricular hypertrophy. Normal left ventricle size and  systolic function with an estimated ejection fraction of 55-60%. No regional  wall motion abnormalities are seen. Grade I diastolic dysfunction with normal LV filling pressures. Avg. E/e'=  13.5. Mitral Valve  The mitral valve is thickened with adequate excursion. Trivial mitral  regurgitation. No evidence of mitral stenosis. Left Atrium  The left atrium is normal in size. Aortic Valve  The aortic valve is sclerotic with adequate excursion. Trivial aortic  insufficiency. No evidence of aortic stenosis. Aorta  The aortic root is normal in size. Right Ventricle  The right ventricle is normal in size and function. TAPSE= 2.66 cm. Tricuspid Valve  The tricuspid valve is normal in structure and function. Trivial tricuspid  regurgitation. No evidence of tricuspid stenosis. Right Atrium  The right atrial size is normal.   Pulmonic Valve  The pulmonic valve is not well visualized. Trivial pulmonic insufficiency. No evidence of pulmonic stenosis. Pericardial Effusion  No pericardial effusion noted. Pleural Effusion  Left sided pleural effusion noted. Miscellaneous  The inferior vena cava appears normal in size with normal respiratory  variation.   M-Mode/2D Measurements (cm)   LV Diastolic Dimension: 4.7 cm LV Systolic Dimension: 7.13 cm  LV Septum Diastolic: 7.11 cm  LV PW Diastolic: 2.39 cm       AO Root Dimension: 3 cm                                 LA Dimension: 4 cm                                 LA Area: 17.7 cm2  LVOT: 2 cm                     LA volume/Index: 46.1 ml /25 ml/m2  Doppler Measurements   AV Peak Velocity: 144 cm/s    MV Peak E-Wave: 90.2 cm/s  AV Peak Gradient: 8.29 mmHg   MV Peak A-Wave: 121 cm/s  AV Mean Gradient: 5 mmHg      MV E/A Ratio: 0.75  LVOT Peak Velocity: 99.8 cm/s  AV Area (Continuity):2.4 cm2   E' Septal Velocity: 5.44 cm/s  E' Lateral Velocity: 8.7 cm/s  PV Peak Velocity: 85.3 cm/s  PV Peak Gradient: 2.91 mmHg   Aortic Valve   Peak Velocity: 144 cm/s    Mean Velocity: 103 cm/s  Peak Gradient: 8.29 mmHg   Mean Gradient: 5 mmHg  Area (continuity): 2.4 cm2  AV VTI: 32.8 cm  Aorta   Aortic Root: 3 cm  Ascending Aorta: 2.9 cm  LVOT Diameter: 2 cm      XR CHEST (2 VW)    Result Date: 9/6/2022  EXAMINATION: TWO XRAY VIEWS OF THE CHEST 9/6/2022 1:18 pm COMPARISON: 07/27/2021 HISTORY: ORDERING SYSTEM PROVIDED HISTORY: fever TECHNOLOGIST PROVIDED HISTORY: Reason for exam:->fever Reason for Exam: fever FINDINGS: . The cardiac size is mildly enlarged, however stable. No acute infiltrates or pleural effusions are seen. Pulmonary vascularity appears normal. There is mild ectasia of the thoracic aorta. Stable mild compression T8 vertebral body. .No acute bony abnormalities.  The hilar structures are normal.     No acute cardiopulmonary disease     VL Extremity Venous Bilateral    Result Date: 9/2/2022  Vascular Lower Extremities DVT Study Procedure -- PRELIMINARY SONOGRAPHER REPORT --   Demographics   Patient Name       [facility] Cyn Yeh   Date of Study      09/02/2022         Gender              Female   Patient Number     [id number]         Date of Birth       1940   Visit Number       [id number]          Age                 78 year(s)   Accession Number   [id number]         Room Number            Corporate ID       [id number]           Sonographer         [facility]                                                            RVT, RDMS, AB,                                                            OB/GYN Ordering Physician Uriel Dial,  Interpreting        Mirta Redmond MD                     CNA                Physician  Procedure Type of Study:   Veins:Lower Extremities DVT Study, VASC EXTREMITY VENOUS DUPLEX BILATERAL. Tech Comments Right Limited study due to pt's severe swelling and pain. No evidence of deep vein or superficial vein thrombosis involving the right lower extremity. Right inguinal lymph node measuring 1.3 x 0.6 x 1.0 cm. Calf veins were poorly visualized on the right. Left Limited study due to pt's severe swelling and pain. No evidence of deep vein or superficial vein thrombosis involving the left lower extremity. Left inguinal lymph node measuring 1.9 x 0.7 x 1.4 cm. Calf veins were poorly visualized on the left. Left medial fossa: Cystic structure measuring 2.3 x 0.7 x 2.0 cm. [facility] MELO DIGITAL SCREEN BILATERAL    Result Date: 8/17/2022  EXAMINATION: SCREENING DIGITAL BILATERAL MAMMOGRAM WITH TOMOSYNTHESIS, 8/16/2022 TECHNIQUE: Screening mammography was performed with tomosynthesis including MLO and CC views of the bilateral breasts. Computer aided detection was used for the interpretation of this exam. The technologist reported that patient positioning was limited. COMPARISON: Prior mammograms in 2021, 2020, and 2018 HISTORY: Screening. The patient reports a history of benign left surgical biopsy. There is a family history of breast cancer in the patient's mother. FINDINGS: There are scattered areas of fibroglandular density. No new suspicious masses, microcalcifications, or areas of architectural distortion are identified. Stable postoperative changes are noted in the left breast.     No mammographic evidence of malignancy. BIRADS: BIRADS - CATEGORY 2 Benign Findings. Normal interval follow-up is recommended in 12 months. OVERALL ASSESSMENT - BENIGN A letter of notification will be sent to the patient regarding the results.  The [employer] recommends annual mammograms for women 40 years and older. Summary   Mild septal left ventricular hypertrophy. Normal left ventricle size and   systolic function with an estimated ejection fraction of 55-60%. No regional   wall motion abnormalities are seen. Grade I diastolic dysfunction with normal LV filling pressures. Left sided pleural effusion noted.       Signature      ------------------------------------------------------------------   Electronically signed by Alina Goodwin MD, [facility] [place], [address]   (Interpreting physician) on 09/03/2022 at 12:49 PM   ------------------------------------------------------------------

## 2022-09-09 NOTE — PROGRESS NOTES
Nutrition Note    RECOMMENDATIONS  PO Diet: Con't KAREN diet as tolerated       NUTRITION ASSESSMENT   Nutrition status is stable AEB po intake % of meals consumed on a KAREN diet. Pt has a good appetite. Skin is intact. NPO & in cath lab today. Will con't to monitor progress as pt is at low risk for nutrition compromise. Nutrition Related Findings: LBM 9/8; nonpitting +1 edema BLE. Na 131  Wounds: None  Nutrition Education:  Education not indicated   Nutrition Goals: PO intake 50% or greater     MALNUTRITION ASSESSMENT      Malnutrition Status: No malnutrition    NUTRITION DIAGNOSIS   No nutrition diagnosis at this time     CURRENT NUTRITION THERAPIES  ADULT DIET; Regular; No Added Salt (3-4 gm)     PO Intake: 51-75%, %   PO Supplement Intake:None Ordered    ANTHROPOMETRICS  Current Height: 5' 1\" (154.9 cm)  Current Weight: 160 lb 12.8 oz (72.9 kg)    Ideal Body Weight (IBW): 105 lbs  (48 kg)      BMI: 30.2    The patient will be monitored per nutrition standards of care. Consult dietitian if additional nutrition interventions are needed prior to RD reassessment.      Sandeep Hurley RD, LD    Contact: [phone]

## 2022-09-09 NOTE — PROGRESS NOTES
Pt from cath lab/4T to 3312 after [facility]. Right wrist puncture CDI. All restrictions completed. VSS except febrile. Telemetry verified. Report from Jyotsna Landin cath nurse. Llamas inserted in cath lab. UA with reflex sent by this nurse. Pt wimpering and c/o leg pain. AM meds and pain meds given. Family visited. MRI questionaire completed by  and faxed.  Pt sleeping

## 2022-09-10 ENCOUNTER — APPOINTMENT (OUTPATIENT)
Dept: MRI IMAGING | Age: 82
DRG: 602 | End: 2022-09-10
Payer: MEDICARE

## 2022-09-10 LAB
ALBUMIN SERPL-MCNC: 2.3 G/DL (ref 3.4–5)
ANION GAP SERPL CALCULATED.3IONS-SCNC: 9 MMOL/L (ref 3–16)
BLOOD CULTURE, ROUTINE: NORMAL
BUN BLDV-MCNC: 27 MG/DL (ref 7–20)
CALCIUM SERPL-MCNC: 8.8 MG/DL (ref 8.3–10.6)
CHLORIDE BLD-SCNC: 95 MMOL/L (ref 99–110)
CHLORIDE URINE RANDOM: <20 MMOL/L
CO2: 28 MMOL/L (ref 21–32)
CREAT SERPL-MCNC: 0.8 MG/DL (ref 0.6–1.2)
CREATININE URINE: 65.1 MG/DL (ref 28–259)
CULTURE, BLOOD 2: NORMAL
GFR AFRICAN AMERICAN: >60
GFR NON-AFRICAN AMERICAN: >60
GLUCOSE BLD-MCNC: 106 MG/DL (ref 70–99)
HCT VFR BLD CALC: 28.1 % (ref 36–48)
HEMOGLOBIN: 9.1 G/DL (ref 12–16)
MCH RBC QN AUTO: 28.6 PG (ref 26–34)
MCHC RBC AUTO-ENTMCNC: 32.4 G/DL (ref 31–36)
MCV RBC AUTO: 88 FL (ref 80–100)
OSMOLALITY URINE: 534 MOSM/KG (ref 390–1070)
PDW BLD-RTO: 14.1 % (ref 12.4–15.4)
PHOSPHORUS: 2.8 MG/DL (ref 2.5–4.9)
PLATELET # BLD: 145 K/UL (ref 135–450)
PMV BLD AUTO: 7.8 FL (ref 5–10.5)
POTASSIUM SERPL-SCNC: 3.7 MMOL/L (ref 3.5–5.1)
POTASSIUM, UR: 25.2 MMOL/L
RBC # BLD: 3.19 M/UL (ref 4–5.2)
SODIUM BLD-SCNC: 132 MMOL/L (ref 136–145)
SODIUM URINE: <20 MMOL/L
TROPONIN: 0.08 NG/ML
UREA NITROGEN, UR: 968.1 MG/DL (ref 800–1666)
WBC # BLD: 5.6 K/UL (ref 4–11)

## 2022-09-10 PROCEDURE — 80069 RENAL FUNCTION PANEL: CPT

## 2022-09-10 PROCEDURE — 36415 COLL VENOUS BLD VENIPUNCTURE: CPT

## 2022-09-10 PROCEDURE — 6370000000 HC RX 637 (ALT 250 FOR IP): Performed by: FAMILY MEDICINE

## 2022-09-10 PROCEDURE — 6370000000 HC RX 637 (ALT 250 FOR IP): Performed by: NURSE PRACTITIONER

## 2022-09-10 PROCEDURE — 2060000000 HC ICU INTERMEDIATE R&B

## 2022-09-10 PROCEDURE — 6370000000 HC RX 637 (ALT 250 FOR IP): Performed by: INTERNAL MEDICINE

## 2022-09-10 PROCEDURE — 72148 MRI LUMBAR SPINE W/O DYE: CPT

## 2022-09-10 PROCEDURE — 6370000000 HC RX 637 (ALT 250 FOR IP): Performed by: HOSPITALIST

## 2022-09-10 PROCEDURE — 2580000003 HC RX 258: Performed by: HOSPITALIST

## 2022-09-10 PROCEDURE — 99232 SBSQ HOSP IP/OBS MODERATE 35: CPT | Performed by: INTERNAL MEDICINE

## 2022-09-10 PROCEDURE — 85027 COMPLETE CBC AUTOMATED: CPT

## 2022-09-10 PROCEDURE — 84484 ASSAY OF TROPONIN QUANT: CPT

## 2022-09-10 PROCEDURE — 51798 US URINE CAPACITY MEASURE: CPT

## 2022-09-10 RX ORDER — PANTOPRAZOLE SODIUM 20 MG/1
20 TABLET, DELAYED RELEASE ORAL
Qty: 30 TABLET | Refills: 0 | Status: CANCELLED
Start: 2022-09-11

## 2022-09-10 RX ADMIN — COLCHICINE 0.6 MG: 0.6 TABLET, FILM COATED ORAL at 10:49

## 2022-09-10 RX ADMIN — VILAZODONE HYDROCHLORIDE 20 MG: 20 TABLET ORAL at 13:01

## 2022-09-10 RX ADMIN — CEPHALEXIN 500 MG: 250 CAPSULE ORAL at 13:04

## 2022-09-10 RX ADMIN — CEPHALEXIN 500 MG: 250 CAPSULE ORAL at 23:44

## 2022-09-10 RX ADMIN — DICLOFENAC SODIUM 4 G: 10 GEL TOPICAL at 20:39

## 2022-09-10 RX ADMIN — Medication 1 CAPSULE: at 10:50

## 2022-09-10 RX ADMIN — STANDARDIZED SENNA CONCENTRATE AND DOCUSATE SODIUM 2 TABLET: 8.6; 5 TABLET ORAL at 10:50

## 2022-09-10 RX ADMIN — Medication 10 ML: at 20:39

## 2022-09-10 RX ADMIN — Medication 10 ML: at 13:08

## 2022-09-10 RX ADMIN — CEPHALEXIN 500 MG: 250 CAPSULE ORAL at 17:25

## 2022-09-10 RX ADMIN — FUROSEMIDE 20 MG: 20 TABLET ORAL at 10:50

## 2022-09-10 RX ADMIN — ASPIRIN 81 MG: 81 TABLET, COATED ORAL at 10:50

## 2022-09-10 RX ADMIN — COLCHICINE 0.6 MG: 0.6 TABLET, FILM COATED ORAL at 20:39

## 2022-09-10 RX ADMIN — Medication 1 CAPSULE: at 17:26

## 2022-09-10 RX ADMIN — GABAPENTIN 100 MG: 100 CAPSULE ORAL at 13:08

## 2022-09-10 RX ADMIN — BUSPIRONE HYDROCHLORIDE 10 MG: 5 TABLET ORAL at 20:39

## 2022-09-10 RX ADMIN — CEPHALEXIN 500 MG: 250 CAPSULE ORAL at 05:46

## 2022-09-10 RX ADMIN — DICLOFENAC SODIUM 4 G: 10 GEL TOPICAL at 13:06

## 2022-09-10 RX ADMIN — HYDROCODONE BITARTRATE AND ACETAMINOPHEN 1 TABLET: 5; 325 TABLET ORAL at 14:16

## 2022-09-10 RX ADMIN — HYDROCODONE BITARTRATE AND ACETAMINOPHEN 1 TABLET: 5; 325 TABLET ORAL at 20:39

## 2022-09-10 RX ADMIN — PANTOPRAZOLE SODIUM 40 MG: 40 TABLET, DELAYED RELEASE ORAL at 05:46

## 2022-09-10 RX ADMIN — DICLOFENAC SODIUM 4 G: 10 GEL TOPICAL at 10:50

## 2022-09-10 RX ADMIN — GABAPENTIN 100 MG: 100 CAPSULE ORAL at 10:50

## 2022-09-10 RX ADMIN — POLYETHYLENE GLYCOL 3350 17 G: 17 POWDER, FOR SOLUTION ORAL at 10:49

## 2022-09-10 RX ADMIN — BUPROPION HYDROCHLORIDE 150 MG: 150 TABLET, EXTENDED RELEASE ORAL at 10:50

## 2022-09-10 RX ADMIN — GABAPENTIN 100 MG: 100 CAPSULE ORAL at 20:40

## 2022-09-10 ASSESSMENT — PAIN SCALES - GENERAL
PAINLEVEL_OUTOF10: 8
PAINLEVEL_OUTOF10: 5
PAINLEVEL_OUTOF10: 5
PAINLEVEL_OUTOF10: 3
PAINLEVEL_OUTOF10: 6
PAINLEVEL_OUTOF10: 3
PAINLEVEL_OUTOF10: 5
PAINLEVEL_OUTOF10: 8
PAINLEVEL_OUTOF10: 6

## 2022-09-10 ASSESSMENT — PAIN DESCRIPTION - LOCATION
LOCATION: LEG

## 2022-09-10 ASSESSMENT — ENCOUNTER SYMPTOMS
RHINORRHEA: 0
NAUSEA: 0
CONSTIPATION: 0
ABDOMINAL PAIN: 0
EYE REDNESS: 0
BACK PAIN: 0
SINUS PAIN: 0
DIARRHEA: 0
WHEEZING: 0
EYE DISCHARGE: 0
SINUS PRESSURE: 0
SORE THROAT: 0
COUGH: 0
SHORTNESS OF BREATH: 0

## 2022-09-10 ASSESSMENT — PAIN DESCRIPTION - PAIN TYPE: TYPE: ACUTE PAIN;CHRONIC PAIN

## 2022-09-10 ASSESSMENT — PAIN DESCRIPTION - ORIENTATION
ORIENTATION: RIGHT;LEFT
ORIENTATION: RIGHT
ORIENTATION: LEFT;RIGHT

## 2022-09-10 ASSESSMENT — PAIN DESCRIPTION - DESCRIPTORS
DESCRIPTORS: ACHING
DESCRIPTORS: ACHING;BURNING
DESCRIPTORS: BURNING;ACHING

## 2022-09-10 NOTE — PROGRESS NOTES
Infectious Diseases   Progress Note      Admission Date: 9/2/2022  Hospital Day: Hospital Day: 9   Attending: Sydnee Green MD  Date of service: 9/10/2022     Chief complaint/ Reason for consult:     High fever of 101.8  Bilateral leg cellulitis  Negative bilateral lower extremity venous Doppler study on 9/2/2022  Gastroesophageal reflux disease    Microbiology:        I have reviewed allavailable micro lab data and cultures    Blood culture (2/2) - collected on 9/2/2022: Negative      Antibiotics and immunizations:       Current antibiotics: All antibiotics and their doses were reviewed by me    Recent Abx Admin                     cephALEXin (KEFLEX) capsule 500 mg (mg) 500 mg Given 09/10/22 1725     500 mg Given  1304     500 mg Given  0546     500 mg Given 09/09/22 5005                      Immunization History: All immunization history was reviewed by me today. Immunization History   Administered Date(s) Administered    COVID-19, MODERNA BLUE border, Primary or Immunocompromised, (age 12y+), IM, 100 mcg/0.5mL 01/21/2021, 02/18/2021, 10/28/2021, 05/12/2022    Influenza 11/24/2010    Influenza Vaccine, unspecified formulation 10/01/2016    Influenza Virus Vaccine 10/01/2012, 10/01/2013, 10/13/2015    Influenza, FLUAD, (age 72 y+), Adjuvanted, 0.5mL 10/12/2020    Influenza, High Dose (Fluzone 65 yrs and older) 10/12/2017, 10/09/2018, 10/28/2021    Pneumococcal Conjugate 13-valent ([id number]) 07/13/2015    Pneumococcal Polysaccharide ([id number]) 05/07/2012    Td, unspecified formulation 09/30/2009    Tdap (Boostrix, Adacel) 01/08/2021    Zoster Live (Zostavax) 08/01/2013    Zoster Recombinant (Shingrix) 01/08/2021, 04/24/2021       Known drug allergies: All allergies were reviewed and updated    No Known Allergies    Social history:     Social History:  All social andepidemiologic history was reviewed and updated by me today as needed. Tobacco use:   reports that she has never smoked.  She Eyes:  Negative for discharge and redness. Respiratory:  Negative for cough, shortness of breath and wheezing. Cardiovascular:  Negative for chest pain and leg swelling. Gastrointestinal:  Negative for abdominal pain, constipation, diarrhea and nausea. Endocrine: Negative for cold intolerance, heat intolerance and polydipsia. Genitourinary:  Negative for dysuria, flank pain, frequency, hematuria and urgency. Musculoskeletal:  Negative for back pain and myalgias. Skin:  Negative for rash. Allergic/Immunologic: Negative for immunocompromised state. Neurological:  Negative for dizziness, seizures and headaches. Hematological:  Does not bruise/bleed easily. Psychiatric/Behavioral:  Negative for agitation, hallucinations and suicidal ideas. The patient is not nervous/anxious. All other systems reviewed and are negative. Past Medical History: All past medical history reviewed today. Past Medical History:   Diagnosis Date    AR (allergic rhinitis)     Arthritis of knee     Pruis    Depression     Erosive gastritis 10/28/2019    EGD October 2019, he did with PPI    GERD (gastroesophageal reflux disease)     Hyperlipidemia     Internal hemorrhoids     Overweight(278.02)     Viral meningitis 5/12       Past Surgical History: All past surgical history was reviewed today. Past Surgical History:   Procedure Laterality Date    BREAST BIOPSY      CHOLECYSTECTOMY, LAPAROSCOPIC  2003    COLONOSCOPY  8/06    normal; Ortega    COLONOSCOPY  12/3/13    Ortega- polyps    HIP SURGERY Left 5/16/2022    LEFT HIP HEMIARTHROPLASTY performed by Susanne Foote MD at [facility] ([address] [place] [address])      AKASH AND BSO (CERVIX REMOVED)  2003    UPPER GASTROINTESTINAL ENDOSCOPY  12/3/13    Nirmala Kim; antritis       Family History: All family history was reviewed today.         Problem Relation Age of Onset    Breast Cancer Mother     Bipolar Disorder Brother        Objective:       PHYSICAL EXAM: Vitals:   Vitals:    09/10/22 0845 09/10/22 1259 09/10/22 1715 09/10/22 1949   BP: 124/69 124/62 119/62 129/69   Pulse: 80 75 85 80   Resp: 18 18  18   Temp: 97.8 °F (36.6 °C)   97.7 °F (36.5 °C)   TempSrc: Oral   Oral   SpO2: 93% 94% 95% 92%   Weight:       Height:           Physical Exam  Vitals and nursing note reviewed. Constitutional:       Appearance: She is well-developed. She is not diaphoretic. Comments: The patient was seen earlier today. HENT:      Head: Normocephalic and atraumatic. Right Ear: External ear normal. There is no impacted cerumen. Left Ear: External ear normal. There is no impacted cerumen. Nose: Nose normal.      Mouth/Throat:      Mouth: Mucous membranes are moist.      Pharynx: Oropharynx is clear. No oropharyngeal exudate. Eyes:      General: No scleral icterus. Right eye: No discharge. Left eye: No discharge. Conjunctiva/sclera: Conjunctivae normal.      Pupils: Pupils are equal, round, and reactive to light. Neck:      Thyroid: No thyromegaly. Cardiovascular:      Rate and Rhythm: Normal rate and regular rhythm. Heart sounds: Normal heart sounds. No murmur heard. No friction rub. Pulmonary:      Effort: No respiratory distress. Breath sounds: No stridor. No wheezing or rales. Abdominal:      General: Bowel sounds are normal.      Palpations: Abdomen is soft. Tenderness: There is no abdominal tenderness. There is no guarding or rebound. Musculoskeletal:         General: No swelling, tenderness or deformity. Normal range of motion. Cervical back: Normal range of motion and neck supple. Right lower leg: No edema. Left lower leg: No edema. Lymphadenopathy:      Cervical: No cervical adenopathy. Skin:     General: Skin is warm and dry. Coloration: Skin is not jaundiced. Findings: No bruising, erythema or rash. Neurological:      General: No focal deficit present.       Mental Status: She is alert and oriented to person, place, and time. Mental status is at baseline. Motor: No abnormal muscle tone. Psychiatric:         Mood and Affect: Mood normal.         Behavior: Behavior normal.     *    Lines and drains: All vascular access sites are healthy with no local erythema, discharge or tenderness. Intake and output:    I/O last 3 completed shifts: In: 6309 [P.O.:1440; I.V.:10]  Out: 1530 [Urine:1530]    Lab Data:   All available labs and old records have been reviewed by me. CBC:  Recent Labs     09/08/22  2209 09/09/22  0403 09/10/22  0427   WBC 4.2 7.2 5.6   RBC 3.72* 3.34* 3.19*   HGB 10.7* 9.5* 9.1*   HCT 33.3* 28.9* 28.1*    158 145   MCV 89.4 86.5 88.0   MCH 28.8 28.5 28.6   MCHC 32.2 32.9 32.4   RDW 14.2 14.2 14.1          BMP:  Recent Labs     09/08/22  2209 09/09/22  0403 09/10/22  0427   * 131* 132*   K 4.5 3.9 3.7   CL 94* 94* 95*   CO2 25 27 28   BUN 43* 47* 27*   CREATININE 1.1 1.2 0.8   CALCIUM 9.0 8.9 8.8   GLUCOSE 108* 131* 106*          Hepatic Function Panel:   Lab Results   Component Value Date/Time    ALKPHOS 95 09/08/2022 10:09 PM    ALT <5 09/08/2022 10:09 PM    AST 21 09/08/2022 10:09 PM    PROT 6.5 09/08/2022 10:09 PM    PROT 7.3 10/17/2012 08:47 AM    BILITOT <0.2 09/08/2022 10:09 PM    BILIDIR <0.2 10/22/2019 03:08 PM    IBILI see below 10/22/2019 03:08 PM    LABALBU 2.3 09/10/2022 04:27 AM       CPK:   Lab Results   Component Value Date    CKTOTAL 209 (H) 09/09/2022     ESR:   Lab Results   Component Value Date    SEDRATE 70 (H) 09/06/2022     CRP:   Lab Results   Component Value Date    .9 (H) 09/06/2022           Imaging: All pertinent images and reports for the current visit were reviewed by me during this visit. I reviewed the chest x-ray/CT scan/MRI images and independently interpreted the findings and results today.     MRI LUMBAR SPINE WO CONTRAST   Final Result   Multilevel lumbar spondylosis without significant canal stenosis. Shallow central/right paracentral disc protrusion at L1-2 along with   degenerative facet changes and levocurvature of the thoracolumbar spine   contributing to encroachment on the right lateral recess and moderate right   foraminal stenosis. XR CHEST (2 VW)   Final Result   No acute cardiopulmonary disease         VL Extremity Venous Bilateral   Final Result          Medications: All current and past medications were reviewed. cephALEXin  500 mg Oral 4 times per day    colchicine  0.6 mg Oral BID    pantoprazole  40 mg Oral QAM AC    furosemide  20 mg Oral Daily    polyethylene glycol  17 g Oral Daily    sennosides-docusate sodium  2 tablet Oral BID    gabapentin  100 mg Oral TID    diclofenac sodium  4 g Topical TID    lactobacillus  1 capsule Oral BID WC    aspirin  81 mg Oral Daily    buPROPion  150 mg Oral Daily    vilazodone HCl  20 mg Oral Daily    sodium chloride flush  5-40 mL IntraVENous 2 times per day        heparin (PORCINE) Infusion 16.461 Units/kg/hr (09/09/22 0651)    sodium chloride 10 mL/hr at 09/07/22 0142       heparin (porcine), heparin (porcine), nitroGLYCERIN, morphine, busPIRone, HYDROcodone 5 mg - acetaminophen, calcium carbonate, sodium chloride flush, sodium chloride, ondansetron **OR** ondansetron, acetaminophen **OR** acetaminophen, perflutren lipid microspheres      Problem list:       Patient Active Problem List   Diagnosis Code    AR (allergic rhinitis) J30.9    Arthritis of knee M17.10    Patient overweight E66.3    High fever R50.9    Postmenopausal Z78.0    Essential hypertension I10    Erosive gastritis K29.60    Current mild episode of major depressive disorder without prior episode ([address] F32.0    Left displaced femoral neck fracture ([facility] [address] S72.002A    Fall at home [address] XXXA, Y92.009    Dyslipidemia E78.5    Cellulitis L03.90    Class 1 obesity due to excess calories with body mass index (BMI) of 30.0 to 30.9 in adult E66.09, Z68.30    History of depression Z86.59    Bilateral lower leg cellulitis L03.116, L03.115    Peripheral edema R60.9    Redness and swelling of lower leg M79.89, R23.8    Weight loss counseling, encounter for Z71.3    NSTEMI (non-ST elevated myocardial infarction) ([facility] [address] I21.4       Please note that this chart was generated using Dragon dictation software. Although every effort was made to ensure the accuracy of this automated transcription, some errors in transcription may have occurred inadvertently. If you may need any clarification, please do not hesitate to contact me through EPIC or at the phone number provided below with my electronic signature. Any pictures or media included in this note were obtained after taking informed verbal consent from the patient and with their approval to include those in the patient's medical record. Cris Carrillo MD, MPH, [address] [place] [address]  9/10/2022, 11:28 PM  [facility] Infectious Disease   [address] [place] [address]., [address] [place] [address], [address] [place] [address]  Office: [phone]  Fax: [phone]  In-person Clinic days:  Tuesday & Thursday a.m. Virtual clinic days: Monday, Wednesday & Friday a.m.

## 2022-09-10 NOTE — CARE COORDINATION
Precert / Discharge. CM sees documentation that patient's precert to [facility] was approved yesterday. CM called [facility] to ask how long precert is good for; phone not answered; voicemail left.    Hellen Adkins RN

## 2022-09-10 NOTE — PROGRESS NOTES
[facility] PROGRESS NOTE    9/10/2022 9:03 AM        Name: Marissa Mares . Admitted: 9/2/2022  Primary Care Provider: Dulce Romero MD (Tel: [phone])      Brief History: Presented with swelling and redness BLE. Failed outpatient management with Lasix. Admitted with cellulitis BLE. Subjective: Resting in bed, crying. Says she is just frustrated with lack of improvement. Patient is reluctant to get out of bed, she is reluctant to ambulate, says legs just too weak. MRI lumbar spine pending. Continues to note discomfort in chest, describes as heaviness. Has pain in bilateral legs, yesterday it was in thighs, today more lower legs. Temp to 101 yesterday evening. No shortness of breath, cough, palpitations, abdominal pain, nausea, diarrhea.      Reviewed interval ancillary notes    Current Medications  heparin (porcine) injection 4,000 Units, PRN  heparin (porcine) injection 2,000 Units, PRN  heparin 25,000 units in dextrose 5% 250 mL (premix) infusion, Continuous  cephALEXin (KEFLEX) capsule 500 mg, 4 times per day  colchicine (COLCRYS) tablet 0.6 mg, BID  pantoprazole (PROTONIX) tablet 40 mg, QAM AC  furosemide (LASIX) tablet 20 mg, Daily  polyethylene glycol (GLYCOLAX) packet 17 g, Daily  sennosides-docusate sodium (SENOKOT-S) 8.6-50 MG tablet 2 tablet, BID  nitroGLYCERIN (NITROSTAT) SL tablet 0.4 mg, Q5 Min PRN  morphine (PF) injection 2 mg, Q4H PRN  atorvastatin (LIPITOR) tablet 10 mg, Every Other Day  gabapentin (NEURONTIN) capsule 100 mg, TID  busPIRone (BUSPAR) tablet 10 mg, Q6H PRN  HYDROcodone-acetaminophen (NORCO) 5-325 MG per tablet 1 tablet, Q6H PRN  calcium carbonate (TUMS) chewable tablet 500 mg, TID PRN  diclofenac sodium (VOLTAREN) 1 % gel 4 g, TID  lactobacillus (CULTURELLE) capsule 1 capsule, BID WC  aspirin EC tablet 81 mg, Daily  buPROPion (WELLBUTRIN XL) extended release tablet 150 mg, Daily  vilazodone HCl (VIIBRYD) TABS 20 mg, Daily  sodium chloride flush 0.9 % injection 5-40 mL, 2 times per day  sodium chloride flush 0.9 % injection 5-40 mL, PRN  0.9 % sodium chloride infusion, PRN  ondansetron (ZOFRAN-ODT) disintegrating tablet 4 mg, Q8H PRN   Or  ondansetron (ZOFRAN) injection 4 mg, Q6H PRN  acetaminophen (TYLENOL) tablet 650 mg, Q6H PRN   Or  acetaminophen (TYLENOL) suppository 650 mg, Q6H PRN  perflutren lipid microspheres (DEFINITY) injection 1.65 mg, ONCE PRN      Objective:  BP (!) 112/59   Pulse 79   Temp 98.5 °F (36.9 °C) (Oral)   Resp 16   Ht 5' 1\" (1.549 m)   Wt 174 lb 13.2 oz (79.3 kg)   SpO2 94%   BMI 33.03 kg/m²     Intake/Output Summary (Last 24 hours) at 9/10/2022 0903  Last data filed at 9/10/2022 0550  Gross per 24 hour   Intake 480 ml   Output 1280 ml   Net -800 ml      Wt Readings from Last 3 Encounters:   09/10/22 174 lb 13.2 oz (79.3 kg)   09/02/22 181 lb 12.8 oz (82.5 kg)   08/16/22 150 lb (68 kg)     General:  Awake, alert, oriented in NAD  Skin:  Warm and dry. No unusual bruising or rash  Neck:  Supple. No JVD appreciated  Chest:  Normal effort. Clear to auscultation, no wheezes/rhonchi/rales  Cardiovascular:  RRR, normal S1/S2, no murmur/gallop/rub  Abdomen:  Soft, nontender, +bowel sounds  Extremities:  No edema, ace wraps in place  Neurological: No focal deficits  Psychological: Tearful      Labs and Tests:  CBC:   Recent Labs     09/08/22  2209 09/09/22  0403 09/10/22  0427   WBC 4.2 7.2 5.6   HGB 10.7* 9.5* 9.1*    158 145     BMP:    Recent Labs     09/08/22  2209 09/09/22  0403 09/10/22  0427   * 131* 132*   K 4.5 3.9 3.7   CL 94* 94* 95*   CO2 25 27 28   BUN 43* 47* 27*   CREATININE 1.1 1.2 0.8   GLUCOSE 108* 131* 106*     Hepatic:   Recent Labs     09/08/22  2209   AST 21   ALT <5*   BILITOT <0.2   ALKPHOS 95       Venous Doppler 9/2/2022:  Right   Limited study due to pt's severe swelling and pain.    No evidence of deep vein or superficial vein thrombosis involving the right   lower extremity. Right inguinal lymph node measuring 1.3 x 0.6 x 1.0 cm. Calf veins were poorly visualized on the right. Left   Limited study due to pt's severe swelling and pain. No evidence of deep vein or superficial vein thrombosis involving the left   lower extremity. Left inguinal lymph node measuring 1.9 x 0.7 x 1.4 cm. Calf veins were poorly visualized on the left. Left medial fossa: Cystic structure measuring 2.3 x 0.7 x 2.0 cm. Echo 9/2/2022:  Summary   Mild septal left ventricular hypertrophy. Normal left ventricle size and systolic function with an estimated ejection fraction of 55-60%. No regional wall motion abnormalities are seen. Grade I diastolic dysfunction with normal LV filling pressures. Left sided pleural effusion noted. CXR 9/6/2022:  No acute cardiopulmonary disease    LHC 9/9/2022:  Findings  Artery Findings/Result   LM Normal   LAD Normal   Cx Normal   RI N/A   RCA Normal   LVEDP 14   LVG 55%   Intervention(s)  None  Post Cath Dx:       Normal coronaries  Normal EF  Possible myopericarditis? Problem List  Principal Problem:    Cellulitis  Active Problems:    Dyslipidemia    Class 1 obesity due to excess calories with body mass index (BMI) of 30.0 to 30.9 in adult    History of depression    Bilateral lower leg cellulitis    Peripheral edema    Redness and swelling of lower leg    Weight loss counseling, encounter for    NSTEMI (non-ST elevated myocardial infarction) (HCC)    High fever    Current mild episode of major depressive disorder without prior episode ([facility] [address]  Resolved Problems:    * No resolved hospital problems. *       Assessment & Plan:   Chest pain / elevated troponin. Patient developed acute chest pain overnight, troponin 0.01->0.39->0. 08. Urgent C 9/9 showed normal cors and normal EF; possible myopericarditis. Still with chest discomfort but not as intense as last night. Started on colchicine. Cellulitis BLE. Venous doppler negative for DVT. , sed rate 70. Started on Ancef with improvement. LTemp to 101 yesterday, WBC remains stable. ID recommends switch to po Keflex 500 mg q 6 hours at DC with stop date 9/16. Continue ACE wraps and leg elevation when up. Appreciate ID recs. Lower leg edema. Echo with EF 38-70%, grade 1 diastolic dysfunction, normal filling pressures. Doubt CHF exacerbation as no pulmonary edema on CXR and BNP only 248 and comparable to prior. Likely secondary to immobility, dependent positioning of legs when up, suspected venous insufficiency, chronic diastolic CHF. Improved with ACE wraps and IV lasix, weight down to 160, reported to be 181 on admission but suspect accuracy. IV Lasix stopped 9/6 as BUN trending up and sodium down to 131. Resumed po Lasix 9/8. Hyponatremia. Sodium 137 on admission, trended down to 131 and remains stable. Appreciate nephrology recs. Pain BLE. Patient describes intractable pain in thighs that she can hardly move legs. She has been receiving gabapentin with little to no improvement. Noted to have some urinary retention yesterday and amos placed. CK level 209. MRI lumbar spine pending. On statin but very low dose, atorvastatin 10 mg QOD. Hold statin to see if any improvement in leg pain. Anxiety / depression. Continue bupropion and vilazodone. Hypokalemia. Resolved. Secondary to diuresis, magnesium 2.20. Normocytic anemia. Hgb slowly trending down, 11.5->10.4->9.6. Denies black or tarry stools prior to admit. Labs show iron deficiency (iron 15, saturation 9%) and received IV Venofer x 2 doses. No deficiency of vitamin B12 or folate. Continue pantoprazole daily, stool for occult blood pending. Disposition: SNF [facility] has accepted and precert obtained. Anticipate DC within 24 hours, depending on MRI results and cardiology final recs. Diet: ADULT DIET; Regular;  No Added Salt (3-4 gm)  Code:Full Code  DVT PPX: enoxaparin      CIERRA Velasco - CNP   9/10/2022 9:03 AM

## 2022-09-10 NOTE — PROGRESS NOTES
MD Katya Baeza MD Booker Hug, MD               Office: [phone]                      Fax: [phone]             1 [facility]                   NEPHROLOGY INPATIENT PROGRESS NOTE:     PATIENT NAME: Nanci Costa  : 1940  MRN: [id number]        IMPRESSION:       Admitted for:  Cellulitis [L03.90]  Peripheral edema [R60.9]  Redness and swelling of lower leg [M79.89, R23.8]  NSTEMI (non-ST elevated myocardial infarction) ([facility] [address] [I21.4]  S/P LHC 22   Normal coronaries  IV dys ~ 40 cc - monitor renal fx   LVEDP ~ 14 (high normal)       Hyponatremia :   : uncontrolled acute on Chronic, Moderate-mild range, no symptomatic, mild hyper-volemic:   - No Reported Severe symptoms: seizures, obtundation, coma, and respiratory arrest.   - no need for Hypertonic saline    - Risk factors for hyponatremia:   : hypokalemia   : HILTON (no h/o CKD) - pre-renal BL SCR ~0.6-0.7 --> peaked at 1.2 on 22 during admission   : Chronic diastolic HF -   - last ECHO -- Grade I diastolic dysfunction with normal LV filling pressures.  : weight ~ 160-162 lbs  :Viibryd    - Patient is at low risk of developing Osmotic Demyelination Syndrome.    - Call us urgently if any/worsening neurological findings.        Work up: reviewed    - Serum Osm , UOsm , Zheng , Urine K, Uric acid,   - renal function - HILTON - as above   - other lytes w/ hypokalemia - mild   - BP not very low  , unlikely AI         RECOMMENDATIONS:   - Monitor Serum Na ,at goal  - Goal Serum Na mid-high 130s, by next /24 hours     - low dose Lasix 20 mg QD- continued   - K - needs repletion- continue same on dc    (Potassium is osmotically active as sodium, so giving K+ can raise the S [Na+] and osmolality in hyponatremic patients)     - minimize SSRI - on Viibryd  - avoid NSIDs PO, ok for a local cream  - ongoing control of cellulitis, pain - to decrease non-osmotic ADH release     - Minimize use any hypotonic/isotonic fluids such as D5W, NS, LR with other medications/drips ,   - Concentrate continuous drip fluids as much as possible,   - Avoid IVF , unless needed for resuscitation/hypovolemia w/ hypotension+tachycardia,    - Strict I/O with daily weights. --- Call us urgently if any/worsening neurological findings. Other major problems: Management per primary and other consulting teams. //   Bilateral leg cellulitis   Obesity       Hospital Problems             Last Modified POA    * (Principal) Cellulitis 9/2/2022 Yes    Dyslipidemia 9/9/2022 Yes    Class 1 obesity due to excess calories with body mass index (BMI) of 30.0 to 30.9 in adult 9/6/2022 Yes    History of depression 9/6/2022 Yes    Bilateral lower leg cellulitis 9/6/2022 Yes    Peripheral edema 9/6/2022 Yes    Redness and swelling of lower leg 9/6/2022 Yes    Weight loss counseling, encounter for 9/7/2022 Yes    NSTEMI (non-ST elevated myocardial infarction) ([facility] [address] 9/9/2022 Yes    High fever 9/6/2022 Yes    Current mild episode of major depressive disorder without prior episode ([facility] [address] 9/3/2022 Yes   : other supportive care :   - Check daily renal function panel with electrolytes-phosphorus  - Strict monitoring of I/Os, daily weight  - non-Renal feeds/diet  - Current medications reviewed. Please refer to the orders. High Complexity. Multiple complex problems. Discussed with patient and treatment team- Fantasma Hampton - CNP today  Time spent > 30 (~35) minutes. Thank you for allowing me to participate in this patient's care. Please do not hesitate to contact me anytime. We will follow along with you.        Napoleon Petty MD,  [facility] [address] [place] [address] [place]: [phone] or Via [employer]  Fax: [phone]        =======================================================================================   =======================================================================================  SUBJECTIVE  Yesterday Had ac Chest pain, needing [facility] Friday   Leg edema  better  Resting in room    Past medical, Surgical, Social, Family medical history reviewed by me. MEDICATIONS: reviewed by me. Medications Prior to Admission:  No current facility-administered medications on file prior to encounter. Current Outpatient Medications on File Prior to Encounter   Medication Sig Dispense Refill    atorvastatin (LIPITOR) 10 MG tablet TAKE 1 TABLET BY MOUTH EVERY OTHER DAY **CHANGE IN DOSE** 45 tablet 3    buPROPion (WELLBUTRIN XL) 150 MG extended release tablet TAKE 1 TABLET (150 MG TOTAL) BY MOUTH DAILY. INDICATIONS  DEPRESSION      vilazodone HCl (VIIBRYD) 10 MG TABS Take 20 mg by mouth daily       B Complex-C (VITAMIN B + C COMPLEX PO) Take by mouth      Lift Chair MISC by Does not apply route 1 each 0    estradiol (ESTRACE) 0.1 MG/GM vaginal cream Place 2 g vaginally Twice a Week       aspirin 81 MG EC tablet Take 81 mg by mouth daily. VITAMIN D PO Take  by mouth.            Current Facility-Administered Medications:     heparin (porcine) injection 4,000 Units, 4,000 Units, IntraVENous, PRN, Ron Lucero DO    heparin (porcine) injection 2,000 Units, 2,000 Units, IntraVENous, PRN, Ron Lucero DO    heparin 25,000 units in dextrose 5% 250 mL (premix) infusion, 5-30 Units/kg/hr, IntraVENous, Continuous, Ron Lucero DO, Last Rate: 12 mL/hr at 09/09/22 0651, 16.461 Units/kg/hr at 09/09/22 0651    cephALEXin (KEFLEX) capsule 500 mg, 500 mg, Oral, 4 times per day, Juana Williamson MD, 500 mg at 09/10/22 1304    colchicine (COLCRYS) tablet 0.6 mg, 0.6 mg, Oral, BID, Crow Figueroa MD, 0.6 mg at 09/10/22 1049    pantoprazole (PROTONIX) tablet 40 mg, 40 mg, Oral, QAM AC, Cielo Hardy APRN - CNP, 40 mg at 09/10/22 0546    furosemide (LASIX) tablet 20 mg, 20 mg, Oral, Daily, Alexi Hayward MD, 20 mg at 09/10/22 1050    polyethylene glycol (GLYCOLAX) packet 17 g, 17 g, Oral, Daily, Cielo Hardy APRN - CNP, 17 g at 09/10/22 1049    sennosides-docusate sodium (SENOKOT-S) 8.6-50 MG tablet 2 tablet, 2 tablet, Oral, BID, CIERRA Arango - CNP, 2 tablet at 09/10/22 1050    nitroGLYCERIN (NITROSTAT) SL tablet 0.4 mg, 0.4 mg, SubLINGual, Q5 Min PRN, Aarti Bonds PA-C, 0.4 mg at 09/08/22 2152    morphine (PF) injection 2 mg, 2 mg, IntraVENous, Q4H PRN, Von Pearson PA-C, 2 mg at 09/09/22 1550    atorvastatin (LIPITOR) tablet 10 mg, 10 mg, Oral, Every Other Day, Ruy Weaver MD, 10 mg at 09/08/22 2219    gabapentin (NEURONTIN) capsule 100 mg, 100 mg, Oral, TID, CIERRA Isaac - CNP, 100 mg at 09/10/22 1308    busPIRone (BUSPAR) tablet 10 mg, 10 mg, Oral, Q6H PRN, CIERRA Isaac - CNP, 10 mg at 09/09/22 2214    HYDROcodone-acetaminophen (NORCO) 5-325 MG per tablet 1 tablet, 1 tablet, Oral, Q6H PRN, CIERRA Isaac - CNP, 1 tablet at 09/10/22 1416    calcium carbonate (TUMS) chewable tablet 500 mg, 500 mg, Oral, TID PRN, CIERRA Isaac - CNP, 500 mg at 09/04/22 1058    diclofenac sodium (VOLTAREN) 1 % gel 4 g, 4 g, Topical, TID, Bernadine Sheehan MD, 4 g at 09/10/22 1306    lactobacillus (CULTURELLE) capsule 1 capsule, 1 capsule, Oral, BID WC, Bernadine Sheehan MD, 1 capsule at 09/10/22 1050    aspirin EC tablet 81 mg, 81 mg, Oral, Daily, Ruy Weaver MD, 81 mg at 09/10/22 1050    buPROPion (WELLBUTRIN XL) extended release tablet 150 mg, 150 mg, Oral, Daily, Ruy Weaver MD, 150 mg at 09/10/22 1050    vilazodone HCl (VIIBRYD) TABS 20 mg, 20 mg, Oral, Daily, Ruy Weaver MD, 20 mg at 09/10/22 1301    sodium chloride flush 0.9 % injection 5-40 mL, 5-40 mL, IntraVENous, 2 times per day, Ruy Weaver MD, 10 mL at 09/10/22 1308    sodium chloride flush 0.9 % injection 5-40 mL, 5-40 mL, IntraVENous, PRN, Sven Stafford MD    0.9 % sodium chloride infusion, , IntraVENous, PRN, Ruy Weaver MD, Last Rate: 10 mL/hr at 09/07/22 0142, New Bag at 09/07/22 0142    ondansetron (ZOFRAN-ODT) disintegrating tablet 4 mg, 4 mg, Oral, Q8H PRN **OR** ondansetron (ZOFRAN) injection 4 mg, 4 mg, IntraVENous, Q6H PRN, Sven Stafford MD, 4 mg at 09/03/22 0522    acetaminophen (TYLENOL) tablet 650 mg, 650 mg, Oral, Q6H PRN, 650 mg at 09/09/22 1743 **OR** acetaminophen (TYLENOL) suppository 650 mg, 650 mg, Rectal, Q6H PRN, Sven Stafford MD    perflutren lipid microspheres (DEFINITY) injection 1.65 mg, 1.5 mL, IntraVENous, ONCE PRN, Kristni Garrison MD        REVIEW OF SYSTEMS:  As mentioned in chief complaint and HPI , Subjective       =======================================================================================     PHYSICAL EXAM:  Recent vital signs and recent I/Os reviewed by me. Wt Readings from Last 3 Encounters:   09/10/22 174 lb 13.2 oz (79.3 kg)   09/02/22 181 lb 12.8 oz (82.5 kg)   08/16/22 150 lb (68 kg)     BP Readings from Last 3 Encounters:   09/10/22 124/62   09/02/22 (!) 140/70   07/29/22 134/60     Patient Vitals for the past 24 hrs:   BP Temp Temp src Pulse Resp SpO2 Weight   09/10/22 1259 124/62 -- -- 75 18 94 % --   09/10/22 0845 124/69 97.8 °F (36.6 °C) -- 80 18 93 % --   09/10/22 0530 -- -- -- -- -- -- 174 lb 13.2 oz (79.3 kg)   09/10/22 0500 (!) 112/59 98.5 °F (36.9 °C) Oral 79 16 94 % --   09/09/22 2345 134/66 99.8 °F (37.7 °C) Oral 87 16 91 % --   09/09/22 2145 138/66 100.4 °F (38 °C) Oral 88 16 95 % --   09/09/22 1900 129/64 99 °F (37.2 °C) Oral 93 -- 95 % --   09/09/22 1730 (!) 151/71 (!) 101 °F (38.3 °C) Oral 92 18 98 % --       Intake/Output Summary (Last 24 hours) at 9/10/2022 1623  Last data filed at 9/10/2022 1308  Gross per 24 hour   Intake 10 ml   Output 1250 ml   Net -1240 ml         Physical Exam  Vitals reviewed. Constitutional:       General: She is not in acute distress. Appearance: Normal appearance. HENT:      Head: Normocephalic and atraumatic.       Right Ear: External ear normal.      Left Ear: External ear normal. Nose: Nose normal.      Mouth/Throat:      Mouth: Mucous membranes are moist. Mucous membranes are not dry. Eyes:      General: No scleral icterus. Conjunctiva/sclera: Conjunctivae normal.   Neck:      Vascular: No JVD. Cardiovascular:      Rate and Rhythm: Normal rate and regular rhythm. Heart sounds: S1 normal and S2 normal.   Pulmonary:      Effort: Pulmonary effort is normal. No respiratory distress. Breath sounds: Rhonchi present. Abdominal:      General: Bowel sounds are normal. There is no distension. Musculoskeletal:         General: Swelling present. No deformity. Cervical back: Normal range of motion and neck supple. Skin:     General: Skin is dry. Coloration: Skin is not jaundiced. Findings: Erythema present. Neurological:      Mental Status: She is alert and oriented to person, place, and time. Mental status is at baseline. Psychiatric:         Mood and Affect: Mood normal.         Behavior: Behavior normal.          =======================================================================================     DATA:  Diagnostic tests reviewed by me for today's visit:   (AS NEEDED FOR MY EVALUATION AND MANAGEMENT).        Recent Labs     09/08/22  0440 09/08/22  2209 09/09/22  0403 09/10/22  0427   WBC 6.0 4.2 7.2 5.6   HCT 29.4* 33.3* 28.9* 28.1*   * 180 158 145     Iron Saturation:  No components found for: PERCENTFE  FERRITIN:    Lab Results   Component Value Date/Time    FERRITIN 670.1 09/08/2022 04:40 AM     IRON:    Lab Results   Component Value Date/Time    IRON 15 09/08/2022 04:40 AM     TIBC:    Lab Results   Component Value Date/Time    TIBC 159 09/08/2022 04:40 AM       Recent Labs     09/08/22 0440 09/08/22 2209 09/09/22 0403 09/10/22  0427   * 132* 131* 132*   K 3.2* 4.5 3.9 3.7   CL 92* 94* 94* 95*   CO2 28 25 27 28   BUN 45* 43* 47* 27*   CREATININE 1.1 1.1 1.2 0.8     Recent Labs     09/08/22  0440 09/08/22  1002 09/08/22  2203 09/09/22  0403 09/10/22  0427   CALCIUM 8.3  --  9.0 8.9 8.8   MG  --  2.20 2.20  --   --    PHOS  --   --  3.3 3.0 2.8     No results for input(s): PH, PCO2, PO2 in the last 72 hours.     Invalid input(s): Reinier Em    ABG:  No results found for: PH, PCO2, PO2, HCO3, BE, THGB, TCO2, O2SAT  VBG:  No results found for: PHVEN, BNX1COT, BEVEN, V7WMICJT    LDH:  No results found for: LDH  Uric Acid:    Lab Results   Component Value Date/Time    LABURIC 7.3 09/08/2022 10:02 AM       PT/INR:    Lab Results   Component Value Date/Time    PROTIME 12.2 05/16/2022 09:06 AM    INR 1.08 05/16/2022 09:06 AM     Warfarin PT/INR:  No components found for: PTPATWAR, PTINRWAR  PTT:    Lab Results   Component Value Date/Time    APTT 30.0 09/09/2022 05:51 AM   [APTT}  Last 3 Troponin:    Lab Results   Component Value Date/Time    TROPONINI 0.08 09/10/2022 04:27 AM    TROPONINI 0.39 09/09/2022 04:03 AM    TROPONINI 0.01 09/08/2022 10:09 PM       U/A:    Lab Results   Component Value Date/Time    NITRITE neg 08/04/2021 02:33 PM    COLORU Yellow 09/09/2022 04:15 PM    PROTEINU 100 09/09/2022 04:15 PM    PHUR 5.5 09/09/2022 04:15 PM    WBCUA 6 09/09/2022 04:15 PM    RBCUA 24 09/09/2022 04:15 PM    BACTERIA None Seen 09/09/2022 04:15 PM    CLARITYU CLOUDY 09/09/2022 04:15 PM    SPECGRAV >=1.030 09/09/2022 04:15 PM    LEUKOCYTESUR TRACE 09/09/2022 04:15 PM    UROBILINOGEN 0.2 09/09/2022 04:15 PM    BILIRUBINUR Negative 09/09/2022 04:15 PM    BILIRUBINUR neg 08/04/2021 02:33 PM    BILIRUBINUR NEGATIVE 06/01/2012 06:20 AM    BLOODU LARGE 09/09/2022 04:15 PM    GLUCOSEU Negative 09/09/2022 04:15 PM    GLUCOSEU NEGATIVE 06/01/2012 06:20 AM     Microalbumen/Creatinine ratio:  No components found for: RUCREAT  24 Hour Urine for Protein:  No components found for: RAWUPRO, UHRS3, HQDP53OE, UTV3  24 Hour Urine for Creatinine Clearance:  No components found for: CREAT4, UHRS10, UTV10  Urine Toxicology:  No components found for: IAMMENTA, IBARBIT, IBENZO, ICOCAINE, IMARTHC, IOPIATES, IPHENCYC    HgBA1c:    Lab Results   Component Value Date/Time    LABA1C 4.9 10/06/2021 08:16 AM     RPR:  No results found for: RPR  HIV:  No results found for: HIV  ROMEO:  No results found for: ANATITER, ROMEO  RF:  No results found for: RF  DSDNA:  No components found for: DNA  AMYLASE:  No results found for: AMYLASE  LIPASE:    Lab Results   Component Value Date/Time    LIPASE 21.0 10/22/2019 03:08 PM     Fibrinogen Level:  No components found for: FIB       BELOW MENTIONED RADIOLOGY STUDY RESULTS BY ME (AS NEEDED FOR MY EVALUATION AND MANAGEMENT). Echo Complete    Result Date: 9/3/2022  Transthoracic Echocardiography Report (TTE)  Demographics   Patient Name       [facility]   Date of Study      09/03/2022        Gender              Female   Patient Number     [id number]        Date of Birth       1940   Visit Number       [id number]         Age                 78 year(s)   Accession Number   [id number]        Room Number         46   Corporate ID       [id number]          Sonographer         Mague Ramirez, RVT   Ordering Physician Parag Zavala MD Interpreting        Christofer Luo MD,                                       Physician           Polina Hernandez  Procedure Type of Study   TTE procedure:ECHOCARDIOGRAM COMPLETE 2D W DOPPLER W COLOR. Procedure Date Date: 09/03/2022 Start: 07:24 AM Study Location: Portable Technical Quality: Adequate visualization Indications:Dyspnea/SOB. Patient Status: Routine Height: 61 inches Weight: 181 pounds BSA: 1.81 m2 BMI: 34.2 kg/m2 BP: 133/60 mmHg  Conclusions   Summary  Mild septal left ventricular hypertrophy. Normal left ventricle size and  systolic function with an estimated ejection fraction of 55-60%. No regional  wall motion abnormalities are seen. Grade I diastolic dysfunction with normal LV filling pressures.   Left sided pleural effusion noted. Signature   ------------------------------------------------------------------  Electronically signed by Nicci Salgado MD, [address] [facility], [address]  (Interpreting physician) on 09/03/2022 at 12:49 PM  ------------------------------------------------------------------   Findings   Left Ventricle  Mild septal left ventricular hypertrophy. Normal left ventricle size and  systolic function with an estimated ejection fraction of 55-60%. No regional  wall motion abnormalities are seen. Grade I diastolic dysfunction with normal LV filling pressures. Avg. E/e'=  13.5. Mitral Valve  The mitral valve is thickened with adequate excursion. Trivial mitral  regurgitation. No evidence of mitral stenosis. Left Atrium  The left atrium is normal in size. Aortic Valve  The aortic valve is sclerotic with adequate excursion. Trivial aortic  insufficiency. No evidence of aortic stenosis. Aorta  The aortic root is normal in size. Right Ventricle  The right ventricle is normal in size and function. TAPSE= 2.66 cm. Tricuspid Valve  The tricuspid valve is normal in structure and function. Trivial tricuspid  regurgitation. No evidence of tricuspid stenosis. Right Atrium  The right atrial size is normal.   Pulmonic Valve  The pulmonic valve is not well visualized. Trivial pulmonic insufficiency. No evidence of pulmonic stenosis. Pericardial Effusion  No pericardial effusion noted. Pleural Effusion  Left sided pleural effusion noted. Miscellaneous  The inferior vena cava appears normal in size with normal respiratory  variation.   M-Mode/2D Measurements (cm)   LV Diastolic Dimension: 4.7 cm LV Systolic Dimension: 2.04 cm  LV Septum Diastolic: 3.88 cm  LV PW Diastolic: 9.23 cm       AO Root Dimension: 3 cm                                 LA Dimension: 4 cm                                 LA Area: 17.7 cm2  LVOT: 2 cm                     LA volume/Index: 46.1 ml /25 ml/m2  Doppler Measurements   AV Peak Velocity: 144 cm/s    MV Peak E-Wave: 90.2 cm/s  AV Peak Gradient: 8.29 mmHg   MV Peak A-Wave: 121 cm/s  AV Mean Gradient: 5 mmHg      MV E/A Ratio: 0.75  LVOT Peak Velocity: 99.8 cm/s  AV Area (Continuity):2.4 cm2   E' Septal Velocity: 5.44 cm/s  E' Lateral Velocity: 8.7 cm/s  PV Peak Velocity: 85.3 cm/s  PV Peak Gradient: 2.91 mmHg   Aortic Valve   Peak Velocity: 144 cm/s    Mean Velocity: 103 cm/s  Peak Gradient: 8.29 mmHg   Mean Gradient: 5 mmHg  Area (continuity): 2.4 cm2  AV VTI: 32.8 cm  Aorta   Aortic Root: 3 cm  Ascending Aorta: 2.9 cm  LVOT Diameter: 2 cm      XR CHEST (2 VW)    Result Date: 9/6/2022  EXAMINATION: TWO XRAY VIEWS OF THE CHEST 9/6/2022 1:18 pm COMPARISON: 07/27/2021 HISTORY: ORDERING SYSTEM PROVIDED HISTORY: fever TECHNOLOGIST PROVIDED HISTORY: Reason for exam:->fever Reason for Exam: fever FINDINGS: . The cardiac size is mildly enlarged, however stable. No acute infiltrates or pleural effusions are seen. Pulmonary vascularity appears normal. There is mild ectasia of the thoracic aorta. Stable mild compression T8 vertebral body. .No acute bony abnormalities.  The hilar structures are normal.     No acute cardiopulmonary disease     VL Extremity Venous Bilateral    Result Date: 9/2/2022  Vascular Lower Extremities DVT Study Procedure -- PRELIMINARY SONOGRAPHER REPORT --   Demographics   Patient Name       [facility]   Date of Study      09/02/2022         Gender              Female   Patient Number     [id number]         Date of Birth       1940   Visit Number       [id number]          Age                 78 year(s)   Accession Number   [id number]         Room Number            Corporate ID       [id number]           Sonographer         Audie Dance RVT, PRECIOUS, AB,                                                            OB/GYN   Ordering Physician Cynthia Abarca,  Interpreting        MD BUSHRA KahnA Physician  Procedure Type of Study:   Veins:Lower Extremities DVT Study, VASC EXTREMITY VENOUS DUPLEX BILATERAL. Tech Comments Right Limited study due to pt's severe swelling and pain. No evidence of deep vein or superficial vein thrombosis involving the right lower extremity. Right inguinal lymph node measuring 1.3 x 0.6 x 1.0 cm. Calf veins were poorly visualized on the right. Left Limited study due to pt's severe swelling and pain. No evidence of deep vein or superficial vein thrombosis involving the left lower extremity. Left inguinal lymph node measuring 1.9 x 0.7 x 1.4 cm. Calf veins were poorly visualized on the left. Left medial fossa: Cystic structure measuring 2.3 x 0.7 x 2.0 cm. [facility] MELO DIGITAL SCREEN BILATERAL    Result Date: 8/17/2022  EXAMINATION: SCREENING DIGITAL BILATERAL MAMMOGRAM WITH TOMOSYNTHESIS, 8/16/2022 TECHNIQUE: Screening mammography was performed with tomosynthesis including MLO and CC views of the bilateral breasts. Computer aided detection was used for the interpretation of this exam. The technologist reported that patient positioning was limited. COMPARISON: Prior mammograms in 2021, 2020, and 2018 HISTORY: Screening. The patient reports a history of benign left surgical biopsy. There is a family history of breast cancer in the patient's mother. FINDINGS: There are scattered areas of fibroglandular density. No new suspicious masses, microcalcifications, or areas of architectural distortion are identified. Stable postoperative changes are noted in the left breast.     No mammographic evidence of malignancy. BIRADS: BIRADS - CATEGORY 2 Benign Findings. Normal interval follow-up is recommended in 12 months. OVERALL ASSESSMENT - BENIGN A letter of notification will be sent to the patient regarding the results. The [employer] recommends annual mammograms for women 40 years and older. Summary   Mild septal left ventricular hypertrophy.  Normal left ventricle size and   systolic function with an estimated ejection fraction of 55-60%. No regional   wall motion abnormalities are seen. Grade I diastolic dysfunction with normal LV filling pressures. Left sided pleural effusion noted.       Signature      ------------------------------------------------------------------   Electronically signed by Marc Calderon MD, [facility], [address]   (Interpreting physician) on 09/03/2022 at 12:49 PM   ------------------------------------------------------------------

## 2022-09-10 NOTE — PROGRESS NOTES
This RN removed amos catheter. Pt tolerated well, no complications. This RN placed external catheter.

## 2022-09-11 PROBLEM — I40.1 ACUTE IDIOPATHIC MYOCARDITIS: Status: ACTIVE | Noted: 2022-09-11

## 2022-09-11 LAB
ALBUMIN SERPL-MCNC: 2.4 G/DL (ref 3.4–5)
ANION GAP SERPL CALCULATED.3IONS-SCNC: 8 MMOL/L (ref 3–16)
BUN BLDV-MCNC: 22 MG/DL (ref 7–20)
CALCIUM SERPL-MCNC: 8.7 MG/DL (ref 8.3–10.6)
CHLORIDE BLD-SCNC: 99 MMOL/L (ref 99–110)
CO2: 28 MMOL/L (ref 21–32)
CREAT SERPL-MCNC: 0.7 MG/DL (ref 0.6–1.2)
GFR AFRICAN AMERICAN: >60
GFR NON-AFRICAN AMERICAN: >60
GLUCOSE BLD-MCNC: 108 MG/DL (ref 70–99)
HCT VFR BLD CALC: 27 % (ref 36–48)
HEMOGLOBIN: 8.9 G/DL (ref 12–16)
MCH RBC QN AUTO: 28.9 PG (ref 26–34)
MCHC RBC AUTO-ENTMCNC: 32.8 G/DL (ref 31–36)
MCV RBC AUTO: 88 FL (ref 80–100)
OCCULT BLOOD DIAGNOSTIC: NORMAL
PDW BLD-RTO: 13.8 % (ref 12.4–15.4)
PHOSPHORUS: 3 MG/DL (ref 2.5–4.9)
PLATELET # BLD: 159 K/UL (ref 135–450)
PMV BLD AUTO: 7.9 FL (ref 5–10.5)
POTASSIUM SERPL-SCNC: 3.5 MMOL/L (ref 3.5–5.1)
RBC # BLD: 3.07 M/UL (ref 4–5.2)
SODIUM BLD-SCNC: 135 MMOL/L (ref 136–145)
WBC # BLD: 4.7 K/UL (ref 4–11)

## 2022-09-11 PROCEDURE — 2580000003 HC RX 258: Performed by: HOSPITALIST

## 2022-09-11 PROCEDURE — 2060000000 HC ICU INTERMEDIATE R&B

## 2022-09-11 PROCEDURE — 6370000000 HC RX 637 (ALT 250 FOR IP): Performed by: PHYSICIAN ASSISTANT

## 2022-09-11 PROCEDURE — 6370000000 HC RX 637 (ALT 250 FOR IP): Performed by: INTERNAL MEDICINE

## 2022-09-11 PROCEDURE — 51702 INSERT TEMP BLADDER CATH: CPT

## 2022-09-11 PROCEDURE — 82270 OCCULT BLOOD FECES: CPT

## 2022-09-11 PROCEDURE — 6370000000 HC RX 637 (ALT 250 FOR IP): Performed by: NURSE PRACTITIONER

## 2022-09-11 PROCEDURE — 6370000000 HC RX 637 (ALT 250 FOR IP): Performed by: FAMILY MEDICINE

## 2022-09-11 PROCEDURE — 6370000000 HC RX 637 (ALT 250 FOR IP): Performed by: HOSPITALIST

## 2022-09-11 PROCEDURE — 6370000000 HC RX 637 (ALT 250 FOR IP): Performed by: UROLOGY

## 2022-09-11 PROCEDURE — 99233 SBSQ HOSP IP/OBS HIGH 50: CPT | Performed by: INTERNAL MEDICINE

## 2022-09-11 PROCEDURE — 80069 RENAL FUNCTION PANEL: CPT

## 2022-09-11 PROCEDURE — 36415 COLL VENOUS BLD VENIPUNCTURE: CPT

## 2022-09-11 PROCEDURE — 85027 COMPLETE CBC AUTOMATED: CPT

## 2022-09-11 RX ORDER — BETHANECHOL CHLORIDE 10 MG/1
10 TABLET ORAL 3 TIMES DAILY
Status: DISCONTINUED | OUTPATIENT
Start: 2022-09-11 | End: 2022-09-14 | Stop reason: HOSPADM

## 2022-09-11 RX ORDER — TAMSULOSIN HYDROCHLORIDE 0.4 MG/1
0.4 CAPSULE ORAL NIGHTLY
Status: DISCONTINUED | OUTPATIENT
Start: 2022-09-11 | End: 2022-09-14 | Stop reason: HOSPADM

## 2022-09-11 RX ADMIN — HYDROCODONE BITARTRATE AND ACETAMINOPHEN 1 TABLET: 5; 325 TABLET ORAL at 05:15

## 2022-09-11 RX ADMIN — GABAPENTIN 100 MG: 100 CAPSULE ORAL at 21:44

## 2022-09-11 RX ADMIN — Medication 10 ML: at 22:05

## 2022-09-11 RX ADMIN — BETHANECHOL CHLORIDE 10 MG: 10 TABLET ORAL at 21:44

## 2022-09-11 RX ADMIN — TAMSULOSIN HYDROCHLORIDE 0.4 MG: 0.4 CAPSULE ORAL at 21:43

## 2022-09-11 RX ADMIN — DICLOFENAC SODIUM 4 G: 10 GEL TOPICAL at 21:55

## 2022-09-11 RX ADMIN — CEPHALEXIN 500 MG: 250 CAPSULE ORAL at 05:15

## 2022-09-11 RX ADMIN — TAMSULOSIN HYDROCHLORIDE 0.4 MG: 0.4 CAPSULE ORAL at 01:18

## 2022-09-11 RX ADMIN — BUPROPION HYDROCHLORIDE 150 MG: 150 TABLET, EXTENDED RELEASE ORAL at 10:02

## 2022-09-11 RX ADMIN — Medication 1 CAPSULE: at 10:02

## 2022-09-11 RX ADMIN — ASPIRIN 81 MG: 81 TABLET, COATED ORAL at 10:02

## 2022-09-11 RX ADMIN — DICLOFENAC SODIUM 4 G: 10 GEL TOPICAL at 10:03

## 2022-09-11 RX ADMIN — Medication 10 ML: at 10:13

## 2022-09-11 RX ADMIN — CEPHALEXIN 500 MG: 250 CAPSULE ORAL at 12:59

## 2022-09-11 RX ADMIN — BUSPIRONE HYDROCHLORIDE 10 MG: 5 TABLET ORAL at 10:01

## 2022-09-11 RX ADMIN — BUSPIRONE HYDROCHLORIDE 10 MG: 5 TABLET ORAL at 21:44

## 2022-09-11 RX ADMIN — COLCHICINE 0.6 MG: 0.6 TABLET, FILM COATED ORAL at 10:02

## 2022-09-11 RX ADMIN — CEPHALEXIN 500 MG: 250 CAPSULE ORAL at 17:39

## 2022-09-11 RX ADMIN — Medication 1 CAPSULE: at 17:39

## 2022-09-11 RX ADMIN — HYDROCODONE BITARTRATE AND ACETAMINOPHEN 1 TABLET: 5; 325 TABLET ORAL at 21:43

## 2022-09-11 RX ADMIN — STANDARDIZED SENNA CONCENTRATE AND DOCUSATE SODIUM 2 TABLET: 8.6; 5 TABLET ORAL at 21:43

## 2022-09-11 RX ADMIN — GABAPENTIN 100 MG: 100 CAPSULE ORAL at 10:02

## 2022-09-11 RX ADMIN — HYDROCODONE BITARTRATE AND ACETAMINOPHEN 1 TABLET: 5; 325 TABLET ORAL at 10:01

## 2022-09-11 RX ADMIN — VILAZODONE HYDROCHLORIDE 20 MG: 20 TABLET ORAL at 10:06

## 2022-09-11 RX ADMIN — COLCHICINE 0.6 MG: 0.6 TABLET, FILM COATED ORAL at 21:43

## 2022-09-11 RX ADMIN — FUROSEMIDE 20 MG: 20 TABLET ORAL at 10:02

## 2022-09-11 RX ADMIN — PANTOPRAZOLE SODIUM 40 MG: 40 TABLET, DELAYED RELEASE ORAL at 05:15

## 2022-09-11 RX ADMIN — BETHANECHOL CHLORIDE 10 MG: 10 TABLET ORAL at 14:27

## 2022-09-11 RX ADMIN — GABAPENTIN 100 MG: 100 CAPSULE ORAL at 14:27

## 2022-09-11 ASSESSMENT — PAIN SCALES - GENERAL
PAINLEVEL_OUTOF10: 6
PAINLEVEL_OUTOF10: 7
PAINLEVEL_OUTOF10: 4
PAINLEVEL_OUTOF10: 6
PAINLEVEL_OUTOF10: 7

## 2022-09-11 ASSESSMENT — PAIN DESCRIPTION - DESCRIPTORS
DESCRIPTORS: ACHING;BURNING

## 2022-09-11 ASSESSMENT — PAIN DESCRIPTION - LOCATION
LOCATION: LEG;KNEE
LOCATION: KNEE;LEG
LOCATION: LEG;KNEE

## 2022-09-11 ASSESSMENT — PAIN DESCRIPTION - ORIENTATION
ORIENTATION: LEFT;RIGHT
ORIENTATION: RIGHT;LEFT
ORIENTATION: RIGHT;LEFT

## 2022-09-11 NOTE — PLAN OF CARE
[place] neurosurgery note    Called regarding this patient for B leg pain and R foraminal stenosis. Spoke with NP from  who stated patient has pain, but no real weakness. Does have urinary retention. MRI reviewed which shows significant multilevel degenerative disease as well as coronal deformity in the lumbar spine. 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 Rec:  MRI T spine to eval for any cord compression as there is no severe lumbar central stenosis to explain this. Can consider steroids for radicular pain and JACINDA as an outpatient for leg pain.      Leonel Loco MD

## 2022-09-11 NOTE — PROGRESS NOTES
[address]   Progress Note  Cardiology      [place] [facility]   Admission date:  9/2/2022  CC-f/up possible myocarditis  Subjective:  She remains tired.  Attributes her problem to a fractured hip    Objective:  Medications/Labs all Reviewed     tamsulosin  0.4 mg Oral Nightly    bethanechol  10 mg Oral TID    cephALEXin  500 mg Oral 4 times per day    colchicine  0.6 mg Oral BID    pantoprazole  40 mg Oral QAM AC    furosemide  20 mg Oral Daily    polyethylene glycol  17 g Oral Daily    sennosides-docusate sodium  2 tablet Oral BID    gabapentin  100 mg Oral TID    diclofenac sodium  4 g Topical TID    lactobacillus  1 capsule Oral BID WC    aspirin  81 mg Oral Daily    buPROPion  150 mg Oral Daily    vilazodone HCl  20 mg Oral Daily    sodium chloride flush  5-40 mL IntraVENous 2 times per day       BMP:   Lab Results   Component Value Date/Time     09/11/2022 05:40 AM    K 3.5 09/11/2022 05:40 AM    K 4.6 09/02/2022 12:24 PM    CL 99 09/11/2022 05:40 AM    CO2 28 09/11/2022 05:40 AM    BUN 22 09/11/2022 05:40 AM    CREATININE 0.7 09/11/2022 05:40 AM    MG 2.20 09/08/2022 10:09 PM     CBC:    Lab Results   Component Value Date/Time    WBC 4.7 09/11/2022 05:40 AM    RBC 3.07 09/11/2022 05:40 AM    HGB 8.9 09/11/2022 05:40 AM    HCT 27.0 09/11/2022 05:40 AM    MCV 88.0 09/11/2022 05:40 AM    RDW 13.8 09/11/2022 05:40 AM     09/11/2022 05:40 AM      PT/INR:    Lab Results   Component Value Date    INR 1.08 05/16/2022    PROTIME 12.2 05/16/2022     Cardiac Enzymes:    Lab Results   Component Value Date/Time    CKTOTAL 209 09/09/2022 05:51 AM     Lab Results   Component Value Date    TROPONINI 0.08 (H) 09/10/2022    TROPONINI 0.39 (H) 09/09/2022    TROPONINI 0.01 09/08/2022     BNP:  No results found for: BNP  FASTING LIPID PANEL:    Lab Results   Component Value Date/Time    CHOL 117 10/06/2021 08:16 AM    HDL 51 10/06/2021 08:16 AM    HDL 62 02/13/2012 08:08 AM    TRIG 112 10/06/2021 08:16 AM       Physical Examination:    /64   Pulse 88   Temp 97.4 °F (36.3 °C) (Oral)   Resp 18   Ht 5' 1\" (1.549 m)   Wt 171 lb 4.8 oz (77.7 kg)   SpO2 95%   BMI 32.37 kg/m²      Respiratory:  Resp Assessment: Normal respiratory effort  Resp Auscultation: Clear to auscultation bilaterally   Cardiovascular: Auscultation: regular rate and rhythm, normal S1S2, no murmur, rub or gallop  Palpation:  Nl PMI  JVP:  normal  Extremities: No Edema  Abdomen:  Soft, non-tender  Normal bowel sounds  Extremities:   No Cyanosis or Clubbing  Neurological/Psychiatric:  Oriented to time, place, and person  Non-anxious  Skin Warm and dry    Assessment:    Principal Problem:        NSTEMI (non-ST elevated myocardial infarction) ([facility] [address]  Plan: likely cardiac demand supply mismatch,possible myocarditis         Plan: Will schedule cardiac MRI. For now leave on colchicine.  MRI to determine if colchicine needs to be continueed  Ok for discharge  I have spent  35  minutes of face to face time with the patient with more than 50%  spent  counseling and coordinating care for [facility]

## 2022-09-11 NOTE — PROGRESS NOTES
MD Rashaad Tejeda MD Angela Cleverly, MD               Office: [phone]                      Fax: [phone]             8 [facility]                   NEPHROLOGY INPATIENT PROGRESS NOTE:     PATIENT NAME: Marissa Mares  : 1940  MRN: [id number]        IMPRESSION:       Admitted for:  Cellulitis [L03.90]  Peripheral edema [R60.9]  Redness and swelling of lower leg [M79.89, R23.8]  NSTEMI (non-ST elevated myocardial infarction) ([address] [I21.4]  S/P LHC 22   Normal coronaries  IV dys ~ 40 cc - monitor renal fx   LVEDP ~ 14 (high normal)       Hyponatremia :   : uncontrolled acute on Chronic, Moderate-mild range, no symptomatic, mild hyper-volemic:   - No Reported Severe symptoms: seizures, obtundation, coma, and respiratory arrest.   - no need for Hypertonic saline    - Risk factors for hyponatremia:   : hypokalemia   : HILTON (no h/o CKD) - pre-renal BL SCR ~0.6-0.7 --> peaked at 1.2 on 22 during admission   : Chronic diastolic HF -   - last ECHO -- Grade I diastolic dysfunction with normal LV filling pressures.  : weight ~ 160-162 lbs  :Viibryd    - Patient is at low risk of developing Osmotic Demyelination Syndrome.    - Call us urgently if any/worsening neurological findings.        Work up: reviewed    - Serum Osm , UOsm , Zheng , Urine K, Uric acid,   - renal function - HILTON - as above   - other lytes w/ hypokalemia - mild   - BP not very low  , unlikely AI         RECOMMENDATIONS:   - Monitor Serum Na ,at goal  - Goal Serum Na mid-high 130s, by next /24 hours     - low dose Lasix 20 mg QD- continued   - K - needs repletion- continue same on dc    (Potassium is osmotically active as sodium, so giving K+ can raise the S [Na+] and osmolality in hyponatremic patients)     - minimize SSRI - on Viibryd  - avoid NSIDs PO, ok for a local cream  - ongoing control of cellulitis, pain - to decrease non-osmotic ADH release     - Minimize use any hypotonic/isotonic fluids such as D5W, NS, LR with other medications/drips ,   - Concentrate continuous drip fluids as much as possible,   - Avoid IVF , unless needed for resuscitation/hypovolemia w/ hypotension+tachycardia,    - Strict I/O with daily weights. --- Call us urgently if any/worsening neurological findings. Other major problems: Management per primary and other consulting teams. //   Bilateral leg cellulitis   Obesity       Hospital Problems             Last Modified POA    * (Principal) Cellulitis 9/2/2022 Yes    Hyperlipidemia 9/10/2022 Yes    Class 1 obesity due to excess calories with body mass index (BMI) of 30.0 to 30.9 in adult 9/6/2022 Yes    History of depression 9/6/2022 Yes    Bilateral lower leg cellulitis 9/6/2022 Yes    Peripheral edema 9/6/2022 Yes    Redness and swelling of lower leg 9/6/2022 Yes    Weight loss counseling, encounter for 9/7/2022 Yes    NSTEMI (non-ST elevated myocardial infarction) ([facility] 9/9/2022 Yes    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 Please refer to the orders. High Complexity. Multiple complex problems. Discussed with patient and treatment team- Uvaldo Penaloza - SAUD today  Time spent > 30 (~35) minutes. Thank you for allowing me to participate in this patient's care. Please do not hesitate to contact me anytime. We will follow along with you.        Neo Anderson MD,  [facility] [id number] [place] [address] [place]: [phone] or Via [employer]  Fax: [phone]        ======================================================================================= =======================================================================================  SUBJECTIVE remains with complaints of tiredness  Leg edema  better  Resting in room    Past medical, Surgical, Social, Family medical history reviewed by me. MEDICATIONS: reviewed by me. Medications Prior to Admission:  No current facility-administered medications on file prior to encounter. Current Outpatient Medications on File Prior to Encounter   Medication Sig Dispense Refill    atorvastatin (LIPITOR) 10 MG tablet TAKE 1 TABLET BY MOUTH EVERY OTHER DAY **CHANGE IN DOSE** 45 tablet 3    buPROPion (WELLBUTRIN XL) 150 MG extended release tablet TAKE 1 TABLET (150 MG TOTAL) BY MOUTH DAILY. INDICATIONS  DEPRESSION      vilazodone HCl (VIIBRYD) 10 MG TABS Take 20 mg by mouth daily       B Complex-C (VITAMIN B + C COMPLEX PO) Take by mouth      Lift Chair MISC by Does not apply route 1 each 0    estradiol (ESTRACE) 0.1 MG/GM vaginal cream Place 2 g vaginally Twice a Week       aspirin 81 MG EC tablet Take 81 mg by mouth daily. VITAMIN D PO Take  by mouth.            Current Facility-Administered Medications:     tamsulosin (FLOMAX) capsule 0.4 mg, 0.4 mg, Oral, Nightly, Aarti Bonds PA-C, 0.4 mg at 09/11/22 0118    bethanechol (URECHOLINE) tablet 10 mg, 10 mg, Oral, TID, Michael Greenberg MD, 10 mg at 09/11/22 1427    cephALEXin (KEFLEX) capsule 500 mg, 500 mg, Oral, 4 times per day, Juana Williamson MD, 500 mg at 09/11/22 1259    colchicine (COLCRYS) tablet 0.6 mg, 0.6 mg, Oral, BID, Crow Figueroa MD, 0.6 mg at 09/11/22 1002    pantoprazole (PROTONIX) tablet 40 mg, 40 mg, Oral, QAM AC, Cielo Hardy APRN - CNP, 40 mg at 09/11/22 0515    furosemide (LASIX) tablet 20 mg, 20 mg, Oral, Daily, Alexi Hayward MD, 20 mg at 09/11/22 1002    polyethylene glycol (GLYCOLAX) packet 17 g, 17 g, Oral, Daily, CIERRA Perry - CNP, 17 g at 09/10/22 1049    sennosides-docusate sodium (SENOKOT-S) 8.6-50 MG tablet 2 tablet, 2 tablet, Oral, BID, Quinn Zhao, APRN - CNP, 2 tablet at 09/10/22 1050    nitroGLYCERIN (NITROSTAT) SL tablet 0.4 mg, 0.4 mg, SubLINGual, Q5 Min PRN, Aarti Bonds PA-C, 0.4 mg at 09/08/22 2152    morphine (PF) injection 2 mg, 2 mg, IntraVENous, Q4H PRN, Chapito Lind PA-C, 2 mg at 09/09/22 1550    gabapentin (NEURONTIN) capsule 100 mg, 100 mg, Oral, TID, CIERRA Isaac - CNP, 100 mg at 09/11/22 1427    busPIRone (BUSPAR) tablet 10 mg, 10 mg, Oral, Q6H PRN, CIERRA Isaac - CNP, 10 mg at 09/11/22 1001    HYDROcodone-acetaminophen (NORCO) 5-325 MG per tablet 1 tablet, 1 tablet, Oral, Q6H PRN, CIERRA Isaac - CNP, 1 tablet at 09/11/22 1001    calcium carbonate (TUMS) chewable tablet 500 mg, 500 mg, Oral, TID PRN, CIERRA Isaac - CNP, 500 mg at 09/04/22 1058    diclofenac sodium (VOLTAREN) 1 % gel 4 g, 4 g, Topical, TID, Ashley Lizama MD, 4 g at 09/11/22 1003    lactobacillus (CULTURELLE) capsule 1 capsule, 1 capsule, Oral, BID , Ashley Lizama MD, 1 capsule at 09/11/22 1002    aspirin EC tablet 81 mg, 81 mg, Oral, Daily, Irina Bowden MD, 81 mg at 09/11/22 1002    buPROPion (WELLBUTRIN XL) extended release tablet 150 mg, 150 mg, Oral, Daily, Irina Bowden MD, 150 mg at 09/11/22 1002    vilazodone HCl (VIIBRYD) TABS 20 mg, 20 mg, Oral, Daily, Irina Bowden MD, 20 mg at 09/11/22 1006    sodium chloride flush 0.9 % injection 5-40 mL, 5-40 mL, IntraVENous, 2 times per day, Irina Bowden MD, 10 mL at 09/11/22 1013    sodium chloride flush 0.9 % injection 5-40 mL, 5-40 mL, IntraVENous, PRN, Sven Stafford MD    0.9 % sodium chloride infusion, , IntraVENous, PRN, Irina Bowden MD, Last Rate: 10 mL/hr at 09/07/22 0142, New Bag at 09/07/22 0142    ondansetron (ZOFRAN-ODT) disintegrating tablet 4 mg, 4 mg, Oral, Q8H PRN **OR** ondansetron (ZOFRAN) injection 4 mg, 4 mg, IntraVENous, Q6H PRN, Irina Bowden MD, 4 mg at 09/03/22 0522    acetaminophen (TYLENOL) tablet 650 mg, 650 mg, Oral, Q6H PRN, 650 mg at 09/09/22 1743 **OR** acetaminophen (TYLENOL) suppository 650 mg, 650 mg, Rectal, Q6H PRN, Sven Stafford MD    perflutren lipid microspheres (DEFINITY) injection 1.65 mg, 1.5 mL, IntraVENous, ONCE PRN, Clarke Carrington MD        REVIEW OF SYSTEMS:  As mentioned in chief complaint and HPI , Subjective       =======================================================================================     PHYSICAL EXAM:  Recent vital signs and recent I/Os reviewed by me. Wt Readings from Last 3 Encounters:   09/11/22 171 lb 4.8 oz (77.7 kg)   09/02/22 181 lb 12.8 oz (82.5 kg)   08/16/22 150 lb (68 kg)     BP Readings from Last 3 Encounters:   09/11/22 131/64   09/02/22 (!) 140/70   07/29/22 134/60     Patient Vitals for the past 24 hrs:   BP Temp Temp src Pulse Resp SpO2 Weight   09/11/22 1300 131/64 97.4 °F (36.3 °C) Oral 88 18 95 % --   09/11/22 1000 (!) 121/58 -- -- 85 18 95 % --   09/11/22 0515 -- -- -- -- -- -- 171 lb 4.8 oz (77.7 kg)   09/11/22 0500 (!) 148/64 99 °F (37.2 °C) Oral 96 16 94 % --   09/10/22 2343 133/61 98.4 °F (36.9 °C) Oral 86 16 95 % --   09/10/22 1949 129/69 97.7 °F (36.5 °C) Oral 80 18 92 % --   09/10/22 1715 119/62 -- -- 85 -- 95 % --       Intake/Output Summary (Last 24 hours) at 9/11/2022 1548  Last data filed at 9/11/2022 1546  Gross per 24 hour   Intake 1680 ml   Output 1700 ml   Net -20 ml         Physical Exam  Vitals reviewed. Constitutional:       General: She is not in acute distress. Appearance: Normal appearance. HENT:      Head: Normocephalic and atraumatic. Right Ear: External ear normal.      Left Ear: External ear normal.      Nose: Nose normal.      Mouth/Throat:      Mouth: Mucous membranes are moist. Mucous membranes are not dry. Eyes:      General: No scleral icterus. Conjunctiva/sclera: Conjunctivae normal.   Neck:      Vascular: No JVD.    Cardiovascular:      Rate and Rhythm: Normal rate and regular rhythm. Heart sounds: S1 normal and S2 normal.   Pulmonary:      Effort: Pulmonary effort is normal. No respiratory distress. Breath sounds: Rhonchi present. Abdominal:      General: Bowel sounds are normal. There is no distension. Musculoskeletal:         General: Swelling present. No deformity. Cervical back: Normal range of motion and neck supple. Skin:     General: Skin is dry. Coloration: Skin is not jaundiced. Findings: Erythema present. Neurological:      Mental Status: She is alert and oriented to person, place, and time. Mental status is at baseline. Psychiatric:         Mood and Affect: Mood normal.         Behavior: Behavior normal.          =======================================================================================     DATA:  Diagnostic tests reviewed by me for today's visit:   (AS NEEDED FOR MY EVALUATION AND MANAGEMENT). Recent Labs     09/08/22  2209 09/09/22  0403 09/10/22  0427 09/11/22  0540   WBC 4.2 7.2 5.6 4.7   HCT 33.3* 28.9* 28.1* 27.0*    158 145 159     Iron Saturation:  No components found for: PERCENTFE  FERRITIN:    Lab Results   Component Value Date/Time    FERRITIN 670.1 09/08/2022 04:40 AM     IRON:    Lab Results   Component Value Date/Time    IRON 15 09/08/2022 04:40 AM     TIBC:    Lab Results   Component Value Date/Time    TIBC 159 09/08/2022 04:40 AM       Recent Labs     09/08/22  2209 09/09/22  0403 09/10/22  0427 09/11/22  0540   * 131* 132* 135*   K 4.5 3.9 3.7 3.5   CL 94* 94* 95* 99   CO2 25 27 28 28   BUN 43* 47* 27* 22*   CREATININE 1.1 1.2 0.8 0.7     Recent Labs     09/08/22  2209 09/09/22  0403 09/10/22  0427 09/11/22  0540   CALCIUM 9.0 8.9 8.8 8.7   MG 2.20  --   --   --    PHOS 3.3 3.0 2.8 3.0     No results for input(s): PH, PCO2, PO2 in the last 72 hours.     Invalid input(s): [id number], INSPIREDO2    ABG:  No results found for: PH, PCO2, PO2, HCO3, BE, THGB, TCO2, O2SAT  VBG:  No results found for: PHVEN, YWZ5XGJ, BEVEN, T7QFHIMZ    LDH:  No results found for: LDH  Uric Acid:    Lab Results   Component Value Date/Time    LABURIC 7.3 09/08/2022 10:02 AM       PT/INR:    Lab Results   Component Value Date/Time    PROTIME 12.2 05/16/2022 09:06 AM    INR 1.08 05/16/2022 09:06 AM     Warfarin PT/INR:  No components found for: PTPATWAR, PTINRWAR  PTT:    Lab Results   Component Value Date/Time    APTT 30.0 09/09/2022 05:51 AM   [APTT}  Last 3 Troponin:    Lab Results   Component Value Date/Time    TROPONINI 0.08 09/10/2022 04:27 AM    TROPONINI 0.39 09/09/2022 04:03 AM    TROPONINI 0.01 09/08/2022 10:09 PM       U/A:    Lab Results   Component Value Date/Time    NITRITE neg 08/04/2021 02:33 PM    COLORU Yellow 09/09/2022 04:15 PM    PROTEINU 100 09/09/2022 04:15 PM    PHUR 5.5 09/09/2022 04:15 PM    WBCUA 6 09/09/2022 04:15 PM    RBCUA 24 09/09/2022 04:15 PM    BACTERIA None Seen 09/09/2022 04:15 PM    CLARITYU CLOUDY 09/09/2022 04:15 PM    SPECGRAV >=1.030 09/09/2022 04:15 PM    LEUKOCYTESUR TRACE 09/09/2022 04:15 PM    UROBILINOGEN 0.2 09/09/2022 04:15 PM    BILIRUBINUR Negative 09/09/2022 04:15 PM    BILIRUBINUR neg 08/04/2021 02:33 PM    BILIRUBINUR NEGATIVE 06/01/2012 06:20 AM    BLOODU LARGE 09/09/2022 04:15 PM    GLUCOSEU Negative 09/09/2022 04:15 PM    GLUCOSEU NEGATIVE 06/01/2012 06:20 AM     Microalbumen/Creatinine ratio:  No components found for: RUCREAT  24 Hour Urine for Protein:  No components found for: RAWUPRO, UHRS3, RJUD86II, UTV3  24 Hour Urine for Creatinine Clearance:  No components found for: CREAT4, UHRS10, UTV10  Urine Toxicology:  No components found for: IAMMENTA, IBARBIT, IBENZO, ICOCAINE, IMARTHC, IOPIATES, IPHENCYC    HgBA1c:    Lab Results   Component Value Date/Time    LABA1C 4.9 10/06/2021 08:16 AM     RPR:  No results found for: RPR  HIV:  No results found for: HIV  ROMEO:  No results found for: ANATITER, ROMEO  RF:  No results found for: RF  DSDNA:  No components found for: DNA  AMYLASE:  No results found for: AMYLASE  LIPASE:    Lab Results   Component Value Date/Time    LIPASE 21.0 10/22/2019 03:08 PM     Fibrinogen Level:  No components found for: FIB       BELOW MENTIONED RADIOLOGY STUDY RESULTS BY ME (AS NEEDED FOR MY EVALUATION AND MANAGEMENT). Echo Complete    Result Date: 9/3/2022  Transthoracic Echocardiography Report (TTE)  Demographics   Patient Name       [facility] Niurka Quispe   Date of Study      09/03/2022        Gender              Female   Patient Number     [id number]        Date of Birth       1940   Visit Number       [id number]         Age                 78 year(s)   Accession Number   [id number]        Room Number         46   Corporate ID       [id number]          Sonographer         Farhan Ramirez T   Ordering Physician Wilson Ferrell MD Interpreting        Dave Villaseñor MD,                                       Physician           Linda Subramanian  Procedure Type of Study   TTE procedure:ECHOCARDIOGRAM COMPLETE 2D W DOPPLER W COLOR. Procedure Date Date: 09/03/2022 Start: 07:24 AM Study Location: Portable Technical Quality: Adequate visualization Indications:Dyspnea/SOB. Patient Status: Routine Height: 61 inches Weight: 181 pounds BSA: 1.81 m2 BMI: 34.2 kg/m2 BP: 133/60 mmHg  Conclusions   Summary  Mild septal left ventricular hypertrophy. Normal left ventricle size and  systolic function with an estimated ejection fraction of 55-60%. No regional  wall motion abnormalities are seen. Grade I diastolic dysfunction with normal LV filling pressures. Left sided pleural effusion noted.    Signature   ------------------------------------------------------------------  Electronically signed by Dave Villaseñor MD, [facility] [place], [address]  (Interpreting physician) on 09/03/2022 at 12:49 PM  ------------------------------------------------------------------   Findings   Left Ventricle  Mild septal left ventricular hypertrophy. Normal left ventricle size and  systolic function with an estimated ejection fraction of 55-60%. No regional  wall motion abnormalities are seen. Grade I diastolic dysfunction with normal LV filling pressures. Avg. E/e'=  13.5. Mitral Valve  The mitral valve is thickened with adequate excursion. Trivial mitral  regurgitation. No evidence of mitral stenosis. Left Atrium  The left atrium is normal in size. Aortic Valve  The aortic valve is sclerotic with adequate excursion. Trivial aortic  insufficiency. No evidence of aortic stenosis. Aorta  The aortic root is normal in size. Right Ventricle  The right ventricle is normal in size and function. TAPSE= 2.66 cm. Tricuspid Valve  The tricuspid valve is normal in structure and function. Trivial tricuspid  regurgitation. No evidence of tricuspid stenosis. Right Atrium  The right atrial size is normal.   Pulmonic Valve  The pulmonic valve is not well visualized. Trivial pulmonic insufficiency. No evidence of pulmonic stenosis. Pericardial Effusion  No pericardial effusion noted. Pleural Effusion  Left sided pleural effusion noted. Miscellaneous  The inferior vena cava appears normal in size with normal respiratory  variation.   M-Mode/2D Measurements (cm)   LV Diastolic Dimension: 4.7 cm LV Systolic Dimension: 0.65 cm  LV Septum Diastolic: 0.05 cm  LV PW Diastolic: 5.77 cm       AO Root Dimension: 3 cm                                 LA Dimension: 4 cm                                 LA Area: 17.7 cm2  LVOT: 2 cm                     LA volume/Index: 46.1 ml /25 ml/m2  Doppler Measurements   AV Peak Velocity: 144 cm/s    MV Peak E-Wave: 90.2 cm/s  AV Peak Gradient: 8.29 mmHg   MV Peak A-Wave: 121 cm/s  AV Mean Gradient: 5 mmHg      MV E/A Ratio: 0.75  LVOT Peak Velocity: 99.8 cm/s  AV Area (Continuity):2.4 cm2   E' Septal Velocity: 5.44 cm/s  E' Lateral Velocity: 8.7 cm/s  PV Peak Velocity: 85.3 cm/s PV Peak Gradient: 2.91 mmHg   Aortic Valve   Peak Velocity: 144 cm/s    Mean Velocity: 103 cm/s  Peak Gradient: 8.29 mmHg   Mean Gradient: 5 mmHg  Area (continuity): 2.4 cm2  AV VTI: 32.8 cm  Aorta   Aortic Root: 3 cm  Ascending Aorta: 2.9 cm  LVOT Diameter: 2 cm      XR CHEST (2 VW)    Result Date: 9/6/2022  EXAMINATION: TWO XRAY VIEWS OF THE CHEST 9/6/2022 1:18 pm COMPARISON: 07/27/2021 HISTORY: ORDERING SYSTEM PROVIDED HISTORY: fever TECHNOLOGIST PROVIDED HISTORY: Reason for exam:->fever Reason for Exam: fever FINDINGS: . The cardiac size is mildly enlarged, however stable. No acute infiltrates or pleural effusions are seen. Pulmonary vascularity appears normal. There is mild ectasia of the thoracic aorta. Stable mild compression T8 vertebral body. .No acute bony abnormalities. The hilar structures are normal.     No acute cardiopulmonary disease     VL Extremity Venous Bilateral    Result Date: 9/2/2022  Vascular Lower Extremities DVT Study Procedure -- PRELIMINARY SONOGRAPHER REPORT --   Demographics   Patient Name       [facility]   Date of Study      09/02/2022         Gender              Female   Patient Number     [id number]         Date of Birth       1940   Visit Number       [id number]          Age                 78 year(s)   Accession Number   [id number]         Room Number            Corporate ID       [id number]           Sonographer         Ivanna Serna RVT, RDMS, AB,                                                            OB/GYN   Ordering Physician Clarence Pinto,  Interpreting        Rosa Bailey MD                     CNA                Physician  Procedure Type of Study:   Veins:Lower Extremities DVT Study, VASC EXTREMITY VENOUS DUPLEX BILATERAL. Tech Comments Right Limited study due to pt's severe swelling and pain. No evidence of deep vein or superficial vein thrombosis involving the right lower extremity.  Right inguinal lymph node measuring 1.3 x 0.6 x 1.0 cm. Calf veins were poorly visualized on the right. Left Limited study due to pt's severe swelling and pain. No evidence of deep vein or superficial vein thrombosis involving the left lower extremity. Left inguinal lymph node measuring 1.9 x 0.7 x 1.4 cm. Calf veins were poorly visualized on the left. Left medial fossa: Cystic structure measuring 2.3 x 0.7 x 2.0 cm. MAKENNA MELO DIGITAL SCREEN BILATERAL    Result Date: 8/17/2022  EXAMINATION: SCREENING DIGITAL BILATERAL MAMMOGRAM WITH TOMOSYNTHESIS, 8/16/2022 TECHNIQUE: Screening mammography was performed with tomosynthesis including MLO and CC views of the bilateral breasts. Computer aided detection was used for the interpretation of this exam. The technologist reported that patient positioning was limited. COMPARISON: Prior mammograms in 2021, 2020, and 2018 HISTORY: Screening. The patient reports a history of benign left surgical biopsy. There is a family history of breast cancer in the patient's mother. FINDINGS: There are scattered areas of fibroglandular density. No new suspicious masses, microcalcifications, or areas of architectural distortion are identified. Stable postoperative changes are noted in the left breast.     No mammographic evidence of malignancy. BIRADS: BIRADS - CATEGORY 2 Benign Findings. Normal interval follow-up is recommended in 12 months. OVERALL ASSESSMENT - BENIGN A letter of notification will be sent to the patient regarding the results. The [employer] recommends annual mammograms for women 40 years and older. Summary   Mild septal left ventricular hypertrophy. Normal left ventricle size and   systolic function with an estimated ejection fraction of 55-60%. No regional   wall motion abnormalities are seen. Grade I diastolic dysfunction with normal LV filling pressures. Left sided pleural effusion noted.       Signature ------------------------------------------------------------------   Electronically signed by Mone Brand MD, [facility] [place], [address]   ([facility] physician) on 09/03/2022 at 12:49 PM   ------------------------------------------------------------------

## 2022-09-11 NOTE — PROGRESS NOTES
[facility]    9/11/2022 9:03 AM        Name: Lg Suarez . Admitted: 9/2/2022  Primary Care Provider: Malik Freeman MD (Tel: [phone])      Brief History: Presented with swelling and redness BLE. Failed outpatient management with Lasix. Admitted with cellulitis BLE. Subjective: Resting in bed. Less tearful today, offers no new complaints. Aoms cath inserted overnight for inability to urinate and bladder scan showed 680 mls urine. Patient reports prior amos following hip surgery and while recovering in SNF. Main complaint is bilateral leg pain, moreso in left and more in lower legs. She is wes to move legs equally but painful to move. Per nursing, patient has been refusing to work with therapy or get out of bed. Spoke with patient about need to get out of bed because she will continue to get weaker. Reviewed MRI results with patient and later with .      Reviewed interval ancillary notes    Current Medications  tamsulosin (FLOMAX) capsule 0.4 mg, Nightly  heparin (porcine) injection 4,000 Units, PRN  heparin (porcine) injection 2,000 Units, PRN  heparin 25,000 units in dextrose 5% 250 mL (premix) infusion, Continuous  cephALEXin (KEFLEX) capsule 500 mg, 4 times per day  colchicine (COLCRYS) tablet 0.6 mg, BID  pantoprazole (PROTONIX) tablet 40 mg, QAM AC  furosemide (LASIX) tablet 20 mg, Daily  polyethylene glycol (GLYCOLAX) packet 17 g, Daily  sennosides-docusate sodium (SENOKOT-S) 8.6-50 MG tablet 2 tablet, BID  nitroGLYCERIN (NITROSTAT) SL tablet 0.4 mg, Q5 Min PRN  morphine (PF) injection 2 mg, Q4H PRN  gabapentin (NEURONTIN) capsule 100 mg, TID  busPIRone (BUSPAR) tablet 10 mg, Q6H PRN  HYDROcodone-acetaminophen (NORCO) 5-325 MG per tablet 1 tablet, Q6H PRN  calcium carbonate (TUMS) chewable tablet 500 mg, TID PRN  diclofenac sodium (VOLTAREN) 1 % gel 4 g, TID  lactobacillus 95       Venous Doppler 9/2/2022:  Right   Limited study due to pt's severe swelling and pain. No evidence of deep vein or superficial vein thrombosis involving the right   lower extremity. Right inguinal lymph node measuring 1.3 x 0.6 x 1.0 cm. Calf veins were poorly visualized on the right. Left   Limited study due to pt's severe swelling and pain. No evidence of deep vein or superficial vein thrombosis involving the left   lower extremity. Left inguinal lymph node measuring 1.9 x 0.7 x 1.4 cm. Calf veins were poorly visualized on the left. Left medial fossa: Cystic structure measuring 2.3 x 0.7 x 2.0 cm. Echo 9/2/2022:  Summary   Mild septal left ventricular hypertrophy. Normal left ventricle size and systolic function with an estimated ejection fraction of 55-60%. No regional wall motion abnormalities are seen. Grade I diastolic dysfunction with normal LV filling pressures. Left sided pleural effusion noted. CXR 9/6/2022:  No acute cardiopulmonary disease    LHC 9/9/2022:  Findings  Artery Findings/Result   LM Normal   LAD Normal   Cx Normal   RI N/A   RCA Normal   LVEDP 14   LVG 55%   Intervention(s)  None  Post Cath Dx:       Normal coronaries  Normal EF  Possible myopericarditis? MRI Lumbar Spine 9/10/2022:  Multilevel lumbar spondylosis without significant canal stenosis. Shallow central/right paracentral disc protrusion at L1-2 along with   degenerative facet changes and levocurvature of the thoracolumbar spine   contributing to encroachment on the right lateral recess and moderate right   foraminal stenosis.        Problem List  Principal Problem:    Cellulitis  Active Problems:    Hyperlipidemia    Class 1 obesity due to excess calories with body mass index (BMI) of 30.0 to 30.9 in adult    History of depression    Bilateral lower leg cellulitis    Peripheral edema    Redness and swelling of lower leg    Weight loss counseling, encounter for    NSTEMI (non-ST elevated deficiency (iron 15, saturation 9%) and received IV Venofer x 2 doses. No deficiency of vitamin B12 or folate. Occult blood negative. Continue pantoprazole daily. Urinary retention. Llamas placed for inability to void and 680 ml on bladder scan. Started on Flomax. Consult urology. Disposition: SNF on DC when medically stable. Diet: ADULT DIET; Regular; No Added Salt (3-4 gm)  Code:Full Code  DVT PPX: enoxaparin      CIERRA Rodrigues CNP   9/11/2022 9:03 AM    Addendum 1:20 PM: Spoke with Dr Erin Steiner, neurosurgery. Discussed patient hospital course and main complaint of bilateral leg pain, weakness and urinary retention. Discussed leg pain which has been ongoing issue since left hip hemiarthroplasty in May and has interfered with her ability to participate in rehab. Discussed that patient has refused to get out of bed or work with PT/OT because of pain. Has been receiving gabapentin and prn Norco with little to no improvement. Dr Erin Steiner has reviewed images and patient with significant degenerative disease and severe stenosis on right. Recommends MRI thoracic spine to evaluate for cord compression to explain urinary retention as there is no central stenosis to explain this. She also recommends consideration of course of steroids for radicular pain and epidural spinal injection as outpatient. Continue gabapentin. Discussed with patient.   JADA Grey

## 2022-09-11 NOTE — PROGRESS NOTES
Pt has had no UOP via [facility] since F/C removed at 1630. Pt encouraged to attempt to urinate multiple times, pt states she can't and has no real urge to urinate. Bladder distension seen. Bladder scan showed 680 ml urine. Order obtained to place F/C. Pt tolerated procedure well. 650 ml immediate urine output obtained.

## 2022-09-11 NOTE — CONSULTS
admission. She was completely asymptomatic despite having a large volume of urine the bladder. Past Medical History:   She has a past medical history of AR (allergic rhinitis), Arthritis of knee, Depression, Erosive gastritis (10/28/2019), GERD (gastroesophageal reflux disease), Hyperlipidemia, Internal hemorrhoids, Overweight(278.02), and Viral meningitis (5/12). Past Surgical History:  She has a past surgical history that includes Cholecystectomy, laparoscopic (2003); Total abdominal hysterectomy w/ bilateral salpingoophorectomy (2003); Colonoscopy (8/06); Colonoscopy (12/3/13); Upper gastrointestinal endoscopy (12/3/13); Breast biopsy; Hysterectomy; and hip surgery (Left, 5/16/2022). Allergies:   No Known Allergies    Social History:  She reports that she has never smoked. She has never used smokeless tobacco. She reports current alcohol use. She reports that she does not use drugs. Family History:  family history includes Bipolar Disorder in her brother; Breast Cancer in her mother.     Medications:   Scheduled Meds:   tamsulosin  0.4 mg Oral Nightly    cephALEXin  500 mg Oral 4 times per day    colchicine  0.6 mg Oral BID    pantoprazole  40 mg Oral QAM AC    furosemide  20 mg Oral Daily    polyethylene glycol  17 g Oral Daily    sennosides-docusate sodium  2 tablet Oral BID    gabapentin  100 mg Oral TID    diclofenac sodium  4 g Topical TID    lactobacillus  1 capsule Oral BID WC    aspirin  81 mg Oral Daily    buPROPion  150 mg Oral Daily    vilazodone HCl  20 mg Oral Daily    sodium chloride flush  5-40 mL IntraVENous 2 times per day     Continuous Infusions:   heparin (PORCINE) Infusion 16.461 Units/kg/hr (09/09/22 0651)    sodium chloride 10 mL/hr at 09/07/22 0142     PRN Meds:heparin (porcine), heparin (porcine), nitroGLYCERIN, morphine, busPIRone, HYDROcodone 5 mg - acetaminophen, calcium carbonate, sodium chloride flush, sodium chloride, ondansetron **OR** ondansetron, acetaminophen **OR** acetaminophen, perflutren lipid microspheres    Review of Systems:  Constitutional: Negative for fever    Genitourinary: see HPI  Eyes: negative for sudden change in vision  EENT: no complaints  Cardiovascular: Negative for chest pain  Respiratory: Negative for shortness of breath  Gastrointestinal: Negative for nausea  Musculoskeletal: Negative for back pain   Neurological: Negative for weakness  Psychiatric: Negative for anxiety  Integumentary: Negative for rashes or adenopathy     Physical Exam:  Vitals:    09/11/22 0500   BP: (!) 148/64   Pulse: 96   Resp: 16   Temp: 99 °F (37.2 °C)   SpO2: 94%     Constitutional: NAD, well-developed, well-nourished. HEENT: MMM. Hearing intact. PERRL  Neck: no thyroid masses appreciated. Trachea is midline. Neck appears unremarkable   Lymph: no palpable adenopathy in supraclavicular, or axillary lymph nodes  Cardiovascular: Regular rate. No peripheral edema  Respiratory: Respirations are even and non-labored. No audible breath sounds. Genitourinary: Llamas catheter in place with clear urine. Abdomen: Soft. No distension, tenderness, hernias, masses or guarding. No CVA tenderness. No hernias appreciated. Liver and spleen appear normal  Psychiatric: A + O x 3, normal affect. Insight appears intact. Muskuloskeletal: WEN x 4   Skin: Pink, warm and dry. No rashes on face and arms. Labs:  Lab Results   Component Value Date    WBC 4.7 09/11/2022    HGB 8.9 (L) 09/11/2022    HCT 27.0 (L) 09/11/2022    MCV 88.0 09/11/2022     09/11/2022     Lab Results   Component Value Date    CREATININE 0.7 09/11/2022    BUN 22 (H) 09/11/2022     (L) 09/11/2022    K 3.5 09/11/2022    CL 99 09/11/2022    CO2 28 09/11/2022       Imaging:   CT a/p 5/19/22  Impression   1. Moderate stool in the rectosigmoid colon with colonic wall thickening and   infiltration of the pericolonic fat concerning for stercoral colitis. 2. No other acute bowel pathology.   Liquid stool in the more proximal colon. No evidence of obstruction. 3. Bilateral hydronephrosis and hydroureter. No obstructing calculi. Findings may be related to bladder outlet obstruction or neurogenic bladder   with moderate distention of the urinary bladder.      Bessie Lopez MD  9/11/2022

## 2022-09-11 NOTE — FLOWSHEET NOTE
Vitals:    09/11/22 0500 09/11/22 0515 09/11/22 1000 09/11/22 1300   BP: (!) 148/64  (!) 121/58 131/64   Pulse: 96  85 88   Resp: 16  18 18   Temp: 99 °F (37.2 °C)   97.4 °F (36.3 °C)   TempSrc: Oral   Oral   SpO2: 94%  95% 95%   Weight:  171 lb 4.8 oz (77.7 kg)     Height:           Morning shift assessment completed, A&Ox4, dependent with bed mobility and ambulation secondary to BLE pain. Uses call light approprietly for assistance. Pt reports pain to BLE with movement at 6 to a 8/10 but denies SOA at this time. O2 sats are stable on RA. Lung sounds are diminished through out. Abdomen is soft nondistended bowel sounds are active. BM's are noted to be frequent and loose today-holding stool softeners and miralax. Improved edema generalized. Fall precautions in place for safety, bed is in low position and wheels are locked. Pt is wearing nonskid sock for safety. Call light in reach for wants and needs, will monitor. POC reviewed and pt is agreeable to Urology consult today-who recommended removal of Llamas and bladder scan with intermittent cath as needed for retention, MRI of thoracic tomorrow AM.       09/11/22 1000   Assessment   Charting Type Shift assessment   Psychosocial   Psychosocial (WDL) X   Patient Behaviors Tearful;Crying;Labile   Family Behaviors Supportive   Rest/Sleep for Patient 1962 [address] - Psychosocial Goals   Will report anxiety at manageable levels Administer medication as ordered; Teach and rehearse alternative coping skills;Provide emotional support with 1:1 interaction with staff   Patient/family able to effectively weigh alternatives and participate in decision making related to treatment and care Determine when there are differences between patient's view, family's view, and healthcare provider's view of condition; Facilitate patient and family articulation of goals for care; Help patient and family identify pros/cons of alternative solutions;Provide information as requested by patient/family; Respect patient/family right to receive or not to receive information   Effect of thought disturbance (confusion, delirium, dementia, or psychosis) are managed with adequate functional status Assess for contributors to thought disturbance, including medications, impaired vision or hearing, underlying metabolic abnormalities, dehydration, psychiatric diagnoses, notify LIP; [place] high risk fall precautions, as indicated;Provide frequent short contacts to provide reality reorientation, refocusing and direction;Decrease environmental stimuli, including noise as appropriate;Monitor and intervene to maintain adequate nutrition, hydration, elimination, sleep and activity   Patient/family maintains appropriate behavior and adheres to behavioral management agreement, if implemented Assess patient/familys coping skills and  non-compliant behavior (including use of illegal substances); Notify security of behavior or suspected illegal substances which indicate the need for search of the patient and/or belongings;Encourage verbalization of thoughts and concerns in a socially appropriate manner;Utilize positive, consistent limit setting strategies supporting safety of patient, staff and others;Encourage participation in the decision making process about the behavioral management agreement   Effect of psychiatric condition will be minimized and patient will be protected from self harm Assess impact of patients symptoms on level of functioning, self care needs and offer support as indicated;Assess patient/family knowledge of depression, impact on illness and need for teaching;Provide emotional support, presence and reassurance   Neurological   Neuro (WDL) X  (noted residual tardive dyskinesia-exacerbated with mood swings)   Level of Consciousness 0   Orientation Level Oriented X4   Cognition Appropriate safety awareness; Appropriate for developmental age; Appropriate judgement; Follows commands; Appropriate attention/concentration; No short term memory loss   Speech Clear; Appropriate for developmental age   R Pupil Size (mm) 3   R Pupil Shape Round   R Pupil Reaction Brisk   L Pupil Size (mm) 3   L Pupil Shape Round   L Pupil Reaction Brisk   R Hand  Moderate   L Hand  Moderate   R Foot Dorsiflexion Weak   L Foot Dorsiflexion Weak   R Foot Plantar Flexion Weak   L Foot Plantar Flexion Weak   RUE Motor Response Responds to command;Normal flexion; Normal extension; Movement to painful stimulus; Non-purposeful movement  (noted residual tardive dyskinesia-exacerbated with mood swings-mostly in hand)   RUE Sensation  Full sensation; No numbness; No pain; No tingling   LUE Motor Response Responds to command;Normal flexion; Normal extension; Movement to painful stimulus; Non-purposeful movement  (noted residual tardive dyskinesia-exacerbated with mood swings-mostly in hand)   LUE Sensation  Full sensation; No numbness; No pain; No tingling   RLE Motor Response Movement to painful stimulus  (pt having trouble moving BLE-c/o heavyness/numbness/pain)   RLE Sensation  Decreased;Numbness;Pain;Tingling   LLE Motor Response Movement to painful stimulus  (pt having trouble moving BLE-c/o heavyness/numbness/pain)   LLE Sensation  Decreased;Numbness;Pain;Tingling   Tongue Deviation None   Gag Present   R Babinski Reflex Absent   L Babinski Reflex Absent   Cough Reflex Present   [place] Coma Scale   Eye Opening 4   Best Verbal Response 5   Best Motor Response 6   [place] Coma Scale Score 15   Care Plan - Neuro Goals   Achieves stable or improved neurological status Assess for and report changes in neurological status;Maintain blood pressure and fluid volume within ordered parameters to optimize cerebral perfusion and minimize risk of hemorrhage;Monitor temperature, glucose, and sodium.  Initiate appropriate interventions as ordered   Achieves maximal functionality and self care Monitor swallowing and airway patency with patient fatigue and changes in neurological status; Encourage and assist patient to increase activity and self care with guidance from physical therapy/occupational therapy   HEENT (Head, Ears, Eyes, Nose, & Throat)   HEENT (WDL) WDL   Incentive Spirometry Tx   Treatment Effort Needs encouragement   Respiratory   Respiratory (WDL) X   Respiratory Pattern Regular   Respiratory Depth Normal   Respiratory Quality/Effort Unlabored;Orthopneic   Chest Assessment Chest expansion symmetrical;Trachea midline   L Breath Sounds Diminished   R Breath Sounds Diminished   Level of Activity/Mobility 3   Breath Sounds   Right Upper Lobe Diminished   Right Middle Lobe Diminished   Right Lower Lobe Diminished   Left Upper Lobe Diminished   Left Lower Lobe Diminished   Care Plan - Respiratory Goals   Achieves optimal ventilation and oxygenation Assess for changes in respiratory status; Assess for changes in mentation and behavior;Position to facilitate oxygenation and minimize respiratory effort;Oxygen supplementation based on oxygen saturation or arterial blood gases; Assess and instruct to report shortness of breath or any respiratory difficulty   Cardiac   Cardiac (WDL) WDL   Cardiac Regularity Regular   Heart Sounds S1, S2   Cardiac Rhythm Sinus rhythm   Cardiac Symptoms Peripheral edema   Rhythm Interpretation   Heart Rate 85   Cardiac Monitor   Telemetry Box Number [id number]   Telemetry Monitor Alarm Parameters    Cardiac/Telemetry Monitor On Portable telemetry pack applied   Alarm Audible Centralized cardiac monitoring   Alarms Set Centralized cardiac monitoring   Electrodes Replaced No   Care Plan - Cardiovascular Goals   Maintains optimal cardiac output and hemodynamic stability Monitor blood pressure and heart rate;Monitor urine output and notify Licensed Independent Practitioner for values outside of normal range;Assess for signs of decreased cardiac output; Administer fluid and/or volume expanders as ordered   Absence of cardiac dysrhythmias or at baseline Monitor cardiac rate and rhythm;Assess for signs of decreased cardiac output   Gastrointestinal   Abdominal (WDL) WDL   Pre Hospital Interventions Fluid intake (comment);laxative   Abdomen Inspection Soft;Rounded;Rotund   RUQ Bowel Sounds Active   LUQ Bowel Sounds Active   RLQ Bowel Sounds Active   LLQ Bowel Sounds Active   GI Symptoms Constipation   Tenderness Soft; No guarding;Nontender; No rebound   Constipation Precipitating Factors Medication (iron supplements, opiods, antidepressants etc)  (imobility)   Passing Flatus Y   Care Plan - Gastrointestinal Goals   Minimal or absence of nausea and vomiting Administer IV fluids as ordered to ensure adequate hydration;Administer ordered antiemetic medications as needed;Provide nonpharmacologic comfort measures as appropriate   Maintains or returns to baseline bowel function Assess bowel function;Encourage oral fluids to ensure adequate hydration;Administer IV fluids as ordered to ensure adequate hydration;Administer ordered medications as needed;Encourage mobilization and activity   Maintains adequate nutritional intake Monitor percentage of each meal consumed; Identify factors contributing to decreased intake, treat as appropriate;Assist with meals as needed;Monitor intake and output, weight and lab values   Genitourinary   Genitourinary (WDL) X  (F/C due to retention)   Suprapubic Tenderness No   Dysuria (Pain/Burning w/Urination) Yes  (feels like she still needs to pee at urethra)   Difficulty Urinating/Starting [place] No  (not in presence of amos)   Urine Frequency   Urine Frequency No  (in presence of F/C)   Urine Urgency   Urine Urgency No  (in presence of F/C)   Urinary Retention   Urinary Retention Absent  (in presence of amos)   Urine Assessment   Urinary Status Patent; Amos;Draining;Urine bag   Urinary Incontinence Absent  (in presence of amos)   Urine Color Yellow/straw   Urine Appearance Clear   Urine Odor No odor   Care Plan - Genitourinary Goals    Absence of urinary retention Assess patients ability to void and empty bladder;Monitor intake/output and perform bladder scan as needed;Place urinary catheter per Licensed Independent Practitioner order if needed; Discuss with Licensed Independent Practitioner  medications to alleviate retention as needed; Discuss catheterization for long term situations as appropriate  (urology consulted)   Urinary catheter remains patent Assess patency of urinary catheter   Peripheral Vascular   Peripheral Vascular (WDL) X   Edema Right lower extremity; Left lower extremity   RLE Edema +1;Pitting   LLE Edema +1;Pitting   RUE Neurovascular Assessment   Capillary Refill Less than/Equal to 3 seconds   Color Pale;Ecchymosis   Temperature Warm   R Radial Pulse +2 (Moderate)   LUE Neurovascular Assessment   Capillary Refill Less than/Equal to 3 seconds   Color Pale;Ecchymosis   Temperature Warm   L Radial Pulse +2 (Moderate)   RLE Neurovascular Assessment   Capillary Refill Less than/Equal to 3 seconds   Color Pale   Temperature Warm   R Pedal Pulse +1   LLE Neurovascular Assessment   Capillary Refill Less than/Equal to 3 seconds   Color Pale   Temperature Warm   L Pedal Pulse +1   Skin Integumentary    Skin Integumentary (WDL) WDL   Care Plan - Skin/Tissue Integrity Goals   Skin Integrity Remains Intact Monitor for areas of redness and/or skin breakdown   Incisions, Wounds, or Drain Sites Healing Without Sign and Symptoms of Infection TWICE DAILY: Assess and document skin integrity; Initiate pressure ulcer prevention bundle as indicated   Oral Mucous Membranes Remain Intact Assess oral mucosa and hygiene practices; Implement preventative oral hygiene regimen; Implement oral medicated treatments as ordered   Musculoskeletal   Musculoskeletal (WDL) X   RUE Weakness   LUE Weakness   RL Extremity Weakness; Swelling; Unsteady;Limited movement   LL Extremity Weakness; Swelling;Limited movement; Unsteady   Musculoskeletal Details   R Knee Limited movement   L Knee Limited movement   Care Plan - Musculoskeletal Goals   Return Mobility to Safest Level of Function Assess patient stability and activity tolerance for standing, transferring and ambulating with or without assistive devices;Assist with transfers and ambulation using safe patient handling equipment as needed;Obtain physical therapy/occupational therapy consults as needed   Maintain proper alignment of affected body part Instruct and reinforce with patient and family use of appropriate assistive device and precautions (e.g. spinal or hip dislocation precautions)   Return ADL Status to a Safe Level of Function Administer medication as ordered;Assess activities of daily living deficits and provide assistive devices as needed;Obtain physical therapy/occupational therapy consults as needed;Assist and instruct patient to increase activity and self care as tolerated   Urinary Catheter 09/11/22 Llamas   Placement Date/Time: 09/11/22 0030   Inserted by: KYLIE  Insertion attempts: 1  Catheter Type: Llamas  Catheter Size: 16 FR  Catheter Balloon Size: 10 mL  Insertion Procedure Practices: Order written;Pre-connected closed drainage system;Properly inflated . .. Catheter Indications Urinary retention (acute or chronic), continuous bladder irrigation or bladder outlet obstruction   Site Assessment No urethral drainage   Urine Color Yellow   Urine Appearance Clear   Collection Container Standard   Securement Method Securing device (Describe)   Catheter Care Completed Yes   Catheter Best Practices  Drainage tube clipped to bed;Catheter secured to thigh; Tamper seal intact; Bag below bladder;Bag not on floor; Lack of dependent loop in tubing;Drainage bag less than half full   Status Draining   Output (mL) 350 mL   Care Plan - Infection Goals   Absence of infection at discharge Assess and monitor for signs and symptoms of infection;Monitor lab/diagnostic results;Monitor all insertion sites i.e., indwelling lines, system

## 2022-09-12 DIAGNOSIS — I51.4 MYOCARDITIS, UNSPECIFIED CHRONICITY, UNSPECIFIED MYOCARDITIS TYPE (HCC): Primary | ICD-10-CM

## 2022-09-12 PROCEDURE — 99221 1ST HOSP IP/OBS SF/LOW 40: CPT | Performed by: NURSE PRACTITIONER

## 2022-09-12 PROCEDURE — 6370000000 HC RX 637 (ALT 250 FOR IP): Performed by: HOSPITALIST

## 2022-09-12 PROCEDURE — 6370000000 HC RX 637 (ALT 250 FOR IP): Performed by: PHYSICIAN ASSISTANT

## 2022-09-12 PROCEDURE — 99232 SBSQ HOSP IP/OBS MODERATE 35: CPT | Performed by: INTERNAL MEDICINE

## 2022-09-12 PROCEDURE — 2060000000 HC ICU INTERMEDIATE R&B

## 2022-09-12 PROCEDURE — 2580000003 HC RX 258: Performed by: HOSPITALIST

## 2022-09-12 PROCEDURE — 6370000000 HC RX 637 (ALT 250 FOR IP): Performed by: UROLOGY

## 2022-09-12 PROCEDURE — 6370000000 HC RX 637 (ALT 250 FOR IP): Performed by: FAMILY MEDICINE

## 2022-09-12 PROCEDURE — 6370000000 HC RX 637 (ALT 250 FOR IP): Performed by: NURSE PRACTITIONER

## 2022-09-12 PROCEDURE — 97530 THERAPEUTIC ACTIVITIES: CPT

## 2022-09-12 PROCEDURE — 6360000002 HC RX W HCPCS: Performed by: NURSE PRACTITIONER

## 2022-09-12 PROCEDURE — 51798 US URINE CAPACITY MEASURE: CPT

## 2022-09-12 PROCEDURE — 6370000000 HC RX 637 (ALT 250 FOR IP): Performed by: INTERNAL MEDICINE

## 2022-09-12 PROCEDURE — 97535 SELF CARE MNGMENT TRAINING: CPT

## 2022-09-12 RX ORDER — PANTOPRAZOLE SODIUM 40 MG/1
40 TABLET, DELAYED RELEASE ORAL
Status: DISCONTINUED | OUTPATIENT
Start: 2022-09-12 | End: 2022-09-14 | Stop reason: HOSPADM

## 2022-09-12 RX ORDER — METHYLPREDNISOLONE 4 MG/1
4 TABLET ORAL
Status: DISCONTINUED | OUTPATIENT
Start: 2022-09-13 | End: 2022-09-14 | Stop reason: HOSPADM

## 2022-09-12 RX ORDER — METHYLPREDNISOLONE 4 MG/1
8 TABLET ORAL NIGHTLY
Status: COMPLETED | OUTPATIENT
Start: 2022-09-13 | End: 2022-09-13

## 2022-09-12 RX ORDER — METHYLPREDNISOLONE 4 MG/1
4 TABLET ORAL NIGHTLY
Status: DISCONTINUED | OUTPATIENT
Start: 2022-09-14 | End: 2022-09-14 | Stop reason: HOSPADM

## 2022-09-12 RX ORDER — TIZANIDINE 4 MG/1
4 TABLET ORAL EVERY 6 HOURS PRN
Status: DISCONTINUED | OUTPATIENT
Start: 2022-09-12 | End: 2022-09-14 | Stop reason: HOSPADM

## 2022-09-12 RX ORDER — METHYLPREDNISOLONE 4 MG/1
24 TABLET ORAL ONCE
Status: COMPLETED | OUTPATIENT
Start: 2022-09-12 | End: 2022-09-12

## 2022-09-12 RX ORDER — LIDOCAINE 4 G/G
1 PATCH TOPICAL DAILY
Status: DISCONTINUED | OUTPATIENT
Start: 2022-09-12 | End: 2022-09-14 | Stop reason: HOSPADM

## 2022-09-12 RX ORDER — ACETAMINOPHEN 325 MG/1
650 TABLET ORAL 3 TIMES DAILY
Status: DISCONTINUED | OUTPATIENT
Start: 2022-09-12 | End: 2022-09-14 | Stop reason: HOSPADM

## 2022-09-12 RX ADMIN — CEPHALEXIN 500 MG: 250 CAPSULE ORAL at 17:18

## 2022-09-12 RX ADMIN — GABAPENTIN 100 MG: 100 CAPSULE ORAL at 14:19

## 2022-09-12 RX ADMIN — PANTOPRAZOLE SODIUM 40 MG: 40 TABLET, DELAYED RELEASE ORAL at 17:18

## 2022-09-12 RX ADMIN — ACETAMINOPHEN 650 MG: 325 TABLET ORAL at 21:02

## 2022-09-12 RX ADMIN — CEPHALEXIN 500 MG: 250 CAPSULE ORAL at 12:29

## 2022-09-12 RX ADMIN — ASPIRIN 81 MG: 81 TABLET, COATED ORAL at 10:05

## 2022-09-12 RX ADMIN — BETHANECHOL CHLORIDE 10 MG: 10 TABLET ORAL at 21:04

## 2022-09-12 RX ADMIN — PANTOPRAZOLE SODIUM 40 MG: 40 TABLET, DELAYED RELEASE ORAL at 06:20

## 2022-09-12 RX ADMIN — BUPROPION HYDROCHLORIDE 150 MG: 150 TABLET, EXTENDED RELEASE ORAL at 10:05

## 2022-09-12 RX ADMIN — ACETAMINOPHEN 650 MG: 325 TABLET ORAL at 17:14

## 2022-09-12 RX ADMIN — TIZANIDINE 4 MG: 4 TABLET ORAL at 21:04

## 2022-09-12 RX ADMIN — BETHANECHOL CHLORIDE 10 MG: 10 TABLET ORAL at 14:19

## 2022-09-12 RX ADMIN — Medication 1 CAPSULE: at 17:17

## 2022-09-12 RX ADMIN — BUSPIRONE HYDROCHLORIDE 10 MG: 5 TABLET ORAL at 21:04

## 2022-09-12 RX ADMIN — CEPHALEXIN 500 MG: 250 CAPSULE ORAL at 23:36

## 2022-09-12 RX ADMIN — Medication 10 ML: at 10:04

## 2022-09-12 RX ADMIN — FUROSEMIDE 20 MG: 20 TABLET ORAL at 10:05

## 2022-09-12 RX ADMIN — BETHANECHOL CHLORIDE 10 MG: 10 TABLET ORAL at 10:05

## 2022-09-12 RX ADMIN — VILAZODONE HYDROCHLORIDE 20 MG: 20 TABLET ORAL at 10:17

## 2022-09-12 RX ADMIN — CEPHALEXIN 500 MG: 250 CAPSULE ORAL at 00:06

## 2022-09-12 RX ADMIN — GABAPENTIN 100 MG: 100 CAPSULE ORAL at 21:02

## 2022-09-12 RX ADMIN — METHYLPREDNISOLONE 24 MG: 4 TABLET ORAL at 10:17

## 2022-09-12 RX ADMIN — TAMSULOSIN HYDROCHLORIDE 0.4 MG: 0.4 CAPSULE ORAL at 21:04

## 2022-09-12 RX ADMIN — Medication 10 ML: at 23:37

## 2022-09-12 RX ADMIN — GABAPENTIN 100 MG: 100 CAPSULE ORAL at 10:05

## 2022-09-12 RX ADMIN — Medication 1 CAPSULE: at 10:23

## 2022-09-12 RX ADMIN — CEPHALEXIN 500 MG: 250 CAPSULE ORAL at 04:29

## 2022-09-12 RX ADMIN — BUSPIRONE HYDROCHLORIDE 10 MG: 5 TABLET ORAL at 10:04

## 2022-09-12 RX ADMIN — ACETAMINOPHEN 650 MG: 325 TABLET ORAL at 10:16

## 2022-09-12 ASSESSMENT — PAIN DESCRIPTION - DESCRIPTORS: DESCRIPTORS: DULL

## 2022-09-12 ASSESSMENT — PAIN SCALES - GENERAL
PAINLEVEL_OUTOF10: 0
PAINLEVEL_OUTOF10: 5
PAINLEVEL_OUTOF10: 3
PAINLEVEL_OUTOF10: 3

## 2022-09-12 ASSESSMENT — ENCOUNTER SYMPTOMS
WHEEZING: 0
CONSTIPATION: 0
BACK PAIN: 0
ABDOMINAL PAIN: 0
SORE THROAT: 0
SHORTNESS OF BREATH: 0
EYE REDNESS: 0
SINUS PAIN: 0
SINUS PRESSURE: 0
EYE DISCHARGE: 0
NAUSEA: 0
COUGH: 0
DIARRHEA: 0
RHINORRHEA: 0

## 2022-09-12 NOTE — PROGRESS NOTES
[facility] [address]   Phone: [phone]    Physical Therapy    [] Initial Evaluation            [x] Daily Treatment Note         [] Discharge Summary      Patient: Phoenix Becker   : 1940   MRN: [id number]   Date of Service:  2022  Admitting Diagnosis: Cellulitis  Current Admission Summary: per MD: Phoenix Becker is a 78 y.o. female who presents to the emergency department the chief complaint of worsening bilateral leg swelling and pain. In May she had a left hip fracture and surgery. States over the past 3 weeks she has had some swelling in her legs have gotten worse over the past 10 days with some redness in her bilateral lower extremities that began 3 days ago. She is not on any diuretics or antibiotics. She denies chest pain, shortness of breath, history of heart failure, nausea, vomiting, fevers, history of chronic kidney disease, diabetes or any skin infections or MRSA in the past.  She states moving her legs makes the pain worse. Denies abdominal pain, urinary bowel symptoms or any other symptoms. Past Medical History:  has a past medical history of AR (allergic rhinitis), Arthritis of knee, Depression, Erosive gastritis, GERD (gastroesophageal reflux disease), Hyperlipidemia, Internal hemorrhoids, Overweight(278.02), and Viral meningitis. Past Surgical History:  has a past surgical history that includes Cholecystectomy, laparoscopic (); Total abdominal hysterectomy w/ bilateral salpingoophorectomy (); Colonoscopy (); Colonoscopy (12/3/13); Upper gastrointestinal endoscopy (12/3/13); Breast biopsy; Hysterectomy; and hip surgery (Left, 2022). Discharge Recommendations: Phoenix Becker scored a 7/24 on the AM-PAC short mobility form. Current research shows that an AM-PAC score of 17 or less is typically not associated with a discharge to the patient's home setting.  Based on the patient's AM-PAC score and their current functional mobility deficits, it is recommended that the patient have 3-5 sessions per week of Physical Therapy at d/c to increase the patient's independence. Please see assessment section for further patient specific details. If pt declines the above recommendation, home health is next recommendation. If patient discharges prior to next session this note will serve as a discharge summary. Please see below for the latest assessment towards goals. DME Required For Discharge: DME to be determined at next level of care  Precautions/Restrictions: high fall risk  Weight Bearing Restrictions: no restrictions    Required Braces/Orthotics: no braces required  Positional Restrictions:no positional restrictions    Pre-Admission Information   Lives With: spouse    Type of Home: house  Home Layout: two level, laundry in basement  Home Access:  3 step to enter without rails   Bathroom Layout: . Comment: pt has been using bathroom on 1st floor since may and taking spongebaths  Bathroom Equipment: grab bars around toilet  Toilet Height: elevated height  Home Equipment: rollator - 4 wheeled walker  Transfer Assistance: modified independent with use of 4WRW  Ambulation Assistance:modified independent with use of 4WRW  ADL Assistance: requires assistance with bathing, requires assistance with dressing, . Comment: needs assitance for LEs(ex. Tying shoes)  IADL Assistance: requires assistance with meal prep, requires assistance with laundry, requires assistance with cleaning, pt helps with some meal prep and cleaning but  does most  Active :        [] Yes  [x] No  Hand Dominance: [] Left  [x] Right  Current Employment: . Comment: not asked  Hobbies: reading biographies  Recent Falls: 1 in may resulting in hip fx         Subjective  General: Supine in bed upon arrival with alarm on. Agreeable to therapy however noted to be tearful throughout session and reporting pain in L knee.  Required significantly increased time throughout session to perform tasks due to L knee pain. Spouse arriving during portion of session and then leaving. Pain: 10/10. Location: L knee  Pain Interventions: pain medication in place prior to arrival, RN notified, and repositioned        Functional Mobility  Bed Mobility  Supine to Sit: .  Comment Max Ax2  Scooting: . Comment Mod>>>Min A  Comments: HOB raised, required increased time to perform   Transfers  Sit to stand transfer: . Comment Max Ax2  Stand to sit transfer: . Comment Max Ax2  Stand step transfer: . Comment Max Ax2  Comments: Max VC for hand placement, walker management, and sequencing (especially during stand step transfer); required SIGNIFICANTLY increased time to perform transfers. Ambulation  Ambulation not tested on this date. Distance: n/a  Gait Mechanics: n/a  Comments:    Stair Mobility  Stair mobility not completed on this date. Comments:  Wheelchair Mobility:  No w/c mobility completed on this date. Comments:  Balance  Static Sitting Balance: fair (+): maintains balance at SBA/supervision without use of UE support  Dynamic Sitting Balance: fair: maintains balance at CGA without use of UE support  Static Standing Balance: poor (-): requires max (A) to maintain balance  Dynamic Standing Balance: poor (-): requires max (A) to maintain balance  Comments: Pt with increasing posterior lean during stand step transfer EOB>recliner requiring max A to maintain balance. Pt sat EOB ~12-15 minutes for ADL tasks with CGA>>>SBA for balance (see OT note for details). Other Therapeutic Interventions  X15 LAQ and partial heel slides to BLE while seated EOB to allow for ranging of L knee prior to further mobility. Second Session  PT/OT returning to room to assist pt back to bed for brief change. Pt continuing to report 8/10 pain in L knee (RN aware). Lateral scooting performed in recliner with Max Ax2 and squat pivot recliner>EOB with Max Ax2. Sit>supine Max Ax2. Rolling L with Mod Ax1 and rolling R with Max Ax1 during dependent pericare/brief change. Dx2 boost up towards [facility] at end of session. Pt left supine in bed with all needs in reach, alarm engaged, RN aware. Functional Outcomes  AM-PAC Inpatient Mobility Raw Score : 7              Cognition  WFL  Orientation:    alert and oriented x 4  Command Following:   [facility]    Education  Barriers To Learning: emotional  Patient Education: patient educated on goals, PT role and benefits, plan of care, discharge recommendations  Learning Assessment:  patient verbalizes and demonstrates understanding    Assessment  Activity Tolerance: limited by pain  Impairments Requiring Therapeutic Intervention: decreased functional mobility, decreased ADL status, decreased ROM, decreased strength, decreased balance  Prognosis: good  Clinical Assessment: Pt with decreased mobility this date, requiring increased time to perform tasks and max Ax2 for all mobility this session. Pt continues to be significantly limited by pain. Pt would benefit from continued skilled PT services to address deficits and facilitate return to [facility]. Safety Interventions: patient left in chair, chair alarm in place, call light within reach, gait belt, patient at risk for falls, and nurse notified    Plan  Frequency: 3-5 x/per week  Current Treatment Recommendations: strengthening, ROM, balance training, functional mobility training, manual lymphatic drainage, patient/caregiver education, ADL/self-care training, and home exercise program    Goals  Patient Goals: to go home   Short Term Goals:  Time Frame: upon d/c  Patient will complete bed mobility at modified independent   Patient will complete transfers at contact guard assistance   Patient will ambulate 10 ft with use of LRAD at moderate assistance  No goals met this treatment.       Therapy Session Time      Individual Group Co-treatment   Time In     7762   Time Out     1123   Minutes     41     Timed Code Treatment Minutes:   41  Total Treatment Minutes:  39     Second Session Therapy Time:   Individual Concurrent Group Co-treatment   Time In       1125   Time Out       1408   Minutes       35     Timed Code Treatment Minutes:  65+21    Total Treatment Minutes:  76    Electronically Signed By: Robin Hickman PT, DPT [id number]

## 2022-09-12 NOTE — PROGRESS NOTES
Urology Progress Note  [facility]    Provider: CIERRA Luna CNP  Patient ID:  Admission Date: 2022 Name: Foye Prader Date: 2022 MRN: [id number]   Patient Location: [id number] : 1940  Attending: Efra Vasquez MD Date of Service: 2022  PCP: Irene James MD     Diagnoses:  1. Peripheral edema    2. Redness and swelling of lower leg    3. Chronic urinary retention with bilateral hydronephrosis. 4.  Chronic diastolic heart failure     Assessment/Plan:  Intermittent straight catheterize for bladder volume >450mL  -Voiding spontaneously with low pvr at this time  Continue to manage constipation issues  Renal function is preserved  Flomax  Bethanechol  OOB as tolerated  Urology will follow peripherally    The patient had a chance to ask questions which were answered. she understands the above plan. Subjective:   Vi Rushing is a 78 y.o. female. She was seen and examined this morning. Today we discussed continued medications and need rehabilitation     Objective:   Vitals:  Vitals:    22 0845   BP: 127/65   Pulse: 75   Resp:    Temp: 97 °F (36.1 °C)   SpO2: 95%       Intake/Output Summary (Last 24 hours) at 2022 0955  Last data filed at 2022 0609  Gross per 24 hour   Intake 1200 ml   Output 2700 ml   Net -1500 ml     Physical Exam:  Gen: Alert and oriented x3, no acute distress  CV: Regular rate   Resp: unlabored respirations  Abd: Soft, non-distended, non-tender, no masses  Ext: no peripheral edema noted, moves upper and lower extremities spontaneously  Skin: warmand well perfused, no rashes noted on the face, or arms.      Labs:  Lab Results   Component Value Date    WBC 4.7 2022    HGB 8.9 (L) 2022    HCT 27.0 (L) 2022    MCV 88.0 2022     2022     Lab Results   Component Value Date    CREATININE 0.7 2022    BUN 22 (H) 2022     (L) 2022    K 3.5 2022    CL 99 09/11/2022    CO2 28 09/11/2022       Yuly Douglas, APRN - CNP   9/12/2022

## 2022-09-12 NOTE — PROGRESS NOTES
MD Heather Wynn MD Gregory Nip, MD               Office: [phone]                      Fax: [phone] [facility]                   NEPHROLOGY INPATIENT PROGRESS NOTE:     PATIENT NAME: Vi Rushing  : 1940  MRN: [id number]      IMPRESSION:       Admitted for:  Cellulitis [L03.90]  Peripheral edema [R60.9]  Redness and swelling of lower leg [M79.89, R23.8]  NSTEMI (non-ST elevated myocardial infarction) ([facility] [address] [I21.4]  S/P LHC 22   Normal coronaries  IV dys ~ 40 cc - monitor renal fx   LVEDP ~ 14 (high normal)       Hyponatremia :   : uncontrolled acute on Chronic, Moderate-mild range, no symptomatic, mild hyper-volemic:   - No Reported Severe symptoms: seizures, obtundation, coma, and respiratory arrest.   - no need for Hypertonic saline    - Risk factors for hyponatremia:   : hypokalemia   : HILTON (no h/o CKD) - pre-renal BL SCR ~0.6-0.7 --> peaked at 1.2 on 22 during admission   : Chronic diastolic HF -   - last ECHO -- Grade I diastolic dysfunction with normal LV filling pressures.  : weight ~ 160-162 lbs  :Viibryd    - Patient is at low risk of developing Osmotic Demyelination Syndrome.    - Call us urgently if any/worsening neurological findings.        Work up: reviewed    - Serum Osm , UOsm , Zheng , Urine K, Uric acid,   - renal function - HILTON - as above   - other lytes w/ hypokalemia - mild   - BP not very low  , unlikely AI         RECOMMENDATIONS:   - Monitor Serum Na ,at goal  - Goal Serum Na mid-high 130s, by next /24 hours     - low dose Lasix 20 mg QD- continued   - K - needs repletion- continue same on dc    (Potassium is osmotically active as sodium, so giving K+ can raise the S [Na+] and osmolality in hyponatremic patients)     - minimize SSRI - on Viibryd  - avoid NSIDs PO, ok for a local cream  - ongoing control of cellulitis, pain - to decrease non-osmotic ADH release     - Minimize use any hypotonic/isotonic fluids such as D5W, NS, LR with other medications/drips ,   - Concentrate continuous drip fluids as much as possible,   - Avoid IVF , unless needed for resuscitation/hypovolemia w/ hypotension+tachycardia,    - Strict I/O with daily weights. --- Call us urgently if any/worsening neurological findings. Other major problems: Management per primary and other consulting teams. //   Bilateral leg cellulitis   Obesity       Hospital Problems             Last Modified POA    * (Principal) Cellulitis 9/2/2022 Yes    Hyperlipidemia 9/10/2022 Yes    Class 1 obesity due to excess calories with body mass index (BMI) of 30.0 to 30.9 in adult 9/6/2022 Yes    History of depression 9/6/2022 Yes    Bilateral lower leg cellulitis 9/6/2022 Yes    Peripheral edema 9/6/2022 Yes    Redness and swelling of lower leg 9/6/2022 Yes    Weight loss counseling, encounter for 9/7/2022 Yes    NSTEMI (non-ST elevated myocardial infarction) ([facility] [address] 9/9/2022 Yes    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 Please refer to the orders. High Complexity. Multiple complex problems. Discussed with patient and treatment team- Bethany Baires - SAUD today  Time spent > 30 (~35) minutes. Thank you for allowing me to participate in this patient's care. Please do not hesitate to contact me anytime. We will follow along with you.        Susie Gallardo MD,  [facility] [id number] [place] [address] [place]: [phone] or [employer]  Fax: [phone]        ======================================================================================= =======================================================================================  SUBJECTIVE remains with complaints of tiredness  Leg edema  better  Resting in room    Past medical, Surgical, Social, Family medical history reviewed by me. MEDICATIONS: reviewed by me. Medications Prior to Admission:  No current facility-administered medications on file prior to encounter. Current Outpatient Medications on File Prior to Encounter   Medication Sig Dispense Refill    atorvastatin (LIPITOR) 10 MG tablet TAKE 1 TABLET BY MOUTH EVERY OTHER DAY **CHANGE IN DOSE** 45 tablet 3    buPROPion (WELLBUTRIN XL) 150 MG extended release tablet TAKE 1 TABLET (150 MG TOTAL) BY MOUTH DAILY. INDICATIONS  DEPRESSION      vilazodone HCl (VIIBRYD) 10 MG TABS Take 20 mg by mouth daily       B Complex-C (VITAMIN B + C COMPLEX PO) Take by mouth      Lift Chair MISC by Does not apply route 1 each 0    estradiol (ESTRACE) 0.1 MG/GM vaginal cream Place 2 g vaginally Twice a Week       aspirin 81 MG EC tablet Take 81 mg by mouth daily. VITAMIN D PO Take  by mouth.            Current Facility-Administered Medications:     methylPREDNISolone (MEDROL DOSEPACK) tablet 24 mg, 24 mg, Oral, Once, CIERRA Barker - CNP    [START ON 9/13/2022] methylPREDNISolone (MEDROL DOSEPACK) tablet 4 mg, 4 mg, Oral, QAM AC, Pauline Mckeon APRN - CNP    [START ON 9/13/2022] methylPREDNISolone (MEDROL DOSEPACK) tablet 4 mg, 4 mg, Oral, Lunch, Pauline Mckeon APRN - CNP    [START ON 9/13/2022] methylPREDNISolone (MEDROL DOSEPACK) tablet 4 mg, 4 mg, Oral, Dinner, Pauline Mckeon APRN - CNP    [START ON 9/13/2022] methylPREDNISolone (MEDROL DOSEPACK) tablet 8 mg, 8 mg, Oral, Nightly, CIERRA Barker - CNP    [START ON 9/14/2022] methylPREDNISolone (MEDROL DOSEPACK) tablet 4 mg, 4 mg, Oral, Nightly, Steph Zarate, APRN - CNP    pantoprazole (PROTONIX) tablet 40 mg, 40 mg, Oral, BID AC, Pauline Mckeon, APRN - CNP tamsulosin (FLOMAX) capsule 0.4 mg, 0.4 mg, Oral, Nightly, JESUS MANUEL eBtheaC, 0.4 mg at 09/11/22 2143    bethanechol (URECHOLINE) tablet 10 mg, 10 mg, Oral, TID, Yogi Ruiz MD, 10 mg at 09/11/22 2144    cephALEXin (KEFLEX) capsule 500 mg, 500 mg, Oral, 4 times per day, Leroy Garcia MD, 500 mg at 09/12/22 0429    furosemide (LASIX) tablet 20 mg, 20 mg, Oral, Daily, Sammi Joel MD, 20 mg at 09/11/22 1002    polyethylene glycol (GLYCOLAX) packet 17 g, 17 g, Oral, Daily, CIERRA Moreland CNP, 17 g at 09/10/22 1049    sennosides-docusate sodium (SENOKOT-S) 8.6-50 MG tablet 2 tablet, 2 tablet, Oral, BID, CIERRA Moreland CNP, 2 tablet at 09/11/22 2143    nitroGLYCERIN (NITROSTAT) SL tablet 0.4 mg, 0.4 mg, SubLINGual, Q5 Min PRN, JESUS MANUEL AmosC, 0.4 mg at 09/08/22 2152    morphine (PF) injection 2 mg, 2 mg, IntraVENous, Q4H PRN, Chapito Lind PAToniC, 2 mg at 09/09/22 1550    gabapentin (NEURONTIN) capsule 100 mg, 100 mg, Oral, TID, CIERRA Isaac - CNP, 100 mg at 09/11/22 2144    busPIRone (BUSPAR) tablet 10 mg, 10 mg, Oral, Q6H PRN, CIERRA Isaac - CNP, 10 mg at 09/11/22 2144    HYDROcodone-acetaminophen (Azael Neve) 5-325 MG per tablet 1 tablet, 1 tablet, Oral, Q6H PRN, CIERRA Isaac - CNP, 1 tablet at 09/11/22 2143    calcium carbonate (TUMS) chewable tablet 500 mg, 500 mg, Oral, TID PRN, CIERRA Isaac - CNP, 500 mg at 09/04/22 1058    diclofenac sodium (VOLTAREN) 1 % gel 4 g, 4 g, Topical, TID, Ashley Lizama MD, 4 g at 09/11/22 2155    lactobacillus (CULTURELLE) capsule 1 capsule, 1 capsule, Oral, BID WC, Ashley Lizama MD, 1 capsule at 09/11/22 1739    aspirin EC tablet 81 mg, 81 mg, Oral, Daily, Irina Bowden MD, 81 mg at 09/11/22 1002    buPROPion (WELLBUTRIN XL) extended release tablet 150 mg, 150 mg, Oral, Daily, Sven Stafford MD, 150 mg at 09/11/22 1002    vilazodone HCl (VIIBRYD) TABS 20 mg, 20 mg, Oral, Daily, Clarke Carrington MD, 20 mg at 09/11/22 1006    sodium chloride flush 0.9 % injection 5-40 mL, 5-40 mL, IntraVENous, 2 times per day, Clarke Carrington MD, 10 mL at 09/11/22 2205    sodium chloride flush 0.9 % injection 5-40 mL, 5-40 mL, IntraVENous, PRN, Sven Stafford MD    0.9 % sodium chloride infusion, , IntraVENous, PRN, Clarke Carrington MD, Last Rate: 10 mL/hr at 09/07/22 0142, New Bag at 09/07/22 0142    ondansetron (ZOFRAN-ODT) disintegrating tablet 4 mg, 4 mg, Oral, Q8H PRN **OR** ondansetron (ZOFRAN) injection 4 mg, 4 mg, IntraVENous, Q6H PRN, Sven Stafford MD, 4 mg at 09/03/22 0522    acetaminophen (TYLENOL) tablet 650 mg, 650 mg, Oral, Q6H PRN, 650 mg at 09/09/22 1743 **OR** acetaminophen (TYLENOL) suppository 650 mg, 650 mg, Rectal, Q6H PRN, Sven Stafford MD    perflutren lipid microspheres (DEFINITY) injection 1.65 mg, 1.5 mL, IntraVENous, ONCE PRN, Clarke Carrington MD        REVIEW OF SYSTEMS:  As mentioned in chief complaint and HPI , Subjective       =======================================================================================     PHYSICAL EXAM:  Recent vital signs and recent I/Os reviewed by me.      Wt Readings from Last 3 Encounters:   09/12/22 170 lb 14.4 oz (77.5 kg)   09/02/22 181 lb 12.8 oz (82.5 kg)   08/16/22 150 lb (68 kg)     BP Readings from Last 3 Encounters:   09/12/22 127/65   09/02/22 (!) 140/70   07/29/22 134/60     Patient Vitals for the past 24 hrs:   BP Temp Temp src Pulse Resp SpO2 Weight   09/12/22 0845 127/65 97 °F (36.1 °C) Temporal 75 -- 95 % --   09/12/22 0415 127/79 -- -- 87 -- 95 % 170 lb 14.4 oz (77.5 kg)   09/12/22 0000 (!) 121/90 -- -- 87 16 94 % --   09/11/22 2130 (!) 150/71 98.4 °F (36.9 °C) -- 82 16 95 % --   09/11/22 1730 (!) 162/68 -- -- 81 18 96 % --   09/11/22 1300 131/64 97.4 °F (36.3 °C) Oral 88 18 95 % --   09/11/22 1000 (!) 121/58 -- -- 85 18 95 % --         Intake/Output Summary (Last 24 hours) at 9/12/2022 5469  Last data filed at 9/12/2022 0803  Gross per 24 hour   Intake 1440 ml   Output 2700 ml   Net -1260 ml           Physical Exam  Vitals reviewed. Constitutional:       General: She is not in acute distress. Appearance: Normal appearance. HENT:      Head: Normocephalic and atraumatic. Right Ear: External ear normal.      Left Ear: External ear normal.      Nose: Nose normal.      Mouth/Throat:      Mouth: Mucous membranes are moist. Mucous membranes are not dry. Eyes:      General: No scleral icterus. Conjunctiva/sclera: Conjunctivae normal.   Neck:      Vascular: No JVD. Cardiovascular:      Rate and Rhythm: Normal rate and regular rhythm. Heart sounds: S1 normal and S2 normal.   Pulmonary:      Effort: Pulmonary effort is normal. No respiratory distress. Breath sounds: Rhonchi present. Abdominal:      General: Bowel sounds are normal. There is no distension. Musculoskeletal:         General: Swelling present. No deformity. Cervical back: Normal range of motion and neck supple. Skin:     General: Skin is dry. Coloration: Skin is not jaundiced. Findings: Erythema present. Neurological:      Mental Status: She is alert and oriented to person, place, and time. Mental status is at baseline. Psychiatric:         Mood and Affect: Mood normal.         Behavior: Behavior normal.          =======================================================================================     DATA:  Diagnostic tests reviewed by me for today's visit:   (AS NEEDED FOR MY EVALUATION AND MANAGEMENT).        Recent Labs     09/10/22  0427 09/11/22  0540   WBC 5.6 4.7   HCT 28.1* 27.0*    159       Iron Saturation:  No components found for: PERCENTFE  FERRITIN:    Lab Results   Component Value Date/Time    FERRITIN 670.1 09/08/2022 04:40 AM     IRON:    Lab Results   Component Value Date/Time    IRON 15 09/08/2022 04:40 AM     TIBC:    Lab Results   Component Value Date/Time    TIBC 159 09/08/2022 04:40 AM       Recent Labs     09/10/22  0427 09/11/22  0540   * 135*   K 3.7 3.5   CL 95* 99   CO2 28 28   BUN 27* 22*   CREATININE 0.8 0.7       Recent Labs     09/10/22  0427 09/11/22  0540   CALCIUM 8.8 8.7   PHOS 2.8 3.0       No results for input(s): PH, PCO2, PO2 in the last 72 hours.     Invalid input(s): Sharin Meth    ABG:  No results found for: PH, PCO2, PO2, HCO3, BE, THGB, TCO2, O2SAT  VBG:  No results found for: PHVEN, AYF0UTH, BEVEN, Y3GQWFPY    LDH:  No results found for: LDH  Uric Acid:    Lab Results   Component Value Date/Time    LABURIC 7.3 09/08/2022 10:02 AM       PT/INR:    Lab Results   Component Value Date/Time    PROTIME 12.2 05/16/2022 09:06 AM    INR 1.08 05/16/2022 09:06 AM     Warfarin PT/INR:  No components found for: PTPATWAR, PTINRWAR  PTT:    Lab Results   Component Value Date/Time    APTT 30.0 09/09/2022 05:51 AM   [APTT}  Last 3 Troponin:    Lab Results   Component Value Date/Time    TROPONINI 0.08 09/10/2022 04:27 AM    TROPONINI 0.39 09/09/2022 04:03 AM    TROPONINI 0.01 09/08/2022 10:09 PM       U/A:    Lab Results   Component Value Date/Time    NITRITE neg 08/04/2021 02:33 PM    COLORU Yellow 09/09/2022 04:15 PM    PROTEINU 100 09/09/2022 04:15 PM    PHUR 5.5 09/09/2022 04:15 PM    WBCUA 6 09/09/2022 04:15 PM    RBCUA 24 09/09/2022 04:15 PM    BACTERIA None Seen 09/09/2022 04:15 PM    CLARITYU CLOUDY 09/09/2022 04:15 PM    SPECGRAV >=1.030 09/09/2022 04:15 PM    LEUKOCYTESUR TRACE 09/09/2022 04:15 PM    UROBILINOGEN 0.2 09/09/2022 04:15 PM    BILIRUBINUR Negative 09/09/2022 04:15 PM    BILIRUBINUR neg 08/04/2021 02:33 PM    BILIRUBINUR NEGATIVE 06/01/2012 06:20 AM    BLOODU LARGE 09/09/2022 04:15 PM    GLUCOSEU Negative 09/09/2022 04:15 PM    GLUCOSEU NEGATIVE 06/01/2012 06:20 AM     Microalbumen/Creatinine ratio:  No components found for: RUCREAT  24 Hour Urine for Protein:  No components found for: RAWUPRO, UHRS3, [id number], UTV3  24 Hour Urine for Creatinine Clearance:  No components found for: CREAT4, UHRS10, UTV10  Urine Toxicology:  No components found for: Nancy Blue, IBENZO, ICOCAINE, IMARTHC, IOPIATES, IPHENCYC    HgBA1c:    Lab Results   Component Value Date/Time    LABA1C 4.9 10/06/2021 08:16 AM     RPR:  No results found for: RPR  HIV:  No results found for: HIV  ROMEO:  No results found for: ANATITER, ROMEO  RF:  No results found for: RF  DSDNA:  No components found for: DNA  AMYLASE:  No results found for: AMYLASE  LIPASE:    Lab Results   Component Value Date/Time    LIPASE 21.0 10/22/2019 03:08 PM     Fibrinogen Level:  No components found for: FIB       BELOW MENTIONED RADIOLOGY STUDY RESULTS BY ME (AS NEEDED FOR MY EVALUATION AND MANAGEMENT). Echo Complete    Result Date: 9/3/2022  Transthoracic Echocardiography Report (TTE)  Demographics   Patient Name       [facility]   Date of Study      09/03/2022        Gender              Female   Patient Number     [id number]        Date of Birth       1940   Visit Number       [id number]         Age                 78 year(s)   Accession Number   [id number]        Room Number         46   Corporate ID       [id number]          Sonographer         Kyleigh Ramirez, RVT   Ordering Physician Mamie Lantigua MD Interpreting        Paulo Canales MD,                                       Physician           Gina Parikh  Procedure Type of Study   TTE procedure:ECHOCARDIOGRAM COMPLETE 2D W DOPPLER W COLOR. Procedure Date Date: 09/03/2022 Start: 07:24 AM Study Location: Portable Technical Quality: Adequate visualization Indications:Dyspnea/SOB. Patient Status: Routine Height: 61 inches Weight: 181 pounds BSA: 1.81 m2 BMI: 34.2 kg/m2 BP: 133/60 mmHg  Conclusions   Summary  Mild septal left ventricular hypertrophy. Normal left ventricle size and  systolic function with an estimated ejection fraction of 55-60%.  No regional  wall motion abnormalities are seen. Grade I diastolic dysfunction with normal LV filling pressures. Left sided pleural effusion noted. Signature   ------------------------------------------------------------------  Electronically signed by Radha Sinclair MD, [facility] [place], [address]  (Interpreting physician) on 09/03/2022 at 12:49 PM  ------------------------------------------------------------------   Findings   Left Ventricle  Mild septal left ventricular hypertrophy. Normal left ventricle size and  systolic function with an estimated ejection fraction of 55-60%. No regional  wall motion abnormalities are seen. Grade I diastolic dysfunction with normal LV filling pressures. Avg. E/e'=  13.5. Mitral Valve  The mitral valve is thickened with adequate excursion. Trivial mitral  regurgitation. No evidence of mitral stenosis. Left Atrium  The left atrium is normal in size. Aortic Valve  The aortic valve is sclerotic with adequate excursion. Trivial aortic  insufficiency. No evidence of aortic stenosis. Aorta  The aortic root is normal in size. Right Ventricle  The right ventricle is normal in size and function. TAPSE= 2.66 cm. Tricuspid Valve  The tricuspid valve is normal in structure and function. Trivial tricuspid  regurgitation. No evidence of tricuspid stenosis. Right Atrium  The right atrial size is normal.   Pulmonic Valve  The pulmonic valve is not well visualized. Trivial pulmonic insufficiency. No evidence of pulmonic stenosis. Pericardial Effusion  No pericardial effusion noted. Pleural Effusion  Left sided pleural effusion noted. Miscellaneous  The inferior vena cava appears normal in size with normal respiratory  variation.   M-Mode/2D Measurements (cm)   LV Diastolic Dimension: 4.7 cm LV Systolic Dimension: 4.58 cm  LV Septum Diastolic: 2.26 cm  LV PW Diastolic: 1.26 cm       AO Root Dimension: 3 cm                                 LA Dimension: 4 cm                                 LA Area: 17.7 cm2  LVOT: 2 cm                     LA volume/Index: 46.1 ml /25 ml/m2  Doppler Measurements   AV Peak Velocity: 144 cm/s    MV Peak E-Wave: 90.2 cm/s  AV Peak Gradient: 8.29 mmHg   MV Peak A-Wave: 121 cm/s  AV Mean Gradient: 5 mmHg      MV E/A Ratio: 0.75  LVOT Peak Velocity: 99.8 cm/s  AV Area (Continuity):2.4 cm2   E' Septal Velocity: 5.44 cm/s  E' Lateral Velocity: 8.7 cm/s  PV Peak Velocity: 85.3 cm/s  PV Peak Gradient: 2.91 mmHg   Aortic Valve   Peak Velocity: 144 cm/s    Mean Velocity: 103 cm/s  Peak Gradient: 8.29 mmHg   Mean Gradient: 5 mmHg  Area (continuity): 2.4 cm2  AV VTI: 32.8 cm  Aorta   Aortic Root: 3 cm  Ascending Aorta: 2.9 cm  LVOT Diameter: 2 cm      XR CHEST (2 VW)    Result Date: 9/6/2022  EXAMINATION: TWO XRAY VIEWS OF THE CHEST 9/6/2022 1:18 pm COMPARISON: 07/27/2021 HISTORY: ORDERING SYSTEM PROVIDED HISTORY: fever TECHNOLOGIST PROVIDED HISTORY: Reason for exam:->fever Reason for Exam: fever FINDINGS: . The cardiac size is mildly enlarged, however stable. No acute infiltrates or pleural effusions are seen. Pulmonary vascularity appears normal. There is mild ectasia of the thoracic aorta. Stable mild compression T8 vertebral body. .No acute bony abnormalities.  The hilar structures are normal.     No acute cardiopulmonary disease     VL Extremity Venous Bilateral    Result Date: 9/2/2022  Vascular Lower Extremities DVT Study Procedure -- PRELIMINARY SONOGRAPHER REPORT --   Demographics   Patient Name       [facility]   Date of Study      09/02/2022         Gender              Female   Patient Number     [id number]         Date of Birth       1940   Visit Number       [id number]          Age                 78 year(s)   Accession Number   [id number]         Room Number            Corporate ID       [id number]           Sonographer         Abe HYATTT, RDMS, AB,                                                            OB/GYN mammograms for women 40 years and older. Summary   Mild septal left ventricular hypertrophy. Normal left ventricle size and   systolic function with an estimated ejection fraction of 55-60%. No regional   wall motion abnormalities are seen. Grade I diastolic dysfunction with normal LV filling pressures. Left sided pleural effusion noted.       Signature      ------------------------------------------------------------------   Electronically signed by Olga Felty MD, [facility] [place], [address]   (Interpreting physician) on 09/03/2022 at 12:49 PM   ------------------------------------------------------------------

## 2022-09-12 NOTE — PROGRESS NOTES
PRN  0.9 % sodium chloride infusion, PRN  ondansetron (ZOFRAN-ODT) disintegrating tablet 4 mg, Q8H PRN   Or  ondansetron (ZOFRAN) injection 4 mg, Q6H PRN  acetaminophen (TYLENOL) tablet 650 mg, Q6H PRN   Or  acetaminophen (TYLENOL) suppository 650 mg, Q6H PRN  perflutren lipid microspheres (DEFINITY) injection 1.65 mg, ONCE PRN      Objective:  /79   Pulse 87   Temp 98.4 °F (36.9 °C)   Resp 16   Ht 5' 1\" (1.549 m)   Wt 170 lb 14.4 oz (77.5 kg)   SpO2 95%   BMI 32.29 kg/m²     Intake/Output Summary (Last 24 hours) at 9/12/2022 0803  Last data filed at 9/12/2022 0428  Gross per 24 hour   Intake 1440 ml   Output 2200 ml   Net -760 ml      Wt Readings from Last 3 Encounters:   09/12/22 170 lb 14.4 oz (77.5 kg)   09/02/22 181 lb 12.8 oz (82.5 kg)   08/16/22 150 lb (68 kg)     General:  Awake, alert, oriented in NAD  Skin:  Warm and dry. No unusual bruising or rash, redness in legs resolved  Neck:  Supple. No JVD appreciated  Chest:  Normal effort. Clear to auscultation, no wheezes/rhonchi/rales  Cardiovascular:  RRR, normal S1/S2, no murmur/gallop/rub  Abdomen:  Soft, nontender, +bowel sounds  Extremities:  No edema  Neurological: No focal deficits  Psychological: Normal mood and affect        Labs and Tests:  CBC:   Recent Labs     09/10/22  0427 09/11/22  0540   WBC 5.6 4.7   HGB 9.1* 8.9*    159     BMP:    Recent Labs     09/10/22  0427 09/11/22  0540   * 135*   K 3.7 3.5   CL 95* 99   CO2 28 28   BUN 27* 22*   CREATININE 0.8 0.7   GLUCOSE 106* 108*     Hepatic:   No results for input(s): AST, ALT, ALB, BILITOT, ALKPHOS in the last 72 hours. Venous Doppler 9/2/2022:  Right   Limited study due to pt's severe swelling and pain. No evidence of deep vein or superficial vein thrombosis involving the right   lower extremity. Right inguinal lymph node measuring 1.3 x 0.6 x 1.0 cm. Calf veins were poorly visualized on the right.    Left   Limited study due to pt's severe swelling and pain.   No evidence of deep vein or superficial vein thrombosis involving the left   lower extremity. Left inguinal lymph node measuring 1.9 x 0.7 x 1.4 cm. Calf veins were poorly visualized on the left. Left medial fossa: Cystic structure measuring 2.3 x 0.7 x 2.0 cm. Echo 9/2/2022:  Summary   Mild septal left ventricular hypertrophy. Normal left ventricle size and systolic function with an estimated ejection fraction of 55-60%. No regional wall motion abnormalities are seen. Grade I diastolic dysfunction with normal LV filling pressures. Left sided pleural effusion noted. CXR 9/6/2022:  No acute cardiopulmonary disease    [facility] 9/9/2022:  Findings  Artery Findings/Result   LM Normal   LAD Normal   Cx Normal   RI N/A   RCA Normal   LVEDP 14   LVG 55%   Intervention(s)  None  Post Cath Dx:       Normal coronaries  Normal EF  Possible myopericarditis? MRI Lumbar Spine 9/10/2022:  Multilevel lumbar spondylosis without significant canal stenosis. Shallow central/right paracentral disc protrusion at L1-2 along with   degenerative facet changes and levocurvature of the thoracolumbar spine   contributing to encroachment on the right lateral recess and moderate right   foraminal stenosis. Problem List  Principal Problem:    Cellulitis  Active Problems:    Hyperlipidemia    Class 1 obesity due to excess calories with body mass index (BMI) of 30.0 to 30.9 in adult    History of depression    Bilateral lower leg cellulitis    Peripheral edema    Redness and swelling of lower leg    Weight loss counseling, encounter for    NSTEMI (non-ST elevated myocardial infarction) ([address]    Acute idiopathic myocarditis    High fever    Current mild episode of major depressive disorder without prior episode ([address]  Resolved Problems:    * No resolved hospital problems. *       Assessment & Plan:   Chest pain / elevated troponin. Patient developed acute chest pain overnight, troponin 0.01->0.39->0. 08.  Urgent [facility] 9/9 showed normal cors and normal EF; possible myopericarditis. Still with chest discomfort but improving. Discussed with cardiology, will DC colchicine since starting steroids. Cardio to arrange for outpatient MRI of heart. Appreciate cardio recs. Cellulitis BLE. Venous doppler negative for DVT. , sed rate 70. Started on Ancef with improvement, has transitioned to keflex 500 mg q 6 hours with stop date 9/16. Continue ACE wraps and leg elevation when up. Appreciate ID recs. Lower leg edema. Echo with EF 81-14%, grade 1 diastolic dysfunction, normal filling pressures. Doubt CHF exacerbation as no pulmonary edema on CXR and BNP only 248 and comparable to prior. Likely secondary to immobility, dependent positioning of legs when up, possible venous insufficiency, chronic diastolic CHF. Improved with ACE wraps and diuresis. Hyponatremia. Sodium 137 on admission, trended down to 131 and remains stable, 135 today. Appreciate nephrology recs. Pain BLE. Patient describes intractable pain in bilateral legs. She has been receiving gabapentin with little to no improvement. Has been refusing to work with therapy and get out of bed because legs hurt. Noted to have some urinary retention. CK level 209. 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 Neurosurgery recommends MRI thoracic spine to evaluate for cord compression to explain urinary retention. Recommend consideration for medrol dose pack for radicular pain and consideration for epidural spinal injection as outpatient. Appreciate neurosurgery recs. Consult ortho for consideration left knee injection as she has considerable arthritis pain in knee as well. Anxiety / depression. Continue bupropion and vilazodone. Hypokalemia. Resolved. Secondary to diuresis, magnesium 2.20. Normocytic anemia. Hgb slowly trending down, 11.5->10.4->9.6. Denies black or tarry stools prior to admit.  Labs show iron deficiency (iron 15, saturation 9%) and received IV Venofer x 2 doses. No deficiency of vitamin B12 or folate. Occult blood negative. Continue pantoprazole daily. Urinary retention. Llamas placed for inability to void and 680 ml on bladder scan. Started on Flomax and bethanechol. Evaluated by urology, recommends intermittent straight cath regimen. Llamas has been DCd. Disposition: SNF on DC when medically stable. Diet: ADULT DIET; Regular;  No Added Salt (3-4 gm)  Code:Full Code  DVT PPX: enoxaparin      CIERRA Metcalf CNP   9/12/2022 8:03 AM

## 2022-09-12 NOTE — CONSULTS
[id number] [facility] Orthopedic Surgery  Consult Note    Patient: Noris Patel  Admit Date: 9/2/2022  Requesting Physician: Shivam Menendez MD  Room: [address] [place], [place] [address]/[facility]    Chief complaint: LEFT hip pain    HPI: Noris Patel is a 78 y.o. female who was admitted on 9/2 for peripheral edema/cellulitis. She has complaints of low back pain extending into her left leg as well as left hip pain and most severe left knee pain. Denies paresthesias. Denies new trauma. Received left knee steroid injection @ [place] 6 weeks ago with one day relief. Received visco supplement injections left knee 4 moths ago with minimal benefit. Underwent left hip danny with Dr. Bev Thompson for fracture back in May as well. Limited ambulation since that time. Describes pain in the left knee of moderate intensity and of sharp nature since at least 6 months ago which is relieved by nothing. Denies new numbness/tingling. Imaging review of the left knee via plain films demonstrated: severe end-stage bone on bone osteoarthritis. Patient uses a walker to ambulate. Medical History:  Past Medical History:   Diagnosis Date    AR (allergic rhinitis)     Arthritis of knee     Pruis    Depression     Erosive gastritis 10/28/2019    EGD October 2019, he did with PPI    GERD (gastroesophageal reflux disease)     Hyperlipidemia     Internal hemorrhoids     Overweight(278.02)     Viral meningitis 5/12     Past Surgical History:   Procedure Laterality Date    BREAST BIOPSY      CHOLECYSTECTOMY, LAPAROSCOPIC  2003    COLONOSCOPY  8/06    normal; [facility]    COLONOSCOPY  12/3/13    [facility]- polyps    HIP SURGERY Left 5/16/2022    LEFT HIP HEMIARTHROPLASTY performed by Hetal Molina MD at [address] [place] [address] ([address] [place] [address])      AKASH AND BSO (CERVIX REMOVED)  2003    UPPER GASTROINTESTINAL ENDOSCOPY  12/3/13    Sydni Shaver; antritis       Social History:    reports that she has never smoked.  She has never used smokeless tobacco.    Family History: Problem Relation Age of Onset    Breast Cancer Mother     Bipolar Disorder Brother        Medications:  ALL MEDICATIONS HAVE BEEN REVIEWED:  Scheduled:   methylPREDNISolone  24 mg Oral Once    [START ON 9/13/2022] methylPREDNISolone  4 mg Oral QAM AC    [START ON 9/13/2022] methylPREDNISolone  4 mg Oral Lunch    [START ON 9/13/2022] methylPREDNISolone  4 mg Oral Dinner    [START ON 9/13/2022] methylPREDNISolone  8 mg Oral Nightly    [START ON 9/14/2022] methylPREDNISolone  4 mg Oral Nightly    pantoprazole  40 mg Oral BID AC    acetaminophen  650 mg Oral TID    lidocaine  1 patch TransDERmal Daily    tamsulosin  0.4 mg Oral Nightly    bethanechol  10 mg Oral TID    cephALEXin  500 mg Oral 4 times per day    furosemide  20 mg Oral Daily    polyethylene glycol  17 g Oral Daily    sennosides-docusate sodium  2 tablet Oral BID    gabapentin  100 mg Oral TID    diclofenac sodium  4 g Topical TID    lactobacillus  1 capsule Oral BID WC    aspirin  81 mg Oral Daily    buPROPion  150 mg Oral Daily    vilazodone HCl  20 mg Oral Daily    sodium chloride flush  5-40 mL IntraVENous 2 times per day     Continuous:   sodium chloride 10 mL/hr at 09/07/22 0142     PRN:tiZANidine, nitroGLYCERIN, morphine, busPIRone, HYDROcodone 5 mg - acetaminophen, calcium carbonate, sodium chloride flush, sodium chloride, ondansetron **OR** ondansetron, acetaminophen **OR** acetaminophen, perflutren lipid microspheres    Allergies: No Known Allergies    Review of Systems:  Constitutional: Negative for fever, chills, fatigue. Skin:  Negative for pruritis, rash  Eyes: Negative for photophobia and visual disturbance. ENT:  Negative for rhinorrhea, epistaxis, sore throat  Respiratory:  Negative for cough and shortness of breath. Cardiovascular: Negative for chest pain. Gastrointestinal: Negative for nausea, vomiting, diarrhea. Genitourinary: Negative for dysuria and difficulty urinating.    Neurological: Negative for confusion, dysarthria, tremors, seizures. Psychiatric:  Negative for depression or anxiety  Musculoskeletal:  Positive for left knee pain    Objective:  Vitals:    09/12/22 0845   BP: 127/65   Pulse: 75   Resp:    Temp: 97 °F (36.1 °C)   SpO2: 95%      Physical Examination:  GENERAL: No apparent distress, well-nourished  SKIN:  Warm and dry  EYES: Nonicteric. ENT: Mucous membranes moist  HEAD: Normocephalic, atraumatic  RESPIRATORY: Resp easy and unlabored  CARDIOVASCULAR: Regular rate and rhythm  GI: Abdomen soft, nontender  NEURO: Awake and alert. No speech defect  PSYCHIATRIC: Appropriate affect; not agitated  MUSCULOSKELETAL:  LEFT knee  Inspection: On exam there are no ulcerations, rashes or lesions about the left knee. There is pain to palpation of the left knee diffusely. No pain with short arc ROM. No warmth or erythema. Small effusion. She can actively flex and extend the knee but only 45-5 degrees. Motor: Intact DF/PF on the left. She tolerates passive ROM of the left hip without pain. Left hip incision is healed without TTP or erythema. Sensation: Grossly intact to light touch throughout the left lower extremity in all nerve distributions. Vascular:  2+left DP pulse. Labs reviewed:  Recent Labs     09/10/22  0427 09/11/22  0540   WBC 5.6 4.7   HGB 9.1* 8.9*   HCT 28.1* 27.0*    159     Recent Labs     09/10/22  0427 09/11/22  0540   * 135*   K 3.7 3.5   CL 95* 99   CO2 28 28   BUN 27* 22*   CREATININE 0.8 0.7   GLUCOSE 106* 108*   CALCIUM 8.8 8.7   PHOS 2.8 3.0     No results for input(s): INR, PROTIME in the last 72 hours.     Lab Results   Component Value Date    COLORU Yellow 09/09/2022    CLARITYU CLOUDY (A) 09/09/2022    PHUR 5.5 09/09/2022    GLUCOSEU Negative 09/09/2022    BLOODU LARGE (A) 09/09/2022    LEUKOCYTESUR TRACE (A) 09/09/2022    NITRITE neg 08/04/2021    BILIRUBINUR Negative 09/09/2022    UROBILINOGEN 0.2 09/09/2022    RBCUA 24 (H) 09/09/2022    WBCUA 6 (H) 09/09/2022    BACTERIA None Seen 09/09/2022       Imaging:  MRI LUMBAR SPINE WO CONTRAST   Final Result   Multilevel lumbar spondylosis without significant canal stenosis. Shallow central/right paracentral disc protrusion at L1-2 along with   degenerative facet changes and levocurvature of the thoracolumbar spine   contributing to encroachment on the right lateral recess and moderate right   foraminal stenosis. XR CHEST (2 VW)   Final Result   No acute cardiopulmonary disease         VL Extremity Venous Bilateral   Final Result      MRI THORACIC SPINE WO CONTRAST    (Results Pending)       IMPRESSION:  Severe LEFT knee osteoarthritis  Lumbar radiculitis  S/p LET hip hemiarthroplasty for fracture (5/16/22)  Principal Problem:    Cellulitis  Active Problems:    Hyperlipidemia    Class 1 obesity due to excess calories with body mass index (BMI) of 30.0 to 30.9 in adult    History of depression    Bilateral lower leg cellulitis    Peripheral edema    Redness and swelling of lower leg    Weight loss counseling, encounter for    NSTEMI (non-ST elevated myocardial infarction) ([facility]    Acute idiopathic myocarditis    High fever    Current mild episode of major depressive disorder without prior episode ([facility]  Resolved Problems:    * No resolved hospital problems. *      RECOMMENDATIONS:  She tried visco supplement injection (3 shot series) 4 months ago and corticosteroid injection 6 weeks ago with Dr. Raegan Vasquez @ [facility]. Cannot repeat visco supplement prior to q6 months or steroid injection prior to q 3 months. Regardless, she only got 1 day of relief with there steroid injection. She tells me she is not interested in TKA.   No other interventions available right now other than pain control.  - WBAT  - Pain control; added scheduled tylenol; lidocaine patch and tizanidine prn.  - DVT prophylaxis: per primary team  - PT/OT  - Agree with steroids per primary team  - Dispo: per primary team;  Can follow-up with either  Faye Aguirre or Dr. Raegan Vasquez as outpatient    Patient denies tobacco use.     Saqib Aiken, CIERRA - CNP  9/12/2022  9:41 AM

## 2022-09-12 NOTE — PROGRESS NOTES
[facility]   Phone: [phone]    Occupational Therapy    [] Initial Evaluation            [x] Daily Treatment Note         [] Discharge Summary      Patient: Marybeth Cardenas   : 1940   MRN: [id number]   Date of Service:  2022    Admitting Diagnosis:  Cellulitis  Current Admission Summary:   78 y.o. female who presents to the emergency department the chief complaint of worsening bilateral leg swelling and pain. In May she had a left hip fracture and surgery. States over the past 3 weeks she has had some swelling in her legs have gotten worse over the past 10 days with some redness in her bilateral lower extremities that began 3 days ago. She is not on any diuretics or antibiotics. She denies chest pain, shortness of breath, history of heart failure, nausea, vomiting, fevers, history of chronic kidney disease, diabetes or any skin infections or MRSA in the past.  She states moving her legs makes the pain worse. Denies abdominal pain, urinary bowel symptoms or any other symptoms. Past Medical History:  has a past medical history of AR (allergic rhinitis), Arthritis of knee, Depression, Erosive gastritis, GERD (gastroesophageal reflux disease), Hyperlipidemia, Internal hemorrhoids, Overweight(278.02), and Viral meningitis. Past Surgical History:  has a past surgical history that includes Cholecystectomy, laparoscopic (); Total abdominal hysterectomy w/ bilateral salpingoophorectomy (); Colonoscopy (); Colonoscopy (12/3/13); Upper gastrointestinal endoscopy (12/3/13); Breast biopsy; Hysterectomy; and hip surgery (Left, 2022). Discharge Recommendations: Marybeth Cardenas scored a 13/24 on the AM-PAC ADL Inpatient form. Current research shows that an AM-PAC score of 17 or less is typically not associated with a discharge to the patient's home setting.  Based on the patient's AM-PAC score and their current ADL deficits, it is recommended that the patient have 3-5 sessions per week of Occupational Therapy at d/c to increase the patient's independence. Please see assessment section for further patient specific details. If patient discharges prior to next session this note will serve as a discharge summary. Please see below for the latest assessment towards goals. DME Required For Discharge: DME to be determined at next level of care    Precautions/Restrictions: medium fall risk  Weight Bearing Restrictions: no restrictions  [] Right Upper Extremity  [] Left Upper Extremity [] Right Lower Extremity  [] Left Lower Extremity     Required Braces/Orthotics: no braces required   [] Right  [] Left  Positional Restrictions:no positional restrictions    Pre-Admission Information   Lives With: spouse                  Type of Home: house  Home Layout: two level, laundry in basement  Home Access:  3 step to enter without rails   Bathroom Layout: . Comment: pt has been using bathroom on 1st floor since may and taking spongebaths  Bathroom Equipment: grab bars around toilet  Toilet Height: elevated height  Home Equipment: rollator - 4 wheeled walker  Transfer Assistance: modified independent with use of 4WRW  Ambulation Assistance:modified independent with use of 4WRW  ADL Assistance: requires assistance with bathing, requires assistance with dressing, . Comment: needs assitance for LEs  IADL Assistance: requires assistance with meal prep, requires assistance with laundry, requires assistance with cleaning, pt helps with some meal prep and cleaning but  does most  Active :        [] Yes                 [x] No  Hand Dominance: [] Left                 [x] Right  Current Employment: . Comment: not asked  Hobbies: reading biographies  Recent Falls: 1 in may resulting in hip fx       Subjective:   General: Pt in Semi-Kendrick's position upon arrival and agreeable to OT/PT treatment session.  Pt pleasant and cooperative with the activities asked of her. Pt very emotional throughout the session and c/o of BLE pain with movement. Pain: 9/10. Location: BLE  Pain Interventions: pain medication in place prior to arrival, RN notified, and repositioned         Activities of Daily Living  Basic Activities of Daily Living  Grooming: stand by assistance . Comment: pt combed her hair while sitting EOB  Upper Extremity Bathing: stand by assistance . Comment: pt performed UE bathing while sitting EOB  Upper Extremity Dressing: . Comment: new gown placed on pt  Lower Extremity Dressing: dependent . Comment: pt dependent with donning her socks   Comment: increased time required due to c/o of BLE pain  Instrumental Activities of Daily Living  No IADL completed on this date. Functional Mobility  Bed Mobility  Supine to Sit: 2 person assistance with max + mod   Scootin person assistance with maximum assistance, moderate assistance , . Comment assist level progressed from max to mod  Comment: HOB elevated; side rails used; increased time required due to BLE pain; verbal cues required for hand placement  Transfers  Sit to stand transfer:2 person assistance with maximum assistance from sitting EOB   Stand to sit transfer: 2 person assistance with maximum assistance onto the recliner   Comment: RW used for all transfers and to provide UE support; verbal cues required for appropriate hand placement  Functional Mobility:  Sitting Balance: stand by assistance, contact guard assistance, . Comment pt sat EOB for 8-10 minutes during ADL performance. Pt's assist level progressed from contact guard assist to stand by assist.    Standing Balance: 2 person assistance with maximum assistance .     Functional Mobility: .  2 person assistance with maximum assistance   Functional Mobility Activity: pt ambulated from EOB to the recliner or ~4 feet  Functional Mobility Device Use: rolling walker   Comment: increased time required due to increased BLE pain; verbal cues required for appropriate hand placement, RW management, and sequencing    Other Therapeutic Interventions    Second Session    OT/PT returned to assist pt with a brief change and to get back to bed. Pt was Max x 2 with scooting laterally in the recliner in preparation for transferring. Pt was Max x 2 when transferring from the recliner to bed. Pt was Max x 2 when going from sitting to supine. Pt was Max x 1 when rolling R and Mod x 1 when rolling L for pericare. Pt was dependent with brief change. Pt was dependent x 2 when boosting up in the bed. Pt was left in bed with her call light in reach, the phone in reach, the bed alarm enabled, and her nurse was notified. Functional Outcomes  AM-PAC Inpatient Daily Activity Raw Score: 13    Cognition  WFL  Orientation:    alert and oriented x 4  Command Following:   [facility]     Education  Barriers To Learning: emotional  Patient Education: patient educated on goals, OT role and benefits, plan of care, discharge recommendations  Learning Assessment:  patient verbalizes understanding, would benefit from continued reinforcement    Assessment  Activity Tolerance: Pt responded well to treatment session. Pt pleasant and fully participated in the activities asked of her. Pt's activity tolerance is impacted by increased BLE pain, decreased endurance, and BLE weakness. Impairments Requiring Therapeutic Intervention: decreased functional mobility, decreased ADL status, decreased ROM, decreased strength, decreased endurance, decreased balance, decreased IADL, decreased coordination, increased pain  Prognosis: fair  Clinical Assessment: Patient presenting below functional baseline with above deficits associated with her diagnosis. Patient's limitations include decreased strength, decreased endurance, increased BLE pain, decreased functional mobility, decreased ADL performance, and decreased IADL performance.  Patient received a little help with ADL/IADL performance and transfers prior to admission but now she requires extensive assistance with those tasks. Patient would benefit from skilled OT services upon discharge in order to return to her PLOF. Patient to continue acute OT services to maximize safety and independence with task performance in order to reach maximum functional potential.   Safety Interventions: patient left in chair, chair alarm in place, call light within reach, and nurse notified    Plan  Frequency: 3-5 x/per week  Current Treatment Recommendations: strengthening, balance training, functional mobility training, transfer training, endurance training, patient/caregiver education, and ADL/self-care training    Goals  Patient Goals: not stated   Short Term Goals:  Time Frame: discharge   Patient will complete upper body ADL at supervision - goal not addressed 9/8, SBA with UE bathing goal not met 9/12  Patient will complete lower body ADL at minimal assistance - goal not met dependent 9/8, dependent with LB dressing goal not met 9/12  Patient will complete toileting at minimal assistance - goal not met Dependent 9/8, goal not addressed 9/12  Patient will complete functional transfers at contact guard assistance - Mod A x 2 goal not met 9/8, Max x 2 goal not met 9/12  Patient will increase [facility] ADL score = to or > than 18/24 goal not met 13/24 9/8, 13/24 goal not met 9/12    Therapy Session Time     Individual Group Co-treatment   Time In    1043   Time Out    1123   Minutes    40      Timed Code Minutes: 40 Minutes  Total Treatment Minutes: 40       Individual Group Co-treatment   Time In    1335   Time Out    1410   Minutes    35     Timed Code Treatment Minutes:  35 minutes  Total Treatment Minutes:  35 minutes    Total Minutes: 40 + 35 = 75 minutes     Ramesh Pate, S/OT    Electronically Signed By: OT/R provided direct supervision and guidance during this treatment session. I have read and agree with the above documentation supporting this OT student's interventions. Mode Denny OTR/L  [id number].

## 2022-09-12 NOTE — CONSULTS
Neurosurgery Consult Note    Reason for Consult: moderate right foraminal stenosis on MRI lumbar spine, bilateral leg pain, more so on left, urinary retention  Requesting Provider: CIERRA Lindo    Subjective:  CHIEF COMPLAINT / HPI:  Morenita Lakhani is a 78 y.o. female patient being seen for complaints of left knee and right anterior thigh pain. Patient initially presented to the hospital with lower extremity swelling and pain. She is diagnosed with cellulitis and treated with antibiotics. She then developed chest pain  with elevated troponins requiring emergent cardiac cath. No significant coronary stenosis noted, being treated with colchine for pericarditis. Has been having complaints of increased left knee pain as well as right anterior thigh pain. She has not been out of bed since being admitted because of pain. Pain is aggravated by any movement. She was also noted to have some urinary retention requiring Llamas catheter. Yesterday Llamas was removed and outpatient voiding without difficulty. Past Medical History:   Diagnosis Date    AR (allergic rhinitis)     Arthritis of knee     Pruis    Depression     Erosive gastritis 10/28/2019    EGD October 2019, he did with PPI    GERD (gastroesophageal reflux disease)     Hyperlipidemia     Internal hemorrhoids     Overweight(278.02)     Viral meningitis 5/12     Past Surgical History:   Procedure Laterality Date    BREAST BIOPSY      CHOLECYSTECTOMY, LAPAROSCOPIC  2003    COLONOSCOPY  8/06    normal; [facility]    COLONOSCOPY  12/3/13    [facility]- polyps    HIP SURGERY Left 5/16/2022    LEFT HIP HEMIARTHROPLASTY performed by Cheryl Campbell MD at [facility] ([address] [place] [address])      [facility]  2003    UPPER GASTROINTESTINAL ENDOSCOPY  12/3/13    Ana Caba; antritis     Patient has no known allergies.     Current Facility-Administered Medications:     tamsulosin (FLOMAX) capsule 0.4 mg, 0.4 mg, Oral, Nightly, Aarti PHILIPPE TIFFANIE Bonds, 0.4 mg at 22    bethanechol (URECHOLINE) tablet 10 mg, 10 mg, Oral, TID, Hieu Morales MD, 10 mg at 22    cephALEXin (KEFLEX) capsule 500 mg, 500 mg, Oral, 4 times per day, Nawaf Andrade MD, 500 mg at 22    colchicine (COLCRYS) tablet 0.6 mg, 0.6 mg, Oral, BID, Manny Apgar, MD, 0.6 mg at 22    pantoprazole (PROTONIX) tablet 40 mg, 40 mg, Oral, QAM AC, CIERRA Barker - CNP, 40 mg at 22 7167    furosemide (LASIX) tablet 20 mg, 20 mg, Oral, Daily, Atilano Baumgarten, MD, 20 mg at 22 1002    polyethylene glycol (GLYCOLAX) packet 17 g, 17 g, Oral, Daily, CIERRA Barker CNP, 17 g at 09/10/22 104    sennosides-docusate sodium (SENOKOT-S) 8.6-50 MG tablet 2 tablet, 2 tablet, Oral, BID, CIERRA Barker CNP, 2 tablet at 22    nitroGLYCERIN (NITROSTAT) SL tablet 0.4 mg, 0.4 mg, SubLINGual, Q5 Min PRN, Aarti Bonds PA-C, 0.4 mg at 22    morphine (PF) injection 2 mg, 2 mg, IntraVENous, Q4H PRN, Castro Martell PA-C, 2 mg at 22 155    gabapentin (NEURONTIN) capsule 100 mg, 100 mg, Oral, TID, CIERRA Isaac CNP, 100 mg at 22    busPIRone (BUSPAR) tablet 10 mg, 10 mg, Oral, Q6H PRN, CIERRA Isaac CNP, 10 mg at 22    HYDROcodone-acetaminophen (Jami [place]) 5-325 MG per tablet 1 tablet, 1 tablet, Oral, Q6H PRN, CIERRA Isaac CNP, 1 tablet at 22 2143    calcium carbonate (TUMS) chewable tablet 500 mg, 500 mg, Oral, TID PRN, CIERRA Isaac CNP, 500 mg at 22 1058    diclofenac sodium (VOLTAREN) 1 % gel 4 g, 4 g, Topical, TID, Shilpa Deshpande MD, 4 g at 22 215    lactobacillus (CULTURELLE) capsule 1 capsule, 1 capsule, Oral, BID WC, Shilpa Deshpande MD, 1 capsule at 22 1739    aspirin EC tablet 81 mg, 81 mg, Oral, Daily, Florencia Brunson MD, 81 mg at 22 1002 buPROPion (WELLBUTRIN XL) extended release tablet 150 mg, 150 mg, Oral, Daily, Sven Stafford MD, 150 mg at 09/11/22 1002    vilazodone HCl (VIIBRYD) TABS 20 mg, 20 mg, Oral, Daily, Sven Stafford MD, 20 mg at 09/11/22 1006    sodium chloride flush 0.9 % injection 5-40 mL, 5-40 mL, IntraVENous, 2 times per day, Geetha Hodges MD, 10 mL at 09/11/22 2205    sodium chloride flush 0.9 % injection 5-40 mL, 5-40 mL, IntraVENous, PRN, Sven Stafford MD    0.9 % sodium chloride infusion, , IntraVENous, PRN, Geetha Hodges MD, Last Rate: 10 mL/hr at 09/07/22 0142, New Bag at 09/07/22 0142    ondansetron (ZOFRAN-ODT) disintegrating tablet 4 mg, 4 mg, Oral, Q8H PRN **OR** ondansetron (ZOFRAN) injection 4 mg, 4 mg, IntraVENous, Q6H PRN, Sven Stafford MD, 4 mg at 09/03/22 0522    acetaminophen (TYLENOL) tablet 650 mg, 650 mg, Oral, Q6H PRN, 650 mg at 09/09/22 1743 **OR** acetaminophen (TYLENOL) suppository 650 mg, 650 mg, Rectal, Q6H PRN, Sven Stafford MD    perflutren lipid microspheres (DEFINITY) injection 1.65 mg, 1.5 mL, IntraVENous, ONCE PRN, Geetha Hodges MD  Social History     Socioeconomic History    Marital status:      Spouse name: Veronique House    Number of children: 4    Years of education: Not on file    Highest education level: Not on file   Occupational History    Not on file   Tobacco Use    Smoking status: Never    Smokeless tobacco: Never   Vaping Use    Vaping Use: Never used   Substance and Sexual Activity    Alcohol use: Yes     Comment: rarely    Drug use: No    Sexual activity: Not on file   Other Topics Concern    Not on file   Social History Narrative    Not on file     Social Determinants of Health     Financial Resource Strain: Low Risk     Difficulty of Paying Living Expenses: Not hard at all   Food Insecurity: No Food Insecurity    Worried About Running Out of Food in the Last Year: Never true    Ran Out of Food in the Last Year: Never true   Transportation Needs: Not on file   Physical Activity: Not on file Stress: Not on file   Social Connections: Not on file   Intimate Partner Violence: Not on file   Housing Stability: Not on file     Family History   Problem Relation Age of Onset    Breast Cancer Mother     Bipolar Disorder Brother        ROS: Complete 10 point ROS was obtained with positives in HPI, otherwise:  Pt denies fevers, denies chills. Pt denies chest pain, denies SOB, denies nausea, denies vomiting, denies headache. PHYSICAL EXAMINATION:  /79   Pulse 87   Temp 98.4 °F (36.9 °C)   Resp 16   Ht 5' 1\" (1.549 m)   Wt 170 lb 14.4 oz (77.5 kg)   SpO2 95%   BMI 32.29 kg/m²   GENERAL:  Pleasant elderly female sitting upright in bed in no acute distress. HEENT:  sclera clear and neck supple  NEUROLOGIC:  Awake, alert, oriented to name, place and time. Cranial nerves II-XII are grossly intact. Motor is 5 out of 5 bilaterally except only able to lift legs off the bed a couple inches right stronger than left. Decreased reflexes.   LABORATORY DATA:   CBC with Differential:    Lab Results   Component Value Date/Time    WBC 4.7 09/11/2022 05:40 AM    RBC 3.07 09/11/2022 05:40 AM    HGB 8.9 09/11/2022 05:40 AM    HCT 27.0 09/11/2022 05:40 AM     09/11/2022 05:40 AM    MCV 88.0 09/11/2022 05:40 AM    MCH 28.9 09/11/2022 05:40 AM    MCHC 32.8 09/11/2022 05:40 AM    RDW 13.8 09/11/2022 05:40 AM    SEGSPCT 63.2 05/08/2013 08:38 AM    LYMPHOPCT 13.0 09/08/2022 10:09 PM    MONOPCT 1.4 09/08/2022 10:09 PM    EOSPCT 2.2 04/27/2012 08:33 AM    BASOPCT 0.6 09/08/2022 10:09 PM    MONOSABS 0.1 09/08/2022 10:09 PM    LYMPHSABS 0.6 09/08/2022 10:09 PM    EOSABS 0.0 09/08/2022 10:09 PM    BASOSABS 0.0 09/08/2022 10:09 PM    DIFFTYPE Auto-K 05/08/2013 08:38 AM     CMP:    Lab Results   Component Value Date/Time     09/11/2022 05:40 AM    K 3.5 09/11/2022 05:40 AM    K 4.6 09/02/2022 12:24 PM    CL 99 09/11/2022 05:40 AM    CO2 28 09/11/2022 05:40 AM    BUN 22 09/11/2022 05:40 AM    CREATININE 0.7 09/11/2022 05:40 AM    GFRAA >60 09/11/2022 05:40 AM    GFRAA >60 05/08/2013 08:38 AM    AGRATIO 0.9 09/08/2022 10:09 PM    LABGLOM >60 09/11/2022 05:40 AM    GLUCOSE 108 09/11/2022 05:40 AM    PROT 6.5 09/08/2022 10:09 PM    PROT 7.3 10/17/2012 08:47 AM    LABALBU 2.4 09/11/2022 05:40 AM    CALCIUM 8.7 09/11/2022 05:40 AM    BILITOT <0.2 09/08/2022 10:09 PM    ALKPHOS 95 09/08/2022 10:09 PM    AST 21 09/08/2022 10:09 PM    ALT <5 09/08/2022 10:09 PM        IMAGING STUDIES:   MRI of the Lumbar-Sacral Spine:  Date: 9/10/22; Result:   Multilevel lumbar spondylosis without significant canal stenosis. Shallow central/right paracentral disc protrusion at L1-2 along with   degenerative facet changes and levocurvature of the thoracolumbar spine   contributing to encroachment on the right lateral recess and moderate right   foraminal stenosis. IMPRESSION/PLAN:  Principal Problem:    Cellulitis  Active Problems:    Hyperlipidemia    Class 1 obesity due to excess calories with body mass index (BMI) of 30.0 to 30.9 in adult    History of depression    Bilateral lower leg cellulitis       Acute idiopathic myocarditis    Current mild episode of major depressive disorder without prior episode (HCC)    Bilateral leg pain. Currently being treated for cellulitis. Pain in left leg primarily her knee with difficulty lifting her leg off the bed. Also notes some right anterior thigh pain. Personal review of lumbar MRI shows significant multilevel degenerative disc disease, degenerative scoliosis resulting in severe foraminal stenosis on the right at L1-2. This could be contributing to her right upper leg pain. But does not explain her left knee pain. Orthopedic consult pending for left knee pain. Thoracic MRI pending to exclude any cord compression given complaints of urinary retention.    Recommend Medrol Dosepak for radicular symptoms and can consider outpatient epidural steroid injection if symptoms

## 2022-09-12 NOTE — PROGRESS NOTES
Infectious Diseases   Progress Note      Admission Date: 9/2/2022  Hospital Day: Hospital Day: 11   Attending: Leslee Abraham MD  Date of service: 9/12/2022     Chief complaint/ Reason for consult:     High fever of 101.8  Bilateral leg cellulitis  Negative bilateral lower extremity venous Doppler study on 9/2/2022  Gastroesophageal reflux disease    Microbiology:        I have reviewed allavailable micro lab data and cultures    Blood culture (2/2) - collected on 9/2/2022: Negative      Antibiotics and immunizations:       Current antibiotics: All antibiotics and their doses were reviewed by me    Recent Abx Admin                     cephALEXin (KEFLEX) capsule 500 mg (mg) 500 mg Given 09/12/22 1229     500 mg Given  0429     500 mg Given  0006     500 mg Given 09/11/22 8479                      Immunization History: All immunization history was reviewed by me today. Immunization History   Administered Date(s) Administered    COVID-19, MODERNA BLUE border, Primary or Immunocompromised, (age 12y+), IM, 100 mcg/0.5mL 01/21/2021, 02/18/2021, 10/28/2021, 05/12/2022    Influenza 11/24/2010    Influenza Vaccine, unspecified formulation 10/01/2016    Influenza Virus Vaccine 10/01/2012, 10/01/2013, 10/13/2015    Influenza, FLUAD, (age 72 y+), Adjuvanted, 0.5mL 10/12/2020    Influenza, High Dose (Fluzone 65 yrs and older) 10/12/2017, 10/09/2018, 10/28/2021    Pneumococcal Conjugate 13-valent ([id number]) 07/13/2015    Pneumococcal Polysaccharide ([id number]) 05/07/2012    Td, unspecified formulation 09/30/2009    Tdap (Boostrix, Adacel) 01/08/2021    Zoster Live (Zostavax) 08/01/2013    Zoster Recombinant (Shingrix) 01/08/2021, 04/24/2021       Known drug allergies: All allergies were reviewed and updated    No Known Allergies    Social history:     Social History:  All social andepidemiologic history was reviewed and updated by me today as needed. Tobacco use:   reports that she has never smoked.  She has never used smokeless tobacco.  Alcohol use:   reports current alcohol use. Currently lives in: [facility]   reports no history of drug use. COVID VACCINATION AND LAB RESULT RECORDS:     Internal Administration   First Dose COVID-19, LISANDRA wray, Primary or Immunocompromised, (age 12y+), IM, 100 mcg/0.5mL  01/21/2021   Second Dose COVID-19, LISANDRA wray, Primary or Immunocompromised, (age 12y+), IM, 100 mcg/0.5mL   02/18/2021       Last COVID Lab No results found for: SARS-COV-2, SARS-COV-2 RNA, SARS-COV-2, SARS-COV-2, SARS-COV-2 BY PCR, SARS-COV-2, SARS-COV-2, SARS-COV-2         Assessment:     The patient is a 78 y.o. old female who  has a past medical history of AR (allergic rhinitis), Arthritis of knee, Depression, Erosive gastritis (10/28/2019), GERD (gastroesophageal reflux disease), Hyperlipidemia, Internal hemorrhoids, Overweight(278.02), and Viral meningitis (5/12). with following problems:    High fever of 101.8-resolved spontaneously  Bilateral leg cellulitis-covered with Keflex  Negative bilateral lower extremity venous Doppler study on 9/2/2022  Gastroesophageal reflux disease-this remained stable  History of depression  Dyslipidemia  Obesity Class 1 due to excess calorie intake : Body mass index is 30.38 kg/m²-counseling done      Discussion:      The patient is afebrile. She is on oral Keflex. Fever is coming down spontaneously. It was likely postprocedural after the heart cath. Serum creatinine 0.7 today. Liver functions are okay. White cell count is 4700 today. MRI of the lumbar spine without contrast done on September 10 showed lumbar spondylosis without any discitis or osteomyelitis    Plan:     Diagnostic Workup:      Continue to follow  fever curve, WBC count and blood cultures. Continue to monitor blood counts, liver and renal function.     Antimicrobials:    Continue oral Keflex 500 mg every 6 hours  Plan to continue oral Keflex until next Monday  Urology note reviewed. Orthopedics note reviewed for left hip pain. Patient has been started on Medrol Dosepak by primary team for concern for myopericarditis  We will follow up on the culture results and clinical progress and will make further recommendations accordingly. Continue close vitals monitoring. Maintain good glycemic control. Fall precautions. Aspiration precautions. Continue to watch for new fever or diarrhea. DVT prophylaxis. Discussed all above with patient and RN. Drug Monitoring:    Continue monitoring for antibiotic toxicity as follows: CBC, CMP   Continue to watch for following: new or worsening fever, new hypotension, hives, lip swelling and redness or purulence at vascular access sites. I/v access Management:    Continue to monitor i.v access sites for erythema, induration, discharge or tenderness. As always, continue efforts to minimize tubes/lines/drains as clinically appropriate to reduce chances of line associated infections. Patient education and counseling: The patient was educated in detail about the side-effects of various antibiotics and things to watch for like new rashes, lip swelling, severe reaction, worsening diarrhea, break through fever etc.  Discussed patient's condition and what to expect. All of the patient's questions were addressed in a satisfactory manner and patient verbalized understanding all instructions. Level of complexity of visit and medical decision making: High     . Thank you for involving me in the care of your patient. I will continue to follow. If you have anyadditional questions, please do not hesitate to contact me. Subjective: Interval history: Interval history was obtained from chart review and patient/ RN. The patient is afebrile. She is tolerating antibiotic okay. No diarrhea     REVIEW OF SYSTEMS:      Review of Systems   Constitutional:  Positive for fatigue.  Negative for chills, diaphoresis and fever. HENT:  Negative for ear discharge, ear pain, postnasal drip, rhinorrhea, sinus pressure, sinus pain and sore throat. Eyes:  Negative for discharge and redness. Respiratory:  Negative for cough, shortness of breath and wheezing. Cardiovascular:  Negative for chest pain and leg swelling. Gastrointestinal:  Negative for abdominal pain, constipation, diarrhea and nausea. Endocrine: Negative for cold intolerance, heat intolerance and polydipsia. Genitourinary:  Negative for dysuria, flank pain, frequency, hematuria and urgency. Musculoskeletal:  Negative for back pain and myalgias. Skin:  Negative for rash. Allergic/Immunologic: Negative for immunocompromised state. Neurological:  Negative for dizziness, seizures and headaches. Hematological:  Does not bruise/bleed easily. Psychiatric/Behavioral:  Negative for agitation, hallucinations and suicidal ideas. The patient is not nervous/anxious. All other systems reviewed and are negative. Past Medical History: All past medical history reviewed today. Past Medical History:   Diagnosis Date    AR (allergic rhinitis)     Arthritis of knee     Pruis    Depression     Erosive gastritis 10/28/2019    EGD October 2019, he did with PPI    GERD (gastroesophageal reflux disease)     Hyperlipidemia     Internal hemorrhoids     Overweight(278.02)     Viral meningitis 5/12       Past Surgical History: All past surgical history was reviewed today. Past Surgical History:   Procedure Laterality Date    BREAST BIOPSY      CHOLECYSTECTOMY, LAPAROSCOPIC  2003    COLONOSCOPY  8/06    normal; Ortega    COLONOSCOPY  12/3/13    Ortega- polyps    HIP SURGERY Left 5/16/2022    LEFT HIP HEMIARTHROPLASTY performed by Laura Mccoy MD at [facility] ([facility])      AKASH AND BSO (CERVIX REMOVED)  2003    UPPER GASTROINTESTINAL ENDOSCOPY  12/3/13    Rachel Polanco; antritis       Family History:  All family history was reviewed today.        Problem Relation Age of Onset    Breast Cancer Mother     Bipolar Disorder Brother        Objective:       PHYSICAL EXAM:      Vitals:   Vitals:    09/12/22 0000 09/12/22 0415 09/12/22 0845 09/12/22 1230   BP: (!) 121/90 127/79 127/65 132/65   Pulse: 87 87 75 78   Resp: 16      Temp:   97 °F (36.1 °C)    TempSrc:   Temporal    SpO2: 94% 95% 95% 97%   Weight:  170 lb 14.4 oz (77.5 kg)     Height:           Physical Exam  Vitals and nursing note reviewed. Constitutional:       Appearance: She is well-developed. She is not diaphoretic. Comments: The patient was seen earlier today. HENT:      Head: Normocephalic and atraumatic. Right Ear: External ear normal. There is no impacted cerumen. Left Ear: External ear normal. There is no impacted cerumen. Nose: Nose normal.      Mouth/Throat:      Mouth: Mucous membranes are moist.      Pharynx: Oropharynx is clear. No oropharyngeal exudate. Eyes:      General: No scleral icterus. Right eye: No discharge. Left eye: No discharge. Conjunctiva/sclera: Conjunctivae normal.      Pupils: Pupils are equal, round, and reactive to light. Neck:      Thyroid: No thyromegaly. Cardiovascular:      Rate and Rhythm: Normal rate and regular rhythm. Heart sounds: Normal heart sounds. No murmur heard. No friction rub. Pulmonary:      Effort: No respiratory distress. Breath sounds: No stridor. No wheezing or rales. Abdominal:      General: Bowel sounds are normal.      Palpations: Abdomen is soft. Tenderness: There is no abdominal tenderness. There is no guarding or rebound. Musculoskeletal:         General: No swelling, tenderness or deformity. Normal range of motion. Cervical back: Normal range of motion and neck supple. Right lower leg: No edema. Left lower leg: No edema. Lymphadenopathy:      Cervical: No cervical adenopathy. Skin:     General: Skin is warm and dry.       Coloration: Skin is not jaundiced. Findings: No bruising, erythema or rash. Comments: Improving bilateral leg cellulitis   Neurological:      General: No focal deficit present. Mental Status: She is alert and oriented to person, place, and time. Mental status is at baseline. Motor: No abnormal muscle tone. Psychiatric:         Mood and Affect: Mood normal.         Behavior: Behavior normal.    **    Lines and drains: All vascular access sites are healthy with no local erythema, discharge or tenderness. Intake and output:    I/O last 3 completed shifts: In: 1920 [P.O.:1920]  Out: 3550 [Urine:3550]    Lab Data:   All available labs and old records have been reviewed by me. CBC:  Recent Labs     09/10/22  0427 09/11/22  0540   WBC 5.6 4.7   RBC 3.19* 3.07*   HGB 9.1* 8.9*   HCT 28.1* 27.0*    159   MCV 88.0 88.0   MCH 28.6 28.9   MCHC 32.4 32.8   RDW 14.1 13.8          BMP:  Recent Labs     09/10/22  0427 09/11/22  0540   * 135*   K 3.7 3.5   CL 95* 99   CO2 28 28   BUN 27* 22*   CREATININE 0.8 0.7   CALCIUM 8.8 8.7   GLUCOSE 106* 108*          Hepatic Function Panel:   Lab Results   Component Value Date/Time    ALKPHOS 95 09/08/2022 10:09 PM    ALT <5 09/08/2022 10:09 PM    AST 21 09/08/2022 10:09 PM    PROT 6.5 09/08/2022 10:09 PM    PROT 7.3 10/17/2012 08:47 AM    BILITOT <0.2 09/08/2022 10:09 PM    BILIDIR <0.2 10/22/2019 03:08 PM    IBILI see below 10/22/2019 03:08 PM    LABALBU 2.4 09/11/2022 05:40 AM       CPK:   Lab Results   Component Value Date    CKTOTAL 209 (H) 09/09/2022     ESR:   Lab Results   Component Value Date    SEDRATE 70 (H) 09/06/2022     CRP:   Lab Results   Component Value Date    .9 (H) 09/06/2022           Imaging: All pertinent images and reports for the current visit were reviewed by me during this visit. I reviewed the chest x-ray/CT scan/MRI images and independently interpreted the findings and results today.     MRI LUMBAR SPINE WO CONTRAST Final Result   Multilevel lumbar spondylosis without significant canal stenosis. Shallow central/right paracentral disc protrusion at L1-2 along with   degenerative facet changes and levocurvature of the thoracolumbar spine   contributing to encroachment on the right lateral recess and moderate right   foraminal stenosis. XR CHEST (2 VW)   Final Result   No acute cardiopulmonary disease         VL Extremity Venous Bilateral   Final Result      MRI THORACIC SPINE WO CONTRAST    (Results Pending)       Medications: All current and past medications were reviewed.      [START ON 9/13/2022] methylPREDNISolone  4 mg Oral QAM AC    [START ON 9/13/2022] methylPREDNISolone  4 mg Oral Lunch    [START ON 9/13/2022] methylPREDNISolone  4 mg Oral Dinner    [START ON 9/13/2022] methylPREDNISolone  8 mg Oral Nightly    [START ON 9/14/2022] methylPREDNISolone  4 mg Oral Nightly    pantoprazole  40 mg Oral BID AC    acetaminophen  650 mg Oral TID    lidocaine  1 patch TransDERmal Daily    tamsulosin  0.4 mg Oral Nightly    bethanechol  10 mg Oral TID    cephALEXin  500 mg Oral 4 times per day    furosemide  20 mg Oral Daily    polyethylene glycol  17 g Oral Daily    sennosides-docusate sodium  2 tablet Oral BID    gabapentin  100 mg Oral TID    diclofenac sodium  4 g Topical TID    lactobacillus  1 capsule Oral BID WC    aspirin  81 mg Oral Daily    buPROPion  150 mg Oral Daily    vilazodone HCl  20 mg Oral Daily    sodium chloride flush  5-40 mL IntraVENous 2 times per day        sodium chloride 10 mL/hr at 09/07/22 0142       tiZANidine, nitroGLYCERIN, morphine, busPIRone, HYDROcodone 5 mg - acetaminophen, calcium carbonate, sodium chloride flush, sodium chloride, ondansetron **OR** ondansetron, acetaminophen **OR** acetaminophen, perflutren lipid microspheres      Problem list:       Patient Active Problem List   Diagnosis Code    AR (allergic rhinitis) J30.9    Arthritis of knee M17.10    Patient overweight E66.3 High fever R50.9    Postmenopausal Z78.0    Essential hypertension I10    Erosive gastritis K29.60    Current mild episode of major depressive disorder without prior episode ([facility] F32.0    Left displaced femoral neck fracture ([facility] S72.002A    Fall at home W19. 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 Although every effort was made to ensure the accuracy of this automated transcription, some errors in transcription may have occurred inadvertently. If you may need any clarification, please do not hesitate to contact me through EPIC or at the phone number provided below with my electronic signature. Any pictures or media included in this note were obtained after taking informed verbal consent from the patient and with their approval to include those in the patient's medical record. Bambi Mckeon MD, MPH, FACP, [facility]  9/12/2022, 2:53 PM  [facility] [place] [facility]   [address] [place] [address], [address] [place] [address]  Office: [phone]  Fax: [phone]  In-person Clinic days:  Tuesday & Thursday a.m. Virtual clinic days: Monday, Wednesday & Friday a.m.

## 2022-09-13 ENCOUNTER — APPOINTMENT (OUTPATIENT)
Dept: MRI IMAGING | Age: 82
DRG: 602 | End: 2022-09-13
Payer: MEDICARE

## 2022-09-13 PROBLEM — E78.5 HYPERLIPIDEMIA: Status: ACTIVE | Noted: 2022-09-13

## 2022-09-13 LAB
ANION GAP SERPL CALCULATED.3IONS-SCNC: 9 MMOL/L (ref 3–16)
BUN BLDV-MCNC: 19 MG/DL (ref 7–20)
CALCIUM SERPL-MCNC: 9.1 MG/DL (ref 8.3–10.6)
CHLORIDE BLD-SCNC: 100 MMOL/L (ref 99–110)
CO2: 28 MMOL/L (ref 21–32)
CREAT SERPL-MCNC: 0.6 MG/DL (ref 0.6–1.2)
GFR AFRICAN AMERICAN: >60
GFR NON-AFRICAN AMERICAN: >60
GLUCOSE BLD-MCNC: 118 MG/DL (ref 70–99)
HCT VFR BLD CALC: 31.3 % (ref 36–48)
HEMOGLOBIN: 10.2 G/DL (ref 12–16)
MCH RBC QN AUTO: 28.3 PG (ref 26–34)
MCHC RBC AUTO-ENTMCNC: 32.6 G/DL (ref 31–36)
MCV RBC AUTO: 86.6 FL (ref 80–100)
PDW BLD-RTO: 14 % (ref 12.4–15.4)
PLATELET # BLD: 226 K/UL (ref 135–450)
PMV BLD AUTO: 7 FL (ref 5–10.5)
POTASSIUM SERPL-SCNC: 4.1 MMOL/L (ref 3.5–5.1)
RBC # BLD: 3.62 M/UL (ref 4–5.2)
SARS-COV-2, NAAT: NOT DETECTED
SODIUM BLD-SCNC: 137 MMOL/L (ref 136–145)
WBC # BLD: 4.6 K/UL (ref 4–11)

## 2022-09-13 PROCEDURE — 2060000000 HC ICU INTERMEDIATE R&B

## 2022-09-13 PROCEDURE — 97535 SELF CARE MNGMENT TRAINING: CPT

## 2022-09-13 PROCEDURE — 36415 COLL VENOUS BLD VENIPUNCTURE: CPT

## 2022-09-13 PROCEDURE — 87635 SARS-COV-2 COVID-19 AMP PRB: CPT

## 2022-09-13 PROCEDURE — 6370000000 HC RX 637 (ALT 250 FOR IP): Performed by: INTERNAL MEDICINE

## 2022-09-13 PROCEDURE — 97530 THERAPEUTIC ACTIVITIES: CPT

## 2022-09-13 PROCEDURE — 6370000000 HC RX 637 (ALT 250 FOR IP): Performed by: NURSE PRACTITIONER

## 2022-09-13 PROCEDURE — 80048 BASIC METABOLIC PNL TOTAL CA: CPT

## 2022-09-13 PROCEDURE — 99232 SBSQ HOSP IP/OBS MODERATE 35: CPT | Performed by: INTERNAL MEDICINE

## 2022-09-13 PROCEDURE — 6370000000 HC RX 637 (ALT 250 FOR IP): Performed by: UROLOGY

## 2022-09-13 PROCEDURE — 2580000003 HC RX 258: Performed by: HOSPITALIST

## 2022-09-13 PROCEDURE — 6360000002 HC RX W HCPCS: Performed by: NURSE PRACTITIONER

## 2022-09-13 PROCEDURE — 6370000000 HC RX 637 (ALT 250 FOR IP): Performed by: PHYSICIAN ASSISTANT

## 2022-09-13 PROCEDURE — 85027 COMPLETE CBC AUTOMATED: CPT

## 2022-09-13 PROCEDURE — 6370000000 HC RX 637 (ALT 250 FOR IP): Performed by: HOSPITALIST

## 2022-09-13 PROCEDURE — 6370000000 HC RX 637 (ALT 250 FOR IP): Performed by: FAMILY MEDICINE

## 2022-09-13 PROCEDURE — 72146 MRI CHEST SPINE W/O DYE: CPT

## 2022-09-13 RX ORDER — PANTOPRAZOLE SODIUM 40 MG/1
40 TABLET, DELAYED RELEASE ORAL
Qty: 30 TABLET | Refills: 3 | Status: SHIPPED | OUTPATIENT
Start: 2022-09-13

## 2022-09-13 RX ORDER — LACTOBACILLUS RHAMNOSUS GG 10B CELL
1 CAPSULE ORAL 2 TIMES DAILY WITH MEALS
Qty: 14 CAPSULE | Refills: 0
Start: 2022-09-13 | End: 2022-09-20

## 2022-09-13 RX ORDER — BETHANECHOL CHLORIDE 10 MG/1
10 TABLET ORAL 3 TIMES DAILY
Qty: 90 TABLET | Refills: 3 | Status: SHIPPED | OUTPATIENT
Start: 2022-09-13

## 2022-09-13 RX ORDER — SENNA AND DOCUSATE SODIUM 50; 8.6 MG/1; MG/1
2 TABLET, FILM COATED ORAL 2 TIMES DAILY PRN
Qty: 30 TABLET | Refills: 0
Start: 2022-09-13 | End: 2022-10-04

## 2022-09-13 RX ORDER — LIDOCAINE 4 G/G
1 PATCH TOPICAL DAILY
Qty: 30 EACH | Refills: 0
Start: 2022-09-13 | End: 2022-10-04

## 2022-09-13 RX ORDER — CEPHALEXIN 500 MG/1
500 CAPSULE ORAL 4 TIMES DAILY
Qty: 24 CAPSULE | Refills: 0
Start: 2022-09-13 | End: 2022-09-19

## 2022-09-13 RX ORDER — TIZANIDINE 4 MG/1
4 TABLET ORAL EVERY 8 HOURS PRN
Qty: 9 TABLET | Refills: 0 | Status: SHIPPED | OUTPATIENT
Start: 2022-09-13 | End: 2022-09-16

## 2022-09-13 RX ORDER — GABAPENTIN 100 MG/1
100 CAPSULE ORAL 3 TIMES DAILY
Qty: 30 CAPSULE | Refills: 0 | Status: SHIPPED | OUTPATIENT
Start: 2022-09-13 | End: 2022-09-26

## 2022-09-13 RX ORDER — BUSPIRONE HYDROCHLORIDE 10 MG/1
10 TABLET ORAL 2 TIMES DAILY PRN
Qty: 6 TABLET | Refills: 0 | Status: SHIPPED | OUTPATIENT
Start: 2022-09-13 | End: 2022-09-16

## 2022-09-13 RX ORDER — FUROSEMIDE 20 MG/1
20 TABLET ORAL DAILY
Qty: 30 TABLET | Refills: 1
Start: 2022-09-13 | End: 2022-09-26 | Stop reason: ALTCHOICE

## 2022-09-13 RX ORDER — TAMSULOSIN HYDROCHLORIDE 0.4 MG/1
0.4 CAPSULE ORAL NIGHTLY
Qty: 30 CAPSULE | Refills: 1
Start: 2022-09-13 | End: 2022-10-04

## 2022-09-13 RX ORDER — POLYETHYLENE GLYCOL 3350 17 G/17G
17 POWDER, FOR SOLUTION ORAL DAILY
Qty: 30 EACH | Refills: 0
Start: 2022-09-13 | End: 2022-10-13

## 2022-09-13 RX ORDER — FUROSEMIDE 20 MG/1
20 TABLET ORAL DAILY
Qty: 60 TABLET | Refills: 3 | Status: SHIPPED | OUTPATIENT
Start: 2022-09-13 | End: 2022-09-26 | Stop reason: ALTCHOICE

## 2022-09-13 RX ORDER — HYDROCODONE BITARTRATE AND ACETAMINOPHEN 5; 325 MG/1; MG/1
1 TABLET ORAL EVERY 8 HOURS PRN
Qty: 9 TABLET | Refills: 0 | Status: SHIPPED | OUTPATIENT
Start: 2022-09-13 | End: 2022-09-16

## 2022-09-13 RX ORDER — PREDNISONE 10 MG/1
10 TABLET ORAL SEE ADMIN INSTRUCTIONS
Qty: 35 TABLET | Refills: 0 | Status: SHIPPED | OUTPATIENT
Start: 2022-09-13 | End: 2022-09-23

## 2022-09-13 RX ADMIN — METHYLPREDNISOLONE 8 MG: 4 TABLET ORAL at 22:39

## 2022-09-13 RX ADMIN — ACETAMINOPHEN 650 MG: 325 TABLET ORAL at 21:51

## 2022-09-13 RX ADMIN — FUROSEMIDE 20 MG: 20 TABLET ORAL at 09:17

## 2022-09-13 RX ADMIN — ACETAMINOPHEN 650 MG: 325 TABLET ORAL at 13:27

## 2022-09-13 RX ADMIN — ACETAMINOPHEN 650 MG: 325 TABLET ORAL at 09:16

## 2022-09-13 RX ADMIN — ASPIRIN 81 MG: 81 TABLET, COATED ORAL at 09:19

## 2022-09-13 RX ADMIN — VILAZODONE HYDROCHLORIDE 20 MG: 20 TABLET ORAL at 09:18

## 2022-09-13 RX ADMIN — BETHANECHOL CHLORIDE 10 MG: 10 TABLET ORAL at 21:51

## 2022-09-13 RX ADMIN — METHYLPREDNISOLONE 4 MG: 4 TABLET ORAL at 13:28

## 2022-09-13 RX ADMIN — METHYLPREDNISOLONE 4 MG: 4 TABLET ORAL at 09:20

## 2022-09-13 RX ADMIN — CEPHALEXIN 500 MG: 250 CAPSULE ORAL at 17:38

## 2022-09-13 RX ADMIN — Medication 10 ML: at 21:52

## 2022-09-13 RX ADMIN — BETHANECHOL CHLORIDE 10 MG: 10 TABLET ORAL at 09:33

## 2022-09-13 RX ADMIN — Medication 1 CAPSULE: at 17:38

## 2022-09-13 RX ADMIN — CEPHALEXIN 500 MG: 250 CAPSULE ORAL at 23:58

## 2022-09-13 RX ADMIN — BUPROPION HYDROCHLORIDE 150 MG: 150 TABLET, EXTENDED RELEASE ORAL at 09:16

## 2022-09-13 RX ADMIN — GABAPENTIN 100 MG: 100 CAPSULE ORAL at 12:50

## 2022-09-13 RX ADMIN — BETHANECHOL CHLORIDE 10 MG: 10 TABLET ORAL at 12:51

## 2022-09-13 RX ADMIN — Medication 10 ML: at 09:28

## 2022-09-13 RX ADMIN — TAMSULOSIN HYDROCHLORIDE 0.4 MG: 0.4 CAPSULE ORAL at 21:51

## 2022-09-13 RX ADMIN — GABAPENTIN 100 MG: 100 CAPSULE ORAL at 09:17

## 2022-09-13 RX ADMIN — GABAPENTIN 100 MG: 100 CAPSULE ORAL at 21:51

## 2022-09-13 RX ADMIN — CEPHALEXIN 500 MG: 250 CAPSULE ORAL at 06:42

## 2022-09-13 RX ADMIN — CEPHALEXIN 500 MG: 250 CAPSULE ORAL at 12:51

## 2022-09-13 RX ADMIN — DICLOFENAC SODIUM 4 G: 10 GEL TOPICAL at 03:04

## 2022-09-13 RX ADMIN — PANTOPRAZOLE SODIUM 40 MG: 40 TABLET, DELAYED RELEASE ORAL at 06:43

## 2022-09-13 RX ADMIN — METHYLPREDNISOLONE 4 MG: 4 TABLET ORAL at 17:38

## 2022-09-13 ASSESSMENT — ENCOUNTER SYMPTOMS
BACK PAIN: 0
EYE DISCHARGE: 0
RHINORRHEA: 0
CONSTIPATION: 0
SINUS PAIN: 0
ABDOMINAL PAIN: 0
WHEEZING: 0
SHORTNESS OF BREATH: 0
EYE REDNESS: 0
COUGH: 0
DIARRHEA: 0
SORE THROAT: 0
NAUSEA: 0
SINUS PRESSURE: 0

## 2022-09-13 ASSESSMENT — PAIN SCALES - GENERAL
PAINLEVEL_OUTOF10: 6
PAINLEVEL_OUTOF10: 6

## 2022-09-13 NOTE — PROGRESS NOTES
MD Mustapha Araujo MD Kerman Hughes, MD               Office: [phone]                      Fax: [phone] [facility]                   NEPHROLOGY INPATIENT PROGRESS NOTE:     PATIENT NAME: Sincere Sanchez  : 1940  MRN: [id number]      IMPRESSION:       Admitted for:  Cellulitis [L03.90]  Peripheral edema [R60.9]  Redness and swelling of lower leg [M79.89, R23.8]  NSTEMI (non-ST elevated myocardial infarction) ([facility] [address] [I21.4]  S/P LHC 22   Normal coronaries  IV dys ~ 40 cc - monitor renal fx   LVEDP ~ 14 (high normal)       Hyponatremia :   : uncontrolled acute on Chronic, Moderate-mild range, no symptomatic, mild hyper-volemic:   - No Reported Severe symptoms: seizures, obtundation, coma, and respiratory arrest.   - no need for Hypertonic saline    - Risk factors for hyponatremia:   : hypokalemia   : HILTON (no h/o CKD) - pre-renal BL SCR ~0.6-0.7 --> peaked at 1.2 on 22 during admission   : Chronic diastolic HF -   - last ECHO -- Grade I diastolic dysfunction with normal LV filling pressures.  : weight ~ 160-162 lbs lowest   :Viibryd    - Patient is at low risk of developing Osmotic Demyelination Syndrome.    - Call us urgently if any/worsening neurological findings.        Work up: reviewed    - Serum Osm , UOsm , Zheng , Urine K, Uric acid,   - renal function - HILTON - as above   - other lytes w/ hypokalemia - mild   - BP not very low  , unlikely AI         RECOMMENDATIONS:   - Monitor Serum Na ,at goal  - Goal Serum Na mid-high 130s, by next /24 hours     - low dose Lasix 20 mg QD- continued - tolerating it well - continue on dc   - K - needs repletion- continue same on dc    (Potassium is osmotically active as sodium, so giving K+ can raise the S [Na+] and osmolality in hyponatremic patients)     - minimize SSRI - on Viibryd  - avoid NSIDs PO, ok for a local cream  - ongoing control of cellulitis, pain - to decrease non-osmotic ADH release     - Minimize use any hypotonic/isotonic fluids such as D5W, NS, LR with other medications/drips ,   - Concentrate continuous drip fluids as much as possible,   - Avoid IVF , unless needed for resuscitation/hypovolemia w/ hypotension+tachycardia,    - Strict I/O with daily weights. --- Call us urgently if any/worsening neurological findings. Other major problems: Management per primary and other consulting teams. //   Bilateral leg cellulitis   Obesity       Hospital Problems             Last Modified POA    * (Principal) Cellulitis 9/2/2022 Yes    Dyslipidemia 9/12/2022 Yes    Class 1 obesity due to excess calories with body mass index (BMI) of 30.0 to 30.9 in adult 9/6/2022 Yes    History of depression 9/6/2022 Yes    Bilateral lower leg cellulitis 9/6/2022 Yes    Peripheral edema 9/6/2022 Yes    Redness and swelling of lower leg 9/6/2022 Yes    Weight loss counseling, encounter for 9/7/2022 Yes    NSTEMI (non-ST elevated myocardial infarction) ([facility] [address] 9/9/2022 Yes    Acute idiopathic myocarditis 9/11/2022 Yes    High fever 9/6/2022 Yes    Current mild episode of major depressive disorder without prior episode ([facility] [address] 9/3/2022 Yes   : other supportive care :   - Check daily renal function panel with electrolytes-phosphorus  - Strict monitoring of I/Os, daily weight  - non-Renal feeds/diet  - Current medications reviewed. Please refer to the orders. High Complexity. Multiple complex problems. Discussed with patient and treatment team-  Kortney - SAUD today  Time spent > 30 (~35) minutes. Thank you for allowing me to participate in this patient's care. Please do not hesitate to contact me anytime. We will follow along with you.        Ruby Bell MD,  [facility] [id number] [place] [address] [place]: [phone] or [address] [employer]  Fax: [phone]        ======================================================================================= =======================================================================================  SUBJECTIVE    Leg edema  better  Resting in room  Na controlled    Past medical, Surgical, Social, Family medical history reviewed by me. MEDICATIONS: reviewed by me. Medications Prior to Admission:  No current facility-administered medications on file prior to encounter. Current Outpatient Medications on File Prior to Encounter   Medication Sig Dispense Refill    atorvastatin (LIPITOR) 10 MG tablet TAKE 1 TABLET BY MOUTH EVERY OTHER DAY **CHANGE IN DOSE** 45 tablet 3    buPROPion (WELLBUTRIN XL) 150 MG extended release tablet TAKE 1 TABLET (150 MG TOTAL) BY MOUTH DAILY. INDICATIONS  DEPRESSION      vilazodone HCl (VIIBRYD) 10 MG TABS Take 20 mg by mouth daily       B Complex-C (VITAMIN B + C COMPLEX PO) Take by mouth      Lift Chair MISC by Does not apply route 1 each 0    estradiol (ESTRACE) 0.1 MG/GM vaginal cream Place 2 g vaginally Twice a Week       aspirin 81 MG EC tablet Take 81 mg by mouth daily. VITAMIN D PO Take  by mouth.            Current Facility-Administered Medications:     methylPREDNISolone (MEDROL DOSEPACK) tablet 4 mg, 4 mg, Oral, QAM AC, Kacie Katz, APRN - CNP, 4 mg at 09/13/22 0920    methylPREDNISolone (MEDROL DOSEPACK) tablet 4 mg, 4 mg, Oral, Lunch, Nancya Sterling, APRN - CNP    methylPREDNISolone (MEDROL DOSEPACK) tablet 4 mg, 4 mg, Oral, Dinner, Nancya Sterling, APRN - CNP    methylPREDNISolone (MEDROL DOSEPACK) tablet 8 mg, 8 mg, Oral, Nightly, Kacie Katz APRN - CNP    [START ON 9/14/2022] methylPREDNISolone (MEDROL DOSEPACK) tablet 4 mg, 4 mg, Oral, Nightly, Von Zarate APRN - CNP    pantoprazole (PROTONIX) tablet 40 mg, 40 mg, Oral, BID AC, Kacie Katz APRN - CNP, 40 mg at 09/13/22 2516    acetaminophen (TYLENOL) tablet 650 mg, 650 mg, Oral, TID, CIERRA Hess - CNP, 650 mg at 09/13/22 0916    lidocaine 4 % external patch 1 patch, 1 patch, TransDERmal, Daily, CIERRA Marie CNP, 1 patch at 09/13/22 0920    tiZANidine (ZANAFLEX) tablet 4 mg, 4 mg, Oral, Q6H PRN, CIERRA Marie CNP, 4 mg at 09/12/22 2104    tamsulosin (FLOMAX) capsule 0.4 mg, 0.4 mg, Oral, Nightly, JESUS MANUEL FloresC, 0.4 mg at 09/12/22 2104    bethanechol (URECHOLINE) tablet 10 mg, 10 mg, Oral, TID, Warner Angel MD, 10 mg at 09/13/22 0933    cephALEXin (KEFLEX) capsule 500 mg, 500 mg, Oral, 4 times per day, Funmi Fofana MD, 500 mg at 09/13/22 3644    furosemide (LASIX) tablet 20 mg, 20 mg, Oral, Daily, Napoleon Petty MD, 20 mg at 09/13/22 0917    polyethylene glycol (GLYCOLAX) packet 17 g, 17 g, Oral, Daily, CIERRA Bosch CNP, 17 g at 09/10/22 1049    sennosides-docusate sodium (SENOKOT-S) 8.6-50 MG tablet 2 tablet, 2 tablet, Oral, BID, CIERRA Bosch CNP, 2 tablet at 09/11/22 2143    nitroGLYCERIN (NITROSTAT) SL tablet 0.4 mg, 0.4 mg, SubLINGual, Q5 Min PRN, Aarti Bonds PA-C, 0.4 mg at 09/08/22 2152    morphine (PF) injection 2 mg, 2 mg, IntraVENous, Q4H PRN, JESUS MANUEL FloresC, 2 mg at 09/09/22 1550    gabapentin (NEURONTIN) capsule 100 mg, 100 mg, Oral, TID, CIERRA Isaac CNP, 100 mg at 09/13/22 0917    busPIRone (BUSPAR) tablet 10 mg, 10 mg, Oral, Q6H PRN, CIERRA Isaac CNP, 10 mg at 09/12/22 2104    HYDROcodone-acetaminophen (Arva Renea) 5-325 MG per tablet 1 tablet, 1 tablet, Oral, Q6H PRN, CIERRA Isaac CNP, 1 tablet at 09/11/22 2143    calcium carbonate (TUMS) chewable tablet 500 mg, 500 mg, Oral, TID PRN, CIERRA Isaac CNP, 500 mg at 09/04/22 1058    diclofenac sodium (VOLTAREN) 1 % gel 4 g, 4 g, Topical, TID, Maty Alaniz MD, 4 g at 09/13/22 0304    lactobacillus (CULTURELLE) capsule 1 capsule, 1 capsule, Oral, BID WC, Maty Alaniz MD, 1 capsule at 09/12/22 1717    aspirin EC tablet 81 mg, 81 mg, Oral, Daily, Sven Stafford, MD, 81 mg at 09/13/22 0919    buPROPion (WELLBUTRIN XL) extended release tablet 150 mg, 150 mg, Oral, Daily, Ruy Weaver MD, 150 mg at 09/13/22 0916    vilazodone HCl (VIIBRYD) TABS 20 mg, 20 mg, Oral, Daily, Ruy Weaver MD, 20 mg at 09/13/22 0918    sodium chloride flush 0.9 % injection 5-40 mL, 5-40 mL, IntraVENous, 2 times per day, Ruy Weaver MD, 10 mL at 09/13/22 8571    sodium chloride flush 0.9 % injection 5-40 mL, 5-40 mL, IntraVENous, PRN, Ruy Weaver MD    0.9 % sodium chloride infusion, , IntraVENous, PRN, Ruy Wevaer MD, Last Rate: 10 mL/hr at 09/07/22 0142, New Bag at 09/07/22 0142    ondansetron (ZOFRAN-ODT) disintegrating tablet 4 mg, 4 mg, Oral, Q8H PRN **OR** ondansetron (ZOFRAN) injection 4 mg, 4 mg, IntraVENous, Q6H PRN, Sven Stafford MD, 4 mg at 09/03/22 0522    acetaminophen (TYLENOL) tablet 650 mg, 650 mg, Oral, Q6H PRN, 650 mg at 09/09/22 1743 **OR** acetaminophen (TYLENOL) suppository 650 mg, 650 mg, Rectal, Q6H PRN, Sven Stafford MD    perflutren lipid microspheres (DEFINITY) injection 1.65 mg, 1.5 mL, IntraVENous, ONCE PRN, Ruy Weaver MD        REVIEW OF SYSTEMS:  As mentioned in chief complaint and HPI , Subjective       =======================================================================================     PHYSICAL EXAM:  Recent vital signs and recent I/Os reviewed by me.      Wt Readings from Last 3 Encounters:   09/13/22 168 lb 3.2 oz (76.3 kg)   09/02/22 181 lb 12.8 oz (82.5 kg)   08/16/22 150 lb (68 kg)     BP Readings from Last 3 Encounters:   09/13/22 132/69   09/02/22 (!) 140/70   07/29/22 134/60     Patient Vitals for the past 24 hrs:   BP Temp Temp src Pulse Resp SpO2 Weight   09/13/22 0901 132/69 98 °F (36.7 °C) Oral 73 16 98 % --   09/13/22 0645 -- -- -- -- -- -- 168 lb 3.2 oz (76.3 kg)   09/13/22 0419 122/66 97.7 °F (36.5 °C) Oral 66 15 98 % --   09/12/22 2335 128/72 97.9 °F (36.6 °C) Oral 71 15 98 % --   09/12/22 2032 (!) 151/74 97.6 °F (36.4 °C) Oral 72 16 95 % --   09/12/22 1700 (!) 148/67 -- -- 86 -- 95 % --   09/12/22 1230 132/65 -- -- 78 -- 97 % --         Intake/Output Summary (Last 24 hours) at 9/13/2022 1131  Last data filed at 9/13/2022 0646  Gross per 24 hour   Intake 240 ml   Output 1200 ml   Net -960 ml           Physical Exam  Vitals reviewed. Constitutional:       General: She is not in acute distress. Appearance: Normal appearance. HENT:      Head: Normocephalic and atraumatic. Right Ear: External ear normal.      Left Ear: External ear normal.      Nose: Nose normal.      Mouth/Throat:      Mouth: Mucous membranes are moist. Mucous membranes are not dry. Eyes:      General: No scleral icterus. Conjunctiva/sclera: Conjunctivae normal.   Neck:      Vascular: No JVD. Cardiovascular:      Rate and Rhythm: Normal rate and regular rhythm. Heart sounds: S1 normal and S2 normal.   Pulmonary:      Effort: Pulmonary effort is normal. No respiratory distress. Breath sounds: Rhonchi present. Abdominal:      General: Bowel sounds are normal. There is no distension. Musculoskeletal:         General: Swelling present. No deformity. Cervical back: Normal range of motion and neck supple. Skin:     General: Skin is dry. Coloration: Skin is not jaundiced. Findings: Erythema present. Neurological:      Mental Status: She is alert and oriented to person, place, and time. Mental status is at baseline. Psychiatric:         Mood and Affect: Mood normal.         Behavior: Behavior normal.          =======================================================================================     DATA:  Diagnostic tests reviewed by me for today's visit:   (AS NEEDED FOR MY EVALUATION AND MANAGEMENT).        Recent Labs     09/11/22  0540 09/13/22  0452   WBC 4.7 4.6   HCT 27.0* 31.3*    226       Iron Saturation:  No components found for: PERCENTFE  FERRITIN:    Lab Results   Component Value Date/Time    FERRITIN 670.1 Microalbumen/Creatinine ratio:  No components found for: RUCREAT  24 Hour Urine for Protein:  No components found for: RAWUPRO, UHRS3, MOPU76BZ, UTV3  24 Hour Urine for Creatinine Clearance:  No components found for: CREAT4, UHRS10, UTV10  Urine Toxicology:  No components found for: IAMMENTA, IBARBIT, IBENZO, ICOCAINE, IMARTHC, IOPIATES, IPHENCYC    HgBA1c:    Lab Results   Component Value Date/Time    LABA1C 4.9 10/06/2021 08:16 AM     RPR:  No results found for: RPR  HIV:  No results found for: HIV  ROMEO:  No results found for: ANATITER, ROMEO  RF:  No results found for: RF  DSDNA:  No components found for: DNA  AMYLASE:  No results found for: AMYLASE  LIPASE:    Lab Results   Component Value Date/Time    LIPASE 21.0 10/22/2019 03:08 PM     Fibrinogen Level:  No components found for: FIB       BELOW MENTIONED RADIOLOGY STUDY RESULTS BY ME (AS NEEDED FOR MY EVALUATION AND MANAGEMENT). Echo Complete    Result Date: 9/3/2022  Transthoracic Echocardiography Report (TTE)  Demographics   Patient Name       [facility]   Date of Study      09/03/2022        Gender              Female   Patient Number     [id number]        Date of Birth       1940   Visit Number       [id number]         Age                 78 year(s)   Accession Number   [id number]        Room Number         46   Corporate ID       [id number]          Sonographer         Roberta Ramirez, T   Ordering Physician Roseann Barnes MD Interpreting        Pamela Avila MD,                                       Physician           Vibha Mcclain  Procedure Type of Study   TTE procedure:ECHOCARDIOGRAM COMPLETE 2D W DOPPLER W COLOR. Procedure Date Date: 09/03/2022 Start: 07:24 AM Study Location: Portable Technical Quality: Adequate visualization Indications:Dyspnea/SOB.  Patient Status: Routine Height: 61 inches Weight: 181 pounds BSA: 1.81 m2 BMI: 34.2 kg/m2 BP: 133/60 mmHg  Conclusions Summary  Mild septal left ventricular hypertrophy. Normal left ventricle size and  systolic function with an estimated ejection fraction of 55-60%. No regional  wall motion abnormalities are seen. Grade I diastolic dysfunction with normal LV filling pressures. Left sided pleural effusion noted. Signature   ------------------------------------------------------------------  Electronically signed by Dave Villaseñor MD, [facility] [place], [address]  (Interpreting physician) on 09/03/2022 at 12:49 PM  ------------------------------------------------------------------   Findings   Left Ventricle  Mild septal left ventricular hypertrophy. Normal left ventricle size and  systolic function with an estimated ejection fraction of 55-60%. No regional  wall motion abnormalities are seen. Grade I diastolic dysfunction with normal LV filling pressures. Avg. E/e'=  13.5. Mitral Valve  The mitral valve is thickened with adequate excursion. Trivial mitral  regurgitation. No evidence of mitral stenosis. Left Atrium  The left atrium is normal in size. Aortic Valve  The aortic valve is sclerotic with adequate excursion. Trivial aortic  insufficiency. No evidence of aortic stenosis. Aorta  The aortic root is normal in size. Right Ventricle  The right ventricle is normal in size and function. TAPSE= 2.66 cm. Tricuspid Valve  The tricuspid valve is normal in structure and function. Trivial tricuspid  regurgitation. No evidence of tricuspid stenosis. Right Atrium  The right atrial size is normal.   Pulmonic Valve  The pulmonic valve is not well visualized. Trivial pulmonic insufficiency. No evidence of pulmonic stenosis. Pericardial Effusion  No pericardial effusion noted. Pleural Effusion  Left sided pleural effusion noted. Miscellaneous  The inferior vena cava appears normal in size with normal respiratory  variation.   M-Mode/2D Measurements (cm)   LV Diastolic Dimension: 4.7 cm LV Systolic Dimension: 7.93 cm  LV Septum Diastolic: 5.98 cm  LV PW Diastolic: 7.43 cm       AO Root Dimension: 3 cm                                 LA Dimension: 4 cm                                 LA Area: 17.7 cm2  LVOT: 2 cm                     LA volume/Index: 46.1 ml /25 ml/m2  Doppler Measurements   AV Peak Velocity: 144 cm/s    MV Peak E-Wave: 90.2 cm/s  AV Peak Gradient: 8.29 mmHg   MV Peak A-Wave: 121 cm/s  AV Mean Gradient: 5 mmHg      MV E/A Ratio: 0.75  LVOT Peak Velocity: 99.8 cm/s  AV Area (Continuity):2.4 cm2   E' Septal Velocity: 5.44 cm/s  E' Lateral Velocity: 8.7 cm/s  PV Peak Velocity: 85.3 cm/s  PV Peak Gradient: 2.91 mmHg   Aortic Valve   Peak Velocity: 144 cm/s    Mean Velocity: 103 cm/s  Peak Gradient: 8.29 mmHg   Mean Gradient: 5 mmHg  Area (continuity): 2.4 cm2  AV VTI: 32.8 cm  Aorta   Aortic Root: 3 cm  Ascending Aorta: 2.9 cm  LVOT Diameter: 2 cm      XR CHEST (2 VW)    Result Date: 9/6/2022  EXAMINATION: TWO XRAY VIEWS OF THE CHEST 9/6/2022 1:18 pm COMPARISON: 07/27/2021 HISTORY: ORDERING SYSTEM PROVIDED HISTORY: fever TECHNOLOGIST PROVIDED HISTORY: Reason for exam:->fever Reason for Exam: fever FINDINGS: . The cardiac size is mildly enlarged, however stable. No acute infiltrates or pleural effusions are seen. Pulmonary vascularity appears normal. There is mild ectasia of the thoracic aorta. Stable mild compression T8 vertebral body. .No acute bony abnormalities.  The hilar structures are normal.     No acute cardiopulmonary disease     VL Extremity Venous Bilateral    Result Date: 9/2/2022  Vascular Lower Extremities DVT Study Procedure -- PRELIMINARY SONOGRAPHER REPORT --   Demographics   Patient Name       [facility]   Date of Study      09/02/2022         Gender              Female   Patient Number     [id number]         Date of Birth       1940   Visit Number       [id number]          Age                 78 year(s)   Accession Number   [id number]         Room Number            Corporate ID       [id number] Sonographer         Hubert Lipscomb                                                            RVT, RDMS, AB,                                                            OB/GYN   Ordering Physician Marciano Bernheim,  Interpreting        Nighat Godoy MD                     CNA                Physician  Procedure Type of Study:   Veins:Lower Extremities DVT Study, VASC EXTREMITY VENOUS DUPLEX BILATERAL. Tech Comments Right Limited study due to pt's severe swelling and pain. No evidence of deep vein or superficial vein thrombosis involving the right lower extremity. Right inguinal lymph node measuring 1.3 x 0.6 x 1.0 cm. Calf veins were poorly visualized on the right. Left Limited study due to pt's severe swelling and pain. No evidence of deep vein or superficial vein thrombosis involving the left lower extremity. Left inguinal lymph node measuring 1.9 x 0.7 x 1.4 cm. Calf veins were poorly visualized on the left. Left medial fossa: Cystic structure measuring 2.3 x 0.7 x 2.0 cm. [facility] MELO DIGITAL SCREEN BILATERAL    Result Date: 8/17/2022  EXAMINATION: SCREENING DIGITAL BILATERAL MAMMOGRAM WITH TOMOSYNTHESIS, 8/16/2022 TECHNIQUE: Screening mammography was performed with tomosynthesis including MLO and CC views of the bilateral breasts. Computer aided detection was used for the interpretation of this exam. The technologist reported that patient positioning was limited. COMPARISON: Prior mammograms in 2021, 2020, and 2018 HISTORY: Screening. The patient reports a history of benign left surgical biopsy. There is a family history of breast cancer in the patient's mother. FINDINGS: There are scattered areas of fibroglandular density. No new suspicious masses, microcalcifications, or areas of architectural distortion are identified. Stable postoperative changes are noted in the left breast.     No mammographic evidence of malignancy. BIRADS: BIRADS - CATEGORY 2 Benign Findings.  Normal interval follow-up is recommended in 12 months. OVERALL ASSESSMENT - BENIGN A letter of notification will be sent to the patient regarding the results. The [employer] recommends annual mammograms for women 40 years and older. Summary   Mild septal left ventricular hypertrophy. Normal left ventricle size and   systolic function with an estimated ejection fraction of 55-60%. No regional   wall motion abnormalities are seen. Grade I diastolic dysfunction with normal LV filling pressures. Left sided pleural effusion noted.       Signature      ------------------------------------------------------------------   Electronically signed by Alina Goodwin MD, [facility] [place], [address]   (Interpreting physician) on 09/03/2022 at 12:49 PM   ------------------------------------------------------------------

## 2022-09-13 NOTE — PROGRESS NOTES
Urology Progress Note  [facility]    Provider: CIERRA Xie CNP  Patient ID:  Admission Date: 2022 Name: Joslyn Del Rosario  OR Date: 2022 MRN: [id number]   Patient Location: [id number]-3312/3312-01 : 1940  Attending: Babs Coats MD Date of Service: 2022  PCP: Mony Olsen MD     Diagnoses:  1. Peripheral edema    2. Redness and swelling of lower leg    3. Chronic urinary retention with bilateral hydronephrosis. 4.  Chronic diastolic heart failure     Assessment/Plan:  Intermittent straight catheterize for bladder volume >450mL  -Voiding spontaneously with low pvr 90cc's  -Excellent UOP  -Cr improved 0.7  Exchange external cath per protocol  Flomax  Bethanechol  OOB as tolerated  Urology will sign out, call with any questions    The patient had a chance to ask questions which were answered. she understands the above plan. Subjective:   Joslyn Del Rosario is a 78 y.o. female. She was seen and examined this morning. Today we discussed continued medications and need rehabilitation     Objective:   Vitals:  Vitals:    22 0419   BP: 122/66   Pulse: 66   Resp: 15   Temp: 97.7 °F (36.5 °C)   SpO2: 98%       Intake/Output Summary (Last 24 hours) at 2022 0845  Last data filed at 2022 6695  Gross per 24 hour   Intake 250 ml   Output 1200 ml   Net -950 ml       Physical Exam:  Gen: Alert and oriented x3, no acute distress  CV: Regular rate   Resp: unlabored respirations  Abd: Soft, non-distended, non-tender, no masses  Ext: no peripheral edema noted, moves upper and lower extremities spontaneously  Skin: warmand well perfused, no rashes noted on the face, or arms.      Labs:  Lab Results   Component Value Date    WBC 4.6 2022    HGB 10.2 (L) 2022    HCT 31.3 (L) 2022    MCV 86.6 2022     2022     Lab Results   Component Value Date    CREATININE 0.6 2022    BUN 19 2022     2022    K 4.1 2022     09/13/2022    CO2 28 09/13/2022       CIERRA Iglesias - SAUD   9/13/2022

## 2022-09-13 NOTE — PROGRESS NOTES
[facility] HOSPITALISTS PROGRESS NOTE    9/13/2022 7:38 AM        Name: Gabe Thomson . Admitted: 9/2/2022  Primary Care Provider: Jimena Portillo MD (Tel: [phone])      Brief History: Presented with swelling and redness BLE. Failed outpatient management with Lasix. Admitted with cellulitis BLE.     Seen while up in the chair  Reports some lower leg pain/ neuropathy     Reviewed interval ancillary notes    Current Medications  methylPREDNISolone (MEDROL DOSEPACK) tablet 4 mg, QAM AC  methylPREDNISolone (MEDROL DOSEPACK) tablet 4 mg, Lunch  methylPREDNISolone (MEDROL DOSEPACK) tablet 4 mg, Dinner  methylPREDNISolone (MEDROL DOSEPACK) tablet 8 mg, Nightly  [START ON 9/14/2022] methylPREDNISolone (MEDROL DOSEPACK) tablet 4 mg, Nightly  pantoprazole (PROTONIX) tablet 40 mg, BID AC  acetaminophen (TYLENOL) tablet 650 mg, TID  lidocaine 4 % external patch 1 patch, Daily  tiZANidine (ZANAFLEX) tablet 4 mg, Q6H PRN  tamsulosin (FLOMAX) capsule 0.4 mg, Nightly  bethanechol (URECHOLINE) tablet 10 mg, TID  cephALEXin (KEFLEX) capsule 500 mg, 4 times per day  furosemide (LASIX) tablet 20 mg, Daily  polyethylene glycol (GLYCOLAX) packet 17 g, Daily  sennosides-docusate sodium (SENOKOT-S) 8.6-50 MG tablet 2 tablet, BID  nitroGLYCERIN (NITROSTAT) SL tablet 0.4 mg, Q5 Min PRN  morphine (PF) injection 2 mg, Q4H PRN  gabapentin (NEURONTIN) capsule 100 mg, TID  busPIRone (BUSPAR) tablet 10 mg, Q6H PRN  HYDROcodone-acetaminophen (NORCO) 5-325 MG per tablet 1 tablet, Q6H PRN  calcium carbonate (TUMS) chewable tablet 500 mg, TID PRN  diclofenac sodium (VOLTAREN) 1 % gel 4 g, TID  lactobacillus (CULTURELLE) capsule 1 capsule, BID WC  aspirin EC tablet 81 mg, Daily  buPROPion (WELLBUTRIN XL) extended release tablet 150 mg, Daily  vilazodone HCl (VIIBRYD) TABS 20 mg, Daily  sodium chloride flush 0.9 % injection 5-40 mL, 2 times per day  sodium chloride flush 0.9 % injection 5-40 mL, PRN  0.9 % sodium chloride infusion, PRN  ondansetron (ZOFRAN-ODT) disintegrating tablet 4 mg, Q8H PRN   Or  ondansetron (ZOFRAN) injection 4 mg, Q6H PRN  acetaminophen (TYLENOL) tablet 650 mg, Q6H PRN   Or  acetaminophen (TYLENOL) suppository 650 mg, Q6H PRN  perflutren lipid microspheres (DEFINITY) injection 1.65 mg, ONCE PRN      Objective:  /66   Pulse 66   Temp 97.7 °F (36.5 °C) (Oral)   Resp 15   Ht 5' 1\" (1.549 m)   Wt 168 lb 3.2 oz (76.3 kg)   SpO2 98%   BMI 31.78 kg/m²     Intake/Output Summary (Last 24 hours) at 9/13/2022 0738  Last data filed at 9/13/2022 0646  Gross per 24 hour   Intake 250 ml   Output 1700 ml   Net -1450 ml        Wt Readings from Last 3 Encounters:   09/13/22 168 lb 3.2 oz (76.3 kg)   09/02/22 181 lb 12.8 oz (82.5 kg)   08/16/22 150 lb (68 kg)     General:  Awake, alert, oriented in NAD  Skin:  Warm and dry. No unusual bruising or rash, redness in legs resolved  Neck:  Supple. No JVD appreciated  Chest:  Normal effort. Clear to auscultation, no wheezes/rhonchi/rales  Cardiovascular:  RRR, normal S1/S2, no murmur/gallop/rub  Abdomen:  Soft, nontender, +bowel sounds  Extremities:  No edema  Neurological: No focal deficits  Psychological: Normal mood and affect        Labs and Tests:  CBC:   Recent Labs     09/11/22  0540 09/13/22 0452   WBC 4.7 4.6   HGB 8.9* 10.2*    226       BMP:    Recent Labs     09/11/22  0540 09/13/22 0452   * 137   K 3.5 4.1   CL 99 100   CO2 28 28   BUN 22* 19   CREATININE 0.7 0.6   GLUCOSE 108* 118*       Hepatic:   No results for input(s): AST, ALT, ALB, BILITOT, ALKPHOS in the last 72 hours. Venous Doppler 9/2/2022:  Right   Limited study due to pt's severe swelling and pain. No evidence of deep vein or superficial vein thrombosis involving the right   lower extremity. Right inguinal lymph node measuring 1.3 x 0.6 x 1.0 cm. Calf veins were poorly visualized on the right.    Left Limited study due to pt's severe swelling and pain. No evidence of deep vein or superficial vein thrombosis involving the left   lower extremity. Left inguinal lymph node measuring 1.9 x 0.7 x 1.4 cm. Calf veins were poorly visualized on the left. Left medial fossa: Cystic structure measuring 2.3 x 0.7 x 2.0 cm. Echo 9/2/2022:  Summary   Mild septal left ventricular hypertrophy. Normal left ventricle size and systolic function with an estimated ejection fraction of 55-60%. No regional wall motion abnormalities are seen. Grade I diastolic dysfunction with normal LV filling pressures. Left sided pleural effusion noted. CXR 9/6/2022:  No acute cardiopulmonary disease    LHC 9/9/2022:  Findings  Artery Findings/Result   LM Normal   LAD Normal   Cx Normal   RI N/A   RCA Normal   LVEDP 14   LVG 55%   Intervention(s)  None  Post Cath Dx:       Normal coronaries  Normal EF  Possible myopericarditis? MRI Lumbar Spine 9/10/2022:  Multilevel lumbar spondylosis without significant canal stenosis. Shallow central/right paracentral disc protrusion at L1-2 along with   degenerative facet changes and levocurvature of the thoracolumbar spine   contributing to encroachment on the right lateral recess and moderate right   foraminal stenosis. Problem List  Principal Problem:    Cellulitis  Active Problems:    Dyslipidemia    Class 1 obesity due to excess calories with body mass index (BMI) of 30.0 to 30.9 in adult    History of depression    Bilateral lower leg cellulitis    Peripheral edema    Redness and swelling of lower leg    Weight loss counseling, encounter for    NSTEMI (non-ST elevated myocardial infarction) ([address]    Acute idiopathic myocarditis    High fever    Current mild episode of major depressive disorder without prior episode ([address]  Resolved Problems:    * No resolved hospital problems. *       Assessment & Plan:   Chest pain / elevated troponin.  Patient developed acute chest pain overnight, troponin 0.01->0.39->0. 08. Urgent C 9/9 showed normal cors and normal EF; possible myopericarditis. Still with chest discomfort but improving. Discussed with cardiology, will DC colchicine since starting steroids. Cardio to arrange for outpatient MRI of heart. Cellulitis BLE. Venous doppler negative for DVT. , sed rate 70. Started on Ancef with improvement, has transitioned to keflex 500 mg q 6 hours with stop date on Monday. Continue ACE wraps and leg elevation when up. Appreciate ID recs. Lower leg edema. Echo with EF 66-72%, grade 1 diastolic dysfunction, normal filling pressures. Doubt CHF exacerbation as no pulmonary edema on CXR and BNP only 248 and comparable to prior. Likely secondary to immobility, dependent positioning of legs when up, possible venous insufficiency, chronic diastolic CHF. Improved with ACE wraps and diuresis. Hyponatremia. Sodium 137 on admission, trended down to 131 and remains stable, 135 today. Appreciate nephrology recs. Pain BLE. Patient describes intractable pain in bilateral legs. She has been receiving gabapentin with little to no improvement. Has been refusing to work with therapy and get out of bed because legs hurt. Noted to have some urinary retention. CK level 209. 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 Neurosurgery recommends MRI thoracic spine to evaluate for cord compression to explain urinary retention. Recommend consideration for medrol dose pack for radicular pain and consideration for epidural spinal injection as outpatient. Appreciate neurosurgery recs. Consult ortho for consideration left knee injection as she has considerable arthritis pain in knee as well. Anxiety / depression. Continue bupropion and vilazodone. Hypokalemia. Resolved. Secondary to diuresis, magnesium 2.20. Normocytic anemia. Hgb slowly trending down, 11.5->10.4->9.6.  Denies black or tarry stools prior to admit. Labs show iron deficiency (iron 15, saturation 9%) and received IV Venofer x 2 doses. No deficiency of vitamin B12 or folate. Occult blood negative. Continue pantoprazole daily. Urinary retention. Llamas placed for inability to void and 680 ml on bladder scan. Started on Flomax and bethanechol. Evaluated by urology, recommends intermittent straight cath regimen. Llamas has been DCd. Pt is stable for d/c to [facility]. Scripts signed. Awaiting pre cert       Diet: ADULT DIET; Regular;  No Added Salt (3-4 gm)  Code:Full Code  DVT PPX: enoxaparin      CIERRA Valencia - CNP   9/13/2022 7:38 AM

## 2022-09-13 NOTE — PROGRESS NOTES
Infectious Diseases   Progress Note      Admission Date: 9/2/2022  Hospital Day: Hospital Day: 12   Attending: Arneta Najjar, MD  Date of service: 9/13/2022     Chief complaint/ Reason for consult:     High fever of 101.8  Bilateral leg cellulitis  Negative bilateral lower extremity venous Doppler study on 9/2/2022  Gastroesophageal reflux disease    Microbiology:        I have reviewed allavailable micro lab data and cultures    Blood culture (2/2) - collected on 9/2/2022: Negative      Antibiotics and immunizations:       Current antibiotics: All antibiotics and their doses were reviewed by me    Recent Abx Admin                     cephALEXin (KEFLEX) capsule 500 mg (mg) 500 mg Given 09/13/22 1251     500 mg Given  0642     500 mg Given 09/12/22 2336     500 mg Given  1718                      Immunization History: All immunization history was reviewed by me today. Immunization History   Administered Date(s) Administered    COVID-19, MODERNA BLUE border, Primary or Immunocompromised, (age 12y+), IM, 100 mcg/0.5mL 01/21/2021, 02/18/2021, 10/28/2021, 05/12/2022    Influenza 11/24/2010    Influenza Vaccine, unspecified formulation 10/01/2016    Influenza Virus Vaccine 10/01/2012, 10/01/2013, 10/13/2015    Influenza, FLUAD, (age 72 y+), Adjuvanted, 0.5mL 10/12/2020    Influenza, High Dose (Fluzone 65 yrs and older) 10/12/2017, 10/09/2018, 10/28/2021    Pneumococcal Conjugate 13-valent ([id number]) 07/13/2015    Pneumococcal Polysaccharide ([id number]) 05/07/2012    Td, unspecified formulation 09/30/2009    Tdap (Boostrix, Adacel) 01/08/2021    Zoster Live (Zostavax) 08/01/2013    Zoster Recombinant (Shingrix) 01/08/2021, 04/24/2021       Known drug allergies: All allergies were reviewed and updated    No Known Allergies    Social history:     Social History:  All social andepidemiologic history was reviewed and updated by me today as needed. Tobacco use:   reports that she has never smoked.  She has never used smokeless tobacco.  Alcohol use:   reports current alcohol use. Currently lives in: [facility]   reports no history of drug use. COVID VACCINATION AND LAB RESULT RECORDS:     Internal Administration   First Dose COVID-19, LISANDRA wray, Primary or Immunocompromised, (age 12y+), IM, 100 mcg/0.5mL  01/21/2021   Second Dose COVID-19, LISANDRA wray, Primary or Immunocompromised, (age 12y+), IM, 100 mcg/0.5mL   02/18/2021       Last COVID Lab No results found for: SARS-COV-2, SARS-COV-2 RNA, SARS-COV-2, SARS-COV-2, SARS-COV-2 BY PCR, SARS-COV-2, SARS-COV-2, SARS-COV-2         Assessment:     The patient is a 78 y.o. old female who  has a past medical history of AR (allergic rhinitis), Arthritis of knee, Depression, Erosive gastritis (10/28/2019), GERD (gastroesophageal reflux disease), Hyperlipidemia, Internal hemorrhoids, Overweight(278.02), and Viral meningitis (5/12). with following problems:    High fever of 101. 8-this had resolved, no more fevers  Bilateral leg cellulitis-is improving on oral Keflex  Negative bilateral lower extremity venous Doppler study on 9/2/2022  Gastroesophageal reflux disease-remains a stable  History of depression  Dyslipidemia  Obesity Class 1 due to excess calorie intake : Body mass index is 30.38 kg/m²-counseling done      Discussion:      The patient is afebrile. She is on oral Keflex. She is tolerating antibiotics okay. Serum creatinine 0.6. Liver functions are okay. Plan:     Diagnostic Workup:      Continue to follow  fever curve, WBC count and blood cultures. Continue to monitor blood counts, liver and renal function. Antimicrobials:    Continue oral Keflex 500 mg every 6 hour  Plan to continue oral Keflex until Monday, September 19  Keep your legs elevated while lying down or sitting up  Continue close vitals monitoring. Maintain good glycemic control. Fall precautions. Aspiration precautions.   Continue to watch for new fever or diarrhea. DVT prophylaxis. Discussed all above with patient and RN. Drug Monitoring:    Continue monitoring for antibiotic toxicity as follows: CBC, CMP   Continue to watch for following: new or worsening fever, new hypotension, hives, lip swelling and redness or purulence at vascular access sites. I/v access Management:    Continue to monitor i.v access sites for erythema, induration, discharge or tenderness. As always, continue efforts to minimize tubes/lines/drains as clinically appropriate to reduce chances of line associated infections. Patient education and counseling: The patient was educated in detail about the side-effects of various antibiotics and things to watch for like new rashes, lip swelling, severe reaction, worsening diarrhea, break through fever etc.  Discussed patient's condition and what to expect. All of the patient's questions were addressed in a satisfactory manner and patient verbalized understanding all instructions. Thanks for allowing me to participate in your patient's care. I will sign off today, but will be available to answer any further questions or concerns that may arise during patient's stay in the hospital.      Subjective: Interval history: Interval history was obtained from chart review and patient/ RN. She is afebrile. She is tolerating Keflex okay. No diarrhea. REVIEW OF SYSTEMS:      Review of Systems   Constitutional:  Negative for chills, diaphoresis and fever. HENT:  Negative for ear discharge, ear pain, postnasal drip, rhinorrhea, sinus pressure, sinus pain and sore throat. Eyes:  Negative for discharge and redness. Respiratory:  Negative for cough, shortness of breath and wheezing. Cardiovascular:  Negative for chest pain and leg swelling. Gastrointestinal:  Negative for abdominal pain, constipation, diarrhea and nausea. Endocrine: Negative for cold intolerance, heat intolerance and polydipsia.    Genitourinary: Negative for dysuria, flank pain, frequency, hematuria and urgency. Musculoskeletal:  Negative for back pain and myalgias. Skin:  Negative for rash. Allergic/Immunologic: Negative for immunocompromised state. Neurological:  Negative for dizziness, seizures and headaches. Hematological:  Does not bruise/bleed easily. Psychiatric/Behavioral:  Negative for agitation, hallucinations and suicidal ideas. The patient is not nervous/anxious. All other systems reviewed and are negative. Past Medical History: All past medical history reviewed today. Past Medical History:   Diagnosis Date    AR (allergic rhinitis)     Arthritis of knee     Pruis    Depression     Erosive gastritis 10/28/2019    EGD October 2019, he did with PPI    GERD (gastroesophageal reflux disease)     Hyperlipidemia     Internal hemorrhoids     Overweight(278.02)     Viral meningitis 5/12       Past Surgical History: All past surgical history was reviewed today. Past Surgical History:   Procedure Laterality Date    BREAST BIOPSY      CHOLECYSTECTOMY, LAPAROSCOPIC  2003    COLONOSCOPY  8/06    normal; Ortega    COLONOSCOPY  12/3/13    Ortega- polyps    HIP SURGERY Left 5/16/2022    LEFT HIP HEMIARTHROPLASTY performed by Claude Richters, MD at [address] [place] [address] ([address] [place] [address])      AKASH AND BSO (CERVIX REMOVED)  2003    UPPER GASTROINTESTINAL ENDOSCOPY  12/3/13    Melda Vane; antritis       Family History: All family history was reviewed today.         Problem Relation Age of Onset    Breast Cancer Mother     Bipolar Disorder Brother        Objective:       PHYSICAL EXAM:      Vitals:   Vitals:    09/13/22 0419 09/13/22 0645 09/13/22 0901 09/13/22 1207   BP: 122/66  132/69 (!) 145/70   Pulse: 66  73 84   Resp: 15  16 16   Temp: 97.7 °F (36.5 °C)  98 °F (36.7 °C) 97.8 °F (36.6 °C)   TempSrc: Oral  Oral Axillary   SpO2: 98%  98% 97%   Weight:  168 lb 3.2 oz (76.3 kg)     Height:           Physical Exam  Vitals and nursing note reviewed. Constitutional:       Appearance: She is well-developed. She is not diaphoretic. Comments: The patient was seen earlier today. HENT:      Head: Normocephalic and atraumatic. Right Ear: External ear normal. There is no impacted cerumen. Left Ear: External ear normal. There is no impacted cerumen. Nose: Nose normal.      Mouth/Throat:      Mouth: Mucous membranes are moist.      Pharynx: Oropharynx is clear. No oropharyngeal exudate. Eyes:      General: No scleral icterus. Right eye: No discharge. Left eye: No discharge. Conjunctiva/sclera: Conjunctivae normal.      Pupils: Pupils are equal, round, and reactive to light. Neck:      Thyroid: No thyromegaly. Cardiovascular:      Rate and Rhythm: Normal rate and regular rhythm. Heart sounds: Normal heart sounds. No murmur heard. No friction rub. Pulmonary:      Effort: No respiratory distress. Breath sounds: No stridor. No wheezing or rales. Abdominal:      General: Bowel sounds are normal.      Palpations: Abdomen is soft. Tenderness: There is no abdominal tenderness. There is no guarding or rebound. Musculoskeletal:         General: No swelling, tenderness or deformity. Normal range of motion. Cervical back: Normal range of motion and neck supple. Right lower leg: No edema. Left lower leg: No edema. Lymphadenopathy:      Cervical: No cervical adenopathy. Skin:     General: Skin is warm and dry. Coloration: Skin is not jaundiced. Findings: No bruising, erythema or rash. Comments: Improving bilateral leg cellulitis   Neurological:      General: No focal deficit present. Mental Status: She is alert and oriented to person, place, and time. Mental status is at baseline. Motor: No abnormal muscle tone.    Psychiatric:         Mood and Affect: Mood normal.         Behavior: Behavior normal.   *    Lines and drains: All vascular access sites are healthy with no local erythema, discharge or tenderness. Intake and output:    I/O last 3 completed shifts: In: 56 [P.O.:480; I.V.:10]  Out: 2450 [Urine:2450]    Lab Data:   All available labs and old records have been reviewed by me. CBC:  Recent Labs     09/11/22 0540 09/13/22 0452   WBC 4.7 4.6   RBC 3.07* 3.62*   HGB 8.9* 10.2*   HCT 27.0* 31.3*    226   MCV 88.0 86.6   MCH 28.9 28.3   MCHC 32.8 32.6   RDW 13.8 14.0          BMP:  Recent Labs     09/11/22 0540 09/13/22 0452   * 137   K 3.5 4.1   CL 99 100   CO2 28 28   BUN 22* 19   CREATININE 0.7 0.6   CALCIUM 8.7 9.1   GLUCOSE 108* 118*          Hepatic Function Panel:   Lab Results   Component Value Date/Time    ALKPHOS 95 09/08/2022 10:09 PM    ALT <5 09/08/2022 10:09 PM    AST 21 09/08/2022 10:09 PM    PROT 6.5 09/08/2022 10:09 PM    PROT 7.3 10/17/2012 08:47 AM    BILITOT <0.2 09/08/2022 10:09 PM    BILIDIR <0.2 10/22/2019 03:08 PM    IBILI see below 10/22/2019 03:08 PM    LABALBU 2.4 09/11/2022 05:40 AM       CPK:   Lab Results   Component Value Date    CKTOTAL 209 (H) 09/09/2022     ESR:   Lab Results   Component Value Date    SEDRATE 70 (H) 09/06/2022     CRP:   Lab Results   Component Value Date    .9 (H) 09/06/2022           Imaging: All pertinent images and reports for the current visit were reviewed by me during this visit. I reviewed the chest x-ray/CT scan/MRI images and independently interpreted the findings and results today. MRI LUMBAR SPINE WO CONTRAST   Final Result   Multilevel lumbar spondylosis without significant canal stenosis. Shallow central/right paracentral disc protrusion at L1-2 along with   degenerative facet changes and levocurvature of the thoracolumbar spine   contributing to encroachment on the right lateral recess and moderate right   foraminal stenosis.          XR CHEST (2 VW)   Final Result   No acute cardiopulmonary disease         VL Extremity Venous Bilateral   Final Result MRI THORACIC SPINE WO CONTRAST    (Results Pending)       Medications: All current and past medications were reviewed. methylPREDNISolone  4 mg Oral QAM AC    methylPREDNISolone  4 mg Oral Lunch    methylPREDNISolone  4 mg Oral Dinner    methylPREDNISolone  8 mg Oral Nightly    [START ON 9/14/2022] methylPREDNISolone  4 mg Oral Nightly    pantoprazole  40 mg Oral BID AC    acetaminophen  650 mg Oral TID    lidocaine  1 patch TransDERmal Daily    tamsulosin  0.4 mg Oral Nightly    bethanechol  10 mg Oral TID    cephALEXin  500 mg Oral 4 times per day    furosemide  20 mg Oral Daily    polyethylene glycol  17 g Oral Daily    sennosides-docusate sodium  2 tablet Oral BID    gabapentin  100 mg Oral TID    diclofenac sodium  4 g Topical TID    lactobacillus  1 capsule Oral BID WC    aspirin  81 mg Oral Daily    buPROPion  150 mg Oral Daily    vilazodone HCl  20 mg Oral Daily    sodium chloride flush  5-40 mL IntraVENous 2 times per day        sodium chloride 10 mL/hr at 09/07/22 0142       tiZANidine, nitroGLYCERIN, morphine, busPIRone, HYDROcodone 5 mg - acetaminophen, calcium carbonate, sodium chloride flush, sodium chloride, ondansetron **OR** ondansetron, acetaminophen **OR** acetaminophen, perflutren lipid microspheres      Problem list:       Patient Active Problem List   Diagnosis Code    AR (allergic rhinitis) J30.9    Arthritis of knee M17.10    Patient overweight E66.3    High fever R50.9    Postmenopausal Z78.0    Essential hypertension I10    Erosive gastritis K29.60    Current mild episode of major depressive disorder without prior episode ([facility] F32.0    Left displaced femoral neck fracture ([facility] S72.002A    Fall at home [address] uD [place], Y92.009    Dyslipidemia E78.5    Cellulitis L03.90    Class 1 obesity due to excess calories with body mass index (BMI) of 30.0 to 30.9 in adult E66.09, Z68.30    History of depression Z86.59    Bilateral lower leg cellulitis L03.116, L03.115    Peripheral edema R60.9

## 2022-09-13 NOTE — CARE COORDINATION
Discharge note:      CM/SW has been notified of discharge. Patient noted to have the following needs at discharge. CM/SW has coordinated the following services: skilled nursing facility       [facility]  PHONE: [phone]: [phone]   Transportation: [employer]  ([phone])         Discharge Time: 6:30pm  AVS faxed and agency notified: [facility]:[phone]   The following prescriptions sent with patient:  Nurse to call report to facility [phone]  Family updated- SW left a message for the patient's . All CM/SW needs met, will sign off.      Electronically signed by SHAWNA Churchill on 9/13/2022 at 3:38 PM

## 2022-09-13 NOTE — PROGRESS NOTES
[facility]   Phone: [phone]    Occupational Therapy    [] Initial Evaluation            [x] Daily Treatment Note         [] Discharge Summary      Patient: Quentin Zaidi   : 1940   MRN: [id number]   Date of Service:  2022    Admitting Diagnosis:  Cellulitis  Current Admission Summary:   78 y.o. female who presents to the emergency department the chief complaint of worsening bilateral leg swelling and pain. In May she had a left hip fracture and surgery. States over the past 3 weeks she has had some swelling in her legs have gotten worse over the past 10 days with some redness in her bilateral lower extremities that began 3 days ago. She is not on any diuretics or antibiotics. She denies chest pain, shortness of breath, history of heart failure, nausea, vomiting, fevers, history of chronic kidney disease, diabetes or any skin infections or MRSA in the past.  She states moving her legs makes the pain worse. Denies abdominal pain, urinary bowel symptoms or any other symptoms. Past Medical History:  has a past medical history of AR (allergic rhinitis), Arthritis of knee, Depression, Erosive gastritis, GERD (gastroesophageal reflux disease), Hyperlipidemia, Internal hemorrhoids, Overweight(278.02), and Viral meningitis. Past Surgical History:  has a past surgical history that includes Cholecystectomy, laparoscopic (); Total abdominal hysterectomy w/ bilateral salpingoophorectomy (); Colonoscopy (); Colonoscopy (12/3/13); Upper gastrointestinal endoscopy (12/3/13); Breast biopsy; Hysterectomy; and hip surgery (Left, 2022). Discharge Recommendations: Quentin Zaidi scored a 15/24 on the AM-PAC ADL Inpatient form. Current research shows that an AM-PAC score of 17 or less is typically not associated with a discharge to the patient's home setting.  Based on the patient's AM-PAC score and their current ADL deficits, it is recommended that the patient have 3-5 sessions per week of Occupational Therapy at d/c to increase the patient's independence. Please see assessment section for further patient specific details. If patient discharges prior to next session this note will serve as a discharge summary. Please see below for the latest assessment towards goals. DME Required For Discharge: DME to be determined at next level of care    Precautions/Restrictions: medium fall risk  Weight Bearing Restrictions: no restrictions  [] Right Upper Extremity  [] Left Upper Extremity [] Right Lower Extremity  [] Left Lower Extremity     Required Braces/Orthotics: no braces required   [] Right  [] Left  Positional Restrictions:no positional restrictions    Pre-Admission Information   Lives With: spouse                  Type of Home: house  Home Layout: two level, laundry in basement  Home Access:  3 step to enter without rails   Bathroom Layout: . Comment: pt has been using bathroom on 1st floor since may and taking spongebaths  Bathroom Equipment: grab bars around toilet  Toilet Height: elevated height  Home Equipment: rollator - 4 wheeled walker  Transfer Assistance: modified independent with use of 4WRW  Ambulation Assistance:modified independent with use of 4WRW  ADL Assistance: requires assistance with bathing, requires assistance with dressing, . Comment: needs assitance for LEs  IADL Assistance: requires assistance with meal prep, requires assistance with laundry, requires assistance with cleaning, pt helps with some meal prep and cleaning but  does most  Active :        [] Yes                 [x] No  Hand Dominance: [] Left                 [x] Right  Current Employment: . Comment: not asked  Hobbies: reading biographies  Recent Falls: 1 in may resulting in hip fx       Subjective:   General: Pt in supine in bed upon arrival and agreeable to OT/PT co-treatment session.  Pt pleasant and cooperative, motivated to participate in therapeutic activities. Pt very emotional throughout the session and c/o of BLE pain with movement. Pain: 8/10. Location: BLE   Pain Interventions: RN notified and repositioned         Activities of Daily Living  Basic Activities of Daily Living  Grooming: stand by assistance . Comment: combing hair and completing oral care sitting in recliner chair. Cues for initiation. Increased time to complete. Lower Extremity Dressing: maximum assistance requires verbal cueing Increased time to complete task . Comment: pt donned new brief sitting on bedside commode. Required max assist to thread through legs in sitting stance. Transition to standing stance with mod A x2 and assist to pull brief over hips in standing stance. Toileting: maximum assistance requires verbal cueing Increased time to complete task. Toileting Equipment: none  Toileting Comments: Toileting completed on [facility]. Max A for pericare and clothing management following toileting. Comment: increased time required due to c/o of BLE pain. Tearful throughout ADLs secondary to pain. Cues for breathing techniques. Instrumental Activities of Daily Living  No IADL completed on this date.     Functional Mobility  Bed Mobility  Supine to Sit: minimal assistance  Scooting: minimal assistance  Comment: HOB elevated; side rails used; increased time required due to BLE pain; verbal cues required for hand placement, sequencing, initiation  Transfers  Sit to stand transfer:2 person assistance with minimum assistance from sitting EOB   Stand to sit transfer: 2 person assistance with minimum assistance from sitting EOB    Toilet transfer: 2 person assistance with mod A x2 to/from [facility]    Toilet transfer equipment: standard bedside commode  Toilet transfer comments: completed with increased time, vc's for hand placement, sequencing, and initiation   Comment: RW used for all transfers and to provide UE support; verbal cues required for appropriate hand placement and sequencing with the RW. Stand step t/f from [facility] to recliner chair completed with min A x2 and RW. Functional Mobility:  Sitting Balance: supervision, . Comment pt sat EOB ~3 minutes, on BSC ~10 minutes for ADL completion, recliner chair for remainder of seated tasks. Standing Balance: 2 person assistance with minimum assistance . Functional Mobility: .  2 person assistance with maximum assistance   Functional Mobility Activity: pt ambulated from EOB to the [facility] or ~4 feet  Functional Mobility Device Use: rolling walker   Comment: increased time required due to increased BLE pain; verbal cues required for appropriate hand placement, RW management, and sequencing    Functional Outcomes  AM-PAC Inpatient Daily Activity Raw Score: 15    Cognition  WFL - increased time to complete tasks and significantly emotional throughout session   Orientation:    alert and oriented x 4  Command Following:   [facility]  Barriers To Learning: emotional  Patient Education: patient educated on goals, OT role and benefits, plan of care, discharge recommendations  Learning Assessment:  patient verbalizes understanding, would benefit from continued reinforcement    Assessment  Activity Tolerance: Pt responded well to treatment session. Pt pleasant and fully participated in the activities asked of her. Pt's activity tolerance is impacted by increased BLE pain, decreased endurance, and BLE weakness. Impairments Requiring Therapeutic Intervention: decreased functional mobility, decreased ADL status, decreased ROM, decreased strength, decreased endurance, decreased balance, decreased IADL, decreased coordination, increased pain  Prognosis: fair  Clinical Assessment: Patient presenting below functional baseline with above deficits associated with her diagnosis.  Patient's limitations include decreased strength, decreased endurance, increased BLE pain, decreased functional mobility, decreased ADL performance, and decreased IADL performance. Patient received a little help with ADL/IADL performance and transfers prior to admission but now she requires increased assistance with those tasks. Continues to require assist of 2 for transfers and mod-max assist for ADL completion. Patient would benefit from skilled OT services upon discharge in order to return to her PLOF.  Patient to continue acute OT services to maximize safety and independence with task performance in order to reach maximum functional potential.   Safety Interventions: patient left in chair, chair alarm in place, call light within reach, and nurse notified    Plan  Frequency: 3-5 x/per week  Current Treatment Recommendations: strengthening, balance training, functional mobility training, transfer training, endurance training, patient/caregiver education, and ADL/self-care training    Goals  Patient Goals: not stated   Short Term Goals:  Time Frame: discharge   Patient will complete upper body ADL at supervision - goal not addressed 9/8, SBA with UE bathing goal not met 9/12  Patient will complete lower body ADL at minimal assistance - goal not met dependent 9/8, dependent with LB dressing goal not met 9/12  Patient will complete toileting at minimal assistance - goal not met Dependent 9/8, goal not addressed 9/12  Patient will complete functional transfers at contact guard assistance - Mod A x 2 goal not met 9/8, Max x 2 goal not met 9/12  Patient will increase AMPAC ADL score = to or > than 18/24 goal not met 13/24 9/8, 13/24 goal not met 9/12    Therapy Session Time     Individual Group Co-treatment   Time In    0952   Time Out    1034   Minutes    42      Timed Code Minutes: 42 Minutes  Total Treatment Minutes: 42      Electronically Signed By: TAMRA Schmidt/SADIE -- [id number]

## 2022-09-13 NOTE — CARE COORDINATION
SW was informed at 5:15pm that patient's MRI, which may or may not hold up pt's discharge, was completed but not read at that time. SW cancelled transport with [address] from RN that pt's MRI was read, hospitalist informed and pt was clear for discharge at this time. SW called [address] back and rescheduled transport for 9:45am tomorrow. Both RN and patient was informed verbally. Discharge packet already completed and on pt's chart. Called Reba at [facility] and informed this information as well. Discharge Plan:  [address] Afshin Loerajoni   Report = [phone]  Fax = [phone]    [address] transport rescheduled for 9:45am tomorrow, 09/14/22.     Electronically signed by SHAWNA Lo, TICOW on 9/13/2022 at 6:26 PM

## 2022-09-13 NOTE — PROGRESS NOTES
Physical Therapy    [facility]   Phone: [phone]    Physical Therapy    [] Initial Evaluation            [x] Daily Treatment Note         [] Discharge Summary      Patient: Jenniffer Reddy   : 1940   MRN: [id number]   Date of Service:  2022    Admitting Diagnosis: Cellulitis  Current Admission Summary: per MD: Jenniffer Reddy is a 78 y.o. female who presents to the emergency department the chief complaint of worsening bilateral leg swelling and pain. In May she had a left hip fracture and surgery. States over the past 3 weeks she has had some swelling in her legs have gotten worse over the past 10 days with some redness in her bilateral lower extremities that began 3 days ago. She is not on any diuretics or antibiotics. She denies chest pain, shortness of breath, history of heart failure, nausea, vomiting, fevers, history of chronic kidney disease, diabetes or any skin infections or MRSA in the past.  She states moving her legs makes the pain worse. Denies abdominal pain, urinary bowel symptoms or any other symptoms. Past Medical History:  has a past medical history of AR (allergic rhinitis), Arthritis of knee, Depression, Erosive gastritis, GERD (gastroesophageal reflux disease), Hyperlipidemia, Internal hemorrhoids, Overweight(278.02), and Viral meningitis. Past Surgical History:  has a past surgical history that includes Cholecystectomy, laparoscopic (); Total abdominal hysterectomy w/ bilateral salpingoophorectomy (); Colonoscopy (); Colonoscopy (12/3/13); Upper gastrointestinal endoscopy (12/3/13); Breast biopsy; Hysterectomy; and hip surgery (Left, 2022). Discharge Recommendations: Jenniffer Reddy scored a 12/24 on the AM-PAC short mobility form. Current research shows that an AM-PAC score of 17 or less is typically not associated with a discharge to the patient's home setting.  Based on the patient's AM-PAC score and situation. Calming words provided and encouraged pt to take deep breaths   Pain: 8/10. Location: L knee  Pain Interventions: RN notified       Functional Mobility  Bed Mobility  Supine to Sit: minimal assistance  Scooting: contact guard assistance  Comments: HOB elevated, grab bars, increased time and effort to complete   Transfers  Sit to stand transfer: 2 person assistance with min A   Stand to sit transfer: 2 person assistance with min A    Stand step transfer: 2 person assistance with Arsh    Toilet transfer: 2 person assistance with mod A   Comments: Stand step transfer from EOB>BSC>recliner with min A x2 RW, assist with RW navigation   Ambulation  Ambulation not tested on this date. Stair Mobility  Stair mobility not completed on this date. Comments:  Wheelchair Mobility:  No w/c mobility completed on this date. Comments:  Balance  Static Sitting Balance: fair (+): maintains balance at SBA/supervision without use of UE support  Dynamic Sitting Balance: fair (+): maintains balance at SBA/supervision without use of UE support  Static Standing Balance: poor (+): requires min (A) to maintain balance  Dynamic Standing Balance: poor (-): requires max (A) to maintain balance  Comments: Pt sat at [address] ~8 mins with SBA for BM. Pt stood at RW ~1 min with min A while OT provided dependent pericare and brief change. Pt sat in recliner with supervision while completing ADLs with OT (see OT notes)     Other Therapeutic Interventions    Functional Outcomes  AM-PAC Inpatient Mobility Raw Score : 12              Cognition  WFL  Orientation:    alert and oriented x 4  Command Following:   [facility]  Education  Barriers To Learning: emotional  Patient Education: patient educated on goals, PT role and benefits, plan of care, discharge recommendations  Learning Assessment:  patient verbalizes and demonstrates understanding     Assessment  Activity Tolerance: limited by pain. Emotionally tearful throughout session.  Words of comfort and support provided throughout   Impairments Requiring Therapeutic Intervention: decreased functional mobility, decreased ADL status, decreased ROM, decreased strength, decreased balance  Prognosis: good  Clinical Assessment: Pt demonstrates improvement this date, able to complete bed mobility with min A and complete 2 stand step transfers with RW min Ax2. Pt would continue to benefit from skilled PT in order to safely return to PLOF.    Safety Interventions: patient left in chair, chair alarm in place, call light within reach, gait belt, patient at risk for falls, and nurse notified    Plan  Frequency: 3-5 x/per week  Current Treatment Recommendations: strengthening, ROM, balance training, functional mobility training, manual lymphatic drainage, patient/caregiver education, ADL/self-care training, and home exercise program     Goals  Patient Goals: to go home   Short Term Goals:  Time Frame: upon d/c  Patient will complete bed mobility at modified independent   Patient will complete transfers at contact guard assistance   Patient will ambulate 10 ft with use of LRAD at moderate assistance  No goals met this date        Therapy Session Time      Individual Group Co-treatment   Time In     0952   Time Out     1034   Minutes     42     Total Treatment Minutes:  42       Electronically Signed By: Sydnie Devine, [address], [address] [place] [address]

## 2022-09-14 VITALS
WEIGHT: 170.19 LBS | DIASTOLIC BLOOD PRESSURE: 80 MMHG | BODY MASS INDEX: 32.13 KG/M2 | TEMPERATURE: 98.1 F | SYSTOLIC BLOOD PRESSURE: 160 MMHG | OXYGEN SATURATION: 97 % | HEIGHT: 61 IN | RESPIRATION RATE: 16 BRPM | HEART RATE: 74 BPM

## 2022-09-14 PROCEDURE — 97530 THERAPEUTIC ACTIVITIES: CPT

## 2022-09-14 PROCEDURE — 6370000000 HC RX 637 (ALT 250 FOR IP): Performed by: UROLOGY

## 2022-09-14 PROCEDURE — 6370000000 HC RX 637 (ALT 250 FOR IP): Performed by: HOSPITALIST

## 2022-09-14 PROCEDURE — 6370000000 HC RX 637 (ALT 250 FOR IP): Performed by: FAMILY MEDICINE

## 2022-09-14 PROCEDURE — 6370000000 HC RX 637 (ALT 250 FOR IP): Performed by: NURSE PRACTITIONER

## 2022-09-14 PROCEDURE — 6370000000 HC RX 637 (ALT 250 FOR IP): Performed by: INTERNAL MEDICINE

## 2022-09-14 PROCEDURE — 6360000002 HC RX W HCPCS: Performed by: NURSE PRACTITIONER

## 2022-09-14 RX ADMIN — VILAZODONE HYDROCHLORIDE 20 MG: 20 TABLET ORAL at 08:24

## 2022-09-14 RX ADMIN — DICLOFENAC SODIUM 4 G: 10 GEL TOPICAL at 08:25

## 2022-09-14 RX ADMIN — POLYETHYLENE GLYCOL 3350 17 G: 17 POWDER, FOR SOLUTION ORAL at 08:26

## 2022-09-14 RX ADMIN — BUPROPION HYDROCHLORIDE 150 MG: 150 TABLET, EXTENDED RELEASE ORAL at 08:24

## 2022-09-14 RX ADMIN — Medication 1 CAPSULE: at 08:24

## 2022-09-14 RX ADMIN — BETHANECHOL CHLORIDE 10 MG: 10 TABLET ORAL at 08:25

## 2022-09-14 RX ADMIN — CEPHALEXIN 500 MG: 250 CAPSULE ORAL at 05:36

## 2022-09-14 RX ADMIN — HYDROCODONE BITARTRATE AND ACETAMINOPHEN 1 TABLET: 5; 325 TABLET ORAL at 05:36

## 2022-09-14 RX ADMIN — STANDARDIZED SENNA CONCENTRATE AND DOCUSATE SODIUM 2 TABLET: 8.6; 5 TABLET ORAL at 08:24

## 2022-09-14 RX ADMIN — METHYLPREDNISOLONE 4 MG: 4 TABLET ORAL at 07:25

## 2022-09-14 RX ADMIN — PANTOPRAZOLE SODIUM 40 MG: 40 TABLET, DELAYED RELEASE ORAL at 05:36

## 2022-09-14 RX ADMIN — GABAPENTIN 100 MG: 100 CAPSULE ORAL at 08:25

## 2022-09-14 RX ADMIN — FUROSEMIDE 20 MG: 20 TABLET ORAL at 08:24

## 2022-09-14 RX ADMIN — ASPIRIN 81 MG: 81 TABLET, COATED ORAL at 08:24

## 2022-09-14 RX ADMIN — ACETAMINOPHEN 650 MG: 325 TABLET ORAL at 08:24

## 2022-09-14 ASSESSMENT — PAIN SCALES - GENERAL
PAINLEVEL_OUTOF10: 0
PAINLEVEL_OUTOF10: 8

## 2022-09-14 ASSESSMENT — PAIN DESCRIPTION - LOCATION: LOCATION: KNEE

## 2022-09-14 ASSESSMENT — PAIN DESCRIPTION - ORIENTATION: ORIENTATION: LEFT

## 2022-09-14 NOTE — DISCHARGE SUMMARY
[facility]    Patient Demographics    Patient. Lis Stallworth  Date of Birth. 1940  MRN. [id number]     Primary care provider. Chelsey Walsh MD  (Tel: [phone])    Admit date: 9/2/2022    Discharge date 9/14/2022  Note Date: 9/14/2022     Reason for Hospitalization. Chief Complaint   Patient presents with    Leg Swelling     Bilateral leg swelling that started a few weeks ago. Worse over the last week. Significant Findings. Principal Problem:    Cellulitis  Active Problems:    Dyslipidemia    Class 1 obesity due to excess calories with body mass index (BMI) of 30.0 to 30.9 in adult    History of depression    Bilateral lower leg cellulitis    Peripheral edema    Redness and swelling of lower leg    Weight loss counseling, encounter for    NSTEMI (non-ST elevated myocardial infarction) ([facility] [address]    Acute idiopathic myocarditis    Hyperlipidemia    High fever    Current mild episode of major depressive disorder without prior episode ([facility] [address]  Resolved Problems:    * No resolved hospital problems. *       Problems and results from this hospitalization that need follow up. Anxiety and depression:  follow up with PCP     Significant test results and incidental findings. MRI T spine:  No cord signal abnormality. Chronic compression fracture of T7 with 30% height loss. No retropulsion. No high-grade canal or foraminal stenosis. No nerve impingement. No   significant posterior disc pathology. Mild degenerative changes of thoracic   spine as described. Venous Doppler 9/2/2022:  Right   Limited study due to pt's severe swelling and pain. No evidence of deep vein or superficial vein thrombosis involving the right   lower extremity. Right inguinal lymph node measuring 1.3 x 0.6 x 1.0 cm. Calf veins were poorly visualized on the right.    Left   Limited study due to pt's outpatient cardiac MRI     Lower leg edema. Echo with EF 37-62%, grade 1 diastolic dysfunction, normal filling pressures. Doubt CHF exacerbation as no pulmonary edema on CXR and BNP only 248 and comparable to prior. Likely secondary to immobility, dependent positioning of legs when up, possible venous insufficiency, chronic diastolic CHF. Improved with ACE wraps and diuresis. Hyponatremia. Sodium 137 on admission, trended down to 131 and remains stable, 135 today. Appreciate nephrology recs. Pain BLE. Patient describes intractable pain in bilateral legs. She has been receiving gabapentin with little to no improvement. Has been refusing to work with therapy and get out of bed because legs hurt. Noted to have some urinary retention. CK level 209. 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 Neurosurgery recommends MRI thoracic spine  which was completed and noted above. Anxiety / depression. Continue bupropion and vilazodone. Hypokalemia. Resolved. Secondary to diuresis, magnesium 2.20. Normocytic anemia. Hgb slowly trending down, 11.5->10.4->9.6. Denies black or tarry stools prior to admit. Labs show iron deficiency (iron 15, saturation 9%) and received IV Venofer x 2 doses. No deficiency of vitamin B12 or folate. Occult blood negative. Continue pantoprazole daily. Urinary retention. Llamas placed for inability to void and 680 ml on bladder scan. Started on Flomax and bethanechol. Evaluated by urology,  was voiding qs at [facility] of d/c to [facility]   Consults. IP CONSULT TO HOSPITALIST  IP CONSULT TO INFECTIOUS DISEASES  IP CONSULT TO NEPHROLOGY  IP CONSULT TO CARDIOLOGY  IP CONSULT TO UROLOGY  IP CONSULT TO NEUROSURGERY  IP CONSULT TO ORTHOPEDIC SURGERY    Physical examination on discharge day.    BP (!) 149/66   Pulse 70   Temp 98.2 °F (36.8 °C) (Oral)   Resp 18   Ht 5' 1\" (1.549 m)   Wt 170 lb 3.1 oz (77.2 kg)   SpO2 95%   BMI 32.16 kg/m² General appearance. Alert. Looks comfortable. anxious affect at intervals   HEENT. Sclera clear. Moist mucus membranes. Cardiovascular. Regular rate and rhythm, normal S1, S2. No murmur. Respiratory. Not using accessory muscles. Clear to auscultation bilaterally, no wheeze. Gastrointestinal. Abdomen soft, non-tender, not distended, normal bowel sounds  Neurology. Facial symmetry. No speech deficits. Moving all extremities equally. Extremities. No edema in lower extremities. Skin. Warm, dry, normal turgor    Condition at time of discharge stable     Medication instructions provided to patient at discharge. Medication List        START taking these medications      bethanechol 10 MG tablet  Commonly known as: URECHOLINE  Take 1 tablet by mouth 3 times daily     busPIRone 10 MG tablet  Commonly known as: BUSPAR  Take 1 tablet by mouth 2 times daily as needed (anxiety)     cephALEXin 500 MG capsule  Commonly known as: KEFLEX  Take 1 capsule by mouth 4 times daily for 6 days     diclofenac sodium 1 % Gel  Commonly known as: VOLTAREN  Apply 4 g topically 3 times daily To bilateral knees     * furosemide 20 MG tablet  Commonly known as: Lasix  Take 1 tablet by mouth daily     * furosemide 20 MG tablet  Commonly known as: LASIX  Take 1 tablet by mouth daily     gabapentin 100 MG capsule  Commonly known as: NEURONTIN  Take 1 capsule by mouth 3 times daily for 10 days. HYDROcodone-acetaminophen 5-325 MG per tablet  Commonly known as: NORCO  Take 1 tablet by mouth every 8 hours as needed for Pain for up to 3 days.      lactobacillus capsule  Take 1 capsule by mouth 2 times daily (with meals) for 7 days     lidocaine 4 % external patch  Place 1 patch onto the skin daily     pantoprazole 40 MG tablet  Commonly known as: PROTONIX  Take 1 tablet by mouth 2 times daily (before meals)     polyethylene glycol 17 g packet  Commonly known as: GLYCOLAX  Take 17 g by mouth daily     predniSONE 10 MG tablet  Commonly known as: DELTASONE  Take 1 tablet by mouth See Admin Instructions for 10 days Take 2 tabs per day for 1 week then 1 tab per day until seen by cardiology     sennosides-docusate sodium 8.6-50 MG tablet  Commonly known as: SENOKOT-S  Take 2 tablets by mouth 2 times daily as needed for Constipation     tamsulosin 0.4 MG capsule  Commonly known as: FLOMAX  Take 1 capsule by mouth at bedtime     tiZANidine 4 MG tablet  Commonly known as: ZANAFLEX  Take 1 tablet by mouth every 8 hours as needed (spasms)           * This list has 2 medication(s) that are the same as other medications prescribed for you. Read the directions carefully, and ask your doctor or other care provider to review them with you.                 CONTINUE taking these medications      aspirin 81 MG EC tablet     buPROPion 150 MG extended release tablet  Commonly known as: WELLBUTRIN XL     estradiol 0.1 MG/GM vaginal cream  Commonly known as: ESTRACE     Lift Chair Misc  by Does not apply route     vilazodone HCl 10 MG Tabs  Commonly known as: VIIBRYD     VITAMIN B + C COMPLEX PO     VITAMIN D PO            STOP taking these medications      atorvastatin 10 MG tablet  Commonly known as: LIPITOR               Where to Get Your Medications        You can get these medications from any pharmacy    Bring a paper prescription for each of these medications  bethanechol 10 MG tablet  busPIRone 10 MG tablet  furosemide 20 MG tablet  gabapentin 100 MG capsule  HYDROcodone-acetaminophen 5-325 MG per tablet  pantoprazole 40 MG tablet  predniSONE 10 MG tablet  tiZANidine 4 MG tablet       Information about where to get these medications is not yet available    Ask your nurse or doctor about these medications  cephALEXin 500 MG capsule  diclofenac sodium 1 % Gel  furosemide 20 MG tablet  lactobacillus capsule  lidocaine 4 % external patch  polyethylene glycol 17 g packet  sennosides-docusate sodium 8.6-50 MG tablet  tamsulosin 0.4 MG capsule         Discharge recommendations given to patient. Follow Up. in 1 week   Disposition. SNF  Activity. activity as tolerated  Diet: ADULT DIET; Regular; No Added Salt (3-4 gm)      Spent 35 minutes in discharge process.     Signed:  CIERRA Martinez CNP     9/14/2022 7:24 AM

## 2022-09-14 NOTE — PROGRESS NOTES
This RN called report to [facility], [address] [facility] [place].

## 2022-09-14 NOTE — PLAN OF CARE
Problem: Discharge Planning  Goal: Discharge to home or other facility with appropriate resources  Outcome: Progressing     Problem: Pain  Goal: Verbalizes/displays adequate comfort level or baseline comfort level  Outcome: Progressing     Problem: Skin/Tissue Integrity  Goal: Absence of new skin breakdown  Description: 1. Monitor for areas of redness and/or skin breakdown  2. Assess vascular access sites hourly  3. Every 4-6 hours minimum:  Change oxygen saturation probe site  4. Every 4-6 hours:  If on nasal continuous positive airway pressure, respiratory therapy assess nares and determine need for appliance change or resting period.   Outcome: Progressing     Problem: Safety - Adult  Goal: Free from fall injury  Outcome: Progressing     Problem: ABCDS Injury Assessment  Goal: Absence of physical injury  Outcome: Progressing     Problem: Neurosensory - Adult  Goal: Achieves stable or improved neurological status  Outcome: Progressing     Problem: Respiratory - Adult  Goal: Achieves optimal ventilation and oxygenation  Outcome: Progressing     Problem: Cardiovascular - Adult  Goal: Maintains optimal cardiac output and hemodynamic stability  Outcome: Progressing     Problem: Skin/Tissue Integrity - Adult  Goal: Skin integrity remains intact  Outcome: Progressing     Problem: Genitourinary - Adult  Goal: Absence of urinary retention  Outcome: Progressing     Problem: Musculoskeletal - Adult  Goal: Return mobility to safest level of function  Outcome: Progressing

## 2022-09-14 NOTE — PROGRESS NOTES
Data- discharge order received, pt verbalized agreement to discharge, disposition to [facility] #[id number], [address] reviewed and signed by physician. Action- AVS prepared, JUSTIN completed/ reported faxed by case management/. Discharge instruction summary: Diet- J, Activity- up as tolerated, immunizations reviewed and updated, medications prescriptions to be filled at receiving facility. Transfer code status: Full Code, LDAs to remain with discharge: n/a. DME used: none. Response- Bedside RN to call report to receiving facility. Pt belongings gathered, peripheral IV and cardiac monitoring removed. Disposition to Discharged via cart/stretcher and via ambulance to skilled nursing by EMS transportation, no complications reported. 1. WEIGHT: Admit Weight: 181 lb (82.1 kg) (09/02/22 1135)        Today  Weight: 170 lb 3.1 oz (77.2 kg) (09/14/22 0536)       2.  O2 SAT.: SpO2: 97 % (09/14/22 0815)

## 2022-09-14 NOTE — PLAN OF CARE
Problem: Discharge Planning  Goal: Discharge to home or other facility with appropriate resources  9/14/2022 1051 by Monica Edwards RN  Outcome: Completed  9/14/2022 1050 by Monica Edwards RN  Outcome: Progressing  9/14/2022 0256 by Gogo Aranda RN  Outcome: Progressing     Problem: Pain  Goal: Verbalizes/displays adequate comfort level or baseline comfort level  9/14/2022 1051 by Monica Edwards RN  Outcome: Completed  9/14/2022 1050 by Monica Edwards RN  Outcome: Progressing  9/14/2022 0256 by Gogo Aranda RN  Outcome: Progressing     Problem: Skin/Tissue Integrity  Goal: Absence of new skin breakdown  Description: 1. Monitor for areas of redness and/or skin breakdown  2. Assess vascular access sites hourly  3. Every 4-6 hours minimum:  Change oxygen saturation probe site  4. Every 4-6 hours:  If on nasal continuous positive airway pressure, respiratory therapy assess nares and determine need for appliance change or resting period.   9/14/2022 1051 by Monica Edwards RN  Outcome: Completed  9/14/2022 1050 by Monica Edwards RN  Outcome: Progressing  9/14/2022 0256 by Gogo Aranda RN  Outcome: Progressing     Problem: Safety - Adult  Goal: Free from fall injury  9/14/2022 1051 by Monica Edwards RN  Outcome: Completed  9/14/2022 1050 by Monica Edwards RN  Outcome: Progressing  9/14/2022 0256 by Gogo Aranda RN  Outcome: Progressing     Problem: ABCDS Injury Assessment  Goal: Absence of physical injury  9/14/2022 1051 by Monica Edwards RN  Outcome: Completed  9/14/2022 1050 by Monica Edwards RN  Outcome: Progressing  9/14/2022 0256 by Gogo Aranda RN  Outcome: Progressing     Problem: Neurosensory - Adult  Goal: Achieves stable or improved neurological status  9/14/2022 1051 by Monica Edwards RN  Outcome: Completed  9/14/2022 1050 by Monica Edwards RN  Outcome: Progressing  9/14/2022 0256 by Gogo Aranda, OZ  Outcome: Progressing  Goal: Achieves maximal functionality and self care  9/14/2022 1051 by Monica Edwards RN  Outcome: Completed  9/14/2022 1050 by Jose De La Fuente RN  Outcome: Progressing     Problem: Respiratory - Adult  Goal: Achieves optimal ventilation and oxygenation  9/14/2022 1051 by Jose De La Fuente RN  Outcome: Completed  9/14/2022 1050 by Jose De La Fuente RN  Outcome: Progressing  9/14/2022 0256 by Mary Lou Moran RN  Outcome: Progressing     Problem: Cardiovascular - Adult  Goal: Maintains optimal cardiac output and hemodynamic stability  9/14/2022 1051 by Jose De La Fuente RN  Outcome: Completed  9/14/2022 1050 by Jose De La Fuente RN  Outcome: Progressing  9/14/2022 0256 by Mary Lou Moran RN  Outcome: Progressing  Goal: Absence of cardiac dysrhythmias or at baseline  9/14/2022 1051 by Jose De La Fuente RN  Outcome: Completed  9/14/2022 1050 by Jose De La Fuente RN  Outcome: Progressing     Problem: Skin/Tissue Integrity - Adult  Goal: Skin integrity remains intact  9/14/2022 1051 by Jose De La Fuente RN  Outcome: Completed  9/14/2022 1050 by Jose De La Fuente RN  Outcome: Progressing  9/14/2022 0256 by Mary Lou Moran RN  Outcome: Progressing  Goal: Incisions, wounds, or drain sites healing without S/S of infection  9/14/2022 1051 by Jose De La Fuente RN  Outcome: Completed  9/14/2022 1050 by Jose De La Fuente RN  Outcome: Progressing  Goal: Oral mucous membranes remain intact  9/14/2022 1051 by Jose De La Fuente RN  Outcome: Completed  9/14/2022 1050 by Jose De La Fuente RN  Outcome: Progressing     Problem: Musculoskeletal - Adult  Goal: Return mobility to safest level of function  9/14/2022 1051 by Jose De La Fuente RN  Outcome: Completed  9/14/2022 1050 by Jose De La Fuente RN  Outcome: Progressing  9/14/2022 0256 by Mary Lou Moran RN  Outcome: Progressing  Goal: Maintain proper alignment of affected body part  9/14/2022 1051 by Jose De La Fuente RN  Outcome: Completed  9/14/2022 1050 by Jose De La Fuente RN  Outcome: Progressing  Goal: Return ADL status to a safe level of function  9/14/2022 1051 by Jose De La Fuente, RN  Outcome: Completed  9/14/2022 1050 by Jose De La Fuente, RN  Outcome: Progressing     Problem: Gastrointestinal - Adult  Goal: Minimal or absence of nausea and vomiting  9/14/2022 1051 by Luzma Joel RN  Outcome: Completed  9/14/2022 1050 by Luzma Joel RN  Outcome: Progressing  Goal: Maintains or returns to baseline bowel function  9/14/2022 1051 by Luzma Joel RN  Outcome: Completed  9/14/2022 1050 by Luzma Joel RN  Outcome: Progressing  Goal: Maintains adequate nutritional intake  9/14/2022 1051 by Luzma Joel RN  Outcome: Completed  9/14/2022 1050 by Luzma Joel RN  Outcome: Progressing     Problem: Genitourinary - Adult  Goal: Absence of urinary retention  9/14/2022 1051 by Luzma Joel RN  Outcome: Completed  9/14/2022 1050 by Luzma Joel RN  Outcome: Progressing  9/14/2022 0256 by Aida Rodriguez RN  Outcome: Progressing  Goal: Urinary catheter remains patent  9/14/2022 1051 by Luzma Joel RN  Outcome: Completed  9/14/2022 1050 by Luzma Joel RN  Outcome: Progressing     Problem: Infection - Adult  Goal: Absence of infection at discharge  9/14/2022 1051 by Luzma Joel RN  Outcome: Completed  9/14/2022 1050 by Luzma Joel RN  Outcome: Progressing  Goal: Absence of infection during hospitalization  9/14/2022 1051 by Luzma Joel RN  Outcome: Completed  9/14/2022 1050 by Luzma Joel RN  Outcome: Progressing  Goal: Absence of fever/infection during anticipated neutropenic period  9/14/2022 1051 by Luzma Joel RN  Outcome: Completed  9/14/2022 1050 by Luzma Joel RN  Outcome: Progressing     Problem: Metabolic/Fluid and Electrolytes - Adult  Goal: Electrolytes maintained within normal limits  9/14/2022 1051 by Luzma Joel RN  Outcome: Completed  9/14/2022 1050 by Luzma Joel RN  Outcome: Progressing  Goal: Hemodynamic stability and optimal renal function maintained  9/14/2022 1051 by Luzma Joel RN  Outcome: Completed  9/14/2022 1050 by Luzma Joel RN  Outcome: Progressing     Problem: Hematologic - Adult  Goal: Maintains hematologic stability  9/14/2022 1051 by Luzma Joel RN  Outcome: Completed  9/14/2022 1050 by Luzma Joel RN  Outcome: Progressing     Problem: Anxiety  Goal: Will report anxiety at manageable levels  Description: INTERVENTIONS:  1. Administer medication as ordered  2. Teach and rehearse alternative coping skills  3. Provide emotional support with 1:1 interaction with staff  9/14/2022 1051 by Emmanuel Thomas RN  Outcome: Completed  9/14/2022 1050 by Emmanuel Thomas RN  Outcome: Progressing     Problem: Decision Making  Goal: Pt/Family able to effectively weigh alternatives and participate in decision making related to treatment and care  Description: INTERVENTIONS:  1. Determine when there are differences between patient's view, family's view, and healthcare provider's view of condition  2. Facilitate patient and family articulation of goals for care  3. Help patient and family identify pros/cons of alternative solutions  4. Provide information as requested by patient/family  5. Respect patient/family right to receive or not to receive information  6. Serve as a liaison between patient and family and health care team  7. Initiate Consults from Ethics, Palliative Care or initiate [facility] as is appropriate  9/14/2022 1051 by Emmanuel Thomas RN  Outcome: Completed  9/14/2022 1050 by Emmanuel Thomas RN  Outcome: Progressing     Problem: Confusion  Goal: Confusion, delirium, dementia, or psychosis is improved or at baseline  Description: INTERVENTIONS:  1. Assess for possible contributors to thought disturbance, including medications, impaired vision or hearing, underlying metabolic abnormalities, dehydration, psychiatric diagnoses, and notify attending LIP  2. [place] high risk fall precautions, as indicated  3. Provide frequent short contacts to provide reality reorientation, refocusing and direction  4. Decrease environmental stimuli, including noise as appropriate  5. Monitor and intervene to maintain adequate nutrition, hydration, elimination, sleep and activity  6.  If unable to ensure safety without constant attention obtain sitter and review sitter guidelines with assigned personnel  7. Initiate Psychosocial CNS and Spiritual Care consult, as indicated  9/14/2022 1051 by Ruy Arreola RN  Outcome: Completed  9/14/2022 1050 by Ruy Arreola RN  Outcome: Progressing     Problem: Behavior  Goal: Pt/Family maintain appropriate behavior and adhere to behavioral management agreement, if implemented  Description: INTERVENTIONS:  1. Assess patient/family's coping skills and  non-compliant behavior (including use of illegal substances)  2. Notify security of behavior or suspected illegal substances which indicate the need for search of the family and/or belongings  3. Encourage verbalization of thoughts and concerns in a socially appropriate manner  4. Utilize positive, consistent limit setting strategies supporting safety of patient, staff and others  5. Encourage participation in the decision making process about the behavioral management agreement  6. If a visitor's behavior poses a threat to safety call refer to organization policy. 7. Initiate consult with , Psychosocial CNS, Spiritual Care as appropriate  9/14/2022 1051 by Ruy Arreola RN  Outcome: Completed  9/14/2022 1050 by Ruy Arreola RN  Outcome: Progressing     Problem: Depression/Self Harm  Goal: Effect of psychiatric condition will be minimized and patient will be protected from self harm  Description: INTERVENTIONS:  1. Assess impact of patient's symptoms on level of functioning, self care needs and offer support as indicated  2. Assess patient/family knowledge of depression, impact on illness and need for teaching  3. Provide emotional support, presence and reassurance  4. Assess for possible suicidal thoughts or ideation. If patient expresses suicidal thoughts or statements do not leave alone, initiate Suicide Precautions, move to a room close to the nursing station and obtain sitter  5.  Initiate consults as appropriate with Mental Health Professional, Spiritual Care, Psychosocial CNS, and consider a recommendation to the LIP for a Psychiatric Consultation  9/14/2022 1051 by Gissel Marte RN  Outcome: Completed  9/14/2022 1050 by Gissel Marte RN  Outcome: Progressing

## 2022-09-14 NOTE — PLAN OF CARE
RN  Outcome: Progressing  Goal: Absence of cardiac dysrhythmias or at baseline  Outcome: Progressing     Problem: Skin/Tissue Integrity - Adult  Goal: Skin integrity remains intact  9/14/2022 1050 by Dougie Graf RN  Outcome: Progressing  9/14/2022 0256 by Charli Mercer RN  Outcome: Progressing  Goal: Incisions, wounds, or drain sites healing without S/S of infection  Outcome: Progressing  Goal: Oral mucous membranes remain intact  Outcome: Progressing     Problem: Musculoskeletal - Adult  Goal: Return mobility to safest level of function  9/14/2022 1050 by Dougie Graf RN  Outcome: Progressing  9/14/2022 0256 by Charli Mercer RN  Outcome: Progressing  Goal: Maintain proper alignment of affected body part  Outcome: Progressing  Goal: Return ADL status to a safe level of function  Outcome: Progressing     Problem: Gastrointestinal - Adult  Goal: Minimal or absence of nausea and vomiting  Outcome: Progressing  Goal: Maintains or returns to baseline bowel function  Outcome: Progressing  Goal: Maintains adequate nutritional intake  Outcome: Progressing     Problem: Genitourinary - Adult  Goal: Absence of urinary retention  9/14/2022 1050 by Dougie Graf RN  Outcome: Progressing  9/14/2022 0256 by Charli Mercer RN  Outcome: Progressing  Goal: Urinary catheter remains patent  Outcome: Progressing     Problem: Infection - Adult  Goal: Absence of infection at discharge  Outcome: Progressing  Goal: Absence of infection during hospitalization  Outcome: Progressing  Goal: Absence of fever/infection during anticipated neutropenic period  Outcome: Progressing     Problem: Metabolic/Fluid and Electrolytes - Adult  Goal: Electrolytes maintained within normal limits  Outcome: Progressing  Goal: Hemodynamic stability and optimal renal function maintained  Outcome: Progressing     Problem: Hematologic - Adult  Goal: Maintains hematologic stability  Outcome: Progressing     Problem: Anxiety  Goal: Will report anxiety at manageable levels  Description: INTERVENTIONS:  1. Administer medication as ordered  2. Teach and rehearse alternative coping skills  3. Provide emotional support with 1:1 interaction with staff  Outcome: Progressing     Problem: Decision Making  Goal: Pt/Family able to effectively weigh alternatives and participate in decision making related to treatment and care  Description: INTERVENTIONS:  1. Determine when there are differences between patient's view, family's view, and healthcare provider's view of condition  2. Facilitate patient and family articulation of goals for care  3. Help patient and family identify pros/cons of alternative solutions  4. Provide information as requested by patient/family  5. Respect patient/family right to receive or not to receive information  6. Serve as a liaison between patient and family and health care team  7. Initiate Consults from Ethics, Palliative Care or initiate [address] [place] [address] as is appropriate  Outcome: Progressing     Problem: Confusion  Goal: Confusion, delirium, dementia, or psychosis is improved or at baseline  Description: INTERVENTIONS:  1. Assess for possible contributors to thought disturbance, including medications, impaired vision or hearing, underlying metabolic abnormalities, dehydration, psychiatric diagnoses, and notify attending LIP  2. [place] high risk fall precautions, as indicated  3. Provide frequent short contacts to provide reality reorientation, refocusing and direction  4. Decrease environmental stimuli, including noise as appropriate  5. Monitor and intervene to maintain adequate nutrition, hydration, elimination, sleep and activity  6. If unable to ensure safety without constant attention obtain sitter and review sitter guidelines with assigned personnel  7.  Initiate Psychosocial CNS and Spiritual Care consult, as indicated  Outcome: Progressing     Problem: Behavior  Goal: Pt/Family maintain appropriate behavior and adhere to behavioral

## 2022-09-14 NOTE — PROGRESS NOTES
Physical Therapy    [facility] [address]   Phone: [phone]    Physical Therapy    [] Initial Evaluation            [x] Daily Treatment Note         [] Discharge Summary      Patient: Tunde Polanco   : 1940   MRN: [id number]   Date of Service:  2022    Admitting Diagnosis: Cellulitis  Current Admission Summary: per MD: Tunde Polanco is a 78 y.o. female who presents to the emergency department the chief complaint of worsening bilateral leg swelling and pain. In May she had a left hip fracture and surgery. States over the past 3 weeks she has had some swelling in her legs have gotten worse over the past 10 days with some redness in her bilateral lower extremities that began 3 days ago. She is not on any diuretics or antibiotics. She denies chest pain, shortness of breath, history of heart failure, nausea, vomiting, fevers, history of chronic kidney disease, diabetes or any skin infections or MRSA in the past.  She states moving her legs makes the pain worse. Denies abdominal pain, urinary bowel symptoms or any other symptoms. Past Medical History:  has a past medical history of AR (allergic rhinitis), Arthritis of knee, Depression, Erosive gastritis, GERD (gastroesophageal reflux disease), Hyperlipidemia, Internal hemorrhoids, Overweight(278.02), and Viral meningitis. Past Surgical History:  has a past surgical history that includes Cholecystectomy, laparoscopic (); Total abdominal hysterectomy w/ bilateral salpingoophorectomy (); Colonoscopy (); Colonoscopy (12/3/13); Upper gastrointestinal endoscopy (12/3/13); Breast biopsy; Hysterectomy; and hip surgery (Left, 2022). Discharge Recommendations: Tunde Polanco scored a 12/ on the AM-PAC short mobility form. Current research shows that an AM-PAC score of 17 or less is typically not associated with a discharge to the patient's home setting.  Based on the patient's AM-PAC score and their current functional mobility deficits, it is recommended that the patient have 3-5 sessions per week of Physical Therapy at d/c to increase the patient's independence. Please see assessment section for further patient specific details. If pt declines the above recommendation, home health is next recommendation. If patient discharges prior to next session this note will serve as a discharge summary. Please see below for the latest assessment towards goals. DME Required For Discharge: DME to be determined at next level of care  Precautions/Restrictions: high fall risk  Weight Bearing Restrictions: no restrictions    Required Braces/Orthotics: no braces required  Positional Restrictions:no positional restrictions    Pre-Admission Information   Lives With: spouse                  Type of Home: house  Home Layout: two level, laundry in basement  Home Access:  3 step to enter without rails   Bathroom Layout: . Comment: pt has been using bathroom on 1st floor since may and taking spongebaths  Bathroom Equipment: grab bars around toilet  Toilet Height: elevated height  Home Equipment: rollator - 4 wheeled walker  Transfer Assistance: modified independent with use of 4WRW  Ambulation Assistance:modified independent with use of 4WRW  ADL Assistance: requires assistance with bathing, requires assistance with dressing, . Comment: needs assitance for LEs(ex. Tying shoes)  IADL Assistance: requires assistance with meal prep, requires assistance with laundry, requires assistance with cleaning, pt helps with some meal prep and cleaning but  does most  Active :        [] Yes                 [x] No  Hand Dominance: [] Left                 [x] Right  Current Employment: . Comment: not asked  Hobbies: reading biographies  Recent Falls: 1 in may resulting in hip fx         Subjective  General: Pt supine in bed upon arrival. Pt agreeable to PT treatment.  Pt emotionally tearful multiple times due to pain. Encouragement and distraction allowed tx to be completed. Pain: 8/10. Location: L knee  Pain Interventions: pain medication in place prior to arrival, RN notified, and repositioned        Functional Mobility  Bed Mobility  Supine to Sit: stand by assistance  Sit to Supine: stand by assistance  Scooting: contact guard assistance, minimal assistance, dependent assistance  Bridging: dependent assistance  Comments: HOB elevated, grab bars, increased time and effort to complete sup to sit. HOB flat, increased time due to L knee pain. To complete sit to sup. Scooting initially CGA/Min A but fatigued quickly. Scooting HOB Dependent with bed in trendelenburg with VC for pt to use rails to pull. Transfers  Sit to stand transfer: contact guard assistance  Stand to sit transfer: contact guard assistance  Comments: increased time due to pain in L knee. Ambulation  Surface:level surface  Assistive Device: rolling walker  Assistance: contact guard assistance  Distance: 3' forward + 3' backward  Gait: Shuffle with limited to no B step height or length, increased weight through B UE,   Comment: VC/TC for upright posture - pt reports feeling stretch in hip flexors. Stair Mobility  Stair mobility not completed on this date. Comments:  Wheelchair Mobility:  No w/c mobility completed on this date. Comments:  Balance  Static Sitting Balance: fair (+): maintains balance at SBA/supervision without use of UE support  Dynamic Sitting Balance: fair (+): maintains balance at SBA/supervision without use of UE support  Static Standing Balance: fair (-): maintains balance at CGA with use of UE support  Dynamic Standing Balance: fair (-): maintains balance at CGA with use of UE support  Comments: Pt sat at [facility] ~5 mins with SBA. Pt stood at RW ~9 min with CGA/SBA conversing to continue to breathing and remaining distracted for upcoming ambulation.        Other Therapeutic Interventions  AP x10 B, heelslides x10 R LE (L LE experienced too much pain even with AAROM)    Functional Outcomes  AM-PAC Inpatient Mobility Raw Score : 15              Cognition  WFL  Orientation:    alert and oriented x 4  Command Following:   [facility]  Education  Barriers To Learning: emotional  Patient Education: patient educated on goals, PT role and benefits, plan of care, discharge recommendations  Learning Assessment:  patient verbalizes and demonstrates understanding     Assessment  Activity Tolerance: limited by pain. Emotionally tearful throughout session. Words of comfort and support provided throughout   Impairments Requiring Therapeutic Intervention: decreased functional mobility, decreased ADL status, decreased ROM, decreased strength, decreased balance  Prognosis: good  Clinical Assessment: Pt demonstrates improvement this date, able to complete bed mobility SBA and performed transfers and ambulation with RW SBA/CGA. Pt would continue to benefit from skilled PT in order to safely return to [facility].    Safety Interventions: patient left in bed, bed alarm in place, call light within reach, gait belt, patient at risk for falls, and nurse notified    Plan  Frequency: 3-5 x/per week  Current Treatment Recommendations: strengthening, ROM, balance training, functional mobility training, manual lymphatic drainage, patient/caregiver education, ADL/self-care training, and home exercise program     Goals  Patient Goals: to go home   Short Term Goals:  Time Frame: upon d/c  Patient will complete bed mobility at modified independent   Patient will complete transfers at contact guard assistance - GOAL MET 9/14  Patient will ambulate 10 ft with use of LRAD at moderate assistance  Pt will perform transfers with LRAD MOD I   1 goals met this date        Therapy Session Time      Individual Group Co-treatment   Time In 0930       Time Out 1008       Minutes 38         Total Treatment Minutes:  45       Electronically Signed By: Valerio Lassiter PTA    I agree with the note above. Goals addressed by PT this session.    [address], [place] [address]

## 2022-09-15 RX ORDER — POTASSIUM CHLORIDE 750 MG/1
TABLET, EXTENDED RELEASE ORAL
Qty: 30 TABLET | Refills: 0 | Status: SHIPPED | OUTPATIENT
Start: 2022-09-15 | End: 2022-10-04

## 2022-09-15 RX ORDER — FUROSEMIDE 20 MG/1
TABLET ORAL
Qty: 30 TABLET | Refills: 0 | OUTPATIENT
Start: 2022-09-15

## 2022-09-15 NOTE — TELEPHONE ENCOUNTER
Medication:   Requested Prescriptions     Pending Prescriptions Disp Refills    KLOR-CON M10 10 MEQ extended release tablet [Pharmacy Med Name: Ad [place] TABLET] 30 tablet 0     Sig: TAKE 1 TABLET BY MOUTH EVERY DAY        Last Filled:  8/23/2022    Patient Phone Number: [phone] (home)     Last appt: 9/2/2022   Next appt: 9/15/2022    Last OARRS: No flowsheet data found.

## 2022-09-15 NOTE — TELEPHONE ENCOUNTER
Medication:   Requested Prescriptions     Pending Prescriptions Disp Refills    furosemide (LASIX) 20 MG tablet [Pharmacy Med Name: FUROSEMIDE 20 MG TABLET] 30 tablet 0     Sig: TAKE 1 TABLET BY MOUTH EVERY DAY        Last Filled:  9/13/2022    Patient Phone Number: [phone] (home)     Last appt: 9/2/2022   Next appt: Visit date not found    Last OARRS: No flowsheet data found.

## 2022-09-26 ENCOUNTER — NURSE TRIAGE (OUTPATIENT)
Dept: OTHER | Facility: CLINIC | Age: 82
End: 2022-09-26

## 2022-09-26 ENCOUNTER — OFFICE VISIT (OUTPATIENT)
Dept: FAMILY MEDICINE CLINIC | Age: 82
End: 2022-09-26
Payer: MEDICARE

## 2022-09-26 ENCOUNTER — CARE COORDINATION (OUTPATIENT)
Dept: CASE MANAGEMENT | Age: 82
End: 2022-09-26

## 2022-09-26 VITALS
DIASTOLIC BLOOD PRESSURE: 60 MMHG | SYSTOLIC BLOOD PRESSURE: 150 MMHG | WEIGHT: 179.6 LBS | RESPIRATION RATE: 16 BRPM | OXYGEN SATURATION: 98 % | HEIGHT: 61 IN | HEART RATE: 86 BPM | BODY MASS INDEX: 33.91 KG/M2

## 2022-09-26 DIAGNOSIS — I10 ESSENTIAL HYPERTENSION: ICD-10-CM

## 2022-09-26 DIAGNOSIS — R35.0 URINARY FREQUENCY: ICD-10-CM

## 2022-09-26 DIAGNOSIS — R60.9 PERIPHERAL EDEMA: Primary | ICD-10-CM

## 2022-09-26 LAB
BILIRUBIN, POC: NORMAL
BLOOD URINE, POC: NORMAL
CLARITY, POC: CLEAR
COLOR, POC: YELLOW
GLUCOSE URINE, POC: NORMAL
KETONES, POC: NORMAL
LEUKOCYTE EST, POC: NORMAL
NITRITE, POC: NORMAL
PH, POC: 5.5
PROTEIN, POC: NORMAL
SPECIFIC GRAVITY, POC: 1.01
UROBILINOGEN, POC: 0.2

## 2022-09-26 PROCEDURE — 99214 OFFICE O/P EST MOD 30 MIN: CPT | Performed by: FAMILY MEDICINE

## 2022-09-26 PROCEDURE — 81002 URINALYSIS NONAUTO W/O SCOPE: CPT | Performed by: FAMILY MEDICINE

## 2022-09-26 PROCEDURE — 1123F ACP DISCUSS/DSCN MKR DOCD: CPT | Performed by: FAMILY MEDICINE

## 2022-09-26 RX ORDER — VILAZODONE HYDROCHLORIDE 20 MG/1
20 TABLET ORAL DAILY
Qty: 90 TABLET | Refills: 1 | Status: SHIPPED | OUTPATIENT
Start: 2022-09-26

## 2022-09-26 RX ORDER — GABAPENTIN 300 MG/1
300 CAPSULE ORAL 3 TIMES DAILY
COMMUNITY
Start: 2022-09-23 | End: 2022-10-27 | Stop reason: SDUPTHER

## 2022-09-26 RX ORDER — TORSEMIDE 20 MG/1
20 TABLET ORAL 2 TIMES DAILY
Qty: 40 TABLET | Refills: 1 | Status: SHIPPED | OUTPATIENT
Start: 2022-09-26

## 2022-09-26 RX ORDER — BUPROPION HYDROCHLORIDE 150 MG/1
150 TABLET ORAL
Qty: 90 TABLET | Refills: 1 | Status: SHIPPED | OUTPATIENT
Start: 2022-09-26

## 2022-09-26 NOTE — PROGRESS NOTES
Leoncio Arevalo is a 78 y.o. female. HPI:  Accompanied by her , Omar Grimes  Worsening of leg edema and thigh edema again   states she is not doing her exercise or wearing her compression stockings  Recommend she wrap her legs  We will try Demadex as p.o. Lasix was not helpful last time    Complaining of some urinary frequency    Meds, vitamins and allergies reviewed with pt    Wt Readings from Last 3 Encounters:   09/26/22 179 lb 9.6 oz (81.5 kg)   09/14/22 170 lb 3.1 oz (77.2 kg)   09/02/22 181 lb 12.8 oz (82.5 kg)       REVIEW OF SYSTEMS:   CONSTITUTIONAL: See history of present illness,   Weight noted   HEENT: No new vision difficulties or ringing in the ears. RESPIRATORY: No new SOB, PND, orthopnea or cough. CARDIOVASCULAR: no CP, palpitations or SOB with exertion  GI: No nausea, vomiting, diarrhea, constipation, abdominal pain or changes in bowel habits. : No urinary frequency, urgency, incontinence hematuria or dysuria. SKIN: No cyanosis or skin lesions. MUSCULOSKELETAL: No new muscle or joint pain. NEUROLOGICAL: No syncope or TIA-like symptoms. PSYCHIATRIC: No anxiety, insomnia or depression     No Known Allergies    Prior to Visit Medications    Medication Sig Taking?  Authorizing Provider   torsemide (DEMADEX) 20 MG tablet Take 1 tablet by mouth 2 times daily Yes Malik Freeman MD   vilazodone HCl (VIIBRYD) 20 MG TABS Take 1 tablet by mouth daily Yes Malik Freeman MD   buPROPion (WELLBUTRIN XL) 150 MG extended release tablet Take 1 tablet by mouth daily (before dinner) Yes Malik Freeman MD   KLOR-CON M10 10 MEQ extended release tablet TAKE 1 TABLET BY MOUTH EVERY DAY Yes Malik Freeman MD   diclofenac sodium (VOLTAREN) 1 % GEL Apply 4 g topically 3 times daily To bilateral knees Yes CIERRA Isaac CNP   polyethylene glycol (GLYCOLAX) 17 g packet Take 17 g by mouth daily Yes CIERRA Isaac CNP   sennosides-docusate sodium (SENOKOT-S) 8.6-50 MG tablet Take 2 tablets by mouth 2 times daily as needed for Constipation Yes CIERRA Isaac CNP   lidocaine 4 % external patch Place 1 patch onto the skin daily Yes CIERRA Isaac CNP   tamsulosin (FLOMAX) 0.4 MG capsule Take 1 capsule by mouth at bedtime Yes CIERRA Isaac CNP   pantoprazole (PROTONIX) 40 MG tablet Take 1 tablet by mouth 2 times daily (before meals) Yes CIERRA Isaac CNP   bethanechol (URECHOLINE) 10 MG tablet Take 1 tablet by mouth 3 times daily Yes CIERRA Isaac CNP   B Complex-C (VITAMIN B + C COMPLEX PO) Take by mouth Yes Historical Provider, MD   Lift Chair MISC by Does not apply route Yes Micki Fernandez MD   estradiol (ESTRACE) 0.1 MG/GM vaginal cream Place 2 g vaginally Twice a Week  Yes Historical Provider, MD   aspirin 81 MG EC tablet Take 81 mg by mouth daily. Yes Historical Provider, MD   VITAMIN D PO Take  by mouth. Yes Historical Provider, MD   gabapentin (NEURONTIN) 300 MG capsule Take 300 mg by mouth 3 times daily.   Historical Provider, MD   atorvastatin (LIPITOR) 10 MG tablet TAKE 1 TABLET BY MOUTH EVERY OTHER DAY **CHANGE IN DOSE**  Micki Fernandez MD       Past Medical History:   Diagnosis Date    AR (allergic rhinitis)     Arthritis of knee     Pruis    Depression     Erosive gastritis 10/28/2019    EGD October 2019, he did with PPI    GERD (gastroesophageal reflux disease)     Hyperlipidemia     Internal hemorrhoids     Overweight(278.02)     Viral meningitis 5/12       Social History     Tobacco Use    Smoking status: Never    Smokeless tobacco: Never   Substance Use Topics    Alcohol use: Yes     Comment: rarely       Family History   Problem Relation Age of Onset    Breast Cancer Mother     Bipolar Disorder Brother        OBJECTIVE:  BP (!) 150/60 (Site: Left Upper Arm, Position: Sitting, Cuff Size: Large Adult)   Pulse 86   Resp 16   Ht 5' 1\" (1.549 m)   Wt 179 lb 9.6 oz (81.5 kg) SpO2 98%   BMI 33.94 kg/m²   GEN: Anxious, tearful  HEENT:  NCAT  NECK:  Supple without adenopathy. CV:  Regular rate and rhythm, S1 and S2 normal, no murmurs, clicks  PULM:  Chest is clear, no wheezing ,  symmetric air entry throughout both lung fields. ABD: Soft, NT  EXT: Significant lower extremity edema from mid thigh to ankle, no opening in her skin, no streaking  NEURO: Alert oriented ×3, using walker    Urine dip reviewed    ASSESSMENT/PLAN:  1. Peripheral edema  Demadex once a day with potassium  Wrap legs  Close follow-up, recheck Friday    2. Essential hypertension  Elevated blood pressure, recheck Friday    3.  Urinary frequency  No significant sign of infection on urinalysis, monitor    30 Total Minutes spent pre charting (reviewing problem list, meds, any test results, consultant and hospital notes ) and  obtaining present visit history, performing appropriate medical exam/evaluation, counseling and educating the patient (and family), ordering medications ,tests, and procedures as needed, refilling medication(s), placing referral(s) when needed in addition to coordinating care for this patient and documenting in electronic health record

## 2022-09-26 NOTE — TELEPHONE ENCOUNTER
Received call from [place] at [facility] with [employer] Complaint. Subjective: Caller states \"I saw Dr. Beck Yo 2 weeks ago and sent me to [facility] because of the swelling. I was in the hospital and went to rehab. They were improving. They did MRI and lumbar testing. The swelling went down in rehab. I came home and they are increasing again. I may have neuropathy but Dr. Beck Yo wants a follow-up. I am uncomfortable. \"     Current Symptoms: worsening in edema. \"Solid like cement\" equally swollen. Thighs swollen distally. Onset: increased while in rehab when using walker. 3 day ago; worsening    Associated Symptoms: tingling to feet-unchanged    Pain Severity: 8/10; burning; constant-chronic past few months    Temperature: denies     What has been tried: wrapping them which does not help. LMP: NA Pregnant: NA    Recommended disposition: Go to ED/UCC Now (Or to Office with PCP Approval)    Care advice provided, patient verbalizes understanding; denies any other questions or concerns; instructed to call back for any new or worsening symptoms. Writer provided warm transfer to [facility] for 2nd level triage, worsening edema of bilateral legs    Attention Provider: Thank you for allowing me to participate in the care of your patient. The patient was connected to triage in response to information provided to the ECC/PSC. Please do not respond through this encounter as the response is not directed to a shared pool.           Reason for Disposition   SEVERE swelling (e.g., swelling extends above knee, entire leg is swollen, weeping fluid)    Protocols used: Leg Swelling and Edema-ADULT-[place]

## 2022-09-26 NOTE — CARE COORDINATION
[facility] Discharge Call    2022    Patient: Ivan Gray Patient : 1940   MRN: [id number]  Reason for Admission: cellulitis  Discharge Date: 22 RARS: Readmission Risk Score: 15.1         Discharge Facility: [facility]    Acute Care Course:   Pt to [place] from  to  with cellulitis and edema. Pt then to [facility] for 11 days with a d/c home with [facility]. They have contacted pt but have not set up a [facility] yet. HFU made:   at 3 PM with PCP to check edema    Sig Hx:   NSTEMI, HTN, L hip fx 4 months ago, depression, anxiety,     DME: rollator    Conversation:   Spoke with Shyla after 2 IDs and she is having a lot of edema. She has an appt with Dr Rusty Faria and will show her. She states having trouble wrapping her leg. Educated on elevating and she stated they were going to buy a lift chair today. She states her swelling went down in hospital and today is back to where it was when she was admitted to hospital. States her legs burn and she states she will tell this to the MD today. She is concerned for neuropathy. She will take the medlist with her today. Educated her on Furosemide and Klorcon. She has a shower but needs to go upstairs to use it so she is just washing off downstairs. She has a rollator and no loose rugs. Happy she can sit on it. Her appetite is \"too good\". She is only watching salt a little bit. Educated for her to look at sodium in drinks too and to reduce those. Educated to monitor constipation and she stated César Deluna knows from experience\". Agreeable to calls      Follow up plan:   Will hand of to [facility] Transition      Do you have any ongoing symptoms?: Yes   Onset of Patient-reported symptoms: Other   Interventions for patient-reported symptoms: Other (Comment: pt has appt to see PCP today)   Do you have all of your prescriptions and are they filled?: Yes   Do you have any questions related to your medications?: Yes   Patient Reports: wanted clarification on furosemide and K+   Have you scheduled your follow up appointment?: Yes   How are you going to get to your appointment?: Car - family or friend to transport   Were you discharged with any Home Care or [address] or do you currently have any active services?: Yes   Post Acute Services: [address] (Comment: [facility])         Do you feel like you have everything you need to keep you well at home?: Yes   Care Transitions Interventions         Future Appointments   Date Time Provider Michel Cash   2022  3:00 PM MD KAM [facility] [place] - DYD   2022 10:00 AM [facility] MRI RM 2 [id number] MRI [facility] Radio   10/4/2022 10:30 AM CIERRA Hines - CNP FF Cardio MMA   2022  1:00 PM Laura Mccoy MD FF Ortho MMA     Transitions of Care Initial Call    Was this an external facility discharge? Yes, 22  Discharge Facility: [address] [address] to be reviewed by the provider   Additional needs identified to be addressed with provider: No  none    Pt with appt today as HFU and to look at edema         Method of communication with provider : none    Advance Care Planning:   Does patient have an Advance Directive: not on file. Care Transition Nurse contacted the patient by telephone to perform post hospital discharge assessment. Verified name and  with patient as identifiers. Provided introduction to self, and explanation of the CTN role. CTN reviewed discharge instructions, medical action plan and red flags with patient who verbalized understanding. Patient given an opportunity to ask questions and does not have any further questions or concerns at this time. Were discharge instructions available to patient? Yes. Reviewed appropriate site of care based on symptoms and resources available to patient including: PCP.  The patient agrees to contact the PCP office for questions related to

## 2022-09-29 ENCOUNTER — HOSPITAL ENCOUNTER (OUTPATIENT)
Dept: MRI IMAGING | Age: 82
Discharge: HOME | End: 2022-09-29
Payer: MEDICARE

## 2022-09-29 DIAGNOSIS — I51.4 MYOCARDITIS, UNSPECIFIED CHRONICITY, UNSPECIFIED MYOCARDITIS TYPE (HCC): ICD-10-CM

## 2022-09-29 LAB
LV EF: 63 %
LVEF MODALITY: NORMAL

## 2022-09-29 PROCEDURE — A9585 GADOBUTROL INJECTION: HCPCS | Performed by: INTERNAL MEDICINE

## 2022-09-29 PROCEDURE — 6360000004 HC RX CONTRAST MEDICATION: Performed by: INTERNAL MEDICINE

## 2022-09-29 PROCEDURE — 75565 CARD MRI VELOC FLOW MAPPING: CPT

## 2022-09-29 PROCEDURE — 75561 CARDIAC MRI FOR MORPH W/DYE: CPT | Performed by: INTERNAL MEDICINE

## 2022-09-29 PROCEDURE — 75565 CARD MRI VELOC FLOW MAPPING: CPT | Performed by: INTERNAL MEDICINE

## 2022-09-29 RX ADMIN — GADOBUTROL 12 ML: 604.72 INJECTION INTRAVENOUS at 11:47

## 2022-09-30 ENCOUNTER — OFFICE VISIT (OUTPATIENT)
Dept: FAMILY MEDICINE CLINIC | Age: 82
End: 2022-09-30
Payer: MEDICARE

## 2022-09-30 VITALS
HEART RATE: 101 BPM | WEIGHT: 177 LBS | SYSTOLIC BLOOD PRESSURE: 140 MMHG | OXYGEN SATURATION: 95 % | BODY MASS INDEX: 33.42 KG/M2 | HEIGHT: 61 IN | RESPIRATION RATE: 16 BRPM | DIASTOLIC BLOOD PRESSURE: 70 MMHG

## 2022-09-30 DIAGNOSIS — R60.0 LOWER LEG EDEMA: ICD-10-CM

## 2022-09-30 DIAGNOSIS — L03.116 LEFT LEG CELLULITIS: ICD-10-CM

## 2022-09-30 DIAGNOSIS — Z23 FLU VACCINE NEED: ICD-10-CM

## 2022-09-30 DIAGNOSIS — R60.0 LOWER LEG EDEMA: Primary | ICD-10-CM

## 2022-09-30 PROCEDURE — 1123F ACP DISCUSS/DSCN MKR DOCD: CPT | Performed by: FAMILY MEDICINE

## 2022-09-30 PROCEDURE — 99213 OFFICE O/P EST LOW 20 MIN: CPT | Performed by: FAMILY MEDICINE

## 2022-09-30 PROCEDURE — 90694 VACC AIIV4 NO PRSRV 0.5ML IM: CPT | Performed by: FAMILY MEDICINE

## 2022-09-30 PROCEDURE — G0008 ADMIN INFLUENZA VIRUS VAC: HCPCS | Performed by: FAMILY MEDICINE

## 2022-09-30 RX ORDER — CEPHALEXIN 500 MG/1
500 CAPSULE ORAL 3 TIMES DAILY
Qty: 30 CAPSULE | Refills: 0 | Status: SHIPPED | OUTPATIENT
Start: 2022-09-30

## 2022-09-30 NOTE — PROGRESS NOTES
Rina Us is a 78 y.o. female. HPI:  Here for recheck leg edema   Taking low-dose Demadex twice a day, weight down 2 pounds  Some redness lower medial aspect of right leg  Restart cephalexin and refer to wound center  Continue to wrap legs    Meds, vitamins and allergies reviewed with pt    Wt Readings from Last 3 Encounters:   09/30/22 177 lb (80.3 kg)   09/26/22 179 lb 9.6 oz (81.5 kg)   09/14/22 170 lb 3.1 oz (77.2 kg)       REVIEW OF SYSTEMS:   CONSTITUTIONAL: See history of present illness,   Weight noted   HEENT: No new vision difficulties or ringing in the ears. RESPIRATORY: No new SOB, PND, orthopnea or cough. CARDIOVASCULAR: no CP, palpitations or SOB with exertion  GI: No nausea, vomiting, diarrhea, constipation, abdominal pain or changes in bowel habits. : No urinary frequency, urgency, incontinence hematuria or dysuria. SKIN: Lower extremity edema bilaterally to mid thigh with redness at the base of her right lower extremity medially t+josefina  MUSCULOSKELETAL: Gait and legs painful  NEUROLOGICAL: No syncope or TIA-like symptoms. PSYCHIATRIC: No anxiety, insomnia or depression     No Known Allergies    Prior to Visit Medications    Medication Sig Taking? Authorizing Provider   cephALEXin (KEFLEX) 500 MG capsule Take 1 capsule by mouth 3 times daily Yes Hernandez Hensley MD   torsemide (DEMADEX) 20 MG tablet Take 1 tablet by mouth 2 times daily Yes Hernandez Hensley MD   vilazodone HCl (VIIBRYD) 20 MG TABS Take 1 tablet by mouth daily Yes Hernandez Hensley MD   buPROPion (WELLBUTRIN XL) 150 MG extended release tablet Take 1 tablet by mouth daily (before dinner) Yes Hernandez Hensley MD   gabapentin (NEURONTIN) 300 MG capsule Take 300 mg by mouth 3 times daily.  Yes Historical Provider, MD   KLOR-CON M10 10 MEQ extended release tablet TAKE 1 TABLET BY MOUTH EVERY DAY Yes Hernandez Hensley MD   diclofenac sodium (VOLTAREN) 1 % GEL Apply 4 g topically 3 times daily To bilateral knees Yes Kortney NOEL CIERRA Feliciano CNP   polyethylene glycol (GLYCOLAX) 17 g packet Take 17 g by mouth daily Yes CIERRA Isaac CNP   sennosides-docusate sodium (SENOKOT-S) 8.6-50 MG tablet Take 2 tablets by mouth 2 times daily as needed for Constipation Yes CIERRA Isaac CNP   lidocaine 4 % external patch Place 1 patch onto the skin daily Yes CIERRA Isaac CNP   tamsulosin (FLOMAX) 0.4 MG capsule Take 1 capsule by mouth at bedtime Yes CIERRA Isaac CNP   pantoprazole (PROTONIX) 40 MG tablet Take 1 tablet by mouth 2 times daily (before meals) Yes CIERRA Isaac CNP   bethanechol (URECHOLINE) 10 MG tablet Take 1 tablet by mouth 3 times daily Yes CIERRA Isaac CNP   B Complex-C (VITAMIN B + C COMPLEX PO) Take by mouth Yes Historical Provider, MD   Lift Chair [address] [facility] by Does not apply route Yes Navin Najera MD   estradiol (ESTRACE) 0.1 MG/GM vaginal cream Place 2 g vaginally Twice a Week  Yes Historical Provider, MD   aspirin 81 MG EC tablet Take 81 mg by mouth daily. Yes Historical Provider, MD   VITAMIN D PO Take  by mouth.  Yes Historical Provider, MD   atorvastatin (LIPITOR) 10 MG tablet TAKE 1 TABLET BY MOUTH EVERY OTHER DAY **CHANGE IN DOSE**  Navin Najera MD       Past Medical History:   Diagnosis Date    AR (allergic rhinitis)     Arthritis of knee     Pruis    Depression     Erosive gastritis 10/28/2019    EGD October 2019, he did with PPI    GERD (gastroesophageal reflux disease)     Hyperlipidemia     Internal hemorrhoids     Overweight(278.02)     Viral meningitis 5/12       Social History     Tobacco Use    Smoking status: Never    Smokeless tobacco: Never   Substance Use Topics    Alcohol use: Yes     Comment: rarely       Family History   Problem Relation Age of Onset    Breast Cancer Mother     Bipolar Disorder Brother        OBJECTIVE:  BP (!) 140/70 (Site: Left Upper Arm, Position: Sitting, Cuff Size: Large Adult)   Pulse (!) 101   Resp 16   Ht 5' 1\" (1.549 m)   Wt 177 lb (80.3 kg)   SpO2 95%   BMI 33.44 kg/m²   GEN:  in NAD  HEENT:  NCAT, TMs:normal and throat: clear  NECK:  Supple without adenopathy. CV:  Regular rate and rhythm, S1 and S2 normal, no murmurs, clicks  PULM:  Chest is clear, no wheezing ,  symmetric air entry throughout both lung fields. ABD: Soft, NT  EXT: No rash or edema  NEURO: Alert oriented ×3, nonfocal     ASSESSMENT/PLAN:  1. Lower leg edema  Continue Demadex and potassium  Weight down 2 pounds  - Comprehensive Metabolic Panel; Future  - 150 3LM    2. Left leg cellulitis  Cephalexin 2-3 times a day with food water probiotic  Wrap legs, elevate  Refer to wound center  - [address]    3.  Flu vaccine need  Administered today  - Influenza, FLUAD, (age 72 y+), IM, Preservative Free, 0.5 mL    25 Total Minutes spent pre charting (reviewing problem list, meds, any test results, consultant and hospital notes ) and  obtaining present visit history, performing appropriate medical exam/evaluation, counseling and educating the patient (and family), ordering medications ,tests, and procedures as needed, refilling medication(s), placing referral(s) when needed in addition to coordinating care for this patient and documenting in electronic health record

## 2022-10-01 LAB
A/G RATIO: 1.9 (ref 1.1–2.2)
ALBUMIN SERPL-MCNC: 4 G/DL (ref 3.4–5)
ALP BLD-CCNC: 113 U/L (ref 40–129)
ALT SERPL-CCNC: 18 U/L (ref 10–40)
ANION GAP SERPL CALCULATED.3IONS-SCNC: 13 MMOL/L (ref 3–16)
AST SERPL-CCNC: 17 U/L (ref 15–37)
BILIRUB SERPL-MCNC: 0.3 MG/DL (ref 0–1)
BUN BLDV-MCNC: 46 MG/DL (ref 7–20)
CALCIUM SERPL-MCNC: 9.3 MG/DL (ref 8.3–10.6)
CHLORIDE BLD-SCNC: 97 MMOL/L (ref 99–110)
CO2: 30 MMOL/L (ref 21–32)
CREAT SERPL-MCNC: 1.1 MG/DL (ref 0.6–1.2)
GFR AFRICAN AMERICAN: 57
GFR NON-AFRICAN AMERICAN: 47
GLUCOSE BLD-MCNC: 151 MG/DL (ref 70–99)
POTASSIUM SERPL-SCNC: 4.7 MMOL/L (ref 3.5–5.1)
SODIUM BLD-SCNC: 140 MMOL/L (ref 136–145)
TOTAL PROTEIN: 6.1 G/DL (ref 6.4–8.2)

## 2022-10-01 NOTE — RESULT ENCOUNTER NOTE
Called pt   Left message   Alternate 1 tablet with twice a day demadex   F/u with dr. JACOBSON next week     Close result

## 2022-10-04 ENCOUNTER — HOSPITAL ENCOUNTER (OUTPATIENT)
Dept: WOUND CARE | Age: 82
Discharge: HOME | End: 2022-10-04
Payer: MEDICARE

## 2022-10-04 ENCOUNTER — OFFICE VISIT (OUTPATIENT)
Dept: CARDIOLOGY CLINIC | Age: 82
End: 2022-10-04
Payer: MEDICARE

## 2022-10-04 VITALS
BODY MASS INDEX: 32.3 KG/M2 | WEIGHT: 171.1 LBS | HEART RATE: 82 BPM | HEIGHT: 61 IN | OXYGEN SATURATION: 98 % | DIASTOLIC BLOOD PRESSURE: 60 MMHG | SYSTOLIC BLOOD PRESSURE: 124 MMHG

## 2022-10-04 VITALS — SYSTOLIC BLOOD PRESSURE: 140 MMHG | HEART RATE: 79 BPM | TEMPERATURE: 96.2 F | DIASTOLIC BLOOD PRESSURE: 62 MMHG

## 2022-10-04 DIAGNOSIS — R60.0 LOWER EXTREMITY EDEMA: ICD-10-CM

## 2022-10-04 DIAGNOSIS — S81.802A WOUND OF LEFT LOWER EXTREMITY, INITIAL ENCOUNTER: ICD-10-CM

## 2022-10-04 DIAGNOSIS — E78.2 MIXED HYPERLIPIDEMIA: ICD-10-CM

## 2022-10-04 DIAGNOSIS — R07.89 OTHER CHEST PAIN: Primary | ICD-10-CM

## 2022-10-04 PROCEDURE — 99214 OFFICE O/P EST MOD 30 MIN: CPT | Performed by: NURSE PRACTITIONER

## 2022-10-04 PROCEDURE — 99214 OFFICE O/P EST MOD 30 MIN: CPT | Performed by: SURGERY

## 2022-10-04 PROCEDURE — 1123F ACP DISCUSS/DSCN MKR DOCD: CPT | Performed by: NURSE PRACTITIONER

## 2022-10-04 PROCEDURE — 99213 OFFICE O/P EST LOW 20 MIN: CPT

## 2022-10-04 RX ORDER — BETAMETHASONE DIPROPIONATE 0.05 %
OINTMENT (GRAM) TOPICAL ONCE
OUTPATIENT
Start: 2022-10-04 | End: 2022-10-04

## 2022-10-04 RX ORDER — BACITRACIN ZINC AND POLYMYXIN B SULFATE 500; 1000 [USP'U]/G; [USP'U]/G
OINTMENT TOPICAL ONCE
OUTPATIENT
Start: 2022-10-04 | End: 2022-10-04

## 2022-10-04 RX ORDER — BACITRACIN, NEOMYCIN, POLYMYXIN B 400; 3.5; 5 [USP'U]/G; MG/G; [USP'U]/G
OINTMENT TOPICAL ONCE
OUTPATIENT
Start: 2022-10-04 | End: 2022-10-04

## 2022-10-04 RX ORDER — LIDOCAINE HYDROCHLORIDE 20 MG/ML
JELLY TOPICAL ONCE
OUTPATIENT
Start: 2022-10-04 | End: 2022-10-04

## 2022-10-04 RX ORDER — GINSENG 100 MG
CAPSULE ORAL ONCE
OUTPATIENT
Start: 2022-10-04 | End: 2022-10-04

## 2022-10-04 RX ORDER — LIDOCAINE 40 MG/G
CREAM TOPICAL ONCE
OUTPATIENT
Start: 2022-10-04 | End: 2022-10-04

## 2022-10-04 RX ORDER — CLOBETASOL PROPIONATE 0.5 MG/G
OINTMENT TOPICAL ONCE
OUTPATIENT
Start: 2022-10-04 | End: 2022-10-04

## 2022-10-04 RX ORDER — LIDOCAINE HYDROCHLORIDE 40 MG/ML
SOLUTION TOPICAL ONCE
OUTPATIENT
Start: 2022-10-04 | End: 2022-10-04

## 2022-10-04 RX ORDER — LIDOCAINE 50 MG/G
OINTMENT TOPICAL ONCE
OUTPATIENT
Start: 2022-10-04 | End: 2022-10-04

## 2022-10-04 RX ORDER — GENTAMICIN SULFATE 1 MG/G
OINTMENT TOPICAL ONCE
OUTPATIENT
Start: 2022-10-04 | End: 2022-10-04

## 2022-10-04 RX ORDER — ATORVASTATIN CALCIUM 10 MG/1
10 TABLET, FILM COATED ORAL EVERY OTHER DAY
COMMUNITY

## 2022-10-04 NOTE — PROGRESS NOTES
[facility]     Outpatient Follow Up Note    CHIEF COMPLAINT / HPI: Hospital Follow Up secondary to myocarditis    Hospital record has been reviewed  Hospital Course progressed as follows per discharge summary:     9/2/22-9/14/22  Hospital Course: The patient was admitted with low leg swelling and cellulitis. She was admitted and followed by nephrology , cardiology, ortho and nephrology. She was treated for the following         Cellulitis BLE. Venous doppler negative for DVT. , sed rate 70. Started on Ancef with improvement, has transitioned to keflex 500 mg q 6 hours with stop date on Monday. Continue ACE wraps and leg elevation when up. Appreciate ID recs. Chest pain / elevated troponin. Patient developed acute chest pain and had LHC with was normal coronaries with  possible myopericarditis. Cardiology recommended daily prednisone with outpatient cardiac MRI     Lower leg edema. Echo with EF 13-34%, grade 1 diastolic dysfunction, normal filling pressures. Doubt CHF exacerbation as no pulmonary edema on CXR and BNP only 248 and comparable to prior. Likely secondary to immobility, dependent positioning of legs when up, possible venous insufficiency, chronic diastolic CHF. Improved with ACE wraps and diuresis. Hyponatremia. Sodium 137 on admission, trended down to 131 and remains stable, 135 today. Appreciate nephrology recs. Pain BLE. Patient describes intractable pain in bilateral legs. She has been receiving gabapentin with little to no improvement. Has been refusing to work with therapy and get out of bed because legs hurt. Noted to have some urinary retention. CK level 209. 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 Neurosurgery recommends MRI thoracic spine  which was completed and noted above. Anxiety / depression. Continue bupropion and vilazodone. Hypokalemia. Resolved. Secondary to diuresis, magnesium 2.20. Normocytic anemia. Hgb slowly trending down, 11.5->10.4->9.6. Denies black or tarry stools prior to admit. Labs show iron deficiency (iron 15, saturation 9%) and received IV Venofer x 2 doses. No deficiency of vitamin B12 or folate. Occult blood negative. Continue pantoprazole daily. Urinary retention. Llamas placed for inability to void and 680 ml on bladder scan. Started on Flomax and bethanechol. Evaluated by urology,  was voiding qs at [facility] of d/c to [facility]      Tj Jackson is 78 y.o. female who presents today for a routine follow up after a recent hospitalization related to the above mentioned issues. Subjective:   Since the time of discharge, the patient denies any chest pain/burning, shortness of breath, palpitations, or significant dizziness. Her biggest issue is discomfort in LLE. She is following with wound care for her cellulitis. Says she has not seen significant improvement in swelling in BLE yet but weight is trending down. With regard to medication therapy the patient has been compliant with prescribed regimen. They have tolerated therapy to date.      Past Medical History:   Diagnosis Date    AR (allergic rhinitis)     Arthritis of knee     Pruis    Depression     Erosive gastritis 10/28/2019    EGD October 2019, he did with PPI    GERD (gastroesophageal reflux disease)     Hyperlipidemia     Internal hemorrhoids     Overweight(278.02)     Viral meningitis 5/12     Social History:    Social History     Tobacco Use   Smoking Status Never   Smokeless Tobacco Never     Current Medications:  Current Outpatient Medications   Medication Sig Dispense Refill    cephALEXin (KEFLEX) 500 MG capsule Take 1 capsule by mouth 3 times daily 30 capsule 0    torsemide (DEMADEX) 20 MG tablet Take 1 tablet by mouth 2 times daily 40 tablet 1    vilazodone HCl (VIIBRYD) 20 MG TABS Take 1 tablet by mouth daily 90 tablet 1    buPROPion (WELLBUTRIN XL) 150 MG extended release tablet Take 1 tablet by mouth daily (before dinner) 90 tablet 1    gabapentin (NEURONTIN) 300 MG capsule Take 300 mg by mouth 3 times daily. KLOR-CON M10 10 MEQ extended release tablet TAKE 1 TABLET BY MOUTH EVERY DAY 30 tablet 0    diclofenac sodium (VOLTAREN) 1 % GEL Apply 4 g topically 3 times daily To bilateral knees 50 g 1    polyethylene glycol (GLYCOLAX) 17 g packet Take 17 g by mouth daily 30 each 0    sennosides-docusate sodium (SENOKOT-S) 8.6-50 MG tablet Take 2 tablets by mouth 2 times daily as needed for Constipation 30 tablet 0    lidocaine 4 % external patch Place 1 patch onto the skin daily 30 each 0    tamsulosin (FLOMAX) 0.4 MG capsule Take 1 capsule by mouth at bedtime 30 capsule 1    pantoprazole (PROTONIX) 40 MG tablet Take 1 tablet by mouth 2 times daily (before meals) 30 tablet 3    bethanechol (URECHOLINE) 10 MG tablet Take 1 tablet by mouth 3 times daily 90 tablet 3    B Complex-C (VITAMIN B + C COMPLEX PO) Take by mouth      Lift Chair MISC by Does not apply route 1 each 0    estradiol (ESTRACE) 0.1 MG/GM vaginal cream Place 2 g vaginally Twice a Week       aspirin 81 MG EC tablet Take 81 mg by mouth daily. VITAMIN D PO Take  by mouth. No current facility-administered medications for this visit. REVIEW OF SYSTEMS:   CONSTITUTIONAL: No major weight gain or loss, fatigue, weakness, night sweats or fever. There's been no change in energy level, sleep pattern, or activity level. HEENT: No new vision difficulties or ringing in the ears. RESPIRATORY: No new SOB, PND, orthopnea or cough. CARDIOVASCULAR: See HPI  GI: No nausea, vomiting, diarrhea, constipation, abdominal pain or changes in bowel habits. : No urinary frequency, urgency, incontinence hematuria or dysuria. SKIN: No cyanosis or skin lesions. MUSCULOSKELETAL: No new muscle or joint pain. NEUROLOGICAL: No syncope or TIA-like symptoms.   PSYCHIATRIC: No anxiety, pain, insomnia or depression    Objective:   PHYSICAL EXAM:        VITALS:  BP 124/60 (Site: Left Upper Arm, Position: Sitting, Cuff Size: Medium Adult)   Pulse 82   Ht 5' 1\" (1.549 m)   Wt 171 lb 1.6 oz (77.6 kg)   SpO2 98%   BMI 32.33 kg/m²     CONSTITUTIONAL: Cooperative, no apparent distress, and appears well nourished / developed + overweight   NEUROLOGIC:  Awake and orientated to person, place and time. PSYCH: Calm affect. SKIN: Warm and dry. HEENT: Sclera non-icteric, normocephalic, neck supple, no elevation of JVP, normal carotid pulses with no bruits and thyroid normal size. LUNGS:  No increased work of breathing and clear to auscultation, no crackles or wheezing. CARDIOVASCULAR:  Regular rate and rhythm with no murmurs, gallops, rubs, or abnormal heart sounds, normal PMI. The apical impulses not displaced. Heart tones are crisp and normal                                                                                            ABDOMEN:  Normal bowel sounds, non-distended and non-tender to palpation   EXT: + 2 edema BLE, compression stocking on  no calf tenderness. Pulses are present bilaterally.     DATA:    Lab Results   Component Value Date    ALT 18 09/30/2022    AST 17 09/30/2022    ALKPHOS 113 09/30/2022    BILITOT 0.3 09/30/2022     Lab Results   Component Value Date    CREATININE 1.1 09/30/2022    BUN 46 (H) 09/30/2022     09/30/2022    K 4.7 09/30/2022    CL 97 (L) 09/30/2022    CO2 30 09/30/2022     Lab Results   Component Value Date    TSH 1.87 02/06/2018    E2CCTQA 6.6 04/27/2012     Lab Results   Component Value Date    WBC 4.6 09/13/2022    HGB 10.2 (L) 09/13/2022    HCT 31.3 (L) 09/13/2022    MCV 86.6 09/13/2022     09/13/2022     No components found for: CHLPL  Lab Results   Component Value Date    TRIG 112 10/06/2021    TRIG 94 10/23/2020    TRIG 66 01/13/2020     Lab Results   Component Value Date    HDL 51 10/06/2021    HDL 60 10/23/2020    HDL 62 (H) 01/13/2020     Lab Results   Component Value Date    LDLCALC 44 10/06/2021    [address] 38 10/23/2020    LDLCALC 30 2020     Lab Results   Component Value Date    LABVLDL 22 10/06/2021    LABVLDL 19 10/23/2020    LABVLDL 13 2020     Radiology Review:  Pertinent images / reports were reviewed as a part of this visit and reveals the following:    Last Echo: 9/3/22   Summary   Mild septal left ventricular hypertrophy. Normal left ventricle size and   systolic function with an estimated ejection fraction of 55-60%. No regional   wall motion abnormalities are seen. Grade I diastolic dysfunction with normal LV filling pressures. Left sided pleural effusion noted. Last Stress Echo Test: 2/15/21   Summary   Normal stress echocardiogram. Poor exercise capacity. Summary   Normal left ventricle size, wall thickness, and systolic function with an   estimated ejection fraction of 55-60%. Abnormal septal motion. Normal diastolic filling pattern for age. The mitral valve leaflets appear myxomatous. The aortic valve is mildly thickened/calcified but opens well with normal gradients. Trivial aortic regurgitation. Cardiac MRI 22  Overall findings are suggestive of resolving myocarditis (no myocardial edema noted) with a very small area of enhancement involving the basal inferolateral myocardium. There is normal LV and RV systolic function. Last Angiogram: 22  Findings  Artery Findings/Result   LM Normal   LAD Normal   Cx Normal   RI N/A   RCA Normal   LVEDP 14   LVG 55%      Intervention(s)  None     Post Cath Dx:       Normal coronaries  Normal EF  Possible myopericarditis? Last EC22  Normal sinus rhythm  Normal ECG  When compared with ECG of 02-SEP-2022 12:21,  Nonspecific T wave abnormality now evident in Lateral leads    Assessment:      Diagnosis Orders   1. Other chest pain   ~ improved   ~ Cardiac MRI 22 suggestive of resolving myocarditis (no myocardial edema noted).  There is normal LV and RV systolic function  ~ LHC (NSTEMI) 9/9/22 normal coronaries, possible myocarditis   ~ prednisone        2. Lower extremity edema   ~ persistent, weight trending down   ~ being managed by wound care and PCP  ~ torsemide 20 BID       3. Mixed hyperlipidemia   ~ atorvastatin 10   ~ Lipids 10/6/21  HLD 51 LDL 44 Trig 112         Patient  is stable since hospital discharge. Plan:   Per Michel Malik to stop colchicine as MRI confirms resolving myocarditis   Blood work 9/30 shows patient is a little dry. She says she will follow up with PCP as he has been managing LE edema  OK to follow up with cardiology PRN    I have addressed the patient's cardiac risk factors and adjusted pharmacologic treatment as needed. In addition, I have reinforced the need for patient directed risk factor modification. Further evaluation will be based upon the patient's clinical course and testing results. All questions and concerns were addressed to the patient/family (, Lulú Bermudez)    The patient currently is not smoking. The risks related to smoking were reviewed with the patient. Recommend maintaining a smoke-free lifestyle. Antiplatelet therapy has been recommended / prescribed for this patient. Education conducted on adverse reactions including bleeding was discussed. The patient verbalizes understanding. Pt is not on a BB; no MI  Pt is not on an ace-i/ARB; no sHF  Pt is on a statin      Saturated fat diet discussed  Exercise program discussed    Thank you for allowing to us to participate in the care of Vinay Martinez.       [address]  Documentation of today's visit sent to PCP

## 2022-10-04 NOTE — PLAN OF CARE
Discharge instructions given. Patient verbalized understanding. Return to [facility] in 2 week(s).

## 2022-10-04 NOTE — DISCHARGE INSTRUCTIONS
[facility], [address] [place] [address] Road  Telephone: [phone]    Discharge Instructions    Important reminders:    **If you have any signs and symptoms of illness (Cough, fever, congestion, nausea, vomiting, diarrhea, etc.) please call the wound care center prior to your appointment. 1. Increase Protein intake for optimal wound healing  2. No added salt to reduce any swelling  3. If diabetic, maintain good glucose control  4. If you smoke, smoking prohibits wound healing, we ask that you refrain from smoking. 5. When taking antibiotics take the entire prescription as ordered. Do not stop taking until medication is all gone unless otherwise instructed. 6. Exercise as tolerated. 7. Keep weight off wounds and reposition every 2 hours if applicable. 8. If wound(s) is on your lower extremity, elevate legs to the level of the heart or above for 30 minutes 4-5 times a day and/or when sitting. Avoid standing for long periods of time. 9. Do not get wounds wet in bath or shower unless otherwise instructed by your physician. If your wound is on your foot or leg, you may purchase a cast bag. Please ask at the pharmacy. If Vascular testing is ordered, please call [address] [place] [address] [phone]) to schedule. Vascular tests ordered by Wound Care Physicians may take up to 2 hours to complete. Please keep that in mind when scheduling. If Vascular testing is scheduled, please bring supplies to replace your dressing after testing is done. The vascular department does not stock supplies. Wound: Right lower leg, Left lower leg     With each dressing change, rinse wounds with 0.9% Saline. (May use wound wash or soft contact solution. Both can be purchased at a local drug store). If unable to obtain saline, may use a gentle soap and water. Dressing care: Dry dressing to area on left lower leg, 2 layers tubi .  You can remove the tubi  at night & place first thing in the morning. You will need compression stockings once your swelling is down. If you want zippered stockings we will have you go to [facility] to be fitted. Continue with the Keflex until gone. Elevate your leg when sitting. [address]  [address]  [address] Glover  P: [phone]  F: [phone]     Home Care Agency/Facility:     Your wound-care supplies will be provided by:   Please note, depending on your insurance coverage, you may have out-of-pocket expenses for these supplies. Someone from the company should call you to confirm your order and discuss those potential costs before they ship your products -- please anticipate that call. If your out-of-pocket cost could be substantial, Many companies have financial hardship programs for patients who qualify, so please ask about that if you might need a hand. If you have any questions about your supplies or your potential out-of-pocket costs, or if you need to place an order for a refill of supplies (typically monthly), please call the company directly. Your  is Ashlyn Freedman    Follow up with Dr Chhaya Smith In 1 week(s) in the wound care center. Wound Care Center Information: Should you experience any significant changes in your wound(s) or have questions about your wound care, please contact the [place] [address] at [phone] Monday  - Thursday 8:00 am - 4:00 pm and Friday 8:00 am - 1:00pm. If you need help with your wound outside these hours and cannot wait until we are again available, contact your PCP or go to the hospital emergency room. PLEASE NOTE: IF YOU ARE UNABLE TO OBTAIN WOUND SUPPLIES, CONTINUE TO USE THE SUPPLIES YOU HAVE AVAILABLE UNTIL YOU ARE ABLE TO REACH US. IT IS MOST IMPORTANT TO KEEP THE WOUND COVERED AT ALL TIMES. Patient Experience    Thank you for trusting us with your care.   You may receive a survey from a company called [employer] asking for your feedback. We would appreciate it if you took a few minutes to share your experience. Your input is very valuable to us.

## 2022-10-04 NOTE — PROGRESS NOTES
Abilio   Progress Note       Lashae Hodges  AGE: 78 y.o. GENDER: female  : 1940  TODAY'S DATE:  10/4/2022    Subjective:     Chief Complaint   Patient presents with    Wound Check     Bilateral legs F/U         HISTORY of PRESENT ILLNESS HPI     Lashae Hodges is a 78 y.o. female who presents today for wound evaluation.    History of Wound: Left lower leg wound    Wound Pain:  mild  Severity:  2 / 10   Wound Type:  traumatic  Modifying Factors:  edema  Associated Signs/Symptoms:  erythema        PAST MEDICAL HISTORY        Diagnosis Date    AR (allergic rhinitis)     Arthritis of knee     Pruis    Depression     Erosive gastritis 10/28/2019    EGD 2019, he did with PPI    GERD (gastroesophageal reflux disease)     Hyperlipidemia     Internal hemorrhoids     Overweight(278.02)     Viral meningitis        PAST SURGICAL HISTORY    Past Surgical History:   Procedure Laterality Date    BREAST BIOPSY      CHOLECYSTECTOMY, LAPAROSCOPIC      COLONOSCOPY      normal; Ortega    COLONOSCOPY  12/3/13    Ortega- polyps    HIP SURGERY Left 2022    LEFT HIP HEMIARTHROPLASTY performed by Rossy Roy MD at [address] [place], [place] [address]  (CERVIX STATUS UNKNOWN)      AKASH AND BSO (CERVIX REMOVED)      UPPER GASTROINTESTINAL ENDOSCOPY  12/3/13    Ortega; antritis       FAMILY HISTORY    Family History   Problem Relation Age of Onset    Breast Cancer Mother     Bipolar Disorder Brother        SOCIAL HISTORY    Social History     Tobacco Use    Smoking status: Never    Smokeless tobacco: Never   Vaping Use    Vaping Use: Never used   Substance Use Topics    Alcohol use: Yes     Comment: rarely    Drug use: No       ALLERGIES    No Known Allergies    MEDICATIONS    Current Outpatient Medications on File Prior to Encounter   Medication Sig Dispense Refill    atorvastatin (LIPITOR) 10 MG tablet Take 10 mg by mouth every other day      cephALEXin (KEFLEX) 500 MG capsule Take 1 capsule by mouth 3 times daily 30 capsule 0    torsemide (DEMADEX) 20 MG tablet Take 1 tablet by mouth 2 times daily (Patient taking differently: Take 20 mg by mouth daily) 40 tablet 1    vilazodone HCl (VIIBRYD) 20 MG TABS Take 1 tablet by mouth daily 90 tablet 1    buPROPion (WELLBUTRIN XL) 150 MG extended release tablet Take 1 tablet by mouth daily (before dinner) 90 tablet 1    gabapentin (NEURONTIN) 300 MG capsule Take 300 mg by mouth 3 times daily. polyethylene glycol (GLYCOLAX) 17 g packet Take 17 g by mouth daily 30 each 0    pantoprazole (PROTONIX) 40 MG tablet Take 1 tablet by mouth 2 times daily (before meals) (Patient taking differently: Take 40 mg by mouth daily) 30 tablet 3    bethanechol (URECHOLINE) 10 MG tablet Take 1 tablet by mouth 3 times daily 90 tablet 3    B Complex-C (VITAMIN B + C COMPLEX PO) Take by mouth      Lift Chair MISC by Does not apply route 1 each 0    estradiol (ESTRACE) 0.1 MG/GM vaginal cream Place 2 g vaginally Twice a Week       aspirin 81 MG EC tablet Take 81 mg by mouth daily. VITAMIN D PO Take  by mouth. No current facility-administered medications on file prior to encounter.        REVIEW OF SYSTEMS    Constitutional: negative  Eyes: negative  Ears, nose, mouth, throat, and face: negative  Gastrointestinal: negative  Behavioral/Psych: negative      Objective:      BP (!) 140/62   Pulse 79   Temp (!) 96.2 °F (35.7 °C) (Tympanic)     PHYSICAL EXAM    General Appearance: alert and oriented to person, place and time, well-developed and well-nourished, in no acute distress  Head: normocephalic and atraumatic  Neck: neck supple and non tender without mass, no thyromegaly or thyroid nodules, no cervical lymphadenopathy   Abdomen: soft, non-tender, non-distended, normal bowel sounds, no masses or organomegaly  Palpable DP pulses bilateral    Assessment:     Patient Active Problem List   Diagnosis    AR (allergic rhinitis)    Arthritis of knee    Patient overweight    High fever    Postmenopausal    Essential hypertension    Erosive gastritis    Current mild episode of major depressive disorder without prior episode (HCC)    Left displaced femoral neck fracture ([facility]    Fall at home    Dyslipidemia    Cellulitis    Class 1 obesity due to excess calories with body mass index (BMI) of 30.0 to 30.9 in adult    History of depression    Bilateral lower leg cellulitis    Peripheral edema    Redness and swelling of lower leg    Weight loss counseling, encounter for    NSTEMI (non-ST elevated myocardial infarction) ([facility]    [facility]    Hyperlipidemia    Other chest pain    Lower extremity edema       Wound 10/04/22 Leg Left; Lower #1 (Active)   Wound Image   10/04/22 0947   Wound Etiology Venous 10/04/22 0947   Wound Cleansed Cleansed with saline 10/04/22 0947   Wound Length (cm) 0 cm 10/04/22 0947   Wound Width (cm) 0 cm 10/04/22 0947   Wound Depth (cm) 0 cm 10/04/22 0947   Wound Surface Area (cm^2) 0 cm^2 10/04/22 0947   Wound Volume (cm^3) 0 cm^3 10/04/22 0947   Wound Assessment Dry 10/04/22 0947   Drainage Amount Scant 10/04/22 0947   Drainage Description Serosanguinous 10/04/22 0947   Odor None 10/04/22 0947   Kathy-wound Assessment Dry/flaky;Edematous 10/04/22 0947   Margins Defined edges 10/04/22 0947   Number of days: [de-identified]    69-year-old female who presents for evaluation of bilateral lower extremity swelling as well as small traumatic left pretibial wounds. No findings to suggest significant peripheral vascular disease. There is some mild cellulitis surrounding the wounds. The patient reports that the cellulitis is regressing with oral antibiotics.       Plan:     Finish course of oral antibiotics  Dry bandage to left lower leg  Double layer Tubigrips for control of bilateral lower extremity swelling  Follow-up in 1 week in the wound center  Once the wounds have healed and her swelling is under good control, we will have her measured for bilateral 20 to 30 mmHg zippered compression stockings    Treatment Plan          Written Patient Discharge Instructions Given            Electronically signed by Kimberlee Mota MD on 10/4/2022 at 4:39 PM

## 2022-10-05 ENCOUNTER — TELEPHONE (OUTPATIENT)
Dept: FAMILY MEDICINE CLINIC | Age: 82
End: 2022-10-05

## 2022-10-05 NOTE — TELEPHONE ENCOUNTER
----- Message from [facility] sent at 10/5/2022 10:05 AM EDT -----  Subject: Message to Provider    QUESTIONS  Information for Provider? Patient says she is taking torsemide (DEMADEX)   20 MG tablet and ick humberto-con 10 mg and she would like from the doctor or   nurse how she should be taking the medication. She would like for someone   to follow up with her. Also the patient has a appt on 10/7/2022 and she   has already seen the wound doctor. She would like to know if she needs to   keep the appointment   ---------------------------------------------------------------------------  --------------  [id number] [employer]  [phone]; OK to leave message on voicemail  ---------------------------------------------------------------------------  --------------  SCRIPT ANSWERS  Relationship to Patient?  Self

## 2022-10-05 NOTE — TELEPHONE ENCOUNTER
Left VM for pt to call back and receive Dr Yuli Bergeron response. Pt is already scheduled for Friday.

## 2022-10-06 NOTE — PROGRESS NOTES
Jeri Ford is a 78 y.o. female. HPI:  Here For recheck of leg edema  Will need BMP also    Legs are softer  Weights at home are trending down  Spirits are better  Condition with wound care  Rating antibiotic and Demadex    Simply seen by cardiology, felt to be doing well, myocarditis resolved    Meds, vitamins and allergies reviewed with pt    Wt Readings from Last 3 Encounters:   10/07/22 175 lb (79.4 kg)   10/04/22 171 lb 1.6 oz (77.6 kg)   09/30/22 177 lb (80.3 kg)       REVIEW OF SYSTEMS:   CONSTITUTIONAL: See history of present illness,   Weight noted   HEENT: No new vision difficulties or ringing in the ears. RESPIRATORY: No new SOB, PND, orthopnea or cough. CARDIOVASCULAR: no CP, palpitations or SOB with exertion  GI: No nausea, vomiting, diarrhea, constipation, abdominal pain or changes in bowel habits. : No urinary frequency, urgency, incontinence hematuria or dysuria. SKIN: No cyanosis or skin lesions. MUSCULOSKELETAL: No new muscle or joint pain. NEUROLOGICAL: No syncope or TIA-like symptoms. PSYCHIATRIC: No anxiety, insomnia or depression     No Known Allergies    Prior to Visit Medications    Medication Sig Taking? Authorizing Provider   atorvastatin (LIPITOR) 10 MG tablet Take 10 mg by mouth every other day Yes Historical Provider, MD   cephALEXin (KEFLEX) 500 MG capsule Take 1 capsule by mouth 3 times daily Yes Marquise Rey MD   torsemide (DEMADEX) 20 MG tablet Take 1 tablet by mouth 2 times daily  Patient taking differently: Take 20 mg by mouth daily Yes Marquise Rey MD   vilazodone HCl (VIIBRYD) 20 MG TABS Take 1 tablet by mouth daily Yes Marquise Rey MD   buPROPion (WELLBUTRIN XL) 150 MG extended release tablet Take 1 tablet by mouth daily (before dinner) Yes Marquise Rey MD   gabapentin (NEURONTIN) 300 MG capsule Take 300 mg by mouth 3 times daily.  Yes Historical Provider, MD   polyethylene glycol (GLYCOLAX) 17 g packet Take 17 g by mouth daily Yes Kortney NOEL CIERRA Feliciano CNP   pantoprazole (PROTONIX) 40 MG tablet Take 1 tablet by mouth 2 times daily (before meals)  Patient taking differently: Take 40 mg by mouth daily Yes CIERRA Isaac CNP   bethanechol (URECHOLINE) 10 MG tablet Take 1 tablet by mouth 3 times daily Yes CIERRA Isaac CNP   B Complex-C (VITAMIN B + C COMPLEX PO) Take by mouth Yes Historical Provider, MD   Lift Chair MISC by Does not apply route Yes Antonio Vitale MD   estradiol (ESTRACE) 0.1 MG/GM vaginal cream Place 2 g vaginally Twice a Week  Yes Historical Provider, MD   aspirin 81 MG EC tablet Take 81 mg by mouth daily. Yes Historical Provider, MD   VITAMIN D PO Take  by mouth. Yes Historical Provider, MD       Past Medical History:   Diagnosis Date    AR (allergic rhinitis)     Arthritis of knee     Pruis    Depression     Erosive gastritis 10/28/2019    EGD October 2019, he did with PPI    GERD (gastroesophageal reflux disease)     Hyperlipidemia     Internal hemorrhoids     Overweight(278.02)     Viral meningitis 5/12       Social History     Tobacco Use    Smoking status: Never    Smokeless tobacco: Never   Substance Use Topics    Alcohol use: Yes     Comment: rarely       Family History   Problem Relation Age of Onset    Breast Cancer Mother     Bipolar Disorder Brother        OBJECTIVE:  BP (!) 124/58 (Site: Left Upper Arm, Position: Sitting, Cuff Size: Large Adult)   Pulse 100   Resp 16   Ht 5' 1\" (1.549 m)   Wt 175 lb (79.4 kg)   SpO2 98%   BMI 33.07 kg/m²   GEN:  in NAD, in better spirits, smiling today, legs softer  HEENT:  NCAT  NECK:  Supple without adenopathy. CV:  Regular rate and rhythm, S1 and S2 normal, no murmurs, clicks  PULM:  Chest is clear, no wheezing ,  symmetric air entry throughout both lung fields.   ABD: Soft, NT, no masses appreciated  EXT: Legs wrapped, softer to palpation and not is tender weight at home is trending down  NEURO: Alert oriented ×3, uses walker, mobility improving    ASSESSMENT/PLAN:  1. Edema of both legs  Continue support stockings/tubing by wound center  Continue daily doing Demadex with potassium  Electrolytes today  - Comprehensive Metabolic Panel; Future    Follow-up 1 month or as needed    2.  Idiopathic myocarditis, unspecified chronicity  Resolved    25 Total Minutes spent pre charting (reviewing problem list, meds, any test results, consultant and hospital notes ) and  obtaining present visit history, performing appropriate medical exam/evaluation, counseling and educating the patient (and family), ordering medications ,tests, and procedures as needed, refilling medication(s), placing referral(s) when needed in addition to coordinating care for this patient and documenting in electronic health record

## 2022-10-07 ENCOUNTER — OFFICE VISIT (OUTPATIENT)
Dept: FAMILY MEDICINE CLINIC | Age: 82
End: 2022-10-07
Payer: MEDICARE

## 2022-10-07 VITALS
HEIGHT: 61 IN | RESPIRATION RATE: 16 BRPM | SYSTOLIC BLOOD PRESSURE: 124 MMHG | DIASTOLIC BLOOD PRESSURE: 58 MMHG | WEIGHT: 175 LBS | HEART RATE: 100 BPM | BODY MASS INDEX: 33.04 KG/M2 | OXYGEN SATURATION: 98 %

## 2022-10-07 DIAGNOSIS — I40.1 IDIOPATHIC MYOCARDITIS, UNSPECIFIED CHRONICITY: ICD-10-CM

## 2022-10-07 DIAGNOSIS — R60.0 EDEMA OF BOTH LEGS: ICD-10-CM

## 2022-10-07 DIAGNOSIS — R60.0 EDEMA OF BOTH LEGS: Primary | ICD-10-CM

## 2022-10-07 PROCEDURE — 3288F FALL RISK ASSESSMENT DOCD: CPT | Performed by: FAMILY MEDICINE

## 2022-10-07 PROCEDURE — 99213 OFFICE O/P EST LOW 20 MIN: CPT | Performed by: FAMILY MEDICINE

## 2022-10-07 PROCEDURE — 1123F ACP DISCUSS/DSCN MKR DOCD: CPT | Performed by: FAMILY MEDICINE

## 2022-10-08 LAB
A/G RATIO: 1.9 (ref 1.1–2.2)
ALBUMIN SERPL-MCNC: 4.1 G/DL (ref 3.4–5)
ALP BLD-CCNC: 115 U/L (ref 40–129)
ALT SERPL-CCNC: 15 U/L (ref 10–40)
ANION GAP SERPL CALCULATED.3IONS-SCNC: 9 MMOL/L (ref 3–16)
AST SERPL-CCNC: 16 U/L (ref 15–37)
BILIRUB SERPL-MCNC: <0.2 MG/DL (ref 0–1)
BUN BLDV-MCNC: 43 MG/DL (ref 7–20)
CALCIUM SERPL-MCNC: 9.1 MG/DL (ref 8.3–10.6)
CHLORIDE BLD-SCNC: 99 MMOL/L (ref 99–110)
CO2: 33 MMOL/L (ref 21–32)
CREAT SERPL-MCNC: 1.1 MG/DL (ref 0.6–1.2)
GFR AFRICAN AMERICAN: 57
GFR NON-AFRICAN AMERICAN: 47
GLUCOSE BLD-MCNC: 109 MG/DL (ref 70–99)
POTASSIUM SERPL-SCNC: 4.9 MMOL/L (ref 3.5–5.1)
SODIUM BLD-SCNC: 141 MMOL/L (ref 136–145)
TOTAL PROTEIN: 6.3 G/DL (ref 6.4–8.2)

## 2022-10-11 ENCOUNTER — HOSPITAL ENCOUNTER (OUTPATIENT)
Dept: WOUND CARE | Age: 82
Discharge: HOME | End: 2022-10-11
Payer: MEDICARE

## 2022-10-11 VITALS — HEART RATE: 88 BPM | TEMPERATURE: 96.5 F | SYSTOLIC BLOOD PRESSURE: 141 MMHG | DIASTOLIC BLOOD PRESSURE: 60 MMHG

## 2022-10-11 DIAGNOSIS — S81.802A WOUND OF LEFT LOWER EXTREMITY, INITIAL ENCOUNTER: ICD-10-CM

## 2022-10-11 DIAGNOSIS — S81.802S WOUND OF LEFT LOWER EXTREMITY, SEQUELA: Primary | ICD-10-CM

## 2022-10-11 PROCEDURE — 99213 OFFICE O/P EST LOW 20 MIN: CPT

## 2022-10-11 PROCEDURE — 99213 OFFICE O/P EST LOW 20 MIN: CPT | Performed by: SURGERY

## 2022-10-11 RX ORDER — CLOBETASOL PROPIONATE 0.5 MG/G
OINTMENT TOPICAL ONCE
OUTPATIENT
Start: 2022-10-11 | End: 2022-10-11

## 2022-10-11 RX ORDER — LIDOCAINE 50 MG/G
OINTMENT TOPICAL ONCE
OUTPATIENT
Start: 2022-10-11 | End: 2022-10-11

## 2022-10-11 RX ORDER — LIDOCAINE HYDROCHLORIDE 20 MG/ML
JELLY TOPICAL ONCE
OUTPATIENT
Start: 2022-10-11 | End: 2022-10-11

## 2022-10-11 RX ORDER — BACITRACIN ZINC AND POLYMYXIN B SULFATE 500; 1000 [USP'U]/G; [USP'U]/G
OINTMENT TOPICAL ONCE
OUTPATIENT
Start: 2022-10-11 | End: 2022-10-11

## 2022-10-11 RX ORDER — LIDOCAINE 40 MG/G
CREAM TOPICAL ONCE
OUTPATIENT
Start: 2022-10-11 | End: 2022-10-11

## 2022-10-11 RX ORDER — GENTAMICIN SULFATE 1 MG/G
OINTMENT TOPICAL ONCE
OUTPATIENT
Start: 2022-10-11 | End: 2022-10-11

## 2022-10-11 RX ORDER — LIDOCAINE HYDROCHLORIDE 40 MG/ML
SOLUTION TOPICAL ONCE
OUTPATIENT
Start: 2022-10-11 | End: 2022-10-11

## 2022-10-11 RX ORDER — BETAMETHASONE DIPROPIONATE 0.05 %
OINTMENT (GRAM) TOPICAL ONCE
OUTPATIENT
Start: 2022-10-11 | End: 2022-10-11

## 2022-10-11 RX ORDER — BACITRACIN, NEOMYCIN, POLYMYXIN B 400; 3.5; 5 [USP'U]/G; MG/G; [USP'U]/G
OINTMENT TOPICAL ONCE
OUTPATIENT
Start: 2022-10-11 | End: 2022-10-11

## 2022-10-11 RX ORDER — GINSENG 100 MG
CAPSULE ORAL ONCE
OUTPATIENT
Start: 2022-10-11 | End: 2022-10-11

## 2022-10-11 ASSESSMENT — PAIN SCALES - GENERAL: PAINLEVEL_OUTOF10: 7

## 2022-10-11 ASSESSMENT — PAIN DESCRIPTION - LOCATION: LOCATION: LEG

## 2022-10-11 ASSESSMENT — PAIN - FUNCTIONAL ASSESSMENT: PAIN_FUNCTIONAL_ASSESSMENT: PREVENTS OR INTERFERES SOME ACTIVE ACTIVITIES AND ADLS

## 2022-10-11 ASSESSMENT — PAIN DESCRIPTION - PAIN TYPE: TYPE: CHRONIC PAIN

## 2022-10-11 ASSESSMENT — PAIN DESCRIPTION - FREQUENCY: FREQUENCY: CONTINUOUS

## 2022-10-11 ASSESSMENT — PAIN DESCRIPTION - ORIENTATION: ORIENTATION: LEFT;RIGHT

## 2022-10-11 ASSESSMENT — PAIN DESCRIPTION - DESCRIPTORS: DESCRIPTORS: BURNING

## 2022-10-11 ASSESSMENT — PAIN DESCRIPTION - ONSET: ONSET: ON-GOING

## 2022-10-11 NOTE — DISCHARGE INSTRUCTIONS
Congratulations! You have completed your treatment program!    To prevent Venous Wounds from recurring again:  Elevate your legs at least parallel to the ground while sitting. Wear your prescription support stockings everyday and remove at night while you sleep. Replace your stockings every 4-6 months so that your legs continue to get the support they need. Inspect your legs and feet daily and report any increased swelling, unusual redness or new wounds to your physician. Wash your legs and feet regularly with mild soap and water and moisturize daily. Continue to use compression pumps if prescribed by your physician. Avoid overeating. Extra weight adds pressure on the veins in your legs. Limit salty foods as they promote swelling or fluid retention in your legs. Call the [address] [place] [address] if your wound reopens, if new wounds occur, or if you have any other questions about your follow up care [phone]. Right & Left lower leg- 2 layers tubi  (give extra set). You can remove the tubi  at night & place first thing in the morning. Prescription for 20-30 mmHg zippered stockings given,  will have you go to [facility] to be fitted. Elevate your leg when sitting.      [address]  [address]  Jose Angel Glover  P: [phone]  F: [phone]

## 2022-10-11 NOTE — PLAN OF CARE
Discharge instructions given. Patient verbalized understanding.   Return to [address] [place] [address],[address] as needed  Wound healed
- - -

## 2022-10-11 NOTE — PROGRESS NOTES
torsemide (DEMADEX) 20 MG tablet Take 1 tablet by mouth 2 times daily (Patient taking differently: Take 20 mg by mouth daily) 40 tablet 1    vilazodone HCl (VIIBRYD) 20 MG TABS Take 1 tablet by mouth daily 90 tablet 1    buPROPion (WELLBUTRIN XL) 150 MG extended release tablet Take 1 tablet by mouth daily (before dinner) 90 tablet 1    gabapentin (NEURONTIN) 300 MG capsule Take 300 mg by mouth 3 times daily. polyethylene glycol (GLYCOLAX) 17 g packet Take 17 g by mouth daily 30 each 0    pantoprazole (PROTONIX) 40 MG tablet Take 1 tablet by mouth 2 times daily (before meals) (Patient taking differently: Take 40 mg by mouth daily) 30 tablet 3    bethanechol (URECHOLINE) 10 MG tablet Take 1 tablet by mouth 3 times daily 90 tablet 3    B Complex-C (VITAMIN B + C COMPLEX PO) Take by mouth      Lift Chair MISC by Does not apply route 1 each 0    estradiol (ESTRACE) 0.1 MG/GM vaginal cream Place 2 g vaginally Twice a Week       aspirin 81 MG EC tablet Take 81 mg by mouth daily. VITAMIN D PO Take  by mouth. No current facility-administered medications on file prior to encounter.        REVIEW OF SYSTEMS    Constitutional: negative  Eyes: negative  Ears, nose, mouth, throat, and face: negative  Gastrointestinal: negative  Behavioral/Psych: negative      Objective:      BP (!) 141/60   Pulse 88   Temp (!) 96.5 °F (35.8 °C)     PHYSICAL EXAM    General Appearance: alert and oriented to person, place and time, well-developed and well-nourished, in no acute distress  Head: normocephalic and atraumatic  Neck: neck supple and non tender without mass, no thyromegaly or thyroid nodules, no cervical lymphadenopathy   Abdomen: soft, non-tender, non-distended, normal bowel sounds, no masses or organomegaly      Assessment:     Patient Active Problem List   Diagnosis    AR (allergic rhinitis)    Arthritis of knee    Patient overweight    High fever    Postmenopausal    Essential hypertension    Erosive gastritis Current mild episode of major depressive disorder without prior episode ([facility]    Left displaced femoral neck fracture ([facility]    Fall at home    Dyslipidemia    Cellulitis    Class 1 obesity due to excess calories with body mass index (BMI) of 30.0 to 30.9 in adult    History of depression    Bilateral lower leg cellulitis    Peripheral edema    Redness and swelling of lower leg    Weight loss counseling, encounter for    NSTEMI (non-ST elevated myocardial infarction) ([facility]    Myocarditis ([facility]    Hyperlipidemia    Other chest pain    Lower extremity edema    Leg wound, left       75-year-old female seen in follow-up for small left pretibial wounds. She initially had cellulitis in this area. The cellulitis has resolved and there is only one very small remaining eschar. The very small eschar should auto amputate. Her swelling is under good control. Plan:     She was given a prescription for 20 to 30 mm Hg zippered compression stockings and instructed on their usage. She will follow-up in the wound center as needed.     Treatment Plan          Written Patient Discharge Instructions Given            Electronically signed by Harry Bueno MD on 10/11/2022 at 2:06 PM

## 2022-10-27 RX ORDER — GABAPENTIN 300 MG/1
300 CAPSULE ORAL 3 TIMES DAILY
Qty: 90 CAPSULE | Refills: 0 | Status: SHIPPED | OUTPATIENT
Start: 2022-10-27 | End: 2022-11-27

## 2022-10-27 NOTE — TELEPHONE ENCOUNTER
Medication and Quantity requested:  gabapentin (NEURONTIN) 300 MG capsule - qty 90      Last Visit  10/07/22 - Dr Angel Mitchell and phone number updated in EPIC:  yes    [facility]

## 2022-11-01 ENCOUNTER — OFFICE VISIT (OUTPATIENT)
Dept: ORTHOPEDIC SURGERY | Age: 82
End: 2022-11-01
Payer: MEDICARE

## 2022-11-01 VITALS — HEIGHT: 61 IN | BODY MASS INDEX: 33.04 KG/M2 | WEIGHT: 175 LBS

## 2022-11-01 DIAGNOSIS — M54.16 LUMBAR RADICULOPATHY: Primary | ICD-10-CM

## 2022-11-01 PROCEDURE — 1123F ACP DISCUSS/DSCN MKR DOCD: CPT | Performed by: ORTHOPAEDIC SURGERY

## 2022-11-01 PROCEDURE — 99214 OFFICE O/P EST MOD 30 MIN: CPT | Performed by: ORTHOPAEDIC SURGERY

## 2022-11-02 ENCOUNTER — NURSE TRIAGE (OUTPATIENT)
Dept: OTHER | Facility: CLINIC | Age: 82
End: 2022-11-02

## 2022-11-02 NOTE — TELEPHONE ENCOUNTER
Location of patient: Kris Malik call from [facility] with [facility]. Subjective: Caller states \"My legs have been swelling and they are not getting better and the tissue is getting hard again\"     Current Symptoms: leg swelling (creeps up into the thighs), lower legs are hard, not pitting, redness in certain area, no fever    Onset: 5 days ago; worsening    Associated Symptoms:  none    Pain Severity: 7/10; burning; constant    Temperature: denies       What has been tried: compression stocking and walks, home therapy, Tylenol (not helping), leg numbness (both sides - especially the feet), elevating your legs,     LMP: NA Pregnant: NA    Recommended disposition: Go to ED/UCC or PCP with approval    Care advice provided, patient verbalizes understanding; denies any other questions or concerns; instructed to call back for any new or worsening symptoms. Warm transfer to [facility] practice for a second level triage due to severe swelling in bilateral legs    Attention Provider: Thank you for allowing me to participate in the care of your patient. The patient was connected to triage in response to information provided to the ECC/PSC. Please do not respond through this encounter as the response is not directed to a shared pool.     Reminders:     Call [facility] Provider Office    Care Advice - both instruction and documentation    Routing - PCP (and NP for 2nd level triage)    PLEASE DELETE ALL RED TEXT PRIOR TO SIGNING NOTE      Reason for Disposition   SEVERE swelling (e.g., swelling extends above knee, entire leg is swollen, weeping fluid)    Protocols used: Leg Swelling and Edema-ADULT-[place]

## 2022-11-02 NOTE — PROGRESS NOTES
Marilyn Lange is a 78 y.o. female. HPI:    Accompanied by   Here For recheck of leg edema    Has shoes and coat on  Weight is 178 pounds at home    Seeing  neurologist for neuropathy, Dr. Arthea Kanner, vitamins and allergies reviewed with pt    weight is with heavy shoes and coat on    Wt Readings from Last 3 Encounters:   11/03/22 182 lb 3.2 oz (82.6 kg)   11/01/22 175 lb (79.4 kg)   10/07/22 175 lb (79.4 kg)       REVIEW OF SYSTEMS:   CONSTITUTIONAL: See history of present illness,   Weight noted   HEENT: No new vision difficulties or ringing in the ears. RESPIRATORY: No new SOB, PND, orthopnea or cough. CARDIOVASCULAR: no CP, palpitations or SOB with exertion  GI: No nausea, vomiting, diarrhea, constipation, abdominal pain or changes in bowel habits. : No urinary frequency, urgency, incontinence hematuria or dysuria. SKIN: No cyanosis or skin lesions. MUSCULOSKELETAL: No new muscle or joint pain. NEUROLOGICAL: No syncope or TIA-like symptoms. PSYCHIATRIC: No anxiety, insomnia or depression     Allergies   Allergen Reactions    Abilify [Aripiprazole]      tremors       Prior to Visit Medications    Medication Sig Taking? Authorizing Provider   gabapentin (NEURONTIN) 100 MG capsule Take with 300mg tid= 400 po TID Yes Ezra Boyce MD   gabapentin (NEURONTIN) 300 MG capsule Take 1 capsule by mouth 3 times daily for 31 days.  Yes Ezra Boyce MD   Compression Stockings MISC by Does not apply route Bilateral below knee zippered compression stockings 20-30 mmHg Yes Allen Sahni MD   atorvastatin (LIPITOR) 10 MG tablet Take 10 mg by mouth every other day Yes Historical Provider, MD   cephALEXin (KEFLEX) 500 MG capsule Take 1 capsule by mouth 3 times daily Yes Ezra Boyce MD   torsemide (DEMADEX) 20 MG tablet Take 1 tablet by mouth 2 times daily  Patient taking differently: Take 20 mg by mouth daily Yes Ezra Boyce MD   vilazodone HCl (VIIBRYD) 20 MG TABS Take 1 tablet by mouth daily Yes Lizet Hidalgo MD   buPROPion (WELLBUTRIN XL) 150 MG extended release tablet Take 1 tablet by mouth daily (before dinner) Yes Lizet Hidalgo MD   pantoprazole (PROTONIX) 40 MG tablet Take 1 tablet by mouth 2 times daily (before meals)  Patient taking differently: Take 40 mg by mouth daily Yes CIERRA Isaac CNP   bethanechol (URECHOLINE) 10 MG tablet Take 1 tablet by mouth 3 times daily Yes CIERRA Isaac CNP   B Complex-C (VITAMIN B + C COMPLEX PO) Take by mouth Yes Historical Provider, MD   Lift Chair MISC by Does not apply route Yes Lizet Hidalgo MD   estradiol (ESTRACE) 0.1 MG/GM vaginal cream Place 2 g vaginally Twice a Week  Yes Historical Provider, MD   aspirin 81 MG EC tablet Take 81 mg by mouth daily. Yes Historical Provider, MD   VITAMIN D PO Take  by mouth. Yes Historical Provider, MD       Past Medical History:   Diagnosis Date    AR (allergic rhinitis)     Arthritis of knee     Pruis    Depression     Erosive gastritis 10/28/2019    EGD October 2019, he did with PPI    GERD (gastroesophageal reflux disease)     Hyperlipidemia     Internal hemorrhoids     Overweight(278.02)     Viral meningitis 5/12       Social History     Tobacco Use    Smoking status: Never    Smokeless tobacco: Never   Substance Use Topics    Alcohol use: Yes     Comment: rarely       Family History   Problem Relation Age of Onset    Breast Cancer Mother     Bipolar Disorder Brother        OBJECTIVE:  /60   Pulse 78   Resp 16   Ht 5' 1\" (1.549 m)   Wt 182 lb 3.2 oz (82.6 kg)   SpO2 99%   BMI 34.43 kg/m²   GEN:  in NAD, smiling, in good spirits, depression improved  HEENT:  NCAT  NECK:  Supple without adenopathy. CV:  Regular rate and rhythm, S1 and S2 normal, no murmurs, clicks  PULM:  Chest is clear, no wheezing ,  symmetric air entry throughout both lung fields.   ABD: Soft, NT, no masses appreciated  EXT: Compression stockings on, helping, legs soft, no pitting edema, no evidence of breakdown  NEURO: Alert oriented ×3, using walker    ASSESSMENT/PLAN:  1. Lower extremity edema  Legs are soft  Continue compression stockings  More walking  Check labs prior to next visit in 1 month for annual wellness visit  - Comprehensive Metabolic Panel; Future    2. Dyslipidemia  Recheck lipid panel  - Lipid Panel; Future    3. Anemia due to chronic kidney disease, unspecified CKD stage  Recheck  - CBC with Auto Differential; Future    4.  Neuropathy  increase Neurontin to 400 mg 3 times daily from 300 mg 3 times daily    25 Total Minutes spent pre charting (reviewing problem list, meds, any test results, consultant and hospital notes ) and  obtaining present visit history, performing appropriate medical exam/evaluation, counseling and educating the patient (and family), ordering medications ,tests, and procedures as needed, refilling medication(s), placing referral(s) when needed in addition to coordinating care for this patient and documenting in electronic health record

## 2022-11-03 ENCOUNTER — OFFICE VISIT (OUTPATIENT)
Dept: FAMILY MEDICINE CLINIC | Age: 82
End: 2022-11-03
Payer: MEDICARE

## 2022-11-03 VITALS
BODY MASS INDEX: 34.4 KG/M2 | OXYGEN SATURATION: 99 % | WEIGHT: 182.2 LBS | RESPIRATION RATE: 16 BRPM | HEIGHT: 61 IN | HEART RATE: 78 BPM | SYSTOLIC BLOOD PRESSURE: 138 MMHG | DIASTOLIC BLOOD PRESSURE: 60 MMHG

## 2022-11-03 DIAGNOSIS — E78.5 DYSLIPIDEMIA: ICD-10-CM

## 2022-11-03 DIAGNOSIS — R60.0 LOWER EXTREMITY EDEMA: Primary | ICD-10-CM

## 2022-11-03 DIAGNOSIS — D63.1 ANEMIA DUE TO CHRONIC KIDNEY DISEASE, UNSPECIFIED CKD STAGE: ICD-10-CM

## 2022-11-03 DIAGNOSIS — G62.9 NEUROPATHY: ICD-10-CM

## 2022-11-03 DIAGNOSIS — N18.9 ANEMIA DUE TO CHRONIC KIDNEY DISEASE, UNSPECIFIED CKD STAGE: ICD-10-CM

## 2022-11-03 PROCEDURE — 3078F DIAST BP <80 MM HG: CPT | Performed by: FAMILY MEDICINE

## 2022-11-03 PROCEDURE — 99213 OFFICE O/P EST LOW 20 MIN: CPT | Performed by: FAMILY MEDICINE

## 2022-11-03 PROCEDURE — 3074F SYST BP LT 130 MM HG: CPT | Performed by: FAMILY MEDICINE

## 2022-11-03 PROCEDURE — 1123F ACP DISCUSS/DSCN MKR DOCD: CPT | Performed by: FAMILY MEDICINE

## 2022-11-03 RX ORDER — GABAPENTIN 100 MG/1
CAPSULE ORAL
Qty: 90 CAPSULE | Refills: 0 | Status: SHIPPED | OUTPATIENT
Start: 2022-11-03 | End: 2022-12-03

## 2022-11-07 NOTE — PROGRESS NOTES
11/1/2022     Reason for visit:  Status post left hip hemiarthroplasty on 5/16/2022    History of Present Illness:  Patient presents for evaluation. She has been having a difficult time at the rehab facility. She reports left knee pain as well as pain that radiates down the back down the back of the leg. Its been interfering with her ability to progress with therapy. Significant hip pain. No fever or chills. Objective:  Ht 5' 1\" (1.549 m)   Wt 175 lb (79.4 kg)   BMI 33.07 kg/m²      Physical Exam:  The patient is well-appearing and in no apparent distress  Examination of the left hip  Incision is well-healed with no pain with gentle motion of hip  Left knee   There is a small effusion, no gross deformity or skin changes  Range of motion reveals 0 to 120  neg lachman, negative posterior drawer, no pain or laxity with varus or valgus stress at 0 degrees and 30 degrees of flexion  + joint line tenderness  5 out of 5 strength throughout distal muscle groups  Sensation is intact to light touch throughout all distributions  There is no calf swelling or tenderness  Palpable DP pulse, brisk cap refill, 2+ symmetric reflexes    Imaging:  Previous x-rays that were performed at the hospital of the left hip demonstrates overall excellent position of hardware without evidence of loosening or failure. 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 I suspect she is also likely experiencing lumbar radiculopathy given her symptoms radiating down the leg. This is despite physical therapy. For her I would refer her to a spine physician. She is in agreement. She will return to see me as needed. Greater than 30 minutes were spent with this encounter.   Time spent included evaluating the patient's chart prior to arrival.  Evaluating the patient in the office including history, physical examination, imaging reviewing, and counseling on next steps. Lastly, time was spent discussing orders with my staff as well as providing documentation in the chart. Xi Patel MD            Orthopaedic Surgery Sports Medicine and [address] [facility]            Team Physician [facility] ([place])      Disclaimer: This note was dictated with voice recognition software. Though review and correction are routine, we apologize for any errors.

## 2022-12-05 RX ORDER — GABAPENTIN 100 MG/1
CAPSULE ORAL
Qty: 90 CAPSULE | Refills: 0 | Status: SHIPPED | OUTPATIENT
Start: 2022-12-05 | End: 2023-01-05

## 2022-12-05 RX ORDER — GABAPENTIN 300 MG/1
CAPSULE ORAL
Qty: 90 CAPSULE | Refills: 0 | Status: SHIPPED | OUTPATIENT
Start: 2022-12-05 | End: 2023-01-04

## 2022-12-05 NOTE — TELEPHONE ENCOUNTER
Medication:   Requested Prescriptions     Pending Prescriptions Disp Refills    gabapentin (NEURONTIN) 100 MG capsule [Pharmacy Med Name: GABAPENTIN 100 MG CAPSULE] 90 capsule 0     Sig: TAKE WITH 300MG 3 TIMES A DAY = 400 MG BY MOUTH 3 TIMES A DAY    gabapentin (NEURONTIN) 300 MG capsule [Pharmacy Med Name: GABAPENTIN 300 MG CAPSULE] 90 capsule 0     Sig: TAKE 1 CAPSULE BY MOUTH 3 TIMES DAILY        Last Filled:  10/27/2022 (300mg) 11/3/2022 (100mg)     Patient Phone Number: [phone] (home)     Last appt: 11/3/2022   Next appt: 12/9/2022    Last OARRS: No flowsheet data found.

## 2022-12-09 ENCOUNTER — OFFICE VISIT (OUTPATIENT)
Dept: FAMILY MEDICINE CLINIC | Age: 82
End: 2022-12-09

## 2022-12-09 VITALS
DIASTOLIC BLOOD PRESSURE: 70 MMHG | HEIGHT: 61 IN | OXYGEN SATURATION: 97 % | SYSTOLIC BLOOD PRESSURE: 130 MMHG | HEART RATE: 85 BPM | BODY MASS INDEX: 33.23 KG/M2 | RESPIRATION RATE: 16 BRPM | WEIGHT: 176 LBS

## 2022-12-09 DIAGNOSIS — Z00.00 MEDICARE ANNUAL WELLNESS VISIT, SUBSEQUENT: Primary | ICD-10-CM

## 2022-12-09 DIAGNOSIS — R60.0 LOWER EXTREMITY EDEMA: ICD-10-CM

## 2022-12-09 DIAGNOSIS — R35.0 URINARY FREQUENCY: ICD-10-CM

## 2022-12-09 DIAGNOSIS — D63.1 ANEMIA DUE TO CHRONIC KIDNEY DISEASE, UNSPECIFIED CKD STAGE: ICD-10-CM

## 2022-12-09 DIAGNOSIS — K29.60 EROSIVE GASTRITIS: ICD-10-CM

## 2022-12-09 DIAGNOSIS — E78.5 DYSLIPIDEMIA: ICD-10-CM

## 2022-12-09 DIAGNOSIS — F32.0 CURRENT MILD EPISODE OF MAJOR DEPRESSIVE DISORDER WITHOUT PRIOR EPISODE (HCC): ICD-10-CM

## 2022-12-09 DIAGNOSIS — M85.89 OSTEOPENIA OF MULTIPLE SITES: ICD-10-CM

## 2022-12-09 DIAGNOSIS — I21.4 NSTEMI (NON-ST ELEVATED MYOCARDIAL INFARCTION) (HCC): ICD-10-CM

## 2022-12-09 DIAGNOSIS — N18.9 ANEMIA DUE TO CHRONIC KIDNEY DISEASE, UNSPECIFIED CKD STAGE: ICD-10-CM

## 2022-12-09 DIAGNOSIS — Z12.11 COLON CANCER SCREENING: ICD-10-CM

## 2022-12-09 DIAGNOSIS — I10 ESSENTIAL HYPERTENSION: ICD-10-CM

## 2022-12-09 LAB
A/G RATIO: 1.6 (ref 1.1–2.2)
ALBUMIN SERPL-MCNC: 4.3 G/DL (ref 3.4–5)
ALP BLD-CCNC: 93 U/L (ref 40–129)
ALT SERPL-CCNC: 13 U/L (ref 10–40)
ANION GAP SERPL CALCULATED.3IONS-SCNC: 10 MMOL/L (ref 3–16)
AST SERPL-CCNC: 18 U/L (ref 15–37)
BASOPHILS ABSOLUTE: 0 K/UL (ref 0–0.2)
BASOPHILS RELATIVE PERCENT: 1 %
BILIRUB SERPL-MCNC: 0.4 MG/DL (ref 0–1)
BILIRUBIN, POC: NORMAL
BLOOD URINE, POC: NORMAL
BUN BLDV-MCNC: 32 MG/DL (ref 7–20)
CALCIUM SERPL-MCNC: 9.3 MG/DL (ref 8.3–10.6)
CHLORIDE BLD-SCNC: 100 MMOL/L (ref 99–110)
CHOLESTEROL, TOTAL: 130 MG/DL (ref 0–199)
CLARITY, POC: NORMAL
CO2: 30 MMOL/L (ref 21–32)
COLOR, POC: YELLOW
CREAT SERPL-MCNC: 0.9 MG/DL (ref 0.6–1.2)
EOSINOPHILS ABSOLUTE: 0.1 K/UL (ref 0–0.6)
EOSINOPHILS RELATIVE PERCENT: 3.2 %
GFR SERPL CREATININE-BSD FRML MDRD: >60 ML/MIN/{1.73_M2}
GLUCOSE BLD-MCNC: 89 MG/DL (ref 70–99)
GLUCOSE URINE, POC: NORMAL
HCT VFR BLD CALC: 37.2 % (ref 36–48)
HDLC SERPL-MCNC: 58 MG/DL (ref 40–60)
HEMOGLOBIN: 12.1 G/DL (ref 12–16)
KETONES, POC: NORMAL
LDL CHOLESTEROL CALCULATED: 58 MG/DL
LEUKOCYTE EST, POC: NORMAL
LYMPHOCYTES ABSOLUTE: 1.4 K/UL (ref 1–5.1)
LYMPHOCYTES RELATIVE PERCENT: 30.9 %
MCH RBC QN AUTO: 29.3 PG (ref 26–34)
MCHC RBC AUTO-ENTMCNC: 32.6 G/DL (ref 31–36)
MCV RBC AUTO: 90 FL (ref 80–100)
MONOCYTES ABSOLUTE: 0.4 K/UL (ref 0–1.3)
MONOCYTES RELATIVE PERCENT: 9.5 %
NEUTROPHILS ABSOLUTE: 2.6 K/UL (ref 1.7–7.7)
NEUTROPHILS RELATIVE PERCENT: 55.4 %
NITRITE, POC: NORMAL
PDW BLD-RTO: 13.5 % (ref 12.4–15.4)
PH, POC: 7
PLATELET # BLD: 144 K/UL (ref 135–450)
PMV BLD AUTO: 8.4 FL (ref 5–10.5)
POTASSIUM SERPL-SCNC: 4.6 MMOL/L (ref 3.5–5.1)
PROTEIN, POC: NORMAL
RBC # BLD: 4.13 M/UL (ref 4–5.2)
SODIUM BLD-SCNC: 140 MMOL/L (ref 136–145)
SPECIFIC GRAVITY, POC: 1.02
TOTAL PROTEIN: 7 G/DL (ref 6.4–8.2)
TRIGL SERPL-MCNC: 72 MG/DL (ref 0–150)
UROBILINOGEN, POC: 0.2
VLDLC SERPL CALC-MCNC: 14 MG/DL
WBC # BLD: 4.6 K/UL (ref 4–11)

## 2022-12-09 RX ORDER — CEFUROXIME AXETIL 250 MG/1
250 TABLET ORAL 2 TIMES DAILY
Qty: 14 TABLET | Refills: 0 | Status: SHIPPED | OUTPATIENT
Start: 2022-12-09 | End: 2022-12-16

## 2022-12-09 ASSESSMENT — PATIENT HEALTH QUESTIONNAIRE - PHQ9
SUM OF ALL RESPONSES TO PHQ9 QUESTIONS 1 & 2: 0
SUM OF ALL RESPONSES TO PHQ QUESTIONS 1-9: 2
SUM OF ALL RESPONSES TO PHQ QUESTIONS 1-9: 2
1. LITTLE INTEREST OR PLEASURE IN DOING THINGS: 0
4. FEELING TIRED OR HAVING LITTLE ENERGY: 1
2. FEELING DOWN, DEPRESSED OR HOPELESS: 0
SUM OF ALL RESPONSES TO PHQ QUESTIONS 1-9: 2
6. FEELING BAD ABOUT YOURSELF - OR THAT YOU ARE A FAILURE OR HAVE LET YOURSELF OR YOUR FAMILY DOWN: 0
7. TROUBLE CONCENTRATING ON THINGS, SUCH AS READING THE NEWSPAPER OR WATCHING TELEVISION: 0
8. MOVING OR SPEAKING SO SLOWLY THAT OTHER PEOPLE COULD HAVE NOTICED. OR THE OPPOSITE, BEING SO FIGETY OR RESTLESS THAT YOU HAVE BEEN MOVING AROUND A LOT MORE THAN USUAL: 0
SUM OF ALL RESPONSES TO PHQ QUESTIONS 1-9: 2
5. POOR APPETITE OR OVEREATING: 1
9. THOUGHTS THAT YOU WOULD BE BETTER OFF DEAD, OR OF HURTING YOURSELF: 0
3. TROUBLE FALLING OR STAYING ASLEEP: 0
10. IF YOU CHECKED OFF ANY PROBLEMS, HOW DIFFICULT HAVE THESE PROBLEMS MADE IT FOR YOU TO DO YOUR WORK, TAKE CARE OF THINGS AT HOME, OR GET ALONG WITH OTHER PEOPLE: 0

## 2022-12-09 ASSESSMENT — LIFESTYLE VARIABLES
HOW OFTEN DO YOU HAVE A DRINK CONTAINING ALCOHOL: NEVER
HOW OFTEN DO YOU HAVE A DRINK CONTAINING ALCOHOL: 2-4 TIMES A MONTH
HOW MANY STANDARD DRINKS CONTAINING ALCOHOL DO YOU HAVE ON A TYPICAL DAY: 1 OR 2
HOW MANY STANDARD DRINKS CONTAINING ALCOHOL DO YOU HAVE ON A TYPICAL DAY: PATIENT DOES NOT DRINK

## 2022-12-09 NOTE — PATIENT INSTRUCTIONS
Preventing Falls: Care Instructions  Overview     Getting around your home safely can be a challenge if you have injuries or health problems that make it easy for you to fall. Loose rugs and furniture in walkways are among the dangers for many older people who have problems walking or who have poor eyesight. People who have conditions such as arthritis, osteoporosis, or dementia also have to be careful not to fall. You can make your home safer with a few simple measures. Follow-up care is a key part of your treatment and safety. Be sure to make and go to all appointments, and call your doctor if you are having problems. It's also a good idea to know your test results and keep a list of the medicines you take. How can you care for yourself at home? Taking care of yourself  Exercise regularly to improve your strength, muscle tone, and balance. Walk if you can. Swimming may be a good choice if you cannot walk easily. Have your vision and hearing checked each year or any time you notice a change. If you have trouble seeing and hearing, you might not be able to avoid objects and could lose your balance. Know the side effects of the medicines you take. Ask your doctor or pharmacist whether the medicines you take can affect your balance. Sleeping pills or sedatives can affect your balance. Limit the amount of alcohol you drink. Alcohol can impair your balance and other senses. Ask your doctor whether calluses or corns on your feet need to be removed. If you wear loose-fitting shoes because of calluses or corns, you can lose your balance and fall. Talk to your doctor if you have numbness in your feet. You may get dizzy if you do not drink enough water. To prevent dehydration, drink plenty of fluids. Choose water and other clear liquids. If you have kidney, heart, or liver disease and have to limit fluids, talk with your doctor before you increase the amount of fluids you drink.   Preventing falls at home  Remove raised doorway thresholds, throw rugs, and clutter. Repair loose carpet or raised areas in the floor. Move furniture and electrical cords to keep them out of walking paths. Use nonskid floor wax, and wipe up spills right away, especially on ceramic tile floors. If you use a walker or cane, put rubber tips on it. If you use crutches, clean the bottoms of them regularly with an abrasive pad, such as steel wool. Keep your house well lit, especially stairways, porches, and outside walkways. Use night-lights in areas such as hallways and bathrooms. Add extra light switches or use remote switches (such as switches that go on or off when you clap your hands) to make it easier to turn lights on if you have to get up during the night. Install sturdy handrails on stairways. Move items in your cabinets so that the things you use a lot are on the lower shelves (about waist level). Keep a cordless phone and a flashlight with new batteries by your bed. If possible, put a phone in each of the main rooms of your house, or carry a cell phone in case you fall and cannot reach a phone. Or, you can wear a device around your neck or wrist. You push a button that sends a signal for help. Wear low-heeled shoes that fit well and give your feet good support. Use footwear with nonskid soles. Check the heels and soles of your shoes for wear. Repair or replace worn heels or soles. Do not wear socks without shoes on smooth floors, such as wood. Walk on the grass when the sidewalks are slippery. If you live in an area that gets snow and ice in the winter, sprinkle salt on slippery steps and sidewalks. Or ask a family member or friend to do this for you. Preventing falls in the bath  Install grab bars and nonskid mats inside and outside your shower or tub and near the toilet and sinks. Use shower chairs and bath benches. Use a hand-held shower head that will allow you to sit while showering.   Get into a tub or shower by putting the weaker leg in first. Get out of a tub or shower with your strong side first.  Repair loose toilet seats and consider installing a raised toilet seat to make getting on and off the toilet easier. Keep your bathroom door unlocked while you are in the shower. Where can you learn more? Go to http://www.miguel.com/ and enter G117 to learn more about \"Preventing Falls: Care Instructions. \"  Current as of: May 4, 2022               Content Version: 13.5  © 4996-2423 Healthwise, Incorporated. Care instructions adapted under license by [facility] ([facility]). If you have questions about a medical condition or this instruction, always ask your healthcare professional. [address] any warranty or liability for your use of this information. Hearing Loss: Care Instructions  Overview     Hearing loss is a sudden or slow decrease in how well you hear. It can range from mild to severe. Permanent hearing loss can occur with aging. It also can happen when you are exposed long-term to loud noise. Examples include listening to loud music, riding motorcycles, or being around other loud machines. Hearing loss can affect your work and home life. It can make you feel lonely or depressed. You may feel that you have lost your independence. But hearing aids and other devices can help you hear better and feel connected to others. Follow-up care is a key part of your treatment and safety. Be sure to make and go to all appointments, and call your doctor if you are having problems. It's also a good idea to know your test results and keep a list of the medicines you take. How can you care for yourself at home? Avoid loud noises whenever possible. This helps keep your hearing from getting worse. Always wear hearing protection around loud noises. Wear a hearing aid as directed. See a professional who can help you pick a hearing aid that fits you. Have hearing tests as your doctor suggests. They can show whether your hearing has changed. Your hearing aid may need to be adjusted. Use other devices as needed. These may include:  Telephone amplifiers and hearing aids that can connect to a television, stereo, radio, or microphone. Devices that use lights or vibrations. These alert you to the doorbell, a ringing telephone, or a baby monitor. Television closed-captioning. This shows the words at the bottom of the screen. Most new TVs can do this. TTY (text telephone). This lets you type messages back and forth on the telephone instead of talking or listening. These devices are also called TDD. When messages are typed on the keyboard, they are sent over the phone line to a receiving TTY. The message is shown on a monitor. Use text messaging, social media, and email if it is hard for you to communicate by telephone. Try to learn a listening technique called speechreading. It is not lipreading. You pay attention to people's gestures, expressions, posture, and tone of voice. These clues can help you understand what a person is saying. Face the person you are talking to, and have them face you. Make sure the lighting is good. You need to see the other person's face clearly. Think about counseling if you need help to adjust to your hearing loss. When should you call for help? Watch closely for changes in your health, and be sure to contact your doctor if:    You think your hearing is getting worse.     You have new symptoms, such as dizziness or nausea. Where can you learn more? Go to http://www.Fungos.com/ and enter R798 to learn more about \"Hearing Loss: Care Instructions. \"  Current as of: May 4, 2022               Content Version: 13.5  © 1626-3558 Healthwise, Incorporated. Care instructions adapted under license by [facility] ([facility]).  If you have questions about a medical condition or this instruction, always ask your healthcare professional. Mary Villarreal any warranty or liability for your use of this information. Advance Directives: Care Instructions  Overview  An advance directive is a legal way to state your wishes at the end of your life. It tells your family and your doctor what to do if you can't say what you want. There are two main types of advance directives. You can change them any time your wishes change. Living will. This form tells your family and your doctor your wishes about life support and other treatment. The form is also called a declaration. Medical power of . This form lets you name a person to make treatment decisions for you when you can't speak for yourself. This person is called a health care agent (health care proxy, health care surrogate). The form is also called a durable power of  for health care. If you do not have an advance directive, decisions about your medical care may be made by a family member, or by a doctor or a  who doesn't know you. It may help to think of an advance directive as a gift to the people who care for you. If you have one, they won't have to make tough decisions by themselves. For more information, including forms for your state, see the [address] website (www.caringinfo.org/planning/advance-directives/). Follow-up care is a key part of your treatment and safety. Be sure to make and go to all appointments, and call your doctor if you are having problems. It's also a good idea to know your test results and keep a list of the medicines you take. What should you include in an advance directive? Many states have a unique advance directive form. (It may ask you to address specific issues.) Or you might use a universal form that's approved by many states. If your form doesn't tell you what to address, it may be hard to know what to include in your advance directive. Use the questions below to help you get started.   Who do you want to make decisions about your medical care if you are not able to? What life-support measures do you want if you have a serious illness that gets worse over time or can't be cured? What are you most afraid of that might happen? (Maybe you're afraid of having pain, losing your independence, or being kept alive by machines.)  Where would you prefer to die? (Your home? A hospital? A nursing home?)  Do you want to donate your organs when you die? Do you want certain Yazidi practices performed before you die? When should you call for help? Be sure to contact your doctor if you have any questions. Where can you learn more? Go to http://www.miguel.com/ and enter R264 to learn more about \"Advance Directives: Care Instructions. \"  Current as of: June 16, 2022               Content Version: 13.5  © 7854-4839 Healthwise, Incorporated. Care instructions adapted under license by [facility] ([facility]). If you have questions about a medical condition or this instruction, always ask your healthcare professional. [place] [address] any warranty or liability for your use of this information. Personalized Preventive Plan for Ira Peres - 12/9/2022  Medicare offers a range of preventive health benefits. Some of the tests and screenings are paid in full while other may be subject to a deductible, co-insurance, and/or copay. Some of these benefits include a comprehensive review of your medical history including lifestyle, illnesses that may run in your family, and various assessments and screenings as appropriate. After reviewing your medical record and screening and assessments performed today your provider may have ordered immunizations, labs, imaging, and/or referrals for you. A list of these orders (if applicable) as well as your Preventive Care list are included within your After Visit Summary for your review.     Other Preventive Recommendations:    A preventive eye exam performed by an eye specialist is recommended every 1-2 years to screen for glaucoma; cataracts, macular degeneration, and other eye disorders. A preventive dental visit is recommended every 6 months. Try to get at least 150 minutes of exercise per week or 10,000 steps per day on a pedometer . Order or download the FREE \"Exercise & Physical Activity: Your Everyday Guide\" from [employer]. Call 0-606.671.3050 or search [employer] online. You need 7676-9840 mg of calcium and 8154-2396 IU of vitamin D per day. It is possible to meet your calcium requirement with diet alone, but a vitamin D supplement is usually necessary to meet this goal.  When exposed to the sun, use a sunscreen that protects against both UVA and UVB radiation with an SPF of 30 or greater. Reapply every 2 to 3 hours or after sweating, drying off with a towel, or swimming. Always wear a seat belt when traveling in a car. Always wear a helmet when riding a bicycle or motorcycle.

## 2022-12-09 NOTE — PROGRESS NOTES
Medicare Annual Wellness Visit    Caridad Stevenson is here for Medicare AWV (Pt is here for an annual wellness visit ) and Edema (1 month f/u )    Here with her     Assessment & Plan   Medicare annual wellness visit, subsequent  Keep moving, healthy diet! Bone density scan Future  Cologuard ordered  Labs today    Lower extremity edema  Continue support stockings, helping immensely    NSTEMI (non-ST elevated myocardial infarction) (HCC)  Stable, continue meds  Statin and aspirin  Stress echo in the future    Essential hypertension  Stable,  no medication at this time    Erosive gastritis  Current mild episode of major depressive disorder without prior episode (HCC)  On PPI, recheck CBC    Urinary frequency  Start antibiotic awaiting culture  -     POCT Urinalysis no Micro  -     Culture, Urine    Osteopenia of multiple sites  Screen   -     DEXA BONE DENSITY AXIAL SKELETON; Future    Colon cancer screening  screen  -     Fecal DNA Colorectal cancer screening (Cologuard)    Recommendations for Preventive Services Due: see orders and patient instructions/AVS.  Recommended screening schedule for the next 5-10 years is provided to the patient in written form: see Patient Instructions/AVS.     Return for Medicare Annual Wellness Visit in 1 year. Subjective   The following acute and/or chronic problems were also addressed today:  Uti for 2 weeks   Taking gabapentin 400 mg tid, helping neuropathy  Waiting for results on EMG from neurology    Has been getting physical therapy at home, continue to do exercises at home    Patient's complete Health Risk Assessment and screening values have been reviewed and are found in Flowsheets. The following problems were reviewed today and where indicated follow up appointments were made and/or referrals ordered.     Positive Risk Factor Screenings with Interventions:    Fall Risk:  Do you feel unsteady or are you worried about falling? : (!) yes  2 or more falls in past year?: no  Fall with injury in past year?: no     Interventions:    Has been in therapy, helping, continue exercises at  See AVS for additional education material  See A/P for plan and any pertinent orders            General HRA Questions:  Select all that apply: (!) New or Increased Pain    Pain Interventions:  Gabapentin 400 mg 3 times daily helping neuropathy  See AVS for additional education material  See A/P for plan and any pertinent orders       Weight and Activity:  Physical Activity: Insufficiently Active    Days of Exercise per Week: 5 days    Minutes of Exercise per Session: 10 min     On average, how many days per week do you engage in moderate to strenuous exercise (like a brisk walk)?: 5 days  Have you lost any weight without trying in the past 3 months?: No  Body mass index: (!) 33.25    Unintentional Weight Loss Interventions:  Physical therapy and exercises at  See AVS for additional education material  See A/P for plan and any pertinent orders  Obesity Interventions:  Healthy diet and exercise  See AVS for additional education material  See A/P for plan and any pertinent orders                               Objective   Vitals:    12/09/22 0955 12/09/22 1048   BP: (!) 145/78 130/70   Site: Left Upper Arm    Position: Sitting    Cuff Size: Large Adult    Pulse: 85    Resp: 16    SpO2: 97%    Weight: 176 lb (79.8 kg)    Height: 5' 1\" (1.549 m)       Body mass index is 33.25 kg/m².      Wt Readings from Last 3 Encounters:   12/09/22 176 lb (79.8 kg)   11/03/22 182 lb 3.2 oz (82.6 kg)   11/01/22 175 lb (79.4 kg)         Physical Exam  General Appearance: alert and oriented to person, place and time, well developed and well- nourished, in no acute distress, BMI noted, weight loss noted, in good spirits  Skin: warm and dry, no rash or erythema  Head: normocephalic and atraumatic  Eyes: pupils equal, round, and reactive to light, extraocular eye movements intact, conjunctivae normal  ENT: tympanic membrane, external ear and ear canal normal bilaterally, nose without deformity  Neck: supple and non-tender without mass, no thyromegaly or thyroid nodules, no cervical lymphadenopathy  Pulmonary/Chest: clear to auscultation bilaterally- no wheezes, rales or rhonchi, normal air movement, no respiratory distress  Cardiovascular: normal rate, regular rhythm, normal S1 and S2, no murmurs, rubs, clicks, or gallops, distal pulses intact, no carotid bruits  Breast: No masses discharge or skin changes bilaterally  Abdomen: soft, non-tender, non-distended, normal bowel sounds, no masses or organomegaly  Extremities: no cyanosis, clubbing or edema, compression stockings working  Musculoskeletal: normal range of motion, no joint swelling, deformity or tenderness  Neurologic: reflexes normal and symmetric, no cranial nerve deficit, slow gait, coordination and speech normal, uses walker      Allergies   Allergen Reactions    Abilify [Aripiprazole]      tremors     Prior to Visit Medications    Medication Sig Taking?  Authorizing Provider   cefUROXime (CEFTIN) 250 MG tablet Take 1 tablet by mouth 2 times daily for 7 days Yes Luci Acevedo MD   gabapentin (NEURONTIN) 100 MG capsule TAKE WITH 300MG 3 TIMES A DAY = 400 MG BY MOUTH 3 TIMES A DAY Yes Luci Acevedo MD   gabapentin (NEURONTIN) 300 MG capsule TAKE 1 CAPSULE BY MOUTH 3 TIMES DAILY Yes Luci Acevedo MD   Compression Stockings MISC by Does not apply route Bilateral below knee zippered compression stockings 20-30 mmHg Yes Ruth Katz MD   atorvastatin (LIPITOR) 10 MG tablet Take 10 mg by mouth every other day Yes Historical Provider, MD   torsemide (DEMADEX) 20 MG tablet Take 1 tablet by mouth 2 times daily  Patient taking differently: Take 20 mg by mouth daily as needed Yes Luci Acevedo MD   vilazodone HCl (VIIBRYD) 20 MG TABS Take 1 tablet by mouth daily Yes Luci Acevedo MD   buPROPion (WELLBUTRIN XL) 150 MG extended release tablet Take 1 tablet by mouth daily (before dinner) Yes Toya Barton MD   pantoprazole (PROTONIX) 40 MG tablet Take 1 tablet by mouth 2 times daily (before meals)  Patient taking differently: Take 40 mg by mouth daily Yes CIERRA Isaac CNP   bethanechol (URECHOLINE) 10 MG tablet Take 1 tablet by mouth 3 times daily Yes CIERRA Isaac CNP   B Complex-C (VITAMIN B + C COMPLEX PO) Take by mouth Yes Historical Provider, MD   Lift Chair MISC by Does not apply route Yes Toya Barton MD   estradiol (ESTRACE) 0.1 MG/GM vaginal cream Place 2 g vaginally Twice a Week  Yes Historical Provider, MD   aspirin 81 MG EC tablet Take 81 mg by mouth daily. Yes Historical Provider, MD   VITAMIN D PO Take  by mouth.  Yes Historical Provider, MD Ortiz (Including outside providers/suppliers regularly involved in providing care):   Patient Care Team:  Toya Barton MD as PCP - General (Family Medicine)  Toya Barton MD as PCP - [facility] Empaneled Provider  Nathaly Zhou as Consulting Physician (Psychiatry)  Melva Bryson MD as Consulting Physician (Orthopedic Surgery)     Reviewed and updated this visit:  Tobacco  Allergies  Meds  Problems  Med Hx  Surg Hx  Soc Hx  Fam Hx

## 2022-12-11 LAB
ORGANISM: ABNORMAL
URINE CULTURE, ROUTINE: ABNORMAL

## 2022-12-14 ENCOUNTER — TELEPHONE (OUTPATIENT)
Dept: PHARMACY | Facility: CLINIC | Age: 82
End: 2022-12-14

## 2022-12-14 NOTE — TELEPHONE ENCOUNTER
[facility]: ADHERENCE REVIEW  Identified care gap per [employer]: fills at [facility]: Statin adherence    Last Visit: 12/9/22    Patient not found in Outcomes MTM    [address] Records claims through 12/4/22 (Prior Year [facility] =  97%; YTD [facility] =  78%; Potential Fail Date: 12/24/22 ):   Atorvastatin last filled on 9/11/22 for 90 day supply. Next refill due: 12/22/22    Per [facility]:   Atorvastatin last picked up on 12/6/22 for 90 day supply. Billed through [employer]   Component Value Date    CHOL 130 12/09/2022    TRIG 72 12/09/2022    HDL 58 12/09/2022    LDLCALC 58 12/09/2022     ALT   Date Value Ref Range Status   12/09/2022 13 10 - 40 U/L Final     AST   Date Value Ref Range Status   12/09/2022 18 15 - 37 U/L Final     The ASCVD Risk score (Zahida DK, et al., 2019) failed to calculate for the following reasons: The 2019 ASCVD risk score is only valid for ages 36 to 78    The patient has a prior MI or stroke diagnosis     PLAN  No patient out reach planned at this time. No future appointments.     Matty Singh, TodD, [facility], [address]   Department, toll free: [phone], option 1    For [address] [facility] in place:  No  Gap Closed?: Yes  Time Spent (min): 5

## 2023-01-10 RX ORDER — GABAPENTIN 100 MG/1
CAPSULE ORAL
Qty: 90 CAPSULE | Refills: 0 | Status: SHIPPED | OUTPATIENT
Start: 2023-01-10 | End: 2023-02-10

## 2023-01-10 RX ORDER — GABAPENTIN 300 MG/1
CAPSULE ORAL
Qty: 90 CAPSULE | Refills: 0 | Status: SHIPPED | OUTPATIENT
Start: 2023-01-10 | End: 2023-02-10

## 2023-01-10 NOTE — TELEPHONE ENCOUNTER
Last visit  12/09/2022  Lab  12/09/2022  Next visit    no future visits  Last refill  Gabapentin  (100MG 12/05/2022)  (300MG 12/05/2022)

## 2023-01-11 ENCOUNTER — HOSPITAL ENCOUNTER (OUTPATIENT)
Dept: GENERAL RADIOLOGY | Age: 83
Discharge: HOME | End: 2023-01-11
Payer: MEDICARE

## 2023-01-11 DIAGNOSIS — M85.89 OSTEOPENIA OF MULTIPLE SITES: ICD-10-CM

## 2023-01-11 PROCEDURE — 77080 DXA BONE DENSITY AXIAL: CPT

## 2023-01-16 LAB
BACTERIA: ABNORMAL /HPF
BILIRUBIN URINE: NEGATIVE
BLOOD, URINE: ABNORMAL
CLARITY: CLEAR
COLOR: YELLOW
COMMENT UA: ABNORMAL
EPITHELIAL CELLS, UA: 11 /HPF (ref 0–5)
GLUCOSE URINE: NEGATIVE MG/DL
HYALINE CASTS: 2 /LPF (ref 0–8)
KETONES, URINE: ABNORMAL MG/DL
LEUKOCYTE ESTERASE, URINE: ABNORMAL
MICROSCOPIC EXAMINATION: YES
NITRITE, URINE: NEGATIVE
PH UA: 5.5 (ref 5–8)
PROTEIN UA: NEGATIVE MG/DL
RBC UA: ABNORMAL /HPF (ref 0–4)
SPECIFIC GRAVITY UA: 1.02 (ref 1–1.03)
URINE REFLEX TO CULTURE: ABNORMAL
URINE TYPE: ABNORMAL
UROBILINOGEN, URINE: 0.2 E.U./DL
WBC UA: 2 /HPF (ref 0–5)

## 2023-01-18 ENCOUNTER — SCHEDULED TELEPHONE ENCOUNTER (OUTPATIENT)
Dept: FAMILY MEDICINE CLINIC | Age: 83
End: 2023-01-18
Payer: MEDICARE

## 2023-01-18 DIAGNOSIS — I40.1 IDIOPATHIC MYOCARDITIS, UNSPECIFIED CHRONICITY: ICD-10-CM

## 2023-01-18 DIAGNOSIS — I10 ESSENTIAL HYPERTENSION: ICD-10-CM

## 2023-01-18 DIAGNOSIS — Z78.0 POSTMENOPAUSAL: ICD-10-CM

## 2023-01-18 DIAGNOSIS — F32.0 CURRENT MILD EPISODE OF MAJOR DEPRESSIVE DISORDER WITHOUT PRIOR EPISODE (HCC): ICD-10-CM

## 2023-01-18 DIAGNOSIS — Z71.2 ENCOUNTER TO DISCUSS TEST RESULTS: ICD-10-CM

## 2023-01-18 DIAGNOSIS — M85.852 OSTEOPENIA OF BOTH HIPS: Primary | ICD-10-CM

## 2023-01-18 DIAGNOSIS — M85.851 OSTEOPENIA OF BOTH HIPS: Primary | ICD-10-CM

## 2023-01-18 PROBLEM — I51.4 MYOCARDITIS (HCC): Status: RESOLVED | Noted: 2022-09-11 | Resolved: 2023-01-18

## 2023-01-18 PROBLEM — M85.859 OSTEOPENIA OF HIP: Status: ACTIVE | Noted: 2023-01-18

## 2023-01-18 LAB
ORGANISM: ABNORMAL
URINE CULTURE, ROUTINE: ABNORMAL

## 2023-01-18 PROCEDURE — 99442 PR PHYS/QHP TELEPHONE EVALUATION 11-20 MIN: CPT | Performed by: FAMILY MEDICINE

## 2023-01-18 RX ORDER — ALENDRONATE SODIUM 70 MG/1
70 TABLET ORAL
Qty: 12 TABLET | Refills: 3 | Status: SHIPPED | OUTPATIENT
Start: 2023-01-18

## 2023-01-18 ASSESSMENT — PATIENT HEALTH QUESTIONNAIRE - PHQ9
10. IF YOU CHECKED OFF ANY PROBLEMS, HOW DIFFICULT HAVE THESE PROBLEMS MADE IT FOR YOU TO DO YOUR WORK, TAKE CARE OF THINGS AT HOME, OR GET ALONG WITH OTHER PEOPLE: 0
SUM OF ALL RESPONSES TO PHQ QUESTIONS 1-9: 2
9. THOUGHTS THAT YOU WOULD BE BETTER OFF DEAD, OR OF HURTING YOURSELF: 0
6. FEELING BAD ABOUT YOURSELF - OR THAT YOU ARE A FAILURE OR HAVE LET YOURSELF OR YOUR FAMILY DOWN: 0
8. MOVING OR SPEAKING SO SLOWLY THAT OTHER PEOPLE COULD HAVE NOTICED. OR THE OPPOSITE, BEING SO FIGETY OR RESTLESS THAT YOU HAVE BEEN MOVING AROUND A LOT MORE THAN USUAL: 0
2. FEELING DOWN, DEPRESSED OR HOPELESS: 0
4. FEELING TIRED OR HAVING LITTLE ENERGY: 0
3. TROUBLE FALLING OR STAYING ASLEEP: 0
SUM OF ALL RESPONSES TO PHQ QUESTIONS 1-9: 2
5. POOR APPETITE OR OVEREATING: 2
7. TROUBLE CONCENTRATING ON THINGS, SUCH AS READING THE NEWSPAPER OR WATCHING TELEVISION: 0
SUM OF ALL RESPONSES TO PHQ9 QUESTIONS 1 & 2: 0
1. LITTLE INTEREST OR PLEASURE IN DOING THINGS: 0

## 2023-01-18 NOTE — PROGRESS NOTES
Kavon Manning is a 78 y.o. female. HPI:  Kavon Manning, was evaluated through a synchronous (real-time) audio-video encounter. The patient (or guardian if applicable) is aware that this is a billable service, which includes applicable co-pays. This Virtual Visit was conducted with patient's (and/or legal guardian's) consent. The visit was conducted pursuant to the emergency declaration under the [facility], [address] [place] [address] and the [employer] Act. Patient identification was verified, and a caregiver was present when appropriate. The patient was located in a state where the provider was licensed to provide care. --Mitesh Bangura MD on 1/18/2023 at 1:15 PM    An electronic signature was used to authenticate this note. Telephone visit for review of Dexa scan   Been on Fosamax in the past and tolerated it well  Thinks someone took her off as a break from it    Results reviewed with patient  Has already had a hip fracture     Wt Readings from Last 3 Encounters:   12/09/22 176 lb (79.8 kg)   11/03/22 182 lb 3.2 oz (82.6 kg)   11/01/22 175 lb (79.4 kg)       REVIEW OF SYSTEMS:   CONSTITUTIONAL: See history of present illness,   Weight noted   HEENT: No new vision difficulties or ringing in the ears. RESPIRATORY: No new SOB, PND, orthopnea or cough. CARDIOVASCULAR: no CP, palpitations or SOB with exertion  GI: No nausea, vomiting, diarrhea, constipation, abdominal pain or changes in bowel habits. : No urinary frequency, urgency, incontinence hematuria or dysuria. SKIN: No cyanosis or skin lesions. MUSCULOSKELETAL: No new muscle or joint pain. NEUROLOGICAL: No syncope or TIA-like symptoms. PSYCHIATRIC: No anxiety, insomnia or depression     Allergies   Allergen Reactions    Abilify [Aripiprazole]      tremors       Prior to Visit Medications    Medication Sig Taking?  Authorizing Provider alendronate (FOSAMAX) 70 MG tablet Take 1 tablet by mouth every 7 days Yes Ralph Oneal MD   gabapentin (NEURONTIN) 300 MG capsule TAKE 1 CAPSULE BY MOUTH THREE TIMES A DAY  Patient taking differently: 400 mg 3 times daily. Yes Ralph Oneal MD   gabapentin (NEURONTIN) 100 MG capsule TAKE WITH 300MG 3 TIMES A DAY = 400 MG BY MOUTH 3 TIMES A DAY Yes Ralph Oneal MD   Compression Stockings MISC by Does not apply route Bilateral below knee zippered compression stockings 20-30 mmHg Yes Oneyda Dumont MD   atorvastatin (LIPITOR) 10 MG tablet Take 10 mg by mouth every other day Yes Historical Provider, MD   torsemide (DEMADEX) 20 MG tablet Take 1 tablet by mouth 2 times daily  Patient taking differently: Take 20 mg by mouth daily as needed Yes Ralph Oneal MD   vilazodone HCl (VIIBRYD) 20 MG TABS Take 1 tablet by mouth daily Yes Ralph Oneal MD   buPROPion (WELLBUTRIN XL) 150 MG extended release tablet Take 1 tablet by mouth daily (before dinner) Yes Ralph Oneal MD   pantoprazole (PROTONIX) 40 MG tablet Take 1 tablet by mouth 2 times daily (before meals)  Patient taking differently: Take 40 mg by mouth daily Yes CIERRA Isaac CNP   bethanechol (URECHOLINE) 10 MG tablet Take 1 tablet by mouth 3 times daily Yes CIERRA Isaac CNP   B Complex-C (VITAMIN B + C COMPLEX PO) Take by mouth Yes Historical Provider, MD   Lift Chair [address] [facility] by Does not apply route Yes Ralph Oneal MD   estradiol (ESTRACE) 0.1 MG/GM vaginal cream Place 2 g vaginally Twice a Week  Yes Historical Provider, MD   aspirin 81 MG EC tablet Take 81 mg by mouth daily. Yes Historical Provider, MD   VITAMIN D PO Take  by mouth.  Yes Historical Provider, MD       Past Medical History:   Diagnosis Date    AR (allergic rhinitis)     Arthritis of knee     Pruis    Depression     Erosive gastritis 10/28/2019    EGD October 2019, he did with PPI    GERD (gastroesophageal reflux disease)     Hyperlipidemia Internal hemorrhoids     Overweight(278.02)     Viral meningitis 5/12       Social History     Tobacco Use    Smoking status: Never    Smokeless tobacco: Never   Substance Use Topics    Alcohol use: Yes     Comment: rarely       Family History   Problem Relation Age of Onset    Breast Cancer Mother     Bipolar Disorder Brother        OBJECTIVE:  There were no vitals taken for this visit. GEN:  alert and pleasant , in NAD  HEENT:  NCAT, EOM intact, no facial asymmetry,   NECK:  good range of motion  RR: in NAD over video, normal respiratory rate   ABD: Soft, NT per Pt self palpation  EXT: No rash or edema observed over video  NEURO: Alert oriented to person/place/date and time , normal mood and affect, able to follow commands ,  No focal changes over video     ASSESSMENT/PLAN:  1. Osteopenia of both hips  - alendronate (FOSAMAX) 70 MG tablet; Take 1 tablet by mouth every 7 days  Dispense: 12 tablet; Refill: 3    Start weekly , take while upright and stay upright  Recheck DEXA 18 to 24 months    Recommend 500 mg calcium citrate (citracal)+ calcium through diet and 4065-4426 IU  IU vitamin D3 daily and daily weight bearing exercise     2. Encounter to discuss test results  DEXA scan reviewed with patient in detail    3. Postmenopausal  See #1  - alendronate (FOSAMAX) 70 MG tablet; Take 1 tablet by mouth every 7 days  Dispense: 12 tablet; Refill: 3    4. Idiopathic myocarditis, unspecified chronicity  Resolved    5. Current mild episode of major depressive disorder without prior episode ([address]  Stable continue Wellbutrin and Viibryd, sees psychiatry  Doing well    6.  Essential hypertension  Stable, continue to monitor off losartan at this time    FUp in the spring with Dr. Mary Recinos updated COVID booster through pharmacy    15 Total Minutes spent pre charting (reviewing problem list, reviewing health maintenance items , meds, any test results, consultant and hospital notes ) and  obtaining present visit history, performing appropriate medical exam/evaluation, counseling and educating the patient (and family), ordering medications ,tests, and procedures as needed, refilling medication(s), placing referral(s) when needed in addition to coordinating care for this patient and documenting in electronic health record

## 2023-01-26 ENCOUNTER — TELEPHONE (OUTPATIENT)
Dept: FAMILY MEDICINE CLINIC | Age: 83
End: 2023-01-26

## 2023-01-26 NOTE — TELEPHONE ENCOUNTER
----- Message from Erica Lopez sent at 1/26/2023 10:52 AM EST -----  Subject: Referral Request    Reason for referral request? prescription for chairlift due to arthritis   and knee issues.   Provider patient wants to be referred to(if known):     Provider Phone Number(if known):    Additional Information for Provider? Please let know when they can come   and  rx for chairlift  ---------------------------------------------------------------------------  --------------  CALL BACK INFO    [phone]; OK to leave message on voicemail  ---------------------------------------------------------------------------  --------------

## 2023-01-27 NOTE — TELEPHONE ENCOUNTER
Patient called and will call insurance company and get fax number and what kind of chairlift and she will call back

## 2023-02-27 RX ORDER — GABAPENTIN 100 MG/1
CAPSULE ORAL
Qty: 90 CAPSULE | Refills: 3 | Status: SHIPPED | OUTPATIENT
Start: 2023-02-27 | End: 2023-05-27

## 2023-02-27 RX ORDER — GABAPENTIN 300 MG/1
CAPSULE ORAL
Qty: 90 CAPSULE | Refills: 3 | Status: SHIPPED | OUTPATIENT
Start: 2023-02-27 | End: 2023-03-30

## 2023-02-27 NOTE — TELEPHONE ENCOUNTER
Medication:   Requested Prescriptions     Pending Prescriptions Disp Refills    gabapentin (NEURONTIN) 300 MG capsule [Pharmacy Med Name: GABAPENTIN 300 MG CAPSULE] 90 capsule 0     Sig: TAKE 1 CAPSULE BY MOUTH THREE TIMES A DAY    gabapentin (NEURONTIN) 100 MG capsule [Pharmacy Med Name: GABAPENTIN 100 MG CAPSULE] 90 capsule 0     Sig: TAKE 1 CAPSULE BY MOUTH 3 TIMES A DAY WITH 3OOMG CAPSULE        Last Filled:  1/10/2023 both medications     Patient Phone Number: [phone] (home)     Last appt: 1/18/2023   Next appt: Visit date not found    Last OARRS: No flowsheet data found.

## 2023-03-20 ENCOUNTER — TELEPHONE (OUTPATIENT)
Dept: FAMILY MEDICINE CLINIC | Age: 83
End: 2023-03-20

## 2023-03-20 NOTE — TELEPHONE ENCOUNTER
Patient has soreness in center of chest,bones, muscle pain in legs(more than normal)  If patient coughs it hurts her chest  Patient thinks this is an allergic reaction to Fosamax  Started about a week ago  Review and contact patient

## 2023-05-30 ENCOUNTER — TELEPHONE (OUTPATIENT)
Dept: ORTHOPEDIC SURGERY | Age: 83
End: 2023-05-30

## 2023-06-02 RX ORDER — ATORVASTATIN CALCIUM 10 MG/1
TABLET, FILM COATED ORAL
Qty: 45 TABLET | Refills: 3 | Status: SHIPPED | OUTPATIENT
Start: 2023-06-02

## 2023-06-02 NOTE — TELEPHONE ENCOUNTER
Medication:   Requested Prescriptions     Pending Prescriptions Disp Refills    atorvastatin (LIPITOR) 10 MG tablet [Pharmacy Med Name: ATORVASTATIN 10 MG TABLET] 45 tablet 3     Sig: TAKE 1 TABLET BY MOUTH EVERY OTHER DAY **CHANGE IN DOSE**       Last Filled:  3/21//2022    Patient Phone Number: [phone] (home)     Last appt: 1/18/2023   Next appt: 6/6/2023    Last Lipid:   Lab Results   Component Value Date/Time    CHOL 130 12/09/2022 11:19 AM    TRIG 72 12/09/2022 11:19 AM    HDL 58 12/09/2022 11:19 AM    HDL 62 02/13/2012 08:08 AM    LDLCALC 58 12/09/2022 11:19 AM

## 2023-06-05 ENCOUNTER — HOSPITAL ENCOUNTER (OUTPATIENT)
Age: 83
Discharge: HOME | End: 2023-06-05
Payer: MEDICARE

## 2023-06-05 ENCOUNTER — HOSPITAL ENCOUNTER (OUTPATIENT)
Dept: GENERAL RADIOLOGY | Age: 83
Discharge: HOME | End: 2023-06-05
Payer: MEDICARE

## 2023-06-05 DIAGNOSIS — M25.40 EFFUSION OF JOINT, MULTIPLE SITES: ICD-10-CM

## 2023-06-05 DIAGNOSIS — M25.50 PAIN IN JOINT, MULTIPLE SITES: ICD-10-CM

## 2023-06-05 PROCEDURE — 73130 X-RAY EXAM OF HAND: CPT

## 2023-06-05 PROCEDURE — 73630 X-RAY EXAM OF FOOT: CPT

## 2023-06-06 ENCOUNTER — OFFICE VISIT (OUTPATIENT)
Dept: FAMILY MEDICINE CLINIC | Age: 83
End: 2023-06-06

## 2023-06-06 VITALS
DIASTOLIC BLOOD PRESSURE: 82 MMHG | SYSTOLIC BLOOD PRESSURE: 140 MMHG | BODY MASS INDEX: 34.1 KG/M2 | HEART RATE: 96 BPM | RESPIRATION RATE: 16 BRPM | OXYGEN SATURATION: 99 % | WEIGHT: 180.6 LBS | HEIGHT: 61 IN

## 2023-06-06 DIAGNOSIS — R35.0 URINARY FREQUENCY: Primary | ICD-10-CM

## 2023-06-06 DIAGNOSIS — F32.0 CURRENT MILD EPISODE OF MAJOR DEPRESSIVE DISORDER WITHOUT PRIOR EPISODE (HCC): ICD-10-CM

## 2023-06-06 DIAGNOSIS — I10 ESSENTIAL HYPERTENSION: ICD-10-CM

## 2023-06-06 DIAGNOSIS — E78.2 MIXED HYPERLIPIDEMIA: ICD-10-CM

## 2023-06-06 LAB
BILIRUBIN, POC: NORMAL
BLOOD URINE, POC: NORMAL
CLARITY, POC: CLEAR
COLOR, POC: YELLOW
GLUCOSE URINE, POC: NORMAL
KETONES, POC: NORMAL
LEUKOCYTE EST, POC: NORMAL
NITRITE, POC: NORMAL
PH, POC: 6.5
PROTEIN, POC: NORMAL
SPECIFIC GRAVITY, POC: 1.01
UROBILINOGEN, POC: 0.2

## 2023-06-06 RX ORDER — PREDNISONE 10 MG/1
20 TABLET ORAL DAILY
COMMUNITY
Start: 2023-06-01

## 2023-06-06 RX ORDER — LOSARTAN POTASSIUM 50 MG/1
50 TABLET ORAL
COMMUNITY
End: 2023-06-07

## 2023-06-06 RX ORDER — FAMOTIDINE 20 MG/1
20 TABLET, FILM COATED ORAL NIGHTLY
Qty: 90 TABLET | Refills: 1 | Status: SHIPPED | OUTPATIENT
Start: 2023-06-06

## 2023-06-06 SDOH — ECONOMIC STABILITY: FOOD INSECURITY: WITHIN THE PAST 12 MONTHS, YOU WORRIED THAT YOUR FOOD WOULD RUN OUT BEFORE YOU GOT MONEY TO BUY MORE.: NEVER TRUE

## 2023-06-06 SDOH — ECONOMIC STABILITY: HOUSING INSECURITY
IN THE LAST 12 MONTHS, WAS THERE A TIME WHEN YOU DID NOT HAVE A STEADY PLACE TO SLEEP OR SLEPT IN A SHELTER (INCLUDING NOW)?: NO

## 2023-06-06 SDOH — ECONOMIC STABILITY: FOOD INSECURITY: WITHIN THE PAST 12 MONTHS, THE FOOD YOU BOUGHT JUST DIDN'T LAST AND YOU DIDN'T HAVE MONEY TO GET MORE.: NEVER TRUE

## 2023-06-06 SDOH — ECONOMIC STABILITY: INCOME INSECURITY: HOW HARD IS IT FOR YOU TO PAY FOR THE VERY BASICS LIKE FOOD, HOUSING, MEDICAL CARE, AND HEATING?: NOT HARD AT ALL

## 2023-06-06 NOTE — PROGRESS NOTES
Never    Smokeless tobacco: Never   Substance Use Topics    Alcohol use: Yes     Comment: rarely       Family History   Problem Relation Age of Onset    Breast Cancer Mother     Bipolar Disorder Brother        OBJECTIVE:  BP (!) 140/82   Pulse 96   Resp 16   Ht 5' 1\" (1.549 m)   Wt 180 lb 9.6 oz (81.9 kg)   SpO2 99%   BMI 34.12 kg/m²   GEN:  in NAD, in good spirits  HEENT:  NCAT  NECK:  Supple without adenopathy. CV:  Regular rate and rhythm, S1 and S2 normal, no murmurs, clicks  PULM:  Chest is clear, no wheezing ,  symmetric air entry throughout both lung fields. ABD: Soft, NT, BMI noted, no masses appreciated  EXT: No rash or edema  NEURO: Alert oriented ×3, moves all 4 extremities, uses walker    Urine dip reviewed, culture sent    ASSESSMENT/PLAN:  1. Urinary frequency  Using estrogen cream, await culture results before starting antibiotic  - POCT Urinalysis no Micro  - Culture, Urine    2. Essential hypertension  Systolic blood pressure slightly elevated, restart losartan 50 mg daily  She has some low start at home    3. Mixed hyperlipidemia  Stable on Lipitor, continue    4.  Current mild episode of major depressive disorder without prior episode ([facility] [address]  Depression is stable on Viibryd 10 mg daily and Wellbutrin 150 mg per psychiatry,      30 Total Minutes spent pre charting (reviewing problem list, meds, any test results, consultant and hospital notes ) and  obtaining present visit history, performing appropriate medical exam/evaluation, counseling and educating the patient (and family), ordering medications ,tests, and procedures as needed, refilling medication(s), placing referral(s) when needed in addition to coordinating care for this patient and documenting in electronic health record

## 2023-06-07 DIAGNOSIS — I10 ESSENTIAL (PRIMARY) HYPERTENSION: ICD-10-CM

## 2023-06-07 RX ORDER — LOSARTAN POTASSIUM 50 MG/1
TABLET ORAL
Qty: 180 TABLET | Refills: 3 | Status: SHIPPED | OUTPATIENT
Start: 2023-06-07 | End: 2023-06-08

## 2023-06-07 NOTE — TELEPHONE ENCOUNTER
Medication:   Requested Prescriptions     Pending Prescriptions Disp Refills    losartan (COZAAR) 50 MG tablet [Pharmacy Med Name: LOSARTAN POTASSIUM 50 MG TAB] 180 tablet 3     Sig: TAKE 1 TABLET BY MOUTH TWICE A DAY WITH MEALS       Last Filled:  5/19/2022    Patient Phone Number: [phone] (home)     Last appt: 6/6/2023   Next appt: Visit date not found    Lab Results   Component Value Date     12/09/2022    K 4.6 12/09/2022     12/09/2022    CO2 30 12/09/2022    BUN 32 (H) 12/09/2022    CREATININE 0.9 12/09/2022    GLUCOSE 89 12/09/2022    CALCIUM 9.3 12/09/2022    PROT 7.0 12/09/2022    LABALBU 4.3 12/09/2022    BILITOT 0.4 12/09/2022    ALKPHOS 93 12/09/2022    AST 18 12/09/2022    ALT 13 12/09/2022    LABGLOM >60 12/09/2022    GFRAA 57 (A) 10/07/2022    AGRATIO 1.6 12/09/2022    GLOB 3.1 07/27/2021

## 2023-06-08 DIAGNOSIS — I10 ESSENTIAL (PRIMARY) HYPERTENSION: ICD-10-CM

## 2023-06-08 LAB — BACTERIA UR CULT: NORMAL

## 2023-06-08 RX ORDER — LOSARTAN POTASSIUM 50 MG/1
50 TABLET ORAL DAILY
Qty: 180 TABLET | Refills: 3 | Status: SHIPPED | OUTPATIENT
Start: 2023-06-08

## 2023-06-08 RX ORDER — LOSARTAN POTASSIUM 50 MG/1
50 TABLET ORAL
Qty: 90 TABLET | Refills: 3 | Status: CANCELLED | OUTPATIENT
Start: 2023-06-08

## 2023-06-26 RX ORDER — VILAZODONE HYDROCHLORIDE 20 MG/1
TABLET ORAL
Qty: 90 TABLET | Refills: 1 | Status: SHIPPED | OUTPATIENT
Start: 2023-06-26

## 2023-07-14 ENCOUNTER — APPOINTMENT (OUTPATIENT)
Dept: GENERAL RADIOLOGY | Age: 83
End: 2023-07-14
Payer: MEDICARE

## 2023-07-14 ENCOUNTER — APPOINTMENT (OUTPATIENT)
Dept: CT IMAGING | Age: 83
End: 2023-07-14
Payer: MEDICARE

## 2023-07-14 ENCOUNTER — HOSPITAL ENCOUNTER (EMERGENCY)
Age: 83
Discharge: HOME | End: 2023-07-14
Payer: MEDICARE

## 2023-07-14 ENCOUNTER — APPOINTMENT (OUTPATIENT)
Dept: ULTRASOUND IMAGING | Age: 83
End: 2023-07-14
Payer: MEDICARE

## 2023-07-14 VITALS
OXYGEN SATURATION: 95 % | BODY MASS INDEX: 33.99 KG/M2 | HEART RATE: 72 BPM | TEMPERATURE: 97.9 F | DIASTOLIC BLOOD PRESSURE: 61 MMHG | HEIGHT: 61 IN | SYSTOLIC BLOOD PRESSURE: 135 MMHG | WEIGHT: 180 LBS | RESPIRATION RATE: 20 BRPM

## 2023-07-14 DIAGNOSIS — K29.00 OTHER ACUTE GASTRITIS WITHOUT HEMORRHAGE: ICD-10-CM

## 2023-07-14 DIAGNOSIS — R77.8 ELEVATED TROPONIN: ICD-10-CM

## 2023-07-14 DIAGNOSIS — R10.13 ABDOMINAL PAIN, EPIGASTRIC: ICD-10-CM

## 2023-07-14 DIAGNOSIS — R07.9 CHEST PAIN, UNSPECIFIED TYPE: Primary | ICD-10-CM

## 2023-07-14 LAB
ALBUMIN SERPL-MCNC: 4 G/DL (ref 3.4–5)
ALBUMIN/GLOB SERPL: 1.7 {RATIO} (ref 1.1–2.2)
ALP SERPL-CCNC: 83 U/L (ref 40–129)
ALT SERPL-CCNC: 19 U/L (ref 10–40)
ANION GAP SERPL CALCULATED.3IONS-SCNC: 8 MMOL/L (ref 3–16)
AST SERPL-CCNC: 17 U/L (ref 15–37)
BACTERIA URNS QL MICRO: NORMAL /HPF
BASOPHILS # BLD: 0.1 K/UL (ref 0–0.2)
BASOPHILS NFR BLD: 0.6 %
BILIRUB SERPL-MCNC: 0.5 MG/DL (ref 0–1)
BILIRUB UR QL STRIP.AUTO: NEGATIVE
BUN SERPL-MCNC: 30 MG/DL (ref 7–20)
CALCIUM SERPL-MCNC: 9.3 MG/DL (ref 8.3–10.6)
CHLORIDE SERPL-SCNC: 101 MMOL/L (ref 99–110)
CLARITY UR: CLEAR
CO2 SERPL-SCNC: 32 MMOL/L (ref 21–32)
COLOR UR: YELLOW
CREAT SERPL-MCNC: 0.9 MG/DL (ref 0.6–1.2)
D DIMER: 0.82 UG/ML FEU (ref 0–0.6)
DEPRECATED RDW RBC AUTO: 15.8 % (ref 12.4–15.4)
EKG ATRIAL RATE: 69 BPM
EKG DIAGNOSIS: NORMAL
EKG P AXIS: 63 DEGREES
EKG P-R INTERVAL: 150 MS
EKG Q-T INTERVAL: 400 MS
EKG QRS DURATION: 102 MS
EKG QTC CALCULATION (BAZETT): 428 MS
EKG R AXIS: -25 DEGREES
EKG T AXIS: 41 DEGREES
EKG VENTRICULAR RATE: 69 BPM
EOSINOPHIL # BLD: 0.1 K/UL (ref 0–0.6)
EOSINOPHIL NFR BLD: 0.8 %
EPI CELLS #/AREA URNS AUTO: 1 /HPF (ref 0–5)
GFR SERPLBLD CREATININE-BSD FMLA CKD-EPI: >60 ML/MIN/{1.73_M2}
GLUCOSE SERPL-MCNC: 94 MG/DL (ref 70–99)
GLUCOSE UR STRIP.AUTO-MCNC: NEGATIVE MG/DL
HCT VFR BLD AUTO: 45.7 % (ref 36–48)
HGB BLD-MCNC: 14.4 G/DL (ref 12–16)
HGB UR QL STRIP.AUTO: NEGATIVE
HYALINE CASTS #/AREA URNS AUTO: 0 /LPF (ref 0–8)
KETONES UR STRIP.AUTO-MCNC: NEGATIVE MG/DL
LEUKOCYTE ESTERASE UR QL STRIP.AUTO: ABNORMAL
LIPASE SERPL-CCNC: 21 U/L (ref 13–60)
LYMPHOCYTES # BLD: 3.6 K/UL (ref 1–5.1)
LYMPHOCYTES NFR BLD: 37 %
MCH RBC QN AUTO: 29.5 PG (ref 26–34)
MCHC RBC AUTO-ENTMCNC: 31.7 G/DL (ref 31–36)
MCV RBC AUTO: 93.1 FL (ref 80–100)
MONOCYTES # BLD: 0.5 K/UL (ref 0–1.3)
MONOCYTES NFR BLD: 5.7 %
NEUTROPHILS # BLD: 5.4 K/UL (ref 1.7–7.7)
NEUTROPHILS NFR BLD: 55.9 %
NITRITE UR QL STRIP.AUTO: NEGATIVE
NT-PROBNP SERPL-MCNC: 236 PG/ML (ref 0–449)
PH UR STRIP.AUTO: 7 [PH] (ref 5–8)
PLATELET # BLD AUTO: ABNORMAL K/UL (ref 135–450)
PLATELET BLD QL SMEAR: ABNORMAL
PMV BLD AUTO: 8.4 FL (ref 5–10.5)
POTASSIUM SERPL-SCNC: 4.7 MMOL/L (ref 3.5–5.1)
PROT SERPL-MCNC: 6.3 G/DL (ref 6.4–8.2)
PROT UR STRIP.AUTO-MCNC: NEGATIVE MG/DL
RBC # BLD AUTO: 4.9 M/UL (ref 4–5.2)
RBC CLUMPS #/AREA URNS AUTO: 0 /HPF (ref 0–4)
REASON FOR REJECTION: NORMAL
REJECTED TEST: NORMAL
SODIUM SERPL-SCNC: 141 MMOL/L (ref 136–145)
SP GR UR STRIP.AUTO: <=1.005 (ref 1–1.03)
TROPONIN, HIGH SENSITIVITY: 18 NG/L (ref 0–14)
TROPONIN, HIGH SENSITIVITY: 20 NG/L (ref 0–14)
UA COMPLETE W REFLEX CULTURE PNL UR: ABNORMAL
UA DIPSTICK W REFLEX MICRO PNL UR: YES
URN SPEC COLLECT METH UR: ABNORMAL
UROBILINOGEN UR STRIP-ACNC: 0.2 E.U./DL
WBC # BLD AUTO: 9.6 K/UL (ref 4–11)
WBC #/AREA URNS AUTO: 2 /HPF (ref 0–5)

## 2023-07-14 PROCEDURE — 93010 ELECTROCARDIOGRAM REPORT: CPT | Performed by: INTERNAL MEDICINE

## 2023-07-14 PROCEDURE — 36415 COLL VENOUS BLD VENIPUNCTURE: CPT

## 2023-07-14 PROCEDURE — 71045 X-RAY EXAM CHEST 1 VIEW: CPT

## 2023-07-14 PROCEDURE — 81001 URINALYSIS AUTO W/SCOPE: CPT

## 2023-07-14 PROCEDURE — 6360000004 HC RX CONTRAST MEDICATION: Performed by: PHYSICIAN ASSISTANT

## 2023-07-14 PROCEDURE — 83690 ASSAY OF LIPASE: CPT

## 2023-07-14 PROCEDURE — 93005 ELECTROCARDIOGRAM TRACING: CPT | Performed by: PHYSICIAN ASSISTANT

## 2023-07-14 PROCEDURE — 99285 EMERGENCY DEPT VISIT HI MDM: CPT

## 2023-07-14 PROCEDURE — 85025 COMPLETE CBC W/AUTO DIFF WBC: CPT

## 2023-07-14 PROCEDURE — 74177 CT ABD & PELVIS W/CONTRAST: CPT

## 2023-07-14 PROCEDURE — 83880 ASSAY OF NATRIURETIC PEPTIDE: CPT

## 2023-07-14 PROCEDURE — 6370000000 HC RX 637 (ALT 250 FOR IP): Performed by: PHYSICIAN ASSISTANT

## 2023-07-14 PROCEDURE — 80053 COMPREHEN METABOLIC PANEL: CPT

## 2023-07-14 PROCEDURE — 84484 ASSAY OF TROPONIN QUANT: CPT

## 2023-07-14 PROCEDURE — 85379 FIBRIN DEGRADATION QUANT: CPT

## 2023-07-14 RX ORDER — OMEPRAZOLE 20 MG/1
40 CAPSULE, DELAYED RELEASE ORAL DAILY
Qty: 60 CAPSULE | Refills: 0 | Status: SHIPPED | OUTPATIENT
Start: 2023-07-14 | End: 2023-08-13

## 2023-07-14 RX ORDER — ASPIRIN 325 MG
325 TABLET ORAL ONCE
Status: COMPLETED | OUTPATIENT
Start: 2023-07-14 | End: 2023-07-14

## 2023-07-14 RX ORDER — MAGNESIUM HYDROXIDE/ALUMINUM HYDROXICE/SIMETHICONE 120; 1200; 1200 MG/30ML; MG/30ML; MG/30ML
SUSPENSION ORAL
Status: DISCONTINUED
Start: 2023-07-14 | End: 2023-07-14 | Stop reason: HOSPADM

## 2023-07-14 RX ORDER — DICYCLOMINE HYDROCHLORIDE 10 MG/1
10 CAPSULE ORAL EVERY 6 HOURS PRN
Qty: 20 CAPSULE | Refills: 0 | Status: SHIPPED | OUTPATIENT
Start: 2023-07-14 | End: 2023-07-19

## 2023-07-14 RX ORDER — SUCRALFATE 1 G/1
1 TABLET ORAL 4 TIMES DAILY
Qty: 60 TABLET | Refills: 0 | Status: SHIPPED | OUTPATIENT
Start: 2023-07-14 | End: 2023-07-29

## 2023-07-14 RX ADMIN — IOPAMIDOL 75 ML: 755 INJECTION, SOLUTION INTRAVENOUS at 13:58

## 2023-07-14 RX ADMIN — ALUMINUM HYDROXIDE, MAGNESIUM HYDROXIDE, AND SIMETHICONE: 200; 200; 20 SUSPENSION ORAL at 13:50

## 2023-07-14 RX ADMIN — ALUMINUM HYDROXIDE, MAGNESIUM HYDROXIDE, AND SIMETHICONE: 200; 200; 20 SUSPENSION ORAL at 09:01

## 2023-07-14 RX ADMIN — ASPIRIN 325 MG: 325 TABLET ORAL at 09:01

## 2023-07-14 ASSESSMENT — ENCOUNTER SYMPTOMS
SHORTNESS OF BREATH: 0
VOMITING: 0
NAUSEA: 0
ABDOMINAL PAIN: 0
RHINORRHEA: 0
SORE THROAT: 0
EYE PAIN: 0
COUGH: 0
CONSTIPATION: 0
CHEST TIGHTNESS: 1
BACK PAIN: 0
DIARRHEA: 0

## 2023-07-14 ASSESSMENT — PAIN DESCRIPTION - LOCATION: LOCATION: CHEST

## 2023-07-14 ASSESSMENT — LIFESTYLE VARIABLES: HOW OFTEN DO YOU HAVE A DRINK CONTAINING ALCOHOL: MONTHLY OR LESS

## 2023-07-14 ASSESSMENT — PAIN - FUNCTIONAL ASSESSMENT: PAIN_FUNCTIONAL_ASSESSMENT: 0-10

## 2023-07-14 ASSESSMENT — HEART SCORE: ECG: 0

## 2023-07-14 ASSESSMENT — PAIN SCALES - GENERAL: PAINLEVEL_OUTOF10: 9

## 2023-07-14 NOTE — ED PROVIDER NOTES
[facility] (formerly [facility])        Pt Name: Oksana Neal  MRN: [id number]  [address] 1940  Date of evaluation: 7/14/2023  Provider: MOODY Patterson  PCP: Fletcher Catalan MD  Note Started: 8:56 AM EDT 7/14/23      MELLY. I have evaluated this patient. CHIEF COMPLAINT       Chief Complaint   Patient presents with    Chest Pain     Mid chest pain with heartburn for 1 week. HISTORY OF PRESENT ILLNESS: 1 or more Elements     History from : Patient    Limitations to history : None    Oksana Neal is a 78 y.o. female who presents to the emergency department due to midsternal chest pain with heartburn sensation for the past week. Patient states that every time she eats she has this burning sensation within her chest and feels somewhat tight at times. She states today she woke up and ate a banana and noted worsening symptoms shortly after this. She denies any shortness of breath or difficulty breathing. She denies any nausea vomiting or abdominal pain today. She denies any fevers or chills. She does take Fosamax. She states that she has not taken any medication today to help with her symptoms. Nursing Notes were all reviewed and agreed with or any disagreements were addressed in the HPI. REVIEW OF SYSTEMS :      Review of Systems   Constitutional:  Negative for chills, diaphoresis and fever. HENT:  Negative for congestion, rhinorrhea and sore throat. Eyes:  Negative for pain and visual disturbance. Respiratory:  Positive for chest tightness. Negative for cough and shortness of breath. Cardiovascular:  Negative for chest pain and leg swelling. \"Heartburn\" center of chest.    Gastrointestinal:  Negative for abdominal pain, constipation, diarrhea, nausea and vomiting. Genitourinary:  Negative for difficulty urinating, dysuria and frequency. Musculoskeletal:  Negative for back pain and neck pain.    Skin:  Negative

## 2023-07-14 NOTE — DISCHARGE INSTRUCTIONS
Follow up with your primary care provider in one week for a recheck    Take medications as prescribed. Return to the ED if you have any worsening symptoms.      Make appointment with gastroenterology for a recheck

## 2023-07-14 NOTE — ED PROVIDER NOTES
EKG:  Read by me in the absence of a cardiologist shows: Sinus rhythm, rate 69, intervals normal, axis -25 degrees, no acute injury pattern, minimal change when compared to 9 September 2022    I did not perform face-to-face evaluation and was not otherwise involved in this patient's care. Please see PA/NP note for further information on this visit.        Tatiana Osei MD  07/14/23 [id number]

## 2023-07-17 ENCOUNTER — CARE COORDINATION (OUTPATIENT)
Dept: CARE COORDINATION | Age: 83
End: 2023-07-17

## 2023-07-17 NOTE — CARE COORDINATION
Ambulatory Care Coordination  ED Follow up Call    Reason for ED visit:  chest pain   Status:     significantly improved    Did you call your PCP prior to going to the ED? No      Did you receive a discharge instructions from the Emergency Room? Yes  Review of Instructions:     Understands what to report/when to return?:  Yes   Understands discharge instructions?:  Yes   Following discharge instructions?:  Yes   If not why? N/a    Are there any new complaints of pain? No  New Pain Meds? Yes    Constipation prophylaxis needed? N/A    If you have a wound is the dressing clean, dry, and intact? N/A  Understands wound care regimen? N/A    Are there any other complaints/concerns that you wish to tell your provider? Pt agreed to call and schedule a f/u appt with her provider and she will talk with him about f/u with GI. Pt has no c/o pain at this time, she has no other c/o to inform ACM about either. She was encouraged to call ACM if she has any further questions or concerns and she agreed with this plan. FU appts/Provider:    No future appointments. New Medications?:   Yes      Medication Reconciliation by phone - No  Understands Medications? Yes  Taking Medications? Yes  Can you swallow your pills? Yes    Any further needs in the home i.e. Equipment?   No    Link to services in community?:  No   Which services:  n/a

## 2023-07-18 ENCOUNTER — TELEPHONE (OUTPATIENT)
Dept: FAMILY MEDICINE CLINIC | Age: 83
End: 2023-07-18

## 2023-07-18 NOTE — TELEPHONE ENCOUNTER
----- Message from Victoria Kiser sent at 7/18/2023 10:17 AM EDT -----  Subject: Appointment Request    Reason for Call: Established Patient Appointment needed: Routine ED Follow   Up Visit    QUESTIONS    Reason for appointment request? No appointments available during search     Additional Information for Provider?  Patient called was seen in the ER on   7/14 for stomach issues needs to f/u with dr as soon as possible  ---------------------------------------------------------------------------  --------------  [address]  [phone]; OK to leave message on voicemail  ---------------------------------------------------------------------------  --------------  SCRIPT ANSWERS

## 2023-07-18 NOTE — TELEPHONE ENCOUNTER
Care Transitions Initial Follow Up Call    Outreach made within 2 business days of discharge: No    Patient: Phoenix Montano Patient : 1940   MRN: [id number]  Reason for Admission: There are no discharge diagnoses documented for the most recent discharge. Discharge Date: 23       Spoke with: Pt    Discharge department/facility: [facility] Interactive Patient Contact:  Was patient able to fill all prescriptions: Yes  Was patient instructed to bring all medications to the follow-up visit: Yes  Is patient taking all medications as directed in the discharge summary?  Yes  Does patient understand their discharge instructions: Yes  Does patient have questions or concerns that need addressed prior to 7-14 day follow up office visit: no    Scheduled appointment with PCP within 7-14 days    Follow Up  Future Appointments   Date Time Provider [address] [place] [address]   2023  2:00 PM Elvia Lorenzo MD [facility], [address] [place] [address]

## 2023-07-24 DIAGNOSIS — E66.01 SEVERE OBESITY (BMI 35.0-39.9) WITH COMORBIDITY (HCC): ICD-10-CM

## 2023-07-24 DIAGNOSIS — R53.83 FATIGUE, UNSPECIFIED TYPE: ICD-10-CM

## 2023-07-25 LAB
EST. AVERAGE GLUCOSE BLD GHB EST-MCNC: 105.4 MG/DL
HBA1C MFR BLD: 5.3 %
TSH SERPL DL<=0.005 MIU/L-ACNC: 2.72 UIU/ML (ref 0.27–4.2)

## 2023-08-01 ENCOUNTER — OFFICE VISIT (OUTPATIENT)
Dept: FAMILY MEDICINE CLINIC | Age: 83
End: 2023-08-01

## 2023-08-01 ENCOUNTER — TELEPHONE (OUTPATIENT)
Dept: FAMILY MEDICINE CLINIC | Age: 83
End: 2023-08-01

## 2023-08-01 VITALS
SYSTOLIC BLOOD PRESSURE: 148 MMHG | HEART RATE: 84 BPM | HEIGHT: 61 IN | BODY MASS INDEX: 36.14 KG/M2 | WEIGHT: 191.4 LBS | DIASTOLIC BLOOD PRESSURE: 74 MMHG | OXYGEN SATURATION: 99 % | RESPIRATION RATE: 16 BRPM

## 2023-08-01 DIAGNOSIS — G62.9 NEUROPATHY: ICD-10-CM

## 2023-08-01 DIAGNOSIS — E66.01 SEVERE OBESITY (BMI 35.0-39.9) WITH COMORBIDITY (HCC): ICD-10-CM

## 2023-08-01 DIAGNOSIS — I10 ESSENTIAL HYPERTENSION: ICD-10-CM

## 2023-08-01 DIAGNOSIS — R60.0 LOWER LEG EDEMA: Primary | ICD-10-CM

## 2023-08-01 RX ORDER — HYDROCHLOROTHIAZIDE 12.5 MG/1
12.5 CAPSULE, GELATIN COATED ORAL EVERY MORNING
Qty: 90 CAPSULE | Refills: 1 | Status: SHIPPED | OUTPATIENT
Start: 2023-08-01

## 2023-08-01 RX ORDER — PREGABALIN 75 MG/1
75 CAPSULE ORAL 2 TIMES DAILY
Qty: 60 CAPSULE | Refills: 0 | Status: SHIPPED | OUTPATIENT
Start: 2023-08-01 | End: 2023-08-31

## 2023-08-01 RX ORDER — HYDROXYCHLOROQUINE SULFATE 200 MG/1
200 TABLET, FILM COATED ORAL 2 TIMES DAILY
COMMUNITY
Start: 2023-07-20

## 2023-08-01 NOTE — PROGRESS NOTES
Wilbert Pedro is a 78 y.o. female. HPI:  Here for follow up LE edema  Demadex did not help  Recommend she wear her support stockings    States she is trying to eat healthy but cannot exercise much    Blood pressure elevated but she did not take her blood pressure medicine this morning    Meds, vitamins and allergies reviewed with pt    Wt Readings from Last 3 Encounters:   08/01/23 191 lb 6.4 oz (86.8 kg)   07/24/23 188 lb 9.6 oz (85.5 kg)   07/14/23 180 lb (81.6 kg)       REVIEW OF SYSTEMS:   CONSTITUTIONAL: See history of present illness,   Weight noted   HEENT: No new vision difficulties or ringing in the ears. RESPIRATORY: No new SOB, PND, orthopnea or cough. CARDIOVASCULAR: no CP, palpitations or SOB with exertion  GI: No nausea, vomiting, diarrhea, constipation, abdominal pain or changes in bowel habits. : No urinary frequency, urgency, incontinence hematuria or dysuria. SKIN: No cyanosis or skin lesions. MUSCULOSKELETAL: No new muscle or joint pain. NEUROLOGICAL: No syncope or TIA-like symptoms. PSYCHIATRIC: No anxiety, insomnia or depression     Allergies   Allergen Reactions    Abilify [Aripiprazole]      tremors       Prior to Visit Medications    Medication Sig Taking? Authorizing Provider   pregabalin (LYRICA) 75 MG capsule Take 1 capsule by mouth 2 times daily for 30 days.  Max Daily Amount: 150 mg Yes Esvin Joseph MD   hydroCHLOROthiazide (MICROZIDE) 12.5 MG capsule Take 1 capsule by mouth every morning Yes Esvin Joseph MD   torsemide (DEMADEX) 20 MG tablet Take 1 tablet by mouth daily as needed (leg edema) Yes Esvin Joseph MD   famotidine (PEPCID) 20 MG tablet Take 1 tablet by mouth nightly Yes Esvin Joseph MD   omeprazole (PRILOSEC) 20 MG delayed release capsule Take 2 capsules by mouth Daily Yes MOODY Mullins   vilazodone HCl (VIIBRYD) 20 MG TABS TAKE 1 TABLET BY MOUTH EVERY DAY Yes Esivn Joseph MD   losartan (COZAAR) 50 MG tablet Take 1 tablet by mouth daily Yes

## 2023-08-01 NOTE — TELEPHONE ENCOUNTER
Office has been notified that pt is requiring Prior Authorization for the following medication:    pregabalin (LYRICA) 75 MG capsule     Please initiate this request through CoverMyMeds, contacting the following Payor/Insurance:  [employer]      Please see below, or the documentation attached to this encounter for any additional information that may assist in processing PA:  KEY: [id number]    Form scanned into this encounter    Thank you!

## 2023-08-02 NOTE — TELEPHONE ENCOUNTER
APPROVED. LETTER ATTACHED. If this requires a response please respond to the pool. [facility]). Please advise patient thank you.

## 2023-08-02 NOTE — TELEPHONE ENCOUNTER
Submitted PA for PREGABALIN  [facility] Key: [id number] STATUS: PENDING. Follow up done daily; if no response in three days we will refax for status check. If another three days goes by with no response we will call the insurance for status.

## 2023-08-16 DIAGNOSIS — R63.5 WEIGHT GAIN: ICD-10-CM

## 2023-08-16 DIAGNOSIS — R60.0 LOWER LEG EDEMA: ICD-10-CM

## 2023-08-16 RX ORDER — TORSEMIDE 20 MG/1
20 TABLET ORAL DAILY PRN
Qty: 30 TABLET | Refills: 2 | Status: SHIPPED | OUTPATIENT
Start: 2023-08-16

## 2023-08-25 ENCOUNTER — HOSPITAL ENCOUNTER (OUTPATIENT)
Dept: WOMENS IMAGING | Age: 83
Discharge: HOME | End: 2023-08-25
Payer: MEDICARE

## 2023-08-25 VITALS — BODY MASS INDEX: 35.68 KG/M2 | WEIGHT: 189 LBS | HEIGHT: 61 IN

## 2023-08-25 DIAGNOSIS — Z12.31 VISIT FOR SCREENING MAMMOGRAM: ICD-10-CM

## 2023-08-25 PROCEDURE — 77067 SCR MAMMO BI INCL CAD: CPT

## 2023-08-30 ENCOUNTER — OFFICE VISIT (OUTPATIENT)
Dept: FAMILY MEDICINE CLINIC | Age: 83
End: 2023-08-30

## 2023-08-30 VITALS
OXYGEN SATURATION: 98 % | HEIGHT: 61 IN | DIASTOLIC BLOOD PRESSURE: 71 MMHG | HEART RATE: 70 BPM | WEIGHT: 191.38 LBS | BODY MASS INDEX: 36.13 KG/M2 | SYSTOLIC BLOOD PRESSURE: 126 MMHG

## 2023-08-30 DIAGNOSIS — I10 ESSENTIAL HYPERTENSION: Primary | ICD-10-CM

## 2023-08-30 DIAGNOSIS — R60.0 LOWER EXTREMITY EDEMA: ICD-10-CM

## 2023-08-30 DIAGNOSIS — G62.9 NEUROPATHY: ICD-10-CM

## 2023-08-30 RX ORDER — OMEPRAZOLE 20 MG/1
20 CAPSULE, DELAYED RELEASE ORAL
Qty: 90 CAPSULE | Refills: 1 | Status: SHIPPED | OUTPATIENT
Start: 2023-08-30

## 2023-08-30 RX ORDER — PREGABALIN 75 MG/1
150 CAPSULE ORAL 2 TIMES DAILY
Qty: 120 CAPSULE | Refills: 0 | Status: SHIPPED | OUTPATIENT
Start: 2023-08-30 | End: 2023-09-29

## 2023-08-30 NOTE — PROGRESS NOTES
Cole Morales is a 78 y.o. female. HPI:  Here for recheck lower extremity edema, supposed to be wearing her compression stockings    Has been wearing her lower compression stockings which seem to help, weight is fairly stable    Biggest concern is her neuropathy, I tried her on Lyrica 75 twice daily helping some we will increase to 150 twice daily    Demadex did not help  Added  on HCTZ, blood pressure improved overall    Meds, vitamins and allergies reviewed with pt    Wt Readings from Last 3 Encounters:   08/30/23 191 lb 6 oz (86.8 kg)   08/25/23 189 lb (85.7 kg)   08/01/23 191 lb 6.4 oz (86.8 kg)       REVIEW OF SYSTEMS:   CONSTITUTIONAL: See history of present illness,   Weight noted/stable  HEENT: No new vision difficulties or ringing in the ears. RESPIRATORY: No new SOB, PND, orthopnea or cough. CARDIOVASCULAR: no CP, palpitations or SOB with exertion  GI: No nausea, vomiting, diarrhea, constipation, abdominal pain or changes in bowel habits. : No urinary frequency, urgency, incontinence hematuria or dysuria. SKIN: No cyanosis or skin lesions. MUSCULOSKELETAL: Neuropathy lower extremities, feet and legs burn  NEUROLOGICAL: No syncope or TIA-like symptoms. PSYCHIATRIC: No anxiety, insomnia or depression     Allergies   Allergen Reactions    Abilify [Aripiprazole]      tremors       Prior to Visit Medications    Medication Sig Taking? Authorizing Provider   pregabalin (LYRICA) 75 MG capsule Take 2 capsules by mouth 2 times daily for 30 days.  Max Daily Amount: 300 mg Yes Kenrick Nj MD   Elastic Bandages & Supports (MEDICAL COMPRESSION STOCKINGS) MISC 1 each by Does not apply route Daily Yes Kenrick Nj MD   omeprazole (PRILOSEC) 20 MG delayed release capsule Take 1 capsule by mouth every morning (before breakfast) Yes Kenrick Nj MD   hydroxychloroquine (PLAQUENIL) 200 MG tablet Take 1 tablet by mouth 2 times daily Yes Historical Provider, MD   hydroCHLOROthiazide (MICROZIDE) 12.5 MG

## 2023-09-06 ENCOUNTER — TELEPHONE (OUTPATIENT)
Dept: FAMILY MEDICINE CLINIC | Age: 83
End: 2023-09-06

## 2023-09-06 NOTE — TELEPHONE ENCOUNTER
Patient  would like to know if she should order her knee compression stocking for her knees. She states that she been having some heaviness in her thighs with a lot of swelling with a little softness.  Please advise

## 2023-10-01 NOTE — PROGRESS NOTES
Abelardo Christianson is a 78 y.o. female. HPI:  Here for blood pressure check and edema check  Wearing her above-the-knee stockings which are working well  Her leg seems soft, no breakdown    Blood pressure stable    Agrees to flu vaccine today    Recommend COVID booster separately in 10 to 14 days    Meds, vitamins and allergies reviewed with pt    Wt Readings from Last 3 Encounters:   10/02/23 195 lb (88.5 kg)   08/30/23 191 lb 6 oz (86.8 kg)   08/25/23 189 lb (85.7 kg)       REVIEW OF SYSTEMS:   CONSTITUTIONAL: See history of present illness,   Weight noted   HEENT: No new vision difficulties or ringing in the ears. RESPIRATORY: No new SOB, PND, orthopnea or cough. CARDIOVASCULAR: no CP, palpitations or SOB with exertion  GI: No nausea, vomiting, diarrhea, constipation, abdominal pain or changes in bowel habits. : No urinary frequency, urgency, incontinence hematuria or dysuria. SKIN: No cyanosis or skin lesions. MUSCULOSKELETAL: No new muscle or joint pain. NEUROLOGICAL: No syncope or TIA-like symptoms. PSYCHIATRIC: No anxiety, insomnia or depression     Allergies   Allergen Reactions    Abilify [Aripiprazole]      tremors       Prior to Visit Medications    Medication Sig Taking? Authorizing Provider   miconazole (MICOTIN) 2 % cream Apply topically 2 times daily.  Yes Mahesh Rasmussen MD   Elastic Bandages & Supports (MEDICAL COMPRESSION STOCKINGS) [facility] 1 each by Does not apply route Daily Yes Mahesh Rasmussen MD   omeprazole (PRILOSEC) 20 MG delayed release capsule Take 1 capsule by mouth every morning (before breakfast) Yes Mahesh Rasmussen MD   hydroxychloroquine (PLAQUENIL) 200 MG tablet Take 1 tablet by mouth 2 times daily Yes Historical Provider, MD   hydroCHLOROthiazide (MICROZIDE) 12.5 MG capsule Take 1 capsule by mouth every morning Yes Mahesh Rasmussen MD   famotidine (PEPCID) 20 MG tablet Take 1 tablet by mouth nightly Yes Mahesh Rasmussen MD   vilazodone HCl (VIIBRYD) 20 MG TABS TAKE 1 TABLET BY

## 2023-10-02 ENCOUNTER — OFFICE VISIT (OUTPATIENT)
Dept: FAMILY MEDICINE CLINIC | Age: 83
End: 2023-10-02

## 2023-10-02 VITALS
DIASTOLIC BLOOD PRESSURE: 56 MMHG | SYSTOLIC BLOOD PRESSURE: 136 MMHG | WEIGHT: 195 LBS | BODY MASS INDEX: 36.82 KG/M2 | HEART RATE: 77 BPM | HEIGHT: 61 IN

## 2023-10-02 DIAGNOSIS — Z23 NEED FOR INFLUENZA VACCINATION: ICD-10-CM

## 2023-10-02 DIAGNOSIS — I10 ESSENTIAL HYPERTENSION: Primary | ICD-10-CM

## 2023-10-11 DIAGNOSIS — G62.9 NEUROPATHY: ICD-10-CM

## 2023-10-12 RX ORDER — PREGABALIN 75 MG/1
150 CAPSULE ORAL 2 TIMES DAILY
Qty: 120 CAPSULE | Refills: 0 | Status: SHIPPED | OUTPATIENT
Start: 2023-10-12 | End: 2023-11-11

## 2023-11-14 ENCOUNTER — TELEPHONE (OUTPATIENT)
Dept: FAMILY MEDICINE CLINIC | Age: 83
End: 2023-11-14

## 2023-11-14 ENCOUNTER — OFFICE VISIT (OUTPATIENT)
Dept: FAMILY MEDICINE CLINIC | Age: 83
End: 2023-11-14

## 2023-11-14 VITALS
DIASTOLIC BLOOD PRESSURE: 76 MMHG | BODY MASS INDEX: 35.3 KG/M2 | SYSTOLIC BLOOD PRESSURE: 175 MMHG | OXYGEN SATURATION: 95 % | WEIGHT: 187 LBS | HEART RATE: 68 BPM | HEIGHT: 61 IN

## 2023-11-14 DIAGNOSIS — H10.012 ACUTE FOLLICULAR CONJUNCTIVITIS OF LEFT EYE: ICD-10-CM

## 2023-11-14 DIAGNOSIS — R11.0 NAUSEA: Primary | ICD-10-CM

## 2023-11-14 DIAGNOSIS — B34.9 VIRAL ILLNESS: ICD-10-CM

## 2023-11-14 DIAGNOSIS — R11.0 NAUSEA: ICD-10-CM

## 2023-11-14 DIAGNOSIS — R19.7 DIARRHEA, UNSPECIFIED TYPE: ICD-10-CM

## 2023-11-14 LAB
INFLUENZA A ANTIBODY: NEGATIVE
INFLUENZA B ANTIBODY: NEGATIVE
Lab: NORMAL
QC PASS/FAIL: NORMAL
SARS-COV-2 RDRP RESP QL NAA+PROBE: NEGATIVE

## 2023-11-14 RX ORDER — PANTOPRAZOLE SODIUM 40 MG/1
40 TABLET, DELAYED RELEASE ORAL
Qty: 30 TABLET | Refills: 5 | Status: SHIPPED | OUTPATIENT
Start: 2023-11-14

## 2023-11-14 RX ORDER — ONDANSETRON 4 MG/1
4 TABLET, FILM COATED ORAL EVERY 8 HOURS PRN
Qty: 15 TABLET | Refills: 0 | Status: SHIPPED | OUTPATIENT
Start: 2023-11-14

## 2023-11-14 RX ORDER — POLYMYXIN B SULFATE AND TRIMETHOPRIM 1; 10000 MG/ML; [USP'U]/ML
1 SOLUTION OPHTHALMIC 3 TIMES DAILY
Qty: 10 ML | Refills: 0 | Status: SHIPPED | OUTPATIENT
Start: 2023-11-14 | End: 2023-11-21

## 2023-11-14 NOTE — PROGRESS NOTES
NAAT    Check electrolytes  Recheck Friday or sooner if not doing well    3. Viral illness  Screen/negative  - POCT Influenza A/B  - POCT COVID-19 Rapid, NAAT    4.  Acute follicular conjunctivitis of left eye  - trimethoprim-polymyxin b (POLYTRIM) 79811-1.1 UNIT/ML-% ophthalmic solution; Place 1 drop into the left eye 3 times daily for 7 days  Dispense: 10 mL; Refill: 0      30 Total Minutes spent pre charting (reviewing problem list, meds, any test results, consultant and hospital notes ) and  obtaining present visit history, performing appropriate medical exam/evaluation, counseling and educating the patient (and family), ordering medications ,tests, and procedures as needed, refilling medication(s), placing referral(s) when needed in addition to coordinating care for this patient and documenting in electronic health record

## 2023-11-15 LAB
ALBUMIN SERPL-MCNC: 4.3 G/DL (ref 3.4–5)
ALBUMIN/GLOB SERPL: 1.9 {RATIO} (ref 1.1–2.2)
ALP SERPL-CCNC: 85 U/L (ref 40–129)
ALT SERPL-CCNC: 11 U/L (ref 10–40)
ANION GAP SERPL CALCULATED.3IONS-SCNC: 9 MMOL/L (ref 3–16)
AST SERPL-CCNC: 18 U/L (ref 15–37)
BASOPHILS # BLD: 0 K/UL (ref 0–0.2)
BASOPHILS NFR BLD: 0.8 %
BILIRUB SERPL-MCNC: 0.4 MG/DL (ref 0–1)
BUN SERPL-MCNC: 25 MG/DL (ref 7–20)
CALCIUM SERPL-MCNC: 9.6 MG/DL (ref 8.3–10.6)
CHLORIDE SERPL-SCNC: 101 MMOL/L (ref 99–110)
CO2 SERPL-SCNC: 32 MMOL/L (ref 21–32)
CREAT SERPL-MCNC: 0.8 MG/DL (ref 0.6–1.2)
DEPRECATED RDW RBC AUTO: 14.2 % (ref 12.4–15.4)
EOSINOPHIL # BLD: 0.1 K/UL (ref 0–0.6)
EOSINOPHIL NFR BLD: 2.4 %
GFR SERPLBLD CREATININE-BSD FMLA CKD-EPI: >60 ML/MIN/{1.73_M2}
GLUCOSE SERPL-MCNC: 99 MG/DL (ref 70–99)
HCT VFR BLD AUTO: 37.5 % (ref 36–48)
HGB BLD-MCNC: 12.5 G/DL (ref 12–16)
LYMPHOCYTES # BLD: 1.5 K/UL (ref 1–5.1)
LYMPHOCYTES NFR BLD: 33.7 %
MCH RBC QN AUTO: 29.3 PG (ref 26–34)
MCHC RBC AUTO-ENTMCNC: 33.4 G/DL (ref 31–36)
MCV RBC AUTO: 87.9 FL (ref 80–100)
MONOCYTES # BLD: 0.4 K/UL (ref 0–1.3)
MONOCYTES NFR BLD: 8.7 %
NEUTROPHILS # BLD: 2.4 K/UL (ref 1.7–7.7)
NEUTROPHILS NFR BLD: 54.4 %
PLATELET # BLD AUTO: 119 K/UL (ref 135–450)
PMV BLD AUTO: 9.5 FL (ref 5–10.5)
POTASSIUM SERPL-SCNC: 4.8 MMOL/L (ref 3.5–5.1)
PROT SERPL-MCNC: 6.6 G/DL (ref 6.4–8.2)
RBC # BLD AUTO: 4.26 M/UL (ref 4–5.2)
SODIUM SERPL-SCNC: 142 MMOL/L (ref 136–145)
WBC # BLD AUTO: 4.5 K/UL (ref 4–11)

## 2023-11-15 RX ORDER — POLYMYXIN B SULFATE AND TRIMETHOPRIM 1; 10000 MG/ML; [USP'U]/ML
1 SOLUTION OPHTHALMIC 3 TIMES DAILY
Qty: 10 ML | Refills: 0 | Status: CANCELLED | OUTPATIENT
Start: 2023-11-15 | End: 2023-11-22

## 2023-11-15 NOTE — TELEPHONE ENCOUNTER
Medication:   Requested Prescriptions     Pending Prescriptions Disp Refills    trimethoprim-polymyxin b (POLYTRIM) 25375-0.1 UNIT/ML-% ophthalmic solution 10 mL 0     Sig: Place 1 drop into the left eye 3 times daily for 7 days        Last Filled:  120 X 0 rf 11/14/23    Patient Phone Number: [phone] (home)     Last appt: 11/14/2023   Next appt: 11/17/2023    Last OARRS:        No data to display

## 2023-11-16 ENCOUNTER — TELEPHONE (OUTPATIENT)
Dept: FAMILY MEDICINE CLINIC | Age: 83
End: 2023-11-16

## 2023-11-16 DIAGNOSIS — G62.9 NEUROPATHY: ICD-10-CM

## 2023-11-16 RX ORDER — PREGABALIN 75 MG/1
150 CAPSULE ORAL 2 TIMES DAILY
Qty: 120 CAPSULE | Refills: 0 | Status: SHIPPED | OUTPATIENT
Start: 2023-11-16 | End: 2023-12-16

## 2023-11-16 NOTE — TELEPHONE ENCOUNTER
----- Message from Darrell Apple sent at 11/16/2023 10:15 AM EST -----  Subject: Message to Provider    QUESTIONS  Information for Provider? Pt called in regards today 11/16 in regards of   medication . Pt called in regards of the medication Lyrica, Pt is asking   for medication to be called in but she said its new medication and couldnt   confirm mg to confirm medication . Pt also stated she has never taken this   medication . Please reach out to pt to help her call in the right   prescription to the pharmacy .  ---------------------------------------------------------------------------  --------------  [address]  [phone]; OK to leave message on voicemail  ---------------------------------------------------------------------------  --------------  SCRIPT ANSWERS  Relationship to Patient?  Self

## 2023-11-17 ENCOUNTER — OFFICE VISIT (OUTPATIENT)
Dept: FAMILY MEDICINE CLINIC | Age: 83
End: 2023-11-17

## 2023-11-17 VITALS
OXYGEN SATURATION: 96 % | BODY MASS INDEX: 35.3 KG/M2 | SYSTOLIC BLOOD PRESSURE: 130 MMHG | DIASTOLIC BLOOD PRESSURE: 77 MMHG | HEART RATE: 93 BPM | HEIGHT: 61 IN | WEIGHT: 187 LBS

## 2023-11-17 DIAGNOSIS — I10 ESSENTIAL HYPERTENSION: Primary | ICD-10-CM

## 2023-11-17 DIAGNOSIS — K29.60 EROSIVE GASTRITIS: ICD-10-CM

## 2023-11-17 DIAGNOSIS — R11.0 NAUSEA IN ADULT: ICD-10-CM

## 2023-11-17 DIAGNOSIS — D69.6 THROMBOCYTOPENIA, UNSPECIFIED (HCC): ICD-10-CM

## 2023-11-17 DIAGNOSIS — G62.9 NEUROPATHY: ICD-10-CM

## 2023-11-17 NOTE — PROGRESS NOTES
Samantha Ramirez is a 78 y.o. female. HPI:  Here for recheck blood pressure, had stopped medication due to nausea and loose stools  Nausea improving with Zofran  Stools improved    Here for follow-up of nausea and diarrhea. .. Improved    Trial of Lyrica for neuropathy. .. To   Gabapentin does not help her neuropathy    Meds, vitamins and allergies reviewed with pt    Wt Readings from Last 3 Encounters:   11/17/23 84.8 kg (187 lb)   11/14/23 84.8 kg (187 lb)   10/02/23 88.5 kg (195 lb)       REVIEW OF SYSTEMS:   CONSTITUTIONAL: See history of present illness,   Weight noted   HEENT: No new vision difficulties or ringing in the ears. RESPIRATORY: No new SOB, PND, orthopnea or cough. CARDIOVASCULAR: no CP, palpitations or SOB with exertion blood pressure stable  GI: GI burning is improving with proton pump inhibitor/pantoprazole 40 mg  : No urinary frequency, urgency, incontinence hematuria or dysuria. SKIN: No cyanosis or skin lesions. MUSCULOSKELETAL: No new muscle or joint pain. NEUROLOGICAL: No syncope or TIA-like symptoms. PSYCHIATRIC: No anxiety, insomnia or depression     Allergies   Allergen Reactions    Abilify [Aripiprazole]      tremors       Prior to Visit Medications    Medication Sig Taking? Authorizing Provider   pregabalin (LYRICA) 75 MG capsule Take 2 capsules by mouth 2 times daily for 30 days. Max Daily Amount: 300 mg Yes Sachi Lopez MD   pantoprazole (PROTONIX) 40 MG tablet Take 1 tablet by mouth every morning (before breakfast) Yes Erica Nava MD   ondansetron (ZOFRAN) 4 MG tablet Take 1 tablet by mouth every 8 hours as needed for Nausea or Vomiting Yes Sachi Lopez MD   trimethoprim-polymyxin b (POLYTRIM) 15940-8.1 UNIT/ML-% ophthalmic solution Place 1 drop into the left eye 3 times daily for 7 days Yes Erica Nava MD   miconazole (MICOTIN) 2 % cream Apply topically 2 times daily.  Yes Sachi Lopez MD   Elastic Bandages & Supports (MEDICAL COMPRESSION

## 2023-12-11 ENCOUNTER — OFFICE VISIT (OUTPATIENT)
Dept: FAMILY MEDICINE CLINIC | Age: 83
End: 2023-12-11
Payer: MEDICARE

## 2023-12-11 VITALS
WEIGHT: 192 LBS | HEART RATE: 82 BPM | RESPIRATION RATE: 16 BRPM | DIASTOLIC BLOOD PRESSURE: 59 MMHG | TEMPERATURE: 96.8 F | OXYGEN SATURATION: 93 % | BODY MASS INDEX: 36.25 KG/M2 | SYSTOLIC BLOOD PRESSURE: 137 MMHG | HEIGHT: 61 IN

## 2023-12-11 DIAGNOSIS — G62.9 NEUROPATHY: Primary | ICD-10-CM

## 2023-12-11 DIAGNOSIS — K29.60 EROSIVE GASTRITIS: ICD-10-CM

## 2023-12-11 DIAGNOSIS — E78.5 DYSLIPIDEMIA: ICD-10-CM

## 2023-12-11 DIAGNOSIS — I10 ESSENTIAL HYPERTENSION: ICD-10-CM

## 2023-12-11 PROCEDURE — 1123F ACP DISCUSS/DSCN MKR DOCD: CPT | Performed by: FAMILY MEDICINE

## 2023-12-11 PROCEDURE — 3078F DIAST BP <80 MM HG: CPT | Performed by: FAMILY MEDICINE

## 2023-12-11 PROCEDURE — 99214 OFFICE O/P EST MOD 30 MIN: CPT | Performed by: FAMILY MEDICINE

## 2023-12-11 PROCEDURE — 3074F SYST BP LT 130 MM HG: CPT | Performed by: FAMILY MEDICINE

## 2023-12-11 RX ORDER — PANTOPRAZOLE SODIUM 40 MG/1
40 TABLET, DELAYED RELEASE ORAL 2 TIMES DAILY
Qty: 60 TABLET | Refills: 3 | Status: SHIPPED | OUTPATIENT
Start: 2023-12-11

## 2023-12-11 NOTE — PROGRESS NOTES
Supports (MEDICAL COMPRESSION STOCKINGS) M[facility] 1 each by Does not apply route Daily Yes Socorro Earl MD   hydroxychloroquine (PLAQUENIL) 200 MG tablet Take 1 tablet by mouth 2 times daily Yes Karli Shanks MD   hydroCHLOROthiazide (MICROZIDE) 12.5 MG capsule Take 1 capsule by mouth every morning Yes Socorro Earl MD   famotidine (PEPCID) 20 MG tablet Take 1 tablet by mouth nightly Yes Erica Nava MD   vilazodone HCl (VIIBRYD) 20 MG TABS TAKE 1 TABLET BY MOUTH EVERY DAY Yes Socorro Earl MD   losartan (COZAAR) 50 MG tablet Take 1 tablet by mouth daily Yes Socorro Earl MD   atorvastatin (LIPITOR) 10 MG tablet TAKE 1 TABLET BY MOUTH EVERY OTHER DAY **CHANGE IN DOSE** Yes Socorro Earl MD   Compression Stockings MISC by Does not apply route Bilateral below knee zippered compression stockings 20-30 mmHg Yes Wendy Christine MD   buPROPion (WELLBUTRIN XL) 150 MG extended release tablet Take 1 tablet by mouth daily (before dinner) Yes Erica Nava MD   bethanechol (URECHOLINE) 10 MG tablet Take 1 tablet by mouth 3 times daily Yes Kortney Feliciano APRN - CNP   B Complex-C (VITAMIN B + C COMPLEX PO) Take by mouth Yes Karli Shanks MD   Lift Chair MISC by Does not apply route Yes Socorro Earl MD   estradiol (ESTRACE) 0.1 MG/GM vaginal cream Place 2 g vaginally Twice a Week Yes Karli Shanks MD   aspirin 81 MG EC tablet Take 1 tablet by mouth daily Yes Karli Shanks MD   VITAMIN D PO Take  by mouth.  Yes Karli Shanks MD   omeprazole (PRILOSEC) 20 MG delayed release capsule Take 2 capsules by mouth Daily  MOODY Gonzales   dicyclomine (BENTYL) 10 MG capsule Take 1 capsule by mouth every 6 hours as needed (Bowel spasms)  MOODY Gonzales   gabapentin (NEURONTIN) 300 MG capsule TAKE 1 CAPSULE BY MOUTH THREE TIMES A DAY  Erica Nava MD   gabapentin (NEURONTIN) 100 MG capsule TAKE 1 CAPSULE BY MOUTH 3 TIMES A DAY WITH 3OOMG CAPSULE  Jesse Flynn

## 2023-12-12 PROBLEM — L03.90 CELLULITIS: Status: RESOLVED | Noted: 2022-09-02 | Resolved: 2023-12-12

## 2023-12-15 ENCOUNTER — TELEPHONE (OUTPATIENT)
Dept: FAMILY MEDICINE CLINIC | Age: 83
End: 2023-12-15

## 2023-12-15 NOTE — TELEPHONE ENCOUNTER
Patient dropped off a urine on 12/13/2023 and she was waiting for her results     Please call patient and advise

## 2023-12-26 ENCOUNTER — HOSPITAL ENCOUNTER (INPATIENT)
Age: 83
LOS: 3 days | Discharge: HOME | End: 2023-12-29
Attending: INTERNAL MEDICINE | Admitting: INTERNAL MEDICINE
Payer: MEDICARE

## 2023-12-26 ENCOUNTER — APPOINTMENT (OUTPATIENT)
Dept: GENERAL RADIOLOGY | Age: 83
End: 2023-12-26
Payer: MEDICARE

## 2023-12-26 ENCOUNTER — APPOINTMENT (OUTPATIENT)
Dept: CT IMAGING | Age: 83
End: 2023-12-26
Payer: MEDICARE

## 2023-12-26 ENCOUNTER — TELEPHONE (OUTPATIENT)
Dept: FAMILY MEDICINE CLINIC | Age: 83
End: 2023-12-26

## 2023-12-26 DIAGNOSIS — K52.9 COLITIS: Primary | ICD-10-CM

## 2023-12-26 DIAGNOSIS — N30.00 ACUTE CYSTITIS WITHOUT HEMATURIA: ICD-10-CM

## 2023-12-26 DIAGNOSIS — R10.9 INTRACTABLE ABDOMINAL PAIN: ICD-10-CM

## 2023-12-26 LAB
ALBUMIN SERPL-MCNC: 3.8 G/DL (ref 3.4–5)
ALBUMIN/GLOB SERPL: 1 {RATIO} (ref 1.1–2.2)
ALP SERPL-CCNC: 105 U/L (ref 40–129)
ALT SERPL-CCNC: 14 U/L (ref 10–40)
ANION GAP SERPL CALCULATED.3IONS-SCNC: 12 MMOL/L (ref 3–16)
AST SERPL-CCNC: 25 U/L (ref 15–37)
BACTERIA URNS QL MICRO: ABNORMAL /HPF
BASOPHILS # BLD: 0 K/UL (ref 0–0.2)
BASOPHILS NFR BLD: 0.5 %
BILIRUB SERPL-MCNC: 0.6 MG/DL (ref 0–1)
BILIRUB UR QL STRIP.AUTO: NEGATIVE
BUN SERPL-MCNC: 22 MG/DL (ref 7–20)
CALCIUM SERPL-MCNC: 9.9 MG/DL (ref 8.3–10.6)
CHLORIDE SERPL-SCNC: 100 MMOL/L (ref 99–110)
CLARITY UR: CLEAR
CO2 SERPL-SCNC: 22 MMOL/L (ref 21–32)
COLOR UR: YELLOW
CREAT SERPL-MCNC: 0.8 MG/DL (ref 0.6–1.2)
DEPRECATED RDW RBC AUTO: 14.9 % (ref 12.4–15.4)
EOSINOPHIL # BLD: 0.1 K/UL (ref 0–0.6)
EOSINOPHIL NFR BLD: 0.9 %
EPI CELLS #/AREA URNS AUTO: 8 /HPF (ref 0–5)
GFR SERPLBLD CREATININE-BSD FMLA CKD-EPI: >60 ML/MIN/{1.73_M2}
GLUCOSE SERPL-MCNC: 98 MG/DL (ref 70–99)
GLUCOSE UR STRIP.AUTO-MCNC: NEGATIVE MG/DL
HCT VFR BLD AUTO: 41.6 % (ref 36–48)
HGB BLD-MCNC: 14 G/DL (ref 12–16)
HGB UR QL STRIP.AUTO: NEGATIVE
HYALINE CASTS #/AREA URNS AUTO: 1 /LPF (ref 0–8)
KETONES UR STRIP.AUTO-MCNC: ABNORMAL MG/DL
LACTATE BLDV-SCNC: 1.1 MMOL/L (ref 0.4–2)
LEUKOCYTE ESTERASE UR QL STRIP.AUTO: ABNORMAL
LIPASE SERPL-CCNC: 21 U/L (ref 13–60)
LYMPHOCYTES # BLD: 1.8 K/UL (ref 1–5.1)
LYMPHOCYTES NFR BLD: 21.9 %
MCH RBC QN AUTO: 29.9 PG (ref 26–34)
MCHC RBC AUTO-ENTMCNC: 33.6 G/DL (ref 31–36)
MCV RBC AUTO: 89 FL (ref 80–100)
MONOCYTES # BLD: 0.7 K/UL (ref 0–1.3)
MONOCYTES NFR BLD: 8.3 %
MUCUS: PRESENT
NEUTROPHILS # BLD: 5.5 K/UL (ref 1.7–7.7)
NEUTROPHILS NFR BLD: 68.4 %
NITRITE UR QL STRIP.AUTO: NEGATIVE
PH UR STRIP.AUTO: 6 [PH] (ref 5–8)
PLATELET # BLD AUTO: 153 K/UL (ref 135–450)
PMV BLD AUTO: 8.9 FL (ref 5–10.5)
POTASSIUM SERPL-SCNC: 4.2 MMOL/L (ref 3.5–5.1)
PROT SERPL-MCNC: 7.7 G/DL (ref 6.4–8.2)
PROT UR STRIP.AUTO-MCNC: ABNORMAL MG/DL
RBC # BLD AUTO: 4.67 M/UL (ref 4–5.2)
RBC CLUMPS #/AREA URNS AUTO: 4 /HPF (ref 0–4)
SODIUM SERPL-SCNC: 134 MMOL/L (ref 136–145)
SP GR UR STRIP.AUTO: >=1.03 (ref 1–1.03)
TROPONIN, HIGH SENSITIVITY: 18 NG/L (ref 0–14)
TROPONIN, HIGH SENSITIVITY: 20 NG/L (ref 0–14)
UA COMPLETE W REFLEX CULTURE PNL UR: YES
UA DIPSTICK W REFLEX MICRO PNL UR: YES
URN SPEC COLLECT METH UR: ABNORMAL
UROBILINOGEN UR STRIP-ACNC: 1 E.U./DL
WBC # BLD AUTO: 8.1 K/UL (ref 4–11)
WBC #/AREA URNS AUTO: 60 /HPF (ref 0–5)

## 2023-12-26 PROCEDURE — 84484 ASSAY OF TROPONIN QUANT: CPT

## 2023-12-26 PROCEDURE — 6360000004 HC RX CONTRAST MEDICATION: Performed by: PHYSICIAN ASSISTANT

## 2023-12-26 PROCEDURE — 1200000000 HC SEMI PRIVATE

## 2023-12-26 PROCEDURE — 93005 ELECTROCARDIOGRAM TRACING: CPT | Performed by: PHYSICIAN ASSISTANT

## 2023-12-26 PROCEDURE — 85025 COMPLETE CBC W/AUTO DIFF WBC: CPT

## 2023-12-26 PROCEDURE — 96366 THER/PROPH/DIAG IV INF ADDON: CPT

## 2023-12-26 PROCEDURE — 96365 THER/PROPH/DIAG IV INF INIT: CPT

## 2023-12-26 PROCEDURE — 80053 COMPREHEN METABOLIC PANEL: CPT

## 2023-12-26 PROCEDURE — 74177 CT ABD & PELVIS W/CONTRAST: CPT

## 2023-12-26 PROCEDURE — 96367 TX/PROPH/DG ADDL SEQ IV INF: CPT

## 2023-12-26 PROCEDURE — 6370000000 HC RX 637 (ALT 250 FOR IP): Performed by: PHYSICIAN ASSISTANT

## 2023-12-26 PROCEDURE — 71045 X-RAY EXAM CHEST 1 VIEW: CPT

## 2023-12-26 PROCEDURE — C9113 INJ PANTOPRAZOLE SODIUM, VIA: HCPCS | Performed by: PHYSICIAN ASSISTANT

## 2023-12-26 PROCEDURE — 83690 ASSAY OF LIPASE: CPT

## 2023-12-26 PROCEDURE — 81001 URINALYSIS AUTO W/SCOPE: CPT

## 2023-12-26 PROCEDURE — 99285 EMERGENCY DEPT VISIT HI MDM: CPT

## 2023-12-26 PROCEDURE — 6360000002 HC RX W HCPCS: Performed by: PHYSICIAN ASSISTANT

## 2023-12-26 PROCEDURE — 96376 TX/PRO/DX INJ SAME DRUG ADON: CPT

## 2023-12-26 PROCEDURE — 96375 TX/PRO/DX INJ NEW DRUG ADDON: CPT

## 2023-12-26 PROCEDURE — 83605 ASSAY OF LACTIC ACID: CPT

## 2023-12-26 PROCEDURE — 87086 URINE CULTURE/COLONY COUNT: CPT

## 2023-12-26 RX ORDER — METRONIDAZOLE 500 MG/100ML
500 INJECTION, SOLUTION INTRAVENOUS EVERY 8 HOURS
Status: DISCONTINUED | OUTPATIENT
Start: 2023-12-27 | End: 2023-12-27

## 2023-12-26 RX ORDER — ONDANSETRON 2 MG/ML
4 INJECTION INTRAMUSCULAR; INTRAVENOUS ONCE
Status: COMPLETED | OUTPATIENT
Start: 2023-12-26 | End: 2023-12-26

## 2023-12-26 RX ORDER — FAMOTIDINE 20 MG/1
20 TABLET, FILM COATED ORAL NIGHTLY
Status: DISCONTINUED | OUTPATIENT
Start: 2023-12-27 | End: 2023-12-29 | Stop reason: HOSPADM

## 2023-12-26 RX ORDER — HYDROMORPHONE HYDROCHLORIDE 1 MG/ML
1 INJECTION, SOLUTION INTRAMUSCULAR; INTRAVENOUS; SUBCUTANEOUS
Status: DISCONTINUED | OUTPATIENT
Start: 2023-12-26 | End: 2023-12-29 | Stop reason: HOSPADM

## 2023-12-26 RX ORDER — SODIUM CHLORIDE 0.9 % (FLUSH) 0.9 %
5-40 SYRINGE (ML) INJECTION PRN
Status: DISCONTINUED | OUTPATIENT
Start: 2023-12-26 | End: 2023-12-29 | Stop reason: HOSPADM

## 2023-12-26 RX ORDER — VILAZODONE HYDROCHLORIDE 20 MG/1
20 TABLET ORAL DAILY
Status: DISCONTINUED | OUTPATIENT
Start: 2023-12-27 | End: 2023-12-29 | Stop reason: HOSPADM

## 2023-12-26 RX ORDER — LEVOFLOXACIN 5 MG/ML
500 INJECTION, SOLUTION INTRAVENOUS ONCE
Status: COMPLETED | OUTPATIENT
Start: 2023-12-26 | End: 2023-12-26

## 2023-12-26 RX ORDER — VITAMIN B COMPLEX
2000 TABLET ORAL DAILY
Status: DISCONTINUED | OUTPATIENT
Start: 2023-12-27 | End: 2023-12-29 | Stop reason: HOSPADM

## 2023-12-26 RX ORDER — HYDROXYCHLOROQUINE SULFATE 200 MG/1
200 TABLET, FILM COATED ORAL 2 TIMES DAILY
Status: DISCONTINUED | OUTPATIENT
Start: 2023-12-27 | End: 2023-12-29 | Stop reason: HOSPADM

## 2023-12-26 RX ORDER — ACETAMINOPHEN 325 MG/1
650 TABLET ORAL EVERY 6 HOURS PRN
Status: DISCONTINUED | OUTPATIENT
Start: 2023-12-26 | End: 2023-12-29 | Stop reason: HOSPADM

## 2023-12-26 RX ORDER — ACETAMINOPHEN 160 MG
1 TABLET,DISINTEGRATING ORAL DAILY
COMMUNITY

## 2023-12-26 RX ORDER — METRONIDAZOLE 500 MG/100ML
500 INJECTION, SOLUTION INTRAVENOUS ONCE
Status: COMPLETED | OUTPATIENT
Start: 2023-12-26 | End: 2023-12-26

## 2023-12-26 RX ORDER — MORPHINE SULFATE 4 MG/ML
4 INJECTION, SOLUTION INTRAMUSCULAR; INTRAVENOUS ONCE
Status: COMPLETED | OUTPATIENT
Start: 2023-12-26 | End: 2023-12-26

## 2023-12-26 RX ORDER — LOSARTAN POTASSIUM 25 MG/1
50 TABLET ORAL DAILY
Status: DISCONTINUED | OUTPATIENT
Start: 2023-12-27 | End: 2023-12-29 | Stop reason: HOSPADM

## 2023-12-26 RX ORDER — ONDANSETRON 2 MG/ML
4 INJECTION INTRAMUSCULAR; INTRAVENOUS EVERY 6 HOURS PRN
Status: DISCONTINUED | OUTPATIENT
Start: 2023-12-26 | End: 2023-12-29 | Stop reason: HOSPADM

## 2023-12-26 RX ORDER — SODIUM CHLORIDE 9 MG/ML
INJECTION, SOLUTION INTRAVENOUS CONTINUOUS
Status: ACTIVE | OUTPATIENT
Start: 2023-12-27 | End: 2023-12-27

## 2023-12-26 RX ORDER — HYDROMORPHONE HYDROCHLORIDE 1 MG/ML
0.5 INJECTION, SOLUTION INTRAMUSCULAR; INTRAVENOUS; SUBCUTANEOUS ONCE
Status: COMPLETED | OUTPATIENT
Start: 2023-12-26 | End: 2023-12-26

## 2023-12-26 RX ORDER — HYDROMORPHONE HYDROCHLORIDE 1 MG/ML
0.5 INJECTION, SOLUTION INTRAMUSCULAR; INTRAVENOUS; SUBCUTANEOUS
Status: DISCONTINUED | OUTPATIENT
Start: 2023-12-26 | End: 2023-12-29 | Stop reason: HOSPADM

## 2023-12-26 RX ORDER — SODIUM CHLORIDE 9 MG/ML
INJECTION, SOLUTION INTRAVENOUS PRN
Status: DISCONTINUED | OUTPATIENT
Start: 2023-12-26 | End: 2023-12-29 | Stop reason: HOSPADM

## 2023-12-26 RX ORDER — ATORVASTATIN CALCIUM 10 MG/1
10 TABLET, FILM COATED ORAL EVERY OTHER DAY
Status: DISCONTINUED | OUTPATIENT
Start: 2023-12-27 | End: 2023-12-29 | Stop reason: HOSPADM

## 2023-12-26 RX ORDER — SODIUM CHLORIDE 0.9 % (FLUSH) 0.9 %
5-40 SYRINGE (ML) INJECTION EVERY 12 HOURS SCHEDULED
Status: DISCONTINUED | OUTPATIENT
Start: 2023-12-27 | End: 2023-12-29 | Stop reason: HOSPADM

## 2023-12-26 RX ORDER — ASPIRIN 81 MG/1
81 TABLET ORAL DAILY
Status: DISCONTINUED | OUTPATIENT
Start: 2023-12-27 | End: 2023-12-29 | Stop reason: HOSPADM

## 2023-12-26 RX ORDER — SENNOSIDES A AND B 8.6 MG/1
1 TABLET, FILM COATED ORAL DAILY PRN
Status: DISCONTINUED | OUTPATIENT
Start: 2023-12-26 | End: 2023-12-29 | Stop reason: HOSPADM

## 2023-12-26 RX ORDER — ACETAMINOPHEN 650 MG/1
650 SUPPOSITORY RECTAL EVERY 6 HOURS PRN
Status: DISCONTINUED | OUTPATIENT
Start: 2023-12-26 | End: 2023-12-29 | Stop reason: HOSPADM

## 2023-12-26 RX ORDER — LEVOFLOXACIN 5 MG/ML
750 INJECTION, SOLUTION INTRAVENOUS EVERY 24 HOURS
Status: DISCONTINUED | OUTPATIENT
Start: 2023-12-27 | End: 2023-12-29 | Stop reason: HOSPADM

## 2023-12-26 RX ORDER — BUPROPION HYDROCHLORIDE 150 MG/1
150 TABLET ORAL
Status: DISCONTINUED | OUTPATIENT
Start: 2023-12-27 | End: 2023-12-29 | Stop reason: HOSPADM

## 2023-12-26 RX ORDER — PANTOPRAZOLE SODIUM 40 MG/1
40 TABLET, DELAYED RELEASE ORAL 2 TIMES DAILY
Status: DISCONTINUED | OUTPATIENT
Start: 2023-12-27 | End: 2023-12-27

## 2023-12-26 RX ORDER — PANTOPRAZOLE SODIUM 40 MG/10ML
40 INJECTION, POWDER, LYOPHILIZED, FOR SOLUTION INTRAVENOUS ONCE
Status: COMPLETED | OUTPATIENT
Start: 2023-12-26 | End: 2023-12-26

## 2023-12-26 RX ORDER — ENOXAPARIN SODIUM 100 MG/ML
40 INJECTION SUBCUTANEOUS DAILY
Status: DISCONTINUED | OUTPATIENT
Start: 2023-12-27 | End: 2023-12-29 | Stop reason: HOSPADM

## 2023-12-26 RX ORDER — NEPHROCAP 1 MG
1 CAP ORAL DAILY
Status: DISCONTINUED | OUTPATIENT
Start: 2023-12-27 | End: 2023-12-29 | Stop reason: HOSPADM

## 2023-12-26 RX ORDER — PREGABALIN 75 MG/1
150 CAPSULE ORAL 2 TIMES DAILY
Status: DISCONTINUED | OUTPATIENT
Start: 2023-12-27 | End: 2023-12-29 | Stop reason: HOSPADM

## 2023-12-26 RX ADMIN — PANTOPRAZOLE SODIUM 40 MG: 40 INJECTION, POWDER, FOR SOLUTION INTRAVENOUS at 17:36

## 2023-12-26 RX ADMIN — ONDANSETRON 4 MG: 2 INJECTION INTRAMUSCULAR; INTRAVENOUS at 17:36

## 2023-12-26 RX ADMIN — HYDROMORPHONE HYDROCHLORIDE 0.5 MG: 1 INJECTION, SOLUTION INTRAMUSCULAR; INTRAVENOUS; SUBCUTANEOUS at 21:42

## 2023-12-26 RX ADMIN — IOPAMIDOL 75 ML: 755 INJECTION, SOLUTION INTRAVENOUS at 17:59

## 2023-12-26 RX ADMIN — METRONIDAZOLE 500 MG: 500 INJECTION, SOLUTION INTRAVENOUS at 21:49

## 2023-12-26 RX ADMIN — ONDANSETRON 4 MG: 2 INJECTION INTRAMUSCULAR; INTRAVENOUS at 19:54

## 2023-12-26 RX ADMIN — MORPHINE SULFATE 4 MG: 4 INJECTION, SOLUTION INTRAMUSCULAR; INTRAVENOUS at 19:54

## 2023-12-26 RX ADMIN — ALUMINUM HYDROXIDE, MAGNESIUM HYDROXIDE, AND SIMETHICONE: 200; 200; 20 SUSPENSION ORAL at 17:39

## 2023-12-26 RX ADMIN — LEVOFLOXACIN 500 MG: 5 INJECTION, SOLUTION INTRAVENOUS at 20:00

## 2023-12-26 ASSESSMENT — LIFESTYLE VARIABLES
HOW MANY STANDARD DRINKS CONTAINING ALCOHOL DO YOU HAVE ON A TYPICAL DAY: PATIENT DOES NOT DRINK
HOW OFTEN DO YOU HAVE A DRINK CONTAINING ALCOHOL: NEVER

## 2023-12-26 ASSESSMENT — PAIN SCALES - GENERAL
PAINLEVEL_OUTOF10: 9
PAINLEVEL_OUTOF10: 8
PAINLEVEL_OUTOF10: 9
PAINLEVEL_OUTOF10: 8
PAINLEVEL_OUTOF10: 9

## 2023-12-26 ASSESSMENT — PAIN DESCRIPTION - ORIENTATION: ORIENTATION: MID

## 2023-12-26 ASSESSMENT — PAIN DESCRIPTION - LOCATION
LOCATION: ABDOMEN
LOCATION: ABDOMEN;LEG
LOCATION: ABDOMEN

## 2023-12-26 ASSESSMENT — PAIN - FUNCTIONAL ASSESSMENT
PAIN_FUNCTIONAL_ASSESSMENT: 0-10
PAIN_FUNCTIONAL_ASSESSMENT: ACTIVITIES ARE NOT PREVENTED

## 2023-12-26 ASSESSMENT — PAIN DESCRIPTION - PAIN TYPE: TYPE: ACUTE PAIN;NEUROPATHIC PAIN

## 2023-12-26 ASSESSMENT — PAIN DESCRIPTION - DESCRIPTORS: DESCRIPTORS: BURNING;SORE

## 2023-12-26 NOTE — TELEPHONE ENCOUNTER
Patient called and is having burning in stomach when taking UTI medication and her reflux she takes in the morning     She would like to know what dr swift recommends doing about the medication and the burning in her stomach     Please call and advise       Pharm [facility]

## 2023-12-26 NOTE — TELEPHONE ENCOUNTER
Antonette's  called and said pt has had abdominal pain and diarrhea today.  He believes her new medication is the reason.  Call to discuss.

## 2023-12-27 LAB
ANION GAP SERPL CALCULATED.3IONS-SCNC: 7 MMOL/L (ref 3–16)
BASOPHILS # BLD: 0 K/UL (ref 0–0.2)
BASOPHILS NFR BLD: 0.3 %
BUN SERPL-MCNC: 30 MG/DL (ref 7–20)
CALCIUM SERPL-MCNC: 8.9 MG/DL (ref 8.3–10.6)
CHLORIDE SERPL-SCNC: 103 MMOL/L (ref 99–110)
CO2 SERPL-SCNC: 30 MMOL/L (ref 21–32)
CREAT SERPL-MCNC: 1 MG/DL (ref 0.6–1.2)
DEPRECATED RDW RBC AUTO: 14.6 % (ref 12.4–15.4)
EKG ATRIAL RATE: 83 BPM
EKG DIAGNOSIS: NORMAL
EKG P AXIS: 51 DEGREES
EKG P-R INTERVAL: 156 MS
EKG Q-T INTERVAL: 400 MS
EKG QRS DURATION: 94 MS
EKG QTC CALCULATION (BAZETT): 470 MS
EKG R AXIS: -40 DEGREES
EKG T AXIS: 51 DEGREES
EKG VENTRICULAR RATE: 83 BPM
EOSINOPHIL # BLD: 0 K/UL (ref 0–0.6)
EOSINOPHIL NFR BLD: 0.8 %
GFR SERPLBLD CREATININE-BSD FMLA CKD-EPI: 55 ML/MIN/{1.73_M2}
GLUCOSE SERPL-MCNC: 114 MG/DL (ref 70–99)
HCT VFR BLD AUTO: 32.4 % (ref 36–48)
HGB BLD-MCNC: 11 G/DL (ref 12–16)
LACTATE BLDV-SCNC: 1.1 MMOL/L (ref 0.4–2)
LYMPHOCYTES # BLD: 1 K/UL (ref 1–5.1)
LYMPHOCYTES NFR BLD: 17.6 %
MCH RBC QN AUTO: 29.7 PG (ref 26–34)
MCHC RBC AUTO-ENTMCNC: 33.8 G/DL (ref 31–36)
MCV RBC AUTO: 87.8 FL (ref 80–100)
MONOCYTES # BLD: 0.5 K/UL (ref 0–1.3)
MONOCYTES NFR BLD: 9.1 %
NEUTROPHILS # BLD: 4.2 K/UL (ref 1.7–7.7)
NEUTROPHILS NFR BLD: 72.2 %
PLATELET # BLD AUTO: 121 K/UL (ref 135–450)
PMV BLD AUTO: 8.1 FL (ref 5–10.5)
POTASSIUM SERPL-SCNC: 4.2 MMOL/L (ref 3.5–5.1)
RBC # BLD AUTO: 3.69 M/UL (ref 4–5.2)
SODIUM SERPL-SCNC: 140 MMOL/L (ref 136–145)
WBC # BLD AUTO: 5.9 K/UL (ref 4–11)

## 2023-12-27 PROCEDURE — 80048 BASIC METABOLIC PNL TOTAL CA: CPT

## 2023-12-27 PROCEDURE — 83605 ASSAY OF LACTIC ACID: CPT

## 2023-12-27 PROCEDURE — 6360000002 HC RX W HCPCS: Performed by: HOSPITALIST

## 2023-12-27 PROCEDURE — 2580000003 HC RX 258: Performed by: PHYSICIAN ASSISTANT

## 2023-12-27 PROCEDURE — 6360000002 HC RX W HCPCS: Performed by: PHYSICIAN ASSISTANT

## 2023-12-27 PROCEDURE — 1200000000 HC SEMI PRIVATE

## 2023-12-27 PROCEDURE — 93010 ELECTROCARDIOGRAM REPORT: CPT | Performed by: INTERNAL MEDICINE

## 2023-12-27 PROCEDURE — 85025 COMPLETE CBC W/AUTO DIFF WBC: CPT

## 2023-12-27 PROCEDURE — 6370000000 HC RX 637 (ALT 250 FOR IP): Performed by: PHYSICIAN ASSISTANT

## 2023-12-27 PROCEDURE — 36415 COLL VENOUS BLD VENIPUNCTURE: CPT

## 2023-12-27 RX ORDER — PROCHLORPERAZINE EDISYLATE 5 MG/ML
10 INJECTION INTRAMUSCULAR; INTRAVENOUS EVERY 6 HOURS PRN
Status: DISCONTINUED | OUTPATIENT
Start: 2023-12-27 | End: 2023-12-29 | Stop reason: HOSPADM

## 2023-12-27 RX ORDER — PANTOPRAZOLE SODIUM 40 MG/10ML
40 INJECTION, POWDER, LYOPHILIZED, FOR SOLUTION INTRAVENOUS DAILY
Status: DISCONTINUED | OUTPATIENT
Start: 2023-12-28 | End: 2023-12-28

## 2023-12-27 RX ORDER — LOSARTAN POTASSIUM 25 MG/1
TABLET ORAL
Status: DISPENSED
Start: 2023-12-27 | End: 2023-12-27

## 2023-12-27 RX ORDER — LACTOBACILLUS RHAMNOSUS GG 10B CELL
1 CAPSULE ORAL 2 TIMES DAILY WITH MEALS
Status: DISCONTINUED | OUTPATIENT
Start: 2023-12-27 | End: 2023-12-29 | Stop reason: HOSPADM

## 2023-12-27 RX ADMIN — SODIUM CHLORIDE, PRESERVATIVE FREE 10 ML: 5 INJECTION INTRAVENOUS at 20:55

## 2023-12-27 RX ADMIN — MICONAZOLE NITRATE: 2 CREAM TOPICAL at 11:27

## 2023-12-27 RX ADMIN — SODIUM CHLORIDE, PRESERVATIVE FREE 10 ML: 5 INJECTION INTRAVENOUS at 11:25

## 2023-12-27 RX ADMIN — HYDROMORPHONE HYDROCHLORIDE 1 MG: 1 INJECTION, SOLUTION INTRAMUSCULAR; INTRAVENOUS; SUBCUTANEOUS at 22:29

## 2023-12-27 RX ADMIN — PROCHLORPERAZINE EDISYLATE 10 MG: 5 INJECTION INTRAMUSCULAR; INTRAVENOUS at 16:25

## 2023-12-27 RX ADMIN — HYDROMORPHONE HYDROCHLORIDE 1 MG: 1 INJECTION, SOLUTION INTRAMUSCULAR; INTRAVENOUS; SUBCUTANEOUS at 16:25

## 2023-12-27 RX ADMIN — ONDANSETRON 4 MG: 2 INJECTION INTRAMUSCULAR; INTRAVENOUS at 22:29

## 2023-12-27 RX ADMIN — SODIUM CHLORIDE: 9 INJECTION, SOLUTION INTRAVENOUS at 11:22

## 2023-12-27 RX ADMIN — METRONIDAZOLE 500 MG: 500 INJECTION, SOLUTION INTRAVENOUS at 05:37

## 2023-12-27 RX ADMIN — LEVOFLOXACIN 750 MG: 5 INJECTION, SOLUTION INTRAVENOUS at 20:54

## 2023-12-27 RX ADMIN — HYDROMORPHONE HYDROCHLORIDE 1 MG: 1 INJECTION, SOLUTION INTRAMUSCULAR; INTRAVENOUS; SUBCUTANEOUS at 05:34

## 2023-12-27 RX ADMIN — ONDANSETRON 4 MG: 2 INJECTION INTRAMUSCULAR; INTRAVENOUS at 10:17

## 2023-12-27 RX ADMIN — ENOXAPARIN SODIUM 40 MG: 100 INJECTION SUBCUTANEOUS at 11:23

## 2023-12-27 RX ADMIN — SODIUM CHLORIDE: 9 INJECTION, SOLUTION INTRAVENOUS at 00:01

## 2023-12-27 ASSESSMENT — PAIN DESCRIPTION - DESCRIPTORS
DESCRIPTORS: CRAMPING;BURNING
DESCRIPTORS: BURNING
DESCRIPTORS: BURNING
DESCRIPTORS: BURNING;SORE

## 2023-12-27 ASSESSMENT — PAIN SCALES - WONG BAKER
WONGBAKER_NUMERICALRESPONSE: 2

## 2023-12-27 ASSESSMENT — PAIN DESCRIPTION - LOCATION
LOCATION: ABDOMEN

## 2023-12-27 ASSESSMENT — PAIN - FUNCTIONAL ASSESSMENT
PAIN_FUNCTIONAL_ASSESSMENT: ACTIVITIES ARE NOT PREVENTED
PAIN_FUNCTIONAL_ASSESSMENT: ACTIVITIES ARE NOT PREVENTED

## 2023-12-27 ASSESSMENT — PAIN DESCRIPTION - ORIENTATION
ORIENTATION: MID

## 2023-12-27 ASSESSMENT — PAIN SCALES - GENERAL
PAINLEVEL_OUTOF10: 5
PAINLEVEL_OUTOF10: 8
PAINLEVEL_OUTOF10: 7
PAINLEVEL_OUTOF10: 3
PAINLEVEL_OUTOF10: 6
PAINLEVEL_OUTOF10: 2

## 2023-12-27 NOTE — ED NOTES
ED TO INPATIENT SBAR HANDOFF    Patient Name: Maciel Novak   :  1940  78 y.o. MRN:  [id number]  Preferred Name    ED Room #:  ED-0020/20  Family/Caregiver Present yes   Restraints no   Sitter yes   Sepsis Risk Score Sepsis Risk Score: 0.89    Situation  Code Status: Prior . Allergies: Abilify [aripiprazole] and Ceftin [cefuroxime]  Weight: Patient Vitals for the past 96 hrs (Last 3 readings):   Weight   23 1619 82.6 kg (182 lb)     Arrived from: home  Chief Complaint:   Chief Complaint   Patient presents with    Abdominal Pain     Pt states that she has been having burning belly pain for the last 3 days. Pt states diarrhea and some nausea an hour ago. Pt states UTI but quit taking antibiotics because it caused these symptoms      Hospital Problem/Diagnosis:  Principal Problem:    Colitis  Resolved Problems:    * No resolved hospital problems. *    Imaging:   CT ABDOMEN PELVIS W IV CONTRAST Additional Contrast? None   Final Result   1. Mild rectosigmoid wall thickening, which could represent infectious or   inflammatory proctocolitis. Consider correlation with direct visualization   to exclude underlying mass. 2. Stable 4 mm nodule along the right minor fissure. XR CHEST PORTABLE   Preliminary Result   Cardiomegaly. No acute cardiopulmonary process.            Abnormal labs:   Abnormal Labs Reviewed   URINALYSIS WITH REFLEX TO CULTURE - Abnormal; Notable for the following components:       Result Value    Ketones, Urine TRACE (*)     Protein, UA TRACE (*)     Leukocyte Esterase, Urine MODERATE (*)     All other components within normal limits   COMPREHENSIVE METABOLIC PANEL W/ REFLEX TO MG FOR LOW K - Abnormal; Notable for the following components:    Sodium 134 (*)     BUN 22 (*)     Albumin/Globulin Ratio 1.0 (*)     All other components within normal limits   TROPONIN - Abnormal; Notable for the following components:    Troponin, High Sensitivity 20 (*)     All other components saline locked;Specimen collected 12/26/23 1736   Phlebitis Assessment No symptoms 12/26/23 1736   Infiltration Assessment 0 12/26/23 1736   Dressing Status New dressing applied;Clean, dry & intact 12/26/23 1736   Dressing Type Transparent 12/26/23 1736   Dressing Intervention New 12/26/23 1736     PO Status: Nothing by Mouth  Pertinent or High Risk Medications/Drips:    If Yes, please provide details:   Pending Blood Product Administration:        You may also review the ED PT Care Timeline found under the Summary Nursing Index tab.    Recommendation    Pending orders   Plan for Discharge (if known):   Additional Comments:    If any further questions, please call Sending RN at [phone]    Electronically signed by: Electronically signed by Jil Nathan RN on 12/26/2023 at 11:01 PM

## 2023-12-27 NOTE — PROGRESS NOTES
4 Eyes Skin Assessment     NAME:  Samantha Ramirez  YOB: 1940  MEDICAL RECORD NUMBER:  [id number]    The patient is being assessed for  Admission    I agree that at least one RN has performed a thorough Head to Toe Skin Assessment on the patient. ALL assessment sites listed below have been assessed. Areas assessed by both nurses:    Head, Face, Ears, Shoulders, Back, Chest, Arms, Elbows, Hands, Sacrum. Buttock, Coccyx, Ischium, Legs. Feet and Heels, and Under Medical Devices         Does the Patient have a Wound?  No noted wound(s)       Brady Prevention initiated by RN: Yes  Wound Care Orders initiated by RN: No    Pressure Injury (Stage 3,4, Unstageable, DTI, NWPT, and Complex wounds) if present, place Wound referral order by RN under : No    New Ostomies, if present place, Ostomy referral order under : No     Nurse 1 eSignature: Electronically signed by James Dominguez RN on 12/27/23 at 12:24 AM EST    **SHARE this note so that the co-signing nurse can place an eSignature**    Nurse 2 eSignature: Electronically signed by Rene Walker RN on 12/27/23 at 12:30 AM EST

## 2023-12-27 NOTE — PROGRESS NOTES
Patient up from ER via wheelchair. All belongs are in patient bags in her walker, granddaughter at bedside and staying the night. Admission assessment completed. VSS. Patient denied taking all medications right now because they upset her stomach to much. Pt is A&Ox4, no signs of distress, resting in bed. Pt has complaints of pain. PRN Dilaudid given down in ER and not available right now, she was okay with waiting. No other needs at this time. Brakes locked, bed in lowest position, bed alarm engaged. All belongings and call light within reach.

## 2023-12-27 NOTE — PROGRESS NOTES
V2.0    [facility] [place]      Name:  Antonette Brown /Age/Sex: 1940  (83 y.o. female)   MRN & CSN:  [id number] & [id number] Encounter Date/Time: 2023 7:09 AM EST   Location:  [address] [place] [address]- PCP: Erica Nava MD     Attending:Sven Stafford MD       Hospital Day: 2    Assessment and Recommendations   Antonette Brown is a 83 y.o. female who presents with Colitis      Plan:   Acute colitis  -Intractable abdominal pain  -Continue empiric antibiotic  -N.p.o. for now  -GI consulted for further recommendations stool studies pending    UTI  -Despite completing the course.  Positive will continue Levaquin for now  -Urine culture noted from recent UA on       Gastritis  GERD PPI      Diet Diet NPO Exceptions are: Sips of Water with Meds   DVT Prophylaxis    Code Status Full Code   Disposition          Personally reviewed Lab Studies and Imaging         Subjective:     Chief Complaint:     Antonette Brown is a 83 y.o. female who presents with still with pain and diarrhea but improving.  No overnight event.      Review of Systems:      Pertinent positives and negatives discussed in HPI    Objective:   No intake or output data in the 24 hours ending 23 0709   Vitals:   Vitals:    23 0445 23 0534 23 0539 23 0604   BP:       Pulse:       Resp:  18  17   Temp:       TempSrc:       SpO2:       Weight: 82.3 kg (181 lb 6.4 oz)  82.3 kg (181 lb 6.4 oz)    Height:             Physical Exam:      General: NAD  Eyes: EOMI  ENT: neck supple  Cardiovascular: Regular rate.  Respiratory: Clear to auscultation  Gastrointestinal: Soft, non tender  Genitourinary: no suprapubic tenderness  Musculoskeletal: No edema  Skin: warm, dry  Neuro: Alert.  Psych: Mood appropriate.         Medications:   Medications:    lactobacillus  1 capsule Oral BID WC    Vitamin D  2,000 Units Oral Daily    famotidine  20 mg Oral Nightly    hydroxychloroquine  200 mg Oral BID    miconazole   Topical BID     pantoprazole  40 mg Oral BID    pregabalin  150 mg Oral BID    vilazodone HCl  20 mg Oral Daily    buPROPion  150 mg Oral Daily before dinner    Virt-Caps  1 capsule Oral Daily    atorvastatin  10 mg Oral Every Other Day    aspirin  81 mg Oral Daily    losartan  50 mg Oral Daily    sodium chloride flush  5-40 mL IntraVENous 2 times per day    enoxaparin  40 mg SubCUTAneous Daily    levofloxacin  750 mg IntraVENous Q24H    metroNIDAZOLE  500 mg IntraVENous Q8H      Infusions:    sodium chloride      sodium chloride 100 mL/hr at 12/27/23 0001     PRN Meds: sodium chloride flush, 5-40 mL, PRN  sodium chloride, , PRN  ondansetron, 4 mg, Q6H PRN  senna, 1 tablet, Daily PRN  acetaminophen, 650 mg, Q6H PRN   Or  acetaminophen, 650 mg, Q6H PRN  HYDROmorphone, 0.5 mg, Q3H PRN   Or  HYDROmorphone, 1 mg, Q3H PRN        Labs and Imaging   CT ABDOMEN PELVIS W IV CONTRAST Additional Contrast? None    Result Date: 12/26/2023  EXAMINATION: CT OF THE ABDOMEN AND PELVIS WITH CONTRAST 12/26/2023 4:55 pm TECHNIQUE: CT of the abdomen and pelvis was performed with the administration of intravenous contrast. Multiplanar reformatted images are provided for review. Automated exposure control, iterative reconstruction, and/or weight based adjustment of the mA/kV was utilized to reduce the radiation dose to as low as reasonably achievable. COMPARISON: CT abdomen and pelvis 07/14/2023 HISTORY: ORDERING SYSTEM PROVIDED HISTORY: abd pain TECHNOLOGIST PROVIDED HISTORY: Reason for exam:->abd pain Additional Contrast?->None Decision Support Exception - unselect if not a suspected or confirmed emergency medical condition->Emergency Medical Condition (MA) Reason for Exam: Diarrhea (Pt reports diarrhea x 3 weeks, however now having pain and bowel incontinence. Mucous/watery. Has not been evaluated.  ) FINDINGS: Lower Chest: Stable 4 mm nodule along the right minor fissure. Organs: Liver is normal in contour.   Stable bilobed 2.3 cm right hepatic lobe

## 2023-12-27 NOTE — ED PROVIDER NOTES
[facility]        Pt Name: Maciel Novak  MRN: [id number]  [address] [facility] [place] 1940  Date of evaluation: 12/26/2023  Provider: MOODY Madison  PCP: Andreina Hooker MD  Note Started: 9:09 PM EST 12/26/23      MELLY. I have evaluated this patient. CHIEF COMPLAINT       Chief Complaint   Patient presents with    Abdominal Pain     Pt states that she has been having burning belly pain for the last 3 days. Pt states diarrhea and some nausea an hour ago. Pt states UTI but quit taking antibiotics because it caused these symptoms        HISTORY OF PRESENT ILLNESS: 1 or more Elements     History From: Patient  Limitations to history : None    Maciel Novak is a 78 y.o. female with past medical history of hyperlipidemia, depression, hypertension who presents ED with complaint of abdominal pain. Patient states was seen by her PCP on 12/19/2023. Diagnosed with a UTI. Was placed on antibiotics with Ceftin. She resorts after taking the Ceftin she has had worsening abdominal pain, nausea, vomiting diarrhea. Called PCP and they were concerned she is having side effect of the Ceftin. She stopped the antibiotic but states she is still having symptoms. She was not placed on another antibiotic. She reports continued abdominal pain with associated nausea, vomiting diarrhea. Came to ED for further evaluation treatment. Denies fever or chills. Denies any hematemesis or bright red blood per rectum. Denies any vaginal bleeding or discharge. Denies any dysuria, frequency or urgency. Reports burning pain rated 10/10 to her lower abdomen diffusely. Denies chest pain or shortness of breath. Nursing Notes were all reviewed and agreed with or any disagreements were addressed in the HPI. REVIEW OF SYSTEMS :      Review of Systems   Constitutional:  Negative for activity change, appetite change, chills, diaphoresis, fatigue and fever. injection 0.5 mg (has no administration in time range)   aluminum & magnesium hydroxide-simethicone (MAALOX) 30 mL, lidocaine viscous hcl (XYLOCAINE) 5 mL (GI COCKTAIL) ( Oral Given 12/26/23 1739)   pantoprazole (PROTONIX) injection 40 mg (40 mg IntraVENous Given 12/26/23 1736)   ondansetron (ZOFRAN) injection 4 mg (4 mg IntraVENous Given 12/26/23 1736)   iopamidol (ISOVUE-370) 76 % injection 75 mL (75 mLs IntraVENous Given 12/26/23 1759)   levoFLOXacin (LEVAQUIN) 500 MG/100ML infusion 500 mg ( IntraVENous Restarted 12/26/23 2043)   morphine injection 4 mg (4 mg IntraVENous Given 12/26/23 1954)   ondansetron (ZOFRAN) injection 4 mg (4 mg IntraVENous Given 12/26/23 1954)             Is this patient to be included in the SEP-1 Core Measure due to severe sepsis or septic shock?   No   Exclusion criteria - the patient is NOT to be included for SEP-1 Core Measure due to:  2+ SIRS criteria are not met    Chronic Conditions affecting care:    has a past medical history of AR (allergic rhinitis), Arthritis of knee, Depression, Erosive gastritis (10/28/2019), GERD (gastroesophageal reflux disease), Hyperlipidemia, Internal hemorrhoids, Overweight(278.02), and Viral meningitis (5/12).    CONSULTS: (Who and What was discussed)  None      Social Determinants Significantly Affecting Health : None    Records Reviewed (External and Source) Urine Culture Results    CC/HPI Summary, DDx, ED Course, and Reassessment: Patient is an 83-year-old female who presents ED with complaint of abdominal pain with associated nausea, vomiting diarrhea.  Recently diagnosed with a UTI.  IV was established and blood work obtained.  Urinalysis showed rare bacteria with 60 white blood cells concerning for underlying acute cystitis. Initial troponin 20.  Delta troponin is 18.  No chest pain or shortness of breath.  Low sufficient for ACS.  EKG interpreted by attending.  CBC with normal white count, hemoglobin and platelets.  CMP unremarkable.  Lipase is

## 2023-12-27 NOTE — H&P
[facility] History & Physical      Patient Name: Michael Correia    : 1940    PCP: Jorge Marks MD    Date of Service:  Patient seen and examined on 2023    Chief Complaint:  Abdominal pain    History Of Present Illness:    Michael Correia is a 78 y.o. female  who presented ED for evaluation of abdominal pain. Patient reports she was seen by her PCP on  and diagnosed with a UTI. She was prescribed a 10-day course of Ceftin. She reports after taking Ceftin she began having increasing abdominal pain, nausea, vomiting, and diarrhea. She called her PCP on  regarding symptoms. They were concerned symptoms were related to side effects from Ceftin and stopped the antibiotic. Patient had completed 7 days of the 10-day course. Patient reports she continues to have abdominal pain, nausea, vomiting, and diarrhea. She reports pain is to her lower abdomen and describes it as \"burning\" pain. She denies fever, chills, hematemesis, coffee-ground emesis, or bright red blood per rectum. She denies dysuria, frequency, or urgency. She does report suprapubic pressure. She denies any other complaints or concerns at this time. Past Medical History:    Patient has a past medical history of AR (allergic rhinitis), Arthritis of knee, Depression, Erosive gastritis, GERD (gastroesophageal reflux disease), Hyperlipidemia, Internal hemorrhoids, Overweight(278.02), and Viral meningitis. Past Surgical History:    Patient has a past surgical history that includes Cholecystectomy, laparoscopic (); Total abdominal hysterectomy w/ bilateral salpingoophorectomy (); Colonoscopy (); Colonoscopy (12/3/13); Upper gastrointestinal endoscopy (12/3/13); Breast biopsy; Hysterectomy; and hip surgery (Left, 2022). Medications Prior to Admission:      Prior to Admission medications    Medication Sig Start Date End Date Taking?  Authorizing Provider   Cholecalciferol (VITAMIN D3) 50 10/3/17   Erica Nava MD   estradiol (ESTRACE) 0.1 MG/GM vaginal cream Place 2 g vaginally Twice a Week    Provider, MD Karli   aspirin 81 MG EC tablet Take 1 tablet by mouth daily    Provider, MD Karli       Allergies:  Abilify [aripiprazole] and Ceftin [cefuroxime]    Social History:      TOBACCO:   reports that she has never smoked. She has never used smokeless tobacco.  ETOH:   reports current alcohol use.  DRUGS:  reports no history of drug use.    Family History:      Reviewed in detail positive as follows:        Problem Relation Age of Onset    Breast Cancer Mother 61    Bipolar Disorder Brother        REVIEW OF SYSTEMS:   Pertinent positives as noted in the HPI. All other systems reviewed and negative.    PHYSICAL EXAM PERFORMED:    BP (!) 143/59   Pulse 82   Temp 98.2 °F (36.8 °C) (Oral)   Resp 18   Ht 1.626 m (5' 4\")   Wt 82.3 kg (181 lb 6.4 oz)   SpO2 95%   BMI 31.14 kg/m²     General appearance:  Awake, alert, no apparent distress  HEENT:  Normocephalic, atraumatic without obvious deformity. PERRL. EOM intact. Conjunctivae/corneas clear.  Neck: Supple, with full range of motion. No JVD. Trachea midline.  Respiratory:  Clear to auscultation bilaterally without rales, wheezes, or rhonchi. Normal respiratory effort.   Cardiovascular:  Regular rate and rhythm without murmurs, rubs or gallops.   Abdomen: +Generalized tenderness to palpation.  Soft, ND, without rebound or guarding. Normal bowel sounds.  Extremities:  No clubbing, cyanosis, or edema bilaterally.  Full range of motion without deformity. +2 palpable pulses, equal bilaterally. Capillary refill brisk,< 3 seconds   Skin: No rashes or lesions. Warm/dry.  Neurologic:  Neurovascularly intact without any focal sensory/motor deficits. Cranial nerves: II-XII intact, grossly non-focal. Alert and oriented x 3. Normal speech.  Psychiatric:  Thought content appropriate, normal insight.    Labs:   CBC   Recent Labs     12/26/23  1700

## 2023-12-27 NOTE — PROGRESS NOTES
Pharmacy Home Medication Reconciliation Note    A medication reconciliation has been completed for Rafi Romero 1940    Pharmacy: [id number] [address]  Information provided by: patient    The patient's home medication list is as follows: No current facility-administered medications on file prior to encounter. Current Outpatient Medications on File Prior to Encounter   Medication Sig Dispense Refill    Cholecalciferol (VITAMIN D3) 50 MCG (2000 UT) CAPS Take 1 capsule by mouth daily      cefUROXime (CEFTIN) 250 MG tablet Take 1 tablet by mouth 2 times daily for 10 days (Patient taking differently: Take 1 tablet by mouth 2 times daily Take one tablet by mouth twice daily for 10 days (12/19-12/29).) 20 tablet 0    pantoprazole (PROTONIX) 40 MG tablet Take 1 tablet by mouth in the morning and 1 tablet in the evening. 60 tablet 3    pregabalin (LYRICA) 75 MG capsule Take 2 capsules by mouth 2 times daily for 30 days. Max Daily Amount: 300 mg 120 capsule 0    ondansetron (ZOFRAN) 4 MG tablet Take 1 tablet by mouth every 8 hours as needed for Nausea or Vomiting (Patient not taking: Reported on 12/26/2023) 15 tablet 0    miconazole (MICOTIN) 2 % cream Apply topically 2 times daily.  56 g 1    Elastic Bandages & Supports (MEDICAL COMPRESSION STOCKINGS) MISC 1 each by Does not apply route Daily 1 each 1    hydroxychloroquine (PLAQUENIL) 200 MG tablet Take 1 tablet by mouth 2 times daily Indications: Arthritis      hydroCHLOROthiazide (MICROZIDE) 12.5 MG capsule Take 1 capsule by mouth every morning (Patient not taking: Reported on 12/26/2023) 90 capsule 1    famotidine (PEPCID) 20 MG tablet Take 1 tablet by mouth nightly 90 tablet 3    [DISCONTINUED] omeprazole (PRILOSEC) 20 MG delayed release capsule Take 2 capsules by mouth Daily 60 capsule 0    vilazodone HCl (VIIBRYD) 20 MG TABS TAKE 1 TABLET BY MOUTH EVERY DAY (Patient taking differently: Take 1 tablet by mouth daily) 90 tablet

## 2023-12-28 LAB — BACTERIA UR CULT: NORMAL

## 2023-12-28 PROCEDURE — 97116 GAIT TRAINING THERAPY: CPT

## 2023-12-28 PROCEDURE — 6360000002 HC RX W HCPCS: Performed by: INTERNAL MEDICINE

## 2023-12-28 PROCEDURE — 97530 THERAPEUTIC ACTIVITIES: CPT

## 2023-12-28 PROCEDURE — 6370000000 HC RX 637 (ALT 250 FOR IP): Performed by: HOSPITALIST

## 2023-12-28 PROCEDURE — 97165 OT EVAL LOW COMPLEX 30 MIN: CPT

## 2023-12-28 PROCEDURE — 97161 PT EVAL LOW COMPLEX 20 MIN: CPT

## 2023-12-28 PROCEDURE — 6370000000 HC RX 637 (ALT 250 FOR IP): Performed by: PHYSICIAN ASSISTANT

## 2023-12-28 PROCEDURE — C9113 INJ PANTOPRAZOLE SODIUM, VIA: HCPCS | Performed by: INTERNAL MEDICINE

## 2023-12-28 PROCEDURE — 2580000003 HC RX 258: Performed by: PHYSICIAN ASSISTANT

## 2023-12-28 PROCEDURE — 97535 SELF CARE MNGMENT TRAINING: CPT

## 2023-12-28 PROCEDURE — 1200000000 HC SEMI PRIVATE

## 2023-12-28 PROCEDURE — 6360000002 HC RX W HCPCS: Performed by: PHYSICIAN ASSISTANT

## 2023-12-28 PROCEDURE — 6360000002 HC RX W HCPCS: Performed by: HOSPITALIST

## 2023-12-28 RX ORDER — POLYETHYLENE GLYCOL 3350 17 G/17G
17 POWDER, FOR SOLUTION ORAL DAILY
Status: DISCONTINUED | OUTPATIENT
Start: 2023-12-28 | End: 2023-12-29 | Stop reason: HOSPADM

## 2023-12-28 RX ORDER — PANTOPRAZOLE SODIUM 40 MG/1
40 TABLET, DELAYED RELEASE ORAL
Status: DISCONTINUED | OUTPATIENT
Start: 2023-12-29 | End: 2023-12-29 | Stop reason: HOSPADM

## 2023-12-28 RX ORDER — VANCOMYCIN HYDROCHLORIDE 50 MG/ML
125 KIT ORAL EVERY 6 HOURS SCHEDULED
Status: DISCONTINUED | OUTPATIENT
Start: 2023-12-28 | End: 2023-12-29 | Stop reason: HOSPADM

## 2023-12-28 RX ADMIN — Medication 2000 UNITS: at 09:37

## 2023-12-28 RX ADMIN — ASPIRIN 81 MG: 81 TABLET, COATED ORAL at 09:37

## 2023-12-28 RX ADMIN — BUPROPION HYDROCHLORIDE 150 MG: 150 TABLET, EXTENDED RELEASE ORAL at 17:39

## 2023-12-28 RX ADMIN — HYDROXYCHLOROQUINE SULFATE 200 MG: 200 TABLET, FILM COATED ORAL at 09:37

## 2023-12-28 RX ADMIN — NEPHROCAP 1 MG: 1 CAP ORAL at 09:36

## 2023-12-28 RX ADMIN — VILAZODONE HYDROCHLORIDE 20 MG: 20 TABLET ORAL at 09:36

## 2023-12-28 RX ADMIN — POLYETHYLENE GLYCOL 3350 17 G: 17 POWDER, FOR SOLUTION ORAL at 09:36

## 2023-12-28 RX ADMIN — LEVOFLOXACIN 750 MG: 5 INJECTION, SOLUTION INTRAVENOUS at 21:05

## 2023-12-28 RX ADMIN — Medication 125 MG: at 23:51

## 2023-12-28 RX ADMIN — FAMOTIDINE 20 MG: 20 TABLET ORAL at 21:01

## 2023-12-28 RX ADMIN — PREGABALIN 150 MG: 75 CAPSULE ORAL at 21:02

## 2023-12-28 RX ADMIN — PREGABALIN 150 MG: 75 CAPSULE ORAL at 09:37

## 2023-12-28 RX ADMIN — Medication 125 MG: at 17:39

## 2023-12-28 RX ADMIN — Medication 1 CAPSULE: at 17:39

## 2023-12-28 RX ADMIN — SODIUM CHLORIDE, PRESERVATIVE FREE 10 ML: 5 INJECTION INTRAVENOUS at 09:40

## 2023-12-28 RX ADMIN — Medication 125 MG: at 13:29

## 2023-12-28 RX ADMIN — Medication 125 MG: at 09:38

## 2023-12-28 RX ADMIN — HYDROMORPHONE HYDROCHLORIDE 1 MG: 1 INJECTION, SOLUTION INTRAMUSCULAR; INTRAVENOUS; SUBCUTANEOUS at 05:06

## 2023-12-28 RX ADMIN — MICONAZOLE NITRATE: 2 CREAM TOPICAL at 21:03

## 2023-12-28 RX ADMIN — LOSARTAN POTASSIUM 50 MG: 25 TABLET, FILM COATED ORAL at 09:36

## 2023-12-28 RX ADMIN — SODIUM CHLORIDE, PRESERVATIVE FREE 10 ML: 5 INJECTION INTRAVENOUS at 21:02

## 2023-12-28 RX ADMIN — MICONAZOLE NITRATE: 2 CREAM TOPICAL at 09:39

## 2023-12-28 RX ADMIN — Medication 1 CAPSULE: at 09:36

## 2023-12-28 RX ADMIN — ENOXAPARIN SODIUM 40 MG: 100 INJECTION SUBCUTANEOUS at 09:37

## 2023-12-28 RX ADMIN — HYDROXYCHLOROQUINE SULFATE 200 MG: 200 TABLET, FILM COATED ORAL at 21:01

## 2023-12-28 RX ADMIN — PROCHLORPERAZINE EDISYLATE 10 MG: 5 INJECTION INTRAMUSCULAR; INTRAVENOUS at 02:59

## 2023-12-28 RX ADMIN — PANTOPRAZOLE SODIUM 40 MG: 40 INJECTION, POWDER, FOR SOLUTION INTRAVENOUS at 09:39

## 2023-12-28 ASSESSMENT — PAIN SCALES - WONG BAKER: WONGBAKER_NUMERICALRESPONSE: 2

## 2023-12-28 ASSESSMENT — PAIN DESCRIPTION - ORIENTATION: ORIENTATION: MID

## 2023-12-28 ASSESSMENT — PAIN DESCRIPTION - DESCRIPTORS: DESCRIPTORS: BURNING

## 2023-12-28 ASSESSMENT — PAIN SCALES - GENERAL
PAINLEVEL_OUTOF10: 8
PAINLEVEL_OUTOF10: 6

## 2023-12-28 ASSESSMENT — PAIN DESCRIPTION - LOCATION: LOCATION: ABDOMEN

## 2023-12-28 NOTE — PROGRESS NOTES
Kostas Horta scored a 21/24 on the AM-PAC ADL Inpatient form. Current research shows that an AM-PAC score of 18 or greater is typically associated with a discharge to the patient's home setting. Based on the patient's AM-PAC score, and their current ADL deficits, it is recommended that the patient have 2-3 sessions per week of Occupational Therapy at d/c to increase the patient's independence. At this time, this patient demonstrates the endurance and safety to discharge home with Nohemy and a follow up treatment frequency of 2-3x/wk. Please see assessment section for further patient specific details. If patient discharges prior to next session this note will serve as a discharge summary. Please see below for the latest assessment towards goals.       DME Required For Discharge: patient has all required DME for discharge    Precautions/Restrictions: no restrictions  Weight Bearing Restrictions: no restrictions  [] Right Upper Extremity  [] Left Upper Extremity [] Right Lower Extremity  [] Left Lower Extremity     Required Braces/Orthotics: no braces required   [] Right  [] Left  Positional Restrictions:no positional restrictions    Pre-Admission Information   Lives With: spouse                  Type of Home: house  Home Layout: two level, able to live on main level, stair lift to 2nd floor, pt sleeps in recliner due to bed being too tall  Home Access:  2 step to enter without rails --pt holds onto doorknob  Bathroom Layout: walk in shower  Bathroom Equipment: grab bars in shower, hand held shower head  Toilet Height: elevated height  Home Equipment: rollator - 4 wheeled walker, lift chair, transport chair  Transfer Assistance: modified independent with use of 4WW  Ambulation Assistance:modified independent with use of 4WW  ADL Assistance: independent with all ADL's  IADL Assistance: independent with homemaking tasks-- completes laundry, pt cleans/folds   Short Term Goals:  Time Frame: by discharge  Patient will complete lower body ADL at contact guard assistance   Patient will complete functional transfers at modified independent   Patient will complete functional mobility at modified independent     Above goals reviewed on 12/28/2023.  All goals are ongoing at this time unless indicated above.       Therapy Session Time     Individual Group Co-treatment   Time In    1400   Time Out    1439   Minutes    39        Timed Code Treatment Minutes:   39 Minutes  Total Treatment Minutes:  24 Minutes       Electronically Signed By: Mayda Santamaria OT

## 2023-12-28 NOTE — PROGRESS NOTES
Patients bed alarm was going off. She was up and going to the bathroom. At this time she was crying. When she got to the bathroom she felt like she was going to get sick. Sat there for a few minutes and working herself up. Told her to control her breathing and gave her a wash cloth on the back of her neck. Got her back to bed and again she started to work her self up. She did end up having a medium size, yellow tinge vomit. She did request water earlier and gave her a few sips, not sure if that's what upset her or if it was because she was working her self up. Changed her gown, washed her face off and let her rinse her mouth out. After she stated she felt better afterwards. Pt is now resting in bed falling asleep. Will continue to monitor.

## 2023-12-28 NOTE — PROGRESS NOTES
Shift assessment completed. VSS, pt is refusing to take all oral medications stating it makes her stomach hurt worse, educated her on medications we were giving to her, she still did not want anything. Pt is A&Ox4. Pt has no complaints or needs at this time. Did get up and walk to the bathroom, did not cry as much as before and she said her legs felt a little better. Brakes locked, bed in lowest position, bed alarm engaged. All belongings and call light within reach.

## 2023-12-28 NOTE — PROGRESS NOTES
[facility] Department   Phone: [phone]    Physical Therapy    [x] Initial Evaluation            [] Daily Treatment Note         [] Discharge Summary      Patient: Mando Godfrey   : 1940   MRN: [id number]   Date of Service:  2023  Admitting Diagnosis: Colitis  Current Admission Summary: Mando Godfrey is a 78 y.o. female with past medical history of hyperlipidemia, depression, hypertension who presents ED with complaint of abdominal pain. Patient states was seen by her PCP on 2023. Diagnosed with a UTI. Was placed on antibiotics with Ceftin. She resorts after taking the Ceftin she has had worsening abdominal pain, nausea, vomiting diarrhea. Called PCP and they were concerned she is having side effect of the Ceftin. She stopped the antibiotic but states she is still having symptoms. She was not placed on another antibiotic. She reports continued abdominal pain with associated nausea, vomiting diarrhea. Came to ED for further evaluation treatment. Denies fever or chills. Denies any hematemesis or bright red blood per rectum. Denies any vaginal bleeding or discharge. Denies any dysuria, frequency or urgency. Reports burning pain rated 10/10 to her lower abdomen diffusely. Denies chest pain or shortness of breath. Past Medical History:  has a past medical history of AR (allergic rhinitis), Arthritis of knee, Depression, Erosive gastritis, GERD (gastroesophageal reflux disease), Hyperlipidemia, Internal hemorrhoids, Overweight(278.02), and Viral meningitis. Past Surgical History:  has a past surgical history that includes Cholecystectomy, laparoscopic (); Total abdominal hysterectomy w/ bilateral salpingoophorectomy (); Colonoscopy (); Colonoscopy (12/3/13); Upper gastrointestinal endoscopy (12/3/13); Breast biopsy; Hysterectomy; and hip surgery (Left, 2022).   Discharge Recommendations: Mando Godfrey scored a 19/24 on modified independent with use of 4WW  Ambulation Assistance:modified independent with use of 4WW  ADL Assistance: independent with all ADL's  IADL Assistance: independent with homemaking tasks-- completes laundry, pt cleans/folds laundry  Active :        [] Yes  [x] No-- drives or sometimes family  Hand Dominance: [] Left  [] Right  Current Employment: retired.  Hobbies: Reading, watching TV  Recent Falls: Pt denies recent falls.    Examination   Vision:   Vision Gross Assessment: Impaired and Vision Corrective Device: wears glasses for reading  Hearing:   WFL  Posture:   Fair  Sensation:   denies numbness and tingling  ROM:   (B) LE AROM WFL  Strength:   (B) LE strength grossly WFL  Therapist Clinical Decision Making (Complexity): low complexity  Clinical Presentation: stable      Subjective  General: Pt supine in bed upon arrival. Pt agreeable to PT/OT eval.  Pain: 0/10  Pain Interventions: not applicable       Functional Mobility  Bed Mobility:  Supine to Sit: stand by assistance  Sit to Supine: stand by assistance  Rolling Right: stand by assistance  Scooting: stand by assistance  Comments:  Transfers:  Sit to stand transfer: contact guard assistance  Stand to sit transfer: contact guard assistance  Toilet transfer: contact guard assistance  Comments:  Ambulation:  Surface:level surface  Assistive Device: rollator (4WRW)  Assistance: contact guard assistance  Distance: ~20 ft + ~70 ft  Gait Mechanics: Pt ambulates with slow tariq, decreased step length, no LOB.  Comments:    Stair Mobility:  Stair mobility not completed on this date.  Comments:  Wheelchair Mobility:  No w/c mobility completed on this date.  Comments:  Balance:  Static Sitting Balance: good: independent with functional balance in unsupported position  Dynamic Sitting Balance: fair (+): maintains balance at SBA/supervision without use of UE support  Static Standing Balance: fair (-): maintains balance at CGA with use of UE

## 2023-12-28 NOTE — PROGRESS NOTES
V2.0    [facility] Progress Note      Name:  Phoenix Montano /Age/Sex: 1940  (78 y.o. female)   MRN & CSN:  [id number] & [id number] Encounter Date/Time: 2023 6:44 AM EST   Location:  [address] [place] [address] PCP: Elvia Lorenzo MD     Attending:Sven Stafford MD       Hospital Day: 3    Assessment and Recommendations   Phoenix Montano is a 78 y.o. female who presents with Colitis      Plan:   Acute colitits   -Patient slowly improving still with tenderness to touch.  -Appreciate GI recommendation  -We have stopped Flagyl nausea somewhat improved  -Patient still with significant been empirically started on p.o. Vanco  -Continue IV Levaquin    Suspected UTI urine cult growing normal follow some will be ruled out    IV hydration monitor close          Diet Diet NPO Exceptions are: Sips of Water with Meds   DVT Prophylaxis    Code Status Full Code   Disposition          Personally reviewed Lab Studies and Imaging         Subjective:     Chief Complaint:     Phoenix Montano is a 78 y.o. female who presents with still with pain but improving. Still not tolerating diet well still nauseated      Review of Systems:      Pertinent positives and negatives discussed in HPI    Objective:   No intake or output data in the 24 hours ending 23 0644   Vitals:   Vitals:    23 2259 23 0200 23 0506 23 0536   BP:       Pulse:       Resp: 17  17 17   Temp:       TempSrc:       SpO2:       Weight:  83.6 kg (184 lb 4.9 oz)     Height:             Physical Exam:      General: NAD  Eyes: EOMI  ENT: neck supple  Cardiovascular: Regular rate. Respiratory: Clear to auscultation  Gastrointestinal: Soft, non tender  Genitourinary: no suprapubic tenderness  Musculoskeletal: No edema  Skin: warm, dry  Neuro: Alert. Psych: Mood appropriate.          Medications:   Medications:    vancomycin  125 mg Oral 4 times per day    lactobacillus  1 capsule Oral BID WC    pantoprazole  40 mg IntraVENous Daily    Vitamin D

## 2023-12-28 NOTE — PROGRESS NOTES
ADVANCED CARE PLANNING    Lorraine Sneed       :  1940              MRN:  [id number]      Purpose of Encounter: Advanced care planning in light of problem listed above   Parties in attendance: :Edwin Hayward MD, Family members:  Decisional Capacity:Yes    Diagnosis: Principal Problem:    Colitis  Resolved Problems:    * No resolved hospital problems. *    Patients Medical Story:Presented with worsening symptom of dx above. With at risk for life threatening event. Procedure and testing as noted in progress noted. We discussed patient long term goal and also wishes and aggressive care. Discussed in detail about code status and what it means with detailed explanation. Goals of Care Determinations: Patient wishes to focus on full code with aggressive care, CPR, intubation long term vent and facility as well. Plan: Will notify Tonny Peace MD of change in care plan. Will look at further interventions as needed. Code Status: At this time patient wishes to be Full Code  Time Spent with Patient: 21 minutes      Electronically signed by Omid Zambrano MD on 2023 at 1:30 PM  Thank you Tonny Peace MD for the opportunity to be involved in this patient's care.

## 2023-12-29 VITALS
BODY MASS INDEX: 31.16 KG/M2 | DIASTOLIC BLOOD PRESSURE: 72 MMHG | RESPIRATION RATE: 17 BRPM | WEIGHT: 182.5 LBS | SYSTOLIC BLOOD PRESSURE: 124 MMHG | OXYGEN SATURATION: 93 % | HEIGHT: 64 IN | HEART RATE: 84 BPM | TEMPERATURE: 97.6 F

## 2023-12-29 DIAGNOSIS — R63.5 WEIGHT GAIN: ICD-10-CM

## 2023-12-29 DIAGNOSIS — R60.0 LOWER LEG EDEMA: ICD-10-CM

## 2023-12-29 LAB
ANION GAP SERPL CALCULATED.3IONS-SCNC: 6 MMOL/L (ref 3–16)
BUN SERPL-MCNC: 31 MG/DL (ref 7–20)
CALCIUM SERPL-MCNC: 9.3 MG/DL (ref 8.3–10.6)
CHLORIDE SERPL-SCNC: 107 MMOL/L (ref 99–110)
CO2 SERPL-SCNC: 28 MMOL/L (ref 21–32)
CREAT SERPL-MCNC: 0.9 MG/DL (ref 0.6–1.2)
DEPRECATED RDW RBC AUTO: 14.5 % (ref 12.4–15.4)
GFR SERPLBLD CREATININE-BSD FMLA CKD-EPI: >60 ML/MIN/{1.73_M2}
GLUCOSE SERPL-MCNC: 89 MG/DL (ref 70–99)
HCT VFR BLD AUTO: 31.8 % (ref 36–48)
HGB BLD-MCNC: 10.6 G/DL (ref 12–16)
MCH RBC QN AUTO: 29.3 PG (ref 26–34)
MCHC RBC AUTO-ENTMCNC: 33.2 G/DL (ref 31–36)
MCV RBC AUTO: 88.3 FL (ref 80–100)
PLATELET # BLD AUTO: 120 K/UL (ref 135–450)
PMV BLD AUTO: 7.9 FL (ref 5–10.5)
POTASSIUM SERPL-SCNC: 5.1 MMOL/L (ref 3.5–5.1)
RBC # BLD AUTO: 3.6 M/UL (ref 4–5.2)
SODIUM SERPL-SCNC: 141 MMOL/L (ref 136–145)
WBC # BLD AUTO: 4.4 K/UL (ref 4–11)

## 2023-12-29 PROCEDURE — 85027 COMPLETE CBC AUTOMATED: CPT

## 2023-12-29 PROCEDURE — 2580000003 HC RX 258: Performed by: PHYSICIAN ASSISTANT

## 2023-12-29 PROCEDURE — 6370000000 HC RX 637 (ALT 250 FOR IP): Performed by: HOSPITALIST

## 2023-12-29 PROCEDURE — 80048 BASIC METABOLIC PNL TOTAL CA: CPT

## 2023-12-29 PROCEDURE — 36415 COLL VENOUS BLD VENIPUNCTURE: CPT

## 2023-12-29 PROCEDURE — 6370000000 HC RX 637 (ALT 250 FOR IP): Performed by: PHYSICIAN ASSISTANT

## 2023-12-29 PROCEDURE — 97535 SELF CARE MNGMENT TRAINING: CPT

## 2023-12-29 PROCEDURE — 6370000000 HC RX 637 (ALT 250 FOR IP)

## 2023-12-29 PROCEDURE — 97530 THERAPEUTIC ACTIVITIES: CPT

## 2023-12-29 PROCEDURE — 6360000002 HC RX W HCPCS: Performed by: PHYSICIAN ASSISTANT

## 2023-12-29 RX ORDER — TORSEMIDE 20 MG/1
20 TABLET ORAL DAILY PRN
Qty: 90 TABLET | OUTPATIENT
Start: 2023-12-29

## 2023-12-29 RX ORDER — VANCOMYCIN HYDROCHLORIDE 50 MG/ML
125 KIT ORAL EVERY 8 HOURS SCHEDULED
Qty: 75 ML | Refills: 0 | Status: SHIPPED | OUTPATIENT
Start: 2023-12-29 | End: 2024-01-08

## 2023-12-29 RX ORDER — LEVOFLOXACIN 250 MG/1
250 TABLET, FILM COATED ORAL DAILY
Qty: 4 TABLET | Refills: 0 | Status: SHIPPED | OUTPATIENT
Start: 2023-12-29 | End: 2024-01-02

## 2023-12-29 RX ORDER — PANTOPRAZOLE SODIUM 40 MG/1
TABLET, DELAYED RELEASE ORAL
Status: COMPLETED
Start: 2023-12-29 | End: 2023-12-29

## 2023-12-29 RX ORDER — LACTOBACILLUS RHAMNOSUS GG 10B CELL
1 CAPSULE ORAL 2 TIMES DAILY WITH MEALS
Qty: 20 CAPSULE | Refills: 0 | Status: SHIPPED | OUTPATIENT
Start: 2023-12-29 | End: 2024-01-08

## 2023-12-29 RX ADMIN — Medication 2000 UNITS: at 10:03

## 2023-12-29 RX ADMIN — ASPIRIN 81 MG: 81 TABLET, COATED ORAL at 10:03

## 2023-12-29 RX ADMIN — ATORVASTATIN CALCIUM 10 MG: 10 TABLET, FILM COATED ORAL at 10:02

## 2023-12-29 RX ADMIN — PANTOPRAZOLE SODIUM 40 MG: 40 TABLET, DELAYED RELEASE ORAL at 05:14

## 2023-12-29 RX ADMIN — SODIUM CHLORIDE, PRESERVATIVE FREE 10 ML: 5 INJECTION INTRAVENOUS at 10:07

## 2023-12-29 RX ADMIN — MICONAZOLE NITRATE: 2 CREAM TOPICAL at 10:06

## 2023-12-29 RX ADMIN — ENOXAPARIN SODIUM 40 MG: 100 INJECTION SUBCUTANEOUS at 10:03

## 2023-12-29 RX ADMIN — NEPHROCAP 1 MG: 1 CAP ORAL at 10:03

## 2023-12-29 RX ADMIN — Medication 1 CAPSULE: at 10:02

## 2023-12-29 RX ADMIN — Medication 125 MG: at 12:41

## 2023-12-29 RX ADMIN — LOSARTAN POTASSIUM 50 MG: 25 TABLET, FILM COATED ORAL at 10:03

## 2023-12-29 RX ADMIN — Medication 125 MG: at 05:13

## 2023-12-29 RX ADMIN — VILAZODONE HYDROCHLORIDE 20 MG: 20 TABLET ORAL at 10:02

## 2023-12-29 RX ADMIN — PREGABALIN 150 MG: 75 CAPSULE ORAL at 10:03

## 2023-12-29 RX ADMIN — HYDROXYCHLOROQUINE SULFATE 200 MG: 200 TABLET, FILM COATED ORAL at 10:02

## 2023-12-29 NOTE — PROGRESS NOTES
CLINICAL PHARMACY NOTE: MEDS TO BEDS    Total # of Prescriptions Filled: 3   The following medications were delivered to the patient:  Levofloxacin 250mg  Vancomycin 125mg  Acidophilus capsules    Additional Documentation:     Medications were picked up in the Outpatient Pharmacy by patient's     Robinivonne Kimnneka CPhT   Vancomycin liquid not covered on insurance, our price is cheaper than insurance price for capsules.  Explained to

## 2023-12-29 NOTE — CARE COORDINATION
Case Management Assessment  Initial Evaluation    Date/Time of Evaluation: 12/29/2023 3:40 PM  Assessment Completed by: Mel Hidalgo RN    If patient is discharged prior to next notation, then this note serves as note for discharge by case management. Patient Name: Phoenix Montano                   YOB: 1940  Diagnosis: Colitis [K52.9]  Acute cystitis without hematuria [N30.00]  Intractable abdominal pain [R10.9]                   Date / Time: 12/26/2023  4:10 PM    Patient Admission Status: Inpatient   Readmission Risk (Low < 19, Mod (19-27), High > 27): Readmission Risk Score: 11.4    Current PCP: Elvia Lorenzo MD  PCP verified by CM? Yes    Chart Reviewed: Yes      History Provided by: Patient  Patient Orientation: Alert and Oriented    Patient Cognition: Alert    Hospitalization in the last 30 days (Readmission):  No    If yes, Readmission Assessment in  Navigator will be completed. Advance Directives:      Code Status: Full Code   Patient's Primary Decision Maker is: Legal Next of Kin    Primary Decision MakerFagus Bowling - Spouse - [phone]    Secondary Decision Maker: Ines Avelar Child - [phone]    Discharge Planning:    Patient lives with: Spouse/Significant Other Type of Home: House  Primary Care Giver: Self  Patient Support Systems include: Spouse/Significant Other, Children   Current Financial resources: Medicare  Current community resources: None  Current services prior to admission: None            Current DME:  rollator - 4 wheeled walker, lift chair, transport chair             Type of Home Care services:  OT, PT, Nursing Services    ADLS  Prior functional level: Independent in ADLs/IADLs, Shopping, Mobility, Assistance with the following:  Current functional level: Independent in ADLs/IADLs, Assistance with the following:, Shopping, Mobility, Bathing    PT AM-PAC: 19 /24  OT AM-PAC: 20 /24    Family can provide assistance at DC:  Yes  Would you like Case Management to discuss the discharge plan with any other family members/significant others, and if so, who? No  Plans to Return to Present Housing: Yes  Other Identified Issues/Barriers to RETURNING to current housing: no  Potential Assistance needed at discharge: Home Care            Potential DME:  none  Patient expects to discharge to: House  Plan for transportation at discharge:  children    Financial    Payor: [employer] MEDICARE / Plan: [employer] MEDICARE ADVANTAGE HMO / Product Type: Medicare /     Does insurance require precert for SNF: Yes    Potential assistance Purchasing Medications: No  Meds-to-Beds request:        [id number] - [place], [place] - [address] - P [phone] - F [phone]  [address]  [facility] [address]  Phone: [phone] Fax: [phone]      Notes:    Factors facilitating achievement of predicted outcomes: Family support, Motivated, and Cooperative    Barriers to discharge: none    Additional Case Management Notes: Lives with spouse. Independent with most ADL's. Children involved in care.    The Plan for Transition of Care is related to the following treatment goals of Colitis [K52.9]  Acute cystitis without hematuria [N30.00]  Intractable abdominal pain [R10.9]    IF APPLICABLE: The Patient and/or patient representative Antonette and her family were provided with a choice of provider and agrees with the discharge plan. Freedom of choice list with basic dialogue that supports the patient's individualized plan of care/goals and shares the quality data associated with the providers was provided to:     Patient Representative Name:       The Patient and/or Patient Representative Agree with the Discharge Plan?  yes    Radha Rodriguez RN  Case Management Department  Electronically signed by Radha Rodriguez RN on 12/29/2023 at 3:42 PM

## 2023-12-29 NOTE — DISCHARGE INSTR - COC
to concentrate and follow conversation    IV Access:  - None    Nursing Mobility/ADLs:  Walking   Assisted  Transfer  Assisted  Bathing  Assisted  Dressing  Assisted  Toileting  Independent  Feeding  Independent  Med C/Casia 10  Independent  Med Delivery   whole    Wound Care Documentation and Therapy:        Elimination:  Continence: Bowel: Yes  Bladder: Yes  Urinary Catheter: None   Colostomy/Ileostomy/Ileal Conduit: No       Date of Last BM: 12/29/23  No intake or output data in the 24 hours ending 12/29/23 0723  No intake/output data recorded. Safety Concerns: At Risk for Falls    Impairments/Disabilities:      Uses walker    Nutrition Therapy:  Current Nutrition Therapy:   - Oral Diet:  General    Routes of Feeding: Oral  Liquids: Thin Liquids  Daily Fluid Restriction: no  Last Modified Barium Swallow with Video (Video Swallowing Test): not done    Treatments at the Time of Hospital Discharge:   Respiratory Treatments: none  Oxygen Therapy:  is not on home oxygen therapy.   Ventilator:    - No ventilator support    Rehab Therapies: Physical Therapy and Occupational Therapy  Weight Bearing Status/Restrictions: No weight bearing restrictions  Other Medical Equipment (for information only, NOT a DME order):  walker  Other Treatments: none    Patient's personal belongings (please select all that are sent with patient):  None    RN SIGNATURE:  Electronically signed by Brianda Sutton RN on 12/29/23 at 4:17 PM EST    CASE MANAGEMENT/SOCIAL WORK SECTION    Inpatient Status Date: ***    Readmission Risk Assessment Score:  Readmission Risk              Risk of Unplanned Readmission:  17           Discharging to Facility/ [address] [place], [place] [address]  Phone: [phone]  Fax: [phone]        / signature: Electronically signed by Darell Hernandez RN on 12/29/23 at 3:45 PM EST    PHYSICIAN SECTION    Prognosis: Good    Condition at Discharge: Stable    Rehab Potential (if transferring to Rehab): Good    Recommended Labs or Other Treatments After Discharge:     Physician Certification: I certify the above information and transfer of Antonette Brown  is necessary for the continuing treatment of the diagnosis listed and that she requires Home Care for greater 30 days.     Update Admission H&P: No change in H&P    PHYSICIAN SIGNATURE:  Electronically signed by Sven Stafford MD on 12/29/23 at 7:24 AM EST

## 2023-12-29 NOTE — PROGRESS NOTES
Shift assessment completed. VSS, scheduled meds given/held per MAR orders. Pt is A&Ox4. Pt has no complaints or needs at this time. Brakes locked, bed in lowest position, bed alarm engaged. All belongings and call light within reach.

## 2023-12-29 NOTE — TELEPHONE ENCOUNTER
Medication:   Requested Prescriptions     Pending Prescriptions Disp Refills    torsemide (DEMADEX) 20 MG tablet [Pharmacy Med Name: TORSEMIDE 20 MG TABLET] 90 tablet      Sig: TAKE 1 TABLET BY MOUTH DAILY AS NEEDED (LEG EDEMA)        Last Filled:  8/16/2023    Patient Phone Number: [phone] (home)     Last appt: 12/11/2023   Next appt: 1/12/2024    Last OARRS:        No data to display

## 2023-12-29 NOTE — CARE COORDINATION
12/29/23 1545   IMM Letter   IMM Letter given to Patient/Family/Significant other/Guardian/POA/by: Letter given to Alyse Hayward by Jonna Umanzor RN. Pt agrees to accept the lette less than four hours from discharge.    IMM Letter date given: 12/29/23   IMM Letter time given: 1546     Electronically signed by Yoel Deluna RN on 12/29/2023 at 3:46 PM

## 2023-12-29 NOTE — CARE COORDINATION
Discharge Planning Note Re: [address]      CM/SW noted consult for discharge planning. Chart reviewed. Noted recommendations for home health care. CM met with patient. Introduced self and explained role of CM and discharge planning. Patient is agreeable to home health on dc. [place] of choice list was provided with basic dialogue that supports the patient's individualized plan of care/goals, treatment preferences and shares the quality data associated with the providers. [x] Yes [] No.  Patient has reviewed the provided list and made selections. Referral made to   [facility]  [facility], [address]  Phone: [phone]  Fax: [phone]      Nirmala Rowland. She states she can take the patient for discharge today. Pending [facility] order for  [x]RN [x]PT  [x]OT  []HHA  []SW  []  SLP    CM/SW will follow-up on referrals and provide any additional documentation necessary to facilitate placement.      Electronically signed by Maria C Woodard RN on 12/29/2023 at 3:44 PM

## 2023-12-29 NOTE — PLAN OF CARE
Problem: Discharge Planning  Goal: Discharge to home or other facility with appropriate resources  12/28/2023 2307 by Luda Quintana RN  Outcome: Progressing     Problem: Pain  Goal: Verbalizes/displays adequate comfort level or baseline comfort level  12/28/2023 2307 by Luda Quintana RN  Outcome: Progressing     Problem: Skin/Tissue Integrity  Goal: Absence of new skin breakdown  Description: 1. Monitor for areas of redness and/or skin breakdown  2. Assess vascular access sites hourly  3. Every 4-6 hours minimum:  Change oxygen saturation probe site  4. Every 4-6 hours:  If on nasal continuous positive airway pressure, respiratory therapy assess nares and determine need for appliance change or resting period.   12/28/2023 2307 by Luda Quintana RN  Outcome: Progressing     Problem: Safety - Adult  Goal: Free from fall injury  12/28/2023 2307 by Luda Quintana RN  Outcome: Progressing     Problem: ABCDS Injury Assessment  Goal: Absence of physical injury  12/28/2023 2307 by Luda Quintana, RN  Outcome: Progressing

## 2023-12-29 NOTE — PROGRESS NOTES
Occupational Therapy    [facility] - Inpatient Rehabilitation Department   Phone: [phone]    Occupational Therapy    [] Initial Evaluation            [x] Daily Treatment Note         [] Discharge Summary      Patient: Antonette Brown   : 1940   MRN: [id number]   Date of Service:  2023    Admitting Diagnosis:  Colitis  Current Admission Summary: Antonette Brown is a 83 y.o. female with past medical history of hyperlipidemia, depression, hypertension who presents ED with complaint of abdominal pain.  Patient states was seen by her PCP on 2023.  Diagnosed with a UTI.  Was placed on antibiotics with Ceftin.  She resorts after taking the Ceftin she has had worsening abdominal pain, nausea, vomiting diarrhea.  Called PCP and they were concerned she is having side effect of the Ceftin.  She stopped the antibiotic but states she is still having symptoms.  She was not placed on another antibiotic.  She reports continued abdominal pain with associated nausea, vomiting diarrhea.  Came to ED for further evaluation treatment.  Denies fever or chills.  Denies any hematemesis or bright red blood per rectum.  Denies any vaginal bleeding or discharge.  Denies any dysuria, frequency or urgency.  Reports burning pain rated 10/10 to her lower abdomen diffusely.  Denies chest pain or shortness of breath.   Past Medical History:  has a past medical history of AR (allergic rhinitis), Arthritis of knee, Depression, Erosive gastritis, GERD (gastroesophageal reflux disease), Hyperlipidemia, Internal hemorrhoids, Overweight(278.02), and Viral meningitis.  Past Surgical History:  has a past surgical history that includes Cholecystectomy, laparoscopic (); Total abdominal hysterectomy w/ bilateral salpingoophorectomy (); Colonoscopy (); Colonoscopy (12/3/13); Upper gastrointestinal endoscopy (12/3/13); Breast biopsy; Hysterectomy; and hip surgery (Left, 2022).    Discharge Recommendations:  Time Out 1153     Minutes 56          Timed Code Treatment Minutes:  Timed Code Treatment Minutes: 56 Minutes  Total Treatment Minutes:  56 minutes total       Electronically Signed By: Julio Sanchez OT, Julio Sanchez OTR/L [id number]

## 2024-01-02 ENCOUNTER — CARE COORDINATION (OUTPATIENT)
Dept: CASE MANAGEMENT | Age: 84
End: 2024-01-02

## 2024-01-02 NOTE — CARE COORDINATION
Care Transitions Initial Follow Up Call    Call within 2 business days of discharge: Yes    First attempt at 24 hour discharge call, no answer, CTN left  with contact information and request for return call.  CTN will continue with outreach call attempts.    CTN contacted [place] with [facility] and confirmed that patient was a SOC on Sunday 12/31/23.    Follow Up  Future Appointments   Date Time Provider Department Center   1/12/2024  1:30 PM Erica Nava MD [facility] [place] - [facility] WENDY L NELKE, RN

## 2024-01-03 ENCOUNTER — CARE COORDINATION (OUTPATIENT)
Dept: CASE MANAGEMENT | Age: 84
End: 2024-01-03

## 2024-01-03 DIAGNOSIS — K52.9 COLITIS: Primary | ICD-10-CM

## 2024-01-03 PROCEDURE — 1111F DSCHRG MED/CURRENT MED MERGE: CPT | Performed by: FAMILY MEDICINE

## 2024-01-03 NOTE — CARE COORDINATION
Care Transitions Initial Follow Up Call    Call within 2 business days of discharge: Yes    Second attempt at 24 hour discharge call, patient answered and reports that she is with her therapist at this time. CTN will continue with outreach call attempts.    Follow Up  Future Appointments   Date Time Provider Department Center   1/12/2024  1:30 PM Erica Nava MD [facility] [place] [facility] WENDY L NELKE RN

## 2024-01-03 NOTE — CARE COORDINATION
Care Transitions Initial Follow Up Call    Call within 2 business days of discharge: Yes    Patient Current Location:  Home: [address] [place] [id number]    Care Transition Nurse contacted the patient by telephone to perform post hospital discharge assessment. Verified name and  with patient as identifiers. Provided introduction to self, and explanation of the Care Transition Nurse role.     Patient: Antonette Brown Patient : 1940   MRN: [id number]  Reason for Admission: Colitis, UTI  Discharge Date: 23 RARS: Readmission Risk Score: 11.5      Last Discharge Facility       Date Complaint Diagnosis Description Type Department Provider    23 Abdominal Pain Colitis ... ED to Hosp-Admission (Discharged) (ADMITTED) [facility] Sven Stafford MD; Ajay Aleman...            Was this an external facility discharge? No Discharge Facility:     Challenges to be reviewed by the provider   Additional needs identified to be addressed with provider: No  none               Method of communication with provider: none.    Patient reports that she is doing \"OK\", working with therapy and trying to get stronger.  She does report \"burning\" in her abdomen from the colitis.  She is taking a probiotic and two antibiotics as directed.  Discussed discharge instructions and reviewed medications, 1111F completed.  She is afebrile and denies any questions or concerns at this time.  CTN will continue with outreach follow up calls.      Care Transition Nurse reviewed discharge instructions, medical action plan, and red flags with patient who verbalized understanding. The patient was given an opportunity to ask questions and does not have any further questions or concerns at this time. Were discharge instructions available to patient? Yes. Reviewed appropriate site of care based on symptoms and resources available to patient including: PCP  Urgent care clinics  [facility]  When to call 911. The patient agrees to 
no

## 2024-01-04 RX ORDER — VILAZODONE HYDROCHLORIDE 20 MG/1
20 TABLET ORAL DAILY
Qty: 90 TABLET | Refills: 1 | Status: SHIPPED | OUTPATIENT
Start: 2024-01-04

## 2024-01-04 NOTE — TELEPHONE ENCOUNTER
Lov 12/11/23  Lrf 90 1 6/26/23 Medication:   Requested Prescriptions     Pending Prescriptions Disp Refills    vilazodone HCl (VIIBRYD) 20 MG TABS 90 tablet 1     Sig: Take 1 tablet by mouth daily        Last Filled:      Patient Phone Number: [phone] (home)     Last appt: 12/11/2023   Next appt: 1/12/2024    Last OARRS:        No data to display

## 2024-01-04 NOTE — TELEPHONE ENCOUNTER
Medication and Quantity requested:    vilazodone HCl (VIIBRYD) 20 MG TABS  - qty 90    Last Visit  12/11/23 - Dr Nava    Pharmacy and phone number updated in EPIC:  yes    [facility]

## 2024-01-05 ENCOUNTER — CARE COORDINATION (OUTPATIENT)
Dept: CASE MANAGEMENT | Age: 84
End: 2024-01-05

## 2024-01-05 NOTE — CARE COORDINATION
Patient does not have a hospital follow up scheduled.  CTN will route a follow up message to the office requesting a change to the 1/12/24 appointment, making it a TCM/hospital follow up.

## 2024-01-05 NOTE — DISCHARGE SUMMARY
[facility]    Patient Demographics    Patient. Antonette Brown  Date of Birth. 1940  MRN. [id number]     Primary care provider. Erica Nava MD  (Tel: [phone])    Admit date: 12/26/2023    Discharge date (blank if same as Note Date): 12/29/2023  Note Date: 1/5/2024     Reason for Hospitalization.   Chief Complaint   Patient presents with    Abdominal Pain     Pt states that she has been having burning belly pain for the last 3 days. Pt states diarrhea and some nausea an hour ago. Pt states UTI but quit taking antibiotics because it caused these symptoms            Problem-based Hospital Course.    Acute colitits   Treated with iv abx and with slow improved  -Appreciate GI recommendation  Concerns for c- diff  Treated with po vanc   Discharged stable   Consults.  IP CONSULT TO GI  IP CONSULT TO HOME CARE NEEDS    Physical examination on discharge day.   /72   Pulse 84   Temp 97.6 °F (36.4 °C) (Oral)   Resp 17   Ht 1.626 m (5' 4\")   Wt 82.8 kg (182 lb 8 oz)   SpO2 93%   BMI 31.33 kg/m²   General appearance.  Alert. Looks comfortable.  HEENT. Sclera clear. Moist mucus membranes.  Cardiovascular. Regular rate and rhythm, normal S1, S2. No murmur.   Respiratory. Not using accessory muscles.Clear to auscultation bilaterally, no wheeze.  Gastrointestinal. Abdomen soft, non-tender, not distended, normal bowel sounds  Neurology. Facial symmetry. No speech deficits. Moving all extremities equally.  Extremities. No edema in lower extremities.  Skin. Warm, dry, normal turgor    Condition at time of discharge stable     Medication instructions provided to patient at discharge.     Medication List        START taking these medications      lactobacillus capsule  Take 1 capsule by mouth 2 times daily (with meals) for 10 days     vancomycin 50

## 2024-01-10 ENCOUNTER — CARE COORDINATION (OUTPATIENT)
Dept: CASE MANAGEMENT | Age: 84
End: 2024-01-10

## 2024-01-10 NOTE — CARE COORDINATION
Remote Patient Monitoring (RPM) program for in-home monitoring: Patient declined.     Care Transitions Subsequent and Final Call    Subsequent and Final Calls  Do you have any ongoing symptoms?: No  Have your medications changed?: No  Do you have any questions related to your medications?: No  Do you currently have any active services?: No  Are you currently active with any services?: Home Health  Do you have any needs or concerns that I can assist you with?: No  Identified Barriers: None  Care Transitions Interventions  No Identified Needs  Other Interventions:             LPN Care Coordinator provided contact information for future needs. Plan for follow-up call in 5-7 days based on severity of symptoms and risk factors.  Plan for next call: symptom management-.  self management-.    Christiana Fung LPN

## 2024-01-12 ENCOUNTER — OFFICE VISIT (OUTPATIENT)
Dept: FAMILY MEDICINE CLINIC | Age: 84
End: 2024-01-12

## 2024-01-12 VITALS
HEIGHT: 64 IN | DIASTOLIC BLOOD PRESSURE: 78 MMHG | SYSTOLIC BLOOD PRESSURE: 129 MMHG | BODY MASS INDEX: 31.86 KG/M2 | OXYGEN SATURATION: 96 % | HEART RATE: 89 BPM | WEIGHT: 186.6 LBS

## 2024-01-12 DIAGNOSIS — F33.2 SEVERE EPISODE OF RECURRENT MAJOR DEPRESSIVE DISORDER, WITHOUT PSYCHOTIC FEATURES (HCC): ICD-10-CM

## 2024-01-12 DIAGNOSIS — K52.9 COLITIS: ICD-10-CM

## 2024-01-12 DIAGNOSIS — D53.8 OTHER SPECIFIED NUTRITIONAL ANEMIAS: ICD-10-CM

## 2024-01-12 DIAGNOSIS — Z09 HOSPITAL DISCHARGE FOLLOW-UP: Primary | ICD-10-CM

## 2024-01-12 PROBLEM — F32.0 CURRENT MILD EPISODE OF MAJOR DEPRESSIVE DISORDER WITHOUT PRIOR EPISODE (HCC): Status: RESOLVED | Noted: 2021-02-03 | Resolved: 2024-01-12

## 2024-01-12 LAB
BASOPHILS # BLD: 0 K/UL (ref 0–0.2)
BASOPHILS NFR BLD: 0.7 %
DEPRECATED RDW RBC AUTO: 14.3 % (ref 12.4–15.4)
EOSINOPHIL # BLD: 0.1 K/UL (ref 0–0.6)
EOSINOPHIL NFR BLD: 2.4 %
HCT VFR BLD AUTO: 36.2 % (ref 36–48)
HGB BLD-MCNC: 12.3 G/DL (ref 12–16)
LYMPHOCYTES # BLD: 1.7 K/UL (ref 1–5.1)
LYMPHOCYTES NFR BLD: 30 %
MCH RBC QN AUTO: 29.8 PG (ref 26–34)
MCHC RBC AUTO-ENTMCNC: 34 G/DL (ref 31–36)
MCV RBC AUTO: 87.5 FL (ref 80–100)
MONOCYTES # BLD: 0.5 K/UL (ref 0–1.3)
MONOCYTES NFR BLD: 8.7 %
NEUTROPHILS # BLD: 3.3 K/UL (ref 1.7–7.7)
NEUTROPHILS NFR BLD: 58.2 %
PLATELET # BLD AUTO: 184 K/UL (ref 135–450)
PMV BLD AUTO: 9 FL (ref 5–10.5)
RBC # BLD AUTO: 4.13 M/UL (ref 4–5.2)
WBC # BLD AUTO: 5.7 K/UL (ref 4–11)

## 2024-01-12 RX ORDER — VILAZODONE HYDROCHLORIDE 20 MG/1
20 TABLET ORAL DAILY
Qty: 90 TABLET | Refills: 1 | Status: SHIPPED | OUTPATIENT
Start: 2024-01-12

## 2024-01-12 ASSESSMENT — PATIENT HEALTH QUESTIONNAIRE - PHQ9
SUM OF ALL RESPONSES TO PHQ QUESTIONS 1-9: 0
2. FEELING DOWN, DEPRESSED OR HOPELESS: 0
5. POOR APPETITE OR OVEREATING: 0
3. TROUBLE FALLING OR STAYING ASLEEP: 0
7. TROUBLE CONCENTRATING ON THINGS, SUCH AS READING THE NEWSPAPER OR WATCHING TELEVISION: 0
1. LITTLE INTEREST OR PLEASURE IN DOING THINGS: 0
8. MOVING OR SPEAKING SO SLOWLY THAT OTHER PEOPLE COULD HAVE NOTICED. OR THE OPPOSITE, BEING SO FIGETY OR RESTLESS THAT YOU HAVE BEEN MOVING AROUND A LOT MORE THAN USUAL: 0
10. IF YOU CHECKED OFF ANY PROBLEMS, HOW DIFFICULT HAVE THESE PROBLEMS MADE IT FOR YOU TO DO YOUR WORK, TAKE CARE OF THINGS AT HOME, OR GET ALONG WITH OTHER PEOPLE: 0
SUM OF ALL RESPONSES TO PHQ QUESTIONS 1-9: 0
SUM OF ALL RESPONSES TO PHQ9 QUESTIONS 1 & 2: 0
SUM OF ALL RESPONSES TO PHQ QUESTIONS 1-9: 0
9. THOUGHTS THAT YOU WOULD BE BETTER OFF DEAD, OR OF HURTING YOURSELF: 0
6. FEELING BAD ABOUT YOURSELF - OR THAT YOU ARE A FAILURE OR HAVE LET YOURSELF OR YOUR FAMILY DOWN: 0
4. FEELING TIRED OR HAVING LITTLE ENERGY: 0
SUM OF ALL RESPONSES TO PHQ QUESTIONS 1-9: 0

## 2024-01-12 NOTE — PROGRESS NOTES
Post-Discharge Transitional Care  Follow Up      Antonette Brown   YOB: 1940  Accompanied by her , Teo , follow-up for her lower abdominal pain/colitis, improved overall  No fever, no abdominal pain, eating improved  Encouraged her to take her PPI twice a day and still she sees GI for colonoscopy in February    Still struggles with her neuropathy, Lyrica is helping some      Date of Office Visit:  1/12/2024  Date of Hospital Admission: 12/26/23  Date of Hospital Discharge: 12/29/23  Risk of hospital readmission (high >=14%. Medium >=10%) :Readmission Risk Score: 11.5      Care management risk score Rising risk (score 2-5) and Complex Care (Scores >=6): No Risk Score On File     Non face to face  following discharge, date last encounter closed (first attempt may have been earlier): 01/03/2024    Call initiated 2 business days of discharge: Yes      Has follow-up colonoscopy 2/2024, Dr. Jean    ASSESSMENT/PLAN:   Hospital discharge follow-up  Continue meds, take PPI twice daily until seen by GI  -     ME DISCHARGE MEDS RECONCILED W/ CURRENT OUTPATIENT MED LIST    Colitis  Improved, monitor follow-up if any recurrence    Severe episode of recurrent major depressive disorder, without psychotic features (HCC)  Continue Viibryd and Wellbutrin, refilled    Other specified nutritional anemias  Eat healthy, recheck CBC  -     CBC with Auto Differential  -     Comprehensive Metabolic Panel      Medical Decision Making: moderate complexity  No follow-ups on file.    On this date 1/12/2024 I have spent 30 minutes reviewing previous notes, test results and face to face with the patient discussing the diagnosis and importance of compliance with the treatment plan as well as documenting on the day of the visit.       Subjective:   HPI:  Follow up of Hospital problems/diagnosis(es): Discharge summary reviewed, has follow-up with Dr. Jean/PATIENCE   On PPI twice a day for now  Stable, no fever, no abdominal pain

## 2024-01-13 DIAGNOSIS — G62.9 NEUROPATHY: ICD-10-CM

## 2024-01-13 LAB
ALBUMIN SERPL-MCNC: 4.4 G/DL (ref 3.4–5)
ALBUMIN/GLOB SERPL: 1.7 {RATIO} (ref 1.1–2.2)
ALP SERPL-CCNC: 101 U/L (ref 40–129)
ALT SERPL-CCNC: 13 U/L (ref 10–40)
ANION GAP SERPL CALCULATED.3IONS-SCNC: 13 MMOL/L (ref 3–16)
AST SERPL-CCNC: 28 U/L (ref 15–37)
BILIRUB SERPL-MCNC: 0.3 MG/DL (ref 0–1)
BUN SERPL-MCNC: 32 MG/DL (ref 7–20)
CALCIUM SERPL-MCNC: 9 MG/DL (ref 8.3–10.6)
CHLORIDE SERPL-SCNC: 103 MMOL/L (ref 99–110)
CO2 SERPL-SCNC: 24 MMOL/L (ref 21–32)
CREAT SERPL-MCNC: 1 MG/DL (ref 0.6–1.2)
GFR SERPLBLD CREATININE-BSD FMLA CKD-EPI: 55 ML/MIN/{1.73_M2}
GLUCOSE SERPL-MCNC: 135 MG/DL (ref 70–99)
POTASSIUM SERPL-SCNC: 4.6 MMOL/L (ref 3.5–5.1)
PROT SERPL-MCNC: 7 G/DL (ref 6.4–8.2)
SODIUM SERPL-SCNC: 140 MMOL/L (ref 136–145)

## 2024-01-13 RX ORDER — PREGABALIN 75 MG/1
150 CAPSULE ORAL 2 TIMES DAILY
Qty: 120 CAPSULE | Refills: 0 | Status: SHIPPED | OUTPATIENT
Start: 2024-01-13 | End: 2024-02-12

## 2024-01-15 RX ORDER — PREGABALIN 75 MG/1
150 CAPSULE ORAL 2 TIMES DAILY
Qty: 120 CAPSULE | Refills: 0 | OUTPATIENT
Start: 2024-01-15 | End: 2024-02-14

## 2024-01-17 ENCOUNTER — CARE COORDINATION (OUTPATIENT)
Dept: CARE COORDINATION | Age: 84
End: 2024-01-17

## 2024-01-17 NOTE — CARE COORDINATION
Care Transitions Follow Up Call    Patient Current Location:  Home: [address] [place] [id number]    LPN Care Coordinator contacted the patient by telephone to follow up after admission on -.  Verified name and  with patient as identifiers.    Patient: Antonette Brown  Patient : 1940   MRN: [id number]  Reason for Admission: colitis  Discharge Date: 23 RARS: Readmission Risk Score: 11.5      Needs to be reviewed by the provider   Additional needs identified to be addressed with provider: No  none             Method of communication with provider: none.    LPN CC spoke with patient. States she is pretty good. HC has been active & she has been doing her exercises. Reports some sl, occ abdominal discomfort after eating. Denies N/V/D, gas, cramps, dysuria, foul urine odor, sediment in urine, hematuria, fever/chills. States she does have some sl burning that she feels is r/t skin irritation. Has been using a cream on labia & this helps. Appetite OK. Has some loose stools after eating prunes. Denies medication changes. Denies needs.   Jaye Garduno, RAULITO CC  Care Transitions  [phone]    Addressed changes since last contact:  none  Discussed follow-up appointments. If no appointment was previously scheduled, appointment scheduling offered: Yes.   Is follow up appointment scheduled within 7 days of discharge? Yes.    Follow Up  No future appointments.  External follow up appointment(s): EDER HERNANDEZN Care Coordinator reviewed medical action plan and red flags with patient and discussed any barriers to care and/or understanding of plan of care after discharge. Discussed appropriate site of care based on symptoms and resources available to patient including: PCP  Specialist  Urgent care clinics  [facility]  When to call 911. The patient agrees to contact the PCP office for questions related to their healthcare.     Advance Care Planning:   reviewed and current.     Patients top risk

## 2024-01-24 ENCOUNTER — CARE COORDINATION (OUTPATIENT)
Dept: CASE MANAGEMENT | Age: 84
End: 2024-01-24

## 2024-01-24 NOTE — CARE COORDINATION
Care Transitions Follow Up Call    Patient Current Location:  Home: [address] [place] [id number]    Care Transition Nurse contacted the patient by telephone.  Verified name and  with patient as identifiers.    Patient: Antonette Brown  Patient : 1940   MRN: [id number]  Reason for Admission: Colitis, UTI  Discharge Date: 23 RARS: Readmission Risk Score: 11.5      Needs to be reviewed by the provider   Additional needs identified to be addressed with provider: No  none             Method of communication with provider: none.    Patient states that she is doing well, denies any questions or concerns at this time.  [facility] remains active and she is also doing additional exercises independently.  She is ambulating as much as possible and trying to build strength.   She denies any difficulty urinating, burning, and/or fever.  CTN will continue with outreach follow up calls.    Follow Up  No future appointments.  External follow up appointment(s):     Care Transition Nurse reviewed red flags with patient and discussed any barriers to care and/or understanding of plan of care after discharge. Discussed appropriate site of care based on symptoms and resources available to patient including: PCP  Urgent care clinics  When to call 911. The patient agrees to contact the PCP office for questions related to their healthcare.       Offered patient enrollment in the Remote Patient Monitoring (RPM) program for in-home monitoring: Patient declined.     Care Transitions Subsequent and Final Call    Subsequent and Final Calls  Do you have any ongoing symptoms?: No  Have your medications changed?: No  Do you have any questions related to your medications?: No  Do you currently have any active services?: No  Are you currently active with any services?: Home Health  Do you have any needs or concerns that I can assist you with?: No  Identified Barriers: None  Care Transitions Interventions  Other Interventions:

## 2024-01-29 ENCOUNTER — CARE COORDINATION (OUTPATIENT)
Dept: CASE MANAGEMENT | Age: 84
End: 2024-01-29

## 2024-01-29 RX ORDER — HYDROCHLOROTHIAZIDE 12.5 MG/1
12.5 CAPSULE, GELATIN COATED ORAL EVERY MORNING
Qty: 90 CAPSULE | Refills: 1 | Status: SHIPPED | OUTPATIENT
Start: 2024-01-29

## 2024-01-29 NOTE — CARE COORDINATION
Care Transitions Follow Up Call    Patient Current Location:  Home: [address] [place] [id number]    Care Transition Nurse contacted the patient by telephone.  Verified name and  with patient as identifiers.    Patient: Antonette Brown  Patient : 1940   MRN: [id number]  Reason for Admission: Colitis, UTI  Discharge Date: 23 RARS: Readmission Risk Score: 11.5      Needs to be reviewed by the provider   Additional needs identified to be addressed with provider: No  none             Method of communication with provider: none.    Final outreach call:  Patient reports that she is doing well and denies any questions or concerns at this time. CTN will close program & remain available.    Follow Up  No future appointments.  External follow up appointment(s):     Care Transition Nurse reviewed red flags with patient and discussed any barriers to care and/or understanding of plan of care after discharge. Discussed appropriate site of care based on symptoms and resources available to patient including: PCP  Urgent care clinics  When to call 911. The patient agrees to contact the PCP office for questions related to their healthcare.       Offered patient enrollment in the Remote Patient Monitoring (RPM) program for in-home monitoring: Patient declined.     Care Transitions Subsequent and Final Call    Subsequent and Final Calls  Do you have any ongoing symptoms?: No  Have your medications changed?: No  Do you have any questions related to your medications?: No  Do you currently have any active services?: No  Are you currently active with any services?: Home Health  Do you have any needs or concerns that I can assist you with?: No  Identified Barriers: None  Care Transitions Interventions  Other Interventions:             Care Transition Nurse provided contact information for future needs. No further follow-up call indicated   CRISTAL KRISHNAN RN

## 2024-02-14 RX ORDER — ACETAMINOPHEN 160 MG
TABLET,DISINTEGRATING ORAL
COMMUNITY

## 2024-02-14 RX ORDER — VITAMIN B COMPLEX
1 CAPSULE ORAL DAILY
COMMUNITY

## 2024-02-14 NOTE — PROGRESS NOTES
Patient reached __X__ yes  _____ no   VM instructions left ____ yes   phone number ________                                ____ no-office notified          Date ___2/21/24______  Time __0950_____  Arrival __0820  hosp-endo____    Nothing to eat or drink after midnight-follow your doctors prep instructions-this may include taking a second dose of your prep after midnight  Responsible adult 18 or older to stay on site while you are here-drive you home-stay with you after  Follow any instructions your doctors office has given you  Bring a complete list of all your medications and supplements including name,dose,how often taken the day of your procedure  If you normally take the following medications in the morning please do so the AM of your procedure with a small sip of water       Heart,blood pressure,seizure,thyroid or breathing medications-use your inhalers-bring any rescue inhalers with you DOS       DO NOT take blood pressure medications ending in \"tu\" or \"pril\" the AM of procedure or evening prior-DO NOT TAKE LOSARTAN  Dr Cerna patients are not to take any medications the AM of surgery  Take half or your normal dose of any long acting insulins the night before your procedure-do not take any diabetic medications the AM of procedure  Follow your doctors instructions regarding stopping or taking  any blood thinners-if you do not have instructions-call them  Any questions call your doctor  Other _________none_____________________________________________________      VISITOR POLICY(subject to change)             The current policy is 2 visitors per patient.There are no children allowed.Mask at discretion of facility. Visiting hours are 8a-8p.Overnight visitors will be at the discretion of the nurse. All policies are subject to change.

## 2024-02-21 ENCOUNTER — ANESTHESIA EVENT (OUTPATIENT)
Dept: ENDOSCOPY | Age: 84
End: 2024-02-21
Payer: MEDICARE

## 2024-02-21 ENCOUNTER — HOSPITAL ENCOUNTER (OUTPATIENT)
Age: 84
Setting detail: OUTPATIENT SURGERY
Discharge: HOME | End: 2024-02-21
Attending: INTERNAL MEDICINE | Admitting: INTERNAL MEDICINE
Payer: MEDICARE

## 2024-02-21 ENCOUNTER — ANESTHESIA (OUTPATIENT)
Dept: ENDOSCOPY | Age: 84
End: 2024-02-21
Payer: MEDICARE

## 2024-02-21 VITALS
OXYGEN SATURATION: 94 % | HEIGHT: 61 IN | TEMPERATURE: 96.9 F | WEIGHT: 187 LBS | BODY MASS INDEX: 35.3 KG/M2 | HEART RATE: 70 BPM | DIASTOLIC BLOOD PRESSURE: 69 MMHG | RESPIRATION RATE: 12 BRPM | SYSTOLIC BLOOD PRESSURE: 167 MMHG

## 2024-02-21 PROCEDURE — 7100000010 HC PHASE II RECOVERY - FIRST 15 MIN: Performed by: INTERNAL MEDICINE

## 2024-02-21 PROCEDURE — 2500000003 HC RX 250 WO HCPCS: Performed by: NURSE ANESTHETIST, CERTIFIED REGISTERED

## 2024-02-21 PROCEDURE — 7100000000 HC PACU RECOVERY - FIRST 15 MIN: Performed by: INTERNAL MEDICINE

## 2024-02-21 PROCEDURE — 2709999900 HC NON-CHARGEABLE SUPPLY: Performed by: INTERNAL MEDICINE

## 2024-02-21 PROCEDURE — 7100000001 HC PACU RECOVERY - ADDTL 15 MIN: Performed by: INTERNAL MEDICINE

## 2024-02-21 PROCEDURE — 2580000003 HC RX 258: Performed by: STUDENT IN AN ORGANIZED HEALTH CARE EDUCATION/TRAINING PROGRAM

## 2024-02-21 PROCEDURE — 3700000001 HC ADD 15 MINUTES (ANESTHESIA): Performed by: INTERNAL MEDICINE

## 2024-02-21 PROCEDURE — 3609010300 HC COLONOSCOPY W/BIOPSY SINGLE/MULTIPLE: Performed by: INTERNAL MEDICINE

## 2024-02-21 PROCEDURE — 7100000011 HC PHASE II RECOVERY - ADDTL 15 MIN: Performed by: INTERNAL MEDICINE

## 2024-02-21 PROCEDURE — 3700000000 HC ANESTHESIA ATTENDED CARE: Performed by: INTERNAL MEDICINE

## 2024-02-21 PROCEDURE — 88305 TISSUE EXAM BY PATHOLOGIST: CPT

## 2024-02-21 PROCEDURE — 6360000002 HC RX W HCPCS: Performed by: NURSE ANESTHETIST, CERTIFIED REGISTERED

## 2024-02-21 RX ORDER — LIDOCAINE HYDROCHLORIDE 20 MG/ML
INJECTION, SOLUTION INFILTRATION; PERINEURAL PRN
Status: DISCONTINUED | OUTPATIENT
Start: 2024-02-21 | End: 2024-02-21 | Stop reason: SDUPTHER

## 2024-02-21 RX ORDER — PROPOFOL 10 MG/ML
INJECTION, EMULSION INTRAVENOUS PRN
Status: DISCONTINUED | OUTPATIENT
Start: 2024-02-21 | End: 2024-02-21 | Stop reason: SDUPTHER

## 2024-02-21 RX ORDER — SODIUM CHLORIDE 9 MG/ML
INJECTION, SOLUTION INTRAVENOUS CONTINUOUS
Status: DISCONTINUED | OUTPATIENT
Start: 2024-02-21 | End: 2024-02-21 | Stop reason: HOSPADM

## 2024-02-21 RX ADMIN — SODIUM CHLORIDE: 9 INJECTION, SOLUTION INTRAVENOUS at 08:53

## 2024-02-21 RX ADMIN — PROPOFOL 50 MG: 10 INJECTION, EMULSION INTRAVENOUS at 09:05

## 2024-02-21 RX ADMIN — PROPOFOL 30 MG: 10 INJECTION, EMULSION INTRAVENOUS at 09:09

## 2024-02-21 RX ADMIN — PROPOFOL 80 MCG/KG/MIN: 10 INJECTION, EMULSION INTRAVENOUS at 09:06

## 2024-02-21 RX ADMIN — LIDOCAINE HYDROCHLORIDE 50 MG: 20 INJECTION, SOLUTION INFILTRATION; PERINEURAL at 09:05

## 2024-02-21 ASSESSMENT — PAIN - FUNCTIONAL ASSESSMENT: PAIN_FUNCTIONAL_ASSESSMENT: NONE - DENIES PAIN

## 2024-02-21 NOTE — DISCHARGE INSTRUCTIONS
Please call Dr. Jean's office for scheduling of follow up appointments, biopsy results (in 5 business days), or any complications.  Office phone number: [phone]    ENDOSCOPY DISCHARGE INSTRUCTIONS    You may experience some lightheadedness for the next several hours.  Plan on quiet relaxation for the rest of today.  A responsible adult needs to stay with you today.  Because of the medications you received today-do not drive,operate machinery,or sign any contractual agreement for the next 24 hours.  Do not drink any alcoholic beverages or take any unprescribed medications tonight.  Eat bland food and avoid anything greasy or spicy initially-progress to your normal diet gradually.  Diet restrictions as instructed.  You may resume home medications as instructed.  It is not unusual to experience some mild cramping or gas pains, and you may not have a bowel movement for several days.  If you have any of the following problems, notify your physician or return to the hospital emergency room : fever, chills, excessive bleeding, excessive vomiting, difficulty swallowing, uncontrolled pain, increased abdominal distention, shortness of breath or any other problems.  If you had a polyp removed, avoid strenuous activity for 48 hours.Avoid the use of aspirin or related compounds for one week, unless otherwise instructed by your physician.  You may notice a small amount of blood in your next few bowel movements, but if a large amount passes, call your physician.  If you have a sore throat, you may use lozenges or salt water gargles.  If you had biopsy's taken from your procedure call the office for results in 5-7 days.     ANESTHESIA DISCHARGE INSTRUCTIONS    Wear your seatbelt home.  You are under the influence of drugs-do not drink alcohol,drive,operate machinery,or make any important decisions or sign any legal documentsfor 24 hours  A responsible adult needs to be with you for 24 hours.  You may experience

## 2024-02-21 NOTE — ANESTHESIA POSTPROCEDURE EVALUATION
Department of Anesthesiology  Postprocedure Note    Patient: Antonette Brown  MRN: [id number]  YOB: 1940  Date of evaluation: 2/21/2024    Procedure Summary       Date: 02/21/24 Room / Location: [place] [address] / [facility]    Anesthesia Start: 0901 Anesthesia Stop: 0925    Procedure: COLONOSCOPY WITH BIOPSY Diagnosis:       Colitis      (Colitis [K52.9])    Surgeons: Marcelo Jean MD Responsible Provider: Josee Magana MD    Anesthesia Type: MAC ASA Status: 3            Anesthesia Type: No value filed.    Logan Phase I: Logan Score: 10    Logan Phase II: Logan Score: 10    Anesthesia Post Evaluation    Patient location during evaluation: bedside  Patient participation: complete - patient participated  Level of consciousness: awake and alert  Pain score: 1  Airway patency: patent  Nausea & Vomiting: no vomiting  Cardiovascular status: hemodynamically stable  Respiratory status: nonlabored ventilation  Hydration status: stable  Multimodal analgesia pain management approach  Pain management: adequate    No notable events documented.

## 2024-02-21 NOTE — ANESTHESIA PRE PROCEDURE
Department of Anesthesiology  Preprocedure Note       Name:  Antonette Brown   Age:  83 y.o.  :  1940                                          MRN:  [id number]         Date:  2024      Surgeon: Surgeon(s):  Marcelo Jean MD    Procedure: Procedure(s):  COLONOSCOPY DIAGNOSTIC    Medications prior to admission:   Prior to Admission medications    Medication Sig Start Date End Date Taking? Authorizing Provider   Calcium-Phosphorus-Vitamin D (CITRACAL +D3 PO) Take by mouth daily (with breakfast)   Yes Karli Shanks MD   Cholecalciferol (VITAMIN D3) 50 MCG ( UT) CAPS Take by mouth daily (with breakfast)   Yes ProviderKarli MD   b complex vitamins capsule Take 1 capsule by mouth daily   Yes Karli Shanks MD   pregabalin (LYRICA) 75 MG capsule Take 2 capsules by mouth 2 times daily for 30 days. Max Daily Amount: 300 mg  Patient taking differently: Take 1 capsule by mouth 2 times daily. 24  Erica Nava MD   vilazodone HCl (VIIBRYD) 20 MG TABS Take 1 tablet by mouth daily 24   Erica Nava MD   pantoprazole (PROTONIX) 40 MG tablet Take 1 tablet by mouth in the morning and 1 tablet in the evening. 23   Erica Nava MD   miconazole (MICOTIN) 2 % cream Apply topically 2 times daily. 10/2/23   Erica Nava MD   Elastic Bandages & Supports (MEDICAL COMPRESSION STOCKINGS) MISC 1 each by Does not apply route Daily 23   Erica Nava MD   hydroxychloroquine (PLAQUENIL) 200 MG tablet Take 1 tablet by mouth 2 times daily Indications: Arthritis 23   ProviderKarli MD   losartan (COZAAR) 50 MG tablet Take 1 tablet by mouth daily  Patient taking differently: Take 1 tablet by mouth in the morning and 1 tablet in the evening. 23   Erica Nava MD   atorvastatin (LIPITOR) 10 MG tablet TAKE 1 TABLET BY MOUTH EVERY OTHER DAY **CHANGE IN DOSE**  Patient taking differently: Take 1 tablet by mouth every other day 23   Erica Nava MD

## 2024-02-21 NOTE — BRIEF OP NOTE
Brief Postoperative Note      Patient: Antonette Brown  YOB: 1940  MRN: [id number]    Date of Procedure: 2/21/2024    Pre-Op Diagnosis Codes:     * Colitis [K52.9]         Procedure(s):  COLONOSCOPY WITH BIOPSY    Surgeon(s):  Marcelo Jean MD    Anesthesia: Monitor Anesthesia Care    Estimated Blood Loss (mL): Minimal    Complications: None    Specimens:   ID Type Source Tests Collected by Time Destination   A : random rectosigmoid bx, r/o microscopic colitis Tissue Colon SURGICAL PATHOLOGY Marcelo Jean MD 2/21/2024 0918      Findings:   Patient has a stiff and angulated rectosigmoid, making the procedure difficult.  Scattered sigmoid diverticulosis.  Random rectosigmoid biopsies were obtained.  Hemorrhoids.    Plans:  No recall colonoscopy due to advanced age.  May take OTC Metamucil and MiraLAX to keep stools soft.    Electronically signed by Marcelo Jean MD on 2/21/2024 at 9:24 AM

## 2024-02-21 NOTE — PROGRESS NOTES
Discharge instructions review with patient and pt .  Pt discharged via wheelchair. Pt discharged with instructions and all belongings. Pt  taking stable pt home.

## 2024-02-21 NOTE — PROGRESS NOTES
Pt arrived from endo to PACU bay 4. Reported received from endo staff. Pt  arousable to voice. Pt arrives on room air, vitals as charted.

## 2024-02-21 NOTE — PROGRESS NOTES
Pt awake and alert at this time. Pt on RA, and VSS. Pt denies pain and nausea, tolerating PO. Pt meets criteria to be discharged from Phase 1.

## 2024-02-21 NOTE — PROGRESS NOTES
Reviewed pt problem list, history, H&P and assessment preoperatively.  Scope verified using 2 person system.  Family in waiting room.    Electronically signed by Rika Rojas RN on 2/21/2024 at 9:02 AM

## 2024-02-21 NOTE — H&P
Pre-operative History and Physical    Patient: Antonette Brown  : 1940  Acct#:     History Obtained From:  patient    HISTORY OF PRESENT ILLNESS:    The patient is a 83 y.o. female with significant past medical history of abnormal CT rectosigmoid thickening (2023) who presents with for colonoscopy.    Past Medical History:        Diagnosis Date    AR (allergic rhinitis)     Arthritis of knee     Pruis    Colitis     Depression     Erosive gastritis 10/28/2019    EGD 2019, he did with PPI    GERD (gastroesophageal reflux disease)     Hyperlipidemia     Hypertension     Internal hemorrhoids     MI (myocardial infarction) (HCC)     Overweight(278.02)     Viral meningitis 2012     Past Surgical History:        Procedure Laterality Date    BREAST BIOPSY      CHOLECYSTECTOMY, LAPAROSCOPIC      COLONOSCOPY      normal; [facility]    COLONOSCOPY  12/3/13    [facility]ga- polyps    HIP SURGERY Left 2022    LEFT HIP HEMIARTHROPLASTY performed by Parker Palacio MD at [facility] OR    HYSTERECTOMY (CERVIX STATUS UNKNOWN)      AKASH AND BSO (CERVIX REMOVED)      UPPER GASTROINTESTINAL ENDOSCOPY  12/3/13    [facility]tega; antritis     Medications Prior to Admission:   No current facility-administered medications on file prior to encounter.     Current Outpatient Medications on File Prior to Encounter   Medication Sig Dispense Refill    Calcium-Phosphorus-Vitamin D (CITRACAL +D3 PO) Take by mouth daily (with breakfast)      Cholecalciferol (VITAMIN D3) 50 MCG ( UT) CAPS Take by mouth daily (with breakfast)      b complex vitamins capsule Take 1 capsule by mouth daily      pregabalin (LYRICA) 75 MG capsule Take 2 capsules by mouth 2 times daily for 30 days. Max Daily Amount: 300 mg (Patient taking differently: Take 1 capsule by mouth 2 times daily.) 120 capsule 0    vilazodone HCl (VIIBRYD) 20 MG TABS Take 1 tablet by mouth daily 90 tablet 1    pantoprazole (PROTONIX) 40 MG tablet Take 1 tablet by mouth

## 2024-03-08 RX ORDER — PANTOPRAZOLE SODIUM 40 MG/1
40 TABLET, DELAYED RELEASE ORAL
Qty: 90 TABLET | Refills: 1 | Status: SHIPPED | OUTPATIENT
Start: 2024-03-08

## 2024-03-08 NOTE — TELEPHONE ENCOUNTER
Lov 01/12/2024  Lrf 60, 3, 01/13/2024    Medication:   Requested Prescriptions     Pending Prescriptions Disp Refills    pantoprazole (PROTONIX) 40 MG tablet [Pharmacy Med Name: PANTOPRAZOLE SOD DR 40 MG TAB] 90 tablet 1     Sig: TAKE 1 TABLET BY MOUTH EVERY DAY BEFORE BREAKFAST        Last Filled:      Patient Phone Number: [phone] (home)     Last appt: 1/12/2024   Next appt: Visit date not found    Last OARRS:        No data to display

## 2024-03-27 ENCOUNTER — TELEPHONE (OUTPATIENT)
Dept: FAMILY MEDICINE CLINIC | Age: 84
End: 2024-03-27

## 2024-03-27 ENCOUNTER — OFFICE VISIT (OUTPATIENT)
Dept: FAMILY MEDICINE CLINIC | Age: 84
End: 2024-03-27

## 2024-03-27 VITALS
SYSTOLIC BLOOD PRESSURE: 150 MMHG | DIASTOLIC BLOOD PRESSURE: 62 MMHG | OXYGEN SATURATION: 96 % | HEART RATE: 77 BPM | BODY MASS INDEX: 35.71 KG/M2 | WEIGHT: 189 LBS

## 2024-03-27 DIAGNOSIS — E66.01 SEVERE OBESITY (BMI 35.0-39.9) WITH COMORBIDITY (HCC): ICD-10-CM

## 2024-03-27 DIAGNOSIS — R10.32 ACUTE BILATERAL LOWER ABDOMINAL PAIN: ICD-10-CM

## 2024-03-27 DIAGNOSIS — K29.60 EROSIVE GASTRITIS: ICD-10-CM

## 2024-03-27 DIAGNOSIS — E78.5 DYSLIPIDEMIA: ICD-10-CM

## 2024-03-27 DIAGNOSIS — F33.2 SEVERE EPISODE OF RECURRENT MAJOR DEPRESSIVE DISORDER, WITHOUT PSYCHOTIC FEATURES (HCC): ICD-10-CM

## 2024-03-27 DIAGNOSIS — R10.31 ACUTE BILATERAL LOWER ABDOMINAL PAIN: ICD-10-CM

## 2024-03-27 DIAGNOSIS — Z00.00 MEDICARE ANNUAL WELLNESS VISIT, SUBSEQUENT: Primary | ICD-10-CM

## 2024-03-27 DIAGNOSIS — G62.9 NEUROPATHY: ICD-10-CM

## 2024-03-27 DIAGNOSIS — R60.0 LOWER EXTREMITY EDEMA: ICD-10-CM

## 2024-03-27 DIAGNOSIS — I10 ESSENTIAL HYPERTENSION: ICD-10-CM

## 2024-03-27 LAB
ALBUMIN SERPL-MCNC: 4.4 G/DL (ref 3.4–5)
ALBUMIN/GLOB SERPL: 1.8 {RATIO} (ref 1.1–2.2)
ALP SERPL-CCNC: 94 U/L (ref 40–129)
ALT SERPL-CCNC: 14 U/L (ref 10–40)
ANION GAP SERPL CALCULATED.3IONS-SCNC: 14 MMOL/L (ref 3–16)
AST SERPL-CCNC: 17 U/L (ref 15–37)
BASOPHILS # BLD: 0 K/UL (ref 0–0.2)
BASOPHILS NFR BLD: 0.6 %
BILIRUB SERPL-MCNC: 0.4 MG/DL (ref 0–1)
BUN SERPL-MCNC: 30 MG/DL (ref 7–20)
CALCIUM SERPL-MCNC: 9.4 MG/DL (ref 8.3–10.6)
CHLORIDE SERPL-SCNC: 101 MMOL/L (ref 99–110)
CHOLEST SERPL-MCNC: 124 MG/DL (ref 0–199)
CO2 SERPL-SCNC: 27 MMOL/L (ref 21–32)
CREAT SERPL-MCNC: 0.9 MG/DL (ref 0.6–1.2)
DEPRECATED RDW RBC AUTO: 13.7 % (ref 12.4–15.4)
EOSINOPHIL # BLD: 0.1 K/UL (ref 0–0.6)
EOSINOPHIL NFR BLD: 1.7 %
GFR SERPLBLD CREATININE-BSD FMLA CKD-EPI: 62 ML/MIN/{1.73_M2}
GLUCOSE SERPL-MCNC: 98 MG/DL (ref 70–99)
HCT VFR BLD AUTO: 37 % (ref 36–48)
HDLC SERPL-MCNC: 79 MG/DL (ref 40–60)
HGB BLD-MCNC: 12.5 G/DL (ref 12–16)
LDLC SERPL CALC-MCNC: 33 MG/DL
LIPASE SERPL-CCNC: 21 U/L (ref 13–60)
LYMPHOCYTES # BLD: 1.7 K/UL (ref 1–5.1)
LYMPHOCYTES NFR BLD: 26.6 %
MCH RBC QN AUTO: 30.2 PG (ref 26–34)
MCHC RBC AUTO-ENTMCNC: 33.8 G/DL (ref 31–36)
MCV RBC AUTO: 89.4 FL (ref 80–100)
MONOCYTES # BLD: 0.6 K/UL (ref 0–1.3)
MONOCYTES NFR BLD: 8.9 %
NEUTROPHILS # BLD: 3.9 K/UL (ref 1.7–7.7)
NEUTROPHILS NFR BLD: 62.2 %
PLATELET # BLD AUTO: 150 K/UL (ref 135–450)
PMV BLD AUTO: 8.8 FL (ref 5–10.5)
POTASSIUM SERPL-SCNC: 4.8 MMOL/L (ref 3.5–5.1)
PROT SERPL-MCNC: 6.8 G/DL (ref 6.4–8.2)
RBC # BLD AUTO: 4.14 M/UL (ref 4–5.2)
SODIUM SERPL-SCNC: 142 MMOL/L (ref 136–145)
TRIGL SERPL-MCNC: 61 MG/DL (ref 0–150)
VLDLC SERPL CALC-MCNC: 12 MG/DL
WBC # BLD AUTO: 6.3 K/UL (ref 4–11)

## 2024-03-27 RX ORDER — SUCRALFATE ORAL 1 G/10ML
1 SUSPENSION ORAL 2 TIMES DAILY
Qty: 200 ML | Refills: 3 | Status: SHIPPED | OUTPATIENT
Start: 2024-03-27

## 2024-03-27 RX ORDER — PREGABALIN 150 MG/1
150 CAPSULE ORAL 2 TIMES DAILY
Qty: 180 CAPSULE | Refills: 0 | Status: SHIPPED | OUTPATIENT
Start: 2024-03-27 | End: 2024-06-25

## 2024-03-27 ASSESSMENT — PATIENT HEALTH QUESTIONNAIRE - PHQ9
7. TROUBLE CONCENTRATING ON THINGS, SUCH AS READING THE NEWSPAPER OR WATCHING TELEVISION: NOT AT ALL
9. THOUGHTS THAT YOU WOULD BE BETTER OFF DEAD, OR OF HURTING YOURSELF: NOT AT ALL
SUM OF ALL RESPONSES TO PHQ QUESTIONS 1-9: 0
SUM OF ALL RESPONSES TO PHQ QUESTIONS 1-9: 0
1. LITTLE INTEREST OR PLEASURE IN DOING THINGS: NOT AT ALL
4. FEELING TIRED OR HAVING LITTLE ENERGY: NOT AT ALL
8. MOVING OR SPEAKING SO SLOWLY THAT OTHER PEOPLE COULD HAVE NOTICED. OR THE OPPOSITE, BEING SO FIGETY OR RESTLESS THAT YOU HAVE BEEN MOVING AROUND A LOT MORE THAN USUAL: NOT AT ALL
5. POOR APPETITE OR OVEREATING: NOT AT ALL
2. FEELING DOWN, DEPRESSED OR HOPELESS: NOT AT ALL
SUM OF ALL RESPONSES TO PHQ QUESTIONS 1-9: 0
6. FEELING BAD ABOUT YOURSELF - OR THAT YOU ARE A FAILURE OR HAVE LET YOURSELF OR YOUR FAMILY DOWN: NOT AT ALL
SUM OF ALL RESPONSES TO PHQ9 QUESTIONS 1 & 2: 0
3. TROUBLE FALLING OR STAYING ASLEEP: NOT AT ALL
10. IF YOU CHECKED OFF ANY PROBLEMS, HOW DIFFICULT HAVE THESE PROBLEMS MADE IT FOR YOU TO DO YOUR WORK, TAKE CARE OF THINGS AT HOME, OR GET ALONG WITH OTHER PEOPLE: NOT DIFFICULT AT ALL
SUM OF ALL RESPONSES TO PHQ QUESTIONS 1-9: 0

## 2024-03-27 ASSESSMENT — LIFESTYLE VARIABLES: HOW OFTEN DO YOU HAVE A DRINK CONTAINING ALCOHOL: NEVER

## 2024-03-27 NOTE — TELEPHONE ENCOUNTER
[facility]  Progress Note     Clemente Babb  : 1/15/1947    Status: Inpatient  Day #: 3    Attending: Vinod Robin MD  PCP: Yessica Prior      Assessment and Plan     Acute respiratory distress on chronic respiratory failure  2/2 acute C Pt called to let Dr Nava know that she cannot have the CT done until April 2 in [facility]   -750 ml         Recent Labs   Lab  09/01/18   1129  09/02/18   0548  09/04/18   0457   WBC  11.5*  11.1*  13.6*   HGB  14.5  14.5  13.7   HCT  45.3  44.2  42.5   PLT  249  249  254   RBC  4.83  4.76  4.54   MCV  93.8  92.8  93.5   MCH  30.0  30.5  30 9. Chronic/healed left rib fractures. 10. Lesser incidental findings as above.      Dictated by (CST): Kiki Lau MD on 9/02/2018 at 14:49     Approved by (CST): Kiki Lau MD on 9/02/2018 at 15:00               Medications     • Potassium Chlorid

## 2024-03-27 NOTE — PROGRESS NOTES
Medicare Annual Wellness Visit    Antonette Brown is here for Medicare AWV and Heartburn (Burning feeling /Went to er for it )  Not feeling well today lower abdominal discomfort, had some diarrhea, not able to eat  , No recent antibiotics    Assessment & Plan   Medicare annual wellness visit, subsequent  Healthy diet when able  Stay as active as possible  Continue usual meds    Acute bilateral lower abdominal pain  Needs to hydrate well  Does not wish to go to the emergency room  Will try getting an abdominal CT as an outpatient  If pain worsens and she is unable to keep fluids down,  knows to take her to the ER  Check labs today  -     CT ABDOMEN PELVIS WO CONTRAST Additional Contrast? None; Future  -     Comprehensive Metabolic Panel; Future  -     CBC with Auto Differential; Future  -     Lipase; Future    Erosive gastritis  Trial of Carafate twice a day  -     Comprehensive Metabolic Panel; Future  -     CBC with Auto Differential; Future  -     Lipase; Future    Essential hypertension  Systolic blood pressure elevated due to not feeling well, usually controlled    Severe episode of recurrent major depressive disorder, without psychotic features (HCC)  Continue Wellbutrin  mg a day and Viibryd 20 mg daily for depression  This has been keeping her depression and check    Lower extremity edema  No change, legs are soft, continue elevating and wrapping as needed    Neuropathy  Lyrica helping, continue, refill 150 mg tablet twice a day  -     pregabalin (LYRICA) 150 MG capsule; Take 1 capsule by mouth 2 times daily for 90 days. Max Daily Amount: 300 mg, Disp-180 capsule, R-0Normal    Severe obesity (BMI 35.0-39.9) with comorbidity (HCC)  Unable to exercise much because of her neuropathy  To try to stay as active as possible    Dyslipidemia  Stable on Lipitor 10 mg nightly, continue  -     Lipid Panel; Future    Recommendations for Preventive Services Due: see orders and patient

## 2024-04-02 ENCOUNTER — HOSPITAL ENCOUNTER (OUTPATIENT)
Age: 84
Discharge: HOME | End: 2024-04-02
Payer: MEDICARE

## 2024-04-02 DIAGNOSIS — R10.31 ACUTE BILATERAL LOWER ABDOMINAL PAIN: ICD-10-CM

## 2024-04-02 DIAGNOSIS — R10.32 ACUTE BILATERAL LOWER ABDOMINAL PAIN: ICD-10-CM

## 2024-04-02 PROCEDURE — 74176 CT ABD & PELVIS W/O CONTRAST: CPT

## 2024-04-29 ENCOUNTER — OFFICE VISIT (OUTPATIENT)
Dept: FAMILY MEDICINE CLINIC | Age: 84
End: 2024-04-29
Payer: MEDICARE

## 2024-04-29 VITALS
HEART RATE: 87 BPM | SYSTOLIC BLOOD PRESSURE: 154 MMHG | WEIGHT: 188 LBS | DIASTOLIC BLOOD PRESSURE: 84 MMHG | BODY MASS INDEX: 35.52 KG/M2 | OXYGEN SATURATION: 97 %

## 2024-04-29 DIAGNOSIS — M79.89 REDNESS AND SWELLING OF LOWER LEG: ICD-10-CM

## 2024-04-29 DIAGNOSIS — G62.9 NEUROPATHY: ICD-10-CM

## 2024-04-29 DIAGNOSIS — I10 ESSENTIAL HYPERTENSION: ICD-10-CM

## 2024-04-29 DIAGNOSIS — K29.60 EROSIVE GASTRITIS: Primary | ICD-10-CM

## 2024-04-29 DIAGNOSIS — R23.8 REDNESS AND SWELLING OF LOWER LEG: ICD-10-CM

## 2024-04-29 PROCEDURE — 99214 OFFICE O/P EST MOD 30 MIN: CPT | Performed by: FAMILY MEDICINE

## 2024-04-29 PROCEDURE — 3079F DIAST BP 80-89 MM HG: CPT | Performed by: FAMILY MEDICINE

## 2024-04-29 PROCEDURE — 3077F SYST BP >= 140 MM HG: CPT | Performed by: FAMILY MEDICINE

## 2024-04-29 PROCEDURE — 1123F ACP DISCUSS/DSCN MKR DOCD: CPT | Performed by: FAMILY MEDICINE

## 2024-04-29 RX ORDER — VILAZODONE HYDROCHLORIDE 20 MG/1
20 TABLET ORAL DAILY
Qty: 90 TABLET | Refills: 1 | Status: SHIPPED | OUTPATIENT
Start: 2024-04-29

## 2024-04-29 NOTE — PROGRESS NOTES
Antonette Brown is a 83 y.o. female.    HPI:  Here for severe hrt burn has been off PPI for few days, just using Carafate  Also burning pain in her legs from her neuropathy    Has not felt well enough to take her blood pressure medicine or her Lyrica    Declines hospitalization, wants to try to get better as an outpatient  We will try to adjust her meds and follow closely    No fever, no vomiting, decreased appetite    Recommend good hydration      Meds, vitamins and allergies reviewed with pt    Wt Readings from Last 3 Encounters:   04/29/24 85.3 kg (188 lb)   03/27/24 85.7 kg (189 lb)   02/21/24 84.8 kg (187 lb)       REVIEW OF SYSTEMS:   CONSTITUTIONAL: See history of present illness,   Weight noted ~same  HEENT: No new vision difficulties or ringing in the ears.   RESPIRATORY: No new SOB, PND, orthopnea or cough.   CARDIOVASCULAR: no CP, palpitations or SOB with exertion  GI: See HPI, no blood in her stool  : No urinary frequency, urgency, incontinence hematuria or dysuria.   SKIN: No cyanosis or skin lesions.   MUSCULOSKELETAL: No new muscle or joint pain.  Uses walker  NEUROLOGICAL: No syncope or TIA-like symptoms.   PSYCHIATRIC: Tearful today as she does not feel well  Still on her Wellbutrin and Viibryd, follows with psychiatry    Allergies   Allergen Reactions    Abilify [Aripiprazole]      tremors    Ceftin [Cefuroxime] Other (See Comments)     UPSET STOMACH        Prior to Visit Medications    Medication Sig Taking? Authorizing Provider   vilazodone HCl (VIIBRYD) 20 MG TABS Take 1 tablet by mouth daily Yes Erica Nava MD   pregabalin (LYRICA) 150 MG capsule Take 1 capsule by mouth 2 times daily for 90 days. Max Daily Amount: 300 mg Yes Erica Nava MD   sucralfate (CARAFATE) 1 GM/10ML suspension Take 10 mLs by mouth 2 times daily Yes Erica Nava MD   pantoprazole (PROTONIX) 40 MG tablet TAKE 1 TABLET BY MOUTH EVERY DAY BEFORE BREAKFAST Yes Erica Nava MD   Calcium-Phosphorus-Vitamin D

## 2024-05-03 ENCOUNTER — TELEPHONE (OUTPATIENT)
Dept: FAMILY MEDICINE CLINIC | Age: 84
End: 2024-05-03

## 2024-05-03 NOTE — TELEPHONE ENCOUNTER
Pt  Teo called to let Dr Nava know that pt is doing fine and has an appt on Monday 5/6/24 with the wound

## 2024-06-05 ENCOUNTER — OFFICE VISIT (OUTPATIENT)
Dept: FAMILY MEDICINE CLINIC | Age: 84
End: 2024-06-05
Payer: MEDICARE

## 2024-06-05 VITALS
OXYGEN SATURATION: 98 % | BODY MASS INDEX: 37.6 KG/M2 | SYSTOLIC BLOOD PRESSURE: 155 MMHG | WEIGHT: 199 LBS | DIASTOLIC BLOOD PRESSURE: 78 MMHG | HEART RATE: 75 BPM

## 2024-06-05 DIAGNOSIS — R35.0 URINARY FREQUENCY: Primary | ICD-10-CM

## 2024-06-05 DIAGNOSIS — I10 ESSENTIAL HYPERTENSION: ICD-10-CM

## 2024-06-05 LAB
BILIRUBIN, POC: NORMAL
BLOOD URINE, POC: NORMAL
CLARITY, POC: NORMAL
COLOR, POC: YELLOW
GLUCOSE URINE, POC: NORMAL
KETONES, POC: NORMAL
LEUKOCYTE EST, POC: NORMAL
NITRITE, POC: NORMAL
PH, POC: 7
PROTEIN, POC: NORMAL
SPECIFIC GRAVITY, POC: 1.01
UROBILINOGEN, POC: 0.2

## 2024-06-05 PROCEDURE — 3077F SYST BP >= 140 MM HG: CPT | Performed by: FAMILY MEDICINE

## 2024-06-05 PROCEDURE — 99213 OFFICE O/P EST LOW 20 MIN: CPT | Performed by: FAMILY MEDICINE

## 2024-06-05 PROCEDURE — 81002 URINALYSIS NONAUTO W/O SCOPE: CPT | Performed by: FAMILY MEDICINE

## 2024-06-05 PROCEDURE — 3078F DIAST BP <80 MM HG: CPT | Performed by: FAMILY MEDICINE

## 2024-06-05 PROCEDURE — 1123F ACP DISCUSS/DSCN MKR DOCD: CPT | Performed by: FAMILY MEDICINE

## 2024-06-05 RX ORDER — SULFAMETHOXAZOLE AND TRIMETHOPRIM 400; 80 MG/1; MG/1
1 TABLET ORAL 2 TIMES DAILY
Qty: 14 TABLET | Refills: 0 | Status: SHIPPED | OUTPATIENT
Start: 2024-06-05 | End: 2024-06-06 | Stop reason: SDUPTHER

## 2024-06-05 RX ORDER — SULFAMETHOXAZOLE AND TRIMETHOPRIM 400; 80 MG/1; MG/1
1 TABLET ORAL 2 TIMES DAILY
Qty: 14 TABLET | Refills: 0 | Status: SHIPPED | OUTPATIENT
Start: 2024-06-05 | End: 2024-06-05 | Stop reason: ALTCHOICE

## 2024-06-05 NOTE — TELEPHONE ENCOUNTER
----- Message from Isamar Sauer Averycas sent at 6/5/2024  3:16 PM EDT -----  Regarding: ECC Message to Provider  ECC Message to Provider    Relationship to Patient: Self     Additional Information PATIENT WANTED TO SEND HER PRESCRIPTION TO HER UTI TO THE [facility]  --------------------------------------------------------------------------------------------------------------------------    Call Back Information: OK to leave message on voicemail  Preferred Call Back Number: Phone [phone]

## 2024-06-05 NOTE — PROGRESS NOTES
Antonette Brown is a 83 y.o. female.    HPI:  Here with concern about UTI  Frequent and dysuria for couple days    Recommend she avoid any soap usage in vaginal area, water only  No fever or back pain  Symptoms for a few days, she has had a bad UTI in the past ... She knows to come in   Before symptoms get worse    Blood pressure elevated, taking losartan 50 mg twice a day    Wondering if she can wean back on pantoprazole, cut back to once a day take in the morning before breakfast  Take Carafate at night  Monitor symptoms    Follow-up 1 month    No compression stockings helping    Meds, vitamins and allergies reviewed with pt    Wt Readings from Last 3 Encounters:   06/05/24 90.3 kg (199 lb)   04/29/24 85.3 kg (188 lb)   03/27/24 85.7 kg (189 lb)       REVIEW OF SYSTEMS:   CONSTITUTIONAL: See history of present illness,   Weight noted   HEENT: No new vision difficulties or ringing in the ears.   RESPIRATORY: No new SOB, PND, orthopnea or cough.   CARDIOVASCULAR: no CP, palpitations or SOB with exertion  GI: No nausea, vomiting, diarrhea, constipation, abdominal pain or changes in bowel habits.   : No urinary frequency, urgency, incontinence hematuria or dysuria.   SKIN: No cyanosis or skin lesions.   MUSCULOSKELETAL: No new muscle or joint pain.   NEUROLOGICAL: No syncope or TIA-like symptoms.   PSYCHIATRIC: No anxiety, insomnia or depression     Allergies   Allergen Reactions    Abilify [Aripiprazole]      tremors    Ceftin [Cefuroxime] Other (See Comments)     UPSET STOMACH        Prior to Visit Medications    Medication Sig Taking? Authorizing Provider   sulfamethoxazole-trimethoprim (BACTRIM) 400-80 MG per tablet Take 1 tablet by mouth 2 times daily for 7 days Yes Erica Nava MD   vilazodone HCl (VIIBRYD) 20 MG TABS Take 1 tablet by mouth daily Yes Erica Nava MD   pregabalin (LYRICA) 150 MG capsule Take 1 capsule by mouth 2 times daily for 90 days. Max Daily Amount: 300 mg Yes Erica Nava MD

## 2024-06-06 ENCOUNTER — TELEPHONE (OUTPATIENT)
Dept: FAMILY MEDICINE CLINIC | Age: 84
End: 2024-06-06

## 2024-06-06 RX ORDER — SULFAMETHOXAZOLE AND TRIMETHOPRIM 400; 80 MG/1; MG/1
1 TABLET ORAL 2 TIMES DAILY
Qty: 14 TABLET | Refills: 0 | Status: CANCELLED | OUTPATIENT
Start: 2024-06-06 | End: 2024-06-13

## 2024-06-06 RX ORDER — SULFAMETHOXAZOLE AND TRIMETHOPRIM 400; 80 MG/1; MG/1
1 TABLET ORAL 2 TIMES DAILY
Qty: 14 TABLET | Refills: 0 | Status: SHIPPED | OUTPATIENT
Start: 2024-06-06 | End: 2024-06-13

## 2024-06-06 NOTE — TELEPHONE ENCOUNTER
I called pt to let her know it was sent yesterday and verified it was correct pharm. She said [facility] [place] never [facility] it. Pending please re send

## 2024-06-06 NOTE — TELEPHONE ENCOUNTER
Patient called to advised the Pharmacy has not received the Medication prescription request below.  Patient asking to ensure the prescription was sent.    sulfamethoxazole-trimethoprim (BACTRIM) 400-80 MG per tablet [[id number]]     [facility]/pharmacy #[id number] - [place], [place] - [address] [phone] - F [phone]

## 2024-06-08 LAB
BACTERIA UR CULT: ABNORMAL
ORGANISM: ABNORMAL

## 2024-07-02 ENCOUNTER — OFFICE VISIT (OUTPATIENT)
Dept: FAMILY MEDICINE CLINIC | Age: 84
End: 2024-07-02
Payer: MEDICARE

## 2024-07-02 VITALS
OXYGEN SATURATION: 95 % | HEART RATE: 90 BPM | DIASTOLIC BLOOD PRESSURE: 82 MMHG | SYSTOLIC BLOOD PRESSURE: 120 MMHG | BODY MASS INDEX: 37.22 KG/M2 | WEIGHT: 197 LBS

## 2024-07-02 DIAGNOSIS — R39.9 UTI SYMPTOMS: ICD-10-CM

## 2024-07-02 DIAGNOSIS — K21.9 GASTROESOPHAGEAL REFLUX DISEASE, UNSPECIFIED WHETHER ESOPHAGITIS PRESENT: Primary | ICD-10-CM

## 2024-07-02 LAB
BILIRUBIN, POC: NEGATIVE
BLOOD URINE, POC: NORMAL
CLARITY, POC: NORMAL
COLOR, POC: YELLOW
GLUCOSE URINE, POC: NEGATIVE
KETONES, POC: NEGATIVE
LEUKOCYTE EST, POC: NORMAL
NITRITE, POC: NEGATIVE
PH, POC: 7.5
PROTEIN, POC: NEGATIVE
SPECIFIC GRAVITY, POC: 1.02
UROBILINOGEN, POC: 0.2

## 2024-07-02 PROCEDURE — 81002 URINALYSIS NONAUTO W/O SCOPE: CPT | Performed by: FAMILY MEDICINE

## 2024-07-02 PROCEDURE — 99213 OFFICE O/P EST LOW 20 MIN: CPT | Performed by: FAMILY MEDICINE

## 2024-07-02 PROCEDURE — 3074F SYST BP LT 130 MM HG: CPT | Performed by: FAMILY MEDICINE

## 2024-07-02 PROCEDURE — 1123F ACP DISCUSS/DSCN MKR DOCD: CPT | Performed by: FAMILY MEDICINE

## 2024-07-02 PROCEDURE — 3079F DIAST BP 80-89 MM HG: CPT | Performed by: FAMILY MEDICINE

## 2024-07-02 RX ORDER — SUCRALFATE 1 G/1
1 TABLET ORAL 4 TIMES DAILY
Qty: 120 TABLET | Refills: 3 | Status: SHIPPED | OUTPATIENT
Start: 2024-07-02

## 2024-07-02 SDOH — ECONOMIC STABILITY: FOOD INSECURITY: WITHIN THE PAST 12 MONTHS, THE FOOD YOU BOUGHT JUST DIDN'T LAST AND YOU DIDN'T HAVE MONEY TO GET MORE.: NEVER TRUE

## 2024-07-02 SDOH — ECONOMIC STABILITY: FOOD INSECURITY: WITHIN THE PAST 12 MONTHS, YOU WORRIED THAT YOUR FOOD WOULD RUN OUT BEFORE YOU GOT MONEY TO BUY MORE.: NEVER TRUE

## 2024-07-02 SDOH — ECONOMIC STABILITY: INCOME INSECURITY: HOW HARD IS IT FOR YOU TO PAY FOR THE VERY BASICS LIKE FOOD, HOUSING, MEDICAL CARE, AND HEATING?: NOT HARD AT ALL

## 2024-07-02 ASSESSMENT — ENCOUNTER SYMPTOMS
ABDOMINAL PAIN: 1
RESPIRATORY NEGATIVE: 1

## 2024-07-02 NOTE — PROGRESS NOTES
Antonette Brown (:  1940) is a 83 y.o. female,Established patient, here for evaluation of the following chief complaint(s):  Abdominal Pain (Pt Has had stomach pains/) and Urinary Tract Infection (Pt feels llike she is having some burning and discomfort and thinks it could be a uti  )      Assessment & Plan   1. Gastroesophageal reflux disease, unspecified whether esophagitis present  -     sucralfate (CARAFATE) 1 GM tablet; Take 1 tablet by mouth 4 times daily, Disp-120 tablet, R-3Normal  Continue Protonix  2. UTI symptoms  -     POCT Urinalysis no Micro  -     Culture, Urine      Return if symptoms worsen or fail to improve.       Subjective   HPI  Developed cramps in lower abdomen starting .Seemed more in mid epigastric area yesterday and has GERD symptoms with acid coming up into her mouth.  She takes Protonix routinely.  She was given some liquid sucralfate which she states she has only taking once a day in the evening now.  We discussed switching to tablets which she is amenable to and taking before meals and at bedtime   Saw Urologist last week and did not have UTI.Urologist gave her Pyridium, but Dr Nava put her on sulfa a few weeks earlier.  She is on Protonix and also sucralfate  No chest pain, cough, SOB    Review of Systems   Constitutional:  Positive for appetite change. Negative for chills, diaphoresis and fever.   Respiratory: Negative.     Cardiovascular: Negative.    Gastrointestinal:  Positive for abdominal pain.   Endocrine: Negative.    Genitourinary:         Pressure when she urinates          Objective   Physical Exam  Constitutional:       General: She is not in acute distress.     Appearance: She is not ill-appearing, toxic-appearing or diaphoretic.   HENT:      Head: Normocephalic and atraumatic.   Eyes:      Conjunctiva/sclera: Conjunctivae normal.   Cardiovascular:      Rate and Rhythm: Normal rate and regular rhythm.   Pulmonary:      Effort: Pulmonary effort is normal.

## 2024-07-03 DIAGNOSIS — G62.9 NEUROPATHY: ICD-10-CM

## 2024-07-03 LAB — BACTERIA UR CULT: NORMAL

## 2024-07-03 RX ORDER — PREGABALIN 150 MG/1
150 CAPSULE ORAL 2 TIMES DAILY
Qty: 180 CAPSULE | Refills: 0 | Status: SHIPPED | OUTPATIENT
Start: 2024-07-03 | End: 2024-10-01

## 2024-07-03 NOTE — TELEPHONE ENCOUNTER
Medication:   Requested Prescriptions     Pending Prescriptions Disp Refills    pregabalin (LYRICA) 150 MG capsule [Pharmacy Med Name: PREGABALIN 150 MG CAPSULE] 180 capsule 0     Sig: Take 1 capsule by mouth 2 times daily for 90 days. Max Daily Amount: 300 mg        Last Filled: 03/27/2024    Patient Phone Number: [phone] (home)     Last appt: 7/2/2024   Next appt: 7/8/2024    Last OARRS:        No data to display

## 2024-07-08 ENCOUNTER — OFFICE VISIT (OUTPATIENT)
Dept: FAMILY MEDICINE CLINIC | Age: 84
End: 2024-07-08
Payer: MEDICARE

## 2024-07-08 VITALS
RESPIRATION RATE: 20 BRPM | HEART RATE: 82 BPM | DIASTOLIC BLOOD PRESSURE: 73 MMHG | BODY MASS INDEX: 36.09 KG/M2 | OXYGEN SATURATION: 97 % | TEMPERATURE: 97.6 F | SYSTOLIC BLOOD PRESSURE: 147 MMHG | WEIGHT: 191 LBS

## 2024-07-08 DIAGNOSIS — R11.0 NAUSEA: ICD-10-CM

## 2024-07-08 DIAGNOSIS — G62.9 NEUROPATHY: ICD-10-CM

## 2024-07-08 DIAGNOSIS — I10 ESSENTIAL HYPERTENSION: ICD-10-CM

## 2024-07-08 DIAGNOSIS — K29.60 EROSIVE GASTRITIS: Primary | ICD-10-CM

## 2024-07-08 PROCEDURE — 1123F ACP DISCUSS/DSCN MKR DOCD: CPT | Performed by: FAMILY MEDICINE

## 2024-07-08 PROCEDURE — 3077F SYST BP >= 140 MM HG: CPT | Performed by: FAMILY MEDICINE

## 2024-07-08 PROCEDURE — 3078F DIAST BP <80 MM HG: CPT | Performed by: FAMILY MEDICINE

## 2024-07-08 PROCEDURE — 99214 OFFICE O/P EST MOD 30 MIN: CPT | Performed by: FAMILY MEDICINE

## 2024-07-08 RX ORDER — SUCRALFATE ORAL 1 G/10ML
1 SUSPENSION ORAL 4 TIMES DAILY
Qty: 1200 ML | Refills: 3 | Status: SHIPPED | OUTPATIENT
Start: 2024-07-08

## 2024-07-08 RX ORDER — ONDANSETRON 4 MG/1
4 TABLET, FILM COATED ORAL DAILY PRN
Qty: 30 TABLET | Refills: 0 | Status: SHIPPED | OUTPATIENT
Start: 2024-07-08

## 2024-07-08 NOTE — PROGRESS NOTES
Antonette Brown is a 83 y.o. female.    HPI:  Gi distress after  fate was changed from liquid to tablet  Burning in her GI tract  Some nausea also    We will switch her back to liquid, if no better to emergency room    Recheck blood pressure improved  Meds, vitamins and allergies reviewed with pt  Wt Readings from Last 3 Encounters:   07/08/24 86.6 kg (191 lb)   07/02/24 89.4 kg (197 lb)   06/05/24 90.3 kg (199 lb)       REVIEW OF SYSTEMS:   CONSTITUTIONAL: See history of present illness,   Weight noted   HEENT: No new vision difficulties or ringing in the ears.   RESPIRATORY: No new SOB, PND, orthopnea or cough.   CARDIOVASCULAR: no CP, palpitations or SOB with exertion  GI: Complains of burning abdominal discomfort  : Burns with urination at times, recommend using Vaseline ointment vaginally  SKIN: No cyanosis or skin lesions.   MUSCULOSKELETAL: No new muscle or joint pain.   NEUROLOGICAL: No syncope or TIA-like symptoms.   PSYCHIATRIC: No anxiety, insomnia or depression     Allergies   Allergen Reactions    Abilify [Aripiprazole]      tremors    Ceftin [Cefuroxime] Other (See Comments)     UPSET STOMACH        Prior to Visit Medications    Medication Sig Taking? Authorizing Provider   sucralfate (CARAFATE) 1 GM/10ML suspension Take 10 mLs by mouth 4 times daily Yes Eirca Nava MD   ondansetron (ZOFRAN) 4 MG tablet Take 1 tablet by mouth daily as needed for Nausea or Vomiting Yes Erica Nava MD   pregabalin (LYRICA) 150 MG capsule Take 1 capsule by mouth 2 times daily for 90 days. Max Daily Amount: 300 mg Yes Erica Nava MD   vilazodone HCl (VIIBRYD) 20 MG TABS Take 1 tablet by mouth daily Yes Erica Nava MD   pantoprazole (PROTONIX) 40 MG tablet TAKE 1 TABLET BY MOUTH EVERY DAY BEFORE BREAKFAST Yes Erica Nava MD   Calcium-Phosphorus-Vitamin D (CITRACAL +D3 PO) Take by mouth daily (with breakfast) Yes Karli Shanks MD   Cholecalciferol (VITAMIN D3) 50 MCG (2000 UT) CAPS Take by

## 2024-07-09 ENCOUNTER — TELEPHONE (OUTPATIENT)
Dept: FAMILY MEDICINE CLINIC | Age: 84
End: 2024-07-09

## 2024-07-24 DIAGNOSIS — I10 ESSENTIAL (PRIMARY) HYPERTENSION: ICD-10-CM

## 2024-07-24 RX ORDER — LOSARTAN POTASSIUM 50 MG/1
50 TABLET ORAL 2 TIMES DAILY
Qty: 180 TABLET | Refills: 3 | Status: SHIPPED | OUTPATIENT
Start: 2024-07-24

## 2024-07-24 NOTE — TELEPHONE ENCOUNTER
Medication:   Requested Prescriptions     Pending Prescriptions Disp Refills    losartan (COZAAR) 50 MG tablet [Pharmacy Med Name: LOSARTAN POTASSIUM 50 MG TAB] 180 tablet 3     Sig: TAKE 1 TABLET BY MOUTH TWICE A DAY WITH FOOD       Last Filled:  06/08/2023, 180, 3    Patient Phone Number: [phone] (home)     Last appt: 7/8/2024   Next appt: Visit date not found    Lab Results   Component Value Date     03/27/2024    K 4.8 03/27/2024     03/27/2024    CO2 27 03/27/2024    BUN 30 (H) 03/27/2024    CREATININE 0.9 03/27/2024    GLUCOSE 98 03/27/2024    CALCIUM 9.4 03/27/2024    BILITOT 0.4 03/27/2024    ALKPHOS 94 03/27/2024    AST 17 03/27/2024    ALT 14 03/27/2024    LABGLOM 62 03/27/2024    GFRAA 57 (A) 10/07/2022    AGRATIO 1.8 03/27/2024    GLOB 3.1 07/27/2021

## 2024-08-23 RX ORDER — ATORVASTATIN CALCIUM 10 MG/1
TABLET, FILM COATED ORAL
Qty: 45 TABLET | Refills: 3 | Status: SHIPPED | OUTPATIENT
Start: 2024-08-23

## 2024-08-23 NOTE — TELEPHONE ENCOUNTER
Medication:   Requested Prescriptions     Pending Prescriptions Disp Refills    atorvastatin (LIPITOR) 10 MG tablet [Pharmacy Med Name: ATORVASTATIN 10 MG TABLET] 45 tablet 3     Sig: TAKE 1 TABLET BY MOUTH EVERY OTHER DAY **CHANGE IN DOSE**       Last Filled:      Patient Phone Number: [phone] (home)     Last appt: 7/8/2024   Next appt: Visit date not found    Last Lipid:   Lab Results   Component Value Date/Time    CHOL 124 03/27/2024 01:29 PM    TRIG 61 03/27/2024 01:29 PM    HDL 79 03/27/2024 01:29 PM    HDL 62 02/13/2012 08:08 AM

## 2024-08-29 RX ORDER — PANTOPRAZOLE SODIUM 40 MG/1
40 TABLET, DELAYED RELEASE ORAL
Qty: 90 TABLET | Refills: 1 | Status: SHIPPED | OUTPATIENT
Start: 2024-08-29

## 2024-08-29 NOTE — TELEPHONE ENCOUNTER
Medication:   Requested Prescriptions     Pending Prescriptions Disp Refills    pantoprazole (PROTONIX) 40 MG tablet [Pharmacy Med Name: PANTOPRAZOLE SOD DR 40 MG TAB] 90 tablet 1     Sig: TAKE 1 TABLET BY MOUTH EVERY DAY BEFORE BREAKFAST        Last Filled:  90.1  03.08.2024    Patient Phone Number: [phone] (home)     Last appt: 7/8/2024   Next appt: Visit date not found    Last OARRS:        No data to display

## 2024-09-03 ENCOUNTER — HOSPITAL ENCOUNTER (OUTPATIENT)
Dept: WOMENS IMAGING | Age: 84
Discharge: HOME | End: 2024-09-03
Payer: MEDICARE

## 2024-09-03 VITALS — WEIGHT: 190 LBS | BODY MASS INDEX: 35.87 KG/M2 | HEIGHT: 61 IN

## 2024-09-03 DIAGNOSIS — Z12.31 VISIT FOR SCREENING MAMMOGRAM: ICD-10-CM

## 2024-09-03 PROCEDURE — 77063 BREAST TOMOSYNTHESIS BI: CPT

## 2024-10-20 ENCOUNTER — APPOINTMENT (OUTPATIENT)
Dept: GENERAL RADIOLOGY | Age: 84
DRG: 481 | End: 2024-10-20
Payer: MEDICARE

## 2024-10-20 ENCOUNTER — ANESTHESIA EVENT (OUTPATIENT)
Dept: OPERATING ROOM | Age: 84
End: 2024-10-20
Payer: MEDICARE

## 2024-10-20 ENCOUNTER — HOSPITAL ENCOUNTER (INPATIENT)
Age: 84
LOS: 4 days | Discharge: NURSING, REHAB OR LONG-TERM CARE | DRG: 481 | End: 2024-10-24
Attending: EMERGENCY MEDICINE | Admitting: INTERNAL MEDICINE
Payer: MEDICARE

## 2024-10-20 ENCOUNTER — ANESTHESIA (OUTPATIENT)
Dept: OPERATING ROOM | Age: 84
End: 2024-10-20
Payer: MEDICARE

## 2024-10-20 DIAGNOSIS — S72.001A CLOSED FRACTURE OF RIGHT HIP, INITIAL ENCOUNTER (HCC): Primary | ICD-10-CM

## 2024-10-20 DIAGNOSIS — N39.0 ACUTE LOWER UTI: ICD-10-CM

## 2024-10-20 LAB
ALBUMIN SERPL-MCNC: 3.4 G/DL (ref 3.4–5)
ALBUMIN/GLOB SERPL: 1.4 {RATIO} (ref 1.1–2.2)
ALP SERPL-CCNC: 75 U/L (ref 40–129)
ALT SERPL-CCNC: 9 U/L (ref 10–40)
ANION GAP SERPL CALCULATED.3IONS-SCNC: 9 MMOL/L (ref 3–16)
AST SERPL-CCNC: 17 U/L (ref 15–37)
BACTERIA URNS QL MICRO: ABNORMAL /HPF
BASOPHILS # BLD: 0 K/UL (ref 0–0.2)
BASOPHILS NFR BLD: 0.3 %
BILIRUB SERPL-MCNC: 0.5 MG/DL (ref 0–1)
BILIRUB UR QL STRIP.AUTO: NEGATIVE
BUN SERPL-MCNC: 22 MG/DL (ref 7–20)
CALCIUM SERPL-MCNC: 8.4 MG/DL (ref 8.3–10.6)
CHLORIDE SERPL-SCNC: 100 MMOL/L (ref 99–110)
CLARITY UR: CLEAR
CO2 SERPL-SCNC: 26 MMOL/L (ref 21–32)
COLOR UR: YELLOW
CREAT SERPL-MCNC: 0.9 MG/DL (ref 0.6–1.2)
DEPRECATED RDW RBC AUTO: 13.7 % (ref 12.4–15.4)
EOSINOPHIL # BLD: 0 K/UL (ref 0–0.6)
EOSINOPHIL NFR BLD: 0.1 %
EPI CELLS #/AREA URNS AUTO: 3 /HPF (ref 0–5)
GFR SERPLBLD CREATININE-BSD FMLA CKD-EPI: 62 ML/MIN/{1.73_M2}
GLUCOSE SERPL-MCNC: 123 MG/DL (ref 70–99)
GLUCOSE UR STRIP.AUTO-MCNC: NEGATIVE MG/DL
HCT VFR BLD AUTO: 33.6 % (ref 36–48)
HGB BLD-MCNC: 11.5 G/DL (ref 12–16)
HGB UR QL STRIP.AUTO: ABNORMAL
HYALINE CASTS #/AREA URNS AUTO: 2 /LPF (ref 0–8)
KETONES UR STRIP.AUTO-MCNC: 15 MG/DL
LEUKOCYTE ESTERASE UR QL STRIP.AUTO: ABNORMAL
LYMPHOCYTES # BLD: 0.8 K/UL (ref 1–5.1)
LYMPHOCYTES NFR BLD: 8.8 %
MCH RBC QN AUTO: 29.8 PG (ref 26–34)
MCHC RBC AUTO-ENTMCNC: 34.1 G/DL (ref 31–36)
MCV RBC AUTO: 87.4 FL (ref 80–100)
MONOCYTES # BLD: 1.2 K/UL (ref 0–1.3)
MONOCYTES NFR BLD: 12.7 %
NEUTROPHILS # BLD: 7.1 K/UL (ref 1.7–7.7)
NEUTROPHILS NFR BLD: 78.1 %
NITRITE UR QL STRIP.AUTO: NEGATIVE
PH UR STRIP.AUTO: 6 [PH] (ref 5–8)
PLATELET # BLD AUTO: 113 K/UL (ref 135–450)
PMV BLD AUTO: 8.8 FL (ref 5–10.5)
POTASSIUM SERPL-SCNC: 3.6 MMOL/L (ref 3.5–5.1)
PROT SERPL-MCNC: 5.8 G/DL (ref 6.4–8.2)
PROT UR STRIP.AUTO-MCNC: 300 MG/DL
RBC # BLD AUTO: 3.85 M/UL (ref 4–5.2)
RBC CLUMPS #/AREA URNS AUTO: 7 /HPF (ref 0–4)
REASON FOR REJECTION: NORMAL
REJECTED TEST: NORMAL
SODIUM SERPL-SCNC: 135 MMOL/L (ref 136–145)
SP GR UR STRIP.AUTO: >=1.03 (ref 1–1.03)
UA COMPLETE W REFLEX CULTURE PNL UR: YES
UA DIPSTICK W REFLEX MICRO PNL UR: YES
URN SPEC COLLECT METH UR: ABNORMAL
UROBILINOGEN UR STRIP-ACNC: 1 E.U./DL
WBC # BLD AUTO: 9.1 K/UL (ref 4–11)
WBC #/AREA URNS AUTO: 53 /HPF (ref 0–5)

## 2024-10-20 PROCEDURE — 2580000003 HC RX 258: Performed by: EMERGENCY MEDICINE

## 2024-10-20 PROCEDURE — 93005 ELECTROCARDIOGRAM TRACING: CPT | Performed by: EMERGENCY MEDICINE

## 2024-10-20 PROCEDURE — 73552 X-RAY EXAM OF FEMUR 2/>: CPT

## 2024-10-20 PROCEDURE — 6360000002 HC RX W HCPCS: Performed by: EMERGENCY MEDICINE

## 2024-10-20 PROCEDURE — 2580000003 HC RX 258: Performed by: ANESTHESIOLOGY

## 2024-10-20 PROCEDURE — 3600000004 HC SURGERY LEVEL 4 BASE: Performed by: ORTHOPAEDIC SURGERY

## 2024-10-20 PROCEDURE — 6360000002 HC RX W HCPCS: Performed by: ANESTHESIOLOGY

## 2024-10-20 PROCEDURE — 2709999900 HC NON-CHARGEABLE SUPPLY: Performed by: ORTHOPAEDIC SURGERY

## 2024-10-20 PROCEDURE — 73502 X-RAY EXAM HIP UNI 2-3 VIEWS: CPT

## 2024-10-20 PROCEDURE — 6370000000 HC RX 637 (ALT 250 FOR IP): Performed by: INTERNAL MEDICINE

## 2024-10-20 PROCEDURE — 6360000002 HC RX W HCPCS

## 2024-10-20 PROCEDURE — 81001 URINALYSIS AUTO W/SCOPE: CPT

## 2024-10-20 PROCEDURE — 6370000000 HC RX 637 (ALT 250 FOR IP): Performed by: EMERGENCY MEDICINE

## 2024-10-20 PROCEDURE — 0QS606Z REPOSITION RIGHT UPPER FEMUR WITH INTRAMEDULLARY INTERNAL FIXATION DEVICE, OPEN APPROACH: ICD-10-PCS | Performed by: ORTHOPAEDIC SURGERY

## 2024-10-20 PROCEDURE — 85025 COMPLETE CBC W/AUTO DIFF WBC: CPT

## 2024-10-20 PROCEDURE — 2500000003 HC RX 250 WO HCPCS: Performed by: ORTHOPAEDIC SURGERY

## 2024-10-20 PROCEDURE — 6360000002 HC RX W HCPCS: Performed by: INTERNAL MEDICINE

## 2024-10-20 PROCEDURE — 96375 TX/PRO/DX INJ NEW DRUG ADDON: CPT

## 2024-10-20 PROCEDURE — 80053 COMPREHEN METABOLIC PANEL: CPT

## 2024-10-20 PROCEDURE — 71045 X-RAY EXAM CHEST 1 VIEW: CPT

## 2024-10-20 PROCEDURE — 2720000010 HC SURG SUPPLY STERILE: Performed by: ORTHOPAEDIC SURGERY

## 2024-10-20 PROCEDURE — 87086 URINE CULTURE/COLONY COUNT: CPT

## 2024-10-20 PROCEDURE — 6370000000 HC RX 637 (ALT 250 FOR IP): Performed by: STUDENT IN AN ORGANIZED HEALTH CARE EDUCATION/TRAINING PROGRAM

## 2024-10-20 PROCEDURE — 3700000000 HC ANESTHESIA ATTENDED CARE: Performed by: ORTHOPAEDIC SURGERY

## 2024-10-20 PROCEDURE — 1200000000 HC SEMI PRIVATE

## 2024-10-20 PROCEDURE — 51702 INSERT TEMP BLADDER CATH: CPT

## 2024-10-20 PROCEDURE — 99285 EMERGENCY DEPT VISIT HI MDM: CPT

## 2024-10-20 PROCEDURE — A4217 STERILE WATER/SALINE, 500 ML: HCPCS | Performed by: ORTHOPAEDIC SURGERY

## 2024-10-20 PROCEDURE — C1769 GUIDE WIRE: HCPCS | Performed by: ORTHOPAEDIC SURGERY

## 2024-10-20 PROCEDURE — 73501 X-RAY EXAM HIP UNI 1 VIEW: CPT

## 2024-10-20 PROCEDURE — 96374 THER/PROPH/DIAG INJ IV PUSH: CPT

## 2024-10-20 PROCEDURE — 7100000001 HC PACU RECOVERY - ADDTL 15 MIN: Performed by: ORTHOPAEDIC SURGERY

## 2024-10-20 PROCEDURE — 2580000003 HC RX 258: Performed by: INTERNAL MEDICINE

## 2024-10-20 PROCEDURE — 73590 X-RAY EXAM OF LOWER LEG: CPT

## 2024-10-20 PROCEDURE — 3700000001 HC ADD 15 MINUTES (ANESTHESIA): Performed by: ORTHOPAEDIC SURGERY

## 2024-10-20 PROCEDURE — 2500000003 HC RX 250 WO HCPCS: Performed by: EMERGENCY MEDICINE

## 2024-10-20 PROCEDURE — 6360000002 HC RX W HCPCS: Performed by: ORTHOPAEDIC SURGERY

## 2024-10-20 PROCEDURE — 7100000000 HC PACU RECOVERY - FIRST 15 MIN: Performed by: ORTHOPAEDIC SURGERY

## 2024-10-20 PROCEDURE — 2580000003 HC RX 258: Performed by: ORTHOPAEDIC SURGERY

## 2024-10-20 PROCEDURE — 2500000003 HC RX 250 WO HCPCS: Performed by: ANESTHESIOLOGY

## 2024-10-20 PROCEDURE — C1713 ANCHOR/SCREW BN/BN,TIS/BN: HCPCS | Performed by: ORTHOPAEDIC SURGERY

## 2024-10-20 PROCEDURE — 3600000014 HC SURGERY LEVEL 4 ADDTL 15MIN: Performed by: ORTHOPAEDIC SURGERY

## 2024-10-20 PROCEDURE — 36415 COLL VENOUS BLD VENIPUNCTURE: CPT

## 2024-10-20 DEVICE — SCREW BNE L34MM DIA5MM TIB LT GRN TI ST CANN LOK FULL THRD: Type: IMPLANTABLE DEVICE | Site: HIP | Status: FUNCTIONAL

## 2024-10-20 DEVICE — NAIL IM L235MM DIA10MM 130DEG SHT R PROX FEM GRN TI CANN: Type: IMPLANTABLE DEVICE | Site: HIP | Status: FUNCTIONAL

## 2024-10-20 DEVICE — SCREW BNE L85MM DIA10.35MM G TI CANN PERF FOR PROX FEM: Type: IMPLANTABLE DEVICE | Site: HIP | Status: FUNCTIONAL

## 2024-10-20 RX ORDER — MORPHINE SULFATE 2 MG/ML
2 INJECTION, SOLUTION INTRAMUSCULAR; INTRAVENOUS
Status: DISCONTINUED | OUTPATIENT
Start: 2024-10-20 | End: 2024-10-24 | Stop reason: HOSPADM

## 2024-10-20 RX ORDER — HYDROCODONE BITARTRATE AND ACETAMINOPHEN 5; 325 MG/1; MG/1
1 TABLET ORAL EVERY 4 HOURS PRN
Status: DISCONTINUED | OUTPATIENT
Start: 2024-10-20 | End: 2024-10-20

## 2024-10-20 RX ORDER — SODIUM CHLORIDE 0.9 % (FLUSH) 0.9 %
5-40 SYRINGE (ML) INJECTION EVERY 12 HOURS SCHEDULED
Status: DISCONTINUED | OUTPATIENT
Start: 2024-10-20 | End: 2024-10-20 | Stop reason: HOSPADM

## 2024-10-20 RX ORDER — ONDANSETRON 2 MG/ML
INJECTION INTRAMUSCULAR; INTRAVENOUS
Status: DISCONTINUED | OUTPATIENT
Start: 2024-10-20 | End: 2024-10-20 | Stop reason: SDUPTHER

## 2024-10-20 RX ORDER — LACTOBACILLUS RHAMNOSUS GG 10B CELL
1 CAPSULE ORAL 2 TIMES DAILY WITH MEALS
Status: DISCONTINUED | OUTPATIENT
Start: 2024-10-20 | End: 2024-10-24 | Stop reason: HOSPADM

## 2024-10-20 RX ORDER — SENNOSIDES A AND B 8.6 MG/1
1 TABLET, FILM COATED ORAL DAILY PRN
Status: DISCONTINUED | OUTPATIENT
Start: 2024-10-20 | End: 2024-10-24 | Stop reason: HOSPADM

## 2024-10-20 RX ORDER — SODIUM CHLORIDE 9 MG/ML
INJECTION, SOLUTION INTRAVENOUS PRN
Status: DISCONTINUED | OUTPATIENT
Start: 2024-10-20 | End: 2024-10-24 | Stop reason: HOSPADM

## 2024-10-20 RX ORDER — SODIUM CHLORIDE 0.9 % (FLUSH) 0.9 %
5-40 SYRINGE (ML) INJECTION EVERY 12 HOURS SCHEDULED
Status: DISCONTINUED | OUTPATIENT
Start: 2024-10-20 | End: 2024-10-24 | Stop reason: HOSPADM

## 2024-10-20 RX ORDER — NALOXONE HYDROCHLORIDE 0.4 MG/ML
INJECTION, SOLUTION INTRAMUSCULAR; INTRAVENOUS; SUBCUTANEOUS PRN
Status: DISCONTINUED | OUTPATIENT
Start: 2024-10-20 | End: 2024-10-20 | Stop reason: HOSPADM

## 2024-10-20 RX ORDER — ONDANSETRON 2 MG/ML
4 INJECTION INTRAMUSCULAR; INTRAVENOUS EVERY 6 HOURS PRN
Status: DISCONTINUED | OUTPATIENT
Start: 2024-10-20 | End: 2024-10-24 | Stop reason: HOSPADM

## 2024-10-20 RX ORDER — HYDROMORPHONE HYDROCHLORIDE 2 MG/ML
INJECTION, SOLUTION INTRAMUSCULAR; INTRAVENOUS; SUBCUTANEOUS
Status: COMPLETED
Start: 2024-10-20 | End: 2024-10-20

## 2024-10-20 RX ORDER — SODIUM CHLORIDE, SODIUM LACTATE, POTASSIUM CHLORIDE, CALCIUM CHLORIDE 600; 310; 30; 20 MG/100ML; MG/100ML; MG/100ML; MG/100ML
INJECTION, SOLUTION INTRAVENOUS CONTINUOUS
Status: DISCONTINUED | OUTPATIENT
Start: 2024-10-20 | End: 2024-10-20

## 2024-10-20 RX ORDER — MAGNESIUM HYDROXIDE/ALUMINUM HYDROXICE/SIMETHICONE 120; 1200; 1200 MG/30ML; MG/30ML; MG/30ML
30 SUSPENSION ORAL EVERY 6 HOURS PRN
Status: DISCONTINUED | OUTPATIENT
Start: 2024-10-20 | End: 2024-10-24 | Stop reason: HOSPADM

## 2024-10-20 RX ORDER — HYDROCODONE BITARTRATE AND ACETAMINOPHEN 5; 325 MG/1; MG/1
1 TABLET ORAL
Status: COMPLETED | OUTPATIENT
Start: 2024-10-20 | End: 2024-10-20

## 2024-10-20 RX ORDER — CEFAZOLIN SODIUM 1 G/3ML
INJECTION, POWDER, FOR SOLUTION INTRAMUSCULAR; INTRAVENOUS
Status: DISCONTINUED | OUTPATIENT
Start: 2024-10-20 | End: 2024-10-20 | Stop reason: SDUPTHER

## 2024-10-20 RX ORDER — LIDOCAINE HYDROCHLORIDE 20 MG/ML
INJECTION, SOLUTION EPIDURAL; INFILTRATION; INTRACAUDAL; PERINEURAL
Status: DISCONTINUED | OUTPATIENT
Start: 2024-10-20 | End: 2024-10-20 | Stop reason: SDUPTHER

## 2024-10-20 RX ORDER — ONDANSETRON 2 MG/ML
4 INJECTION INTRAMUSCULAR; INTRAVENOUS ONCE
Status: COMPLETED | OUTPATIENT
Start: 2024-10-20 | End: 2024-10-20

## 2024-10-20 RX ORDER — OXYCODONE HYDROCHLORIDE 5 MG/1
5 TABLET ORAL EVERY 4 HOURS PRN
Status: DISCONTINUED | OUTPATIENT
Start: 2024-10-20 | End: 2024-10-24 | Stop reason: HOSPADM

## 2024-10-20 RX ORDER — DIPHENHYDRAMINE HYDROCHLORIDE 50 MG/ML
12.5 INJECTION INTRAMUSCULAR; INTRAVENOUS
Status: DISCONTINUED | OUTPATIENT
Start: 2024-10-20 | End: 2024-10-20 | Stop reason: HOSPADM

## 2024-10-20 RX ORDER — MORPHINE SULFATE 4 MG/ML
4 INJECTION, SOLUTION INTRAMUSCULAR; INTRAVENOUS
Status: COMPLETED | OUTPATIENT
Start: 2024-10-20 | End: 2024-10-20

## 2024-10-20 RX ORDER — SODIUM CHLORIDE, SODIUM LACTATE, POTASSIUM CHLORIDE, CALCIUM CHLORIDE 600; 310; 30; 20 MG/100ML; MG/100ML; MG/100ML; MG/100ML
INJECTION, SOLUTION INTRAVENOUS
Status: DISCONTINUED | OUTPATIENT
Start: 2024-10-20 | End: 2024-10-20 | Stop reason: SDUPTHER

## 2024-10-20 RX ORDER — POTASSIUM CHLORIDE 7.45 MG/ML
10 INJECTION INTRAVENOUS PRN
Status: ACTIVE | OUTPATIENT
Start: 2024-10-20 | End: 2024-10-22

## 2024-10-20 RX ORDER — ONDANSETRON 4 MG/1
4 TABLET, ORALLY DISINTEGRATING ORAL EVERY 8 HOURS PRN
Status: DISCONTINUED | OUTPATIENT
Start: 2024-10-20 | End: 2024-10-24 | Stop reason: HOSPADM

## 2024-10-20 RX ORDER — MORPHINE SULFATE 4 MG/ML
4 INJECTION, SOLUTION INTRAMUSCULAR; INTRAVENOUS
Status: DISCONTINUED | OUTPATIENT
Start: 2024-10-20 | End: 2024-10-24 | Stop reason: HOSPADM

## 2024-10-20 RX ORDER — ONDANSETRON 2 MG/ML
4 INJECTION INTRAMUSCULAR; INTRAVENOUS
Status: DISCONTINUED | OUTPATIENT
Start: 2024-10-20 | End: 2024-10-20 | Stop reason: HOSPADM

## 2024-10-20 RX ORDER — SODIUM CHLORIDE 0.9 % (FLUSH) 0.9 %
5-40 SYRINGE (ML) INJECTION PRN
Status: DISCONTINUED | OUTPATIENT
Start: 2024-10-20 | End: 2024-10-20 | Stop reason: HOSPADM

## 2024-10-20 RX ORDER — HYDROMORPHONE HYDROCHLORIDE 2 MG/ML
0.25 INJECTION, SOLUTION INTRAMUSCULAR; INTRAVENOUS; SUBCUTANEOUS EVERY 5 MIN PRN
Status: DISCONTINUED | OUTPATIENT
Start: 2024-10-20 | End: 2024-10-20 | Stop reason: HOSPADM

## 2024-10-20 RX ORDER — PROPOFOL 10 MG/ML
INJECTION, EMULSION INTRAVENOUS
Status: DISCONTINUED | OUTPATIENT
Start: 2024-10-20 | End: 2024-10-20 | Stop reason: SDUPTHER

## 2024-10-20 RX ORDER — MAGNESIUM SULFATE IN WATER 40 MG/ML
2000 INJECTION, SOLUTION INTRAVENOUS PRN
Status: DISCONTINUED | OUTPATIENT
Start: 2024-10-20 | End: 2024-10-24 | Stop reason: HOSPADM

## 2024-10-20 RX ORDER — DEXAMETHASONE SODIUM PHOSPHATE 4 MG/ML
INJECTION, SOLUTION INTRA-ARTICULAR; INTRALESIONAL; INTRAMUSCULAR; INTRAVENOUS; SOFT TISSUE
Status: DISCONTINUED | OUTPATIENT
Start: 2024-10-20 | End: 2024-10-20 | Stop reason: SDUPTHER

## 2024-10-20 RX ORDER — BUPIVACAINE HYDROCHLORIDE 5 MG/ML
INJECTION, SOLUTION EPIDURAL; INTRACAUDAL
Status: COMPLETED | OUTPATIENT
Start: 2024-10-20 | End: 2024-10-20

## 2024-10-20 RX ORDER — ACETAMINOPHEN 650 MG/1
650 SUPPOSITORY RECTAL EVERY 6 HOURS PRN
Status: DISCONTINUED | OUTPATIENT
Start: 2024-10-20 | End: 2024-10-24 | Stop reason: HOSPADM

## 2024-10-20 RX ORDER — MORPHINE SULFATE 4 MG/ML
4 INJECTION, SOLUTION INTRAMUSCULAR; INTRAVENOUS
Status: DISCONTINUED | OUTPATIENT
Start: 2024-10-20 | End: 2024-10-20

## 2024-10-20 RX ORDER — DEXMEDETOMIDINE HYDROCHLORIDE 100 UG/ML
INJECTION, SOLUTION INTRAVENOUS
Status: DISCONTINUED | OUTPATIENT
Start: 2024-10-20 | End: 2024-10-20 | Stop reason: SDUPTHER

## 2024-10-20 RX ORDER — ACETAMINOPHEN 325 MG/1
650 TABLET ORAL EVERY 6 HOURS PRN
Status: DISCONTINUED | OUTPATIENT
Start: 2024-10-20 | End: 2024-10-24 | Stop reason: HOSPADM

## 2024-10-20 RX ORDER — POTASSIUM CHLORIDE 1500 MG/1
40 TABLET, EXTENDED RELEASE ORAL PRN
Status: ACTIVE | OUTPATIENT
Start: 2024-10-20 | End: 2024-10-22

## 2024-10-20 RX ORDER — MAGNESIUM HYDROXIDE 1200 MG/15ML
LIQUID ORAL CONTINUOUS PRN
Status: COMPLETED | OUTPATIENT
Start: 2024-10-20 | End: 2024-10-20

## 2024-10-20 RX ORDER — HYDROMORPHONE HYDROCHLORIDE 2 MG/ML
0.5 INJECTION, SOLUTION INTRAMUSCULAR; INTRAVENOUS; SUBCUTANEOUS EVERY 5 MIN PRN
Status: DISCONTINUED | OUTPATIENT
Start: 2024-10-20 | End: 2024-10-20 | Stop reason: HOSPADM

## 2024-10-20 RX ORDER — FENTANYL CITRATE 50 UG/ML
INJECTION, SOLUTION INTRAMUSCULAR; INTRAVENOUS
Status: DISCONTINUED | OUTPATIENT
Start: 2024-10-20 | End: 2024-10-20 | Stop reason: SDUPTHER

## 2024-10-20 RX ORDER — ENOXAPARIN SODIUM 100 MG/ML
30 INJECTION SUBCUTANEOUS 2 TIMES DAILY
Status: DISCONTINUED | OUTPATIENT
Start: 2024-10-20 | End: 2024-10-21

## 2024-10-20 RX ORDER — SODIUM CHLORIDE, SODIUM LACTATE, POTASSIUM CHLORIDE, AND CALCIUM CHLORIDE .6; .31; .03; .02 G/100ML; G/100ML; G/100ML; G/100ML
1000 INJECTION, SOLUTION INTRAVENOUS ONCE
Status: COMPLETED | OUTPATIENT
Start: 2024-10-20 | End: 2024-10-20

## 2024-10-20 RX ORDER — SODIUM CHLORIDE 0.9 % (FLUSH) 0.9 %
5-40 SYRINGE (ML) INJECTION PRN
Status: DISCONTINUED | OUTPATIENT
Start: 2024-10-20 | End: 2024-10-24 | Stop reason: HOSPADM

## 2024-10-20 RX ORDER — SODIUM CHLORIDE 9 MG/ML
INJECTION, SOLUTION INTRAVENOUS PRN
Status: DISCONTINUED | OUTPATIENT
Start: 2024-10-20 | End: 2024-10-20 | Stop reason: HOSPADM

## 2024-10-20 RX ORDER — OXYCODONE HYDROCHLORIDE 5 MG/1
10 TABLET ORAL EVERY 4 HOURS PRN
Status: DISCONTINUED | OUTPATIENT
Start: 2024-10-20 | End: 2024-10-24 | Stop reason: HOSPADM

## 2024-10-20 RX ADMIN — MORPHINE SULFATE 4 MG: 4 INJECTION, SOLUTION INTRAMUSCULAR; INTRAVENOUS at 11:48

## 2024-10-20 RX ADMIN — LIDOCAINE HYDROCHLORIDE 100 MG: 20 INJECTION, SOLUTION EPIDURAL; INFILTRATION; INTRACAUDAL; PERINEURAL at 14:28

## 2024-10-20 RX ADMIN — SODIUM CHLORIDE, POTASSIUM CHLORIDE, SODIUM LACTATE AND CALCIUM CHLORIDE: 600; 310; 30; 20 INJECTION, SOLUTION INTRAVENOUS at 05:24

## 2024-10-20 RX ADMIN — MORPHINE SULFATE 4 MG: 4 INJECTION, SOLUTION INTRAMUSCULAR; INTRAVENOUS at 08:02

## 2024-10-20 RX ADMIN — ENOXAPARIN SODIUM 30 MG: 100 INJECTION SUBCUTANEOUS at 21:55

## 2024-10-20 RX ADMIN — FENTANYL CITRATE 50 MCG: 50 INJECTION, SOLUTION INTRAMUSCULAR; INTRAVENOUS at 14:51

## 2024-10-20 RX ADMIN — SODIUM CHLORIDE, POTASSIUM CHLORIDE, SODIUM LACTATE AND CALCIUM CHLORIDE: 600; 310; 30; 20 INJECTION, SOLUTION INTRAVENOUS at 14:27

## 2024-10-20 RX ADMIN — FAMOTIDINE 20 MG: 10 INJECTION, SOLUTION INTRAVENOUS at 03:26

## 2024-10-20 RX ADMIN — CEFAZOLIN 2000 MG: 2 INJECTION, POWDER, FOR SOLUTION INTRAMUSCULAR; INTRAVENOUS at 14:00

## 2024-10-20 RX ADMIN — HYDROMORPHONE HYDROCHLORIDE 0.5 MG: 2 INJECTION, SOLUTION INTRAMUSCULAR; INTRAVENOUS; SUBCUTANEOUS at 16:35

## 2024-10-20 RX ADMIN — SODIUM CHLORIDE, POTASSIUM CHLORIDE, SODIUM LACTATE AND CALCIUM CHLORIDE 1000 ML: 600; 310; 30; 20 INJECTION, SOLUTION INTRAVENOUS at 03:19

## 2024-10-20 RX ADMIN — HYDROMORPHONE HYDROCHLORIDE 0.25 MG: 2 INJECTION, SOLUTION INTRAMUSCULAR; INTRAVENOUS; SUBCUTANEOUS at 16:29

## 2024-10-20 RX ADMIN — CEFAZOLIN 2 G: 1 INJECTION, POWDER, FOR SOLUTION INTRAVENOUS at 14:35

## 2024-10-20 RX ADMIN — Medication 1 CAPSULE: at 10:18

## 2024-10-20 RX ADMIN — FENTANYL CITRATE 50 MCG: 50 INJECTION, SOLUTION INTRAMUSCULAR; INTRAVENOUS at 14:31

## 2024-10-20 RX ADMIN — MORPHINE SULFATE 4 MG: 4 INJECTION, SOLUTION INTRAMUSCULAR; INTRAVENOUS at 02:16

## 2024-10-20 RX ADMIN — SODIUM CHLORIDE, PRESERVATIVE FREE 10 ML: 5 INJECTION INTRAVENOUS at 07:34

## 2024-10-20 RX ADMIN — ONDANSETRON 4 MG: 2 INJECTION, SOLUTION INTRAMUSCULAR; INTRAVENOUS at 02:16

## 2024-10-20 RX ADMIN — PROPOFOL 150 MG: 10 INJECTION, EMULSION INTRAVENOUS at 14:31

## 2024-10-20 RX ADMIN — ONDANSETRON 4 MG: 2 INJECTION INTRAMUSCULAR; INTRAVENOUS at 14:52

## 2024-10-20 RX ADMIN — DEXAMETHASONE SODIUM PHOSPHATE 4 MG: 4 INJECTION, SOLUTION INTRAMUSCULAR; INTRAVENOUS at 14:52

## 2024-10-20 RX ADMIN — SODIUM CHLORIDE, PRESERVATIVE FREE 10 ML: 5 INJECTION INTRAVENOUS at 20:45

## 2024-10-20 RX ADMIN — HYDROCODONE BITARTRATE AND ACETAMINOPHEN 1 TABLET: 5; 325 TABLET ORAL at 10:30

## 2024-10-20 RX ADMIN — DEXMEDETOMIDINE HYDROCHLORIDE 10 MCG: 100 INJECTION, SOLUTION INTRAVENOUS at 15:42

## 2024-10-20 RX ADMIN — MORPHINE SULFATE 2 MG: 2 INJECTION, SOLUTION INTRAMUSCULAR; INTRAVENOUS at 20:42

## 2024-10-20 RX ADMIN — HYDROCODONE BITARTRATE AND ACETAMINOPHEN 1 TABLET: 5; 325 TABLET ORAL at 00:37

## 2024-10-20 RX ADMIN — WATER 1000 MG: 1 INJECTION INTRAMUSCULAR; INTRAVENOUS; SUBCUTANEOUS at 03:27

## 2024-10-20 RX ADMIN — SODIUM CHLORIDE, PRESERVATIVE FREE 20 MG: 5 INJECTION INTRAVENOUS at 21:55

## 2024-10-20 ASSESSMENT — PAIN - FUNCTIONAL ASSESSMENT
PAIN_FUNCTIONAL_ASSESSMENT: PREVENTS OR INTERFERES WITH MANY ACTIVE NOT PASSIVE ACTIVITIES
PAIN_FUNCTIONAL_ASSESSMENT: PREVENTS OR INTERFERES WITH ALL ACTIVE AND SOME PASSIVE ACTIVITIES
PAIN_FUNCTIONAL_ASSESSMENT: PREVENTS OR INTERFERES WITH MANY ACTIVE NOT PASSIVE ACTIVITIES
PAIN_FUNCTIONAL_ASSESSMENT: 0-10
PAIN_FUNCTIONAL_ASSESSMENT: PREVENTS OR INTERFERES WITH ALL ACTIVE AND SOME PASSIVE ACTIVITIES
PAIN_FUNCTIONAL_ASSESSMENT: INTOLERABLE, UNABLE TO DO ANY ACTIVE OR PASSIVE ACTIVITIES
PAIN_FUNCTIONAL_ASSESSMENT: PREVENTS OR INTERFERES WITH MANY ACTIVE NOT PASSIVE ACTIVITIES
PAIN_FUNCTIONAL_ASSESSMENT: 0-10

## 2024-10-20 ASSESSMENT — PAIN DESCRIPTION - DESCRIPTORS
DESCRIPTORS: SHARP;SHOOTING
DESCRIPTORS: CRAMPING
DESCRIPTORS: CRAMPING
DESCRIPTORS: STABBING
DESCRIPTORS: SHARP;SHOOTING

## 2024-10-20 ASSESSMENT — PAIN SCALES - GENERAL
PAINLEVEL_OUTOF10: 6
PAINLEVEL_OUTOF10: 6
PAINLEVEL_OUTOF10: 8
PAINLEVEL_OUTOF10: 5
PAINLEVEL_OUTOF10: 4
PAINLEVEL_OUTOF10: 8
PAINLEVEL_OUTOF10: 5
PAINLEVEL_OUTOF10: 3
PAINLEVEL_OUTOF10: 9
PAINLEVEL_OUTOF10: 8
PAINLEVEL_OUTOF10: 8
PAINLEVEL_OUTOF10: 6
PAINLEVEL_OUTOF10: 0
PAINLEVEL_OUTOF10: 0
PAINLEVEL_OUTOF10: 5
PAINLEVEL_OUTOF10: 3
PAINLEVEL_OUTOF10: 7
PAINLEVEL_OUTOF10: 0

## 2024-10-20 ASSESSMENT — PAIN DESCRIPTION - LOCATION
LOCATION: HIP

## 2024-10-20 ASSESSMENT — PAIN DESCRIPTION - ORIENTATION
ORIENTATION: RIGHT

## 2024-10-20 ASSESSMENT — LIFESTYLE VARIABLES
HOW MANY STANDARD DRINKS CONTAINING ALCOHOL DO YOU HAVE ON A TYPICAL DAY: 1 OR 2
HOW OFTEN DO YOU HAVE A DRINK CONTAINING ALCOHOL: MONTHLY OR LESS

## 2024-10-20 ASSESSMENT — PAIN DESCRIPTION - PAIN TYPE
TYPE: ACUTE PAIN

## 2024-10-20 ASSESSMENT — PAIN SCALES - WONG BAKER: WONGBAKER_NUMERICALRESPONSE: HURTS EVEN MORE

## 2024-10-20 NOTE — ACP (ADVANCE CARE PLANNING)
Advanced Care Planning Note    Purpose of Encounter: Advanced care planning in light of Femur Fx  Parties In Attendance: Patient, Fran Farley MD  Decisional Capacity: Yes  Subjective: Patient has Femur Fx  Objective: UA with WBC 53  Goals of Care Determination: Patient wants Full support  Plan:  IV Abx, ortho Sx c/s  Code Status: Full   Time spent on Advanced care Plannin minutes  Advanced Care Planning Documents: Completed advanced directives on chart, Teo, is the Healthcare POA.    Fran Farley MD  10/20/2024 11:22 AM

## 2024-10-20 NOTE — CONSULTS
[facility] Orthopedic Surgery  Consult Note    Patient: Antonette Brown  Admit Date: 10/20/2024  Requesting Physician: Jeanne Singh MD    Chief complaint: Right hip pain    HPI: Antonette Brown is a 83 y.o. female who presented to [facility] yesterday evening with acute onset of right hip pain.  She lost her balance while walking the garage and landed on her right side.  She had acute onset of right hip pain and inability to bear weight.  She generally walks with a walker at baseline due to neuropathy.  She has a history of left hip hemiarthroplasty from prior fracture approximately 3 years ago.  There is no history of injury or surgery to the right leg.  She does report bilateral knee osteoarthritis.  She denies diabetes, tobacco use, history of blood clots, blood thinner usage.  Medical history is significant for MI, BMI 36.  UA was positive for UTI and she is currently on IV antibiotics.    Relevant notes, labs and other tests reviewed.  Medical History:  Past Medical History:   Diagnosis Date    AR (allergic rhinitis)     Arthritis of knee     Pruis    Colitis     Depression     Erosive gastritis 10/28/2019    EGD October 2019, he did with PPI    GERD (gastroesophageal reflux disease)     Hyperlipidemia     Hypertension     Internal hemorrhoids     MI (myocardial infarction) (HCC)     Overweight(278.02)     Viral meningitis 05/2012     Past Surgical History:   Procedure Laterality Date    BREAST BIOPSY      CHOLECYSTECTOMY, LAPAROSCOPIC  2003    COLONOSCOPY  8/06    normal; Ortega    COLONOSCOPY  12/3/13    Ortega- polyps    COLONOSCOPY N/A 2/21/2024    COLONOSCOPY WITH BIOPSY performed by Marcelo Jean MD at [facility] ENDOSCOPY    HIP SURGERY Left 5/16/2022    LEFT HIP HEMIARTHROPLASTY performed by Parker Palacio MD at [facility] OR    HYSTERECTOMY (CERVIX STATUS UNKNOWN)      AKASH AND BSO (CERVIX REMOVED)  2003    UPPER GASTROINTESTINAL ENDOSCOPY  12/3/13    Ortega; antritis       Social History:    reports

## 2024-10-20 NOTE — ED PROVIDER NOTES
clinical information including history, physical exam, and plan. I have also reviewed and agree with the medications, allergies and past medical history section for this patient.    Electronically signed by: Alphonso Gregory Jr., DO, FACEGERALD, [facility], 10/20/2024  3:44 AM        Alphonso Gregory DO  10/20/24 0344

## 2024-10-20 NOTE — OP NOTE
Patient: Antonette Brown  YOB: 1940  MRN: [id number]    Date of Procedure: 10/20/2024      Pre-Op Diagnosis: Right intertrochanteric femur fracture     Post-Op Diagnosis: Same       Procedure: Right femur cephalomedullary nail     Surgeon: Isma Cody MD     Anesthesia: General     Estimated Blood Loss (mL): 150 mL     Complications: None      Implants:  Synthes TFNA 10 x 235 mm  Lag screw 85 mm  Distal interlocking screw x 1     Indications:  This is a 83 y.o. female who sustained a right intertrochanteric femur fracture from a mechanical fall.  We discussed the diagnosis and treatment options and I recommended surgical fixation.  We went over the risks and complications of surgery including bleeding, infection, decreased ROM, continued pain, instability, fracture, dislocation, neurovascular injury, post op cognitive disorder, leg length discrepancy, DVT, pulmonary embolism and need for further surgical procedures. Informed consent for surgery was obtained.     Details:  The patient was seen in the preoperative holding area where the site of surgery was marked and informed consent was confirmed.  The patient was brought back to the operating room by OR personnel.  General anesthesia was administered. The patient was positioned supine on the [place] table. A final and formal timeout was then performed which confirmed the correct patient, correct position, and correct site of surgery.  Closed reduction maneuvers were carried out on the traction table.  Fluoroscopic imaging was used to confirm appropriate reduction of the fracture in both AP and lateral planes.  The right lower extremity was then prepped and draped in a standard and sterile fashion. IV antibiotics were administered within 1 hour of skin incision.     A longitudinal incision was made in the lateral thigh just proximal to the greater trochanter.  Sharp dissection was carried down through skin and subcutaneous tissue.  The IT band was

## 2024-10-20 NOTE — ED NOTES
Patient oriented to room and ED throughput process.  Safety measures with ED bed locked in lowest position and call light in reach.  Patient educated on all orders, including any medications.  Patient educated on chief complaint/symptoms. Patient encouraged to ask questions regarding care, medications or treatment plan.  Patient aware of how to reach staff with questions/concerns.     normal...

## 2024-10-20 NOTE — H&P
Hospital Medicine History & Physical      PCP: Erica Nava MD    Date of Admission: 10/20/2024    Date of Service: Pt seen/examined and Admitted with expected LOS greater than two midnights due to medical therapy.     Chief Complaint:      Intractable pain right hip    History Of Present Illness:      83 y.o. female who presented to the emergency room after she sustained a ground-level mechanical fall with subsequent right hip injury and severe pain.    [facility].  The workup was unremarkable and she was discharged home.  After getting home patient sustained a fall and due to intractable pain of her right hip she was unable to move.  EMS was called and she was brought to the emergency room for further evaluation.  Patient denies having any chills fever no chest pain no shortness of breath no nausea vomiting no lightheadedness no dizziness prior to the fall event.    ED workup notable for:    The workup in ED shows comminuted right hip fracture.  Orthopedic surgery consult contacted by ED physician and they will will evaluate patient in a.m.    Urinalysis positive for UTI  In ED patient received Pepcid IV and Zofran morphine IV for abdominal pain with possible dyspepsia  IV antibiotics initiated after pancultures obtained.      Past Medical History:          Diagnosis Date    AR (allergic rhinitis)     Arthritis of knee     Pruis    Colitis     Depression     Erosive gastritis 10/28/2019    EGD October 2019, he did with PPI    GERD (gastroesophageal reflux disease)     Hyperlipidemia     Hypertension     Internal hemorrhoids     MI (myocardial infarction) (HCC)     Overweight(278.02)     Viral meningitis 05/2012         Past Surgical History:          Procedure Laterality Date    BREAST BIOPSY      CHOLECYSTECTOMY, LAPAROSCOPIC  2003    COLONOSCOPY  8/06    normal; Ortega    COLONOSCOPY  12/3/13    Ortega- polyps    COLONOSCOPY N/A

## 2024-10-20 NOTE — ED NOTES
ED TO INPATIENT SBAR HANDOFF    Patient Name: Antonette Brown   :  1940  83 y.o.   MRN:  [id number]  Preferred Name  Antonette  ED Room #:  ED-0002/02  Family/Caregiver Present no   Restraints no   Sitter no   Sepsis Risk Score      Situation  Code Status: Prior No additional code details.    Allergies: Abilify [aripiprazole] and Ceftin [cefuroxime]  Weight: Patient Vitals for the past 96 hrs (Last 3 readings):   Weight   10/20/24 0011 86.2 kg (190 lb)     Arrived from: home  Chief Complaint:   Chief Complaint   Patient presents with    Fall     Pt presents via [facility] w/ cc of injuries from a fall. Patient complaining of right sided hip pain.      Hospital Problem/Diagnosis:  Principal Problem:    Hip fracture, right, closed, initial encounter (HCC)  Resolved Problems:    * No resolved hospital problems. *    Imaging:   XR CHEST PORTABLE   Final Result   Cardiomegaly with mild vascular congestion.         XR FEMUR RIGHT (MIN 2 VIEWS)   Final Result   1. Comminuted displaced intertrochanteric fracture of the right proximal   femur.   2. Intact left hip arthroplasty.   3. No acute fracture of the right tib-fib.         XR TIBIA FIBULA RIGHT (2 VIEWS)   Final Result   1. Comminuted displaced intertrochanteric fracture of the right proximal   femur.   2. Intact left hip arthroplasty.   3. No acute fracture of the right tib-fib.         XR HIP RIGHT (2-3 VIEWS)   Final Result   1. Comminuted displaced intertrochanteric fracture of the right proximal   femur.   2. Intact left hip arthroplasty.   3. No acute fracture of the right tib-fib.           Abnormal labs:   Abnormal Labs Reviewed   URINALYSIS WITH REFLEX TO CULTURE - Abnormal; Notable for the following components:       Result Value    Ketones, Urine 15 (*)     Blood, Urine SMALL (*)     Leukocyte Esterase, Urine SMALL (*)     All other components within normal limits   MICROSCOPIC URINALYSIS - Abnormal; Notable for the following components:    WBC, UA

## 2024-10-21 ENCOUNTER — APPOINTMENT (OUTPATIENT)
Dept: CT IMAGING | Age: 84
DRG: 481 | End: 2024-10-21
Attending: STUDENT IN AN ORGANIZED HEALTH CARE EDUCATION/TRAINING PROGRAM
Payer: MEDICARE

## 2024-10-21 LAB
ABO + RH BLD: NORMAL
ANION GAP SERPL CALCULATED.3IONS-SCNC: 9 MMOL/L (ref 3–16)
BACTERIA UR CULT: NORMAL
BASOPHILS # BLD: 0 K/UL (ref 0–0.2)
BASOPHILS NFR BLD: 0 %
BLD GP AB SCN SERPL QL: NORMAL
BUN SERPL-MCNC: 46 MG/DL (ref 7–20)
CALCIUM SERPL-MCNC: 7.6 MG/DL (ref 8.3–10.6)
CHLORIDE SERPL-SCNC: 107 MMOL/L (ref 99–110)
CO2 SERPL-SCNC: 22 MMOL/L (ref 21–32)
CREAT SERPL-MCNC: 1.7 MG/DL (ref 0.6–1.2)
DEPRECATED RDW RBC AUTO: 13.9 % (ref 12.4–15.4)
EKG ATRIAL RATE: 94 BPM
EKG DIAGNOSIS: NORMAL
EKG P AXIS: 53 DEGREES
EKG P-R INTERVAL: 146 MS
EKG Q-T INTERVAL: 370 MS
EKG QRS DURATION: 90 MS
EKG QTC CALCULATION (BAZETT): 462 MS
EKG R AXIS: -43 DEGREES
EKG T AXIS: 32 DEGREES
EKG VENTRICULAR RATE: 94 BPM
EOSINOPHIL # BLD: 0 K/UL (ref 0–0.6)
EOSINOPHIL NFR BLD: 0 %
GFR SERPLBLD CREATININE-BSD FMLA CKD-EPI: 29 ML/MIN/{1.73_M2}
GLUCOSE SERPL-MCNC: 186 MG/DL (ref 70–99)
HCT VFR BLD AUTO: 21.2 % (ref 36–48)
HCT VFR BLD AUTO: 24 % (ref 36–48)
HGB BLD-MCNC: 7 G/DL (ref 12–16)
HGB BLD-MCNC: 8 G/DL (ref 12–16)
LACTATE BLDV-SCNC: 1.3 MMOL/L (ref 0.4–2)
LACTATE BLDV-SCNC: 1.9 MMOL/L (ref 0.4–2)
LACTATE BLDV-SCNC: 2.3 MMOL/L (ref 0.4–2)
LACTATE BLDV-SCNC: 3.9 MMOL/L (ref 0.4–2)
LYMPHOCYTES # BLD: 0.6 K/UL (ref 1–5.1)
LYMPHOCYTES NFR BLD: 10 %
MAGNESIUM SERPL-MCNC: 2.19 MG/DL (ref 1.8–2.4)
MCH RBC QN AUTO: 29.5 PG (ref 26–34)
MCHC RBC AUTO-ENTMCNC: 33.2 G/DL (ref 31–36)
MCV RBC AUTO: 88.9 FL (ref 80–100)
MONOCYTES # BLD: 0.4 K/UL (ref 0–1.3)
MONOCYTES NFR BLD: 7.1 %
NEUTROPHILS # BLD: 4.9 K/UL (ref 1.7–7.7)
NEUTROPHILS NFR BLD: 82.9 %
PLATELET # BLD AUTO: 115 K/UL (ref 135–450)
PMV BLD AUTO: 8.7 FL (ref 5–10.5)
POTASSIUM SERPL-SCNC: 4.2 MMOL/L (ref 3.5–5.1)
RBC # BLD AUTO: 2.39 M/UL (ref 4–5.2)
SODIUM SERPL-SCNC: 138 MMOL/L (ref 136–145)
WBC # BLD AUTO: 5.9 K/UL (ref 4–11)

## 2024-10-21 PROCEDURE — 74176 CT ABD & PELVIS W/O CONTRAST: CPT

## 2024-10-21 PROCEDURE — 6360000002 HC RX W HCPCS: Performed by: STUDENT IN AN ORGANIZED HEALTH CARE EDUCATION/TRAINING PROGRAM

## 2024-10-21 PROCEDURE — 99024 POSTOP FOLLOW-UP VISIT: CPT | Performed by: NURSE PRACTITIONER

## 2024-10-21 PROCEDURE — 97162 PT EVAL MOD COMPLEX 30 MIN: CPT

## 2024-10-21 PROCEDURE — 6370000000 HC RX 637 (ALT 250 FOR IP): Performed by: ORTHOPAEDIC SURGERY

## 2024-10-21 PROCEDURE — P9016 RBC LEUKOCYTES REDUCED: HCPCS

## 2024-10-21 PROCEDURE — 6360000002 HC RX W HCPCS: Performed by: ORTHOPAEDIC SURGERY

## 2024-10-21 PROCEDURE — 2500000003 HC RX 250 WO HCPCS: Performed by: ORTHOPAEDIC SURGERY

## 2024-10-21 PROCEDURE — 2580000003 HC RX 258: Performed by: ORTHOPAEDIC SURGERY

## 2024-10-21 PROCEDURE — 86901 BLOOD TYPING SEROLOGIC RH(D): CPT

## 2024-10-21 PROCEDURE — 80048 BASIC METABOLIC PNL TOTAL CA: CPT

## 2024-10-21 PROCEDURE — 83605 ASSAY OF LACTIC ACID: CPT

## 2024-10-21 PROCEDURE — 97166 OT EVAL MOD COMPLEX 45 MIN: CPT

## 2024-10-21 PROCEDURE — 2580000003 HC RX 258: Performed by: STUDENT IN AN ORGANIZED HEALTH CARE EDUCATION/TRAINING PROGRAM

## 2024-10-21 PROCEDURE — 86850 RBC ANTIBODY SCREEN: CPT

## 2024-10-21 PROCEDURE — 94150 VITAL CAPACITY TEST: CPT

## 2024-10-21 PROCEDURE — 85014 HEMATOCRIT: CPT

## 2024-10-21 PROCEDURE — 93010 ELECTROCARDIOGRAM REPORT: CPT | Performed by: INTERNAL MEDICINE

## 2024-10-21 PROCEDURE — APPNB45 APP NON BILLABLE 31-45 MINUTES: Performed by: NURSE PRACTITIONER

## 2024-10-21 PROCEDURE — 30233N1 TRANSFUSION OF NONAUTOLOGOUS RED BLOOD CELLS INTO PERIPHERAL VEIN, PERCUTANEOUS APPROACH: ICD-10-PCS | Performed by: INTERNAL MEDICINE

## 2024-10-21 PROCEDURE — 85018 HEMOGLOBIN: CPT

## 2024-10-21 PROCEDURE — 83735 ASSAY OF MAGNESIUM: CPT

## 2024-10-21 PROCEDURE — 2060000000 HC ICU INTERMEDIATE R&B

## 2024-10-21 PROCEDURE — 36430 TRANSFUSION BLD/BLD COMPNT: CPT

## 2024-10-21 PROCEDURE — 85025 COMPLETE CBC W/AUTO DIFF WBC: CPT

## 2024-10-21 PROCEDURE — 97530 THERAPEUTIC ACTIVITIES: CPT

## 2024-10-21 PROCEDURE — 36415 COLL VENOUS BLD VENIPUNCTURE: CPT

## 2024-10-21 PROCEDURE — 86923 COMPATIBILITY TEST ELECTRIC: CPT

## 2024-10-21 PROCEDURE — 86900 BLOOD TYPING SEROLOGIC ABO: CPT

## 2024-10-21 RX ORDER — 0.9 % SODIUM CHLORIDE 0.9 %
1000 INTRAVENOUS SOLUTION INTRAVENOUS ONCE
Status: COMPLETED | OUTPATIENT
Start: 2024-10-21 | End: 2024-10-21

## 2024-10-21 RX ORDER — SODIUM CHLORIDE 9 MG/ML
INJECTION, SOLUTION INTRAVENOUS CONTINUOUS
Status: ACTIVE | OUTPATIENT
Start: 2024-10-21 | End: 2024-10-22

## 2024-10-21 RX ORDER — SODIUM CHLORIDE 9 MG/ML
INJECTION, SOLUTION INTRAMUSCULAR; INTRAVENOUS; SUBCUTANEOUS
Status: COMPLETED
Start: 2024-10-21 | End: 2024-10-22

## 2024-10-21 RX ORDER — SODIUM CHLORIDE 9 MG/ML
INJECTION, SOLUTION INTRAVENOUS PRN
Status: DISCONTINUED | OUTPATIENT
Start: 2024-10-21 | End: 2024-10-24 | Stop reason: HOSPADM

## 2024-10-21 RX ORDER — WATER 10 ML/10ML
INJECTION INTRAMUSCULAR; INTRAVENOUS; SUBCUTANEOUS
Status: DISPENSED
Start: 2024-10-21 | End: 2024-10-21

## 2024-10-21 RX ORDER — OXYCODONE HYDROCHLORIDE 5 MG/1
5 TABLET ORAL EVERY 4 HOURS PRN
Qty: 42 TABLET | Refills: 0 | Status: SHIPPED | OUTPATIENT
Start: 2024-10-21 | End: 2024-10-28

## 2024-10-21 RX ORDER — ENOXAPARIN SODIUM 100 MG/ML
30 INJECTION SUBCUTANEOUS DAILY
Status: DISCONTINUED | OUTPATIENT
Start: 2024-10-22 | End: 2024-10-24 | Stop reason: HOSPADM

## 2024-10-21 RX ADMIN — OXYCODONE 10 MG: 5 TABLET ORAL at 16:39

## 2024-10-21 RX ADMIN — SODIUM CHLORIDE, PRESERVATIVE FREE 10 ML: 5 INJECTION INTRAVENOUS at 22:39

## 2024-10-21 RX ADMIN — SODIUM CHLORIDE: 9 INJECTION, SOLUTION INTRAVENOUS at 18:12

## 2024-10-21 RX ADMIN — Medication 1 CAPSULE: at 08:34

## 2024-10-21 RX ADMIN — OXYCODONE 5 MG: 5 TABLET ORAL at 03:53

## 2024-10-21 RX ADMIN — SODIUM CHLORIDE, PRESERVATIVE FREE 0.5 MG: 5 INJECTION INTRAVENOUS at 18:16

## 2024-10-21 RX ADMIN — SODIUM CHLORIDE, PRESERVATIVE FREE 20 MG: 5 INJECTION INTRAVENOUS at 22:38

## 2024-10-21 RX ADMIN — SODIUM CHLORIDE, PRESERVATIVE FREE 0.5 MG: 5 INJECTION INTRAVENOUS at 11:05

## 2024-10-21 RX ADMIN — MORPHINE SULFATE 2 MG: 2 INJECTION, SOLUTION INTRAMUSCULAR; INTRAVENOUS at 09:27

## 2024-10-21 RX ADMIN — ENOXAPARIN SODIUM 30 MG: 100 INJECTION SUBCUTANEOUS at 08:34

## 2024-10-21 RX ADMIN — SODIUM CHLORIDE, PRESERVATIVE FREE 10 ML: 5 INJECTION INTRAVENOUS at 09:56

## 2024-10-21 RX ADMIN — WATER 1000 MG: 1 INJECTION INTRAMUSCULAR; INTRAVENOUS; SUBCUTANEOUS at 03:39

## 2024-10-21 RX ADMIN — MORPHINE SULFATE 2 MG: 2 INJECTION, SOLUTION INTRAMUSCULAR; INTRAVENOUS at 14:22

## 2024-10-21 RX ADMIN — SODIUM CHLORIDE 1000 ML: 9 INJECTION, SOLUTION INTRAVENOUS at 09:50

## 2024-10-21 RX ADMIN — Medication 1 CAPSULE: at 16:39

## 2024-10-21 ASSESSMENT — PAIN - FUNCTIONAL ASSESSMENT: PAIN_FUNCTIONAL_ASSESSMENT: PREVENTS OR INTERFERES SOME ACTIVE ACTIVITIES AND ADLS

## 2024-10-21 ASSESSMENT — PAIN SCALES - GENERAL
PAINLEVEL_OUTOF10: 8
PAINLEVEL_OUTOF10: 0
PAINLEVEL_OUTOF10: 4
PAINLEVEL_OUTOF10: 5
PAINLEVEL_OUTOF10: 6

## 2024-10-21 ASSESSMENT — PAIN DESCRIPTION - ORIENTATION
ORIENTATION: RIGHT
ORIENTATION: MID;LOWER

## 2024-10-21 ASSESSMENT — PAIN SCALES - WONG BAKER
WONGBAKER_NUMERICALRESPONSE: NO HURT
WONGBAKER_NUMERICALRESPONSE: NO HURT

## 2024-10-21 ASSESSMENT — PAIN DESCRIPTION - LOCATION
LOCATION: KNEE
LOCATION: HIP
LOCATION: HIP;KNEE
LOCATION: ABDOMEN

## 2024-10-21 ASSESSMENT — PAIN DESCRIPTION - DESCRIPTORS
DESCRIPTORS: TENDER
DESCRIPTORS: ACHING

## 2024-10-21 ASSESSMENT — PAIN DESCRIPTION - PAIN TYPE: TYPE: ACUTE PAIN

## 2024-10-21 NOTE — CONSENT
Informed Consent for Blood Component Transfusion Note    I have discussed with the patient the rationale for blood component transfusion; its benefits in treating or preventing fatigue, organ damage, or death; and its risk which includes mild transfusion reactions, rare risk of blood borne infection, or more serious but rare reactions. I have discussed the alternatives to transfusion, including the risk and consequences of not receiving transfusion. The patient had an opportunity to ask questions and had agreed to proceed with transfusion of blood components.    Electronically signed by Fran Farley MD on 10/21/24 at 8:46 AM EDT

## 2024-10-21 NOTE — DISCHARGE INSTR - COC
Administered    COVID-19, MODERNA BLUE border, Primary or Immunocompromised, (age 12y+), IM, 100 mcg/0.5mL 01/21/2021, 02/18/2021, 10/28/2021, 05/12/2022    Influenza 11/24/2010    Influenza Vaccine, unspecified formulation 10/01/2016    Influenza Virus Vaccine 10/01/2012, 10/01/2013, 10/13/2015    Influenza, FLUAD, (age 65 y+), IM, Quadv, 0.5mL 10/12/2020, 09/30/2022, 10/02/2023    Influenza, FLUZONE High Dose, (age 65 y+), IM, Trivalent PF, 0.5mL 10/12/2017, 10/09/2018, 10/28/2021    Pneumococcal, PCV-13, PREVNAR 13, (age 6w+), IM, 0.5mL 07/13/2015    Pneumococcal, PPSV23, PNEUMOVAX 23, (age 2y+), SC/IM, 0.5mL 05/07/2012    TDaP, ADACEL (age 10y-64y), BOOSTRIX (age 10y+), IM, 0.5mL 01/08/2021    Td, unspecified formulation 09/30/2009    Zoster Live (Zostavax) 08/01/2013    Zoster Recombinant (Shingrix) 01/08/2021, 04/24/2021       Active Problems:  Patient Active Problem List   Diagnosis Code    AR (allergic rhinitis) J30.9    Arthritis of knee M17.10    High fever R50.9    Postmenopausal Z78.0    Essential hypertension I10    Erosive gastritis K29.60    Left displaced femoral neck fracture (HCC) S72.002A    Fall at home W19.XXXA, Y92.009    Dyslipidemia E78.5    Class 1 obesity due to excess calories with body mass index (BMI) of 30.0 to 30.9 in adult E66.811, E66.09, Z68.30    History of depression Z86.59    Bilateral lower leg cellulitis L03.116, L03.115    Peripheral edema R60.0    Redness and swelling of lower leg M79.89, R23.8    Weight loss counseling, encounter for Z71.3    NSTEMI (non-ST elevated myocardial infarction) (HCC) I21.4    Hyperlipidemia E78.5    Other chest pain R07.89    Lower extremity edema R60.0    Leg wound, left S81.802A    Neuropathy G62.9    Osteopenia of hip M85.859    Colitis K52.9    Severe episode of recurrent major depressive disorder, without psychotic features ([facility]) F33.2    Hip fracture, right, closed, initial encounter ([facility]) S72.001A    Acute postoperative anemia due to

## 2024-10-21 NOTE — CONSULTS
[facility]  Consultation Note    Reason for Consult:  HILTON  Requesting Physician:  Dr. ARIEL Peters    CHIEF COMPLAINT:  right hip pain    History obtained from records and patient.    HISTORY OF PRESENT ILLNESS:                Antonette Brown  is 84 y.o. y.o. female with significant past medical history of Colitis, Erosive Gastritis, HLD< HTN, CAD who presents with right hip pain s/p fall.  +Abdominal pain which has improved.  I am asked to see the patient since crea 1.7 from 0.9.  Hgb down to 7 from 11.5. Lowest sbp in the 80's.  On Losartan and statin as outpatient.  Decreased urine output.  No vomiting nor diarrhea.     Past Medical History:     has a past medical history of AR (allergic rhinitis), Arthritis of knee, Colitis, Depression, Erosive gastritis, GERD (gastroesophageal reflux disease), Hyperlipidemia, Hypertension, Internal hemorrhoids, MI (myocardial infarction) (HCC), Overweight(278.02), and Viral meningitis.   Past Surgical History:     has a past surgical history that includes Cholecystectomy, laparoscopic (2003); Total abdominal hysterectomy w/ bilateral salpingoophorectomy (2003); Colonoscopy (8/06); Colonoscopy (12/3/13); Upper gastrointestinal endoscopy (12/3/13); Breast biopsy; Hysterectomy; hip surgery (Left, 5/16/2022); Colonoscopy (N/A, 2/21/2024); and hip surgery (Right, 10/20/2024).   Current Medications:    Current Facility-Administered Medications: 0.9 % sodium chloride infusion, , IntraVENous, Continuous  0.9 % sodium chloride infusion, , IntraVENous, PRN  [Held by provider] enoxaparin Sodium (LOVENOX) injection 30 mg, 30 mg, SubCUTAneous, Daily  LORazepam (ATIVAN) 0.5 mg in sodium chloride (PF) 0.9 % 10 mL injection, 0.5 mg, IntraVENous, Q6H PRN  sterile water injection, , ,   famotidine (PEPCID) 20 mg in sodium chloride (PF) 0.9 % 10 mL injection, 20 mg, IntraVENous, BID  cefTRIAXone (ROCEPHIN) 1,000 mg in sterile water 10 mL IV syringe, 1,000 mg,

## 2024-10-22 PROBLEM — D64.9 ACUTE ANEMIA: Status: ACTIVE | Noted: 2024-10-22

## 2024-10-22 PROBLEM — N39.0 UTI (URINARY TRACT INFECTION): Status: ACTIVE | Noted: 2024-10-22

## 2024-10-22 PROBLEM — D62 ACUTE POSTOPERATIVE ANEMIA DUE TO GREATER THAN EXPECTED BLOOD LOSS: Status: ACTIVE | Noted: 2024-10-22

## 2024-10-22 PROBLEM — N17.9 AKI (ACUTE KIDNEY INJURY) (HCC): Status: ACTIVE | Noted: 2024-10-22

## 2024-10-22 LAB
ANION GAP SERPL CALCULATED.3IONS-SCNC: 9 MMOL/L (ref 3–16)
BASOPHILS # BLD: 0 K/UL (ref 0–0.2)
BASOPHILS NFR BLD: 0.1 %
BLOOD BANK DISPENSE STATUS: NORMAL
BLOOD BANK DISPENSE STATUS: NORMAL
BLOOD BANK PRODUCT CODE: NORMAL
BLOOD BANK PRODUCT CODE: NORMAL
BPU ID: NORMAL
BPU ID: NORMAL
BUN SERPL-MCNC: 56 MG/DL (ref 7–20)
CALCIUM SERPL-MCNC: 7.2 MG/DL (ref 8.3–10.6)
CHLORIDE SERPL-SCNC: 105 MMOL/L (ref 99–110)
CO2 SERPL-SCNC: 22 MMOL/L (ref 21–32)
CREAT SERPL-MCNC: 2.2 MG/DL (ref 0.6–1.2)
CREAT UR-MCNC: 172 MG/DL (ref 28–259)
DEPRECATED RDW RBC AUTO: 14 % (ref 12.4–15.4)
DESCRIPTION BLOOD BANK: NORMAL
DESCRIPTION BLOOD BANK: NORMAL
EOSINOPHIL # BLD: 0 K/UL (ref 0–0.6)
EOSINOPHIL NFR BLD: 0.1 %
GFR SERPLBLD CREATININE-BSD FMLA CKD-EPI: 21 ML/MIN/{1.73_M2}
GLUCOSE SERPL-MCNC: 119 MG/DL (ref 70–99)
HCT VFR BLD AUTO: 20.2 % (ref 36–48)
HCT VFR BLD AUTO: 25.9 % (ref 36–48)
HGB BLD-MCNC: 6.7 G/DL (ref 12–16)
HGB BLD-MCNC: 8.4 G/DL (ref 12–16)
LYMPHOCYTES # BLD: 0.9 K/UL (ref 1–5.1)
LYMPHOCYTES NFR BLD: 14.5 %
MAGNESIUM SERPL-MCNC: 2.21 MG/DL (ref 1.8–2.4)
MCH RBC QN AUTO: 29.2 PG (ref 26–34)
MCHC RBC AUTO-ENTMCNC: 33.1 G/DL (ref 31–36)
MCV RBC AUTO: 88.2 FL (ref 80–100)
MONOCYTES # BLD: 0.6 K/UL (ref 0–1.3)
MONOCYTES NFR BLD: 9.8 %
NEUTROPHILS # BLD: 4.8 K/UL (ref 1.7–7.7)
NEUTROPHILS NFR BLD: 75.5 %
PLATELET # BLD AUTO: 108 K/UL (ref 135–450)
PLATELET BLD QL SMEAR: ABNORMAL
PMV BLD AUTO: 8.5 FL (ref 5–10.5)
POTASSIUM SERPL-SCNC: 4.1 MMOL/L (ref 3.5–5.1)
RBC # BLD AUTO: 2.29 M/UL (ref 4–5.2)
SLIDE REVIEW: ABNORMAL
SODIUM SERPL-SCNC: 136 MMOL/L (ref 136–145)
SODIUM UR-SCNC: <20 MMOL/L
UUN UR-MCNC: 512 MG/DL (ref 800–1666)
WBC # BLD AUTO: 6.4 K/UL (ref 4–11)

## 2024-10-22 PROCEDURE — 94761 N-INVAS EAR/PLS OXIMETRY MLT: CPT

## 2024-10-22 PROCEDURE — 80048 BASIC METABOLIC PNL TOTAL CA: CPT

## 2024-10-22 PROCEDURE — 84300 ASSAY OF URINE SODIUM: CPT

## 2024-10-22 PROCEDURE — 2580000003 HC RX 258

## 2024-10-22 PROCEDURE — 6370000000 HC RX 637 (ALT 250 FOR IP): Performed by: ORTHOPAEDIC SURGERY

## 2024-10-22 PROCEDURE — 6360000002 HC RX W HCPCS: Performed by: ORTHOPAEDIC SURGERY

## 2024-10-22 PROCEDURE — 2500000003 HC RX 250 WO HCPCS: Performed by: ORTHOPAEDIC SURGERY

## 2024-10-22 PROCEDURE — 2580000003 HC RX 258: Performed by: ORTHOPAEDIC SURGERY

## 2024-10-22 PROCEDURE — 85018 HEMOGLOBIN: CPT

## 2024-10-22 PROCEDURE — 84540 ASSAY OF URINE/UREA-N: CPT

## 2024-10-22 PROCEDURE — 85025 COMPLETE CBC W/AUTO DIFF WBC: CPT

## 2024-10-22 PROCEDURE — 36415 COLL VENOUS BLD VENIPUNCTURE: CPT

## 2024-10-22 PROCEDURE — 83735 ASSAY OF MAGNESIUM: CPT

## 2024-10-22 PROCEDURE — 99024 POSTOP FOLLOW-UP VISIT: CPT | Performed by: NURSE PRACTITIONER

## 2024-10-22 PROCEDURE — 36430 TRANSFUSION BLD/BLD COMPNT: CPT

## 2024-10-22 PROCEDURE — 2580000003 HC RX 258: Performed by: STUDENT IN AN ORGANIZED HEALTH CARE EDUCATION/TRAINING PROGRAM

## 2024-10-22 PROCEDURE — 2060000000 HC ICU INTERMEDIATE R&B

## 2024-10-22 PROCEDURE — 82570 ASSAY OF URINE CREATININE: CPT

## 2024-10-22 PROCEDURE — APPNB45 APP NON BILLABLE 31-45 MINUTES: Performed by: NURSE PRACTITIONER

## 2024-10-22 PROCEDURE — 85014 HEMATOCRIT: CPT

## 2024-10-22 PROCEDURE — 51798 US URINE CAPACITY MEASURE: CPT

## 2024-10-22 RX ORDER — SODIUM CHLORIDE 9 MG/ML
INJECTION, SOLUTION INTRAVENOUS PRN
Status: DISCONTINUED | OUTPATIENT
Start: 2024-10-22 | End: 2024-10-22

## 2024-10-22 RX ADMIN — OXYCODONE 5 MG: 5 TABLET ORAL at 09:42

## 2024-10-22 RX ADMIN — WATER 1000 MG: 1 INJECTION INTRAMUSCULAR; INTRAVENOUS; SUBCUTANEOUS at 03:54

## 2024-10-22 RX ADMIN — SODIUM CHLORIDE, PRESERVATIVE FREE 20 MG: 5 INJECTION INTRAVENOUS at 08:27

## 2024-10-22 RX ADMIN — Medication 1 CAPSULE: at 08:28

## 2024-10-22 RX ADMIN — SODIUM CHLORIDE, PRESERVATIVE FREE 10 ML: 5 INJECTION INTRAVENOUS at 22:05

## 2024-10-22 RX ADMIN — Medication 1 CAPSULE: at 16:33

## 2024-10-22 RX ADMIN — MORPHINE SULFATE 2 MG: 2 INJECTION, SOLUTION INTRAMUSCULAR; INTRAVENOUS at 11:21

## 2024-10-22 RX ADMIN — SODIUM CHLORIDE, PRESERVATIVE FREE 10 ML: 5 INJECTION INTRAVENOUS at 08:28

## 2024-10-22 RX ADMIN — MORPHINE SULFATE 2 MG: 2 INJECTION, SOLUTION INTRAMUSCULAR; INTRAVENOUS at 04:07

## 2024-10-22 RX ADMIN — SODIUM CHLORIDE, PRESERVATIVE FREE 10 ML: 5 INJECTION INTRAVENOUS at 04:04

## 2024-10-22 RX ADMIN — OXYCODONE 5 MG: 5 TABLET ORAL at 15:18

## 2024-10-22 RX ADMIN — SODIUM CHLORIDE: 9 INJECTION, SOLUTION INTRAVENOUS at 06:15

## 2024-10-22 RX ADMIN — MORPHINE SULFATE 4 MG: 4 INJECTION, SOLUTION INTRAMUSCULAR; INTRAVENOUS at 22:01

## 2024-10-22 RX ADMIN — SODIUM CHLORIDE, PRESERVATIVE FREE 10 ML: 5 INJECTION INTRAVENOUS at 03:55

## 2024-10-22 RX ADMIN — SODIUM CHLORIDE, PRESERVATIVE FREE 20 MG: 5 INJECTION INTRAVENOUS at 22:04

## 2024-10-22 ASSESSMENT — PAIN SCALES - GENERAL
PAINLEVEL_OUTOF10: 5
PAINLEVEL_OUTOF10: 5
PAINLEVEL_OUTOF10: 7
PAINLEVEL_OUTOF10: 4
PAINLEVEL_OUTOF10: 5
PAINLEVEL_OUTOF10: 4
PAINLEVEL_OUTOF10: 3
PAINLEVEL_OUTOF10: 6
PAINLEVEL_OUTOF10: 0
PAINLEVEL_OUTOF10: 4
PAINLEVEL_OUTOF10: 2
PAINLEVEL_OUTOF10: 4

## 2024-10-22 ASSESSMENT — ENCOUNTER SYMPTOMS
VOMITING: 0
BLOOD IN STOOL: 0
EYE DISCHARGE: 0
EYE REDNESS: 0
PHOTOPHOBIA: 0
ABDOMINAL PAIN: 0
CHEST TIGHTNESS: 0
COUGH: 0
FACIAL SWELLING: 0
NAUSEA: 0
ABDOMINAL DISTENTION: 0
DIARRHEA: 0
SHORTNESS OF BREATH: 0
CONSTIPATION: 0

## 2024-10-22 ASSESSMENT — PAIN DESCRIPTION - DESCRIPTORS
DESCRIPTORS: DISCOMFORT
DESCRIPTORS: ACHING;DISCOMFORT
DESCRIPTORS: DISCOMFORT
DESCRIPTORS: THROBBING

## 2024-10-22 ASSESSMENT — PAIN DESCRIPTION - ORIENTATION
ORIENTATION: RIGHT
ORIENTATION: LEFT
ORIENTATION: RIGHT

## 2024-10-22 ASSESSMENT — PAIN DESCRIPTION - LOCATION
LOCATION: HIP

## 2024-10-22 ASSESSMENT — PAIN DESCRIPTION - PAIN TYPE: TYPE: SURGICAL PAIN

## 2024-10-22 ASSESSMENT — PAIN - FUNCTIONAL ASSESSMENT: PAIN_FUNCTIONAL_ASSESSMENT: PREVENTS OR INTERFERES SOME ACTIVE ACTIVITIES AND ADLS

## 2024-10-22 NOTE — PLAN OF CARE
VSS. NSR on monitor. Patient is alert and oriented, complains of pain to right hip, medication given with some relief. Patient received 1 unit of PRBC today, tolerated well. Patient on regular diet, tolerating well. Assisted as needed with turns for pressure ulcer prevention. Free from any injury. Patient and family updated on plan of care. Will continue plan of care.      Problem: Pain  Goal: Verbalizes/displays adequate comfort level or baseline comfort level  Outcome: Progressing     Problem: Skin/Tissue Integrity  Goal: Absence of new skin breakdown  Description: 1.  Monitor for areas of redness and/or skin breakdown  2.  Assess vascular access sites hourly  3.  Every 4-6 hours minimum:  Change oxygen saturation probe site  4.  Every 4-6 hours:  If on nasal continuous positive airway pressure, respiratory therapy assess nares and determine need for appliance change or resting period.  Outcome: Progressing     Problem: Safety - Adult  Goal: Free from fall injury  Outcome: Progressing     Problem: ABCDS Injury Assessment  Goal: Absence of physical injury  Outcome: Progressing     Problem: Discharge Planning  Goal: Discharge to home or other facility with appropriate resources  Outcome: Progressing      Yes

## 2024-10-22 NOTE — CARE COORDINATION
Case Management Assessment  Initial Evaluation    Date/Time of Evaluation: 10/22/2024 1:13 PM  Assessment Completed by: SHAWNA Gongora    If patient is discharged prior to next notation, then this note serves as note for discharge by case management.    Patient Name: Antonette Brown                   YOB: 1940  Diagnosis: Acute lower UTI [N39.0]  Closed fracture of right hip, initial encounter ([facility]) [S72.001A]  Hip fracture, right, closed, initial encounter ([facility]) [S72.001A]                   Date / Time: 10/20/2024 12:06 AM    Patient Admission Status: Inpatient   Readmission Risk (Low < 19, Mod (19-27), High > 27): Readmission Risk Score: 16.1    Current PCP: Erica Nava MD  PCP verified by CM? (P) Yes    Chart Reviewed: Yes      History Provided by: (P) Patient  Patient Orientation: (P) Alert and Oriented, Person, Place, Situation    Patient Cognition: (P) Alert    Hospitalization in the last 30 days (Readmission):  No    If yes, Readmission Assessment in  Navigator will be completed.    Advance Directives:      Code Status: Full Code   Patient's Primary Decision Maker is: (P) Named in Scanned ACP Document    Primary Decision Maker: Teo Brown - Spouse - [phone]    Secondary Decision Maker: Parag Brown - Child - [phone]    Discharge Planning:    Patient lives with: (P) Spouse/Significant Other Type of Home: (P) House  Primary Care Giver: (P) Self  Patient Support Systems include: (P) Spouse/Significant Other, Children   Current Financial resources: (P) Medicare  Current community resources: (P) None  Current services prior to admission: (P) Durable Medical Equipment            Current DME: (P) Walker            Type of Home Care services:  None    ADLS  Prior functional level: (P) Assistance with the following:, Mobility  Current functional level: (P) Assistance with the following:, Mobility    PT AM-PAC: 6 /24  OT AM-PAC: 12 /24    Family can provide assistance at DC: (P)

## 2024-10-22 NOTE — CARE COORDINATION
Discharge Planning Note    received call from [facility] that they can accept and started precert.   Passar completed to [facility].       updated by Reba at [facility] who states precert obtained and is good through 10/29/2024.    Electronically signed by SHAWNA Gongora on 10/22/2024 at 3:01 PM  Phone [phone]

## 2024-10-22 NOTE — FLOWSHEET NOTE
Called PICC RN for new IV due to bruising around site. PIV flushes well. PICC unable to get new PIV at this time.     Maeve Salinas, RN

## 2024-10-22 NOTE — FLOWSHEET NOTE
10/22/24 0622   Treatment Team Notification   Reason for Communication Critical results   Type of Critical Result Laboratory   Critical Lab Information h/h   Person Result Received From Yazmin JAIME RN   Critical Lab Result Type Hemoglobin and hematocrit   Name of Team Member Notified MIKAEL Singh   Treatment Team Role Attending Provider   Method of Communication Secure Message   Response Waiting for response   Notification Time 0622       Critical h/h  6.7/20.2

## 2024-10-23 ENCOUNTER — APPOINTMENT (OUTPATIENT)
Dept: GENERAL RADIOLOGY | Age: 84
DRG: 481 | End: 2024-10-23
Payer: MEDICARE

## 2024-10-23 LAB
ANION GAP SERPL CALCULATED.3IONS-SCNC: 9 MMOL/L (ref 3–16)
BASOPHILS # BLD: 0 K/UL (ref 0–0.2)
BASOPHILS NFR BLD: 0.4 %
BUN SERPL-MCNC: 42 MG/DL (ref 7–20)
CALCIUM SERPL-MCNC: 7.7 MG/DL (ref 8.3–10.6)
CHLORIDE SERPL-SCNC: 107 MMOL/L (ref 99–110)
CO2 SERPL-SCNC: 21 MMOL/L (ref 21–32)
CREAT SERPL-MCNC: 1.2 MG/DL (ref 0.6–1.2)
DEPRECATED RDW RBC AUTO: 14.2 % (ref 12.4–15.4)
EOSINOPHIL # BLD: 0.1 K/UL (ref 0–0.6)
EOSINOPHIL NFR BLD: 1.8 %
GFR SERPLBLD CREATININE-BSD FMLA CKD-EPI: 44 ML/MIN/{1.73_M2}
GLUCOSE SERPL-MCNC: 88 MG/DL (ref 70–99)
HCT VFR BLD AUTO: 22.8 % (ref 36–48)
HGB BLD-MCNC: 7.5 G/DL (ref 12–16)
LYMPHOCYTES # BLD: 1.3 K/UL (ref 1–5.1)
LYMPHOCYTES NFR BLD: 25.7 %
MAGNESIUM SERPL-MCNC: 2.39 MG/DL (ref 1.8–2.4)
MCH RBC QN AUTO: 28.9 PG (ref 26–34)
MCHC RBC AUTO-ENTMCNC: 32.7 G/DL (ref 31–36)
MCV RBC AUTO: 88.4 FL (ref 80–100)
MONOCYTES # BLD: 0.6 K/UL (ref 0–1.3)
MONOCYTES NFR BLD: 11.7 %
NEUTROPHILS # BLD: 3.1 K/UL (ref 1.7–7.7)
NEUTROPHILS NFR BLD: 60.4 %
PLATELET # BLD AUTO: 118 K/UL (ref 135–450)
PMV BLD AUTO: 8.2 FL (ref 5–10.5)
POTASSIUM SERPL-SCNC: 4.3 MMOL/L (ref 3.5–5.1)
RBC # BLD AUTO: 2.58 M/UL (ref 4–5.2)
SODIUM SERPL-SCNC: 137 MMOL/L (ref 136–145)
WBC # BLD AUTO: 5.1 K/UL (ref 4–11)

## 2024-10-23 PROCEDURE — 2500000003 HC RX 250 WO HCPCS: Performed by: ORTHOPAEDIC SURGERY

## 2024-10-23 PROCEDURE — 80048 BASIC METABOLIC PNL TOTAL CA: CPT

## 2024-10-23 PROCEDURE — 6370000000 HC RX 637 (ALT 250 FOR IP): Performed by: INTERNAL MEDICINE

## 2024-10-23 PROCEDURE — 97110 THERAPEUTIC EXERCISES: CPT

## 2024-10-23 PROCEDURE — 83735 ASSAY OF MAGNESIUM: CPT

## 2024-10-23 PROCEDURE — 99024 POSTOP FOLLOW-UP VISIT: CPT | Performed by: NURSE PRACTITIONER

## 2024-10-23 PROCEDURE — 74018 RADEX ABDOMEN 1 VIEW: CPT

## 2024-10-23 PROCEDURE — 6370000000 HC RX 637 (ALT 250 FOR IP): Performed by: ORTHOPAEDIC SURGERY

## 2024-10-23 PROCEDURE — 2580000003 HC RX 258: Performed by: ORTHOPAEDIC SURGERY

## 2024-10-23 PROCEDURE — 36415 COLL VENOUS BLD VENIPUNCTURE: CPT

## 2024-10-23 PROCEDURE — 2060000000 HC ICU INTERMEDIATE R&B

## 2024-10-23 PROCEDURE — 97530 THERAPEUTIC ACTIVITIES: CPT

## 2024-10-23 PROCEDURE — 85025 COMPLETE CBC W/AUTO DIFF WBC: CPT

## 2024-10-23 PROCEDURE — APPNB45 APP NON BILLABLE 31-45 MINUTES: Performed by: NURSE PRACTITIONER

## 2024-10-23 PROCEDURE — 6360000002 HC RX W HCPCS: Performed by: ORTHOPAEDIC SURGERY

## 2024-10-23 RX ORDER — PANTOPRAZOLE SODIUM 40 MG/1
40 TABLET, DELAYED RELEASE ORAL
Status: DISCONTINUED | OUTPATIENT
Start: 2024-10-24 | End: 2024-10-23

## 2024-10-23 RX ORDER — BUPROPION HYDROCHLORIDE 150 MG/1
150 TABLET ORAL DAILY
Status: DISCONTINUED | OUTPATIENT
Start: 2024-10-24 | End: 2024-10-24 | Stop reason: HOSPADM

## 2024-10-23 RX ORDER — ATORVASTATIN CALCIUM 10 MG/1
10 TABLET, FILM COATED ORAL
Status: DISCONTINUED | OUTPATIENT
Start: 2024-10-23 | End: 2024-10-24 | Stop reason: HOSPADM

## 2024-10-23 RX ORDER — LACTULOSE 10 G/15ML
30 SOLUTION ORAL ONCE
Status: COMPLETED | OUTPATIENT
Start: 2024-10-23 | End: 2024-10-23

## 2024-10-23 RX ORDER — PREGABALIN 75 MG/1
150 CAPSULE ORAL 2 TIMES DAILY
Status: DISCONTINUED | OUTPATIENT
Start: 2024-10-23 | End: 2024-10-24 | Stop reason: HOSPADM

## 2024-10-23 RX ORDER — SUCRALFATE 1 G/1
1 TABLET ORAL
Status: DISCONTINUED | OUTPATIENT
Start: 2024-10-23 | End: 2024-10-24 | Stop reason: HOSPADM

## 2024-10-23 RX ORDER — LACTULOSE 10 G/15ML
30 SOLUTION ORAL 3 TIMES DAILY
Status: DISCONTINUED | OUTPATIENT
Start: 2024-10-23 | End: 2024-10-24 | Stop reason: HOSPADM

## 2024-10-23 RX ORDER — HYDROXYCHLOROQUINE SULFATE 200 MG/1
200 TABLET, FILM COATED ORAL 2 TIMES DAILY
Status: DISCONTINUED | OUTPATIENT
Start: 2024-10-23 | End: 2024-10-24 | Stop reason: HOSPADM

## 2024-10-23 RX ORDER — VILAZODONE HYDROCHLORIDE 20 MG/1
20 TABLET ORAL DAILY
Status: DISCONTINUED | OUTPATIENT
Start: 2024-10-23 | End: 2024-10-24 | Stop reason: HOSPADM

## 2024-10-23 RX ORDER — LACTOBACILLUS RHAMNOSUS GG 10B CELL
1 CAPSULE ORAL 2 TIMES DAILY WITH MEALS
Qty: 28 CAPSULE | Refills: 0 | Status: SHIPPED | OUTPATIENT
Start: 2024-10-23 | End: 2024-11-06

## 2024-10-23 RX ADMIN — SUCRALFATE 1 G: 1 TABLET ORAL at 20:29

## 2024-10-23 RX ADMIN — OXYCODONE 5 MG: 5 TABLET ORAL at 08:35

## 2024-10-23 RX ADMIN — Medication 1 CAPSULE: at 08:35

## 2024-10-23 RX ADMIN — MORPHINE SULFATE 2 MG: 2 INJECTION, SOLUTION INTRAMUSCULAR; INTRAVENOUS at 19:15

## 2024-10-23 RX ADMIN — SODIUM CHLORIDE, PRESERVATIVE FREE 10 ML: 5 INJECTION INTRAVENOUS at 19:15

## 2024-10-23 RX ADMIN — SODIUM CHLORIDE, PRESERVATIVE FREE 20 MG: 5 INJECTION INTRAVENOUS at 08:35

## 2024-10-23 RX ADMIN — LACTULOSE 30 G: 10 SOLUTION ORAL at 12:35

## 2024-10-23 RX ADMIN — OXYCODONE 5 MG: 5 TABLET ORAL at 17:05

## 2024-10-23 RX ADMIN — ATORVASTATIN CALCIUM 10 MG: 10 TABLET, FILM COATED ORAL at 20:29

## 2024-10-23 RX ADMIN — OXYCODONE 5 MG: 5 TABLET ORAL at 12:35

## 2024-10-23 RX ADMIN — PREGABALIN 150 MG: 75 CAPSULE ORAL at 20:29

## 2024-10-23 RX ADMIN — Medication 1 CAPSULE: at 17:05

## 2024-10-23 RX ADMIN — SODIUM CHLORIDE, PRESERVATIVE FREE 10 ML: 5 INJECTION INTRAVENOUS at 20:30

## 2024-10-23 RX ADMIN — HYDROXYCHLOROQUINE SULFATE 200 MG: 200 TABLET ORAL at 20:29

## 2024-10-23 RX ADMIN — SODIUM CHLORIDE, PRESERVATIVE FREE 10 ML: 5 INJECTION INTRAVENOUS at 08:35

## 2024-10-23 RX ADMIN — LACTULOSE 30 G: 10 SOLUTION ORAL at 17:05

## 2024-10-23 RX ADMIN — SODIUM CHLORIDE, PRESERVATIVE FREE 20 MG: 5 INJECTION INTRAVENOUS at 20:29

## 2024-10-23 RX ADMIN — SUCRALFATE 1 G: 1 TABLET ORAL at 17:05

## 2024-10-23 ASSESSMENT — ENCOUNTER SYMPTOMS
PHOTOPHOBIA: 0
FACIAL SWELLING: 0
ABDOMINAL DISTENTION: 0
DIARRHEA: 0
NAUSEA: 0
CHEST TIGHTNESS: 0
CONSTIPATION: 0
EYE DISCHARGE: 0
VOMITING: 0
ABDOMINAL PAIN: 0
BLOOD IN STOOL: 0
EYE REDNESS: 0
COUGH: 0
SHORTNESS OF BREATH: 0

## 2024-10-23 ASSESSMENT — PAIN SCALES - GENERAL
PAINLEVEL_OUTOF10: 4
PAINLEVEL_OUTOF10: 5
PAINLEVEL_OUTOF10: 5
PAINLEVEL_OUTOF10: 6

## 2024-10-23 ASSESSMENT — PAIN DESCRIPTION - LOCATION
LOCATION: GENERALIZED
LOCATION: NECK;KNEE
LOCATION: GENERALIZED

## 2024-10-23 NOTE — CARE COORDINATION
DISCHARGE ORDER  Date/Time 10/24/24 10:00 AM  Completed by: Teresa Boeck, RN, Case Management    Patient Name: Antonette Brown    : 1940      Admit order Date and Status:10/20/24  Noted discharge order. (verify MD's last order for status of admission/Traditional Medicare 3 [place] Inpatient qualifying stay required for SNF)    Confirmed discharge plan with:              Patient:  Yes              When pt confirms DC plan does any support person need to be contacted by CM Yes if yes who spouse                      Discharge to Facility: [facility] phone number for staff giving report:  [phone]    Pre-certification completed: yes   Hospital Exemption Notification (HENS) completed: yes   Discharge orders and Continuity of Care faxed to facility:  Sent       Transportation:               Medical Transport explained with choice list offered to pt/family.                Choice:No(no preference)  Agency used: [facility]   time:   10:00      Pt/family/Nursing/Facility aware of  time: Yes Names: Patient, son, Berenice RN, HUC/paperwork, Natalya/[employer],   Ambulance form completed:  yes:      Date Last IMM Given: 10/23/24    Comments: Pt is being d/c'd to [facility] today. Pt's O2 sats are 98% on 2L.    Discharge timeout done with Berenice CLINTON. All discharge needs and concerns addressed.    Discharging nurse to complete JUSTIN, reconcile AVS, and place final copy with patient's discharge packet. Discharging RN to ensure that written prescriptions for  Level II medications are sent with patient to the facility as per protocol.

## 2024-10-23 NOTE — PLAN OF CARE
Problem: Pain  Goal: Verbalizes/displays adequate comfort level or baseline comfort level  Outcome: Progressing  Note: Patient with c/o pain 5/10.  PRN Oxycodone administered per orders - see MAR.  Will continue to monitor.     Problem: Safety - Adult  Goal: Free from fall injury  Outcome: Progressing  Note: Patient remains absent from falls at this time.  Remains alert and oriented, in bed with call light and belongings in reach.  Non-slip footwear on and 2/4 siderails raised.  Bed remains in lowest/locked position at all times with alarm activated.  Fall precautions in place.  Patient encouraged to use call light to request assistance, v/u.  Will continue to monitor.

## 2024-10-23 NOTE — CARE COORDINATION
CM did deliver 2nd IMM within 4 hours for DC, verbal explanation of patient rights at bedside. Pt voiced understanding of discharge MCR rights and is agreeable to discharge.

## 2024-10-23 NOTE — CONSULTS
Antonette Brown  10/23/2024  [id number]    Chief Complaint: Hip fracture, right, closed, initial encounter ([facility])    Subjective:   This is an 84-year-old female with a past medical history including:  Past Medical History:   Diagnosis Date    AR (allergic rhinitis)     Arthritis of knee     Pruis    Colitis     Depression     Erosive gastritis 10/28/2019    EGD October 2019, he did with PPI    GERD (gastroesophageal reflux disease)     Hyperlipidemia     Hypertension     Internal hemorrhoids     MI (myocardial infarction) ([facility])     Overweight(278.02)     Viral meningitis 05/2012     Who came into the hospital after right hip fracture.  She is currently status post right femur medullary nailing.  She continues to be limited in therapy but would like to go to a skilled nursing facility as her discharge.    ROS: No CP, SOB, dyspnea    Objective:  Patient Vitals for the past 24 hrs:   BP Temp Temp src Pulse Resp SpO2 Weight   10/23/24 0815 130/69 98.2 °F (36.8 °C) Oral 100 16 98 % 86.3 kg (190 lb 3.2 oz)   10/23/24 0337 133/71 97.2 °F (36.2 °C) Oral 89 14 98 % --   10/23/24 0034 (!) 122/50 98 °F (36.7 °C) Oral 99 14 96 % --   10/22/24 1602 114/67 98.2 °F (36.8 °C) Axillary (!) 107 14 95 % --   10/22/24 1151 -- -- -- -- 16 -- --     Gen: No distress, pleasant.   HEENT: Normocephalic, atraumatic.   CV: No audible murmurs, well perfused extremities  Resp: No respiratory distress. No increased WOB  Abd: Soft, nontender nondistended  Ext: + edema  Neuro: Alert, oriented, appropriately interactive.     Laboratory data: Available via EMR.     Therapy progress:    PT    Rolling: Level of difficulty - Total   Sit to Stand from a Chair: Level of difficulty - Total  Supine to Sit: Level of difficulty - Total     Bed to Chair: Physical Assistance Required - Total  Ambulate Across Room: Physical Assistance Required - Total  Ascend 3-5 Steps With HR: Physical Assistance Required - Total    OT    Eating: Physical Assistance Required -

## 2024-10-23 NOTE — CARE COORDINATION
Per MD in rounds patient can dc today. CM was instructed to set up transport for 16:00.    CM spoke with Natalya/shy/Eliseo. Okay to send patient today. They will be ready for the patient.     Will wait for DC order. Will need HENS.

## 2024-10-24 VITALS
OXYGEN SATURATION: 92 % | TEMPERATURE: 98.2 F | SYSTOLIC BLOOD PRESSURE: 119 MMHG | HEART RATE: 95 BPM | RESPIRATION RATE: 16 BRPM | DIASTOLIC BLOOD PRESSURE: 71 MMHG | HEIGHT: 61 IN | WEIGHT: 191.8 LBS | BODY MASS INDEX: 36.21 KG/M2

## 2024-10-24 LAB
ANION GAP SERPL CALCULATED.3IONS-SCNC: 8 MMOL/L (ref 3–16)
BASOPHILS # BLD: 0 K/UL (ref 0–0.2)
BASOPHILS NFR BLD: 0.5 %
BUN SERPL-MCNC: 30 MG/DL (ref 7–20)
CALCIUM SERPL-MCNC: 7.7 MG/DL (ref 8.3–10.6)
CHLORIDE SERPL-SCNC: 107 MMOL/L (ref 99–110)
CO2 SERPL-SCNC: 24 MMOL/L (ref 21–32)
CREAT SERPL-MCNC: 0.9 MG/DL (ref 0.6–1.2)
DEPRECATED RDW RBC AUTO: 14.4 % (ref 12.4–15.4)
EOSINOPHIL # BLD: 0.1 K/UL (ref 0–0.6)
EOSINOPHIL NFR BLD: 2.6 %
GFR SERPLBLD CREATININE-BSD FMLA CKD-EPI: 62 ML/MIN/{1.73_M2}
GLUCOSE SERPL-MCNC: 109 MG/DL (ref 70–99)
HCT VFR BLD AUTO: 22.2 % (ref 36–48)
HGB BLD-MCNC: 7.4 G/DL (ref 12–16)
LYMPHOCYTES # BLD: 1.3 K/UL (ref 1–5.1)
LYMPHOCYTES NFR BLD: 25.7 %
MAGNESIUM SERPL-MCNC: 2.3 MG/DL (ref 1.8–2.4)
MCH RBC QN AUTO: 29.6 PG (ref 26–34)
MCHC RBC AUTO-ENTMCNC: 33.5 G/DL (ref 31–36)
MCV RBC AUTO: 88.2 FL (ref 80–100)
MONOCYTES # BLD: 0.7 K/UL (ref 0–1.3)
MONOCYTES NFR BLD: 12.8 %
NEUTROPHILS # BLD: 3.1 K/UL (ref 1.7–7.7)
NEUTROPHILS NFR BLD: 58.4 %
PLATELET # BLD AUTO: 140 K/UL (ref 135–450)
PMV BLD AUTO: 7.8 FL (ref 5–10.5)
POTASSIUM SERPL-SCNC: 4.1 MMOL/L (ref 3.5–5.1)
RBC # BLD AUTO: 2.51 M/UL (ref 4–5.2)
SODIUM SERPL-SCNC: 139 MMOL/L (ref 136–145)
WBC # BLD AUTO: 5.2 K/UL (ref 4–11)

## 2024-10-24 PROCEDURE — 6370000000 HC RX 637 (ALT 250 FOR IP): Performed by: ORTHOPAEDIC SURGERY

## 2024-10-24 PROCEDURE — 6370000000 HC RX 637 (ALT 250 FOR IP): Performed by: INTERNAL MEDICINE

## 2024-10-24 PROCEDURE — 36415 COLL VENOUS BLD VENIPUNCTURE: CPT

## 2024-10-24 PROCEDURE — 2500000003 HC RX 250 WO HCPCS: Performed by: ORTHOPAEDIC SURGERY

## 2024-10-24 PROCEDURE — 85025 COMPLETE CBC W/AUTO DIFF WBC: CPT

## 2024-10-24 PROCEDURE — 83735 ASSAY OF MAGNESIUM: CPT

## 2024-10-24 PROCEDURE — 2580000003 HC RX 258: Performed by: ORTHOPAEDIC SURGERY

## 2024-10-24 PROCEDURE — 80048 BASIC METABOLIC PNL TOTAL CA: CPT

## 2024-10-24 RX ADMIN — Medication 1 CAPSULE: at 07:52

## 2024-10-24 RX ADMIN — LACTULOSE 30 G: 10 SOLUTION ORAL at 07:59

## 2024-10-24 RX ADMIN — PREGABALIN 150 MG: 75 CAPSULE ORAL at 07:51

## 2024-10-24 RX ADMIN — OXYCODONE 5 MG: 5 TABLET ORAL at 07:52

## 2024-10-24 RX ADMIN — HYDROXYCHLOROQUINE SULFATE 200 MG: 200 TABLET ORAL at 08:04

## 2024-10-24 RX ADMIN — SODIUM CHLORIDE, PRESERVATIVE FREE 20 MG: 5 INJECTION INTRAVENOUS at 07:52

## 2024-10-24 RX ADMIN — VILAZODONE HYDROCHLORIDE 20 MG: 20 TABLET, FILM COATED ORAL at 08:04

## 2024-10-24 RX ADMIN — SUCRALFATE 1 G: 1 TABLET ORAL at 06:13

## 2024-10-24 RX ADMIN — BUPROPION HYDROCHLORIDE 150 MG: 150 TABLET, EXTENDED RELEASE ORAL at 07:52

## 2024-10-24 NOTE — CARE COORDINATION
Discharge Planning:     (CM) called [facility]  ([phone]) and confirmed 10AM transport.       Electronically signed by SHAWNA Pitts on 10/24/2024 at 9:19 AM

## 2024-10-24 NOTE — PROGRESS NOTES
Advanced Care Planning Note.     Purpose of Encounter: Advanced care planning in light of right hip fracture, UTI, HILTON  Parties In Attendance: Patient  Decisional Capacity: Yes  Subjective: Patient with fall and right hip injury with pain due to  right hip fracture, UTI, HILTON, postop anemia  Objective: Creat 2.2, Imaging with right hip fracture  Goals of Care Determination: Patient wishes to focus on treatment and return to home  Patient wants full support (CPR, vent, surgery, HD, trach, PEG)  Plan: Intermedullary nailing, blood transfusion  Code Status: Full code            Time spent on Advanced care Plannin minutes  Advanced Care Planning Documents: Completed advanced directives on chart,   is the POA.     Ck Salinas MD, MD  10/22/2024 8:37 PM        
      Admit Date: 10/20/2024  Diet: ADULT DIET; Regular    CC: Femure Fx    Interval history:   Overnight, there were no acute events. Patient's vitals remained stable    Patient was seen this morning. I discussed the plan of the day with her in detail. All questions were addressed and answered to patient's satisfaction.   Patient denies fevers, chills, nausea, vomiting, chest pain, shortness of breath, diarrhea, constipation, dysuria, urinary frequency or urgency.     Plan:     -CT abdomen pelvis to make sure patient does not have any retroperitoneal bleed  -Transfuse 1 unit PRBC  -Urine sodium, urine creatinine, urine urea  -IV fluid hydration  -Pain control  -IV PPI  -Continue IV Rocephin for UTI  -Probiotics    Assessment:   Antonette Brown is a 84 y.o. female with PMH of HTN, HLD, erosive gastritis who was admitted with comminuted displaced intertrochanteric fracture of the right proximal femur    Comminuted displaced intertrochanteric fracture of the right proximal femur  Patient had a mechanical fall that led to a right femur fracture. Patient is s/p Right femur cephalomedullary nail   -Orthopedic surgery consulted patient is s/p    HILTON  Patient's creatinine postsurgery increased from 0.9 yesterday to 1.7 today.  -Nephrology consulted    Anemia  Patient's hemoglobin dropped from 11 yesterday to 7.0 today.  She endorses some hip pain/back pain.  She does not have any overt source of bleeding.    UTI  Patient endorses that she has been having dysuria.  She claims she gets a lot of UTIs and sees a urologist pretty often.    HTN  At home, patient is on losartan    HLD  At home, patient is on atorvastatin    GERD  At home, patient is on Protonix and sucralfate    Code Status: Full Code   FEN: ADULT DIET; Regular   DVT PPX: []Lovenox, []Heparin, []Coumadin, []Eliquis, []Xarelto, [x]SCD  DISPO: Continue management of acute issues     Fran Farley MD  10/21/2024,  10:32 AM    This note was likely completed using 
      Admit Date: 10/20/2024  Diet: Diet NPO    CC: Larry Fx    Interval history:   Overnight, there were no acute events. Patient's vitals remained stable    Patient was seen this morning. I discussed the plan of the day with her in detail. All questions were addressed and answered to patient's satisfaction.   Patient denies fevers, chills, nausea, vomiting, chest pain, shortness of breath, diarrhea, constipation, dysuria, urinary frequency or urgency.     Plan:     -Pain control  -Will await for orthopedic surgery recommendations in light of femur fracture  -IV PPI  -Continue IV Rocephin for UTI  -Probiotics    Assessment:   Antonette Brown is a 83 y.o. female with PMH of HTN, HLD, erosive gastritis who was admitted with comminuted displaced intertrochanteric fracture of the right proximal femur    Comminuted displaced intertrochanteric fracture of the right proximal femur  Patient had a mechanical fall that led to a right femur fracture.  -Orthopedic surgery consulted    UTI  Patient endorses that she has been having dysuria.  She claims she gets a lot of UTIs and sees a urologist pretty often.    HTN  At home, patient is on losartan    HLD  At home, patient is on atorvastatin    GERD  At home, patient is on Protonix and sucralfate    Code Status: Full Code   FEN: Diet NPO   DVT PPX: []Lovenox, []Heparin, []Coumadin, []Eliquis, []Xarelto, [x]SCD  DISPO: Continue management of acute issues     Fran Farley MD  10/20/2024,  11:18 AM    This note was likely completed using voice recognition technology and may contain unintended errors.     Objective:   Vitals:   T-max:  Patient Vitals for the past 8 hrs:   BP Temp Temp src Pulse Resp SpO2 Height Weight   10/20/24 1100 -- -- -- -- 17 -- -- --   10/20/24 1045 (!) 142/83 99.5 °F (37.5 °C) Axillary 85 17 93 % -- --   10/20/24 0832 -- -- -- -- 16 -- -- --   10/20/24 0715 137/62 98.3 °F (36.8 °C) Oral 86 16 99 % -- --   10/20/24 0600 -- -- -- -- -- -- -- 88.5 kg (195 
      Hospitalist Progress Note      PCP: Erica Nava MD    Date of Admission: 10/20/2024    LOS: 2    Chief Complaint:   Chief Complaint   Patient presents with    Fall     Pt presents via [facility] w/ cc of injuries from a fall. Patient complaining of right sided hip pain.        Case Summary:   84-year-old gentleman with history of hypertension, hyperlipidemia, gastritis and GERD who presented following a fall onto the right hip with injury sustaining right hip fracture status post right femur medullary nailing.  She was complicated by urinary tract infection, acute kidney injury and acute postoperative anemia due to blood loss.        Active Hospital Problems    Diagnosis Date Noted    Hyperlipidemia [E78.5] 09/13/2022     Priority: Medium    Acute postoperative anemia due to greater than expected blood loss [D62] 10/22/2024    UTI (urinary tract infection) [N39.0] 10/22/2024    HILTON (acute kidney injury) ([facility]) [N17.9] 10/22/2024    Hip fracture, right, closed, initial encounter ([facility]) [S72.001A] 10/20/2024    Essential hypertension [I10] 11/21/2018         Principal Problem:    HILTON (acute kidney injury) (HCC): In the setting of episode of hypotension, blood loss anemia postsurgery.  Patient had received IV hydration however creatinine continued to worsen.  On admission creatinine 0.9 is up at 2.2 this morning.  - Continue IV hydration  - Monitor renal function and electrolytes  - Nephrology following with recommendation      Acute postoperative anemia due to greater than expected blood loss: Hemoglobin down to 6.7 this morning.  It has been trending down.  Patient received 1 unit PRBC yesterday.  Hemoglobin improved to 8.0 and dropped down to 6.7 this morning.  - Will give 1 unit PRBC.  - Monitor H&H and transfuse for hemoglobin less than 7.0 or if symptomatic  - If continues to drop, to consider CT imaging to rule out hematoma  - Repeat H&H today posttransfusion and in the evening      UTI (urinary 
      Hospitalist Progress Note      PCP: Erica Nava MD    Date of Admission: 10/20/2024    LOS: 3    Chief Complaint:   Chief Complaint   Patient presents with    Fall     Pt presents via [facility] w/ cc of injuries from a fall. Patient complaining of right sided hip pain.        Case Summary:   84-year-old gentleman with history of hypertension, hyperlipidemia, gastritis and GERD who presented following a fall onto the right hip with injury sustaining right hip fracture status post right femur medullary nailing.  She was complicated by urinary tract infection, acute kidney injury and acute postoperative anemia due to blood loss.        Active Hospital Problems    Diagnosis Date Noted    Hyperlipidemia [E78.5] 09/13/2022     Priority: Medium    Acute postoperative anemia due to greater than expected blood loss [D62] 10/22/2024    UTI (urinary tract infection) [N39.0] 10/22/2024    HILTON (acute kidney injury) ([facility]) [N17.9] 10/22/2024    Hip fracture, right, closed, initial encounter ([facility]) [S72.001A] 10/20/2024    Essential hypertension [I10] 11/21/2018         Principal Problem:    HILTON (acute kidney injury) ([facility]): In the setting of episode of hypotension, blood loss anemia postsurgery.  Patient had received IV hydration however creatinine continued to worsen.  On admission creatinine 0.9 and peaked at at 2.2.  Improved with hydration and transfusion.  Creatinine down to 1.2.  HILTON resolved.  Will monitor      Acute postoperative anemia due to greater than expected blood loss: Hemoglobin down to 6.7 this morning.  It has been trending down.  Patient received 1 unit PRBC yesterday.  Hemoglobin improved to 8.0 and dropped down to 6.7 status post 1 unit PRBC.  Hemoglobin stable at 7.5 this morning.  - Will monitor and transfuse as appropriate  - Planning for discharge to SNF and outpatient follow-up with orthopedics at discharge      UTI (urinary tract infection): Completed 3 days of antibiotics      Hip 
  [facility] - Inpatient Rehabilitation Department   Phone: [phone]    Occupational Therapy    [] Initial Evaluation            [x] Daily Treatment Note         [] Discharge Summary      Patient: Antonette Brown   : 1940   MRN: [id number]   Date of Service:  10/23/2024    Admitting Diagnosis:  Hip fracture, right, closed, initial encounter ([facility])  Current Admission Summary: presents for mechanical fall, S/p right hip IM nailing for fracture 10/20. Also admitted for UTI management. Post surgery, developed HILTON, anemia. CT pelvis (-) for retroperitoneal hemorrhage.    Past Medical History:  has a past medical history of AR (allergic rhinitis), Arthritis of knee, Colitis, Depression, Erosive gastritis, GERD (gastroesophageal reflux disease), Hyperlipidemia, Hypertension, Internal hemorrhoids, MI (myocardial infarction) ([facility]), Overweight(278.02), and Viral meningitis.  Past Surgical History:  has a past surgical history that includes Cholecystectomy, laparoscopic (); Total abdominal hysterectomy w/ bilateral salpingoophorectomy (); Colonoscopy (); Colonoscopy (12/3/13); Upper gastrointestinal endoscopy (12/3/13); Breast biopsy; Hysterectomy; hip surgery (Left, 2022); Colonoscopy (N/A, 2024); and hip surgery (Right, 10/20/2024).    Discharge Recommendations: Antonette Brown scored a 14/24 on the AM-PAC ADL Inpatient form. Current research shows that an AM-PAC score of 17 or less is typically not associated with a discharge to the patient's home setting. Based on the patient's AM-PAC score and their current ADL deficits, it is recommended that the patient have 3-5 sessions per week of Occupational Therapy at d/c to increase the patient's independence.  Please see assessment section for further patient specific details.    If patient discharges prior to next session this note will serve as a discharge summary.  Please see below for the latest assessment towards goals.      DME 
  [facility] - Inpatient Rehabilitation Department   Phone: [phone]    Physical Therapy    [x] Initial Evaluation            [] Daily Treatment Note         [] Discharge Summary      Patient: Antonette Brown   : 1940   MRN: [id number]   Date of Service:  10/21/2024  Admitting Diagnosis: Hip fracture, right, closed, initial encounter ([facility])  Current Admission Summary:   Antonette Brown is a 83 y.o. female who presents to the ED for evaluation of a fall.  Patient just got home from [facility] emergency department and fell at the door.  Had immediate pain in the right hip and unable to ambulate.  Denies any head or other injury.  Does have issues with neuropathy in that leg and states that seems to have triggered it may have that even worse as well.    S/p right hip IM nailing for fracture on 10/20     Past Medical History:  has a past medical history of AR (allergic rhinitis), Arthritis of knee, Colitis, Depression, Erosive gastritis, GERD (gastroesophageal reflux disease), Hyperlipidemia, Hypertension, Internal hemorrhoids, MI (myocardial infarction) ([facility]), Overweight(278.02), and Viral meningitis.  Past Surgical History:  has a past surgical history that includes Cholecystectomy, laparoscopic (); Total abdominal hysterectomy w/ bilateral salpingoophorectomy (); Colonoscopy (); Colonoscopy (12/3/13); Upper gastrointestinal endoscopy (12/3/13); Breast biopsy; Hysterectomy; hip surgery (Left, 2022); Colonoscopy (N/A, 2024); and hip surgery (Right, 10/20/2024).    Discharge Recommendations: Antonette Brown scored a / on the AM-PAC short mobility form. Current research shows that an AM-PAC score of 17 or less is typically not associated with a discharge to the patient's home setting. Based on the patient's AM-PAC score and their current functional mobility deficits, it is recommended that the patient have 3-5 sessions per week of Physical Therapy at d/c to 
  [facility] - Inpatient Rehabilitation Department   Phone: [phone]    Physical Therapy    [] Initial Evaluation            [x] Daily Treatment Note         [] Discharge Summary      Patient: Antonette Brown   : 1940   MRN: [id number]   Date of Service:  10/23/2024  Admitting Diagnosis: Hip fracture, right, closed, initial encounter ([facility])    Current Admission Summary:   Antonette Brown is a 83 y.o. female who presents to the ED for evaluation of a fall.  Patient just got home from [facility] emergency department and fell at the door.  Had immediate pain in the right hip and unable to ambulate.  Denies any head or other injury.  Does have issues with neuropathy in that leg and states that seems to have triggered it may have that even worse as well.    S/p right hip IM nailing for fracture on 10/20     Past Medical History:  has a past medical history of AR (allergic rhinitis), Arthritis of knee, Colitis, Depression, Erosive gastritis, GERD (gastroesophageal reflux disease), Hyperlipidemia, Hypertension, Internal hemorrhoids, MI (myocardial infarction) ([facility]), Overweight(278.02), and Viral meningitis.  Past Surgical History:  has a past surgical history that includes Cholecystectomy, laparoscopic (); Total abdominal hysterectomy w/ bilateral salpingoophorectomy (); Colonoscopy (); Colonoscopy (12/3/13); Upper gastrointestinal endoscopy (12/3/13); Breast biopsy; Hysterectomy; hip surgery (Left, 2022); Colonoscopy (N/A, 2024); and hip surgery (Right, 10/20/2024).    Discharge Recommendations: Antonette Brown scored a / on the AM-PAC short mobility form. Current research shows that an AM-PAC score of 17 or less is typically not associated with a discharge to the patient's home setting. Based on the patient's AM-PAC score and their current functional mobility deficits, it is recommended that the patient have 3-5 sessions per week of Physical Therapy at d/c to 
 [facility] Orthopedic Surgery   Progress Note    CHIEF COMPLAINT/DIAGNOSIS: S/p right hip IM nailing for fracture    SUBJECTIVE: The patient is seen sitting up in the bed; describes mild pain.  Denies no new issues. Another unit PRBC running for anemia now.     OBJECTIVE  Physical    VITALS:  /62   Pulse (!) 106   Temp 98.1 °F (36.7 °C)   Resp 16   Ht 1.549 m (5' 1\")   Wt 86 kg (189 lb 9.6 oz)   SpO2 94%   BMI 35.82 kg/m²     GENERAL: Alert and oriented x3, in no acute distress.   MUSCULOSKELETAL: Able to dorsi and plantarflex the ankle on operative side without issue.   INCISION:  Covered with post-op dressing; clean dry and intact.  ROM: Limited secondary pain and swelling.   Sensory:  Intact to light touch in peroneal and tibial distributions.   Vascular:   2+ DP pulses with brisk cap refill;  calf soft and nontender.    Data    ALL MEDICATIONS HAVE BEEN REVIEWED    CBC:   Recent Labs     10/20/24  0211 10/21/24  0619 10/21/24  1730 10/22/24  0507 10/22/24  1152   WBC 9.1 5.9  --  6.4  --    HGB 11.5* 7.0* 8.0* 6.7* 8.4*   HCT 33.6* 21.2* 24.0* 20.2* 25.9*   * 115*  --  108*  --      BMP:   Recent Labs     10/20/24  0249 10/21/24  0619 10/22/24  0507   * 138 136   K 3.6 4.2 4.1    107 105   CO2 26 22 22   BUN 22* 46* 56*   CREATININE 0.9 1.7* 2.2*     INR: No results for input(s): \"INR\" in the last 72 hours.    ASSESSMENT:  S/p right hip IM nailing (10/20/24), POD#2  Acute blood loss anemia as expected  Depression  HTN  HLD    PLAN:   Anemia possibly solely related to fracture and subsequent surgery.    No sign of hematoma on exam today - no active bleeding or drainage from incision.   - WB status:  WBAT through operative extremity;   - DVT prophylaxis: Hold AC given anemia.  - Ice therapy  - PT/OT  - Pain Control: Rx for dc in chart.  Due to orthopaedic surgical procedure/condition, patient may require pain medication for up to 6-8 weeks.  - Expected acute blood loss anemia: H/H 
 [facility] Orthopedic Surgery   Progress Note    CHIEF COMPLAINT/DIAGNOSIS: S/p right hip IM nailing for fracture    SUBJECTIVE: The patient is seen sitting up in the bed; describes mild pain.  Denies no new issues. Hypotensive this AM.     OBJECTIVE  Physical    VITALS:  BP 97/61   Pulse (!) 104   Temp (!) 96.6 °F (35.9 °C) (Oral)   Resp 16   Ht 1.549 m (5' 1\")   Wt 88 kg (193 lb 14.4 oz)   SpO2 92%   BMI 36.64 kg/m²     GENERAL: Alert and oriented x3, in no acute distress.   MUSCULOSKELETAL: Able to dorsi and plantarflex the ankle on operative side without issue.   INCISION:  Covered with post-op dressing; clean dry and intact.  ROM: Limited secondary pain and swelling.   Sensory:  Intact to light touch in peroneal and tibial distributions.   Vascular:   2+ DP pulses with brisk cap refill;  calf soft and nontender.    Data    ALL MEDICATIONS HAVE BEEN REVIEWED    CBC:   Recent Labs     10/20/24  0211 10/21/24  0619   WBC 9.1 5.9   HGB 11.5* 7.0*   HCT 33.6* 21.2*   * 115*     BMP:   Recent Labs     10/20/24  0249 10/21/24  0619   * 138   K 3.6 4.2    107   CO2 26 22   BUN 22* 46*   CREATININE 0.9 1.7*     INR: No results for input(s): \"INR\" in the last 72 hours.    ASSESSMENT:  S/p right hip IM nailing (10/20/24), POD#1  Acute blood loss anemia as expected  Depression  HTN  HLD    PLAN:   Anemia possibly solely related to fracture and subsequent surgery.    No sign of hematoma on exam today - no active bleeding or drainage from incision.   - WB status:  WBAT through operative extremity;   - DVT prophylaxis: Hold AC given anemia.  - Ice therapy  - PT/OT  - Pain Control: Rx for dc in chart.  Due to orthopaedic surgical procedure/condition, patient may require pain medication for up to 6-8 weeks.  - Expected acute blood loss anemia: H/H today: 7/21.2   - 1 unit PRBC today  - ID:  Ancef x 2 doses post-op completed.   - Disposition: per primary team; ok to d/c from our end once medically stable 
 [facility] Orthopedic Surgery   Progress Note    CHIEF COMPLAINT/DIAGNOSIS: S/p right hip IM nailing for fracture    SUBJECTIVE: The patient is seen sitting up in the bed; describes mild pain.  Denies no new issues. Constipated by her report.     OBJECTIVE  Physical    VITALS:  BP (!) 149/73   Pulse (!) 108   Temp 98.2 °F (36.8 °C) (Oral)   Resp 18   Ht 1.549 m (5' 1\")   Wt 86.3 kg (190 lb 3.2 oz)   SpO2 94%   BMI 35.94 kg/m²     GENERAL: Alert and oriented x3, in no acute distress.   MUSCULOSKELETAL: Able to dorsi and plantarflex the ankle on operative side without issue.   INCISION:  Covered with post-op dressing; clean dry and intact.  ROM: Limited secondary pain and swelling.   Sensory:  Intact to light touch in peroneal and tibial distributions.   Vascular:   2+ DP pulses with brisk cap refill;  calf soft and nontender.    Data    ALL MEDICATIONS HAVE BEEN REVIEWED    CBC:   Recent Labs     10/21/24  0619 10/21/24  1730 10/22/24  0507 10/22/24  1152 10/23/24  0456   WBC 5.9  --  6.4  --  5.1   HGB 7.0*   < > 6.7* 8.4* 7.5*   HCT 21.2*   < > 20.2* 25.9* 22.8*   *  --  108*  --  118*    < > = values in this interval not displayed.     BMP:   Recent Labs     10/21/24  0619 10/22/24  0507 10/23/24  0456    136 137   K 4.2 4.1 4.3    105 107   CO2 22 22 21   BUN 46* 56* 42*   CREATININE 1.7* 2.2* 1.2     INR: No results for input(s): \"INR\" in the last 72 hours.    ASSESSMENT:  S/p right hip IM nailing (10/20/24), POD#3  Acute blood loss anemia as expected  Depression  HTN  HLD    PLAN:   Anemia possibly solely related to fracture and subsequent surgery.    No sign of hematoma on exam today - no active bleeding or drainage from incision.   - WB status:  WBAT through operative extremity;   - DVT prophylaxis: Hold AC given anemia.  - Ice therapy  - PT/OT  - Pain Control: Rx for dc in chart.  Due to orthopaedic surgical procedure/condition, patient may require pain medication for up to 6-8 
..4 Eyes Skin Assessment     NAME:  Antonette Brown  YOB: 1940  MEDICAL RECORD NUMBER:  [id number]    The patient is being assessed for  Admission    I agree that at least one RN has performed a thorough Head to Toe Skin Assessment on the patient. ALL assessment sites listed below have been assessed.      Areas assessed by both nurses:    Head, Face, Ears, Shoulders, Back, Chest, Arms, Elbows, Hands, Sacrum. Buttock, Coccyx, Ischium, Legs. Feet and Heels, and Under Medical Devices         Does the Patient have a Wound? No noted wound(s)       Brady Prevention initiated by RN: Yes  Wound Care Orders initiated by RN: No    Pressure Injury (Stage 3,4, Unstageable, DTI, NWPT, and Complex wounds) if present, place Wound referral order by RN under : No    New Ostomies, if present place, Ostomy referral order under : No     Nurse 1 eSignature: Electronically signed by PAUL HERRING RN on 10/20/24 at 5:54 AM EDT    **SHARE this note so that the co-signing nurse can place an eSignature**    Nurse 2 eSignature: Electronically signed by Sarah Cole RN on 10/20/24 at 6:05 AM EDT   
0730: Shift assessment done, VSS, A/O. Neuro checks WNL. Reports pain 8/10 R hip pain. Small amount of bloody drainage noted.  Meds given per MAR. Standard safety measures in place. The care plan and education has been reviewed and mutually agreed upon with the patient.  Patient noted to have a hemoglobin of 7.0, patient is accepting of blood. Still needs to sign consent.      0847: patient seen Pt/Ot however did not get out of bed due to 10/10 hip pain.  Patient noted to have soft BP. MD at bedside and ordered CT scan, 1unit of PRBC, urine cultures still need done. And transfer to .     0900: new IV placed in hand and bolus started.   Patient noted to c/o pressure and the feeling to urinate with amos in place and to have 233 ml  rentention of urine from bladder scan  with current amos in place. Removed amos to replace amos, went to replace amos and could not get replacement amos in.     1100: patient transported to CT scan.     1253: bedside report given to raven de anda.     Electronically signed by Cherry Betts RN on 10/21/2024 at 1:42 PM    
Occupational Therapy  Antonette Brown    Attempted to see pt for OT treatment session, however, therapy on hold at this time d/t hemoglobin at 6.7. Will continue to monitor pt and check back as time permits and schedule allows.    Thanks,  Andreas Caba, OTR/L [id number]    
Patient brought up to the unit from pacu. Patient is A&O x 4, VSS. Family at bedside.   
Phase 1 discharge criteria met.  VSS.  Right hip dressing CD&I.  RLE neurovascular checks complete and WDL.  Pt reports pain is tolerable.  Pt will transfer to  in stable condition  
Physical Therapy  Antonette Brown   Therapy on hold this a.m. per RN due receiving blood and hemoglobin 6.7.   Will re-attempt as schedule permits.     [place], PTA [id number]  Zhang Baca PT, DPT, ATC-R [id number]      
Prior to receiving transfusion of blood products, patient educated on the following:    Blood product transfusion process  Benefits of receiving blood product transfusion  Risks associated with receiving the transfusion  Signs and symptoms of complications associated with blood product transfusion  Vague, uneasy feeling  Onset of pain (especially at the IV site, back, or chest)  Chills  Flushing  Fever  Nausea  Dizziness  Rash  Itching  Dark or red urine  Instructed patient to notify RN immediately if any sign or symptom occurs     
Pt discharged at 1030 w/ ems transport to ecf, A&OX4 w/ confusion to how she fell, had bm(incontinent), very weak trying to get up to commode and could not do so, assisted back to bed, incontinence care, peripheral iv removed w/ no complications, report given to Yao CLINTON at [place].  
Pt to pacu from OR, awakening.  VSS.  Right hip dressing CD&I.  RLE warm, cap refill <3 sec, pedal pulse palpable.  Will monitor  
Shift assessment completed, pt is alert and oriented, VSS, fall precautions in place, call light within reach, neuro checks WNL, Morphine for pain, see flowsheets and MAR, will monitor pt. The care plan and education has been reviewed and mutually agreed upon with the patient.     
Shift assessment completed. Routine vitals stable. Scheduled medications given. Patient is awake, alert and oriented. Respirations are easy and unlabored. Patient does not appear to be in distress, resting comfortably at this time. Pain medication given earlier and patient states pain has become tolerable.  Call light within reach.   
Teaching / education initiated regarding perioperative experience, expectations, and pain management during stay. Patient verbalized understanding.   
Required For Discharge: DME to be determined at next level of care, DME to be determined pending patient progress    Precautions/Restrictions: high fall risk  Weight Bearing Restrictions: weight bearing as tolerated  [] Right Upper Extremity  [] Left Upper Extremity [x] Right Lower Extremity  [] Left Lower Extremity     Required Braces/Orthotics: no braces required   [] Right  [] Left  Positional Restrictions:no positional restrictions    Pre-Admission Information   Lives With: spouse                  Type of Home: house  Home Layout: two level, laundry in basement, stair lift to get upstairs, lives on main level  Home Access:  2 step to enter without rails   Bathroom Layout: walk in shower  Bathroom Equipment: grab bars in shower, grab bars around toilet, hand held shower head, bedside commode, BSC goes over toilet on main level   Toilet Height: elevated height, bedside commode  Home Equipment: rolling walker, rollator - 4 wheeled walker, lift chair, reacher, sock aide, sleeps in recliner on main level; uses rollator  Transfer Assistance: modified independent with use of rollator  Ambulation Assistance:modified independent with use of rollator  ADL Assistance: requires assistance with dressing, needs assistance for getting socks on   IADL Assistance: requires assistance with laundry, requires assistance with shopping  Active :        [] Yes                 [x] No  Hand Dominance: [] Left                 [x] Right  Current Employment: retired.  Occupation: dental hygienist    Hobbies: read , walking, babysitting   Recent Falls: fall prior to sx; walking in garage and fell      Available Assistance at Discharge: 24 hr physical assistance available,  can help      Examination   Vision:   Vision Corrective Device: wears glasses for reading  Hearing:   WFL  Observation:   General Observation:  R hip dressing in place  Posture:   fair  Sensation:   reports numbness and tingling in (B) LE- neuropathy     ROM: 
Continue off ARB.   Anemia-CT did not show any blood collection.  Follow hemoglobin closely.  S/P Fall with right femoral fracture-status post nailing.  Orthopedics following.  Possible UTI-given  ceftriaxone  Possible urinary retention-if repeat bladder scan>200ml, place amos cath. Now urinating better.   Disposition-stable from renal perspective.     René Lawrence MD    
hypotension.  Hypotension -better.  Follow Hgb.  Anemia-CT did not show any blood collection.  Follow hemoglobin closely.  S/P Fall with right femoral fracture-status post nailing.  Orthopedics following.  Possible UTI-on ceftriaxone  Possible urinary retention-if repeat bladder scan at 2pm still >200ml, place amos cath.   Disposition-continue inpatient stay    René Lawrence MD

## 2024-10-24 NOTE — DISCHARGE SUMMARY
Hospital Medicine Discharge Summary    Patient ID: Antonette Brown      Patient's PCP: Erica Nava MD    Admit Date: 10/20/2024     Discharge Date: 10/24/2024      Admitting Provider: Jeanne Singh MD     Discharge Provider: Ck Salinas MD     Discharge Diagnoses:       Active Hospital Problems    Diagnosis     Hyperlipidemia [E78.5]      Priority: Medium    Acute postoperative anemia due to greater than expected blood loss [D62]     UTI (urinary tract infection) [N39.0]     HILTON (acute kidney injury) (HCC) [N17.9]     Hip fracture, right, closed, initial encounter ([facility]) [S72.001A]     Essential hypertension [I10]        The patient was seen and examined on day of discharge and this discharge summary is in conjunction with any daily progress note from day of discharge.    Hospital Course:   The patient is a 84-year-old gentleman with history of hypertension, hyperlipidemia, gastritis and GERD who presented following a fall onto the right hip with injury sustaining right hip fracture status post right femur medullary nailing.  She was complicated by urinary tract infection, acute kidney injury and acute postoperative anemia due to blood loss.       HILTON (acute kidney injury) (HCC): In the setting of episode of hypotension, blood loss anemia postsurgery.  Patient had received IV hydration however creatinine continued to worsen.  On admission creatinine 0.9 and peaked at at 2.2.  Improved with hydration and transfusion.  Creatinine down to 1.2.  HILTON resolved.  Will monitor       Acute postoperative anemia due to greater than expected blood loss: Hemoglobin down to 6.7 this morning.  It has been trending down.  Patient received 1 unit PRBC yesterday.  Hemoglobin improved to 8.0 and dropped down to 6.7 status post 1 unit PRBC.  Hemoglobin stable at 7.4 this morning.  Will discharge to SNF and outpatient follow-up with orthopedics at discharge       UTI (urinary tract infection): Completed

## 2024-10-24 NOTE — DISCHARGE INSTRUCTIONS
RAN:   Anemia possibly solely related to fracture and subsequent surgery.    No sign of hematoma on exam today - no active bleeding or drainage from incision.   - WB status:  WBAT through operative extremity;   - DVT prophylaxis: Hold AC given anemia.  - Ice therapy  - PT/OT  - Pain Control: Rx for dc in chart.  Due to orthopaedic surgical procedure/condition, patient may require pain medication for up to 6-8 weeks.  - Expected acute blood loss anemia: H/H today: 7.5/22.8   - 1 unit PRBC 10/22, 1 unit 10/21  - ID:  Ancef x 2 doses post-op completed.   - Disposition: per primary team; ok to d/c from our end once medically stable and dispo needs met.      Follow-up in two weeks with Dr. Cody.  Office # [phone]

## 2024-11-04 ENCOUNTER — OFFICE VISIT (OUTPATIENT)
Dept: ORTHOPEDIC SURGERY | Age: 84
End: 2024-11-04

## 2024-11-04 VITALS — BODY MASS INDEX: 39.27 KG/M2 | HEIGHT: 60 IN | WEIGHT: 200 LBS

## 2024-11-04 DIAGNOSIS — S72.002A LEFT DISPLACED FEMORAL NECK FRACTURE (HCC): Primary | ICD-10-CM

## 2024-11-04 DIAGNOSIS — M25.551 RIGHT HIP PAIN: ICD-10-CM

## 2024-11-04 PROCEDURE — 99024 POSTOP FOLLOW-UP VISIT: CPT | Performed by: PHYSICIAN ASSISTANT

## 2024-11-14 NOTE — PROGRESS NOTES
This dictation was done with [employer] dictation and may contain mechanical errors related to translation.  Height 1.524 m (5'), weight 90.7 kg (200 lb), not currently breastfeeding.  This is a very pleasant 84-year-old female who has had a right hip IM nailing from 10/20/2024.  Her pain score is 8 out of 10 and she is having hard time dealing with the pain we talked about pain management strategies.  She had a refill of her pain medicine recently.  She was sent for an AP pelvis and a 2 view right femoral.    The incisions healing nicely no signs of infection she is alert and oriented x 3 she is got intact distal pulses good dorsiflexion plantarflexion strength.  She is weak to hip flexion and abduction pretty typical has normal swelling but the incisions healing nicely we talked about wound care.    X-rays taken the office today show overall good alignment of the ORIF of the right hip with good fixation and this was shown to the patient.    Successful initial visit for the ORIF of her right hip fracture.    At this point we will have her weight-bear as tolerated to physical therapy per the protocol and follow-up with us in 4 to 6 weeks

## 2024-11-15 ENCOUNTER — CARE COORDINATION (OUTPATIENT)
Dept: CASE MANAGEMENT | Age: 84
End: 2024-11-15

## 2024-11-15 NOTE — CARE COORDINATION
Per [facility] pt d/c [facility] on 11/14 to [facility]      Brianna De Leon, BSN, RN   [facility] Post Acute Care Coordinator  [phone]

## 2024-11-21 PROBLEM — N39.0 UTI (URINARY TRACT INFECTION): Status: RESOLVED | Noted: 2024-10-22 | Resolved: 2024-11-21

## 2024-11-25 ENCOUNTER — TELEPHONE (OUTPATIENT)
Dept: FAMILY MEDICINE CLINIC | Age: 84
End: 2024-11-25

## 2024-11-25 NOTE — TELEPHONE ENCOUNTER
Care Transitions Initial Follow Up Call    Outreach made within 2 business days of discharge: Yes    Patient: Antonette Brown Patient : 1940   MRN: [id number]  Reason for Admission: LVM  Discharge Date: 10/24/24         Rasheeda Olguin MA

## 2024-11-25 NOTE — TELEPHONE ENCOUNTER
FYI     Patient  called and wanted Dr. Nava to know that patient is in Rehab at [facility] and is unsure of discharge date

## 2024-12-09 ENCOUNTER — OFFICE VISIT (OUTPATIENT)
Dept: ORTHOPEDIC SURGERY | Age: 84
End: 2024-12-09

## 2024-12-09 DIAGNOSIS — S72.141D: Primary | ICD-10-CM

## 2024-12-09 PROCEDURE — 99024 POSTOP FOLLOW-UP VISIT: CPT | Performed by: ORTHOPAEDIC SURGERY

## 2024-12-09 NOTE — PROGRESS NOTES
[facility]  Office Visit    Chief Complaint: Follow-up for right hip fracture    HPI:  Antonette Brown is a 84 y.o. who is here in follow-up of her right intertrochanteric femur fracture for which she underwent cephalomedullary nail on October 20, 2024.  She remains at a skilled nursing facility.  She is here in wheelchair today.  She continues to report hip pain that she rates as 6/10.  She is working with physical therapy and walking with the use of a walker.    Exam:  Appearance: sitting in exam room chair, appears to be in no acute distress, awake and alert  Resp: unlabored breathing on room air  Skin: warm, dry and intact with out erythema or significant increased temperature  RLE: Incisions are healed.  She has significant pitting edema of both lower extremities at baseline.  She demonstrates active hip flexion.    Imaging:  AP pelvis, AP and frog-leg lateral views of the right hip were performed and interpreted today.  Significant for healing intertrochanteric femur fracture spanned by cephalomedullary nail.  There are no significant changes in alignment compared to prior radiographs.    Assessment:  Right intertrochanteric femur fracture status post cephalomedullary nail    Plan:  She continues her postoperative recovery.  She may continue to weight-bear as tolerated and walk with use of walker.  She will continue to work with physical therapy.  Follow-up in 6 weeks for repeat assessment and radiographs.    This dictation was done with Dragon dictation and may contain mechanical errors related to translation.

## 2024-12-19 ENCOUNTER — TELEPHONE (OUTPATIENT)
Dept: FAMILY MEDICINE CLINIC | Age: 84
End: 2024-12-19

## 2024-12-30 ENCOUNTER — TELEPHONE (OUTPATIENT)
Dept: FAMILY MEDICINE CLINIC | Age: 84
End: 2024-12-30

## 2024-12-30 NOTE — TELEPHONE ENCOUNTER
Care Transitions Initial Follow Up Call    Outreach made within 2 business days of discharge: Yes    Patient: Antonette Brown Patient : 1940   MRN: [id number]  Reason for Admission: COPD  Discharge Date: 10/24/24       Spoke with: Antonette    Discharge department/facility: [facility] Interactive Patient Contact:  Was patient able to fill all prescriptions: Yes  Was patient instructed to bring all medications to the follow-up visit: Yes  Is patient taking all medications as directed in the discharge summary? Yes  Does patient understand their discharge instructions: Yes  Does patient have questions or concerns that need addressed prior to 7-14 day follow up office visit: no    Additional needs identified to be addressed with provider  No needs identified             Scheduled appointment with PCP within 7-14 days    Follow Up  Future Appointments   Date Time Provider Department Center   2024 12:00 PM Erica Nava MD [facility]   2025  1:45 PM Isma Cody MD [facility]       Rasheeda Olguin MA

## 2024-12-31 ENCOUNTER — OFFICE VISIT (OUTPATIENT)
Dept: FAMILY MEDICINE CLINIC | Age: 84
End: 2024-12-31

## 2024-12-31 ENCOUNTER — CARE COORDINATION (OUTPATIENT)
Dept: CASE MANAGEMENT | Age: 84
End: 2024-12-31

## 2024-12-31 VITALS — HEART RATE: 96 BPM | SYSTOLIC BLOOD PRESSURE: 136 MMHG | DIASTOLIC BLOOD PRESSURE: 72 MMHG | OXYGEN SATURATION: 98 %

## 2024-12-31 DIAGNOSIS — F33.2 SEVERE EPISODE OF RECURRENT MAJOR DEPRESSIVE DISORDER, WITHOUT PSYCHOTIC FEATURES (HCC): ICD-10-CM

## 2024-12-31 DIAGNOSIS — S72.001A HIP FRACTURE, RIGHT, CLOSED, INITIAL ENCOUNTER (HCC): ICD-10-CM

## 2024-12-31 DIAGNOSIS — I10 ESSENTIAL HYPERTENSION: ICD-10-CM

## 2024-12-31 DIAGNOSIS — Z09 HOSPITAL DISCHARGE FOLLOW-UP: ICD-10-CM

## 2024-12-31 DIAGNOSIS — R30.0 DYSURIA: Primary | ICD-10-CM

## 2024-12-31 LAB
BILIRUBIN, POC: NORMAL
BLOOD URINE, POC: NORMAL
CLARITY, POC: NORMAL
COLOR, POC: YELLOW
GLUCOSE URINE, POC: NORMAL MG/DL
KETONES, POC: NORMAL MG/DL
LEUKOCYTE EST, POC: NORMAL
NITRITE, POC: NORMAL
PH, POC: 7
PROTEIN, POC: 30 MG/DL
SPECIFIC GRAVITY, POC: 1.02
UROBILINOGEN, POC: 0.2 MG/DL

## 2024-12-31 RX ORDER — CIPROFLOXACIN 250 MG/1
250 TABLET, FILM COATED ORAL 2 TIMES DAILY
Qty: 20 TABLET | Refills: 0 | Status: SHIPPED | OUTPATIENT
Start: 2024-12-31 | End: 2025-01-10

## 2024-12-31 ASSESSMENT — PATIENT HEALTH QUESTIONNAIRE - PHQ9: DEPRESSION UNABLE TO ASSESS: URGENT/EMERGENT SITUATION

## 2024-12-31 NOTE — CARE COORDINATION
Care Transitions Note    Initial Call - Call within 2 business days of discharge: Yes    Attempted to reach patient for transitions of care follow up. Unable to reach patient.    Outreach Attempts:   HIPAA compliant voicemail left for patient.     Patient: Antonette Brown    Patient : 1940   MRN: [id number]    Reason for Admission: COPD/[place]  Discharge Date: 10/24/24  RURS: Readmission Risk Score: 15.4    Last Discharge Facility       Date Complaint Diagnosis Description Type Department Provider    10/20/24 Fall Closed fracture of right hip, initial encounter (HCC) ... ED to Hosp-Admission (Discharged) (ADMITTED) [facility] 3T Ck Salinas MD; Bri,...            Was this an external facility discharge? Yes. Discharge Date: 24. Facility Name: [facility]    Follow Up Appointment:   Patient does not have a follow up appointment scheduled at time of call.  Unable to reach patient  Future Appointments         Provider Specialty Dept Phone    2025 1:45 PM Isma Cody MD Orthopedic Surgery [phone]            Plan for follow-up on next business day.      Shreie Lanier RN

## 2024-12-31 NOTE — PROGRESS NOTES
Calcium-Phosphorus-Vitamin D (CITRACAL +D3 PO) Take by mouth daily (with breakfast) Yes Karli Shanks MD   Cholecalciferol (VITAMIN D3) 50 MCG (2000 UT) CAPS Take by mouth daily (with breakfast) Yes Karli Shanks MD   b complex vitamins capsule Take 1 capsule by mouth daily Yes Karli Shanks MD   Elastic Bandages & Supports (MEDICAL COMPRESSION STOCKINGS) MISC 1 each by Does not apply route Daily Yes Erica Nava MD   hydroxychloroquine (PLAQUENIL) 200 MG tablet Take 1 tablet by mouth 2 times daily Indications: Arthritis Yes Karli Shanks MD   Compression Stockings MISC by Does not apply route Bilateral below knee zippered compression stockings 20-30 mmHg Yes Jeff Markham MD   buPROPion (WELLBUTRIN XL) 150 MG extended release tablet Take 1 tablet by mouth daily (before dinner) Yes Erica Nava MD   Lift Chair MISC by Does not apply route Yes Erica Nava MD   estradiol (ESTRACE) 0.1 MG/GM vaginal cream Place 2 g vaginally Twice a Week Yes Karli Shanks MD   aspirin 81 MG EC tablet Take 1 tablet by mouth daily Yes Karli Shanks MD   pregabalin (LYRICA) 150 MG capsule Take 1 capsule by mouth 2 times daily for 90 days. Max Daily Amount: 300 mg  Erica Nava MD       Past Medical History:   Diagnosis Date    AR (allergic rhinitis)     Arthritis of knee     Pruis    Colitis     Depression     Erosive gastritis 10/28/2019    EGD October 2019, he did with PPI    GERD (gastroesophageal reflux disease)     Hyperlipidemia     Hypertension     Internal hemorrhoids     MI (myocardial infarction) (HCC)     Overweight(278.02)     Viral meningitis 05/2012       Social History     Tobacco Use    Smoking status: Never    Smokeless tobacco: Never   Substance Use Topics    Alcohol use: Yes     Comment: rarely       Family History   Problem Relation Age of Onset    Breast Cancer Mother 61    Bipolar Disorder Brother        OBJECTIVE:  /72   Pulse 96   SpO2

## 2025-01-02 ENCOUNTER — TELEPHONE (OUTPATIENT)
Dept: FAMILY MEDICINE CLINIC | Age: 85
End: 2025-01-02

## 2025-01-02 RX ORDER — PANTOPRAZOLE SODIUM 40 MG/1
40 TABLET, DELAYED RELEASE ORAL
Qty: 90 TABLET | Refills: 1 | Status: SHIPPED | OUTPATIENT
Start: 2025-01-02

## 2025-01-02 RX ORDER — VILAZODONE HYDROCHLORIDE 20 MG/1
20 TABLET ORAL DAILY
Qty: 90 TABLET | Refills: 1 | Status: SHIPPED | OUTPATIENT
Start: 2025-01-02

## 2025-01-02 RX ORDER — ATORVASTATIN CALCIUM 10 MG/1
10 TABLET, FILM COATED ORAL
Qty: 50 TABLET | Refills: 3 | Status: SHIPPED | OUTPATIENT
Start: 2025-01-02

## 2025-01-02 NOTE — TELEPHONE ENCOUNTER
Patient daughter called and said that Dr Nava wanted to talk to daughter.     Ashley [phone]    Patient is also needing these medications refilled  but also has list that she is unsure if she is taking or if they were discontinued.   Ashley said no downing on phone call back   Ashley said that they are working on signing into [employer] to get HIPAA and consent form signed also         atorvastatin (LIPITOR) 10 MG tablet [[id number]]     AND    pantoprazole (PROTONIX) 40 MG tablet [[id number]]     AND    vilazodone HCl (VIIBRYD) 20 MG TABS [[id number]]     [facility] [address]

## 2025-01-02 NOTE — TELEPHONE ENCOUNTER
ABRIL    Pt had a fall in her bathroom last night  They called 911 to help her up.  Pt is experiencing a lot of pain from fall.    Lashae is there today as a follow up from rehab and doing an assessment.

## 2025-01-03 ENCOUNTER — CARE COORDINATION (OUTPATIENT)
Dept: CASE MANAGEMENT | Age: 85
End: 2025-01-03

## 2025-01-03 DIAGNOSIS — G62.9 NEUROPATHY: Primary | ICD-10-CM

## 2025-01-03 DIAGNOSIS — S72.001A HIP FRACTURE, RIGHT, CLOSED, INITIAL ENCOUNTER (HCC): Primary | ICD-10-CM

## 2025-01-03 LAB
BACTERIA UR CULT: ABNORMAL
ORGANISM: ABNORMAL

## 2025-01-03 PROCEDURE — 1111F DSCHRG MED/CURRENT MED MERGE: CPT | Performed by: FAMILY MEDICINE

## 2025-01-03 RX ORDER — POTASSIUM CHLORIDE 750 MG/1
10 TABLET, EXTENDED RELEASE ORAL
Qty: 30 TABLET | Refills: 1 | Status: SHIPPED | OUTPATIENT
Start: 2025-01-06

## 2025-01-03 RX ORDER — PREGABALIN 150 MG/1
150 CAPSULE ORAL 2 TIMES DAILY
Qty: 60 CAPSULE | Refills: 2 | Status: SHIPPED | OUTPATIENT
Start: 2025-01-03 | End: 2025-07-02

## 2025-01-03 RX ORDER — FUROSEMIDE 20 MG/1
20 TABLET ORAL
COMMUNITY

## 2025-01-03 RX ORDER — FUROSEMIDE 20 MG/1
20 TABLET ORAL
Qty: 30 TABLET | Refills: 1 | Status: SHIPPED | OUTPATIENT
Start: 2025-01-06

## 2025-01-06 NOTE — CARE COORDINATION
Care Transitions Note    Initial Call - Call within 2 business days of discharge: Yes    Patient Current Location:  Home: [address] [place] [id number]    Care Transition Nurse contacted the patient by telephone to perform post hospital discharge assessment, verified name and  as identifiers. Provided introduction to self, and explanation of the Care Transition Nurse role.     Patient: Antonette Brown    Patient : 1940   MRN: [id number]    Reason for Admission: Closed fx of rt hip  Discharge Date: 10/24/24  RURS: Readmission Risk Score: 15.4      Last Discharge Facility       Date Complaint Diagnosis Description Type Department Provider    10/20/24 Fall Closed fracture of right hip, initial encounter ([facility]) ... ED to Hosp-Admission (Discharged) (ADMITTED) [facility] 3T Ck Salinas MD; Bri,...            Was this an external facility discharge? Yes. Discharge Date: . Facility Name: [facility]     Additional needs identified to be addressed with provider   No needs identified             Method of communication with provider: none.    Patients top risk factors for readmission: medical condition-Falls    Interventions to address risk factors:   Review of patient management of conditions/medications: with patient    Care Summary Note: Late entry 1/3/25. Per spouse she has back pain manages with Tylenol. Denies sob, cough, congestion, fever, chills. No more falls since last night.  Appetite fair. Adequate water intake. Patient sleeping well. Spouse preferred daughter Ashley review medications. Patient is on a antibiotic for a UTI.  Per daughter patient is in a lot of pain. She ambulates using a walker. She requires one assist for ADL's. [facility] has made contact. Agency name unknown. Per daughter patient has a lot of anxiety. Patient can be heard in the background crying attempting to stand up.Ortho appointment 25. Daughter states patient had a virtual PCP appointment and spouse did not

## 2025-01-08 ENCOUNTER — CARE COORDINATION (OUTPATIENT)
Dept: CARE COORDINATION | Age: 85
End: 2025-01-08

## 2025-01-08 SDOH — ECONOMIC STABILITY: FOOD INSECURITY: WITHIN THE PAST 12 MONTHS, THE FOOD YOU BOUGHT JUST DIDN'T LAST AND YOU DIDN'T HAVE MONEY TO GET MORE.: NEVER TRUE

## 2025-01-08 SDOH — HEALTH STABILITY: PHYSICAL HEALTH: ON AVERAGE, HOW MANY DAYS PER WEEK DO YOU ENGAGE IN MODERATE TO STRENUOUS EXERCISE (LIKE A BRISK WALK)?: 0 DAYS

## 2025-01-08 SDOH — ECONOMIC STABILITY: FOOD INSECURITY: WITHIN THE PAST 12 MONTHS, YOU WORRIED THAT YOUR FOOD WOULD RUN OUT BEFORE YOU GOT MONEY TO BUY MORE.: NEVER TRUE

## 2025-01-08 SDOH — ECONOMIC STABILITY: INCOME INSECURITY: IN THE LAST 12 MONTHS, WAS THERE A TIME WHEN YOU WERE NOT ABLE TO PAY THE MORTGAGE OR RENT ON TIME?: NO

## 2025-01-08 SDOH — HEALTH STABILITY: PHYSICAL HEALTH: ON AVERAGE, HOW MANY MINUTES DO YOU ENGAGE IN EXERCISE AT THIS LEVEL?: 0 MIN

## 2025-01-08 ASSESSMENT — SOCIAL DETERMINANTS OF HEALTH (SDOH): HOW HARD IS IT FOR YOU TO PAY FOR THE VERY BASICS LIKE FOOD, HOUSING, MEDICAL CARE, AND HEATING?: NOT HARD AT ALL

## 2025-01-08 NOTE — CARE COORDINATION
Ambulatory Care Coordination Note     2025 12:12 PM     Patient Current Location:  Home: [address] [place] [id number]     This patient was received as a referral from Care Transition Nurse.    ACM contacted the patient by telephone. Verified name and  with patient as identifiers. Provided introduction to self, and explanation of the ACM role.   Patient accepted care management services at this time.          ACM: Ivone Abarca RN     Challenges to be reviewed by the provider   Additional needs identified to be addressed with provider No  none               Method of communication with provider: chart routing.    Utilization: N/A - Initial Call     Care Summary Note:     ACM made care coordination outreach and spoke with patient related to post acute referral. Patient very pleasant on the phone while conversing with ACM. Patient reported that she is very weak on her feet. Expressed that she has become weaker since returning home. Stated that she has fallen twice this past week. Had to call 911 to get off the floor. Reported no injuries with the falls. Generalized pain from the falls. Taking Tylenol as needed. Ambulates with a walker. Also has a wheelchair. Denied any new or worsening chest pain, shortness of breath or swelling. Reported that she has some baseline swelling in her legs. Has compression stockings, but is not wearing. Discussed importance of wearing compression stockings and elevating feet. Reviewed fall safety. Discussed having clear, well lit pathways and removing any clutter or rugs. Bathroom has grab bars and a non-skid mat. Patient has a chair lift, but does not feel strong enough to use at this time. Dependent on care.  is primary caregiver and manages medications. Daughters assist with bathing. Home is a private residence with . Denied any financial strain. Discussed COA and politely declined referral at this time.  Patient reported that she was suppose to have

## 2025-01-09 ENCOUNTER — CARE COORDINATION (OUTPATIENT)
Dept: CARE COORDINATION | Age: 85
End: 2025-01-09

## 2025-01-09 ENCOUNTER — TELEPHONE (OUTPATIENT)
Dept: FAMILY MEDICINE CLINIC | Age: 85
End: 2025-01-09

## 2025-01-09 NOTE — CARE COORDINATION
Ambulatory Care Coordination Note     2025 4:33 PM     Patient Current Location:  Home: [address] [place] [id number]     ACM contacted the patient by telephone. Verified name and  with patient as identifiers.         ACM: Ivone Abarca RN     Challenges to be reviewed by the provider   Additional needs identified to be addressed with provider Yes                    Method of communication with provider: chart routing.    Utilization: Patient has not had any utilization since our last call.     Care Summary Note:     ACM made care coordination outreach and spoke with patient and patient's , Teo. Patient reported that she is feeling \"tired.\" Denied any chest pain, shortness of breath or swelling. Reported that she is having painful urination. Completed cipro prescription. Reported that she is using Estrace cream at night. Denied any fevers or back pain.  is offering to bring urine sample to the office if needed. Discussed UTI prevention care.    reported that physical therapist with [employer] [employer] came out to the house yesterday. Reported no nurse visits this past week. Stated that his wife has been more confused this past month. Expressed caregiver fatigue and being anchored to the house. Discussed private home health care companies.  reported that this is not something they need at this time. They have a lot of family support. Will keep in mind in the future if needed. Denied medication review. Reported that his daughter manages medications. Denied any other questions or concerns at this time. Agreeable to future care coordination outreaches.     Offered patient enrollment in the Remote Patient Monitoring (RPM) program for in-home monitoring: Yes, but did not enroll at this time: limited patient ability to navigate RPM/equipment.     Assessments Completed:   General Assessment    Do you have any symptoms that are causing concern?: Yes  Progression since Onset:

## 2025-01-09 NOTE — TELEPHONE ENCOUNTER
Possible UTI - Patient would like to know if a prescription can be called into her pharmacy.    Symptoms for the last three to four days of burning.    LOV 12-31-24  [facility] [address]

## 2025-01-10 ENCOUNTER — LAB (OUTPATIENT)
Dept: FAMILY MEDICINE CLINIC | Age: 85
End: 2025-01-10
Payer: MEDICARE

## 2025-01-10 DIAGNOSIS — R35.0 URINARY FREQUENCY: Primary | ICD-10-CM

## 2025-01-10 LAB
BILIRUBIN, POC: NORMAL
BLOOD URINE, POC: NORMAL
CLARITY, POC: NORMAL
COLOR, POC: YELLOW
GLUCOSE URINE, POC: NORMAL MG/DL
KETONES, POC: NORMAL MG/DL
LEUKOCYTE EST, POC: NORMAL
NITRITE, POC: POSITIVE
PH, POC: 5.5
PROTEIN, POC: 30 MG/DL
SPECIFIC GRAVITY, POC: 1.01
UROBILINOGEN, POC: 0.2 MG/DL

## 2025-01-10 PROCEDURE — 81002 URINALYSIS NONAUTO W/O SCOPE: CPT | Performed by: FAMILY MEDICINE

## 2025-01-12 ENCOUNTER — APPOINTMENT (OUTPATIENT)
Dept: CT IMAGING | Age: 85
DRG: 853 | End: 2025-01-12
Payer: MEDICARE

## 2025-01-12 ENCOUNTER — ANESTHESIA (OUTPATIENT)
Dept: OPERATING ROOM | Age: 85
End: 2025-01-12
Payer: MEDICARE

## 2025-01-12 ENCOUNTER — HOSPITAL ENCOUNTER (INPATIENT)
Age: 85
LOS: 14 days | Discharge: NURSING, REHAB OR LONG-TERM CARE | DRG: 853 | End: 2025-01-26
Attending: EMERGENCY MEDICINE | Admitting: INTERNAL MEDICINE
Payer: MEDICARE

## 2025-01-12 ENCOUNTER — APPOINTMENT (OUTPATIENT)
Dept: GENERAL RADIOLOGY | Age: 85
DRG: 853 | End: 2025-01-12
Payer: MEDICARE

## 2025-01-12 ENCOUNTER — ANESTHESIA EVENT (OUTPATIENT)
Dept: OPERATING ROOM | Age: 85
End: 2025-01-12
Payer: MEDICARE

## 2025-01-12 DIAGNOSIS — N39.0 URINARY TRACT INFECTION WITHOUT HEMATURIA, SITE UNSPECIFIED: ICD-10-CM

## 2025-01-12 DIAGNOSIS — I95.9 HYPOTENSION, UNSPECIFIED HYPOTENSION TYPE: ICD-10-CM

## 2025-01-12 DIAGNOSIS — S72.351A CLOSED DISPLACED COMMINUTED FRACTURE OF SHAFT OF RIGHT FEMUR, INITIAL ENCOUNTER (HCC): Primary | ICD-10-CM
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
## 2025-01-12 DIAGNOSIS — R60.0 LOWER EXTREMITY EDEMA: ICD-10-CM
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
## 2025-01-12 LAB
ABO + RH BLD: NORMAL
ALBUMIN SERPL-MCNC: 3.5 G/DL (ref 3.4–5)
ALBUMIN/GLOB SERPL: 1.4 {RATIO} (ref 1.1–2.2)
ALP SERPL-CCNC: 142 U/L (ref 40–129)
ALT SERPL-CCNC: 12 U/L (ref 10–40)
ANION GAP SERPL CALCULATED.3IONS-SCNC: 9 MMOL/L (ref 3–16)
AST SERPL-CCNC: 23 U/L (ref 15–37)
BACTERIA UR CULT: ABNORMAL
BACTERIA URNS QL MICRO: ABNORMAL /HPF
BASOPHILS # BLD: 0 K/UL (ref 0–0.2)
BASOPHILS NFR BLD: 0.8 %
BILIRUB SERPL-MCNC: <0.2 MG/DL (ref 0–1)
BILIRUB UR QL STRIP.AUTO: NEGATIVE
BLD GP AB SCN SERPL QL: NORMAL
BUN SERPL-MCNC: 20 MG/DL (ref 7–20)
CALCIUM SERPL-MCNC: 8.7 MG/DL (ref 8.3–10.6)
CHLORIDE SERPL-SCNC: 102 MMOL/L (ref 99–110)
CLARITY UR: ABNORMAL
CO2 SERPL-SCNC: 27 MMOL/L (ref 21–32)
COLOR UR: YELLOW
CREAT SERPL-MCNC: 1.2 MG/DL (ref 0.6–1.2)
DEPRECATED RDW RBC AUTO: 17.8 % (ref 12.4–15.4)
EKG ATRIAL RATE: 89 BPM
EKG DIAGNOSIS: NORMAL
EKG P AXIS: 45 DEGREES
EKG P-R INTERVAL: 146 MS
EKG Q-T INTERVAL: 364 MS
EKG QRS DURATION: 88 MS
EKG QTC CALCULATION (BAZETT): 442 MS
EKG R AXIS: -38 DEGREES
EKG T AXIS: 26 DEGREES
EKG VENTRICULAR RATE: 89 BPM
EOSINOPHIL # BLD: 0 K/UL (ref 0–0.6)
EOSINOPHIL NFR BLD: 0.9 %
EPI CELLS #/AREA URNS AUTO: 0 /HPF (ref 0–5)
GFR SERPLBLD CREATININE-BSD FMLA CKD-EPI: 44 ML/MIN/{1.73_M2}
GLUCOSE SERPL-MCNC: 101 MG/DL (ref 70–99)
GLUCOSE UR STRIP.AUTO-MCNC: NEGATIVE MG/DL
HCT VFR BLD AUTO: 26.5 % (ref 36–48)
HGB BLD-MCNC: 8.8 G/DL (ref 12–16)
HGB UR QL STRIP.AUTO: ABNORMAL
HYALINE CASTS #/AREA URNS AUTO: 1 /LPF (ref 0–8)
KETONES UR STRIP.AUTO-MCNC: NEGATIVE MG/DL
LEUKOCYTE ESTERASE UR QL STRIP.AUTO: ABNORMAL
LIPASE SERPL-CCNC: 15 U/L (ref 13–60)
LYMPHOCYTES # BLD: 0.7 K/UL (ref 1–5.1)
LYMPHOCYTES NFR BLD: 13.6 %
MCH RBC QN AUTO: 29.5 PG (ref 26–34)
MCHC RBC AUTO-ENTMCNC: 33.3 G/DL (ref 31–36)
MCV RBC AUTO: 88.4 FL (ref 80–100)
MONOCYTES # BLD: 0.4 K/UL (ref 0–1.3)
MONOCYTES NFR BLD: 8.7 %
NEUTROPHILS # BLD: 3.8 K/UL (ref 1.7–7.7)
NEUTROPHILS NFR BLD: 76 %
NITRITE UR QL STRIP.AUTO: POSITIVE
ORGANISM: ABNORMAL
PH UR STRIP.AUTO: 5.5 [PH] (ref 5–8)
PLATELET # BLD AUTO: 119 K/UL (ref 135–450)
PMV BLD AUTO: 8.3 FL (ref 5–10.5)
POTASSIUM SERPL-SCNC: 4.3 MMOL/L (ref 3.5–5.1)
PROT SERPL-MCNC: 6 G/DL (ref 6.4–8.2)
PROT UR STRIP.AUTO-MCNC: ABNORMAL MG/DL
RBC # BLD AUTO: 3 M/UL (ref 4–5.2)
RBC CLUMPS #/AREA URNS AUTO: 0 /HPF (ref 0–4)
SODIUM SERPL-SCNC: 138 MMOL/L (ref 136–145)
SP GR UR STRIP.AUTO: 1.01 (ref 1–1.03)
UA COMPLETE W REFLEX CULTURE PNL UR: YES
UA DIPSTICK W REFLEX MICRO PNL UR: YES
URN SPEC COLLECT METH UR: ABNORMAL
UROBILINOGEN UR STRIP-ACNC: 0.2 E.U./DL
WBC # BLD AUTO: 5 K/UL (ref 4–11)
WBC #/AREA URNS AUTO: 685 /HPF (ref 0–5)

## 2025-01-12 PROCEDURE — 93005 ELECTROCARDIOGRAM TRACING: CPT | Performed by: EMERGENCY MEDICINE

## 2025-01-12 PROCEDURE — 80053 COMPREHEN METABOLIC PANEL: CPT

## 2025-01-12 PROCEDURE — 87077 CULTURE AEROBIC IDENTIFY: CPT

## 2025-01-12 PROCEDURE — 86901 BLOOD TYPING SEROLOGIC RH(D): CPT

## 2025-01-12 PROCEDURE — 51702 INSERT TEMP BLADDER CATH: CPT

## 2025-01-12 PROCEDURE — 83690 ASSAY OF LIPASE: CPT

## 2025-01-12 PROCEDURE — P9016 RBC LEUKOCYTES REDUCED: HCPCS

## 2025-01-12 PROCEDURE — 86923 COMPATIBILITY TEST ELECTRIC: CPT

## 2025-01-12 PROCEDURE — 87086 URINE CULTURE/COLONY COUNT: CPT

## 2025-01-12 PROCEDURE — 70450 CT HEAD/BRAIN W/O DYE: CPT

## 2025-01-12 PROCEDURE — 72125 CT NECK SPINE W/O DYE: CPT

## 2025-01-12 PROCEDURE — 99285 EMERGENCY DEPT VISIT HI MDM: CPT

## 2025-01-12 PROCEDURE — 71260 CT THORAX DX C+: CPT

## 2025-01-12 PROCEDURE — 86850 RBC ANTIBODY SCREEN: CPT

## 2025-01-12 PROCEDURE — 6370000000 HC RX 637 (ALT 250 FOR IP): Performed by: INTERNAL MEDICINE

## 2025-01-12 PROCEDURE — 85025 COMPLETE CBC W/AUTO DIFF WBC: CPT

## 2025-01-12 PROCEDURE — 6360000004 HC RX CONTRAST MEDICATION: Performed by: EMERGENCY MEDICINE

## 2025-01-12 PROCEDURE — 93010 ELECTROCARDIOGRAM REPORT: CPT | Performed by: INTERNAL MEDICINE

## 2025-01-12 PROCEDURE — 87186 SC STD MICRODIL/AGAR DIL: CPT

## 2025-01-12 PROCEDURE — 81001 URINALYSIS AUTO W/SCOPE: CPT

## 2025-01-12 PROCEDURE — 6360000002 HC RX W HCPCS: Performed by: INTERNAL MEDICINE

## 2025-01-12 PROCEDURE — 1200000000 HC SEMI PRIVATE

## 2025-01-12 PROCEDURE — 2500000003 HC RX 250 WO HCPCS: Performed by: INTERNAL MEDICINE

## 2025-01-12 PROCEDURE — 86900 BLOOD TYPING SEROLOGIC ABO: CPT

## 2025-01-12 PROCEDURE — 96365 THER/PROPH/DIAG IV INF INIT: CPT

## 2025-01-12 PROCEDURE — 2580000003 HC RX 258: Performed by: INTERNAL MEDICINE

## 2025-01-12 PROCEDURE — 6360000002 HC RX W HCPCS: Performed by: EMERGENCY MEDICINE

## 2025-01-12 PROCEDURE — 74174 CTA ABD&PLVS W/CONTRAST: CPT

## 2025-01-12 PROCEDURE — 73560 X-RAY EXAM OF KNEE 1 OR 2: CPT

## 2025-01-12 PROCEDURE — 96375 TX/PRO/DX INJ NEW DRUG ADDON: CPT

## 2025-01-12 PROCEDURE — 6370000000 HC RX 637 (ALT 250 FOR IP): Performed by: NURSE PRACTITIONER

## 2025-01-12 PROCEDURE — 99223 1ST HOSP IP/OBS HIGH 75: CPT | Performed by: ORTHOPAEDIC SURGERY

## 2025-01-12 PROCEDURE — 36415 COLL VENOUS BLD VENIPUNCTURE: CPT

## 2025-01-12 PROCEDURE — 30233N1 TRANSFUSION OF NONAUTOLOGOUS RED BLOOD CELLS INTO PERIPHERAL VEIN, PERCUTANEOUS APPROACH: ICD-10-PCS | Performed by: INTERNAL MEDICINE

## 2025-01-12 PROCEDURE — 73502 X-RAY EXAM HIP UNI 2-3 VIEWS: CPT

## 2025-01-12 RX ORDER — HYDROCODONE BITARTRATE AND ACETAMINOPHEN 5; 325 MG/1; MG/1
1 TABLET ORAL ONCE
Status: DISCONTINUED | OUTPATIENT
Start: 2025-01-12 | End: 2025-01-12

## 2025-01-12 RX ORDER — ONDANSETRON 2 MG/ML
8 INJECTION INTRAMUSCULAR; INTRAVENOUS ONCE
Status: DISCONTINUED | OUTPATIENT
Start: 2025-01-12 | End: 2025-01-12

## 2025-01-12 RX ORDER — ASPIRIN 81 MG/1
81 TABLET ORAL DAILY
Status: DISCONTINUED | OUTPATIENT
Start: 2025-01-12 | End: 2025-01-26 | Stop reason: HOSPADM

## 2025-01-12 RX ORDER — SUCRALFATE 1 G/1
1 TABLET ORAL
Status: DISCONTINUED | OUTPATIENT
Start: 2025-01-12 | End: 2025-01-26 | Stop reason: HOSPADM

## 2025-01-12 RX ORDER — POLYETHYLENE GLYCOL 3350 17 G/17G
17 POWDER, FOR SOLUTION ORAL DAILY PRN
Status: DISCONTINUED | OUTPATIENT
Start: 2025-01-12 | End: 2025-01-26 | Stop reason: HOSPADM

## 2025-01-12 RX ORDER — ACETAMINOPHEN 650 MG/1
650 SUPPOSITORY RECTAL EVERY 6 HOURS PRN
Status: DISCONTINUED | OUTPATIENT
Start: 2025-01-12 | End: 2025-01-26 | Stop reason: HOSPADM

## 2025-01-12 RX ORDER — ENOXAPARIN SODIUM 100 MG/ML
40 INJECTION SUBCUTANEOUS DAILY
Status: DISCONTINUED | OUTPATIENT
Start: 2025-01-12 | End: 2025-01-13

## 2025-01-12 RX ORDER — ATORVASTATIN CALCIUM 10 MG/1
10 TABLET, FILM COATED ORAL
Status: DISCONTINUED | OUTPATIENT
Start: 2025-01-12 | End: 2025-01-14

## 2025-01-12 RX ORDER — POTASSIUM CHLORIDE 7.45 MG/ML
10 INJECTION INTRAVENOUS PRN
Status: DISCONTINUED | OUTPATIENT
Start: 2025-01-12 | End: 2025-01-14

## 2025-01-12 RX ORDER — IOPAMIDOL 755 MG/ML
75 INJECTION, SOLUTION INTRAVASCULAR
Status: DISCONTINUED | OUTPATIENT
Start: 2025-01-12 | End: 2025-01-26 | Stop reason: HOSPADM

## 2025-01-12 RX ORDER — MORPHINE SULFATE 2 MG/ML
2 INJECTION, SOLUTION INTRAMUSCULAR; INTRAVENOUS EVERY 4 HOURS PRN
Status: DISCONTINUED | OUTPATIENT
Start: 2025-01-12 | End: 2025-01-14

## 2025-01-12 RX ORDER — POTASSIUM CHLORIDE 1500 MG/1
40 TABLET, EXTENDED RELEASE ORAL PRN
Status: DISCONTINUED | OUTPATIENT
Start: 2025-01-12 | End: 2025-01-14

## 2025-01-12 RX ORDER — BUPROPION HYDROCHLORIDE 150 MG/1
150 TABLET ORAL
Status: DISCONTINUED | OUTPATIENT
Start: 2025-01-12 | End: 2025-01-26 | Stop reason: HOSPADM

## 2025-01-12 RX ORDER — HYDROXYCHLOROQUINE SULFATE 200 MG/1
200 TABLET, FILM COATED ORAL 2 TIMES DAILY
Status: DISCONTINUED | OUTPATIENT
Start: 2025-01-12 | End: 2025-01-26 | Stop reason: HOSPADM

## 2025-01-12 RX ORDER — ONDANSETRON 2 MG/ML
4 INJECTION INTRAMUSCULAR; INTRAVENOUS EVERY 6 HOURS PRN
Status: DISCONTINUED | OUTPATIENT
Start: 2025-01-12 | End: 2025-01-26 | Stop reason: HOSPADM

## 2025-01-12 RX ORDER — VILAZODONE HYDROCHLORIDE 20 MG/1
20 TABLET ORAL DAILY
Status: DISCONTINUED | OUTPATIENT
Start: 2025-01-12 | End: 2025-01-26 | Stop reason: HOSPADM

## 2025-01-12 RX ORDER — SODIUM CHLORIDE 0.9 % (FLUSH) 0.9 %
10 SYRINGE (ML) INJECTION EVERY 12 HOURS SCHEDULED
Status: DISCONTINUED | OUTPATIENT
Start: 2025-01-12 | End: 2025-01-26 | Stop reason: HOSPADM

## 2025-01-12 RX ORDER — IOPAMIDOL 755 MG/ML
75 INJECTION, SOLUTION INTRAVASCULAR
Status: COMPLETED | OUTPATIENT
Start: 2025-01-12 | End: 2025-01-12

## 2025-01-12 RX ORDER — ACETAMINOPHEN 325 MG/1
650 TABLET ORAL EVERY 6 HOURS PRN
Status: DISCONTINUED | OUTPATIENT
Start: 2025-01-12 | End: 2025-01-26 | Stop reason: HOSPADM

## 2025-01-12 RX ORDER — MAGNESIUM SULFATE IN WATER 40 MG/ML
2000 INJECTION, SOLUTION INTRAVENOUS PRN
Status: DISCONTINUED | OUTPATIENT
Start: 2025-01-12 | End: 2025-01-14

## 2025-01-12 RX ORDER — ONDANSETRON 4 MG/1
4 TABLET, ORALLY DISINTEGRATING ORAL EVERY 8 HOURS PRN
Status: DISCONTINUED | OUTPATIENT
Start: 2025-01-12 | End: 2025-01-26 | Stop reason: HOSPADM

## 2025-01-12 RX ORDER — SODIUM CHLORIDE 0.9 % (FLUSH) 0.9 %
10 SYRINGE (ML) INJECTION PRN
Status: DISCONTINUED | OUTPATIENT
Start: 2025-01-12 | End: 2025-01-26 | Stop reason: HOSPADM

## 2025-01-12 RX ORDER — LOSARTAN POTASSIUM 25 MG/1
50 TABLET ORAL 2 TIMES DAILY
Status: DISCONTINUED | OUTPATIENT
Start: 2025-01-12 | End: 2025-01-22

## 2025-01-12 RX ORDER — HYDROMORPHONE HYDROCHLORIDE 1 MG/ML
1 INJECTION, SOLUTION INTRAMUSCULAR; INTRAVENOUS; SUBCUTANEOUS ONCE
Status: COMPLETED | OUTPATIENT
Start: 2025-01-12 | End: 2025-01-12

## 2025-01-12 RX ORDER — SODIUM CHLORIDE 9 MG/ML
INJECTION, SOLUTION INTRAVENOUS PRN
Status: DISCONTINUED | OUTPATIENT
Start: 2025-01-12 | End: 2025-01-26 | Stop reason: HOSPADM

## 2025-01-12 RX ORDER — CIPROFLOXACIN 2 MG/ML
400 INJECTION, SOLUTION INTRAVENOUS ONCE
Status: COMPLETED | OUTPATIENT
Start: 2025-01-12 | End: 2025-01-12

## 2025-01-12 RX ORDER — PREGABALIN 100 MG/1
200 CAPSULE ORAL 2 TIMES DAILY
Status: DISCONTINUED | OUTPATIENT
Start: 2025-01-12 | End: 2025-01-26 | Stop reason: HOSPADM

## 2025-01-12 RX ORDER — VITAMIN B COMPLEX
2000 TABLET ORAL
Status: DISCONTINUED | OUTPATIENT
Start: 2025-01-12 | End: 2025-01-26 | Stop reason: HOSPADM

## 2025-01-12 RX ORDER — PANTOPRAZOLE SODIUM 40 MG/1
40 TABLET, DELAYED RELEASE ORAL
Status: DISCONTINUED | OUTPATIENT
Start: 2025-01-13 | End: 2025-01-13

## 2025-01-12 RX ADMIN — MEROPENEM 1000 MG: 1 INJECTION INTRAVENOUS at 15:27

## 2025-01-12 RX ADMIN — LOSARTAN POTASSIUM 50 MG: 100 TABLET, FILM COATED ORAL at 20:55

## 2025-01-12 RX ADMIN — ASPIRIN 81 MG: 81 TABLET, COATED ORAL at 20:56

## 2025-01-12 RX ADMIN — MORPHINE SULFATE 2 MG: 2 INJECTION, SOLUTION INTRAMUSCULAR; INTRAVENOUS at 13:17

## 2025-01-12 RX ADMIN — Medication 2000 UNITS: at 20:56

## 2025-01-12 RX ADMIN — Medication 1 SPRAY: at 21:39

## 2025-01-12 RX ADMIN — SODIUM CHLORIDE, PRESERVATIVE FREE 10 ML: 5 INJECTION INTRAVENOUS at 20:59

## 2025-01-12 RX ADMIN — HYDROXYCHLOROQUINE SULFATE 200 MG: 200 TABLET ORAL at 20:57

## 2025-01-12 RX ADMIN — IOPAMIDOL 75 ML: 755 INJECTION, SOLUTION INTRAVENOUS at 07:58

## 2025-01-12 RX ADMIN — VILAZODONE HYDROCHLORIDE 20 MG: 20 TABLET, FILM COATED ORAL at 21:44

## 2025-01-12 RX ADMIN — CIPROFLOXACIN 400 MG: 400 INJECTION, SOLUTION INTRAVENOUS at 08:34

## 2025-01-12 RX ADMIN — SODIUM CHLORIDE 200 MG: 9 INJECTION, SOLUTION INTRAVENOUS at 18:49

## 2025-01-12 RX ADMIN — MORPHINE SULFATE 2 MG: 2 INJECTION, SOLUTION INTRAMUSCULAR; INTRAVENOUS at 17:20

## 2025-01-12 RX ADMIN — MORPHINE SULFATE 2 MG: 2 INJECTION, SOLUTION INTRAMUSCULAR; INTRAVENOUS at 21:36

## 2025-01-12 RX ADMIN — SODIUM CHLORIDE, PRESERVATIVE FREE 10 ML: 5 INJECTION INTRAVENOUS at 13:20

## 2025-01-12 RX ADMIN — SUCRALFATE 1 G: 1 TABLET ORAL at 20:56

## 2025-01-12 RX ADMIN — PREGABALIN 200 MG: 100 CAPSULE ORAL at 20:56

## 2025-01-12 RX ADMIN — BUPROPION HYDROCHLORIDE 150 MG: 150 TABLET, EXTENDED RELEASE ORAL at 20:56

## 2025-01-12 RX ADMIN — HYDROMORPHONE HYDROCHLORIDE 1 MG: 1 INJECTION, SOLUTION INTRAMUSCULAR; INTRAVENOUS; SUBCUTANEOUS at 07:19

## 2025-01-12 RX ADMIN — ATORVASTATIN CALCIUM 10 MG: 10 TABLET, FILM COATED ORAL at 20:56

## 2025-01-12 ASSESSMENT — LIFESTYLE VARIABLES
HOW OFTEN DO YOU HAVE A DRINK CONTAINING ALCOHOL: NEVER
HOW OFTEN DO YOU HAVE A DRINK CONTAINING ALCOHOL: NEVER
HOW MANY STANDARD DRINKS CONTAINING ALCOHOL DO YOU HAVE ON A TYPICAL DAY: PATIENT DOES NOT DRINK

## 2025-01-12 ASSESSMENT — PAIN DESCRIPTION - ORIENTATION
ORIENTATION: RIGHT

## 2025-01-12 ASSESSMENT — PAIN SCALES - GENERAL
PAINLEVEL_OUTOF10: 8
PAINLEVEL_OUTOF10: 9
PAINLEVEL_OUTOF10: 8
PAINLEVEL_OUTOF10: 7
PAINLEVEL_OUTOF10: 10

## 2025-01-12 ASSESSMENT — PAIN - FUNCTIONAL ASSESSMENT
PAIN_FUNCTIONAL_ASSESSMENT: PREVENTS OR INTERFERES WITH ALL ACTIVE AND SOME PASSIVE ACTIVITIES
PAIN_FUNCTIONAL_ASSESSMENT: 0-10
PAIN_FUNCTIONAL_ASSESSMENT: INTOLERABLE, UNABLE TO DO ANY ACTIVE OR PASSIVE ACTIVITIES

## 2025-01-12 ASSESSMENT — PAIN DESCRIPTION - PAIN TYPE: TYPE: ACUTE PAIN

## 2025-01-12 ASSESSMENT — PAIN DESCRIPTION - LOCATION
LOCATION: HIP
LOCATION: LEG
LOCATION: HIP

## 2025-01-12 ASSESSMENT — PAIN DESCRIPTION - DESCRIPTORS
DESCRIPTORS: SORE;TENDER
DESCRIPTORS: SORE;TENDER
DESCRIPTORS: ACHING
DESCRIPTORS: ACHING

## 2025-01-12 NOTE — ED NOTES
This RN called report to OZ Javier. All questions were answered and no concerns were communicated at this time.

## 2025-01-12 NOTE — ED NOTES
Patient Name: Antonette Brown  : 1940 84 y.o.  MRN: [id number]  ED Room #: ED-0020/     Chief complaint:   Chief Complaint   Patient presents with    Fall     Pt arrived via [facility] from home w c/o R leg pain d/t mechanical fall in a bathroom. Pt w visible R  leg shortening.      Hospital Problem/Diagnosis:   Hospital Problems             Last Modified POA    * (Principal) Fall against object 2025 Yes         O2 Flow Rate:O2 Device: None (Room air)   (if applicable)  Cardiac Rhythm:   (if applicable)  Active LDA's:   Peripheral IV 25 Right Antecubital (Active)      Urinary Catheter 25 Llamas (Active)   $ Urethral catheter insertion $ Not inserted for procedure 25 0750          How does patient ambulate? Bed Bound    2. How does patient take pills? Unknown, no oral medications were given in the Emergency Department    3. Is patient alert? Alert    4. Is patient oriented?     5.   Patient arrived from:  home  Facility Name: ___________________________________________    6. If patient is disoriented or from a Skill Nursing Facility has family been notified of admission?  N/A    7. Patient belongings? Belongings: Clothing    Disposition of belongings? Kept with Patient     8. Any specific patient or family belongings/needs/dynamics?   a. N/A    9. Miscellaneous comments/pending orders?  a. N/A      If there are any additional questions please reach out to the Emergency Department.

## 2025-01-12 NOTE — H&P
Hospital Medicine History & Physical      PCP: Erica Nava MD    Date of Admission: 1/12/2025    Date of Service: Pt seen/examined on 1/12/2025  6:26 AM and   Admitted to Inpatient with expected LOS greater than two midnights due to medical therapy.       Chief Complaint: Femur fracture    History Of Present Illness:    84 y.o. female with PMHx significant for hypertension, hyperlipidemia, previous right hip fracture admitted to the hospital after a fall  Patient reports that she was walking to the bathroom lost her balance fell landing on her right hip  Complains of pain in the right hip rating it 8/10 worse with any movement  She denies any loss of consciousness no chest pain no palpitations  Has been having some dysuria ongoing for the last 7 days  No fevers no sweats      Past Medical History:          Diagnosis Date    AR (allergic rhinitis)     Arthritis of knee     Pruis    Colitis     Depression     Erosive gastritis 10/28/2019    EGD October 2019, he did with PPI    GERD (gastroesophageal reflux disease)     Hyperlipidemia     Hypertension     Internal hemorrhoids     MI (myocardial infarction) (HCC)     Overweight(278.02)     Viral meningitis 05/2012       Past Surgical History:          Procedure Laterality Date    BREAST BIOPSY      CHOLECYSTECTOMY, LAPAROSCOPIC  2003    COLONOSCOPY  8/06    normal; Or[facility]ga    COLONOSCOPY  12/3/13    [facility]tega- polyps    COLONOSCOPY N/A 2/21/2024    COLONOSCOPY WITH BIOPSY performed by Marcelo Jean MD at [facility] ENDOSCOPY    HIP SURGERY Left 5/16/2022    LEFT HIP HEMIARTHROPLASTY performed by Parker Palacio MD at [facility] OR    HIP SURGERY Right 10/20/2024    HIP OPEN REDUCTION INTERNAL FIXATION performed by Isma Cody MD at [facility] OR    HYSTERECTOMY (CERVIX STATUS UNKNOWN)      AKASH AND BSO (CERVIX REMOVED)  2003    UPPER GASTROINTESTINAL ENDOSCOPY  12/3/13    Ortega; antritis       Medications Prior to Admission:      Prior to Admission medications    Medication

## 2025-01-12 NOTE — ED PROVIDER NOTES
EMERGENCY MEDICINE PROVIDER NOTE    Patient Identification  Pt Name: Antonette Brown  MRN: [id number]  Birthdate 1940  Date of evaluation: 1/12/2025  Provider: Usama Campo MD  PCP: Erica Nava MD    Chief Complaint  Fall (Pt arrived via [facility] from home w c/o R leg pain d/t mechanical fall in a bathroom. Pt w visible R  leg shortening. )      HPI  (History provided by patient)  This is a 84 y.o. female who was brought in by EMS transportation for evaluation after fall with significant pain to her right hip.  Patient states she had been walking to her bathroom when she lost her balance and fell, landing on her right hip.  She has had severe pain in the right hip ever since, worse with attempted movement.  Any movement in the right lower extremity causes increased pain, including movement of the right foot.  Patient's pain radiates from the right hip to the right knee.  She has no pain in her lower leg, including her ankle or foot.  Patient reports pain to her back since the fall.  In addition, patient is having lower abdominal pain in the bilateral lower quadrants.  She denies nausea vomiting.  She has had urinary frequency recently and thinks that she may have a UTI.  She denies having any symptoms preceding her fall that may have caused it.  Specifically, patient denies lightheadedness, palpitations, dizziness, and other symptoms.  She did not hit her head and denies headache.  She had no loss of consciousness..     I have reviewed the following nursing documentation:  Allergies: Abilify [aripiprazole] and Ceftin [cefuroxime]    Past medical history:   Past Medical History:   Diagnosis Date    AR (allergic rhinitis)     Arthritis of knee     Pruis    Colitis     Depression     Erosive gastritis 10/28/2019    EGD October 2019, he did with PPI    GERD (gastroesophageal reflux disease)     Hyperlipidemia     Hypertension     Internal hemorrhoids     MI (myocardial infarction) ([facility])

## 2025-01-13 ENCOUNTER — APPOINTMENT (OUTPATIENT)
Dept: GENERAL RADIOLOGY | Age: 85
DRG: 853 | End: 2025-01-13
Payer: MEDICARE

## 2025-01-13 ENCOUNTER — APPOINTMENT (OUTPATIENT)
Age: 85
DRG: 853 | End: 2025-01-13
Attending: STUDENT IN AN ORGANIZED HEALTH CARE EDUCATION/TRAINING PROGRAM
Payer: MEDICARE

## 2025-01-13 ENCOUNTER — APPOINTMENT (OUTPATIENT)
Dept: CT IMAGING | Age: 85
DRG: 853 | End: 2025-01-13
Payer: MEDICARE

## 2025-01-13 PROBLEM — I25.10 ATHEROSCLEROTIC HEART DISEASE OF NATIVE CORONARY ARTERY WITHOUT ANGINA PECTORIS: Status: ACTIVE | Noted: 2024-10-24

## 2025-01-13 PROBLEM — A41.9 SEPTIC SHOCK (HCC): Status: ACTIVE | Noted: 2025-01-13

## 2025-01-13 PROBLEM — B99.9 INFECTION REQUIRING CONTACT ISOLATION PRECAUTIONS: Status: ACTIVE | Noted: 2025-01-13

## 2025-01-13 PROBLEM — N30.00 ACUTE CYSTITIS WITHOUT HEMATURIA: Status: ACTIVE | Noted: 2025-01-13

## 2025-01-13 PROBLEM — F41.9 ANXIETY DISORDER, UNSPECIFIED: Status: ACTIVE | Noted: 2024-11-05

## 2025-01-13 PROBLEM — I50.32 CHRONIC DIASTOLIC HEART FAILURE (HCC): Chronic | Status: ACTIVE | Noted: 2024-11-18

## 2025-01-13 PROBLEM — I25.83 CORONARY ARTERY DISEASE DUE TO LIPID RICH PLAQUE: Status: ACTIVE | Noted: 2024-10-24

## 2025-01-13 PROBLEM — R57.8 HEMORRHAGIC SHOCK (HCC): Status: ACTIVE | Noted: 2025-01-13

## 2025-01-13 PROBLEM — B96.89 URINARY TRACT INFECTION DUE TO ESBL KLEBSIELLA: Status: ACTIVE | Noted: 2024-10-22

## 2025-01-13 PROBLEM — R65.21 SEPTIC SHOCK (HCC): Status: ACTIVE | Noted: 2025-01-13

## 2025-01-13 PROBLEM — J96.01 ACUTE HYPOXEMIC RESPIRATORY FAILURE: Status: ACTIVE | Noted: 2024-11-14
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
## 2025-01-13 PROBLEM — K21.9 GASTROESOPHAGEAL REFLUX DISEASE WITHOUT ESOPHAGITIS: Status: ACTIVE | Noted: 2025-01-13

## 2025-01-13 PROBLEM — E87.20 LACTIC ACIDOSIS: Status: ACTIVE | Noted: 2025-01-13

## 2025-01-13 LAB
ANION GAP SERPL CALCULATED.3IONS-SCNC: 12 MMOL/L (ref 3–16)
BACTERIA UR CULT: ABNORMAL
BACTERIA UR CULT: ABNORMAL
BASE EXCESS BLDA CALC-SCNC: -2 MMOL/L (ref -3–3)
BASOPHILS # BLD: 0 K/UL (ref 0–0.2)
BASOPHILS NFR BLD: 0.3 %
BLOOD BANK DISPENSE STATUS: NORMAL
BLOOD BANK PRODUCT CODE: NORMAL
BPU ID: NORMAL
BUN SERPL-MCNC: 26 MG/DL (ref 7–20)
CA-I BLD-SCNC: 1.12 MMOL/L (ref 1.12–1.32)
CALCIUM SERPL-MCNC: 8.2 MG/DL (ref 8.3–10.6)
CHLORIDE SERPL-SCNC: 99 MMOL/L (ref 99–110)
CO2 BLDA-SCNC: 24 MMOL/L
CO2 SERPL-SCNC: 23 MMOL/L (ref 21–32)
CREAT SERPL-MCNC: 1.2 MG/DL (ref 0.6–1.2)
DEPRECATED RDW RBC AUTO: 17.2 % (ref 12.4–15.4)
DEPRECATED RDW RBC AUTO: 17.2 % (ref 12.4–15.4)
DESCRIPTION BLOOD BANK: NORMAL
ECHO AO ASC DIAM: 2.9 CM
ECHO AO ASCENDING AORTA INDEX: 1.61 CM/M2
ECHO AO ROOT DIAM: 2.6 CM
ECHO AO ROOT INDEX: 1.44 CM/M2
ECHO BSA: 1.88 M2
ECHO LA DIAMETER INDEX: 1.44 CM/M2
ECHO LA DIAMETER: 2.6 CM
ECHO LA TO AORTIC ROOT RATIO: 1
ECHO LV EDV A2C: 70 ML
ECHO LV EDV A4C: 68 ML
ECHO LV EDV INDEX A4C: 38 ML/M2
ECHO LV EDV NDEX A2C: 39 ML/M2
ECHO LV EJECTION FRACTION A2C: 52 %
ECHO LV EJECTION FRACTION A4C: 78 %
ECHO LV EJECTION FRACTION BIPLANE: 69 % (ref 55–100)
ECHO LV ESV A2C: 33 ML
ECHO LV ESV A4C: 15 ML
ECHO LV ESV INDEX A2C: 18 ML/M2
ECHO LV ESV INDEX A4C: 8 ML/M2
ECHO LV FRACTIONAL SHORTENING: 31 % (ref 28–44)
ECHO LV INTERNAL DIMENSION DIASTOLE INDEX: 2 CM/M2
ECHO LV INTERNAL DIMENSION DIASTOLIC: 3.6 CM (ref 3.9–5.3)
ECHO LV INTERNAL DIMENSION SYSTOLIC INDEX: 1.39 CM/M2
ECHO LV INTERNAL DIMENSION SYSTOLIC: 2.5 CM
ECHO LV IVSD: 0.8 CM (ref 0.6–0.9)
ECHO LV MASS 2D: 72.1 G (ref 67–162)
ECHO LV MASS INDEX 2D: 40.1 G/M2 (ref 43–95)
ECHO LV POSTERIOR WALL DIASTOLIC: 0.7 CM (ref 0.6–0.9)
ECHO LV RELATIVE WALL THICKNESS RATIO: 0.39
ECHO LVOT AREA: 3.1 CM2
ECHO LVOT DIAM: 2 CM
EKG ATRIAL RATE: 96 BPM
EKG DIAGNOSIS: NORMAL
EKG P AXIS: 59 DEGREES
EKG P-R INTERVAL: 154 MS
EKG Q-T INTERVAL: 402 MS
EKG QRS DURATION: 90 MS
EKG QTC CALCULATION (BAZETT): 507 MS
EKG R AXIS: -30 DEGREES
EKG T AXIS: 35 DEGREES
EKG VENTRICULAR RATE: 96 BPM
EOSINOPHIL # BLD: 0 K/UL (ref 0–0.6)
EOSINOPHIL NFR BLD: 0 %
GFR SERPLBLD CREATININE-BSD FMLA CKD-EPI: 44 ML/MIN/{1.73_M2}
GLUCOSE BLD-MCNC: 101 MG/DL (ref 70–99)
GLUCOSE BLD-MCNC: 118 MG/DL (ref 70–99)
GLUCOSE SERPL-MCNC: 107 MG/DL (ref 70–99)
HCO3 BLDA-SCNC: 22.9 MMOL/L (ref 21–29)
HCT VFR BLD AUTO: 17.6 % (ref 36–48)
HCT VFR BLD AUTO: 21 % (ref 36–48)
HCT VFR BLD AUTO: 24.9 % (ref 36–48)
HEMOCCULT STL QL: NORMAL
HGB BLD CALC-MCNC: 7.2 GM/DL (ref 12–16)
HGB BLD-MCNC: 5.8 G/DL (ref 12–16)
HGB BLD-MCNC: 8 G/DL (ref 12–16)
INR PPP: 1.12 (ref 0.85–1.15)
LACTATE BLD-SCNC: <0.4 MMOL/L (ref 0.4–2)
LACTATE BLDV-SCNC: 0.8 MMOL/L (ref 0.4–1.9)
LACTATE BLDV-SCNC: 1.4 MMOL/L (ref 0.4–2)
LACTATE BLDV-SCNC: 2.1 MMOL/L (ref 0.4–2)
LYMPHOCYTES # BLD: 2 K/UL (ref 1–5.1)
LYMPHOCYTES NFR BLD: 14.7 %
MCH RBC QN AUTO: 28.8 PG (ref 26–34)
MCH RBC QN AUTO: 29 PG (ref 26–34)
MCHC RBC AUTO-ENTMCNC: 32.3 G/DL (ref 31–36)
MCHC RBC AUTO-ENTMCNC: 32.8 G/DL (ref 31–36)
MCV RBC AUTO: 87.7 FL (ref 80–100)
MCV RBC AUTO: 89.8 FL (ref 80–100)
MONOCYTES # BLD: 0.9 K/UL (ref 0–1.3)
MONOCYTES NFR BLD: 6.9 %
NEUTROPHILS # BLD: 10.5 K/UL (ref 1.7–7.7)
NEUTROPHILS NFR BLD: 78.1 %
NT-PROBNP SERPL-MCNC: 1781 PG/ML (ref 0–449)
NT-PROBNP SERPL-MCNC: 998 PG/ML (ref 0–449)
ORGANISM: ABNORMAL
ORGANISM: ABNORMAL
PCO2 BLDA: 37.3 MM HG (ref 35–45)
PERFORMED ON: ABNORMAL
PERFORMED ON: ABNORMAL
PH BLDA: 7.39 [PH] (ref 7.35–7.45)
PLATELET # BLD AUTO: 110 K/UL (ref 135–450)
PLATELET # BLD AUTO: 139 K/UL (ref 135–450)
PMV BLD AUTO: 8.1 FL (ref 5–10.5)
PMV BLD AUTO: 8.3 FL (ref 5–10.5)
PO2 BLDA: 297.6 MM HG (ref 75–108)
POC SAMPLE TYPE: ABNORMAL
POTASSIUM BLD-SCNC: 4 MMOL/L (ref 3.5–5.1)
POTASSIUM SERPL-SCNC: 4.2 MMOL/L (ref 3.5–5.1)
PROCALCITONIN SERPL IA-MCNC: 0.25 NG/ML (ref 0–0.15)
PROTHROMBIN TIME: 14.6 SEC (ref 11.9–14.9)
RBC # BLD AUTO: 2.01 M/UL (ref 4–5.2)
RBC # BLD AUTO: 2.77 M/UL (ref 4–5.2)
REPORT: NORMAL
RESP PATH DNA+RNA PNL NPH NAA+NON-PROBE: ABNORMAL
SAO2 % BLDA: 100 % (ref 93–100)
SODIUM BLD-SCNC: 134 MMOL/L (ref 136–145)
SODIUM SERPL-SCNC: 134 MMOL/L (ref 136–145)
TROPONIN, HIGH SENSITIVITY: 47 NG/L (ref 0–14)
TROPONIN, HIGH SENSITIVITY: 71 NG/L (ref 0–14)
WBC # BLD AUTO: 12.4 K/UL (ref 4–11)
WBC # BLD AUTO: 13.4 K/UL (ref 4–11)

## 2025-01-13 PROCEDURE — 85014 HEMATOCRIT: CPT

## 2025-01-13 PROCEDURE — 93325 DOPPLER ECHO COLOR FLOW MAPG: CPT | Performed by: INTERNAL MEDICINE

## 2025-01-13 PROCEDURE — 0202U NFCT DS 22 TRGT SARS-COV-2: CPT

## 2025-01-13 PROCEDURE — 93005 ELECTROCARDIOGRAM TRACING: CPT | Performed by: HOSPITALIST

## 2025-01-13 PROCEDURE — 6360000004 HC RX CONTRAST MEDICATION: Performed by: INTERNAL MEDICINE

## 2025-01-13 PROCEDURE — 85610 PROTHROMBIN TIME: CPT

## 2025-01-13 PROCEDURE — 84132 ASSAY OF SERUM POTASSIUM: CPT

## 2025-01-13 PROCEDURE — 93010 ELECTROCARDIOGRAM REPORT: CPT | Performed by: INTERNAL MEDICINE

## 2025-01-13 PROCEDURE — 87040 BLOOD CULTURE FOR BACTERIA: CPT

## 2025-01-13 PROCEDURE — 2500000003 HC RX 250 WO HCPCS: Performed by: INTERNAL MEDICINE

## 2025-01-13 PROCEDURE — 6360000004 HC RX CONTRAST MEDICATION: Performed by: STUDENT IN AN ORGANIZED HEALTH CARE EDUCATION/TRAINING PROGRAM

## 2025-01-13 PROCEDURE — 5A09557 ASSISTANCE WITH RESPIRATORY VENTILATION, GREATER THAN 96 CONSECUTIVE HOURS, CONTINUOUS POSITIVE AIRWAY PRESSURE: ICD-10-PCS | Performed by: INTERNAL MEDICINE

## 2025-01-13 PROCEDURE — 71045 X-RAY EXAM CHEST 1 VIEW: CPT

## 2025-01-13 PROCEDURE — 02HV33Z INSERTION OF INFUSION DEVICE INTO SUPERIOR VENA CAVA, PERCUTANEOUS APPROACH: ICD-10-PCS | Performed by: INTERNAL MEDICINE

## 2025-01-13 PROCEDURE — 83880 ASSAY OF NATRIURETIC PEPTIDE: CPT

## 2025-01-13 PROCEDURE — 6370000000 HC RX 637 (ALT 250 FOR IP): Performed by: INTERNAL MEDICINE

## 2025-01-13 PROCEDURE — 99291 CRITICAL CARE FIRST HOUR: CPT | Performed by: STUDENT IN AN ORGANIZED HEALTH CARE EDUCATION/TRAINING PROGRAM

## 2025-01-13 PROCEDURE — 82947 ASSAY GLUCOSE BLOOD QUANT: CPT

## 2025-01-13 PROCEDURE — 85025 COMPLETE CBC W/AUTO DIFF WBC: CPT

## 2025-01-13 PROCEDURE — 6360000002 HC RX W HCPCS: Performed by: INTERNAL MEDICINE

## 2025-01-13 PROCEDURE — 6360000002 HC RX W HCPCS: Performed by: HOSPITALIST

## 2025-01-13 PROCEDURE — 36430 TRANSFUSION BLD/BLD COMPNT: CPT

## 2025-01-13 PROCEDURE — 2580000003 HC RX 258: Performed by: STUDENT IN AN ORGANIZED HEALTH CARE EDUCATION/TRAINING PROGRAM

## 2025-01-13 PROCEDURE — 82803 BLOOD GASES ANY COMBINATION: CPT

## 2025-01-13 PROCEDURE — 36415 COLL VENOUS BLD VENIPUNCTURE: CPT

## 2025-01-13 PROCEDURE — 6360000002 HC RX W HCPCS: Performed by: NURSE PRACTITIONER

## 2025-01-13 PROCEDURE — 3E033XZ INTRODUCTION OF VASOPRESSOR INTO PERIPHERAL VEIN, PERCUTANEOUS APPROACH: ICD-10-PCS | Performed by: INTERNAL MEDICINE

## 2025-01-13 PROCEDURE — 94761 N-INVAS EAR/PLS OXIMETRY MLT: CPT

## 2025-01-13 PROCEDURE — 5A0935A ASSISTANCE WITH RESPIRATORY VENTILATION, LESS THAN 24 CONSECUTIVE HOURS, HIGH NASAL FLOW/VELOCITY: ICD-10-PCS | Performed by: INTERNAL MEDICINE

## 2025-01-13 PROCEDURE — 93308 TTE F-UP OR LMTD: CPT | Performed by: INTERNAL MEDICINE

## 2025-01-13 PROCEDURE — C8924 2D TTE W OR W/O FOL W/CON,FU: HCPCS

## 2025-01-13 PROCEDURE — 2700000000 HC OXYGEN THERAPY PER DAY

## 2025-01-13 PROCEDURE — 99291 CRITICAL CARE FIRST HOUR: CPT | Performed by: INTERNAL MEDICINE

## 2025-01-13 PROCEDURE — 85027 COMPLETE CBC AUTOMATED: CPT

## 2025-01-13 PROCEDURE — 6360000002 HC RX W HCPCS: Performed by: STUDENT IN AN ORGANIZED HEALTH CARE EDUCATION/TRAINING PROGRAM

## 2025-01-13 PROCEDURE — 36556 INSERT NON-TUNNEL CV CATH: CPT | Performed by: INTERNAL MEDICINE

## 2025-01-13 PROCEDURE — 83605 ASSAY OF LACTIC ACID: CPT

## 2025-01-13 PROCEDURE — 92610 EVALUATE SWALLOWING FUNCTION: CPT

## 2025-01-13 PROCEDURE — 2580000003 HC RX 258: Performed by: INTERNAL MEDICINE

## 2025-01-13 PROCEDURE — 94640 AIRWAY INHALATION TREATMENT: CPT

## 2025-01-13 PROCEDURE — 36556 INSERT NON-TUNNEL CV CATH: CPT

## 2025-01-13 PROCEDURE — 80048 BASIC METABOLIC PNL TOTAL CA: CPT

## 2025-01-13 PROCEDURE — 82270 OCCULT BLOOD FECES: CPT

## 2025-01-13 PROCEDURE — 74177 CT ABD & PELVIS W/CONTRAST: CPT

## 2025-01-13 PROCEDURE — 84295 ASSAY OF SERUM SODIUM: CPT

## 2025-01-13 PROCEDURE — 94660 CPAP INITIATION&MGMT: CPT

## 2025-01-13 PROCEDURE — 82330 ASSAY OF CALCIUM: CPT

## 2025-01-13 PROCEDURE — 2580000003 HC RX 258: Performed by: HOSPITALIST

## 2025-01-13 PROCEDURE — 99222 1ST HOSP IP/OBS MODERATE 55: CPT | Performed by: STUDENT IN AN ORGANIZED HEALTH CARE EDUCATION/TRAINING PROGRAM

## 2025-01-13 PROCEDURE — 2000000000 HC ICU R&B

## 2025-01-13 PROCEDURE — 36600 WITHDRAWAL OF ARTERIAL BLOOD: CPT

## 2025-01-13 PROCEDURE — 84484 ASSAY OF TROPONIN QUANT: CPT

## 2025-01-13 PROCEDURE — 84145 PROCALCITONIN (PCT): CPT

## 2025-01-13 RX ORDER — KETOROLAC TROMETHAMINE 15 MG/ML
15 INJECTION, SOLUTION INTRAMUSCULAR; INTRAVENOUS EVERY 6 HOURS PRN
Status: DISCONTINUED | OUTPATIENT
Start: 2025-01-13 | End: 2025-01-13

## 2025-01-13 RX ORDER — SODIUM CHLORIDE 9 MG/ML
INJECTION, SOLUTION INTRAVENOUS
Status: DISPENSED
Start: 2025-01-13 | End: 2025-01-14

## 2025-01-13 RX ORDER — NOREPINEPHRINE BITARTRATE 0.06 MG/ML
1-100 INJECTION, SOLUTION INTRAVENOUS CONTINUOUS
Status: DISCONTINUED | OUTPATIENT
Start: 2025-01-13 | End: 2025-01-21

## 2025-01-13 RX ORDER — SODIUM CHLORIDE, SODIUM LACTATE, POTASSIUM CHLORIDE, AND CALCIUM CHLORIDE .6; .31; .03; .02 G/100ML; G/100ML; G/100ML; G/100ML
1000 INJECTION, SOLUTION INTRAVENOUS ONCE
Status: COMPLETED | OUTPATIENT
Start: 2025-01-13 | End: 2025-01-13

## 2025-01-13 RX ORDER — FUROSEMIDE 10 MG/ML
40 INJECTION INTRAMUSCULAR; INTRAVENOUS ONCE
Status: COMPLETED | OUTPATIENT
Start: 2025-01-13 | End: 2025-01-13

## 2025-01-13 RX ORDER — IOPAMIDOL 755 MG/ML
75 INJECTION, SOLUTION INTRAVASCULAR
Status: COMPLETED | OUTPATIENT
Start: 2025-01-13 | End: 2025-01-13

## 2025-01-13 RX ORDER — LEVALBUTEROL INHALATION SOLUTION 0.63 MG/3ML
0.63 SOLUTION RESPIRATORY (INHALATION)
Status: COMPLETED | OUTPATIENT
Start: 2025-01-13 | End: 2025-01-13

## 2025-01-13 RX ORDER — SODIUM CHLORIDE 9 MG/ML
INJECTION, SOLUTION INTRAVENOUS PRN
Status: DISCONTINUED | OUTPATIENT
Start: 2025-01-13 | End: 2025-01-26 | Stop reason: HOSPADM

## 2025-01-13 RX ORDER — SODIUM CHLORIDE, SODIUM LACTATE, POTASSIUM CHLORIDE, AND CALCIUM CHLORIDE .6; .31; .03; .02 G/100ML; G/100ML; G/100ML; G/100ML
500 INJECTION, SOLUTION INTRAVENOUS ONCE
Status: COMPLETED | OUTPATIENT
Start: 2025-01-13 | End: 2025-01-13

## 2025-01-13 RX ORDER — LACTOBACILLUS RHAMNOSUS GG 10B CELL
1 CAPSULE ORAL 2 TIMES DAILY WITH MEALS
Status: DISCONTINUED | OUTPATIENT
Start: 2025-01-13 | End: 2025-01-13

## 2025-01-13 RX ORDER — LORAZEPAM 2 MG/ML
0.5 INJECTION INTRAMUSCULAR EVERY 6 HOURS PRN
Status: DISCONTINUED | OUTPATIENT
Start: 2025-01-13 | End: 2025-01-18

## 2025-01-13 RX ORDER — 0.9 % SODIUM CHLORIDE 0.9 %
1000 INTRAVENOUS SOLUTION INTRAVENOUS ONCE
Status: COMPLETED | OUTPATIENT
Start: 2025-01-13 | End: 2025-01-13

## 2025-01-13 RX ORDER — SODIUM CHLORIDE 9 MG/ML
INJECTION, SOLUTION INTRAVENOUS PRN
Status: DISCONTINUED | OUTPATIENT
Start: 2025-01-13 | End: 2025-01-18

## 2025-01-13 RX ORDER — SODIUM CHLORIDE 9 MG/ML
INJECTION, SOLUTION INTRAVENOUS CONTINUOUS
Status: DISCONTINUED | OUTPATIENT
Start: 2025-01-13 | End: 2025-01-14

## 2025-01-13 RX ORDER — CALCIUM CARBONATE 500 MG/1
500 TABLET, CHEWABLE ORAL 3 TIMES DAILY PRN
Status: DISCONTINUED | OUTPATIENT
Start: 2025-01-13 | End: 2025-01-26 | Stop reason: HOSPADM

## 2025-01-13 RX ORDER — VANCOMYCIN HYDROCHLORIDE 750 MG/150ML
750 INJECTION, SOLUTION INTRAVENOUS EVERY 24 HOURS
Status: DISCONTINUED | OUTPATIENT
Start: 2025-01-13 | End: 2025-01-13

## 2025-01-13 RX ORDER — PANTOPRAZOLE SODIUM 40 MG/10ML
40 INJECTION, POWDER, LYOPHILIZED, FOR SOLUTION INTRAVENOUS 2 TIMES DAILY
Status: DISCONTINUED | OUTPATIENT
Start: 2025-01-13 | End: 2025-01-15

## 2025-01-13 RX ADMIN — SULFUR HEXAFLUORIDE 2 ML: 60.7; .19; .19 INJECTION, POWDER, LYOPHILIZED, FOR SUSPENSION INTRAVENOUS; INTRAVESICAL at 12:59

## 2025-01-13 RX ADMIN — SODIUM CHLORIDE, POTASSIUM CHLORIDE, SODIUM LACTATE AND CALCIUM CHLORIDE 1000 ML: 600; 310; 30; 20 INJECTION, SOLUTION INTRAVENOUS at 11:30

## 2025-01-13 RX ADMIN — VANCOMYCIN HYDROCHLORIDE 750 MG: 750 INJECTION, SOLUTION INTRAVENOUS at 13:35

## 2025-01-13 RX ADMIN — SODIUM CHLORIDE 1000 ML: 9 INJECTION, SOLUTION INTRAVENOUS at 15:40

## 2025-01-13 RX ADMIN — MORPHINE SULFATE 2 MG: 2 INJECTION, SOLUTION INTRAMUSCULAR; INTRAVENOUS at 18:07

## 2025-01-13 RX ADMIN — LEVALBUTEROL 0.63 MG: 0.63 SOLUTION RESPIRATORY (INHALATION) at 05:18

## 2025-01-13 RX ADMIN — ONDANSETRON 4 MG: 2 INJECTION, SOLUTION INTRAMUSCULAR; INTRAVENOUS at 00:28

## 2025-01-13 RX ADMIN — MEROPENEM 1000 MG: 1 INJECTION INTRAVENOUS at 16:42

## 2025-01-13 RX ADMIN — MORPHINE SULFATE 2 MG: 2 INJECTION, SOLUTION INTRAMUSCULAR; INTRAVENOUS at 04:13

## 2025-01-13 RX ADMIN — SODIUM CHLORIDE: 9 INJECTION, SOLUTION INTRAVENOUS at 19:38

## 2025-01-13 RX ADMIN — ACETAMINOPHEN 650 MG: 650 SUPPOSITORY RECTAL at 09:50

## 2025-01-13 RX ADMIN — SODIUM CHLORIDE 125 MG: 9 INJECTION, SOLUTION INTRAVENOUS at 16:49

## 2025-01-13 RX ADMIN — FUROSEMIDE 40 MG: 10 INJECTION, SOLUTION INTRAMUSCULAR; INTRAVENOUS at 05:05

## 2025-01-13 RX ADMIN — MORPHINE SULFATE 2 MG: 2 INJECTION, SOLUTION INTRAMUSCULAR; INTRAVENOUS at 09:03

## 2025-01-13 RX ADMIN — IOPAMIDOL 75 ML: 755 INJECTION, SOLUTION INTRAVENOUS at 17:15

## 2025-01-13 RX ADMIN — MEROPENEM 1000 MG: 1 INJECTION INTRAVENOUS at 04:07

## 2025-01-13 RX ADMIN — Medication 5 MCG/MIN: at 15:54

## 2025-01-13 RX ADMIN — SODIUM CHLORIDE, POTASSIUM CHLORIDE, SODIUM LACTATE AND CALCIUM CHLORIDE 500 ML: 600; 310; 30; 20 INJECTION, SOLUTION INTRAVENOUS at 13:46

## 2025-01-13 ASSESSMENT — PAIN SCALES - GENERAL
PAINLEVEL_OUTOF10: 6
PAINLEVEL_OUTOF10: 0
PAINLEVEL_OUTOF10: 6
PAINLEVEL_OUTOF10: 6
PAINLEVEL_OUTOF10: 5

## 2025-01-13 ASSESSMENT — PAIN DESCRIPTION - DESCRIPTORS: DESCRIPTORS: ACHING

## 2025-01-13 ASSESSMENT — PAIN DESCRIPTION - LOCATION
LOCATION: HIP
LOCATION: HIP
LOCATION: ABDOMEN
LOCATION: ABDOMEN

## 2025-01-13 ASSESSMENT — PAIN DESCRIPTION - ORIENTATION
ORIENTATION: RIGHT
ORIENTATION: MID

## 2025-01-13 NOTE — OP NOTE
Central Venous Catheter Insertion Procedure Note    Procedure: Insertion of Central Venous Catheter    Indications:  Acute blood loss anemia    Procedure Details   Informed consent was obtained for the procedure, including sedation.  Risks of lung perforation, hemorrhage, arrhythmia, and adverse drug reaction were discussed.     Under sterile conditions, the skin above the on the right internal jugular vein was prepped using chlorhexine and covered with a sterile drape. 5cc 1%Local anesthesia was applied to the skin and subcutaneous tissues over the site. A 18-gauge needle was inserted into the desired vein under ultrasound guidance. A guide wire was then passed easily through the needle and subsequently the needle was withdrawn. There were occasional PVCs. A 7.0 [place] triple-lumen central venous catheter was then inserted into the desired vein over the guide wire. The guidewire was removed and the catheter was sutured into place using 2-0 mersilk.    Findings:  There were no changes to vital signs. Catheter was flushed with 10 cc NS. Patient did tolerate procedure well.    EBL: 3cc    Recommendations:  CXR ordered to verify placement.

## 2025-01-13 NOTE — CONSENT
Informed Consent for Blood Component Transfusion Note    I have discussed with the patient the rationale for blood component transfusion; its benefits in treating or preventing fatigue, organ damage, or death; and its risk which includes mild transfusion reactions, rare risk of blood borne infection, or more serious but rare reactions. I have discussed the alternatives to transfusion, including the risk and consequences of not receiving transfusion. The patient had an opportunity to ask questions and had agreed to proceed with transfusion of blood components.    Electronically signed by Parker Mcgregor MD on 1/13/25 at 4:30 PM EST

## 2025-01-13 NOTE — PLAN OF CARE
[facility] Orthopedic Surgery  Plan of Care Note        Patient seen sitting up in bed with family at bedside.  She was placed on BiPap this AM after rapid response for hypoxia/respiratory failure - not tolerating short periods off thus far.   Concern for aspiration.  Experiencing some anxiety related to BiPap    Tachy; Temp 102.8  ESBL UTI    WBC 13.4  PCT 0.25  LA 2.1  GFR stable  INR WNL  Type & Screen completed    Plan:  - Holding slot for 12:00 tomorrow in case she is medically stable for ORIF right femur.   - NPO midnight  - NWB RLE  - Pain control  - Hold AC  - Verify informed consent  - Abx per primary team    CIERRA Ascencio - CNP 1/13/2025 9:20 AM

## 2025-01-14 ENCOUNTER — APPOINTMENT (OUTPATIENT)
Dept: VASCULAR LAB | Age: 85
DRG: 853 | End: 2025-01-14
Attending: STUDENT IN AN ORGANIZED HEALTH CARE EDUCATION/TRAINING PROGRAM
Payer: MEDICARE

## 2025-01-14 PROBLEM — J10.1 INFLUENZA A: Status: ACTIVE | Noted: 2025-01-14

## 2025-01-14 LAB
ANION GAP SERPL CALCULATED.3IONS-SCNC: 10 MMOL/L (ref 3–16)
ANISOCYTOSIS BLD QL SMEAR: ABNORMAL
BASOPHILS # BLD: 0 K/UL (ref 0–0.2)
BASOPHILS NFR BLD: 0.1 %
BUN SERPL-MCNC: 35 MG/DL (ref 7–20)
CALCIUM SERPL-MCNC: 7.2 MG/DL (ref 8.3–10.6)
CHLORIDE SERPL-SCNC: 104 MMOL/L (ref 99–110)
CO2 SERPL-SCNC: 23 MMOL/L (ref 21–32)
CREAT SERPL-MCNC: 1.5 MG/DL (ref 0.6–1.2)
D-DIMER QUANTITATIVE: 9.47 UG/ML FEU (ref 0–0.6)
DEPRECATED RDW RBC AUTO: 15.6 % (ref 12.4–15.4)
ECHO BSA: 1.88 M2
EOSINOPHIL # BLD: 0 K/UL (ref 0–0.6)
EOSINOPHIL NFR BLD: 0.1 %
GFR SERPLBLD CREATININE-BSD FMLA CKD-EPI: 34 ML/MIN/{1.73_M2}
GLUCOSE SERPL-MCNC: 99 MG/DL (ref 70–99)
HCT VFR BLD AUTO: 25.6 % (ref 36–48)
HCT VFR BLD AUTO: 25.7 % (ref 36–48)
HCT VFR BLD AUTO: 25.9 % (ref 36–48)
HCT VFR BLD AUTO: 27.4 % (ref 36–48)
HGB BLD-MCNC: 8.6 G/DL (ref 12–16)
HGB BLD-MCNC: 8.6 G/DL (ref 12–16)
HGB BLD-MCNC: 8.7 G/DL (ref 12–16)
HGB BLD-MCNC: 9 G/DL (ref 12–16)
HYPOCHROMIA BLD QL SMEAR: ABNORMAL
INR PPP: 1.17 (ref 0.85–1.15)
LACTATE BLDV-SCNC: 0.6 MMOL/L (ref 0.4–2)
LYMPHOCYTES # BLD: 1.2 K/UL (ref 1–5.1)
LYMPHOCYTES NFR BLD: 12.2 %
MCH RBC QN AUTO: 29.9 PG (ref 26–34)
MCHC RBC AUTO-ENTMCNC: 33.7 G/DL (ref 31–36)
MCV RBC AUTO: 88.7 FL (ref 80–100)
MONOCYTES # BLD: 0.5 K/UL (ref 0–1.3)
MONOCYTES NFR BLD: 5.5 %
NEUTROPHILS # BLD: 8 K/UL (ref 1.7–7.7)
NEUTROPHILS NFR BLD: 82.1 %
OVALOCYTES BLD QL SMEAR: ABNORMAL
PLATELET # BLD AUTO: 99 K/UL (ref 135–450)
PLATELET BLD QL SMEAR: ABNORMAL
PMV BLD AUTO: 8.5 FL (ref 5–10.5)
POLYCHROMASIA BLD QL SMEAR: ABNORMAL
POTASSIUM SERPL-SCNC: 3.4 MMOL/L (ref 3.5–5.1)
POTASSIUM SERPL-SCNC: 4.2 MMOL/L (ref 3.5–5.1)
PROTHROMBIN TIME: 15.1 SEC (ref 11.9–14.9)
RBC # BLD AUTO: 2.89 M/UL (ref 4–5.2)
SLIDE REVIEW: ABNORMAL
SODIUM SERPL-SCNC: 137 MMOL/L (ref 136–145)
WBC # BLD AUTO: 9.8 K/UL (ref 4–11)

## 2025-01-14 PROCEDURE — 85610 PROTHROMBIN TIME: CPT

## 2025-01-14 PROCEDURE — 83605 ASSAY OF LACTIC ACID: CPT

## 2025-01-14 PROCEDURE — 36592 COLLECT BLOOD FROM PICC: CPT

## 2025-01-14 PROCEDURE — 92526 ORAL FUNCTION THERAPY: CPT

## 2025-01-14 PROCEDURE — 6360000002 HC RX W HCPCS: Performed by: NURSE PRACTITIONER

## 2025-01-14 PROCEDURE — 6360000002 HC RX W HCPCS: Performed by: HOSPITALIST

## 2025-01-14 PROCEDURE — 6360000002 HC RX W HCPCS: Performed by: PHYSICIAN ASSISTANT

## 2025-01-14 PROCEDURE — 87641 MR-STAPH DNA AMP PROBE: CPT

## 2025-01-14 PROCEDURE — 85018 HEMOGLOBIN: CPT

## 2025-01-14 PROCEDURE — 99291 CRITICAL CARE FIRST HOUR: CPT | Performed by: INTERNAL MEDICINE

## 2025-01-14 PROCEDURE — 2000000000 HC ICU R&B

## 2025-01-14 PROCEDURE — 93970 EXTREMITY STUDY: CPT

## 2025-01-14 PROCEDURE — 94640 AIRWAY INHALATION TREATMENT: CPT

## 2025-01-14 PROCEDURE — 2580000003 HC RX 258: Performed by: HOSPITALIST

## 2025-01-14 PROCEDURE — 85014 HEMATOCRIT: CPT

## 2025-01-14 PROCEDURE — 85025 COMPLETE CBC W/AUTO DIFF WBC: CPT

## 2025-01-14 PROCEDURE — 84132 ASSAY OF SERUM POTASSIUM: CPT

## 2025-01-14 PROCEDURE — 2580000003 HC RX 258: Performed by: INTERNAL MEDICINE

## 2025-01-14 PROCEDURE — 99233 SBSQ HOSP IP/OBS HIGH 50: CPT | Performed by: INTERNAL MEDICINE

## 2025-01-14 PROCEDURE — 2700000000 HC OXYGEN THERAPY PER DAY

## 2025-01-14 PROCEDURE — 6370000000 HC RX 637 (ALT 250 FOR IP): Performed by: HOSPITALIST

## 2025-01-14 PROCEDURE — 2500000003 HC RX 250 WO HCPCS: Performed by: INTERNAL MEDICINE

## 2025-01-14 PROCEDURE — 99232 SBSQ HOSP IP/OBS MODERATE 35: CPT | Performed by: STUDENT IN AN ORGANIZED HEALTH CARE EDUCATION/TRAINING PROGRAM

## 2025-01-14 PROCEDURE — 6360000002 HC RX W HCPCS: Performed by: INTERNAL MEDICINE

## 2025-01-14 PROCEDURE — 6370000000 HC RX 637 (ALT 250 FOR IP): Performed by: INTERNAL MEDICINE

## 2025-01-14 PROCEDURE — 94761 N-INVAS EAR/PLS OXIMETRY MLT: CPT

## 2025-01-14 PROCEDURE — 93970 EXTREMITY STUDY: CPT | Performed by: SURGERY

## 2025-01-14 PROCEDURE — 85379 FIBRIN DEGRADATION QUANT: CPT

## 2025-01-14 PROCEDURE — 80048 BASIC METABOLIC PNL TOTAL CA: CPT

## 2025-01-14 PROCEDURE — 6370000000 HC RX 637 (ALT 250 FOR IP): Performed by: NURSE PRACTITIONER

## 2025-01-14 RX ORDER — ATORVASTATIN CALCIUM 10 MG/1
10 TABLET, FILM COATED ORAL
Status: DISCONTINUED | OUTPATIENT
Start: 2025-01-14 | End: 2025-01-26 | Stop reason: HOSPADM

## 2025-01-14 RX ORDER — POTASSIUM CHLORIDE 29.8 MG/ML
20 INJECTION INTRAVENOUS ONCE
Status: COMPLETED | OUTPATIENT
Start: 2025-01-14 | End: 2025-01-14

## 2025-01-14 RX ORDER — POTASSIUM CHLORIDE 7.45 MG/ML
10 INJECTION INTRAVENOUS
Status: DISCONTINUED | OUTPATIENT
Start: 2025-01-14 | End: 2025-01-14

## 2025-01-14 RX ORDER — ALBUTEROL SULFATE 0.83 MG/ML
2.5 SOLUTION RESPIRATORY (INHALATION) EVERY 6 HOURS PRN
Status: DISCONTINUED | OUTPATIENT
Start: 2025-01-14 | End: 2025-01-20

## 2025-01-14 RX ORDER — MORPHINE SULFATE 2 MG/ML
1 INJECTION, SOLUTION INTRAMUSCULAR; INTRAVENOUS
Status: COMPLETED | OUTPATIENT
Start: 2025-01-14 | End: 2025-01-14

## 2025-01-14 RX ORDER — POTASSIUM CHLORIDE 750 MG/1
40 TABLET, EXTENDED RELEASE ORAL ONCE
Status: DISCONTINUED | OUTPATIENT
Start: 2025-01-14 | End: 2025-01-14

## 2025-01-14 RX ORDER — OSELTAMIVIR PHOSPHATE 30 MG/1
30 CAPSULE ORAL DAILY
Status: DISCONTINUED | OUTPATIENT
Start: 2025-01-14 | End: 2025-01-15

## 2025-01-14 RX ORDER — MORPHINE SULFATE 2 MG/ML
2 INJECTION, SOLUTION INTRAMUSCULAR; INTRAVENOUS EVERY 4 HOURS PRN
Status: DISCONTINUED | OUTPATIENT
Start: 2025-01-14 | End: 2025-01-15

## 2025-01-14 RX ADMIN — SODIUM CHLORIDE 125 MG: 9 INJECTION, SOLUTION INTRAVENOUS at 18:07

## 2025-01-14 RX ADMIN — MEROPENEM 1000 MG: 1 INJECTION INTRAVENOUS at 18:01

## 2025-01-14 RX ADMIN — MORPHINE SULFATE 2 MG: 2 INJECTION, SOLUTION INTRAMUSCULAR; INTRAVENOUS at 10:27

## 2025-01-14 RX ADMIN — HYDROXYCHLOROQUINE SULFATE 200 MG: 200 TABLET ORAL at 21:42

## 2025-01-14 RX ADMIN — POTASSIUM CHLORIDE 20 MEQ: 29.8 INJECTION, SOLUTION INTRAVENOUS at 10:35

## 2025-01-14 RX ADMIN — PANTOPRAZOLE SODIUM 40 MG: 40 INJECTION, POWDER, FOR SOLUTION INTRAVENOUS at 21:43

## 2025-01-14 RX ADMIN — ATORVASTATIN CALCIUM 10 MG: 10 TABLET, FILM COATED ORAL at 21:42

## 2025-01-14 RX ADMIN — SODIUM CHLORIDE, PRESERVATIVE FREE 10 ML: 5 INJECTION INTRAVENOUS at 10:25

## 2025-01-14 RX ADMIN — PREGABALIN 200 MG: 100 CAPSULE ORAL at 21:42

## 2025-01-14 RX ADMIN — MORPHINE SULFATE 2 MG: 2 INJECTION, SOLUTION INTRAMUSCULAR; INTRAVENOUS at 13:33

## 2025-01-14 RX ADMIN — PANTOPRAZOLE SODIUM 40 MG: 40 INJECTION, POWDER, FOR SOLUTION INTRAVENOUS at 10:22

## 2025-01-14 RX ADMIN — SODIUM CHLORIDE: 9 INJECTION, SOLUTION INTRAVENOUS at 10:36

## 2025-01-14 RX ADMIN — MEROPENEM 1000 MG: 1 INJECTION INTRAVENOUS at 03:19

## 2025-01-14 RX ADMIN — BUPROPION HYDROCHLORIDE 150 MG: 150 TABLET, EXTENDED RELEASE ORAL at 18:02

## 2025-01-14 RX ADMIN — MORPHINE SULFATE 2 MG: 2 INJECTION, SOLUTION INTRAMUSCULAR; INTRAVENOUS at 00:14

## 2025-01-14 RX ADMIN — SODIUM CHLORIDE, PRESERVATIVE FREE 10 ML: 5 INJECTION INTRAVENOUS at 00:16

## 2025-01-14 RX ADMIN — SODIUM CHLORIDE, PRESERVATIVE FREE 10 ML: 5 INJECTION INTRAVENOUS at 21:43

## 2025-01-14 RX ADMIN — POTASSIUM CHLORIDE 20 MEQ: 29.8 INJECTION, SOLUTION INTRAVENOUS at 12:56

## 2025-01-14 RX ADMIN — PREGABALIN 200 MG: 100 CAPSULE ORAL at 10:24

## 2025-01-14 RX ADMIN — PANTOPRAZOLE SODIUM 40 MG: 40 INJECTION, POWDER, FOR SOLUTION INTRAVENOUS at 00:15

## 2025-01-14 RX ADMIN — ALBUTEROL SULFATE 2.5 MG: 2.5 SOLUTION RESPIRATORY (INHALATION) at 13:42

## 2025-01-14 RX ADMIN — HYDROXYCHLOROQUINE SULFATE 200 MG: 200 TABLET ORAL at 10:24

## 2025-01-14 RX ADMIN — OSELTAMIVIR PHOSPHATE 30 MG: 30 CAPSULE ORAL at 12:53

## 2025-01-14 RX ADMIN — VILAZODONE HYDROCHLORIDE 20 MG: 20 TABLET, FILM COATED ORAL at 10:26

## 2025-01-14 RX ADMIN — LORAZEPAM 0.5 MG: 2 INJECTION INTRAMUSCULAR; INTRAVENOUS at 21:43

## 2025-01-14 RX ADMIN — SUCRALFATE 1 G: 1 TABLET ORAL at 18:02

## 2025-01-14 RX ADMIN — Medication 2000 UNITS: at 10:22

## 2025-01-14 RX ADMIN — ANTACID TABLETS 500 MG: 500 TABLET, CHEWABLE ORAL at 13:19

## 2025-01-14 RX ADMIN — MORPHINE SULFATE 2 MG: 2 INJECTION, SOLUTION INTRAMUSCULAR; INTRAVENOUS at 17:58

## 2025-01-14 RX ADMIN — MORPHINE SULFATE 1 MG: 2 INJECTION, SOLUTION INTRAMUSCULAR; INTRAVENOUS at 03:10

## 2025-01-14 ASSESSMENT — PAIN DESCRIPTION - LOCATION
LOCATION: HIP
LOCATION: LEG
LOCATION: LEG;KNEE
LOCATION: HIP
LOCATION: HIP;GROIN;KNEE

## 2025-01-14 ASSESSMENT — PAIN - FUNCTIONAL ASSESSMENT
PAIN_FUNCTIONAL_ASSESSMENT: PREVENTS OR INTERFERES SOME ACTIVE ACTIVITIES AND ADLS
PAIN_FUNCTIONAL_ASSESSMENT: INTOLERABLE, UNABLE TO DO ANY ACTIVE OR PASSIVE ACTIVITIES

## 2025-01-14 ASSESSMENT — PAIN SCALES - GENERAL
PAINLEVEL_OUTOF10: 7
PAINLEVEL_OUTOF10: 5
PAINLEVEL_OUTOF10: 6
PAINLEVEL_OUTOF10: 7
PAINLEVEL_OUTOF10: 6
PAINLEVEL_OUTOF10: 2
PAINLEVEL_OUTOF10: 5
PAINLEVEL_OUTOF10: 6
PAINLEVEL_OUTOF10: 10
PAINLEVEL_OUTOF10: 8

## 2025-01-14 ASSESSMENT — PAIN DESCRIPTION - FREQUENCY: FREQUENCY: CONTINUOUS

## 2025-01-14 ASSESSMENT — PAIN DESCRIPTION - ORIENTATION
ORIENTATION: RIGHT

## 2025-01-14 ASSESSMENT — PAIN DESCRIPTION - ONSET: ONSET: ON-GOING

## 2025-01-14 ASSESSMENT — PAIN DESCRIPTION - PAIN TYPE: TYPE: ACUTE PAIN

## 2025-01-14 ASSESSMENT — PAIN DESCRIPTION - DESCRIPTORS
DESCRIPTORS: THROBBING
DESCRIPTORS: DISCOMFORT

## 2025-01-14 NOTE — PLAN OF CARE
[facility] Orthopedic Surgery  Plan of Care Note        Patient is now on pressor support.  Off BiPap, remains on 3L supplemental O2 via NC.     BLE US pending.     Got 2 unit PRBC yesterday; Hgb now 8.6  GFR down to 34  WBC normalized  Afebrile    Plan:  - OR cancelled today; Will reschedule for Thursday morning (1/16)  - NPO midnight 1/16; OK to eat today from our end if clears swallow eval.   - Continue to hold AC in light of upcoming surgery and existing ABLA requiring transfusions.   - NWSHANTI GRACIAE  - Verify informed consent  - Appreciate medical management of all participating specialties.     CIERRA Ascencio - CNP 1/14/2025 9:58 AM

## 2025-01-14 NOTE — RT PROTOCOL NOTE
RT Nebulizer Bronchodilator Protocol Note    There is a bronchodilator order in the chart from a provider indicating to follow the RT Bronchodilator Protocol and there is an “Initiate RT Bronchodilator Protocol” order as well (see protocol at bottom of note).    CXR Findings:  XR CHEST PORTABLE    Result Date: 1/13/2025  Central line tip in the proximal right atrium.  No pneumothorax. Stable chest.  Cardiomegaly with mild prominence of the perihilar markings.     XR CHEST PORTABLE    Result Date: 1/13/2025  1. No significant change.     XR CHEST PORTABLE    Result Date: 1/13/2025  1. Possible left basilar airspace opacity could be due to summation overlying tissues but could also be seen with atelectasis, aspiration, or pneumonia. 2. Interstitial prominence potentially due to pulmonary vascular congestion, chronic changes, and/or central vascular crowding.       The findings from the last RT Protocol Assessment were as follows:  Smoking: None or smoker <15 pack years  Respiratory Pattern: Regular pattern and RR 12-20 bpm  Breath Sounds: Slightly diminished and/or crackles  Cough: Strong, spontaneous, non-productive  Indication for Bronchodilator Therapy:    Bronchodilator Assessment Score: 2    Aerosolized bronchodilator medication orders have been revised according to the RT Nebulizer Bronchodilator Protocol below.    Respiratory Therapist to perform RT Therapy Protocol Assessment initially then follow the protocol.  Repeat RT Therapy Protocol Assessment PRN for score 0-3 or on second treatment, BID, and PRN for scores above 3.    No Indications - adjust the frequency to every 6 hours PRN wheezing or bronchospasm, if no treatments needed after 48 hours then discontinue using Per Protocol order mode.     If indication present, adjust the RT bronchodilator orders based on the Bronchodilator Assessment Score as indicated below.  If a patient is on this medication at home then do not decrease Frequency below that used

## 2025-01-15 ENCOUNTER — APPOINTMENT (OUTPATIENT)
Dept: GENERAL RADIOLOGY | Age: 85
DRG: 853 | End: 2025-01-15
Payer: MEDICARE

## 2025-01-15 LAB
ANION GAP SERPL CALCULATED.3IONS-SCNC: 7 MMOL/L (ref 3–16)
ANISOCYTOSIS BLD QL SMEAR: ABNORMAL
BACTERIA UR CULT: ABNORMAL
BASOPHILS # BLD: 0 K/UL (ref 0–0.2)
BASOPHILS NFR BLD: 0 %
BLOOD BANK DISPENSE STATUS: NORMAL
BLOOD BANK PRODUCT CODE: NORMAL
BPU ID: NORMAL
BUN SERPL-MCNC: 32 MG/DL (ref 7–20)
CALCIUM SERPL-MCNC: 7.8 MG/DL (ref 8.3–10.6)
CHLORIDE SERPL-SCNC: 106 MMOL/L (ref 99–110)
CO2 SERPL-SCNC: 24 MMOL/L (ref 21–32)
CREAT SERPL-MCNC: 1.1 MG/DL (ref 0.6–1.2)
DEPRECATED RDW RBC AUTO: 15.8 % (ref 12.4–15.4)
DESCRIPTION BLOOD BANK: NORMAL
EOSINOPHIL # BLD: 0.1 K/UL (ref 0–0.6)
EOSINOPHIL NFR BLD: 1 %
GFR SERPLBLD CREATININE-BSD FMLA CKD-EPI: 49 ML/MIN/{1.73_M2}
GLUCOSE SERPL-MCNC: 88 MG/DL (ref 70–99)
HCT VFR BLD AUTO: 23.4 % (ref 36–48)
HCT VFR BLD AUTO: 24.5 % (ref 36–48)
HCT VFR BLD AUTO: 24.6 % (ref 36–48)
HGB BLD-MCNC: 7.6 G/DL (ref 12–16)
HGB BLD-MCNC: 8 G/DL (ref 12–16)
HGB BLD-MCNC: 8.3 G/DL (ref 12–16)
HYPOCHROMIA BLD QL SMEAR: ABNORMAL
LACTATE BLDV-SCNC: 0.5 MMOL/L (ref 0.4–2)
LYMPHOCYTES # BLD: 1.4 K/UL (ref 1–5.1)
LYMPHOCYTES NFR BLD: 21 %
MCH RBC QN AUTO: 29.8 PG (ref 26–34)
MCHC RBC AUTO-ENTMCNC: 33.8 G/DL (ref 31–36)
MCV RBC AUTO: 88.1 FL (ref 80–100)
MONOCYTES # BLD: 0.1 K/UL (ref 0–1.3)
MONOCYTES NFR BLD: 2 %
MRSA DNA SPEC QL NAA+PROBE: NORMAL
NEUTROPHILS # BLD: 4.9 K/UL (ref 1.7–7.7)
NEUTROPHILS NFR BLD: 76 %
ORGANISM: ABNORMAL
OVALOCYTES BLD QL SMEAR: ABNORMAL
PLATELET # BLD AUTO: 102 K/UL (ref 135–450)
PMV BLD AUTO: 8.6 FL (ref 5–10.5)
POLYCHROMASIA BLD QL SMEAR: ABNORMAL
POTASSIUM SERPL-SCNC: 4.1 MMOL/L (ref 3.5–5.1)
RBC # BLD AUTO: 2.78 M/UL (ref 4–5.2)
SODIUM SERPL-SCNC: 137 MMOL/L (ref 136–145)
WBC # BLD AUTO: 6.5 K/UL (ref 4–11)

## 2025-01-15 PROCEDURE — 99233 SBSQ HOSP IP/OBS HIGH 50: CPT | Performed by: INTERNAL MEDICINE

## 2025-01-15 PROCEDURE — 6360000002 HC RX W HCPCS: Performed by: HOSPITALIST

## 2025-01-15 PROCEDURE — 2500000003 HC RX 250 WO HCPCS: Performed by: INTERNAL MEDICINE

## 2025-01-15 PROCEDURE — 85018 HEMOGLOBIN: CPT

## 2025-01-15 PROCEDURE — 2580000003 HC RX 258: Performed by: INTERNAL MEDICINE

## 2025-01-15 PROCEDURE — 85025 COMPLETE CBC W/AUTO DIFF WBC: CPT

## 2025-01-15 PROCEDURE — 6360000002 HC RX W HCPCS: Performed by: NURSE PRACTITIONER

## 2025-01-15 PROCEDURE — 99291 CRITICAL CARE FIRST HOUR: CPT | Performed by: INTERNAL MEDICINE

## 2025-01-15 PROCEDURE — 80048 BASIC METABOLIC PNL TOTAL CA: CPT

## 2025-01-15 PROCEDURE — 71045 X-RAY EXAM CHEST 1 VIEW: CPT

## 2025-01-15 PROCEDURE — 83605 ASSAY OF LACTIC ACID: CPT

## 2025-01-15 PROCEDURE — 2580000003 HC RX 258: Performed by: HOSPITALIST

## 2025-01-15 PROCEDURE — 6360000002 HC RX W HCPCS: Performed by: INTERNAL MEDICINE

## 2025-01-15 PROCEDURE — 6360000002 HC RX W HCPCS: Performed by: PHYSICIAN ASSISTANT

## 2025-01-15 PROCEDURE — 94761 N-INVAS EAR/PLS OXIMETRY MLT: CPT

## 2025-01-15 PROCEDURE — 2000000000 HC ICU R&B

## 2025-01-15 PROCEDURE — 6370000000 HC RX 637 (ALT 250 FOR IP): Performed by: INTERNAL MEDICINE

## 2025-01-15 PROCEDURE — 2580000003 HC RX 258: Performed by: NURSE PRACTITIONER

## 2025-01-15 PROCEDURE — 85014 HEMATOCRIT: CPT

## 2025-01-15 PROCEDURE — 94640 AIRWAY INHALATION TREATMENT: CPT

## 2025-01-15 PROCEDURE — 36430 TRANSFUSION BLD/BLD COMPNT: CPT

## 2025-01-15 RX ORDER — OSELTAMIVIR PHOSPHATE 30 MG/1
30 CAPSULE ORAL 2 TIMES DAILY
Status: DISCONTINUED | OUTPATIENT
Start: 2025-01-15 | End: 2025-01-20

## 2025-01-15 RX ORDER — PANTOPRAZOLE SODIUM 40 MG/10ML
40 INJECTION, POWDER, LYOPHILIZED, FOR SOLUTION INTRAVENOUS DAILY
Status: DISCONTINUED | OUTPATIENT
Start: 2025-01-16 | End: 2025-01-23

## 2025-01-15 RX ORDER — SODIUM CHLORIDE 9 MG/ML
INJECTION, SOLUTION INTRAVENOUS CONTINUOUS
Status: DISCONTINUED | OUTPATIENT
Start: 2025-01-15 | End: 2025-01-18

## 2025-01-15 RX ORDER — MORPHINE SULFATE 4 MG/ML
4 INJECTION, SOLUTION INTRAMUSCULAR; INTRAVENOUS
Status: DISCONTINUED | OUTPATIENT
Start: 2025-01-15 | End: 2025-01-26 | Stop reason: HOSPADM

## 2025-01-15 RX ORDER — SODIUM CHLORIDE 9 MG/ML
INJECTION, SOLUTION INTRAVENOUS CONTINUOUS
Status: DISCONTINUED | OUTPATIENT
Start: 2025-01-15 | End: 2025-01-15

## 2025-01-15 RX ADMIN — ACETAMINOPHEN 650 MG: 325 TABLET ORAL at 09:37

## 2025-01-15 RX ADMIN — SUCRALFATE 1 G: 1 TABLET ORAL at 09:38

## 2025-01-15 RX ADMIN — SUCRALFATE 1 G: 1 TABLET ORAL at 15:25

## 2025-01-15 RX ADMIN — VILAZODONE HYDROCHLORIDE 20 MG: 20 TABLET, FILM COATED ORAL at 11:27

## 2025-01-15 RX ADMIN — OSELTAMIVIR PHOSPHATE 30 MG: 30 CAPSULE ORAL at 09:38

## 2025-01-15 RX ADMIN — ALBUTEROL SULFATE 2.5 MG: 2.5 SOLUTION RESPIRATORY (INHALATION) at 11:47

## 2025-01-15 RX ADMIN — HYDROXYCHLOROQUINE SULFATE 200 MG: 200 TABLET ORAL at 20:31

## 2025-01-15 RX ADMIN — Medication 5 MCG/MIN: at 22:07

## 2025-01-15 RX ADMIN — SUCRALFATE 1 G: 1 TABLET ORAL at 05:47

## 2025-01-15 RX ADMIN — SODIUM CHLORIDE 125 MG: 9 INJECTION, SOLUTION INTRAVENOUS at 22:27

## 2025-01-15 RX ADMIN — MEROPENEM 1000 MG: 1 INJECTION INTRAVENOUS at 03:44

## 2025-01-15 RX ADMIN — PANTOPRAZOLE SODIUM 40 MG: 40 INJECTION, POWDER, FOR SOLUTION INTRAVENOUS at 09:38

## 2025-01-15 RX ADMIN — SODIUM CHLORIDE: 9 INJECTION, SOLUTION INTRAVENOUS at 16:28

## 2025-01-15 RX ADMIN — MORPHINE SULFATE 4 MG: 4 INJECTION, SOLUTION INTRAMUSCULAR; INTRAVENOUS at 22:32

## 2025-01-15 RX ADMIN — SODIUM CHLORIDE, PRESERVATIVE FREE 10 ML: 5 INJECTION INTRAVENOUS at 09:38

## 2025-01-15 RX ADMIN — HYDROXYCHLOROQUINE SULFATE 200 MG: 200 TABLET ORAL at 09:38

## 2025-01-15 RX ADMIN — LORAZEPAM 0.5 MG: 2 INJECTION INTRAMUSCULAR; INTRAVENOUS at 06:25

## 2025-01-15 RX ADMIN — MORPHINE SULFATE 4 MG: 4 INJECTION, SOLUTION INTRAMUSCULAR; INTRAVENOUS at 18:28

## 2025-01-15 RX ADMIN — BUPROPION HYDROCHLORIDE 150 MG: 150 TABLET, EXTENDED RELEASE ORAL at 15:25

## 2025-01-15 RX ADMIN — Medication 2000 UNITS: at 09:38

## 2025-01-15 RX ADMIN — MORPHINE SULFATE 2 MG: 2 INJECTION, SOLUTION INTRAMUSCULAR; INTRAVENOUS at 09:33

## 2025-01-15 RX ADMIN — MEROPENEM 1000 MG: 1 INJECTION INTRAVENOUS at 15:22

## 2025-01-15 RX ADMIN — OSELTAMIVIR PHOSPHATE 30 MG: 30 CAPSULE ORAL at 20:31

## 2025-01-15 RX ADMIN — MORPHINE SULFATE 2 MG: 2 INJECTION, SOLUTION INTRAMUSCULAR; INTRAVENOUS at 05:05

## 2025-01-15 RX ADMIN — PREGABALIN 200 MG: 100 CAPSULE ORAL at 09:38

## 2025-01-15 RX ADMIN — SODIUM CHLORIDE: 9 INJECTION, SOLUTION INTRAVENOUS at 23:42

## 2025-01-15 RX ADMIN — SODIUM CHLORIDE, PRESERVATIVE FREE 10 ML: 5 INJECTION INTRAVENOUS at 20:24

## 2025-01-15 RX ADMIN — PREGABALIN 200 MG: 100 CAPSULE ORAL at 20:31

## 2025-01-15 ASSESSMENT — PAIN DESCRIPTION - DESCRIPTORS
DESCRIPTORS: ACHING;BURNING
DESCRIPTORS: ACHING;BURNING
DESCRIPTORS: ACHING

## 2025-01-15 ASSESSMENT — PAIN DESCRIPTION - LOCATION
LOCATION: HIP
LOCATION: KNEE;LEG
LOCATION: HIP;LEG

## 2025-01-15 ASSESSMENT — PAIN SCALES - GENERAL
PAINLEVEL_OUTOF10: 9
PAINLEVEL_OUTOF10: 3
PAINLEVEL_OUTOF10: 8
PAINLEVEL_OUTOF10: 7
PAINLEVEL_OUTOF10: 5
PAINLEVEL_OUTOF10: 0
PAINLEVEL_OUTOF10: 4
PAINLEVEL_OUTOF10: 6

## 2025-01-15 ASSESSMENT — PAIN DESCRIPTION - ORIENTATION
ORIENTATION: RIGHT

## 2025-01-15 ASSESSMENT — PAIN DESCRIPTION - PAIN TYPE: TYPE: ACUTE PAIN

## 2025-01-15 ASSESSMENT — PAIN SCALES - WONG BAKER: WONGBAKER_NUMERICALRESPONSE: NO HURT

## 2025-01-15 ASSESSMENT — PAIN - FUNCTIONAL ASSESSMENT: PAIN_FUNCTIONAL_ASSESSMENT: PREVENTS OR INTERFERES SOME ACTIVE ACTIVITIES AND ADLS

## 2025-01-15 ASSESSMENT — PAIN DESCRIPTION - ONSET: ONSET: ON-GOING

## 2025-01-15 ASSESSMENT — PAIN DESCRIPTION - FREQUENCY: FREQUENCY: CONTINUOUS

## 2025-01-15 NOTE — PLAN OF CARE
[facility] Orthopedic Surgery  Plan of Care Note        Patient now weaned off levo;  Sitting up in bed.  Mild pain when not moving - severe with movement. Denies new issues.     BLE US without DVT.     Got 2 unit PRBC 1/13; Hgb now 8.3  GFR improved  WBC normalized  Afebrile    Plan:  - Scheduled for right femur open reduction and internal fixation with Dr. Madeline Masterson 10AM on 1/16  - NPO midnight  - Continue to hold AC in light of upcoming surgery and existing ABLA requiring transfusions.   - NWSHANTI GARCIAE  - Verify informed consent  - Appreciate medical management of all participating specialties.     CIERRA Ascencio - CNP 1/15/2025 10:32 AM

## 2025-01-15 NOTE — PLAN OF CARE
Problem: Discharge Planning  Goal: Discharge to home or other facility with appropriate resources  Outcome: Progressing  Flowsheets (Taken 1/14/2025 2000)  Discharge to home or other facility with appropriate resources:   Identify barriers to discharge with patient and caregiver   Arrange for needed discharge resources and transportation as appropriate     Problem: Pain  Goal: Verbalizes/displays adequate comfort level or baseline comfort level  Outcome: Progressing  Flowsheets (Taken 1/14/2025 2005)  Verbalizes/displays adequate comfort level or baseline comfort level:   Encourage patient to monitor pain and request assistance   Assess pain using appropriate pain scale   Administer analgesics based on type and severity of pain and evaluate response   Implement non-pharmacological measures as appropriate and evaluate response     Problem: Safety - Adult  Goal: Free from fall injury  Outcome: Progressing     Problem: ABCDS Injury Assessment  Goal: Absence of physical injury  Outcome: Progressing     Problem: Skin/Tissue Integrity  Goal: Absence of new skin breakdown  Description: 1.  Monitor for areas of redness and/or skin breakdown  2.  Assess vascular access sites hourly  3.  Every 4-6 hours minimum:  Change oxygen saturation probe site  4.  Every 4-6 hours:  If on nasal continuous positive airway pressure, respiratory therapy assess nares and determine need for appliance change or resting period.  Outcome: Progressing

## 2025-01-16 ENCOUNTER — APPOINTMENT (OUTPATIENT)
Dept: GENERAL RADIOLOGY | Age: 85
DRG: 853 | End: 2025-01-16
Payer: MEDICARE

## 2025-01-16 ENCOUNTER — ANESTHESIA (OUTPATIENT)
Dept: OPERATING ROOM | Age: 85
End: 2025-01-16
Payer: MEDICARE

## 2025-01-16 ENCOUNTER — ANESTHESIA EVENT (OUTPATIENT)
Dept: OPERATING ROOM | Age: 85
End: 2025-01-16
Payer: MEDICARE

## 2025-01-16 PROBLEM — S72.401A CLOSED FRACTURE OF RIGHT DISTAL FEMUR (HCC): Status: ACTIVE | Noted: 2025-01-16

## 2025-01-16 LAB
ABO + RH BLD: NORMAL
ANION GAP SERPL CALCULATED.3IONS-SCNC: 5 MMOL/L (ref 3–16)
BASOPHILS # BLD: 0 K/UL (ref 0–0.2)
BASOPHILS NFR BLD: 0.4 %
BLD GP AB SCN SERPL QL: NORMAL
BUN SERPL-MCNC: 35 MG/DL (ref 7–20)
CALCIUM SERPL-MCNC: 8.2 MG/DL (ref 8.3–10.6)
CHLORIDE SERPL-SCNC: 107 MMOL/L (ref 99–110)
CO2 SERPL-SCNC: 25 MMOL/L (ref 21–32)
CREAT SERPL-MCNC: 1.3 MG/DL (ref 0.6–1.2)
DEPRECATED RDW RBC AUTO: 16.5 % (ref 12.4–15.4)
DEPRECATED RDW RBC AUTO: 16.6 % (ref 12.4–15.4)
EOSINOPHIL # BLD: 0.4 K/UL (ref 0–0.6)
EOSINOPHIL NFR BLD: 5.9 %
GFR SERPLBLD CREATININE-BSD FMLA CKD-EPI: 40 ML/MIN/{1.73_M2}
GLUCOSE SERPL-MCNC: 138 MG/DL (ref 70–99)
HCT VFR BLD AUTO: 23.4 % (ref 36–48)
HCT VFR BLD AUTO: 24 % (ref 36–48)
HCT VFR BLD AUTO: 28.4 % (ref 36–48)
HCT VFR BLD AUTO: 28.9 % (ref 36–48)
HGB BLD-MCNC: 7.8 G/DL (ref 12–16)
HGB BLD-MCNC: 7.8 G/DL (ref 12–16)
HGB BLD-MCNC: 9.4 G/DL (ref 12–16)
HGB BLD-MCNC: 9.7 G/DL (ref 12–16)
LACTATE BLDV-SCNC: 1 MMOL/L (ref 0.4–2)
LYMPHOCYTES # BLD: 1.5 K/UL (ref 1–5.1)
LYMPHOCYTES NFR BLD: 22.3 %
MCH RBC QN AUTO: 28.9 PG (ref 26–34)
MCH RBC QN AUTO: 29.3 PG (ref 26–34)
MCHC RBC AUTO-ENTMCNC: 32.7 G/DL (ref 31–36)
MCHC RBC AUTO-ENTMCNC: 33 G/DL (ref 31–36)
MCV RBC AUTO: 88.2 FL (ref 80–100)
MCV RBC AUTO: 88.8 FL (ref 80–100)
MONOCYTES # BLD: 0.5 K/UL (ref 0–1.3)
MONOCYTES NFR BLD: 7.5 %
NEUTROPHILS # BLD: 4.3 K/UL (ref 1.7–7.7)
NEUTROPHILS NFR BLD: 63.9 %
PLATELET # BLD AUTO: 102 K/UL (ref 135–450)
PLATELET # BLD AUTO: 119 K/UL (ref 135–450)
PMV BLD AUTO: 8.3 FL (ref 5–10.5)
PMV BLD AUTO: 8.4 FL (ref 5–10.5)
POTASSIUM SERPL-SCNC: 4.2 MMOL/L (ref 3.5–5.1)
RBC # BLD AUTO: 2.72 M/UL (ref 4–5.2)
RBC # BLD AUTO: 3.2 M/UL (ref 4–5.2)
SODIUM SERPL-SCNC: 137 MMOL/L (ref 136–145)
WBC # BLD AUTO: 5.5 K/UL (ref 4–11)
WBC # BLD AUTO: 6.7 K/UL (ref 4–11)

## 2025-01-16 PROCEDURE — 86900 BLOOD TYPING SEROLOGIC ABO: CPT

## 2025-01-16 PROCEDURE — 86923 COMPATIBILITY TEST ELECTRIC: CPT

## 2025-01-16 PROCEDURE — 2580000003 HC RX 258: Performed by: HOSPITALIST

## 2025-01-16 PROCEDURE — 6360000002 HC RX W HCPCS: Performed by: ANESTHESIOLOGY

## 2025-01-16 PROCEDURE — 83605 ASSAY OF LACTIC ACID: CPT

## 2025-01-16 PROCEDURE — 6360000002 HC RX W HCPCS: Performed by: REGISTERED NURSE

## 2025-01-16 PROCEDURE — 0QSB04Z REPOSITION RIGHT LOWER FEMUR WITH INTERNAL FIXATION DEVICE, OPEN APPROACH: ICD-10-PCS | Performed by: ORTHOPAEDIC SURGERY

## 2025-01-16 PROCEDURE — 3600000014 HC SURGERY LEVEL 4 ADDTL 15MIN: Performed by: ORTHOPAEDIC SURGERY

## 2025-01-16 PROCEDURE — 6360000002 HC RX W HCPCS: Performed by: INTERNAL MEDICINE

## 2025-01-16 PROCEDURE — 2000000000 HC ICU R&B

## 2025-01-16 PROCEDURE — 2580000003 HC RX 258: Performed by: NURSE PRACTITIONER

## 2025-01-16 PROCEDURE — 85027 COMPLETE CBC AUTOMATED: CPT

## 2025-01-16 PROCEDURE — 80048 BASIC METABOLIC PNL TOTAL CA: CPT

## 2025-01-16 PROCEDURE — 6360000002 HC RX W HCPCS: Performed by: HOSPITALIST

## 2025-01-16 PROCEDURE — 6360000002 HC RX W HCPCS: Performed by: NURSE PRACTITIONER

## 2025-01-16 PROCEDURE — 7100000000 HC PACU RECOVERY - FIRST 15 MIN: Performed by: ORTHOPAEDIC SURGERY

## 2025-01-16 PROCEDURE — P9016 RBC LEUKOCYTES REDUCED: HCPCS

## 2025-01-16 PROCEDURE — 7100000001 HC PACU RECOVERY - ADDTL 15 MIN: Performed by: ORTHOPAEDIC SURGERY

## 2025-01-16 PROCEDURE — 2500000003 HC RX 250 WO HCPCS: Performed by: INTERNAL MEDICINE

## 2025-01-16 PROCEDURE — 0QS804Z REPOSITION RIGHT FEMORAL SHAFT WITH INTERNAL FIXATION DEVICE, OPEN APPROACH: ICD-10-PCS | Performed by: ORTHOPAEDIC SURGERY

## 2025-01-16 PROCEDURE — 85014 HEMATOCRIT: CPT

## 2025-01-16 PROCEDURE — C1713 ANCHOR/SCREW BN/BN,TIS/BN: HCPCS | Performed by: ORTHOPAEDIC SURGERY

## 2025-01-16 PROCEDURE — 27507 TREATMENT OF THIGH FRACTURE: CPT | Performed by: ORTHOPAEDIC SURGERY

## 2025-01-16 PROCEDURE — 94640 AIRWAY INHALATION TREATMENT: CPT

## 2025-01-16 PROCEDURE — 3700000000 HC ANESTHESIA ATTENDED CARE: Performed by: ORTHOPAEDIC SURGERY

## 2025-01-16 PROCEDURE — 3700000001 HC ADD 15 MINUTES (ANESTHESIA): Performed by: ORTHOPAEDIC SURGERY

## 2025-01-16 PROCEDURE — 86901 BLOOD TYPING SEROLOGIC RH(D): CPT

## 2025-01-16 PROCEDURE — 2500000003 HC RX 250 WO HCPCS: Performed by: ORTHOPAEDIC SURGERY

## 2025-01-16 PROCEDURE — 2500000003 HC RX 250 WO HCPCS: Performed by: REGISTERED NURSE

## 2025-01-16 PROCEDURE — 3600000004 HC SURGERY LEVEL 4 BASE: Performed by: ORTHOPAEDIC SURGERY

## 2025-01-16 PROCEDURE — 99291 CRITICAL CARE FIRST HOUR: CPT | Performed by: INTERNAL MEDICINE

## 2025-01-16 PROCEDURE — 94761 N-INVAS EAR/PLS OXIMETRY MLT: CPT

## 2025-01-16 PROCEDURE — 85025 COMPLETE CBC W/AUTO DIFF WBC: CPT

## 2025-01-16 PROCEDURE — 2709999900 HC NON-CHARGEABLE SUPPLY: Performed by: ORTHOPAEDIC SURGERY

## 2025-01-16 PROCEDURE — 2580000003 HC RX 258: Performed by: INTERNAL MEDICINE

## 2025-01-16 PROCEDURE — 2720000010 HC SURG SUPPLY STERILE: Performed by: ORTHOPAEDIC SURGERY

## 2025-01-16 PROCEDURE — 2500000003 HC RX 250 WO HCPCS: Performed by: NURSE ANESTHETIST, CERTIFIED REGISTERED

## 2025-01-16 PROCEDURE — 86850 RBC ANTIBODY SCREEN: CPT

## 2025-01-16 PROCEDURE — C1769 GUIDE WIRE: HCPCS | Performed by: ORTHOPAEDIC SURGERY

## 2025-01-16 PROCEDURE — 6360000002 HC RX W HCPCS: Performed by: ORTHOPAEDIC SURGERY

## 2025-01-16 PROCEDURE — 6360000002 HC RX W HCPCS: Performed by: NURSE ANESTHETIST, CERTIFIED REGISTERED

## 2025-01-16 PROCEDURE — 99233 SBSQ HOSP IP/OBS HIGH 50: CPT | Performed by: INTERNAL MEDICINE

## 2025-01-16 PROCEDURE — 73552 X-RAY EXAM OF FEMUR 2/>: CPT

## 2025-01-16 PROCEDURE — 2700000000 HC OXYGEN THERAPY PER DAY

## 2025-01-16 PROCEDURE — 6370000000 HC RX 637 (ALT 250 FOR IP): Performed by: NURSE ANESTHETIST, CERTIFIED REGISTERED

## 2025-01-16 PROCEDURE — 85018 HEMOGLOBIN: CPT

## 2025-01-16 PROCEDURE — P9045 ALBUMIN (HUMAN), 5%, 250 ML: HCPCS | Performed by: NURSE ANESTHETIST, CERTIFIED REGISTERED

## 2025-01-16 PROCEDURE — 37799 UNLISTED PX VASCULAR SURGERY: CPT

## 2025-01-16 PROCEDURE — 2580000003 HC RX 258: Performed by: NURSE ANESTHETIST, CERTIFIED REGISTERED

## 2025-01-16 PROCEDURE — 27511 TREATMENT OF THIGH FRACTURE: CPT | Performed by: ORTHOPAEDIC SURGERY

## 2025-01-16 PROCEDURE — 94660 CPAP INITIATION&MGMT: CPT

## 2025-01-16 DEVICE — CORTEX SCREW
Type: IMPLANTABLE DEVICE | Site: FEMUR | Status: FUNCTIONAL
Brand: AXSOS

## 2025-01-16 DEVICE — LOCKING SCREW
Type: IMPLANTABLE DEVICE | Site: FEMUR | Status: FUNCTIONAL
Brand: AXSOS

## 2025-01-16 DEVICE — PERIPROSTHETIC LOCKING SCREW
Type: IMPLANTABLE DEVICE | Site: FEMUR | Status: FUNCTIONAL
Brand: AXSOS

## 2025-01-16 DEVICE — CABLE SURG L635MM DIA1.8MM CO CHROM CERCLAGE CBL RDY: Type: IMPLANTABLE DEVICE | Site: FEMUR | Status: FUNCTIONAL

## 2025-01-16 DEVICE — DISTAL LATERAL FEMUR PLATE
Type: IMPLANTABLE DEVICE | Site: FEMUR | Status: FUNCTIONAL
Brand: AXSOS

## 2025-01-16 DEVICE — CANCELLOUS SCREW
Type: IMPLANTABLE DEVICE | Site: FEMUR | Status: FUNCTIONAL
Brand: AXSOS

## 2025-01-16 RX ORDER — OXYCODONE HYDROCHLORIDE 5 MG/1
5 TABLET ORAL
Status: DISCONTINUED | OUTPATIENT
Start: 2025-01-16 | End: 2025-01-16 | Stop reason: HOSPADM

## 2025-01-16 RX ORDER — ONDANSETRON 2 MG/ML
4 INJECTION INTRAMUSCULAR; INTRAVENOUS
Status: DISCONTINUED | OUTPATIENT
Start: 2025-01-16 | End: 2025-01-16 | Stop reason: HOSPADM

## 2025-01-16 RX ORDER — SODIUM CHLORIDE 0.9 % (FLUSH) 0.9 %
5-40 SYRINGE (ML) INJECTION PRN
Status: DISCONTINUED | OUTPATIENT
Start: 2025-01-16 | End: 2025-01-16 | Stop reason: HOSPADM

## 2025-01-16 RX ORDER — NALOXONE HYDROCHLORIDE 0.4 MG/ML
INJECTION, SOLUTION INTRAMUSCULAR; INTRAVENOUS; SUBCUTANEOUS PRN
Status: DISCONTINUED | OUTPATIENT
Start: 2025-01-16 | End: 2025-01-16 | Stop reason: HOSPADM

## 2025-01-16 RX ORDER — FENTANYL CITRATE 50 UG/ML
50 INJECTION, SOLUTION INTRAMUSCULAR; INTRAVENOUS EVERY 5 MIN PRN
Status: DISCONTINUED | OUTPATIENT
Start: 2025-01-16 | End: 2025-01-16 | Stop reason: HOSPADM

## 2025-01-16 RX ORDER — BUPIVACAINE HYDROCHLORIDE 5 MG/ML
INJECTION, SOLUTION EPIDURAL; INTRACAUDAL
Status: COMPLETED | OUTPATIENT
Start: 2025-01-16 | End: 2025-01-16

## 2025-01-16 RX ORDER — CEFAZOLIN SODIUM 1 G/3ML
INJECTION, POWDER, FOR SOLUTION INTRAMUSCULAR; INTRAVENOUS
Status: DISCONTINUED | OUTPATIENT
Start: 2025-01-16 | End: 2025-01-16 | Stop reason: SDUPTHER

## 2025-01-16 RX ORDER — ROCURONIUM BROMIDE 10 MG/ML
INJECTION, SOLUTION INTRAVENOUS
Status: DISCONTINUED | OUTPATIENT
Start: 2025-01-16 | End: 2025-01-16 | Stop reason: SDUPTHER

## 2025-01-16 RX ORDER — SODIUM CHLORIDE 0.9 % (FLUSH) 0.9 %
5-40 SYRINGE (ML) INJECTION EVERY 12 HOURS SCHEDULED
Status: DISCONTINUED | OUTPATIENT
Start: 2025-01-16 | End: 2025-01-16 | Stop reason: HOSPADM

## 2025-01-16 RX ORDER — OXYCODONE HYDROCHLORIDE 5 MG/1
5 TABLET ORAL EVERY 4 HOURS PRN
Status: DISCONTINUED | OUTPATIENT
Start: 2025-01-16 | End: 2025-01-26 | Stop reason: HOSPADM

## 2025-01-16 RX ORDER — ALBUMIN HUMAN 50 G/1000ML
SOLUTION INTRAVENOUS
Status: DISCONTINUED | OUTPATIENT
Start: 2025-01-16 | End: 2025-01-16 | Stop reason: SDUPTHER

## 2025-01-16 RX ORDER — EPHEDRINE SULFATE 50 MG/ML
INJECTION INTRAVENOUS
Status: DISCONTINUED | OUTPATIENT
Start: 2025-01-16 | End: 2025-01-16 | Stop reason: SDUPTHER

## 2025-01-16 RX ORDER — SODIUM CHLORIDE 9 MG/ML
INJECTION, SOLUTION INTRAVENOUS PRN
Status: DISCONTINUED | OUTPATIENT
Start: 2025-01-16 | End: 2025-01-16 | Stop reason: HOSPADM

## 2025-01-16 RX ORDER — ENOXAPARIN SODIUM 100 MG/ML
40 INJECTION SUBCUTANEOUS DAILY
Status: DISCONTINUED | OUTPATIENT
Start: 2025-01-16 | End: 2025-01-26 | Stop reason: HOSPADM

## 2025-01-16 RX ORDER — ONDANSETRON 2 MG/ML
INJECTION INTRAMUSCULAR; INTRAVENOUS
Status: DISCONTINUED | OUTPATIENT
Start: 2025-01-16 | End: 2025-01-16 | Stop reason: SDUPTHER

## 2025-01-16 RX ORDER — METHOCARBAMOL 100 MG/ML
500 INJECTION, SOLUTION INTRAMUSCULAR; INTRAVENOUS ONCE
Status: COMPLETED | OUTPATIENT
Start: 2025-01-16 | End: 2025-01-16

## 2025-01-16 RX ORDER — PROPOFOL 10 MG/ML
INJECTION, EMULSION INTRAVENOUS
Status: DISCONTINUED | OUTPATIENT
Start: 2025-01-16 | End: 2025-01-16 | Stop reason: SDUPTHER

## 2025-01-16 RX ORDER — PROCHLORPERAZINE EDISYLATE 5 MG/ML
5 INJECTION INTRAMUSCULAR; INTRAVENOUS
Status: DISCONTINUED | OUTPATIENT
Start: 2025-01-16 | End: 2025-01-16 | Stop reason: HOSPADM

## 2025-01-16 RX ORDER — ALBUTEROL SULFATE 90 UG/1
INHALANT RESPIRATORY (INHALATION)
Status: DISCONTINUED | OUTPATIENT
Start: 2025-01-16 | End: 2025-01-16 | Stop reason: SDUPTHER

## 2025-01-16 RX ORDER — ACETAMINOPHEN 325 MG/1
650 TABLET ORAL 3 TIMES DAILY
Status: DISCONTINUED | OUTPATIENT
Start: 2025-01-16 | End: 2025-01-26 | Stop reason: HOSPADM

## 2025-01-16 RX ORDER — HYDROMORPHONE HYDROCHLORIDE 2 MG/ML
0.25 INJECTION, SOLUTION INTRAMUSCULAR; INTRAVENOUS; SUBCUTANEOUS EVERY 5 MIN PRN
Status: DISCONTINUED | OUTPATIENT
Start: 2025-01-16 | End: 2025-01-16 | Stop reason: HOSPADM

## 2025-01-16 RX ORDER — HYDROMORPHONE HYDROCHLORIDE 2 MG/ML
0.5 INJECTION, SOLUTION INTRAMUSCULAR; INTRAVENOUS; SUBCUTANEOUS EVERY 5 MIN PRN
Status: DISCONTINUED | OUTPATIENT
Start: 2025-01-16 | End: 2025-01-16 | Stop reason: HOSPADM

## 2025-01-16 RX ORDER — FENTANYL CITRATE 50 UG/ML
INJECTION, SOLUTION INTRAMUSCULAR; INTRAVENOUS
Status: DISCONTINUED | OUTPATIENT
Start: 2025-01-16 | End: 2025-01-16 | Stop reason: SDUPTHER

## 2025-01-16 RX ORDER — HYDRALAZINE HYDROCHLORIDE 20 MG/ML
10 INJECTION INTRAMUSCULAR; INTRAVENOUS
Status: DISCONTINUED | OUTPATIENT
Start: 2025-01-16 | End: 2025-01-16 | Stop reason: HOSPADM

## 2025-01-16 RX ORDER — DIPHENHYDRAMINE HYDROCHLORIDE 50 MG/ML
12.5 INJECTION INTRAMUSCULAR; INTRAVENOUS
Status: DISCONTINUED | OUTPATIENT
Start: 2025-01-16 | End: 2025-01-16 | Stop reason: HOSPADM

## 2025-01-16 RX ADMIN — ONDANSETRON 4 MG: 2 INJECTION INTRAMUSCULAR; INTRAVENOUS at 11:54

## 2025-01-16 RX ADMIN — PROPOFOL 100 MG: 10 INJECTION, EMULSION INTRAVENOUS at 11:47

## 2025-01-16 RX ADMIN — ALBUMIN (HUMAN) 12.5 G: 12.5 INJECTION, SOLUTION INTRAVENOUS at 12:39

## 2025-01-16 RX ADMIN — METHOCARBAMOL 500 MG: 100 INJECTION, SOLUTION INTRAMUSCULAR; INTRAVENOUS at 14:17

## 2025-01-16 RX ADMIN — CEFAZOLIN 2000 MG: 2 INJECTION, POWDER, FOR SOLUTION INTRAMUSCULAR; INTRAVENOUS at 12:00

## 2025-01-16 RX ADMIN — PROPOFOL 100 MG: 10 INJECTION, EMULSION INTRAVENOUS at 12:55

## 2025-01-16 RX ADMIN — PROPOFOL 100 MG: 10 INJECTION, EMULSION INTRAVENOUS at 12:53

## 2025-01-16 RX ADMIN — PANTOPRAZOLE SODIUM 40 MG: 40 INJECTION, POWDER, FOR SOLUTION INTRAVENOUS at 08:23

## 2025-01-16 RX ADMIN — PROPOFOL 100 MG: 10 INJECTION, EMULSION INTRAVENOUS at 11:55

## 2025-01-16 RX ADMIN — MEROPENEM 1000 MG: 1 INJECTION INTRAVENOUS at 03:47

## 2025-01-16 RX ADMIN — PHENYLEPHRINE HYDROCHLORIDE 30 MCG/MIN: 10 INJECTION INTRAVENOUS at 12:49

## 2025-01-16 RX ADMIN — SODIUM CHLORIDE, PRESERVATIVE FREE 10 ML: 5 INJECTION INTRAVENOUS at 08:20

## 2025-01-16 RX ADMIN — MEROPENEM 1000 MG: 1 INJECTION INTRAVENOUS at 15:30

## 2025-01-16 RX ADMIN — ALBUTEROL SULFATE 2.5 MG: 2.5 SOLUTION RESPIRATORY (INHALATION) at 08:05

## 2025-01-16 RX ADMIN — SODIUM CHLORIDE 125 MG: 9 INJECTION, SOLUTION INTRAVENOUS at 20:22

## 2025-01-16 RX ADMIN — FENTANYL CITRATE 50 MCG: 50 INJECTION, SOLUTION INTRAMUSCULAR; INTRAVENOUS at 11:47

## 2025-01-16 RX ADMIN — FENTANYL CITRATE 50 MCG: 50 INJECTION, SOLUTION INTRAMUSCULAR; INTRAVENOUS at 11:43

## 2025-01-16 RX ADMIN — PROPOFOL 125 MCG/KG/MIN: 10 INJECTION, EMULSION INTRAVENOUS at 11:43

## 2025-01-16 RX ADMIN — MORPHINE SULFATE 4 MG: 4 INJECTION, SOLUTION INTRAMUSCULAR; INTRAVENOUS at 15:23

## 2025-01-16 RX ADMIN — EPHEDRINE SULFATE 5 MG: 50 INJECTION, SOLUTION INTRAVENOUS at 12:07

## 2025-01-16 RX ADMIN — CEFAZOLIN 2 G: 1 INJECTION, POWDER, FOR SOLUTION INTRAVENOUS at 12:01

## 2025-01-16 RX ADMIN — SODIUM CHLORIDE: 9 INJECTION, SOLUTION INTRAVENOUS at 23:08

## 2025-01-16 RX ADMIN — ALBUTEROL SULFATE 4 PUFF: 90 AEROSOL, METERED RESPIRATORY (INHALATION) at 13:34

## 2025-01-16 RX ADMIN — HYDROMORPHONE HYDROCHLORIDE 0.5 MG: 2 INJECTION, SOLUTION INTRAMUSCULAR; INTRAVENOUS; SUBCUTANEOUS at 14:19

## 2025-01-16 RX ADMIN — SODIUM CHLORIDE: 9 INJECTION, SOLUTION INTRAVENOUS at 08:20

## 2025-01-16 RX ADMIN — SUGAMMADEX 200 MG: 100 INJECTION, SOLUTION INTRAVENOUS at 13:36

## 2025-01-16 RX ADMIN — ROCURONIUM BROMIDE 50 MG: 10 INJECTION, SOLUTION INTRAVENOUS at 11:43

## 2025-01-16 ASSESSMENT — PAIN DESCRIPTION - ORIENTATION
ORIENTATION: RIGHT

## 2025-01-16 ASSESSMENT — PAIN DESCRIPTION - DESCRIPTORS
DESCRIPTORS: ACHING
DESCRIPTORS: ACHING;BURNING

## 2025-01-16 ASSESSMENT — PAIN DESCRIPTION - LOCATION
LOCATION: HIP;LEG
LOCATION: LEG
LOCATION: LEG

## 2025-01-16 ASSESSMENT — PAIN - FUNCTIONAL ASSESSMENT
PAIN_FUNCTIONAL_ASSESSMENT: 0-10

## 2025-01-16 ASSESSMENT — PAIN SCALES - GENERAL
PAINLEVEL_OUTOF10: 10
PAINLEVEL_OUTOF10: 7
PAINLEVEL_OUTOF10: 7
PAINLEVEL_OUTOF10: 0

## 2025-01-16 ASSESSMENT — ENCOUNTER SYMPTOMS
SHORTNESS OF BREATH: 1
SHORTNESS OF BREATH: 1

## 2025-01-16 ASSESSMENT — PAIN SCALES - WONG BAKER: WONGBAKER_NUMERICALRESPONSE: NO HURT

## 2025-01-16 NOTE — ANESTHESIA PRE PROCEDURE
Department of Anesthesiology  Preprocedure Note       Name:  Antonette Brown   Age:  84 y.o.  :  1940                                          MRN:  [id number]         Date:  2025      Surgeon: Surgeon(s):  Madeline Masterson MD    Procedure: Procedure(s):  FEMUR OPEN REDUCTION INTERNAL FIXATION, CABLES    Medications prior to admission:   Prior to Admission medications    Medication Sig Start Date End Date Taking? Authorizing Provider   pregabalin (LYRICA) 150 MG capsule Take 1 capsule by mouth 2 times daily for 180 days. Max Daily Amount: 300 mg 1/3/25 7/2/25 Yes Erica Nava MD   furosemide (LASIX) 20 MG tablet Take 1 tablet by mouth Twice a Week   Yes Karli Shanks MD   potassium chloride (KLOR-CON M) 10 MEQ extended release tablet Take 1 tablet by mouth Twice a Week 25  Yes Erica Nava MD   atorvastatin (LIPITOR) 10 MG tablet Take 1 tablet by mouth four times a week 25  Yes Erica Nava MD   pantoprazole (PROTONIX) 40 MG tablet Take 1 tablet by mouth every morning (before breakfast) 25  Yes Erica Nava MD   vilazodone HCl (VIIBRYD) 20 MG TABS Take 1 tablet by mouth daily 25  Yes Erica Nava MD   losartan (COZAAR) 50 MG tablet Take 1 tablet by mouth in the morning and 1 tablet in the evening. 24  Yes Erica Nava MD   sucralfate (CARAFATE) 1 GM/10ML suspension Take 10 mLs by mouth 4 times daily 24  Yes Erica Nava MD   Calcium-Phosphorus-Vitamin D (CITRACAL +D3 PO) Take by mouth daily (with breakfast)   Yes Karli Shanks MD   Cholecalciferol (VITAMIN D3) 50 MCG ( UT) CAPS Take by mouth daily (with breakfast)   Yes Karli Shanks MD   b complex vitamins capsule Take 1 capsule by mouth daily   Yes Karli Shanks MD   hydroxychloroquine (PLAQUENIL) 200 MG tablet Take 1 tablet by mouth 2 times daily Indications: Arthritis 23  Yes Karli Shanks MD   buPROPion (WELLBUTRIN XL) 150 MG extended release tablet Take

## 2025-01-16 NOTE — ANESTHESIA POSTPROCEDURE EVALUATION
Department of Anesthesiology  Postprocedure Note    Patient: Antonette Brown  MRN: [id number]  YOB: 1940  Date of evaluation: 1/16/2025    Procedure Summary       Date: 01/16/25 Room / Location: [address]    Anesthesia Start: 1134 Anesthesia Stop: 1409    Procedure: OPEN REDUCTION INTERNAL FIXATION RIGHT FEMUR WITH CABLES AND C-ARM (Right: Leg Upper) Diagnosis:       Closed fracture of distal end of right femur, unspecified fracture morphology, initial encounter ([facility])      (Closed fracture of distal end of right femur, unspecified fracture morphology, initial encounter ([facility]) [S72.401A])    Surgeons: Madeline Masterson MD Responsible Provider: Walt Osuna DO    Anesthesia Type: general ASA Status: 4 - Emergent            Anesthesia Type: No value filed.    Logan Phase I: Logan Score: 9    Logan Phase II:      Anesthesia Post Evaluation    Patient location during evaluation: PACU  Patient participation: complete - patient participated  Level of consciousness: awake and alert  Pain score: 0  Airway patency: patent  Nausea & Vomiting: no nausea and no vomiting  Cardiovascular status: blood pressure returned to baseline  Respiratory status: acceptable  Hydration status: stable  Multimodal analgesia pain management approach  Pain management: adequate    No notable events documented.

## 2025-01-16 NOTE — BRIEF OP NOTE
dependent loop in tubing;Drainage bag less than half full 01/16/25 0800   Status Draining;Patent 01/16/25 0800   Output (mL) 15 mL 01/16/25 0400       Findings:  Infection Present At Time Of Surgery (PATOS) (choose all levels that have infection present):  No infection present  Other Findings: Same.    Electronically signed by Madeline Masterson MD on 1/16/2025 at 1:45 PM

## 2025-01-17 PROBLEM — Z71.89 COMPLEX CARE COORDINATION: Status: ACTIVE | Noted: 2025-01-17

## 2025-01-17 PROBLEM — Z79.2 RECEIVING INTRAVENOUS ANTIBIOTIC TREATMENT AS OUTPATIENT: Status: ACTIVE | Noted: 2025-01-17

## 2025-01-17 LAB
ANION GAP SERPL CALCULATED.3IONS-SCNC: 7 MMOL/L (ref 3–16)
BACTERIA BLD CULT ORG #2: NORMAL
BACTERIA BLD CULT: NORMAL
BLOOD BANK DISPENSE STATUS: NORMAL
BLOOD BANK PRODUCT CODE: NORMAL
BPU ID: NORMAL
BUN SERPL-MCNC: 29 MG/DL (ref 7–20)
CALCIUM SERPL-MCNC: 8.3 MG/DL (ref 8.3–10.6)
CHLORIDE SERPL-SCNC: 111 MMOL/L (ref 99–110)
CO2 SERPL-SCNC: 22 MMOL/L (ref 21–32)
CREAT SERPL-MCNC: 0.9 MG/DL (ref 0.6–1.2)
DESCRIPTION BLOOD BANK: NORMAL
GFR SERPLBLD CREATININE-BSD FMLA CKD-EPI: 62 ML/MIN/{1.73_M2}
GLUCOSE SERPL-MCNC: 98 MG/DL (ref 70–99)
HCT VFR BLD AUTO: 21.9 % (ref 36–48)
HCT VFR BLD AUTO: 24.2 % (ref 36–48)
HGB BLD-MCNC: 7.3 G/DL (ref 12–16)
HGB BLD-MCNC: 8 G/DL (ref 12–16)
POTASSIUM SERPL-SCNC: 4.1 MMOL/L (ref 3.5–5.1)
SODIUM SERPL-SCNC: 140 MMOL/L (ref 136–145)

## 2025-01-17 PROCEDURE — 85018 HEMOGLOBIN: CPT

## 2025-01-17 PROCEDURE — 80048 BASIC METABOLIC PNL TOTAL CA: CPT

## 2025-01-17 PROCEDURE — 2580000003 HC RX 258: Performed by: NURSE PRACTITIONER

## 2025-01-17 PROCEDURE — 2500000003 HC RX 250 WO HCPCS: Performed by: NURSE PRACTITIONER

## 2025-01-17 PROCEDURE — 99233 SBSQ HOSP IP/OBS HIGH 50: CPT | Performed by: INTERNAL MEDICINE

## 2025-01-17 PROCEDURE — 6360000002 HC RX W HCPCS: Performed by: INTERNAL MEDICINE

## 2025-01-17 PROCEDURE — 6360000002 HC RX W HCPCS: Performed by: NURSE PRACTITIONER

## 2025-01-17 PROCEDURE — 36430 TRANSFUSION BLD/BLD COMPNT: CPT

## 2025-01-17 PROCEDURE — 92526 ORAL FUNCTION THERAPY: CPT

## 2025-01-17 PROCEDURE — 97530 THERAPEUTIC ACTIVITIES: CPT

## 2025-01-17 PROCEDURE — 97162 PT EVAL MOD COMPLEX 30 MIN: CPT

## 2025-01-17 PROCEDURE — 2000000000 HC ICU R&B

## 2025-01-17 PROCEDURE — 6370000000 HC RX 637 (ALT 250 FOR IP): Performed by: NURSE PRACTITIONER

## 2025-01-17 PROCEDURE — 85014 HEMATOCRIT: CPT

## 2025-01-17 PROCEDURE — 92523 SPEECH SOUND LANG COMPREHEN: CPT

## 2025-01-17 PROCEDURE — 97166 OT EVAL MOD COMPLEX 45 MIN: CPT

## 2025-01-17 PROCEDURE — 37799 UNLISTED PX VASCULAR SURGERY: CPT

## 2025-01-17 RX ORDER — SODIUM CHLORIDE 9 MG/ML
INJECTION, SOLUTION INTRAVENOUS PRN
Status: DISCONTINUED | OUTPATIENT
Start: 2025-01-17 | End: 2025-01-18

## 2025-01-17 RX ADMIN — SODIUM CHLORIDE, PRESERVATIVE FREE 10 ML: 5 INJECTION INTRAVENOUS at 09:05

## 2025-01-17 RX ADMIN — ACETAMINOPHEN 650 MG: 325 TABLET ORAL at 15:24

## 2025-01-17 RX ADMIN — Medication 5 MCG/MIN: at 03:23

## 2025-01-17 RX ADMIN — MEROPENEM 1000 MG: 1 INJECTION INTRAVENOUS at 15:37

## 2025-01-17 RX ADMIN — BUPROPION HYDROCHLORIDE 150 MG: 150 TABLET, EXTENDED RELEASE ORAL at 15:24

## 2025-01-17 RX ADMIN — ENOXAPARIN SODIUM 40 MG: 100 INJECTION SUBCUTANEOUS at 11:08

## 2025-01-17 RX ADMIN — MORPHINE SULFATE 4 MG: 4 INJECTION, SOLUTION INTRAMUSCULAR; INTRAVENOUS at 04:11

## 2025-01-17 RX ADMIN — PANTOPRAZOLE SODIUM 40 MG: 40 INJECTION, POWDER, FOR SOLUTION INTRAVENOUS at 09:05

## 2025-01-17 RX ADMIN — SUCRALFATE 1 G: 1 TABLET ORAL at 15:24

## 2025-01-17 RX ADMIN — MEROPENEM 1000 MG: 1 INJECTION INTRAVENOUS at 03:08

## 2025-01-17 ASSESSMENT — PAIN SCALES - GENERAL
PAINLEVEL_OUTOF10: 9
PAINLEVEL_OUTOF10: 0

## 2025-01-17 ASSESSMENT — PAIN DESCRIPTION - LOCATION: LOCATION: LEG

## 2025-01-17 ASSESSMENT — PAIN DESCRIPTION - ORIENTATION: ORIENTATION: RIGHT

## 2025-01-17 NOTE — OP NOTE
[facility]                 [address] [place] [address]                            OPERATIVE REPORT      PATIENT NAME: SHARAD CHAPIN              : 1940  MED REC NO: [id number]                      ROOM: [facility]  ACCOUNT NO: [id number]                       ADMIT DATE: 2025  PROVIDER: Madeline Masterson MD      DATE OF PROCEDURE:  2025    SURGEON:  Madeline Masterson MD    ASSISTANTS:  [facility] surgical assistants.    PREOPERATIVE DIAGNOSES:    1. Right distal femur supracondylar displaced fracture.  2. Right femoral shaft periprosthetic fracture just distal to a short intramedullary nail.    POSTOPERATIVE DIAGNOSES:    1. Right distal femur supracondylar displaced fracture.  2. Right femoral shaft periprosthetic fracture just distal to a short intramedullary nail.    PROCEDURES DONE:    1. Treatment of right distal femur supracondylar fracture with open reduction and internal fixation.  2. Open treatment of right femoral shaft periprosthetic fracture with open reduction and internal fixation using plate and screws as well as cables.    ANESTHESIA:  General anesthesia.    ESTIMATED BLOOD LOSS:  Less than 100 mL.    COMPLICATIONS:  None.    IMPLANTS USED:    1. [place] titanium 12-hole supracondylar femur plate.  2. David Ready cables x2.    INDICATION:  This is an 84-year-old white female, who is still recovering from a proximal femur subtrochanteric fracture that was done by Dr. Cody in 2024.  She sustained a fall with a twisting injury and a new injury to her right knee.  She was brought by EMS to [facility], where she was found to have a periprosthetic fracture in the midshaft that extends down to the supracondylar femur area.  All risks, benefits, and alternatives were discussed with the patient and her family, and they agreed to proceed with surgical treatment and at this point, she is stable for surgery.    Given the

## 2025-01-17 NOTE — CONSULTS
Gastroenterology Consult Note        Patient: Antonette Brown  : 1940  Acct#:      Date:  2025      1. Closed displaced comminuted fracture of shaft of right femur, initial encounter ([facility])    2. Urinary tract infection without hematuria, site unspecified    3. Fall, initial encounter    4. Hypotension, unspecified hypotension type        Subjective:       History of Present Illness  Patient is a 84 y.o.  female admitted with Fall against object [W18.00XA]  Closed displaced comminuted fracture of shaft of right femur, initial encounter ([facility]) [S72.351A]  Fall, initial encounter [W19.XXXA]  Urinary tract infection without hematuria, site unspecified [N39.0] who is seen in consult for anemia and reports of melena at home.  History of hypertension, GERD.  S/p left hip hemiarthroplasty 2022 for left hip fracture.  Right intertrochanteric femur fracture 10/2024 s/p right femur cephalomedullary nail.    She had a mechanical fall at home and suffered a right femur fracture.  Plan was for ORIF right femur tomorrow.  She is being treated for complicated ESBL Klebsiella UTI.  She had rapid response today for hypoxia and required BiPAP.  She is being treated for pneumonia.  Later she was down to 3 L O2 but was hypotensive so sent to the ER.  Hemoglobin noted to have dropped from 8.8 at admission down to 5.8.  Patient's  had reported dark liquid stool for a month or 2.  A rectal exam showed brown stool.  CT showed increasing hematoma of the right thigh.  She reports some lower abdominal pain.  History of recurrent UTIs.  No nausea or vomiting.  No hematochezia.      Past Medical History:   Diagnosis Date    AR (allergic rhinitis)     Arthritis of knee     Pruis    Colitis     Depression     Erosive gastritis 10/28/2019    EGD 2019, he did with PPI    GERD (gastroesophageal reflux disease)     Hyperlipidemia     Hypertension     Internal hemorrhoids     MI (myocardial 
    Clinical Pharmacy Note: Pharmacy to Dose Vancomycin    Antonette Brown is a 84 y.o. female started on Vancomycin for PNA; consult received from Dr. Glez to manage therapy. Also receiving the following antibiotics: meropenem.    Goal AUC: 400-600 mg/L*hr  Goal Trough Level: 15 mcg/mL    Assessment/Plan:  Initiate vancomycin 750 mg IV every 24 hours. Bayesian modeling predicts an AUC of 454 mg/L*hr and a trough of 14.3 mcg/mL at steady state concentration.  A vancomycin random level has been ordered for 1/14 at 0600  Changes in regimen will be determined based on culture results, renal function, and clinical response.  Pharmacy will continue to monitor and adjust regimen as necessary.    Allergies:  Abilify [aripiprazole] and Ceftin [cefuroxime]     Recent Labs     01/12/25  0715 01/13/25  0527   CREATININE 1.2 1.2       Recent Labs     01/12/25  0715 01/13/25  0528   WBC 5.0 13.4*       Ht Readings from Last 1 Encounters:   01/12/25 1.524 m (5')        Wt Readings from Last 1 Encounters:   01/13/25 83.5 kg (184 lb 1.6 oz)         Estimated Creatinine Clearance: 32 mL/min (based on SCr of 1.2 mg/dL).      Thank you for the consult,    Adrienne Romano, PharmD, [facility]  [id number]  1/13/2025 11:01 AM       
    Infectious Diseases   Consult Note        Admission Date: 1/12/2025  Hospital Day: Hospital Day: 2   Attending: Benito Stafford MD  Date of service: 1/13/25     Reason for admission: Fall against object [W18.00XA]  Closed displaced comminuted fracture of shaft of right femur, initial encounter ([facility]) [S72.351A]  Fall, initial encounter [W19.XXXA]  Urinary tract infection without hematuria, site unspecified [N39.0]    Chief complaint/ Reason for consult: Complicated ESBL Klebsiella urinary tract infection    Microbiology:        I have reviewed allavailable micro lab data and cultures    Results       Procedure Component Value Units Date/Time    Culture, Urine [[id number]] Collected: 01/12/25 0658    Order Status: No result Updated: 01/12/25 1815    Culture, Urine [[id number]]  (Abnormal)  (Susceptibility) Collected: 01/10/25 1248    Order Status: Completed Specimen: Urine, clean catch Updated: 01/13/25 0626     Urine Culture, Routine <10,000 CFU/ml mixed skin/urogenital maicol. No further workup     Organism Klebsiella pneumoniae ESBL     Urine Culture, Routine --     >100,000 CFU/ml  CONTACT PRECAUTIONS INDICATED  Carbapenem antibiotics are the best option for infections caused  by ESBL producing organisms.  Other antibiotic classes are  likely to result in treatment failure, even for organisms  demonstrating in vitro susceptibility.      Narrative:      ORDER#: [id number]                          ORDERED BY: FRED HUNG  SOURCE: Urine Clean Catch                  COLLECTED:  01/10/25 12:48  ANTIBIOTICS AT DAVID.:                      RECEIVED :  01/10/25 22:29  CALL  doctor   tel. [phone],    Susceptibility        Klebsiella pneumoniae ESBL      BACTERIAL SUSCEPTIBILITY PANEL BY BLANCA      ampicillin >=32 mcg/mL Resistant      ampicillin-sulbactam >=32 mcg/mL Resistant      ceFAZolin >=64 mcg/mL Resistant      cefepime >=32 mcg/mL Resistant      cefTRIAXone >=64 mcg/mL Resistant      ciprofloxacin >=4 
    [facility] Orthopedic Surgery  Consult Note         This patient is seen in consultation at the request of Usama Dick MD     Reason for Consult:  Right femur shaft and supracondylar fracture distal to IMN.    CHIEF COMPLAINT:  Right knee pain/ fall.    History Obtained From:  patient, spouse, family member - son and daughter, electronic medical record    HISTORY OF PRESENT ILLNESS:    Ms. Brown 84 y.o.  female seen today for consultation and evaluation of a right hip and knee pain which occurred when she fell at home.   She is complaining of right knee pain.  This is better with rest and worse with bearing any wt.  The pain is sharp and not radiating. No numbness or tingling sensation.  No other complaint. She was seen 1st at , came via EMS, where she was x-rayed and I was consulted. She had TFN right femur by Dr Cody on 10/20/2024. She currently has active UTI.    Past Medical History:        Diagnosis Date    AR (allergic rhinitis)     Arthritis of knee     Pruis    Colitis     Depression     Erosive gastritis 10/28/2019    EGD October 2019, he did with PPI    GERD (gastroesophageal reflux disease)     Hyperlipidemia     Hypertension     Internal hemorrhoids     MI (myocardial infarction) (HCC)     Overweight(278.02)     Viral meningitis 05/2012       Past Surgical History:        Procedure Laterality Date    BREAST BIOPSY      CHOLECYSTECTOMY, LAPAROSCOPIC  2003    COLONOSCOPY  8/06    normal; Ortega    COLONOSCOPY  12/3/13    Ortega- polyps    COLONOSCOPY N/A 2/21/2024    COLONOSCOPY WITH BIOPSY performed by Marcelo Jean MD at [facility] ASC ENDOSCOPY    HIP SURGERY Left 5/16/2022    LEFT HIP HEMIARTHROPLASTY performed by Parker Palacio MD at [facility] OR    HIP SURGERY Right 10/20/2024    HIP OPEN REDUCTION INTERNAL FIXATION performed by Isma Cody MD at [facility] OR    HYSTERECTOMY (CERVIX STATUS UNKNOWN)      AKASH AND BSO (CERVIX REMOVED)  2003    UPPER GASTROINTESTINAL ENDOSCOPY  12/3/13    
  Pulmonary and Critical Care Medicine    CC: fall  Date: 2025  Admit Date:  2025  Reason for Consultation: hypoxia  Consult Requesting Physician: Benito Stafford MD     HPI     This is a 85 y/o F w/ a hx of RA, CHF, anemia, who presented to [facility] after a fall. She was found to have right femur fx. She was admitted with mechanical fall, right hip fx, UTI. Her hospital course was complicated by acute hypoxic respiratory failure, overnight she had RR called for acute respiratory failure, she was started on bipap, give lasix and started on abx for possible PNA. Today pulmonary medicine was consulted on my exam patient was in room 3318 she was on bipap  40% fi02, saturation 97%, her BP was 80s/60, HR 110s, Temp 102.     ROS: negative except stated above    PMH:  has a past medical history of AR (allergic rhinitis), Arthritis of knee, Colitis, Depression, Erosive gastritis, GERD (gastroesophageal reflux disease), Hyperlipidemia, Hypertension, Internal hemorrhoids, MI (myocardial infarction) ([facility]), Overweight(278.02), and Viral meningitis.   PSH:  has a past surgical history that includes Cholecystectomy, laparoscopic (); Total abdominal hysterectomy w/ bilateral salpingoophorectomy (); Colonoscopy (); Colonoscopy (12/3/13); Upper gastrointestinal endoscopy (12/3/13); Breast biopsy; Hysterectomy; hip surgery (Left, 2022); Colonoscopy (N/A, 2024); and hip surgery (Right, 10/20/2024).    SH:  reports that she has never smoked. She has never used smokeless tobacco. She reports current alcohol use. She reports that she does not use drugs.   FH: family history includes Bipolar Disorder in her brother; Breast Cancer (age of onset: 61) in her mother.    Allergies: Abilify [aripiprazole] and Ceftin [cefuroxime]     OBJECTIVE DATA       PHYSICAL EXAM:   Temp  Av.1 °F (37.8 °C)  Min: 98.5 °F (36.9 °C)  Max: 102.8 °F (39.3 °C)  Pulse  Av.2  Min: 88  Max: 125  BP  Min: 96/62  Max: 
Antonette Brown  1/17/2025  [id number]    Chief Complaint: Fall against object    Subjective   HPI: Antonette Brown is a 84 y.o. female with PMHx notable for HTN, HLD, prior right hip fracture s/p ORIF who presented on 1/12 with mechanical fall.  Trauma survey notable for fracture involving the right distal femoral diaphysis distal to IM nail.  She was found to have complicated ESBL Klebsiella UTI, ID was consulted and recommending IV meropenem.  She also developed acute hypoxic respiratory failure, multifactorial in the setting of influenza A infection, ARDS?, required NIV.  She also developed hypotension, felt to be multifactorial in the setting of sepsis due to UTI as well as hemorrhagic shock related to right thigh hematoma (required 2 units PRBCs).  She did ultimately undergo right distal femur ORIF on 1/16. Hospital course otherwise complicated by: HILTON.      Currently, patient is unable to participate much in ROS. She says she needs to use the restroom, requesting nursing assistance which I requested. She is very uncomfortable, but unable to state where she is having pain.     Past Medical History:   Diagnosis Date    AR (allergic rhinitis)     Arthritis of knee     Pruis    Colitis     Depression     Erosive gastritis 10/28/2019    EGD October 2019, he did with PPI    GERD (gastroesophageal reflux disease)     Hyperlipidemia     Hypertension     Internal hemorrhoids     MI (myocardial infarction) (HCC)     Overweight(278.02)     Viral meningitis 05/2012       Past Surgical History:   Procedure Laterality Date    BREAST BIOPSY      CHOLECYSTECTOMY, LAPAROSCOPIC  2003    COLONOSCOPY  8/06    normal; Ortega    COLONOSCOPY  12/3/13    Ortega- polyps    COLONOSCOPY N/A 2/21/2024    COLONOSCOPY WITH BIOPSY performed by Marcelo Jean MD at [facility] ASC ENDOSCOPY    HIP SURGERY Left 5/16/2022    LEFT HIP HEMIARTHROPLASTY performed by Parker Palacio MD at [facility] OR    HIP SURGERY Right 10/20/2024    HIP OPEN REDUCTION 
function not performed.    Image quality is poor. Contrast used: Lumason. Technically difficult study with poor endocardial visualization and procedure performed with the patient in a supine position.    CXR 1/13/2025  IMPRESSION:  1. Possible left basilar airspace opacity could be due to summation overlying  tissues but could also be seen with atelectasis, aspiration, or pneumonia.  2. Interstitial prominence potentially due to pulmonary vascular congestion,  chronic changes, and/or central vascular crowding.    CTA Chest, abdomen, pelvis, lumbar spine, thoracic spine, cervical spine, head 1/12/2025  No acute traumatic injury of the chest, abdomen or pelvis.     No acute traumatic injury of the abdominal aorta.  No evidence of active  extravasation of contrast in the abdomen or pelvis.     No evidence of acute traumatic injury of the thoracic or lumbar spine.  If  there is persistent clinical concern for a thoracic spine subtle nondisplaced  fracture, MRI may be helpful for further evaluation if clinically indicated.     Chronic compression fracture of T7.     4 mm right solid pulmonary nodule within the upper lobe.  No specific  follow-up required as per Fleischner Society guidelines.  This may represent  a benign intrapulmonary lymph node.     Additional chronic findings as described above.    No acute fracture or subluxation of the cervical spine.     Moderate multilevel disc and facet degenerative changes.    Right Knee/hip Xray 1/12/2025  IMPRESSION:  Acute traumatic displaced and angulated fracture of the distal right femoral  diaphysis near the tip of the long stem femoral enio.  Remote ORIF of the  right hip.     Large suprapatellar joint effusion of the right knee.     Moderate tricompartmental osteoarthritis of the right knee.     Left total hip arthroplasty without evidence of acute hardware complication.    [facility] 9/9/2022 ([facility])  Artery Findings/Result   LM Normal   LAD Normal   Cx Normal   RI N/A   RCA

## 2025-01-18 LAB
ANION GAP SERPL CALCULATED.3IONS-SCNC: 8 MMOL/L (ref 3–16)
BUN SERPL-MCNC: 24 MG/DL (ref 7–20)
CALCIUM SERPL-MCNC: 8.6 MG/DL (ref 8.3–10.6)
CHLORIDE SERPL-SCNC: 114 MMOL/L (ref 99–110)
CO2 SERPL-SCNC: 22 MMOL/L (ref 21–32)
CREAT SERPL-MCNC: 0.6 MG/DL (ref 0.6–1.2)
DEPRECATED RDW RBC AUTO: 16.1 % (ref 12.4–15.4)
GFR SERPLBLD CREATININE-BSD FMLA CKD-EPI: 87 ML/MIN/{1.73_M2}
GLUCOSE SERPL-MCNC: 74 MG/DL (ref 70–99)
HCT VFR BLD AUTO: 23.5 % (ref 36–48)
HGB BLD-MCNC: 7.8 G/DL (ref 12–16)
MCH RBC QN AUTO: 29 PG (ref 26–34)
MCHC RBC AUTO-ENTMCNC: 33.2 G/DL (ref 31–36)
MCV RBC AUTO: 87.3 FL (ref 80–100)
PLATELET # BLD AUTO: 91 K/UL (ref 135–450)
PMV BLD AUTO: 8.1 FL (ref 5–10.5)
POTASSIUM SERPL-SCNC: 3.7 MMOL/L (ref 3.5–5.1)
RBC # BLD AUTO: 2.69 M/UL (ref 4–5.2)
SODIUM SERPL-SCNC: 144 MMOL/L (ref 136–145)
WBC # BLD AUTO: 4.7 K/UL (ref 4–11)

## 2025-01-18 PROCEDURE — 6370000000 HC RX 637 (ALT 250 FOR IP): Performed by: NURSE PRACTITIONER

## 2025-01-18 PROCEDURE — 2500000003 HC RX 250 WO HCPCS: Performed by: NURSE PRACTITIONER

## 2025-01-18 PROCEDURE — 92526 ORAL FUNCTION THERAPY: CPT

## 2025-01-18 PROCEDURE — 6360000002 HC RX W HCPCS: Performed by: INTERNAL MEDICINE

## 2025-01-18 PROCEDURE — 80048 BASIC METABOLIC PNL TOTAL CA: CPT

## 2025-01-18 PROCEDURE — 6360000002 HC RX W HCPCS: Performed by: NURSE PRACTITIONER

## 2025-01-18 PROCEDURE — 97530 THERAPEUTIC ACTIVITIES: CPT

## 2025-01-18 PROCEDURE — 2700000000 HC OXYGEN THERAPY PER DAY

## 2025-01-18 PROCEDURE — 37799 UNLISTED PX VASCULAR SURGERY: CPT

## 2025-01-18 PROCEDURE — 1200000000 HC SEMI PRIVATE

## 2025-01-18 PROCEDURE — 97535 SELF CARE MNGMENT TRAINING: CPT

## 2025-01-18 PROCEDURE — 94761 N-INVAS EAR/PLS OXIMETRY MLT: CPT

## 2025-01-18 PROCEDURE — 2580000003 HC RX 258: Performed by: NURSE PRACTITIONER

## 2025-01-18 PROCEDURE — 6370000000 HC RX 637 (ALT 250 FOR IP): Performed by: HOSPITALIST

## 2025-01-18 PROCEDURE — 94660 CPAP INITIATION&MGMT: CPT

## 2025-01-18 PROCEDURE — 6360000002 HC RX W HCPCS: Performed by: HOSPITALIST

## 2025-01-18 PROCEDURE — 2580000003 HC RX 258: Performed by: INTERNAL MEDICINE

## 2025-01-18 PROCEDURE — 6370000000 HC RX 637 (ALT 250 FOR IP): Performed by: INTERNAL MEDICINE

## 2025-01-18 PROCEDURE — 2580000003 HC RX 258: Performed by: HOSPITALIST

## 2025-01-18 PROCEDURE — 85027 COMPLETE CBC AUTOMATED: CPT

## 2025-01-18 PROCEDURE — 97129 THER IVNTJ 1ST 15 MIN: CPT

## 2025-01-18 RX ORDER — SENNA AND DOCUSATE SODIUM 50; 8.6 MG/1; MG/1
1 TABLET, FILM COATED ORAL 2 TIMES DAILY
Status: DISCONTINUED | OUTPATIENT
Start: 2025-01-18 | End: 2025-01-26 | Stop reason: HOSPADM

## 2025-01-18 RX ORDER — SODIUM CHLORIDE 9 MG/ML
INJECTION, SOLUTION INTRAVENOUS PRN
Status: DISCONTINUED | OUTPATIENT
Start: 2025-01-18 | End: 2025-01-26 | Stop reason: HOSPADM

## 2025-01-18 RX ORDER — SODIUM CHLORIDE 0.9 % (FLUSH) 0.9 %
5-40 SYRINGE (ML) INJECTION EVERY 12 HOURS SCHEDULED
Status: DISCONTINUED | OUTPATIENT
Start: 2025-01-18 | End: 2025-01-26 | Stop reason: HOSPADM

## 2025-01-18 RX ORDER — LIDOCAINE HYDROCHLORIDE 10 MG/ML
50 INJECTION, SOLUTION EPIDURAL; INFILTRATION; INTRACAUDAL; PERINEURAL ONCE
Status: DISCONTINUED | OUTPATIENT
Start: 2025-01-18 | End: 2025-01-26 | Stop reason: HOSPADM

## 2025-01-18 RX ORDER — SODIUM CHLORIDE 0.9 % (FLUSH) 0.9 %
5-40 SYRINGE (ML) INJECTION PRN
Status: DISCONTINUED | OUTPATIENT
Start: 2025-01-18 | End: 2025-01-26 | Stop reason: HOSPADM

## 2025-01-18 RX ADMIN — OXYCODONE 5 MG: 5 TABLET ORAL at 20:59

## 2025-01-18 RX ADMIN — BUPROPION HYDROCHLORIDE 150 MG: 150 TABLET, EXTENDED RELEASE ORAL at 17:09

## 2025-01-18 RX ADMIN — PREGABALIN 200 MG: 100 CAPSULE ORAL at 20:58

## 2025-01-18 RX ADMIN — POLYETHYLENE GLYCOL 3350 17 G: 17 POWDER, FOR SOLUTION ORAL at 22:39

## 2025-01-18 RX ADMIN — OXYCODONE 5 MG: 5 TABLET ORAL at 10:03

## 2025-01-18 RX ADMIN — SODIUM CHLORIDE, PRESERVATIVE FREE 10 ML: 5 INJECTION INTRAVENOUS at 10:04

## 2025-01-18 RX ADMIN — MEROPENEM 1000 MG: 1 INJECTION INTRAVENOUS at 17:08

## 2025-01-18 RX ADMIN — ENOXAPARIN SODIUM 40 MG: 100 INJECTION SUBCUTANEOUS at 09:55

## 2025-01-18 RX ADMIN — ACETAMINOPHEN 650 MG: 325 TABLET ORAL at 17:08

## 2025-01-18 RX ADMIN — ASPIRIN 81 MG: 81 TABLET, COATED ORAL at 09:53

## 2025-01-18 RX ADMIN — ATORVASTATIN CALCIUM 10 MG: 10 TABLET, FILM COATED ORAL at 20:59

## 2025-01-18 RX ADMIN — LORAZEPAM 0.5 MG: 2 INJECTION INTRAMUSCULAR; INTRAVENOUS at 01:44

## 2025-01-18 RX ADMIN — HYDROXYCHLOROQUINE SULFATE 200 MG: 200 TABLET ORAL at 20:59

## 2025-01-18 RX ADMIN — SUCRALFATE 1 G: 1 TABLET ORAL at 17:09

## 2025-01-18 RX ADMIN — ACETAMINOPHEN 650 MG: 325 TABLET ORAL at 20:59

## 2025-01-18 RX ADMIN — Medication 2000 UNITS: at 09:53

## 2025-01-18 RX ADMIN — MORPHINE SULFATE 4 MG: 4 INJECTION, SOLUTION INTRAMUSCULAR; INTRAVENOUS at 00:51

## 2025-01-18 RX ADMIN — MEROPENEM 1000 MG: 1 INJECTION INTRAVENOUS at 03:37

## 2025-01-18 RX ADMIN — OSELTAMIVIR PHOSPHATE 30 MG: 30 CAPSULE ORAL at 22:38

## 2025-01-18 RX ADMIN — PANTOPRAZOLE SODIUM 40 MG: 40 INJECTION, POWDER, FOR SOLUTION INTRAVENOUS at 09:53

## 2025-01-18 RX ADMIN — MORPHINE SULFATE 4 MG: 4 INJECTION, SOLUTION INTRAMUSCULAR; INTRAVENOUS at 14:16

## 2025-01-18 RX ADMIN — FUROSEMIDE 5 MG/HR: 10 INJECTION, SOLUTION INTRAMUSCULAR; INTRAVENOUS at 14:17

## 2025-01-18 RX ADMIN — STANDARDIZED SENNA CONCENTRATE AND DOCUSATE SODIUM 1 TABLET: 8.6; 5 TABLET ORAL at 20:59

## 2025-01-18 RX ADMIN — HYDROXYCHLOROQUINE SULFATE 200 MG: 200 TABLET ORAL at 09:54

## 2025-01-18 RX ADMIN — OSELTAMIVIR PHOSPHATE 30 MG: 30 CAPSULE ORAL at 09:54

## 2025-01-18 RX ADMIN — VILAZODONE HYDROCHLORIDE 20 MG: 20 TABLET, FILM COATED ORAL at 10:03

## 2025-01-18 RX ADMIN — SUCRALFATE 1 G: 1 TABLET ORAL at 09:53

## 2025-01-18 RX ADMIN — SODIUM CHLORIDE: 9 INJECTION, SOLUTION INTRAVENOUS at 07:11

## 2025-01-18 RX ADMIN — MORPHINE SULFATE 4 MG: 4 INJECTION, SOLUTION INTRAMUSCULAR; INTRAVENOUS at 18:39

## 2025-01-18 RX ADMIN — ACETAMINOPHEN 650 MG: 325 TABLET ORAL at 09:54

## 2025-01-18 RX ADMIN — PREGABALIN 200 MG: 100 CAPSULE ORAL at 09:54

## 2025-01-18 RX ADMIN — STANDARDIZED SENNA CONCENTRATE AND DOCUSATE SODIUM 1 TABLET: 8.6; 5 TABLET ORAL at 13:36

## 2025-01-18 ASSESSMENT — PAIN DESCRIPTION - ORIENTATION
ORIENTATION: RIGHT
ORIENTATION: LOWER

## 2025-01-18 ASSESSMENT — PAIN DESCRIPTION - LOCATION
LOCATION: ABDOMEN
LOCATION: LEG;HIP
LOCATION: HIP
LOCATION: LEG;HIP
LOCATION: HIP
LOCATION: HIP

## 2025-01-18 ASSESSMENT — PAIN DESCRIPTION - DESCRIPTORS
DESCRIPTORS: ACHING

## 2025-01-18 ASSESSMENT — PAIN SCALES - GENERAL
PAINLEVEL_OUTOF10: 10
PAINLEVEL_OUTOF10: 7
PAINLEVEL_OUTOF10: 8
PAINLEVEL_OUTOF10: 5
PAINLEVEL_OUTOF10: 6
PAINLEVEL_OUTOF10: 4
PAINLEVEL_OUTOF10: 0
PAINLEVEL_OUTOF10: 7
PAINLEVEL_OUTOF10: 6
PAINLEVEL_OUTOF10: 0
PAINLEVEL_OUTOF10: 4
PAINLEVEL_OUTOF10: 8
PAINLEVEL_OUTOF10: 3
PAINLEVEL_OUTOF10: 10

## 2025-01-18 ASSESSMENT — PAIN SCALES - WONG BAKER: WONGBAKER_NUMERICALRESPONSE: NO HURT

## 2025-01-18 ASSESSMENT — PAIN DESCRIPTION - PAIN TYPE: TYPE: SURGICAL PAIN

## 2025-01-18 ASSESSMENT — PAIN DESCRIPTION - ONSET: ONSET: ON-GOING

## 2025-01-19 LAB
ANION GAP SERPL CALCULATED.3IONS-SCNC: 7 MMOL/L (ref 3–16)
BUN SERPL-MCNC: 21 MG/DL (ref 7–20)
CALCIUM SERPL-MCNC: 8.6 MG/DL (ref 8.3–10.6)
CHLORIDE SERPL-SCNC: 111 MMOL/L (ref 99–110)
CO2 SERPL-SCNC: 26 MMOL/L (ref 21–32)
CREAT SERPL-MCNC: 0.6 MG/DL (ref 0.6–1.2)
DEPRECATED RDW RBC AUTO: 15.8 % (ref 12.4–15.4)
GFR SERPLBLD CREATININE-BSD FMLA CKD-EPI: 87 ML/MIN/{1.73_M2}
GLUCOSE SERPL-MCNC: 105 MG/DL (ref 70–99)
HCT VFR BLD AUTO: 24.3 % (ref 36–48)
HGB BLD-MCNC: 8.2 G/DL (ref 12–16)
MCH RBC QN AUTO: 29.6 PG (ref 26–34)
MCHC RBC AUTO-ENTMCNC: 33.7 G/DL (ref 31–36)
MCV RBC AUTO: 87.6 FL (ref 80–100)
PLATELET # BLD AUTO: 118 K/UL (ref 135–450)
PMV BLD AUTO: 8.3 FL (ref 5–10.5)
POTASSIUM SERPL-SCNC: 3.3 MMOL/L (ref 3.5–5.1)
RBC # BLD AUTO: 2.78 M/UL (ref 4–5.2)
SODIUM SERPL-SCNC: 144 MMOL/L (ref 136–145)
WBC # BLD AUTO: 3.6 K/UL (ref 4–11)

## 2025-01-19 PROCEDURE — 2500000003 HC RX 250 WO HCPCS: Performed by: FAMILY MEDICINE

## 2025-01-19 PROCEDURE — 92526 ORAL FUNCTION THERAPY: CPT

## 2025-01-19 PROCEDURE — 36415 COLL VENOUS BLD VENIPUNCTURE: CPT

## 2025-01-19 PROCEDURE — 36569 INSJ PICC 5 YR+ W/O IMAGING: CPT

## 2025-01-19 PROCEDURE — 6370000000 HC RX 637 (ALT 250 FOR IP): Performed by: INTERNAL MEDICINE

## 2025-01-19 PROCEDURE — 6360000002 HC RX W HCPCS: Performed by: NURSE PRACTITIONER

## 2025-01-19 PROCEDURE — 6360000002 HC RX W HCPCS: Performed by: INTERNAL MEDICINE

## 2025-01-19 PROCEDURE — 6370000000 HC RX 637 (ALT 250 FOR IP): Performed by: HOSPITALIST

## 2025-01-19 PROCEDURE — 97530 THERAPEUTIC ACTIVITIES: CPT

## 2025-01-19 PROCEDURE — 05HY33Z INSERTION OF INFUSION DEVICE INTO UPPER VEIN, PERCUTANEOUS APPROACH: ICD-10-PCS | Performed by: INTERNAL MEDICINE

## 2025-01-19 PROCEDURE — 2500000003 HC RX 250 WO HCPCS: Performed by: NURSE PRACTITIONER

## 2025-01-19 PROCEDURE — 2580000003 HC RX 258: Performed by: HOSPITALIST

## 2025-01-19 PROCEDURE — 6370000000 HC RX 637 (ALT 250 FOR IP): Performed by: NURSE PRACTITIONER

## 2025-01-19 PROCEDURE — 85027 COMPLETE CBC AUTOMATED: CPT

## 2025-01-19 PROCEDURE — 1200000000 HC SEMI PRIVATE

## 2025-01-19 PROCEDURE — 80048 BASIC METABOLIC PNL TOTAL CA: CPT

## 2025-01-19 PROCEDURE — 6360000002 HC RX W HCPCS: Performed by: HOSPITALIST

## 2025-01-19 PROCEDURE — 2580000003 HC RX 258: Performed by: NURSE PRACTITIONER

## 2025-01-19 RX ORDER — POTASSIUM CHLORIDE 29.8 MG/ML
20 INJECTION INTRAVENOUS ONCE
Status: COMPLETED | OUTPATIENT
Start: 2025-01-19 | End: 2025-01-19

## 2025-01-19 RX ORDER — GUAIFENESIN/DEXTROMETHORPHAN 100-10MG/5
5 SYRUP ORAL EVERY 4 HOURS PRN
Status: DISCONTINUED | OUTPATIENT
Start: 2025-01-19 | End: 2025-01-26 | Stop reason: HOSPADM

## 2025-01-19 RX ORDER — POTASSIUM CHLORIDE 1500 MG/1
20 TABLET, EXTENDED RELEASE ORAL ONCE
Status: DISCONTINUED | OUTPATIENT
Start: 2025-01-19 | End: 2025-01-19

## 2025-01-19 RX ADMIN — POTASSIUM BICARBONATE 25 MEQ: 978 TABLET, EFFERVESCENT ORAL at 17:12

## 2025-01-19 RX ADMIN — Medication 2000 UNITS: at 09:41

## 2025-01-19 RX ADMIN — POTASSIUM CHLORIDE 20 MEQ: 29.8 INJECTION, SOLUTION INTRAVENOUS at 17:27

## 2025-01-19 RX ADMIN — ATORVASTATIN CALCIUM 10 MG: 10 TABLET, FILM COATED ORAL at 20:48

## 2025-01-19 RX ADMIN — HYDROXYCHLOROQUINE SULFATE 200 MG: 200 TABLET ORAL at 20:48

## 2025-01-19 RX ADMIN — STANDARDIZED SENNA CONCENTRATE AND DOCUSATE SODIUM 1 TABLET: 8.6; 5 TABLET ORAL at 20:48

## 2025-01-19 RX ADMIN — GUAIFENESIN AND DEXTROMETHORPHAN 5 ML: 100; 10 SYRUP ORAL at 15:49

## 2025-01-19 RX ADMIN — ACETAMINOPHEN 650 MG: 325 TABLET ORAL at 09:41

## 2025-01-19 RX ADMIN — ENOXAPARIN SODIUM 40 MG: 100 INJECTION SUBCUTANEOUS at 09:40

## 2025-01-19 RX ADMIN — OXYCODONE 5 MG: 5 TABLET ORAL at 21:42

## 2025-01-19 RX ADMIN — SUCRALFATE 1 G: 1 TABLET ORAL at 05:42

## 2025-01-19 RX ADMIN — SODIUM CHLORIDE, PRESERVATIVE FREE 10 ML: 5 INJECTION INTRAVENOUS at 09:38

## 2025-01-19 RX ADMIN — PANTOPRAZOLE SODIUM 40 MG: 40 INJECTION, POWDER, FOR SOLUTION INTRAVENOUS at 09:42

## 2025-01-19 RX ADMIN — ACETAMINOPHEN 650 MG: 325 TABLET ORAL at 20:48

## 2025-01-19 RX ADMIN — OSELTAMIVIR PHOSPHATE 30 MG: 30 CAPSULE ORAL at 09:42

## 2025-01-19 RX ADMIN — PREGABALIN 200 MG: 100 CAPSULE ORAL at 09:40

## 2025-01-19 RX ADMIN — PREGABALIN 200 MG: 100 CAPSULE ORAL at 20:48

## 2025-01-19 RX ADMIN — SUCRALFATE 1 G: 1 TABLET ORAL at 09:41

## 2025-01-19 RX ADMIN — POLYETHYLENE GLYCOL 3350 17 G: 17 POWDER, FOR SOLUTION ORAL at 18:32

## 2025-01-19 RX ADMIN — VILAZODONE HYDROCHLORIDE 20 MG: 20 TABLET, FILM COATED ORAL at 09:59

## 2025-01-19 RX ADMIN — SUCRALFATE 1 G: 1 TABLET ORAL at 15:50

## 2025-01-19 RX ADMIN — BUPROPION HYDROCHLORIDE 150 MG: 150 TABLET, EXTENDED RELEASE ORAL at 15:51

## 2025-01-19 RX ADMIN — OSELTAMIVIR PHOSPHATE 30 MG: 30 CAPSULE ORAL at 20:49

## 2025-01-19 RX ADMIN — OXYCODONE 5 MG: 5 TABLET ORAL at 09:40

## 2025-01-19 RX ADMIN — MEROPENEM 1000 MG: 1 INJECTION INTRAVENOUS at 15:48

## 2025-01-19 RX ADMIN — MORPHINE SULFATE 4 MG: 4 INJECTION, SOLUTION INTRAMUSCULAR; INTRAVENOUS at 12:25

## 2025-01-19 RX ADMIN — OXYCODONE 5 MG: 5 TABLET ORAL at 05:46

## 2025-01-19 RX ADMIN — ACETAMINOPHEN 650 MG: 325 TABLET ORAL at 15:49

## 2025-01-19 RX ADMIN — SODIUM CHLORIDE, PRESERVATIVE FREE 10 ML: 5 INJECTION INTRAVENOUS at 09:39

## 2025-01-19 RX ADMIN — ASPIRIN 81 MG: 81 TABLET, COATED ORAL at 09:41

## 2025-01-19 RX ADMIN — HYDROXYCHLOROQUINE SULFATE 200 MG: 200 TABLET ORAL at 09:41

## 2025-01-19 RX ADMIN — STANDARDIZED SENNA CONCENTRATE AND DOCUSATE SODIUM 1 TABLET: 8.6; 5 TABLET ORAL at 09:41

## 2025-01-19 RX ADMIN — FUROSEMIDE 5 MG/HR: 10 INJECTION, SOLUTION INTRAMUSCULAR; INTRAVENOUS at 21:49

## 2025-01-19 RX ADMIN — OXYCODONE 5 MG: 5 TABLET ORAL at 17:31

## 2025-01-19 ASSESSMENT — PAIN DESCRIPTION - ORIENTATION
ORIENTATION: RIGHT
ORIENTATION: MID
ORIENTATION: RIGHT

## 2025-01-19 ASSESSMENT — PAIN DESCRIPTION - DESCRIPTORS
DESCRIPTORS: ACHING

## 2025-01-19 ASSESSMENT — PAIN SCALES - GENERAL
PAINLEVEL_OUTOF10: 5
PAINLEVEL_OUTOF10: 9
PAINLEVEL_OUTOF10: 7
PAINLEVEL_OUTOF10: 9
PAINLEVEL_OUTOF10: 2
PAINLEVEL_OUTOF10: 7
PAINLEVEL_OUTOF10: 6
PAINLEVEL_OUTOF10: 4
PAINLEVEL_OUTOF10: 6
PAINLEVEL_OUTOF10: 8

## 2025-01-19 ASSESSMENT — PAIN DESCRIPTION - LOCATION
LOCATION: LEG;HIP
LOCATION: LEG;KNEE;HIP
LOCATION: HIP
LOCATION: LEG
LOCATION: ABDOMEN

## 2025-01-19 ASSESSMENT — PAIN - FUNCTIONAL ASSESSMENT: PAIN_FUNCTIONAL_ASSESSMENT: PREVENTS OR INTERFERES SOME ACTIVE ACTIVITIES AND ADLS

## 2025-01-19 ASSESSMENT — PAIN DESCRIPTION - PAIN TYPE: TYPE: SURGICAL PAIN

## 2025-01-19 ASSESSMENT — PAIN SCALES - WONG BAKER: WONGBAKER_NUMERICALRESPONSE: HURTS A LITTLE BIT

## 2025-01-19 NOTE — PLAN OF CARE
Problem: Discharge Planning  Goal: Discharge to home or other facility with appropriate resources  1/19/2025 0030 by Herminia Hernandez RN  Outcome: Progressing  1/18/2025 1404 by Beena Whitney RN  Outcome: Progressing     Problem: Pain  Goal: Verbalizes/displays adequate comfort level or baseline comfort level  1/19/2025 0030 by Herminia Hernandez RN  Outcome: Progressing  1/18/2025 1404 by Beena Whitney RN  Outcome: Progressing     Problem: Safety - Adult  Goal: Free from fall injury  1/19/2025 0030 by Herminia Hernandez RN  Outcome: Progressing  1/18/2025 1404 by Beena Whitney RN  Outcome: Progressing     Problem: ABCDS Injury Assessment  Goal: Absence of physical injury  1/19/2025 0030 by Herminia Hernandez RN  Outcome: Progressing  1/18/2025 1404 by Beena Whitney RN  Outcome: Progressing     Problem: Skin/Tissue Integrity  Goal: Absence of new skin breakdown  Description: 1.  Monitor for areas of redness and/or skin breakdown  2.  Assess vascular access sites hourly  3.  Every 4-6 hours minimum:  Change oxygen saturation probe site  4.  Every 4-6 hours:  If on nasal continuous positive airway pressure, respiratory therapy assess nares and determine need for appliance change or resting period.  1/19/2025 0030 by Herminia Hernandez RN  Outcome: Progressing  1/18/2025 1404 by Beena Whitney RN  Outcome: Progressing

## 2025-01-19 NOTE — PLAN OF CARE
Problem: Discharge Planning  Goal: Discharge to home or other facility with appropriate resources  1/19/2025 0956 by Adrian Schaefer RN  Outcome: Progressing  1/19/2025 0030 by Herminia Hernandez RN  Outcome: Progressing     Problem: Pain  Goal: Verbalizes/displays adequate comfort level or baseline comfort level  1/19/2025 0956 by Adrian Schaefer RN  Outcome: Progressing  1/19/2025 0030 by Herminia Hernandez RN  Outcome: Progressing     Problem: Safety - Adult  Goal: Free from fall injury  1/19/2025 0956 by Adrian Schaefer RN  Outcome: Progressing  1/19/2025 0030 by Herminia Hernandez RN  Outcome: Progressing     Problem: ABCDS Injury Assessment  Goal: Absence of physical injury  1/19/2025 0956 by Adrian Schaefer RN  Outcome: Progressing  1/19/2025 0030 by Herminia Hernandez RN  Outcome: Progressing     Problem: Skin/Tissue Integrity  Goal: Absence of new skin breakdown  Description: 1.  Monitor for areas of redness and/or skin breakdown  2.  Assess vascular access sites hourly  3.  Every 4-6 hours minimum:  Change oxygen saturation probe site  4.  Every 4-6 hours:  If on nasal continuous positive airway pressure, respiratory therapy assess nares and determine need for appliance change or resting period.  1/19/2025 0956 by Adrian Schaefer RN  Outcome: Progressing  1/19/2025 0030 by Herminia Hernandez RN  Outcome: Progressing

## 2025-01-20 LAB
ALBUMIN SERPL-MCNC: 2.2 G/DL (ref 3.4–5)
ANION GAP SERPL CALCULATED.3IONS-SCNC: 10 MMOL/L (ref 3–16)
BUN SERPL-MCNC: 24 MG/DL (ref 7–20)
CALCIUM SERPL-MCNC: 8.3 MG/DL (ref 8.3–10.6)
CHLORIDE SERPL-SCNC: 106 MMOL/L (ref 99–110)
CO2 SERPL-SCNC: 29 MMOL/L (ref 21–32)
CREAT SERPL-MCNC: 0.6 MG/DL (ref 0.6–1.2)
DEPRECATED RDW RBC AUTO: 15.6 % (ref 12.4–15.4)
GFR SERPLBLD CREATININE-BSD FMLA CKD-EPI: 87 ML/MIN/{1.73_M2}
GLUCOSE SERPL-MCNC: 98 MG/DL (ref 70–99)
HCT VFR BLD AUTO: 23.3 % (ref 36–48)
HGB BLD-MCNC: 7.9 G/DL (ref 12–16)
MCH RBC QN AUTO: 29.4 PG (ref 26–34)
MCHC RBC AUTO-ENTMCNC: 33.7 G/DL (ref 31–36)
MCV RBC AUTO: 87.3 FL (ref 80–100)
PHOSPHATE SERPL-MCNC: 2.3 MG/DL (ref 2.5–4.9)
PLATELET # BLD AUTO: 146 K/UL (ref 135–450)
PMV BLD AUTO: 8.4 FL (ref 5–10.5)
POTASSIUM SERPL-SCNC: 3.5 MMOL/L (ref 3.5–5.1)
RBC # BLD AUTO: 2.67 M/UL (ref 4–5.2)
SODIUM SERPL-SCNC: 145 MMOL/L (ref 136–145)
WBC # BLD AUTO: 3.7 K/UL (ref 4–11)

## 2025-01-20 PROCEDURE — 6360000002 HC RX W HCPCS: Performed by: INTERNAL MEDICINE

## 2025-01-20 PROCEDURE — 85027 COMPLETE CBC AUTOMATED: CPT

## 2025-01-20 PROCEDURE — 6370000000 HC RX 637 (ALT 250 FOR IP): Performed by: HOSPITALIST

## 2025-01-20 PROCEDURE — 6360000002 HC RX W HCPCS: Performed by: NURSE PRACTITIONER

## 2025-01-20 PROCEDURE — 2500000003 HC RX 250 WO HCPCS: Performed by: HOSPITALIST

## 2025-01-20 PROCEDURE — 6370000000 HC RX 637 (ALT 250 FOR IP): Performed by: NURSE PRACTITIONER

## 2025-01-20 PROCEDURE — 97530 THERAPEUTIC ACTIVITIES: CPT

## 2025-01-20 PROCEDURE — 97129 THER IVNTJ 1ST 15 MIN: CPT

## 2025-01-20 PROCEDURE — 2580000003 HC RX 258: Performed by: HOSPITALIST

## 2025-01-20 PROCEDURE — 80069 RENAL FUNCTION PANEL: CPT

## 2025-01-20 PROCEDURE — 6360000002 HC RX W HCPCS: Performed by: HOSPITALIST

## 2025-01-20 PROCEDURE — 92526 ORAL FUNCTION THERAPY: CPT

## 2025-01-20 PROCEDURE — 2700000000 HC OXYGEN THERAPY PER DAY

## 2025-01-20 PROCEDURE — 97110 THERAPEUTIC EXERCISES: CPT

## 2025-01-20 PROCEDURE — 97130 THER IVNTJ EA ADDL 15 MIN: CPT

## 2025-01-20 PROCEDURE — 97535 SELF CARE MNGMENT TRAINING: CPT

## 2025-01-20 PROCEDURE — 1200000000 HC SEMI PRIVATE

## 2025-01-20 PROCEDURE — 94640 AIRWAY INHALATION TREATMENT: CPT

## 2025-01-20 PROCEDURE — 2580000003 HC RX 258: Performed by: NURSE PRACTITIONER

## 2025-01-20 PROCEDURE — 94761 N-INVAS EAR/PLS OXIMETRY MLT: CPT

## 2025-01-20 PROCEDURE — 6370000000 HC RX 637 (ALT 250 FOR IP): Performed by: INTERNAL MEDICINE

## 2025-01-20 PROCEDURE — 99232 SBSQ HOSP IP/OBS MODERATE 35: CPT | Performed by: INTERNAL MEDICINE

## 2025-01-20 RX ORDER — OSELTAMIVIR PHOSPHATE 75 MG/1
75 CAPSULE ORAL 2 TIMES DAILY
Status: DISCONTINUED | OUTPATIENT
Start: 2025-01-20 | End: 2025-01-20

## 2025-01-20 RX ORDER — ALBUTEROL SULFATE 0.83 MG/ML
2.5 SOLUTION RESPIRATORY (INHALATION) EVERY 4 HOURS PRN
Status: DISCONTINUED | OUTPATIENT
Start: 2025-01-20 | End: 2025-01-26 | Stop reason: HOSPADM

## 2025-01-20 RX ADMIN — Medication 2000 UNITS: at 08:41

## 2025-01-20 RX ADMIN — ACETAMINOPHEN 650 MG: 325 TABLET ORAL at 08:43

## 2025-01-20 RX ADMIN — SUCRALFATE 1 G: 1 TABLET ORAL at 06:34

## 2025-01-20 RX ADMIN — OXYCODONE 5 MG: 5 TABLET ORAL at 06:54

## 2025-01-20 RX ADMIN — ENOXAPARIN SODIUM 40 MG: 100 INJECTION SUBCUTANEOUS at 08:42

## 2025-01-20 RX ADMIN — OXYCODONE 5 MG: 5 TABLET ORAL at 14:54

## 2025-01-20 RX ADMIN — STANDARDIZED SENNA CONCENTRATE AND DOCUSATE SODIUM 1 TABLET: 8.6; 5 TABLET ORAL at 20:02

## 2025-01-20 RX ADMIN — HYDROXYCHLOROQUINE SULFATE 200 MG: 200 TABLET ORAL at 08:42

## 2025-01-20 RX ADMIN — POTASSIUM PHOSPHATE, MONOBASIC POTASSIUM PHOSPHATE, DIBASIC 10 MMOL: 224; 236 INJECTION, SOLUTION, CONCENTRATE INTRAVENOUS at 13:29

## 2025-01-20 RX ADMIN — OSELTAMIVIR PHOSPHATE 30 MG: 30 CAPSULE ORAL at 08:42

## 2025-01-20 RX ADMIN — VILAZODONE HYDROCHLORIDE 20 MG: 20 TABLET, FILM COATED ORAL at 08:42

## 2025-01-20 RX ADMIN — ACETAMINOPHEN 650 MG: 325 TABLET ORAL at 20:03

## 2025-01-20 RX ADMIN — ASPIRIN 81 MG: 81 TABLET, COATED ORAL at 08:41

## 2025-01-20 RX ADMIN — ACETAMINOPHEN 650 MG: 325 TABLET ORAL at 14:54

## 2025-01-20 RX ADMIN — SUCRALFATE 1 G: 1 TABLET ORAL at 16:59

## 2025-01-20 RX ADMIN — ALBUTEROL SULFATE 2.5 MG: 2.5 SOLUTION RESPIRATORY (INHALATION) at 09:29

## 2025-01-20 RX ADMIN — MEROPENEM 1000 MG: 1 INJECTION INTRAVENOUS at 11:54

## 2025-01-20 RX ADMIN — BUPROPION HYDROCHLORIDE 150 MG: 150 TABLET, EXTENDED RELEASE ORAL at 16:59

## 2025-01-20 RX ADMIN — PREGABALIN 200 MG: 100 CAPSULE ORAL at 20:02

## 2025-01-20 RX ADMIN — HYDROXYCHLOROQUINE SULFATE 200 MG: 200 TABLET ORAL at 20:02

## 2025-01-20 RX ADMIN — PANTOPRAZOLE SODIUM 40 MG: 40 INJECTION, POWDER, FOR SOLUTION INTRAVENOUS at 08:50

## 2025-01-20 RX ADMIN — PREGABALIN 200 MG: 100 CAPSULE ORAL at 08:43

## 2025-01-20 RX ADMIN — STANDARDIZED SENNA CONCENTRATE AND DOCUSATE SODIUM 1 TABLET: 8.6; 5 TABLET ORAL at 08:43

## 2025-01-20 RX ADMIN — MEROPENEM 1000 MG: 1 INJECTION INTRAVENOUS at 03:19

## 2025-01-20 RX ADMIN — FUROSEMIDE 5 MG/HR: 10 INJECTION, SOLUTION INTRAMUSCULAR; INTRAVENOUS at 15:55

## 2025-01-20 RX ADMIN — SUCRALFATE 1 G: 1 TABLET ORAL at 11:56

## 2025-01-20 RX ADMIN — MEROPENEM 1000 MG: 1 INJECTION INTRAVENOUS at 20:05

## 2025-01-20 ASSESSMENT — PAIN SCALES - GENERAL
PAINLEVEL_OUTOF10: 3
PAINLEVEL_OUTOF10: 2
PAINLEVEL_OUTOF10: 2
PAINLEVEL_OUTOF10: 7
PAINLEVEL_OUTOF10: 2
PAINLEVEL_OUTOF10: 8

## 2025-01-20 ASSESSMENT — PAIN DESCRIPTION - DESCRIPTORS
DESCRIPTORS: ACHING

## 2025-01-20 ASSESSMENT — PAIN DESCRIPTION - ORIENTATION
ORIENTATION: RIGHT
ORIENTATION: RIGHT;LEFT
ORIENTATION: RIGHT

## 2025-01-20 ASSESSMENT — PAIN DESCRIPTION - LOCATION
LOCATION: HIP
LOCATION: HIP
LOCATION: LEG
LOCATION: HIP
LOCATION: LEG

## 2025-01-20 NOTE — RT PROTOCOL NOTE
RT Inhaler-Nebulizer Bronchodilator Protocol Note    There is a bronchodilator order in the chart from a provider indicating to follow the RT Bronchodilator Protocol and there is an “Initiate RT Inhaler-Nebulizer Bronchodilator Protocol” order as well (see protocol at bottom of note).    CXR Findings:  No results found.    The findings from the last RT Protocol Assessment were as follows:   History Pulmonary Disease: None or smoker <15 pack years  Respiratory Pattern: Regular pattern and RR 12-20 bpm  Breath Sounds: Slightly diminished and/or crackles  Cough: Strong, productive  Indication for Bronchodilator Therapy:    Bronchodilator Assessment Score: 3    Aerosolized bronchodilator medication orders have been revised according to the RT Inhaler-Nebulizer Bronchodilator Protocol below.    Respiratory Therapist to perform RT Therapy Protocol Assessment initially then follow the protocol.  Repeat RT Therapy Protocol Assessment PRN for score 0-3 or on second treatment, BID, and PRN for scores above 3.    No Indications - adjust the frequency to every 6 hours PRN wheezing or bronchospasm, if no treatments needed after 48 hours then discontinue using Per Protocol order mode.     If indication present, adjust the RT bronchodilator orders based on the Bronchodilator Assessment Score as indicated below.  Use Inhaler orders unless patient has one or more of the following: on home nebulizer, not able to hold breath for 10 seconds, is not alert and oriented, cannot activate and use MDI correctly, or respiratory rate 25 breaths per minute or more, then use the equivalent nebulizer order(s) with same Frequency and PRN reasons based on the score.  If a patient is on this medication at home then do not decrease Frequency below that used at home.    0-3 - enter or revise RT bronchodilator order(s) to equivalent RT Bronchodilator order with Frequency of every 4 hours PRN for wheezing or increased work of breathing using Per

## 2025-01-20 NOTE — PLAN OF CARE
Problem: Discharge Planning  Goal: Discharge to home or other facility with appropriate resources  1/19/2025 2257 by Ady Lombardi RN  Outcome: Progressing     Problem: Pain  Goal: Verbalizes/displays adequate comfort level or baseline comfort level  1/19/2025 2257 by Ady Lombardi RN  Outcome: Progressing     Problem: Safety - Adult  Goal: Free from fall injury  1/19/2025 2257 by Ady Lombardi RN  Outcome: Progressing     Problem: ABCDS Injury Assessment  Goal: Absence of physical injury  1/19/2025 2257 by Ady Lombardi RN  Outcome: Progressing     Problem: Skin/Tissue Integrity  Goal: Absence of new skin breakdown  Description: 1.  Monitor for areas of redness and/or skin breakdown  2.  Assess vascular access sites hourly  3.  Every 4-6 hours minimum:  Change oxygen saturation probe site  4.  Every 4-6 hours:  If on nasal continuous positive airway pressure, respiratory therapy assess nares and determine need for appliance change or resting period.  1/19/2025 2257 by Ady Lombardi RN  Outcome: Progressing

## 2025-01-21 PROCEDURE — 97129 THER IVNTJ 1ST 15 MIN: CPT

## 2025-01-21 PROCEDURE — 2580000003 HC RX 258: Performed by: HOSPITALIST

## 2025-01-21 PROCEDURE — 97530 THERAPEUTIC ACTIVITIES: CPT

## 2025-01-21 PROCEDURE — 97110 THERAPEUTIC EXERCISES: CPT

## 2025-01-21 PROCEDURE — 6370000000 HC RX 637 (ALT 250 FOR IP): Performed by: INTERNAL MEDICINE

## 2025-01-21 PROCEDURE — 6370000000 HC RX 637 (ALT 250 FOR IP): Performed by: NURSE PRACTITIONER

## 2025-01-21 PROCEDURE — 2500000003 HC RX 250 WO HCPCS: Performed by: NURSE PRACTITIONER

## 2025-01-21 PROCEDURE — 1200000000 HC SEMI PRIVATE

## 2025-01-21 PROCEDURE — 97535 SELF CARE MNGMENT TRAINING: CPT

## 2025-01-21 PROCEDURE — 6360000002 HC RX W HCPCS: Performed by: NURSE PRACTITIONER

## 2025-01-21 PROCEDURE — 6370000000 HC RX 637 (ALT 250 FOR IP): Performed by: HOSPITALIST

## 2025-01-21 PROCEDURE — 92507 TX SP LANG VOICE COMM INDIV: CPT

## 2025-01-21 PROCEDURE — 2500000003 HC RX 250 WO HCPCS: Performed by: FAMILY MEDICINE

## 2025-01-21 PROCEDURE — 92526 ORAL FUNCTION THERAPY: CPT

## 2025-01-21 PROCEDURE — 6360000002 HC RX W HCPCS: Performed by: HOSPITALIST

## 2025-01-21 PROCEDURE — 6360000002 HC RX W HCPCS: Performed by: INTERNAL MEDICINE

## 2025-01-21 RX ORDER — FUROSEMIDE 10 MG/ML
40 INJECTION INTRAMUSCULAR; INTRAVENOUS 2 TIMES DAILY
Status: DISCONTINUED | OUTPATIENT
Start: 2025-01-21 | End: 2025-01-22

## 2025-01-21 RX ADMIN — FUROSEMIDE 40 MG: 10 INJECTION, SOLUTION INTRAMUSCULAR; INTRAVENOUS at 17:38

## 2025-01-21 RX ADMIN — VILAZODONE HYDROCHLORIDE 20 MG: 20 TABLET, FILM COATED ORAL at 10:32

## 2025-01-21 RX ADMIN — ENOXAPARIN SODIUM 40 MG: 100 INJECTION SUBCUTANEOUS at 10:32

## 2025-01-21 RX ADMIN — SUCRALFATE 1 G: 1 TABLET ORAL at 10:02

## 2025-01-21 RX ADMIN — ATORVASTATIN CALCIUM 10 MG: 10 TABLET, FILM COATED ORAL at 21:35

## 2025-01-21 RX ADMIN — MORPHINE SULFATE 4 MG: 4 INJECTION, SOLUTION INTRAMUSCULAR; INTRAVENOUS at 14:53

## 2025-01-21 RX ADMIN — SODIUM CHLORIDE, PRESERVATIVE FREE 10 ML: 5 INJECTION INTRAVENOUS at 10:39

## 2025-01-21 RX ADMIN — MEROPENEM 1000 MG: 1 INJECTION INTRAVENOUS at 03:11

## 2025-01-21 RX ADMIN — STANDARDIZED SENNA CONCENTRATE AND DOCUSATE SODIUM 1 TABLET: 8.6; 5 TABLET ORAL at 10:02

## 2025-01-21 RX ADMIN — Medication 2000 UNITS: at 10:02

## 2025-01-21 RX ADMIN — MORPHINE SULFATE 4 MG: 4 INJECTION, SOLUTION INTRAMUSCULAR; INTRAVENOUS at 10:32

## 2025-01-21 RX ADMIN — MEROPENEM 1000 MG: 1 INJECTION INTRAVENOUS at 18:27

## 2025-01-21 RX ADMIN — POLYETHYLENE GLYCOL 3350 17 G: 17 POWDER, FOR SOLUTION ORAL at 15:41

## 2025-01-21 RX ADMIN — SUCRALFATE 1 G: 1 TABLET ORAL at 05:51

## 2025-01-21 RX ADMIN — HYDROXYCHLOROQUINE SULFATE 200 MG: 200 TABLET ORAL at 10:02

## 2025-01-21 RX ADMIN — PANTOPRAZOLE SODIUM 40 MG: 40 INJECTION, POWDER, FOR SOLUTION INTRAVENOUS at 05:51

## 2025-01-21 RX ADMIN — PREGABALIN 200 MG: 100 CAPSULE ORAL at 10:02

## 2025-01-21 RX ADMIN — ASPIRIN 81 MG: 81 TABLET, COATED ORAL at 10:02

## 2025-01-21 RX ADMIN — HYDROXYCHLOROQUINE SULFATE 200 MG: 200 TABLET ORAL at 21:35

## 2025-01-21 RX ADMIN — SODIUM CHLORIDE, PRESERVATIVE FREE 10 ML: 5 INJECTION INTRAVENOUS at 21:35

## 2025-01-21 RX ADMIN — STANDARDIZED SENNA CONCENTRATE AND DOCUSATE SODIUM 1 TABLET: 8.6; 5 TABLET ORAL at 21:35

## 2025-01-21 RX ADMIN — ACETAMINOPHEN 650 MG: 325 TABLET ORAL at 14:53

## 2025-01-21 RX ADMIN — MEROPENEM 1000 MG: 1 INJECTION INTRAVENOUS at 10:25

## 2025-01-21 RX ADMIN — SUCRALFATE 1 G: 1 TABLET ORAL at 14:53

## 2025-01-21 RX ADMIN — BUPROPION HYDROCHLORIDE 150 MG: 150 TABLET, EXTENDED RELEASE ORAL at 17:38

## 2025-01-21 RX ADMIN — ACETAMINOPHEN 650 MG: 325 TABLET ORAL at 10:02

## 2025-01-21 RX ADMIN — OXYCODONE 5 MG: 5 TABLET ORAL at 12:13

## 2025-01-21 RX ADMIN — OXYCODONE 5 MG: 5 TABLET ORAL at 21:35

## 2025-01-21 RX ADMIN — ACETAMINOPHEN 650 MG: 325 TABLET ORAL at 21:35

## 2025-01-21 RX ADMIN — PREGABALIN 200 MG: 100 CAPSULE ORAL at 21:34

## 2025-01-21 ASSESSMENT — PAIN DESCRIPTION - FREQUENCY: FREQUENCY: CONTINUOUS

## 2025-01-21 ASSESSMENT — PAIN DESCRIPTION - ONSET: ONSET: ON-GOING

## 2025-01-21 ASSESSMENT — PAIN SCALES - GENERAL
PAINLEVEL_OUTOF10: 6
PAINLEVEL_OUTOF10: 7
PAINLEVEL_OUTOF10: 8
PAINLEVEL_OUTOF10: 7
PAINLEVEL_OUTOF10: 7
PAINLEVEL_OUTOF10: 0

## 2025-01-21 ASSESSMENT — PAIN DESCRIPTION - LOCATION
LOCATION: HIP;LEG
LOCATION: LEG
LOCATION: HIP
LOCATION: HIP

## 2025-01-21 ASSESSMENT — PAIN - FUNCTIONAL ASSESSMENT: PAIN_FUNCTIONAL_ASSESSMENT: PREVENTS OR INTERFERES SOME ACTIVE ACTIVITIES AND ADLS

## 2025-01-21 ASSESSMENT — PAIN DESCRIPTION - DESCRIPTORS
DESCRIPTORS: ACHING
DESCRIPTORS: ACHING;BURNING
DESCRIPTORS: ACHING;BURNING
DESCRIPTORS: ACHING

## 2025-01-21 ASSESSMENT — PAIN DESCRIPTION - ORIENTATION
ORIENTATION: RIGHT
ORIENTATION: RIGHT
ORIENTATION: MID;RIGHT

## 2025-01-21 ASSESSMENT — PAIN DESCRIPTION - PAIN TYPE: TYPE: ACUTE PAIN;SURGICAL PAIN

## 2025-01-21 NOTE — PLAN OF CARE
Problem: Discharge Planning  Goal: Discharge to home or other facility with appropriate resources  1/21/2025 1129 by Miladis Heard RN  Outcome: Progressing  1/20/2025 2159 by Kenia Celeste RN  Outcome: Progressing     Problem: Pain  Goal: Verbalizes/displays adequate comfort level or baseline comfort level  1/21/2025 1129 by Miladis Heard RN  Outcome: Progressing  1/20/2025 2159 by Kenia Celeste RN  Outcome: Progressing     Problem: Safety - Adult  Goal: Free from fall injury  1/21/2025 1129 by Miladis Heard RN  Outcome: Progressing  1/20/2025 2159 by Kenia Celeste RN  Outcome: Progressing     Problem: ABCDS Injury Assessment  Goal: Absence of physical injury  1/21/2025 1129 by Miladis Heard RN  Outcome: Progressing  1/20/2025 2159 by Kenia Celeste RN  Outcome: Progressing     Problem: Skin/Tissue Integrity  Goal: Absence of new skin breakdown  Description: 1.  Monitor for areas of redness and/or skin breakdown  2.  Assess vascular access sites hourly  3.  Every 4-6 hours minimum:  Change oxygen saturation probe site  4.  Every 4-6 hours:  If on nasal continuous positive airway pressure, respiratory therapy assess nares and determine need for appliance change or resting period.  1/21/2025 1129 by Miladis Heard RN  Outcome: Progressing  1/20/2025 2159 by Kenia Celeste RN  Outcome: Progressing     Problem: Nutrition Deficit:  Goal: Optimize nutritional status  1/21/2025 1129 by Miladis Heard RN  Outcome: Progressing  1/20/2025 2159 by Kenia Celeste RN  Outcome: Progressing

## 2025-01-22 LAB
ANION GAP SERPL CALCULATED.3IONS-SCNC: 7 MMOL/L (ref 3–16)
BASOPHILS # BLD: 0 K/UL (ref 0–0.2)
BASOPHILS NFR BLD: 0.7 %
BUN SERPL-MCNC: 35 MG/DL (ref 7–20)
CALCIUM SERPL-MCNC: 8.7 MG/DL (ref 8.3–10.6)
CHLORIDE SERPL-SCNC: 97 MMOL/L (ref 99–110)
CO2 SERPL-SCNC: 38 MMOL/L (ref 21–32)
CREAT SERPL-MCNC: 0.8 MG/DL (ref 0.6–1.2)
DEPRECATED RDW RBC AUTO: 15.5 % (ref 12.4–15.4)
EOSINOPHIL # BLD: 0.2 K/UL (ref 0–0.6)
EOSINOPHIL NFR BLD: 3.5 %
GFR SERPLBLD CREATININE-BSD FMLA CKD-EPI: 72 ML/MIN/{1.73_M2}
GLUCOSE SERPL-MCNC: 139 MG/DL (ref 70–99)
HCT VFR BLD AUTO: 26.6 % (ref 36–48)
HGB BLD-MCNC: 9 G/DL (ref 12–16)
LYMPHOCYTES # BLD: 1.4 K/UL (ref 1–5.1)
LYMPHOCYTES NFR BLD: 25.2 %
MCH RBC QN AUTO: 29.7 PG (ref 26–34)
MCHC RBC AUTO-ENTMCNC: 33.7 G/DL (ref 31–36)
MCV RBC AUTO: 88.1 FL (ref 80–100)
MONOCYTES # BLD: 0.5 K/UL (ref 0–1.3)
MONOCYTES NFR BLD: 8.1 %
NEUTROPHILS # BLD: 3.5 K/UL (ref 1.7–7.7)
NEUTROPHILS NFR BLD: 62.5 %
PLATELET # BLD AUTO: 175 K/UL (ref 135–450)
PMV BLD AUTO: 8.4 FL (ref 5–10.5)
POTASSIUM SERPL-SCNC: 3.6 MMOL/L (ref 3.5–5.1)
RBC # BLD AUTO: 3.01 M/UL (ref 4–5.2)
SODIUM SERPL-SCNC: 142 MMOL/L (ref 136–145)
WBC # BLD AUTO: 5.6 K/UL (ref 4–11)

## 2025-01-22 PROCEDURE — 6360000002 HC RX W HCPCS: Performed by: HOSPITALIST

## 2025-01-22 PROCEDURE — 2580000003 HC RX 258: Performed by: HOSPITALIST

## 2025-01-22 PROCEDURE — 2500000003 HC RX 250 WO HCPCS: Performed by: NURSE PRACTITIONER

## 2025-01-22 PROCEDURE — 80048 BASIC METABOLIC PNL TOTAL CA: CPT

## 2025-01-22 PROCEDURE — 6370000000 HC RX 637 (ALT 250 FOR IP): Performed by: NURSE PRACTITIONER

## 2025-01-22 PROCEDURE — 6370000000 HC RX 637 (ALT 250 FOR IP): Performed by: INTERNAL MEDICINE

## 2025-01-22 PROCEDURE — 97530 THERAPEUTIC ACTIVITIES: CPT

## 2025-01-22 PROCEDURE — 6360000002 HC RX W HCPCS: Performed by: NURSE PRACTITIONER

## 2025-01-22 PROCEDURE — 97110 THERAPEUTIC EXERCISES: CPT

## 2025-01-22 PROCEDURE — 6370000000 HC RX 637 (ALT 250 FOR IP): Performed by: HOSPITALIST

## 2025-01-22 PROCEDURE — 85025 COMPLETE CBC W/AUTO DIFF WBC: CPT

## 2025-01-22 PROCEDURE — 97535 SELF CARE MNGMENT TRAINING: CPT

## 2025-01-22 PROCEDURE — 2500000003 HC RX 250 WO HCPCS: Performed by: FAMILY MEDICINE

## 2025-01-22 PROCEDURE — 6360000002 HC RX W HCPCS: Performed by: INTERNAL MEDICINE

## 2025-01-22 PROCEDURE — 1200000000 HC SEMI PRIVATE

## 2025-01-22 RX ADMIN — Medication 2000 UNITS: at 10:14

## 2025-01-22 RX ADMIN — MEROPENEM 1000 MG: 1 INJECTION INTRAVENOUS at 02:57

## 2025-01-22 RX ADMIN — MEROPENEM 1000 MG: 1 INJECTION INTRAVENOUS at 20:21

## 2025-01-22 RX ADMIN — SODIUM CHLORIDE, PRESERVATIVE FREE 10 ML: 5 INJECTION INTRAVENOUS at 20:24

## 2025-01-22 RX ADMIN — STANDARDIZED SENNA CONCENTRATE AND DOCUSATE SODIUM 1 TABLET: 8.6; 5 TABLET ORAL at 10:14

## 2025-01-22 RX ADMIN — BUPROPION HYDROCHLORIDE 150 MG: 150 TABLET, EXTENDED RELEASE ORAL at 15:03

## 2025-01-22 RX ADMIN — OXYCODONE 5 MG: 5 TABLET ORAL at 02:17

## 2025-01-22 RX ADMIN — SUCRALFATE 1 G: 1 TABLET ORAL at 06:12

## 2025-01-22 RX ADMIN — SUCRALFATE 1 G: 1 TABLET ORAL at 10:14

## 2025-01-22 RX ADMIN — ENOXAPARIN SODIUM 40 MG: 100 INJECTION SUBCUTANEOUS at 10:14

## 2025-01-22 RX ADMIN — MORPHINE SULFATE 4 MG: 4 INJECTION, SOLUTION INTRAMUSCULAR; INTRAVENOUS at 11:34

## 2025-01-22 RX ADMIN — HYDROXYCHLOROQUINE SULFATE 200 MG: 200 TABLET ORAL at 20:22

## 2025-01-22 RX ADMIN — ACETAMINOPHEN 650 MG: 325 TABLET ORAL at 15:03

## 2025-01-22 RX ADMIN — SUCRALFATE 1 G: 1 TABLET ORAL at 15:04

## 2025-01-22 RX ADMIN — ASPIRIN 81 MG: 81 TABLET, COATED ORAL at 10:14

## 2025-01-22 RX ADMIN — OXYCODONE 5 MG: 5 TABLET ORAL at 07:13

## 2025-01-22 RX ADMIN — HYDROXYCHLOROQUINE SULFATE 200 MG: 200 TABLET ORAL at 10:13

## 2025-01-22 RX ADMIN — PREGABALIN 200 MG: 100 CAPSULE ORAL at 20:22

## 2025-01-22 RX ADMIN — OXYCODONE 5 MG: 5 TABLET ORAL at 16:02

## 2025-01-22 RX ADMIN — FUROSEMIDE 40 MG: 10 INJECTION, SOLUTION INTRAMUSCULAR; INTRAVENOUS at 10:22

## 2025-01-22 RX ADMIN — SODIUM CHLORIDE, PRESERVATIVE FREE 10 ML: 5 INJECTION INTRAVENOUS at 20:23

## 2025-01-22 RX ADMIN — STANDARDIZED SENNA CONCENTRATE AND DOCUSATE SODIUM 1 TABLET: 8.6; 5 TABLET ORAL at 20:22

## 2025-01-22 RX ADMIN — MORPHINE SULFATE 4 MG: 4 INJECTION, SOLUTION INTRAMUSCULAR; INTRAVENOUS at 17:25

## 2025-01-22 RX ADMIN — MEROPENEM 1000 MG: 1 INJECTION INTRAVENOUS at 11:40

## 2025-01-22 RX ADMIN — ACETAMINOPHEN 650 MG: 325 TABLET ORAL at 20:22

## 2025-01-22 RX ADMIN — VILAZODONE HYDROCHLORIDE 20 MG: 20 TABLET, FILM COATED ORAL at 10:14

## 2025-01-22 RX ADMIN — ACETAMINOPHEN 650 MG: 325 TABLET ORAL at 10:13

## 2025-01-22 RX ADMIN — PANTOPRAZOLE SODIUM 40 MG: 40 INJECTION, POWDER, FOR SOLUTION INTRAVENOUS at 06:12

## 2025-01-22 RX ADMIN — PREGABALIN 200 MG: 100 CAPSULE ORAL at 10:13

## 2025-01-22 ASSESSMENT — PAIN SCALES - GENERAL
PAINLEVEL_OUTOF10: 7
PAINLEVEL_OUTOF10: 9
PAINLEVEL_OUTOF10: 7
PAINLEVEL_OUTOF10: 7
PAINLEVEL_OUTOF10: 8
PAINLEVEL_OUTOF10: 4
PAINLEVEL_OUTOF10: 3
PAINLEVEL_OUTOF10: 2
PAINLEVEL_OUTOF10: 9
PAINLEVEL_OUTOF10: 3
PAINLEVEL_OUTOF10: 9
PAINLEVEL_OUTOF10: 8
PAINLEVEL_OUTOF10: 8

## 2025-01-22 ASSESSMENT — PAIN DESCRIPTION - LOCATION
LOCATION: HIP
LOCATION: LEG
LOCATION: HIP
LOCATION: LEG
LOCATION: HIP
LOCATION: HIP
LOCATION: LEG

## 2025-01-22 ASSESSMENT — PAIN DESCRIPTION - ORIENTATION
ORIENTATION: RIGHT
ORIENTATION: RIGHT;LEFT;LOWER
ORIENTATION: RIGHT
ORIENTATION: RIGHT
ORIENTATION: LEFT
ORIENTATION: RIGHT

## 2025-01-22 ASSESSMENT — PAIN DESCRIPTION - DESCRIPTORS
DESCRIPTORS: ACHING
DESCRIPTORS: BURNING
DESCRIPTORS: ACHING;DISCOMFORT
DESCRIPTORS: ACHING
DESCRIPTORS: ACHING

## 2025-01-22 ASSESSMENT — PAIN DESCRIPTION - PAIN TYPE
TYPE: ACUTE PAIN;SURGICAL PAIN
TYPE: NEUROPATHIC PAIN;SURGICAL PAIN

## 2025-01-22 ASSESSMENT — PAIN DESCRIPTION - ONSET: ONSET: ON-GOING

## 2025-01-22 ASSESSMENT — PAIN DESCRIPTION - FREQUENCY: FREQUENCY: CONTINUOUS

## 2025-01-22 ASSESSMENT — PAIN - FUNCTIONAL ASSESSMENT: PAIN_FUNCTIONAL_ASSESSMENT: PREVENTS OR INTERFERES SOME ACTIVE ACTIVITIES AND ADLS

## 2025-01-22 NOTE — PLAN OF CARE
Problem: Discharge Planning  Goal: Discharge to home or other facility with appropriate resources  1/22/2025 0052 by Concepcion Pike RN  Outcome: Progressing  1/21/2025 1129 by Miladis Heard RN  Outcome: Progressing     Problem: Pain  Goal: Verbalizes/displays adequate comfort level or baseline comfort level  1/22/2025 0052 by Concepcion Pike RN  Outcome: Progressing  1/21/2025 1129 by Miladis Heard RN  Outcome: Progressing     Problem: Safety - Adult  Goal: Free from fall injury  1/22/2025 0052 by Concepcion Pike RN  Outcome: Progressing  1/21/2025 1129 by Miladis Heard RN  Outcome: Progressing     Problem: ABCDS Injury Assessment  Goal: Absence of physical injury  1/22/2025 0052 by Concepcion Pike RN  Outcome: Progressing  1/21/2025 1129 by Miladis Heard RN  Outcome: Progressing     Problem: Skin/Tissue Integrity  Goal: Absence of new skin breakdown  Description: 1.  Monitor for areas of redness and/or skin breakdown  2.  Assess vascular access sites hourly  3.  Every 4-6 hours minimum:  Change oxygen saturation probe site  4.  Every 4-6 hours:  If on nasal continuous positive airway pressure, respiratory therapy assess nares and determine need for appliance change or resting period.  1/22/2025 0052 by Concepcion Pike RN  Outcome: Progressing  1/21/2025 1129 by Miladis Heard RN  Outcome: Progressing     Problem: Nutrition Deficit:  Goal: Optimize nutritional status  1/22/2025 0052 by Concepcion Pike RN  Outcome: Progressing  1/21/2025 1129 by Miladis Heard RN  Outcome: Progressing

## 2025-01-22 NOTE — PLAN OF CARE
Problem: Discharge Planning  Goal: Discharge to home or other facility with appropriate resources  1/22/2025 1054 by Lou Moore RN  Outcome: Progressing  1/22/2025 0052 by Concepcion Pike RN  Outcome: Progressing     Problem: Pain  Goal: Verbalizes/displays adequate comfort level or baseline comfort level  1/22/2025 1054 by Lou Moore RN  Outcome: Progressing  1/22/2025 0052 by Concepcion Pike RN  Outcome: Progressing     Problem: Safety - Adult  Goal: Free from fall injury  1/22/2025 1054 by Lou Moore RN  Outcome: Progressing  1/22/2025 0052 by Concepcion Pike RN  Outcome: Progressing     Problem: ABCDS Injury Assessment  Goal: Absence of physical injury  1/22/2025 1054 by Lou Moore RN  Outcome: Progressing  1/22/2025 0052 by Concepcion Pike RN  Outcome: Progressing     Problem: Skin/Tissue Integrity  Goal: Absence of new skin breakdown  Description: 1.  Monitor for areas of redness and/or skin breakdown  2.  Assess vascular access sites hourly  3.  Every 4-6 hours minimum:  Change oxygen saturation probe site  4.  Every 4-6 hours:  If on nasal continuous positive airway pressure, respiratory therapy assess nares and determine need for appliance change or resting period.  1/22/2025 1054 by Lou Moore RN  Outcome: Progressing  1/22/2025 0052 by Concepcion Pike RN  Outcome: Progressing     Problem: Nutrition Deficit:  Goal: Optimize nutritional status  1/22/2025 1054 by Luo Moore RN  Outcome: Progressing  1/22/2025 0052 by Concepcion Pike RN  Outcome: Progressing

## 2025-01-23 LAB
ANION GAP SERPL CALCULATED.3IONS-SCNC: 4 MMOL/L (ref 3–16)
BACTERIA URNS QL MICRO: ABNORMAL /HPF
BASOPHILS # BLD: 0 K/UL (ref 0–0.2)
BASOPHILS NFR BLD: 0.6 %
BILIRUB UR QL STRIP.AUTO: NEGATIVE
BUN SERPL-MCNC: 40 MG/DL (ref 7–20)
CALCIUM SERPL-MCNC: 8.8 MG/DL (ref 8.3–10.6)
CHLORIDE SERPL-SCNC: 97 MMOL/L (ref 99–110)
CLARITY UR: CLEAR
CO2 SERPL-SCNC: 39 MMOL/L (ref 21–32)
COLOR UR: YELLOW
CREAT SERPL-MCNC: 0.8 MG/DL (ref 0.6–1.2)
DEPRECATED RDW RBC AUTO: 15.9 % (ref 12.4–15.4)
EOSINOPHIL # BLD: 0.2 K/UL (ref 0–0.6)
EOSINOPHIL NFR BLD: 3.7 %
EPI CELLS #/AREA URNS HPF: ABNORMAL /HPF (ref 0–5)
FERRITIN SERPL IA-MCNC: 1460 NG/ML (ref 15–150)
FOLATE SERPL-MCNC: 12.9 NG/ML (ref 4.78–24.2)
GFR SERPLBLD CREATININE-BSD FMLA CKD-EPI: 72 ML/MIN/{1.73_M2}
GLUCOSE SERPL-MCNC: 96 MG/DL (ref 70–99)
GLUCOSE UR STRIP.AUTO-MCNC: NEGATIVE MG/DL
HCT VFR BLD AUTO: 24.2 % (ref 36–48)
HGB BLD-MCNC: 7.9 G/DL (ref 12–16)
HGB UR QL STRIP.AUTO: NEGATIVE
IRON SATN MFR SERPL: 30 % (ref 15–50)
IRON SERPL-MCNC: 39 UG/DL (ref 37–145)
KETONES UR STRIP.AUTO-MCNC: ABNORMAL MG/DL
LEUKOCYTE ESTERASE UR QL STRIP.AUTO: ABNORMAL
LYMPHOCYTES # BLD: 1.6 K/UL (ref 1–5.1)
LYMPHOCYTES NFR BLD: 33.8 %
MCH RBC QN AUTO: 29.2 PG (ref 26–34)
MCHC RBC AUTO-ENTMCNC: 32.8 G/DL (ref 31–36)
MCV RBC AUTO: 88.9 FL (ref 80–100)
MONOCYTES # BLD: 0.4 K/UL (ref 0–1.3)
MONOCYTES NFR BLD: 8.9 %
NEUTROPHILS # BLD: 2.5 K/UL (ref 1.7–7.7)
NEUTROPHILS NFR BLD: 53 %
NITRITE UR QL STRIP.AUTO: NEGATIVE
PH UR STRIP.AUTO: 5.5 [PH] (ref 5–8)
PLATELET # BLD AUTO: 174 K/UL (ref 135–450)
PMV BLD AUTO: 8.2 FL (ref 5–10.5)
POTASSIUM SERPL-SCNC: 3.5 MMOL/L (ref 3.5–5.1)
PROT UR STRIP.AUTO-MCNC: 30 MG/DL
RBC # BLD AUTO: 2.72 M/UL (ref 4–5.2)
RBC #/AREA URNS HPF: ABNORMAL /HPF (ref 0–4)
SODIUM SERPL-SCNC: 140 MMOL/L (ref 136–145)
SP GR UR STRIP.AUTO: 1.02 (ref 1–1.03)
TIBC SERPL-MCNC: 132 UG/DL (ref 260–445)
UA COMPLETE W REFLEX CULTURE PNL UR: ABNORMAL
UA DIPSTICK W REFLEX MICRO PNL UR: YES
URN SPEC COLLECT METH UR: ABNORMAL
UROBILINOGEN UR STRIP-ACNC: 1 E.U./DL
VIT B12 SERPL-MCNC: 782 PG/ML (ref 211–911)
WBC # BLD AUTO: 4.7 K/UL (ref 4–11)
WBC #/AREA URNS HPF: ABNORMAL /HPF (ref 0–5)

## 2025-01-23 PROCEDURE — 2580000003 HC RX 258: Performed by: HOSPITALIST

## 2025-01-23 PROCEDURE — P9047 ALBUMIN (HUMAN), 25%, 50ML: HCPCS | Performed by: INTERNAL MEDICINE

## 2025-01-23 PROCEDURE — APPNB45 APP NON BILLABLE 31-45 MINUTES: Performed by: NURSE PRACTITIONER

## 2025-01-23 PROCEDURE — 99024 POSTOP FOLLOW-UP VISIT: CPT | Performed by: NURSE PRACTITIONER

## 2025-01-23 PROCEDURE — 6360000002 HC RX W HCPCS: Performed by: NURSE PRACTITIONER

## 2025-01-23 PROCEDURE — 6370000000 HC RX 637 (ALT 250 FOR IP): Performed by: NURSE PRACTITIONER

## 2025-01-23 PROCEDURE — 6360000002 HC RX W HCPCS: Performed by: INTERNAL MEDICINE

## 2025-01-23 PROCEDURE — 6360000002 HC RX W HCPCS: Performed by: HOSPITALIST

## 2025-01-23 PROCEDURE — 82607 VITAMIN B-12: CPT

## 2025-01-23 PROCEDURE — 82728 ASSAY OF FERRITIN: CPT

## 2025-01-23 PROCEDURE — 83550 IRON BINDING TEST: CPT

## 2025-01-23 PROCEDURE — 97535 SELF CARE MNGMENT TRAINING: CPT

## 2025-01-23 PROCEDURE — 2500000003 HC RX 250 WO HCPCS: Performed by: FAMILY MEDICINE

## 2025-01-23 PROCEDURE — 1200000000 HC SEMI PRIVATE

## 2025-01-23 PROCEDURE — 97530 THERAPEUTIC ACTIVITIES: CPT

## 2025-01-23 PROCEDURE — 6370000000 HC RX 637 (ALT 250 FOR IP): Performed by: INTERNAL MEDICINE

## 2025-01-23 PROCEDURE — 36415 COLL VENOUS BLD VENIPUNCTURE: CPT

## 2025-01-23 PROCEDURE — 82746 ASSAY OF FOLIC ACID SERUM: CPT

## 2025-01-23 PROCEDURE — 80048 BASIC METABOLIC PNL TOTAL CA: CPT

## 2025-01-23 PROCEDURE — 83540 ASSAY OF IRON: CPT

## 2025-01-23 PROCEDURE — 6370000000 HC RX 637 (ALT 250 FOR IP): Performed by: HOSPITALIST

## 2025-01-23 PROCEDURE — 2500000003 HC RX 250 WO HCPCS: Performed by: NURSE PRACTITIONER

## 2025-01-23 PROCEDURE — 81001 URINALYSIS AUTO W/SCOPE: CPT

## 2025-01-23 PROCEDURE — 85025 COMPLETE CBC W/AUTO DIFF WBC: CPT

## 2025-01-23 RX ORDER — FUROSEMIDE 10 MG/ML
20 INJECTION INTRAMUSCULAR; INTRAVENOUS 2 TIMES DAILY
Status: DISCONTINUED | OUTPATIENT
Start: 2025-01-23 | End: 2025-01-25

## 2025-01-23 RX ORDER — PANTOPRAZOLE SODIUM 40 MG/1
40 TABLET, DELAYED RELEASE ORAL
Status: DISCONTINUED | OUTPATIENT
Start: 2025-01-23 | End: 2025-01-26 | Stop reason: HOSPADM

## 2025-01-23 RX ORDER — OXYCODONE HYDROCHLORIDE 5 MG/1
5 TABLET ORAL EVERY 4 HOURS PRN
Qty: 20 TABLET | Refills: 0 | Status: SHIPPED | OUTPATIENT
Start: 2025-01-23 | End: 2025-01-25

## 2025-01-23 RX ORDER — ALBUMIN (HUMAN) 12.5 G/50ML
12.5 SOLUTION INTRAVENOUS EVERY 6 HOURS
Status: COMPLETED | OUTPATIENT
Start: 2025-01-23 | End: 2025-01-24

## 2025-01-23 RX ORDER — ASPIRIN 81 MG/1
81 TABLET, CHEWABLE ORAL 2 TIMES DAILY
Qty: 60 TABLET | Refills: 0 | Status: SHIPPED | OUTPATIENT
Start: 2025-01-23 | End: 2025-02-22

## 2025-01-23 RX ORDER — FERROUS SULFATE 325(65) MG
325 TABLET ORAL
Status: DISCONTINUED | OUTPATIENT
Start: 2025-01-23 | End: 2025-01-26 | Stop reason: HOSPADM

## 2025-01-23 RX ADMIN — ALBUMIN (HUMAN) 12.5 G: 0.25 INJECTION, SOLUTION INTRAVENOUS at 21:55

## 2025-01-23 RX ADMIN — ATORVASTATIN CALCIUM 10 MG: 10 TABLET, FILM COATED ORAL at 21:39

## 2025-01-23 RX ADMIN — PANTOPRAZOLE SODIUM 40 MG: 40 TABLET, DELAYED RELEASE ORAL at 16:38

## 2025-01-23 RX ADMIN — ACETAMINOPHEN 650 MG: 325 TABLET ORAL at 16:38

## 2025-01-23 RX ADMIN — Medication 2000 UNITS: at 09:37

## 2025-01-23 RX ADMIN — OXYCODONE 5 MG: 5 TABLET ORAL at 17:06

## 2025-01-23 RX ADMIN — SODIUM CHLORIDE, PRESERVATIVE FREE 10 ML: 5 INJECTION INTRAVENOUS at 21:57

## 2025-01-23 RX ADMIN — MEROPENEM 1000 MG: 1 INJECTION INTRAVENOUS at 21:50

## 2025-01-23 RX ADMIN — ACETAMINOPHEN 650 MG: 325 TABLET ORAL at 21:39

## 2025-01-23 RX ADMIN — ACETAMINOPHEN 650 MG: 325 TABLET ORAL at 09:36

## 2025-01-23 RX ADMIN — SUCRALFATE 1 G: 1 TABLET ORAL at 07:00

## 2025-01-23 RX ADMIN — OXYCODONE 5 MG: 5 TABLET ORAL at 07:00

## 2025-01-23 RX ADMIN — ASPIRIN 81 MG: 81 TABLET, COATED ORAL at 09:37

## 2025-01-23 RX ADMIN — MEROPENEM 1000 MG: 1 INJECTION INTRAVENOUS at 03:16

## 2025-01-23 RX ADMIN — ENOXAPARIN SODIUM 40 MG: 100 INJECTION SUBCUTANEOUS at 09:37

## 2025-01-23 RX ADMIN — HYDROXYCHLOROQUINE SULFATE 200 MG: 200 TABLET ORAL at 21:40

## 2025-01-23 RX ADMIN — SUCRALFATE 1 G: 1 TABLET ORAL at 16:38

## 2025-01-23 RX ADMIN — SUCRALFATE 1 G: 1 TABLET ORAL at 11:01

## 2025-01-23 RX ADMIN — PREGABALIN 200 MG: 100 CAPSULE ORAL at 09:37

## 2025-01-23 RX ADMIN — PREGABALIN 200 MG: 100 CAPSULE ORAL at 21:39

## 2025-01-23 RX ADMIN — ALBUMIN (HUMAN) 12.5 G: 0.25 INJECTION, SOLUTION INTRAVENOUS at 16:38

## 2025-01-23 RX ADMIN — SODIUM CHLORIDE, PRESERVATIVE FREE 10 ML: 5 INJECTION INTRAVENOUS at 09:37

## 2025-01-23 RX ADMIN — STANDARDIZED SENNA CONCENTRATE AND DOCUSATE SODIUM 1 TABLET: 8.6; 5 TABLET ORAL at 09:36

## 2025-01-23 RX ADMIN — MORPHINE SULFATE 4 MG: 4 INJECTION, SOLUTION INTRAMUSCULAR; INTRAVENOUS at 21:46

## 2025-01-23 RX ADMIN — STANDARDIZED SENNA CONCENTRATE AND DOCUSATE SODIUM 1 TABLET: 8.6; 5 TABLET ORAL at 21:38

## 2025-01-23 RX ADMIN — HYDROXYCHLOROQUINE SULFATE 200 MG: 200 TABLET ORAL at 09:37

## 2025-01-23 RX ADMIN — MEROPENEM 1000 MG: 1 INJECTION INTRAVENOUS at 11:14

## 2025-01-23 RX ADMIN — FUROSEMIDE 20 MG: 10 INJECTION, SOLUTION INTRAMUSCULAR; INTRAVENOUS at 16:34

## 2025-01-23 RX ADMIN — VILAZODONE HYDROCHLORIDE 20 MG: 20 TABLET, FILM COATED ORAL at 09:36

## 2025-01-23 RX ADMIN — FERROUS SULFATE TAB 325 MG (65 MG ELEMENTAL FE) 325 MG: 325 (65 FE) TAB at 09:41

## 2025-01-23 RX ADMIN — BUPROPION HYDROCHLORIDE 150 MG: 150 TABLET, EXTENDED RELEASE ORAL at 16:38

## 2025-01-23 RX ADMIN — OXYCODONE 5 MG: 5 TABLET ORAL at 11:00

## 2025-01-23 RX ADMIN — PANTOPRAZOLE SODIUM 40 MG: 40 INJECTION, POWDER, FOR SOLUTION INTRAVENOUS at 07:01

## 2025-01-23 ASSESSMENT — PAIN DESCRIPTION - LOCATION
LOCATION: HIP;LEG
LOCATION: PERINEUM
LOCATION: LEG
LOCATION: FOOT

## 2025-01-23 ASSESSMENT — PAIN DESCRIPTION - DESCRIPTORS
DESCRIPTORS: BURNING;DISCOMFORT
DESCRIPTORS: BURNING

## 2025-01-23 ASSESSMENT — PAIN SCALES - GENERAL
PAINLEVEL_OUTOF10: 7
PAINLEVEL_OUTOF10: 4
PAINLEVEL_OUTOF10: 1
PAINLEVEL_OUTOF10: 2
PAINLEVEL_OUTOF10: 7

## 2025-01-23 ASSESSMENT — PAIN DESCRIPTION - ORIENTATION
ORIENTATION: RIGHT
ORIENTATION: LEFT
ORIENTATION: RIGHT

## 2025-01-23 NOTE — PLAN OF CARE
Problem: Discharge Planning  Goal: Discharge to home or other facility with appropriate resources  1/22/2025 2307 by Madelaine Ontiveros RN  Outcome: Progressing  1/22/2025 1054 by Lou Moore RN  Outcome: Progressing     Problem: Pain  Goal: Verbalizes/displays adequate comfort level or baseline comfort level  1/22/2025 2307 by Madelaine Ontiveros RN  Outcome: Progressing  1/22/2025 1054 by Lou Moore RN  Outcome: Progressing     Problem: Safety - Adult  Goal: Free from fall injury  1/22/2025 2307 by Madelaine Ontiveros RN  Outcome: Progressing  1/22/2025 1054 by Lou Moore RN  Outcome: Progressing     Problem: ABCDS Injury Assessment  Goal: Absence of physical injury  1/22/2025 2307 by Madelaine Ontiveros RN  Outcome: Progressing  1/22/2025 1054 by Lou Moore RN  Outcome: Progressing     Problem: Skin/Tissue Integrity  Goal: Absence of new skin breakdown  Description: 1.  Monitor for areas of redness and/or skin breakdown  2.  Assess vascular access sites hourly  3.  Every 4-6 hours minimum:  Change oxygen saturation probe site  4.  Every 4-6 hours:  If on nasal continuous positive airway pressure, respiratory therapy assess nares and determine need for appliance change or resting period.  1/22/2025 2307 by Madelaine Ontiveros RN  Outcome: Progressing  1/22/2025 1054 by Lou Moore RN  Outcome: Progressing     Problem: Nutrition Deficit:  Goal: Optimize nutritional status  1/22/2025 2307 by Madelaine Ontiveros RN  Outcome: Progressing  1/22/2025 1054 by Lou Moore RN  Outcome: Progressing

## 2025-01-23 NOTE — PLAN OF CARE
Problem: Discharge Planning  Goal: Discharge to home or other facility with appropriate resources  1/23/2025 1002 by Lou Moore RN  Outcome: Progressing  1/22/2025 2307 by Madelaine Ontiveros RN  Outcome: Progressing     Problem: Pain  Goal: Verbalizes/displays adequate comfort level or baseline comfort level  1/23/2025 1002 by Lou Moore RN  Outcome: Progressing  1/22/2025 2307 by Madelaine Ontiveros RN  Outcome: Progressing     Problem: Safety - Adult  Goal: Free from fall injury  1/23/2025 1002 by Lou Moore RN  Outcome: Progressing  1/22/2025 2307 by Madelaine Ontiveros RN  Outcome: Progressing     Problem: ABCDS Injury Assessment  Goal: Absence of physical injury  1/23/2025 1002 by Lou Moore RN  Outcome: Progressing  1/22/2025 2307 by Madelaine Ontiveros RN  Outcome: Progressing     Problem: Skin/Tissue Integrity  Goal: Absence of new skin breakdown  Description: 1.  Monitor for areas of redness and/or skin breakdown  2.  Assess vascular access sites hourly  3.  Every 4-6 hours minimum:  Change oxygen saturation probe site  4.  Every 4-6 hours:  If on nasal continuous positive airway pressure, respiratory therapy assess nares and determine need for appliance change or resting period.  1/23/2025 1002 by Lou Moore RN  Outcome: Progressing  1/22/2025 2307 by Madelaine Ontiveros RN  Outcome: Progressing     Problem: Nutrition Deficit:  Goal: Optimize nutritional status  1/23/2025 1002 by Lou Moore RN  Outcome: Progressing  1/22/2025 2307 by Madelaine Ontiveros RN  Outcome: Progressing

## 2025-01-24 LAB
ANION GAP SERPL CALCULATED.3IONS-SCNC: 5 MMOL/L (ref 3–16)
BASOPHILS # BLD: 0 K/UL (ref 0–0.2)
BASOPHILS NFR BLD: 0.7 %
BUN SERPL-MCNC: 36 MG/DL (ref 7–20)
CALCIUM SERPL-MCNC: 9.1 MG/DL (ref 8.3–10.6)
CHLORIDE SERPL-SCNC: 97 MMOL/L (ref 99–110)
CO2 SERPL-SCNC: 39 MMOL/L (ref 21–32)
CREAT SERPL-MCNC: 0.8 MG/DL (ref 0.6–1.2)
DEPRECATED RDW RBC AUTO: 15.9 % (ref 12.4–15.4)
EOSINOPHIL # BLD: 0.2 K/UL (ref 0–0.6)
EOSINOPHIL NFR BLD: 3.8 %
GFR SERPLBLD CREATININE-BSD FMLA CKD-EPI: 72 ML/MIN/{1.73_M2}
GLUCOSE SERPL-MCNC: 92 MG/DL (ref 70–99)
HCT VFR BLD AUTO: 22.9 % (ref 36–48)
HGB BLD-MCNC: 7.6 G/DL (ref 12–16)
LYMPHOCYTES # BLD: 1.5 K/UL (ref 1–5.1)
LYMPHOCYTES NFR BLD: 36.1 %
MAGNESIUM SERPL-MCNC: 1.96 MG/DL (ref 1.8–2.4)
MCH RBC QN AUTO: 29.1 PG (ref 26–34)
MCHC RBC AUTO-ENTMCNC: 33.1 G/DL (ref 31–36)
MCV RBC AUTO: 88.1 FL (ref 80–100)
MONOCYTES # BLD: 0.4 K/UL (ref 0–1.3)
MONOCYTES NFR BLD: 9.6 %
NEUTROPHILS # BLD: 2.1 K/UL (ref 1.7–7.7)
NEUTROPHILS NFR BLD: 49.8 %
PHOSPHATE SERPL-MCNC: 2.3 MG/DL (ref 2.5–4.9)
PLATELET # BLD AUTO: 171 K/UL (ref 135–450)
PMV BLD AUTO: 8.7 FL (ref 5–10.5)
POTASSIUM SERPL-SCNC: 3.7 MMOL/L (ref 3.5–5.1)
RBC # BLD AUTO: 2.6 M/UL (ref 4–5.2)
SODIUM SERPL-SCNC: 141 MMOL/L (ref 136–145)
WBC # BLD AUTO: 4.3 K/UL (ref 4–11)

## 2025-01-24 PROCEDURE — 6360000002 HC RX W HCPCS: Performed by: INTERNAL MEDICINE

## 2025-01-24 PROCEDURE — 83735 ASSAY OF MAGNESIUM: CPT

## 2025-01-24 PROCEDURE — 97530 THERAPEUTIC ACTIVITIES: CPT

## 2025-01-24 PROCEDURE — 84100 ASSAY OF PHOSPHORUS: CPT

## 2025-01-24 PROCEDURE — 97110 THERAPEUTIC EXERCISES: CPT

## 2025-01-24 PROCEDURE — 6370000000 HC RX 637 (ALT 250 FOR IP): Performed by: INTERNAL MEDICINE

## 2025-01-24 PROCEDURE — P9047 ALBUMIN (HUMAN), 25%, 50ML: HCPCS | Performed by: INTERNAL MEDICINE

## 2025-01-24 PROCEDURE — 2580000003 HC RX 258: Performed by: HOSPITALIST

## 2025-01-24 PROCEDURE — 80048 BASIC METABOLIC PNL TOTAL CA: CPT

## 2025-01-24 PROCEDURE — 6370000000 HC RX 637 (ALT 250 FOR IP): Performed by: NURSE PRACTITIONER

## 2025-01-24 PROCEDURE — 2500000003 HC RX 250 WO HCPCS: Performed by: NURSE PRACTITIONER

## 2025-01-24 PROCEDURE — 2500000003 HC RX 250 WO HCPCS: Performed by: FAMILY MEDICINE

## 2025-01-24 PROCEDURE — 6360000002 HC RX W HCPCS: Performed by: HOSPITALIST

## 2025-01-24 PROCEDURE — 6370000000 HC RX 637 (ALT 250 FOR IP): Performed by: HOSPITALIST

## 2025-01-24 PROCEDURE — 36415 COLL VENOUS BLD VENIPUNCTURE: CPT

## 2025-01-24 PROCEDURE — 1200000000 HC SEMI PRIVATE

## 2025-01-24 PROCEDURE — 97535 SELF CARE MNGMENT TRAINING: CPT

## 2025-01-24 PROCEDURE — 97129 THER IVNTJ 1ST 15 MIN: CPT

## 2025-01-24 PROCEDURE — 92526 ORAL FUNCTION THERAPY: CPT

## 2025-01-24 PROCEDURE — 85025 COMPLETE CBC W/AUTO DIFF WBC: CPT

## 2025-01-24 RX ORDER — FLUCONAZOLE 100 MG/1
200 TABLET ORAL DAILY
Status: COMPLETED | OUTPATIENT
Start: 2025-01-24 | End: 2025-01-26

## 2025-01-24 RX ORDER — ESTRADIOL 0.1 MG/G
1 CREAM VAGINAL DAILY
Status: DISCONTINUED | OUTPATIENT
Start: 2025-01-24 | End: 2025-01-26 | Stop reason: HOSPADM

## 2025-01-24 RX ADMIN — ALBUMIN (HUMAN) 12.5 G: 0.25 INJECTION, SOLUTION INTRAVENOUS at 03:04

## 2025-01-24 RX ADMIN — SUCRALFATE 1 G: 1 TABLET ORAL at 12:00

## 2025-01-24 RX ADMIN — STANDARDIZED SENNA CONCENTRATE AND DOCUSATE SODIUM 1 TABLET: 8.6; 5 TABLET ORAL at 08:21

## 2025-01-24 RX ADMIN — MEROPENEM 1000 MG: 1 INJECTION INTRAVENOUS at 03:05

## 2025-01-24 RX ADMIN — OXYCODONE 5 MG: 5 TABLET ORAL at 08:21

## 2025-01-24 RX ADMIN — MEROPENEM 1000 MG: 1 INJECTION INTRAVENOUS at 12:04

## 2025-01-24 RX ADMIN — ALBUMIN (HUMAN) 12.5 G: 0.25 INJECTION, SOLUTION INTRAVENOUS at 15:11

## 2025-01-24 RX ADMIN — SUCRALFATE 1 G: 1 TABLET ORAL at 15:12

## 2025-01-24 RX ADMIN — ALBUMIN (HUMAN) 12.5 G: 0.25 INJECTION, SOLUTION INTRAVENOUS at 19:56

## 2025-01-24 RX ADMIN — FUROSEMIDE 20 MG: 10 INJECTION, SOLUTION INTRAMUSCULAR; INTRAVENOUS at 18:51

## 2025-01-24 RX ADMIN — ACETAMINOPHEN 650 MG: 325 TABLET ORAL at 08:21

## 2025-01-24 RX ADMIN — ACETAMINOPHEN 650 MG: 325 TABLET ORAL at 19:46

## 2025-01-24 RX ADMIN — ALBUMIN (HUMAN) 12.5 G: 0.25 INJECTION, SOLUTION INTRAVENOUS at 08:16

## 2025-01-24 RX ADMIN — PANTOPRAZOLE SODIUM 40 MG: 40 TABLET, DELAYED RELEASE ORAL at 06:23

## 2025-01-24 RX ADMIN — FUROSEMIDE 20 MG: 10 INJECTION, SOLUTION INTRAMUSCULAR; INTRAVENOUS at 08:20

## 2025-01-24 RX ADMIN — ENOXAPARIN SODIUM 40 MG: 100 INJECTION SUBCUTANEOUS at 08:22

## 2025-01-24 RX ADMIN — HYDROXYCHLOROQUINE SULFATE 200 MG: 200 TABLET ORAL at 08:20

## 2025-01-24 RX ADMIN — PREGABALIN 200 MG: 100 CAPSULE ORAL at 19:46

## 2025-01-24 RX ADMIN — SODIUM CHLORIDE, PRESERVATIVE FREE 10 ML: 5 INJECTION INTRAVENOUS at 08:31

## 2025-01-24 RX ADMIN — OXYCODONE 5 MG: 5 TABLET ORAL at 21:16

## 2025-01-24 RX ADMIN — OXYCODONE 5 MG: 5 TABLET ORAL at 03:35

## 2025-01-24 RX ADMIN — VILAZODONE HYDROCHLORIDE 20 MG: 20 TABLET, FILM COATED ORAL at 08:20

## 2025-01-24 RX ADMIN — ASPIRIN 81 MG: 81 TABLET, COATED ORAL at 08:21

## 2025-01-24 RX ADMIN — PREGABALIN 200 MG: 100 CAPSULE ORAL at 08:21

## 2025-01-24 RX ADMIN — ESTRADIOL 1 G: 0.1 CREAM VAGINAL at 12:31

## 2025-01-24 RX ADMIN — SODIUM CHLORIDE, PRESERVATIVE FREE 10 ML: 5 INJECTION INTRAVENOUS at 19:41

## 2025-01-24 RX ADMIN — BUPROPION HYDROCHLORIDE 150 MG: 150 TABLET, EXTENDED RELEASE ORAL at 15:12

## 2025-01-24 RX ADMIN — OXYCODONE 5 MG: 5 TABLET ORAL at 15:15

## 2025-01-24 RX ADMIN — SODIUM CHLORIDE, PRESERVATIVE FREE 10 ML: 5 INJECTION INTRAVENOUS at 08:22

## 2025-01-24 RX ADMIN — HYDROXYCHLOROQUINE SULFATE 200 MG: 200 TABLET ORAL at 19:46

## 2025-01-24 RX ADMIN — PANTOPRAZOLE SODIUM 40 MG: 40 TABLET, DELAYED RELEASE ORAL at 15:12

## 2025-01-24 RX ADMIN — MEROPENEM 1000 MG: 1 INJECTION INTRAVENOUS at 19:46

## 2025-01-24 RX ADMIN — ACETAMINOPHEN 650 MG: 325 TABLET ORAL at 15:12

## 2025-01-24 RX ADMIN — Medication 2000 UNITS: at 08:22

## 2025-01-24 RX ADMIN — FLUCONAZOLE 200 MG: 100 TABLET ORAL at 12:30

## 2025-01-24 RX ADMIN — FERROUS SULFATE TAB 325 MG (65 MG ELEMENTAL FE) 325 MG: 325 (65 FE) TAB at 08:21

## 2025-01-24 RX ADMIN — SUCRALFATE 1 G: 1 TABLET ORAL at 06:23

## 2025-01-24 ASSESSMENT — PAIN DESCRIPTION - LOCATION
LOCATION: CHEST
LOCATION: HIP

## 2025-01-24 ASSESSMENT — PAIN SCALES - GENERAL
PAINLEVEL_OUTOF10: 3
PAINLEVEL_OUTOF10: 8
PAINLEVEL_OUTOF10: 4
PAINLEVEL_OUTOF10: 4
PAINLEVEL_OUTOF10: 8
PAINLEVEL_OUTOF10: 7

## 2025-01-24 ASSESSMENT — PAIN DESCRIPTION - DESCRIPTORS
DESCRIPTORS: ACHING
DESCRIPTORS: ACHING;SORE

## 2025-01-24 ASSESSMENT — PAIN DESCRIPTION - ORIENTATION
ORIENTATION: MID
ORIENTATION: RIGHT
ORIENTATION: RIGHT;LEFT
ORIENTATION: RIGHT

## 2025-01-24 ASSESSMENT — PAIN DESCRIPTION - PAIN TYPE: TYPE: ACUTE PAIN;CHRONIC PAIN

## 2025-01-24 NOTE — PLAN OF CARE
Problem: Discharge Planning  Goal: Discharge to home or other facility with appropriate resources  1/23/2025 2237 by Kenia Celeste RN  Outcome: Progressing  1/23/2025 1002 by Lou Moore RN  Outcome: Progressing     Problem: Pain  Goal: Verbalizes/displays adequate comfort level or baseline comfort level  1/23/2025 2237 by Kenia Celeste RN  Outcome: Progressing  1/23/2025 1002 by Lou Moore RN  Outcome: Progressing     Problem: Safety - Adult  Goal: Free from fall injury  1/23/2025 2237 by Kenia Celeste RN  Outcome: Progressing  1/23/2025 1002 by Lou Moore RN  Outcome: Progressing     Problem: ABCDS Injury Assessment  Goal: Absence of physical injury  1/23/2025 2237 by Kenia Celeste RN  Outcome: Progressing  1/23/2025 1002 by Lou Moore RN  Outcome: Progressing     Problem: Skin/Tissue Integrity  Goal: Absence of new skin breakdown  Description: 1.  Monitor for areas of redness and/or skin breakdown  2.  Assess vascular access sites hourly  3.  Every 4-6 hours minimum:  Change oxygen saturation probe site  4.  Every 4-6 hours:  If on nasal continuous positive airway pressure, respiratory therapy assess nares and determine need for appliance change or resting period.  1/23/2025 2237 by Kenia Celeste RN  Outcome: Progressing  1/23/2025 1002 by Lou Moore RN  Outcome: Progressing     Problem: Nutrition Deficit:  Goal: Optimize nutritional status  1/23/2025 2237 by Kenia Celeste RN  Outcome: Progressing  1/23/2025 1002 by Lou Moore RN  Outcome: Progressing

## 2025-01-24 NOTE — PLAN OF CARE
Problem: Discharge Planning  Goal: Discharge to home or other facility with appropriate resources  1/24/2025 0831 by Adrian Schaefer RN  Outcome: Progressing  1/23/2025 2237 by Kenia Celeste RN  Outcome: Progressing     Problem: Pain  Goal: Verbalizes/displays adequate comfort level or baseline comfort level  1/24/2025 0831 by Adrian Schaefer RN  Outcome: Progressing  1/23/2025 2237 by Kenia Celeste RN  Outcome: Progressing     Problem: Safety - Adult  Goal: Free from fall injury  1/24/2025 0831 by Adrian Schaefer RN  Outcome: Progressing  1/23/2025 2237 by Kenia Celeste RN  Outcome: Progressing     Problem: ABCDS Injury Assessment  Goal: Absence of physical injury  1/24/2025 0831 by Adrian Schaefer RN  Outcome: Progressing  1/23/2025 2237 by Kenia Celeste RN  Outcome: Progressing     Problem: Skin/Tissue Integrity  Goal: Absence of new skin breakdown  Description: 1.  Monitor for areas of redness and/or skin breakdown  2.  Assess vascular access sites hourly  3.  Every 4-6 hours minimum:  Change oxygen saturation probe site  4.  Every 4-6 hours:  If on nasal continuous positive airway pressure, respiratory therapy assess nares and determine need for appliance change or resting period.  1/24/2025 0831 by Adrian Schaefer RN  Outcome: Progressing  1/23/2025 2237 by Kenia Celeste RN  Outcome: Progressing     Problem: Nutrition Deficit:  Goal: Optimize nutritional status  1/24/2025 0831 by Adrian Schaefer RN  Outcome: Progressing  1/23/2025 2237 by Kenia Celeste RN  Outcome: Progressing

## 2025-01-25 LAB
ANION GAP SERPL CALCULATED.3IONS-SCNC: 6 MMOL/L (ref 3–16)
BUN SERPL-MCNC: 30 MG/DL (ref 7–20)
CALCIUM SERPL-MCNC: 9 MG/DL (ref 8.3–10.6)
CHLORIDE SERPL-SCNC: 96 MMOL/L (ref 99–110)
CO2 SERPL-SCNC: 38 MMOL/L (ref 21–32)
CREAT SERPL-MCNC: 0.8 MG/DL (ref 0.6–1.2)
GFR SERPLBLD CREATININE-BSD FMLA CKD-EPI: 72 ML/MIN/{1.73_M2}
GLUCOSE SERPL-MCNC: 90 MG/DL (ref 70–99)
POTASSIUM SERPL-SCNC: 3.8 MMOL/L (ref 3.5–5.1)
SODIUM SERPL-SCNC: 140 MMOL/L (ref 136–145)

## 2025-01-25 PROCEDURE — 1200000000 HC SEMI PRIVATE

## 2025-01-25 PROCEDURE — 6360000002 HC RX W HCPCS: Performed by: INTERNAL MEDICINE

## 2025-01-25 PROCEDURE — 2580000003 HC RX 258: Performed by: HOSPITALIST

## 2025-01-25 PROCEDURE — 6370000000 HC RX 637 (ALT 250 FOR IP): Performed by: NURSE PRACTITIONER

## 2025-01-25 PROCEDURE — 2500000003 HC RX 250 WO HCPCS: Performed by: NURSE PRACTITIONER

## 2025-01-25 PROCEDURE — 2500000003 HC RX 250 WO HCPCS: Performed by: FAMILY MEDICINE

## 2025-01-25 PROCEDURE — 6370000000 HC RX 637 (ALT 250 FOR IP): Performed by: INTERNAL MEDICINE

## 2025-01-25 PROCEDURE — 94760 N-INVAS EAR/PLS OXIMETRY 1: CPT

## 2025-01-25 PROCEDURE — 80048 BASIC METABOLIC PNL TOTAL CA: CPT

## 2025-01-25 PROCEDURE — 6360000002 HC RX W HCPCS: Performed by: HOSPITALIST

## 2025-01-25 PROCEDURE — 6360000002 HC RX W HCPCS: Performed by: NURSE PRACTITIONER

## 2025-01-25 PROCEDURE — 2580000003 HC RX 258: Performed by: FAMILY MEDICINE

## 2025-01-25 RX ORDER — FERROUS SULFATE 325(65) MG
325 TABLET ORAL
Qty: 30 TABLET | Refills: 3 | Status: SHIPPED | OUTPATIENT
Start: 2025-01-26

## 2025-01-25 RX ORDER — OXYCODONE HYDROCHLORIDE 5 MG/1
5 TABLET ORAL EVERY 4 HOURS PRN
Qty: 20 TABLET | Refills: 0 | Status: SHIPPED | OUTPATIENT
Start: 2025-01-25 | End: 2025-01-30

## 2025-01-25 RX ORDER — SENNA AND DOCUSATE SODIUM 50; 8.6 MG/1; MG/1
1 TABLET, FILM COATED ORAL 2 TIMES DAILY
Qty: 30 TABLET | Refills: 0 | Status: SHIPPED | OUTPATIENT
Start: 2025-01-25

## 2025-01-25 RX ORDER — FUROSEMIDE 10 MG/ML
20 INJECTION INTRAMUSCULAR; INTRAVENOUS ONCE
Status: COMPLETED | OUTPATIENT
Start: 2025-01-25 | End: 2025-01-25

## 2025-01-25 RX ORDER — FUROSEMIDE 20 MG/1
20 TABLET ORAL DAILY
Status: DISCONTINUED | OUTPATIENT
Start: 2025-01-26 | End: 2025-01-26 | Stop reason: HOSPADM

## 2025-01-25 RX ORDER — FUROSEMIDE 20 MG/1
20 TABLET ORAL DAILY
Qty: 60 TABLET | Refills: 3 | Status: SHIPPED | OUTPATIENT
Start: 2025-01-26

## 2025-01-25 RX ORDER — POTASSIUM CHLORIDE 750 MG/1
10 TABLET, EXTENDED RELEASE ORAL DAILY
Qty: 30 TABLET | Refills: 1 | Status: SHIPPED | OUTPATIENT
Start: 2025-01-25

## 2025-01-25 RX ADMIN — MEROPENEM 1000 MG: 1 INJECTION INTRAVENOUS at 19:54

## 2025-01-25 RX ADMIN — ACETAMINOPHEN 650 MG: 325 TABLET ORAL at 07:46

## 2025-01-25 RX ADMIN — FERROUS SULFATE TAB 325 MG (65 MG ELEMENTAL FE) 325 MG: 325 (65 FE) TAB at 07:46

## 2025-01-25 RX ADMIN — HYDROXYCHLOROQUINE SULFATE 200 MG: 200 TABLET ORAL at 19:59

## 2025-01-25 RX ADMIN — BUPROPION HYDROCHLORIDE 150 MG: 150 TABLET, EXTENDED RELEASE ORAL at 15:36

## 2025-01-25 RX ADMIN — ATORVASTATIN CALCIUM 10 MG: 10 TABLET, FILM COATED ORAL at 19:58

## 2025-01-25 RX ADMIN — VILAZODONE HYDROCHLORIDE 20 MG: 20 TABLET, FILM COATED ORAL at 07:45

## 2025-01-25 RX ADMIN — PREGABALIN 200 MG: 100 CAPSULE ORAL at 19:59

## 2025-01-25 RX ADMIN — ESTRADIOL 1 G: 0.1 CREAM VAGINAL at 07:47

## 2025-01-25 RX ADMIN — FLUCONAZOLE 200 MG: 100 TABLET ORAL at 07:45

## 2025-01-25 RX ADMIN — SODIUM CHLORIDE, PRESERVATIVE FREE 10 ML: 5 INJECTION INTRAVENOUS at 19:54

## 2025-01-25 RX ADMIN — HYDROXYCHLOROQUINE SULFATE 200 MG: 200 TABLET ORAL at 07:45

## 2025-01-25 RX ADMIN — SODIUM CHLORIDE, PRESERVATIVE FREE 10 ML: 5 INJECTION INTRAVENOUS at 07:48

## 2025-01-25 RX ADMIN — PREGABALIN 200 MG: 100 CAPSULE ORAL at 07:47

## 2025-01-25 RX ADMIN — ACETAMINOPHEN 650 MG: 325 TABLET ORAL at 15:35

## 2025-01-25 RX ADMIN — SUCRALFATE 1 G: 1 TABLET ORAL at 10:55

## 2025-01-25 RX ADMIN — SODIUM CHLORIDE: 9 INJECTION, SOLUTION INTRAVENOUS at 19:53

## 2025-01-25 RX ADMIN — ACETAMINOPHEN 650 MG: 325 TABLET ORAL at 20:00

## 2025-01-25 RX ADMIN — ASPIRIN 81 MG: 81 TABLET, COATED ORAL at 07:47

## 2025-01-25 RX ADMIN — PANTOPRAZOLE SODIUM 40 MG: 40 TABLET, DELAYED RELEASE ORAL at 15:36

## 2025-01-25 RX ADMIN — FUROSEMIDE 20 MG: 10 INJECTION, SOLUTION INTRAMUSCULAR; INTRAVENOUS at 07:46

## 2025-01-25 RX ADMIN — MEROPENEM 1000 MG: 1 INJECTION INTRAVENOUS at 03:17

## 2025-01-25 RX ADMIN — SUCRALFATE 1 G: 1 TABLET ORAL at 05:22

## 2025-01-25 RX ADMIN — ENOXAPARIN SODIUM 40 MG: 100 INJECTION SUBCUTANEOUS at 07:47

## 2025-01-25 RX ADMIN — SUCRALFATE 1 G: 1 TABLET ORAL at 15:36

## 2025-01-25 RX ADMIN — MEROPENEM 1000 MG: 1 INJECTION INTRAVENOUS at 10:56

## 2025-01-25 RX ADMIN — MORPHINE SULFATE 4 MG: 4 INJECTION, SOLUTION INTRAMUSCULAR; INTRAVENOUS at 10:56

## 2025-01-25 RX ADMIN — Medication 2000 UNITS: at 07:47

## 2025-01-25 RX ADMIN — FUROSEMIDE 20 MG: 10 INJECTION, SOLUTION INTRAMUSCULAR; INTRAVENOUS at 15:35

## 2025-01-25 RX ADMIN — PANTOPRAZOLE SODIUM 40 MG: 40 TABLET, DELAYED RELEASE ORAL at 05:22

## 2025-01-25 ASSESSMENT — PAIN DESCRIPTION - DESCRIPTORS
DESCRIPTORS: ACHING
DESCRIPTORS: ACHING

## 2025-01-25 ASSESSMENT — PAIN DESCRIPTION - ORIENTATION
ORIENTATION: RIGHT
ORIENTATION: MID
ORIENTATION: RIGHT
ORIENTATION: MID

## 2025-01-25 ASSESSMENT — PAIN SCALES - GENERAL
PAINLEVEL_OUTOF10: 3
PAINLEVEL_OUTOF10: 5
PAINLEVEL_OUTOF10: 4
PAINLEVEL_OUTOF10: 3
PAINLEVEL_OUTOF10: 8

## 2025-01-25 ASSESSMENT — PAIN DESCRIPTION - PAIN TYPE
TYPE: SURGICAL PAIN
TYPE: SURGICAL PAIN

## 2025-01-25 ASSESSMENT — PAIN DESCRIPTION - LOCATION
LOCATION: HIP
LOCATION: ABDOMEN
LOCATION: ABDOMEN
LOCATION: HIP

## 2025-01-25 NOTE — PLAN OF CARE
Problem: Discharge Planning  Goal: Discharge to home or other facility with appropriate resources  1/25/2025 0752 by Adrian Schaefer RN  Outcome: Progressing  1/24/2025 2126 by Norma Pena RN  Outcome: Progressing     Problem: Pain  Goal: Verbalizes/displays adequate comfort level or baseline comfort level  1/25/2025 0752 by Adrian Schaefer RN  Outcome: Progressing  1/24/2025 2126 by Norma Pena RN  Outcome: Progressing     Problem: Safety - Adult  Goal: Free from fall injury  1/25/2025 0752 by Adrian Schaefer RN  Outcome: Progressing  1/24/2025 2126 by Norma Pena RN  Outcome: Progressing     Problem: ABCDS Injury Assessment  Goal: Absence of physical injury  1/25/2025 0752 by Adrian Schaefer RN  Outcome: Progressing  1/24/2025 2126 by Norma Pnea RN  Outcome: Progressing     Problem: Skin/Tissue Integrity  Goal: Absence of new skin breakdown  Description: 1.  Monitor for areas of redness and/or skin breakdown  2.  Assess vascular access sites hourly  3.  Every 4-6 hours minimum:  Change oxygen saturation probe site  4.  Every 4-6 hours:  If on nasal continuous positive airway pressure, respiratory therapy assess nares and determine need for appliance change or resting period.  1/25/2025 0752 by Adrian Schaefer RN  Outcome: Progressing  1/24/2025 2126 by Norma Pena RN  Outcome: Progressing     Problem: Nutrition Deficit:  Goal: Optimize nutritional status  1/25/2025 0752 by Adrian Schaefer RN  Outcome: Progressing  1/24/2025 2126 by Norma Pena RN  Outcome: Progressing

## 2025-01-25 NOTE — DISCHARGE SUMMARY
Patient: Antonette Brown     Gender: female  : 1940   Age: 84 y.o.  MRN: [id number]    Admitting Physician: Arley Mráquez MD  Discharge Physician: Arley Márquez MD     Code Status: Full Code     Admit Date: 2025   Discharge Date: 24      Disposition:  To a non-[facility] facility    Discharge Diagnoses:  Fall mechanical  Right femur fracture s/p open reduction internal fixation  UTI secondary to MDRO completed course of antibiotics  Acute hypoxic respiratory failure secondary to acute diastolic heart failure treated  Shock hemorrhagic and secondary to UTI treated  Right thigh hematoma with acute blood loss anemia treated  6 septic shock secondary to ESBL UTI treated  Influenza infection  Hypertension  Hyperlipidemia  Generalized debility      Active Hospital Problems    Diagnosis Date Noted    History of depression [Z86.59] 2022     Priority: Medium    Peripheral edema [R60.0] 2022     Priority: Medium    Dyslipidemia [E78.5] 2022     Priority: Medium    Receiving intravenous antibiotic treatment as outpatient [Z79.2] 2025    Complex care coordination [Z71.89] 2025    Closed fracture of right distal femur (HCC) [S72.401A] 2025    Influenza A [J10.1] 2025    Infection requiring contact isolation precautions [B99.9] 2025    Gastroesophageal reflux disease without esophagitis [K21.9] 2025    Lactic acidosis [E87.20] 2025    Septic shock (HCC) [A41.9, R65.21] 2025    Hemorrhagic shock (HCC) [R57.8] 2025    Acute cystitis without hematuria [N30.00] 2025    Fall [W19.XXXA] 2025    Closed displaced comminuted fracture of shaft of right femur (HCC) [S72.351A] 2025    Acute respiratory failure with hypoxia [J96.01] 2024    Coronary artery disease due to lipid rich plaque [I25.10, I25.83] 10/24/2024    Urinary tract infection due to ESBL Klebsiella [N39.0, B96.89] 10/22/2024    Acute blood loss anemia [D62] 10/22/2024

## 2025-01-25 NOTE — PLAN OF CARE
Problem: Discharge Planning  Goal: Discharge to home or other facility with appropriate resources  1/24/2025 2126 by Norma Pena RN  Outcome: Progressing  1/24/2025 0831 by Adrian Schaefer RN  Outcome: Progressing     Problem: Pain  Goal: Verbalizes/displays adequate comfort level or baseline comfort level  1/24/2025 2126 by Norma Pena RN  Outcome: Progressing  1/24/2025 0831 by Adrian Schaefer RN  Outcome: Progressing     Problem: Safety - Adult  Goal: Free from fall injury  1/24/2025 2126 by Norma Pena RN  Outcome: Progressing  1/24/2025 0831 by Adrian Schaefer RN  Outcome: Progressing     Problem: ABCDS Injury Assessment  Goal: Absence of physical injury  1/24/2025 2126 by Norma Pena RN  Outcome: Progressing  1/24/2025 0831 by Adrian Schaefer RN  Outcome: Progressing     Problem: Skin/Tissue Integrity  Goal: Absence of new skin breakdown  Description: 1.  Monitor for areas of redness and/or skin breakdown  2.  Assess vascular access sites hourly  3.  Every 4-6 hours minimum:  Change oxygen saturation probe site  4.  Every 4-6 hours:  If on nasal continuous positive airway pressure, respiratory therapy assess nares and determine need for appliance change or resting period.  1/24/2025 2126 by Norma Pena RN  Outcome: Progressing  1/24/2025 0831 by Adrian Schaefer RN  Outcome: Progressing     Problem: Nutrition Deficit:  Goal: Optimize nutritional status  1/24/2025 2126 by Norma Pena RN  Outcome: Progressing  1/24/2025 0831 by Adrian Schaefer RN  Outcome: Progressing

## 2025-01-26 VITALS
WEIGHT: 201.7 LBS | HEART RATE: 87 BPM | DIASTOLIC BLOOD PRESSURE: 71 MMHG | HEIGHT: 60 IN | RESPIRATION RATE: 18 BRPM | TEMPERATURE: 97.5 F | SYSTOLIC BLOOD PRESSURE: 154 MMHG | OXYGEN SATURATION: 97 % | BODY MASS INDEX: 39.6 KG/M2

## 2025-01-26 LAB
ANION GAP SERPL CALCULATED.3IONS-SCNC: 7 MMOL/L (ref 3–16)
BUN SERPL-MCNC: 29 MG/DL (ref 7–20)
CALCIUM SERPL-MCNC: 9 MG/DL (ref 8.3–10.6)
CHLORIDE SERPL-SCNC: 94 MMOL/L (ref 99–110)
CO2 SERPL-SCNC: 38 MMOL/L (ref 21–32)
CREAT SERPL-MCNC: 0.8 MG/DL (ref 0.6–1.2)
GFR SERPLBLD CREATININE-BSD FMLA CKD-EPI: 72 ML/MIN/{1.73_M2}
GLUCOSE SERPL-MCNC: 91 MG/DL (ref 70–99)
POTASSIUM SERPL-SCNC: 3.9 MMOL/L (ref 3.5–5.1)
SODIUM SERPL-SCNC: 139 MMOL/L (ref 136–145)

## 2025-01-26 PROCEDURE — 6360000002 HC RX W HCPCS: Performed by: NURSE PRACTITIONER

## 2025-01-26 PROCEDURE — 2580000003 HC RX 258: Performed by: FAMILY MEDICINE

## 2025-01-26 PROCEDURE — 6360000002 HC RX W HCPCS: Performed by: INTERNAL MEDICINE

## 2025-01-26 PROCEDURE — 2500000003 HC RX 250 WO HCPCS: Performed by: FAMILY MEDICINE

## 2025-01-26 PROCEDURE — 6370000000 HC RX 637 (ALT 250 FOR IP): Performed by: INTERNAL MEDICINE

## 2025-01-26 PROCEDURE — 6370000000 HC RX 637 (ALT 250 FOR IP): Performed by: NURSE PRACTITIONER

## 2025-01-26 PROCEDURE — 80048 BASIC METABOLIC PNL TOTAL CA: CPT

## 2025-01-26 PROCEDURE — 2580000003 HC RX 258: Performed by: HOSPITALIST

## 2025-01-26 PROCEDURE — 6360000002 HC RX W HCPCS: Performed by: HOSPITALIST

## 2025-01-26 PROCEDURE — 2500000003 HC RX 250 WO HCPCS: Performed by: NURSE PRACTITIONER

## 2025-01-26 RX ADMIN — PREGABALIN 200 MG: 100 CAPSULE ORAL at 07:58

## 2025-01-26 RX ADMIN — SODIUM CHLORIDE, PRESERVATIVE FREE 10 ML: 5 INJECTION INTRAVENOUS at 07:57

## 2025-01-26 RX ADMIN — FLUCONAZOLE 200 MG: 100 TABLET ORAL at 07:58

## 2025-01-26 RX ADMIN — VILAZODONE HYDROCHLORIDE 20 MG: 20 TABLET, FILM COATED ORAL at 07:58

## 2025-01-26 RX ADMIN — OXYCODONE 5 MG: 5 TABLET ORAL at 06:49

## 2025-01-26 RX ADMIN — ACETAMINOPHEN 650 MG: 325 TABLET ORAL at 06:49

## 2025-01-26 RX ADMIN — MORPHINE SULFATE 4 MG: 4 INJECTION, SOLUTION INTRAMUSCULAR; INTRAVENOUS at 07:55

## 2025-01-26 RX ADMIN — ASPIRIN 81 MG: 81 TABLET, COATED ORAL at 07:59

## 2025-01-26 RX ADMIN — FERROUS SULFATE TAB 325 MG (65 MG ELEMENTAL FE) 325 MG: 325 (65 FE) TAB at 07:59

## 2025-01-26 RX ADMIN — ENOXAPARIN SODIUM 40 MG: 100 INJECTION SUBCUTANEOUS at 07:57

## 2025-01-26 RX ADMIN — ACETAMINOPHEN 650 MG: 325 TABLET ORAL at 07:58

## 2025-01-26 RX ADMIN — MEROPENEM 1000 MG: 1 INJECTION INTRAVENOUS at 03:58

## 2025-01-26 RX ADMIN — SODIUM CHLORIDE: 9 INJECTION, SOLUTION INTRAVENOUS at 03:57

## 2025-01-26 RX ADMIN — ESTRADIOL 1 G: 0.1 CREAM VAGINAL at 07:55

## 2025-01-26 RX ADMIN — HYDROXYCHLOROQUINE SULFATE 200 MG: 200 TABLET ORAL at 07:58

## 2025-01-26 RX ADMIN — SUCRALFATE 1 G: 1 TABLET ORAL at 05:09

## 2025-01-26 RX ADMIN — Medication 2000 UNITS: at 07:59

## 2025-01-26 RX ADMIN — PANTOPRAZOLE SODIUM 40 MG: 40 TABLET, DELAYED RELEASE ORAL at 05:09

## 2025-01-26 RX ADMIN — SUCRALFATE 1 G: 1 TABLET ORAL at 10:51

## 2025-01-26 RX ADMIN — FUROSEMIDE 20 MG: 20 TABLET ORAL at 07:59

## 2025-01-26 ASSESSMENT — PAIN DESCRIPTION - LOCATION
LOCATION: HIP

## 2025-01-26 ASSESSMENT — PAIN SCALES - GENERAL
PAINLEVEL_OUTOF10: 8
PAINLEVEL_OUTOF10: 4
PAINLEVEL_OUTOF10: 9
PAINLEVEL_OUTOF10: 7

## 2025-01-26 ASSESSMENT — PAIN DESCRIPTION - ORIENTATION
ORIENTATION: RIGHT

## 2025-01-26 ASSESSMENT — PAIN DESCRIPTION - DESCRIPTORS: DESCRIPTORS: ACHING

## 2025-01-26 ASSESSMENT — PAIN DESCRIPTION - PAIN TYPE
TYPE: SURGICAL PAIN

## 2025-01-26 NOTE — CARE COORDINATION
01/26/25 1121   IMM Letter   IMM Letter given to Patient/Family/Significant other/Guardian/POA/by: Yadira Sidhu RNCM  (Pt agreeable to discharge within 4 hour window)   IMM Letter date given: 01/26/25   IMM Letter time given: 1030       
CM called Patient's son, Parag [phone] to follow up on snf choices.    He states he has to talk to his dad and he is not ready yet to decide and if needed can pay extra money for pt to stay in hospital.    CM discussed and gently encouraged making referrals and planning now so as to be prepared for discharge and that insurance will require a pre cert. CM explained if referrals made and accepting facility is obtained and then they decide on a different plan like going home there is no harm done as far as the facilities.     CM provided my number for call back with choices if made today.     Brenda Fiore, RN, BSN  [phone]   
Case Management -  Discharge Note      Patient Name: Antonette Brown                   YOB: 1940  Room: [address] [place], [place] [address]            Readmission Risk (Low < 19, Mod (19-27), High > 27): Readmission Risk Score: 18.3    Current PCP: Erica Nava MD      (IMM) Important Message from Medicare:    Has pt received appropriate compliance notices before being discharged if required: yes  Compliance doc:  [x] 2nd IMM; [] Code 44 [] [place]  Date Given: 1/26/25 Given By: ABDIEL Sidhu    PT AM-PAC: 6 /24  OT AM-PAC: 9 /24    Patient/patient representative has been educated on the benefits of SNF as well as the possible risks of declining recommended services. Patient/patient representative has acknowledged the information provided and decided on the following discharge plan. Patient/ patient representative has been provided freedom of choice regarding service provider, supported by basic dialogue that supports the patient's individualized plan of care/goals.    Patient noted to have a discharge order.  Pt has been medically cleared for transition to Skilled Nursing Rehab Facility    Patient discharged to   [place] [id number]  Phone: [phone]  Fax:[phone]        HENS Completed:  YES  Pre-cert required/obtained:  YES    Transportation scheduled for 1/26/25 AT 12:00 PM  Transportation [facility] by [facility]   AVS faxed and agency notified:  yes  The following prescriptions sent with pt: Aspirin, Roxicodone  Family Notified:  yes    Nurse to call report to facility    [facility] agency notified of discharge:  [] Yes [] No  [x] NA    Family notified of discharge:  [x] Yes  [] No  [] NA    Facility notified of discharge:  [x] Yes  [] No  [] NA    Pt is being discharged with Outpt IV Antibiotics  [] Yes [] No  [x] NA  If yes, make sure JUSTIN is faxed to [facility] agency, and meds are called in to pharmacy by RN from JUSTIN orders only.      Financial    Payor: ANNELIESE 
Discharge Planning Note Re: Skilled Nursing     CM/SW noted consult for discharge planning. Chart reviewed. Noted recommendations for SNF.  CM met with patient and , Teo, bedside. Introduced self and explained role of CM and discharge planning.    Patient is agreeable to SNF on dc.     [place] of choice list was provided with basic dialogue that supports the patient's individualized plan of care/goals, treatment preferences and shares the quality data associated with the providers. [x] Yes [] No.  Teo reviewed the provided list and made selections.    Referral made to the following through [facility]:    [facility]- YES  [address]  [place] [id number]  Phone: [phone]  Fax: [phone] (Wkdy)          [phone] (Wknd)     [facility]  [address] [place]  [address]  Phone: [phone]  Fax: [phone] (Assisted Living)  Fax: [phone] (Memory Care)     [facility] - YES   [address] [place] [address]  Phone: [phone]  Fax:[phone]     [facility]-   OON  [address] [place]  [address]  Report: [phone]  Fax: [phone]     [facility] [place] [facility] YES   [address] [place] [id number]  Phone: [phone]  Fax: [phone]     CM/SW will follow-up on referrals and provide any additional documentation necessary to facilitate placement.     Electronically signed by ANA [place] on 1/20/2025 at 1:06 PM       
Discharge Planning:     (CM) called Patient's son, Parag [phone] and LVM advising that PT/OT did get to work with Patient today and she is recommended for a SNF. CM advised she saw that Pamela provided him a list and asked him to call back and provide options to CM. CM provided her call back number and asked for a phone call back.     There is also an ARU consult pending. PT/OT recommendations are for SNF.     CM team to follow.       Electronically signed by SHAWNA Pitst on 1/17/2025 at 2:21 PM      
Discharge Planning:     (CM) spoke to daughter, Ashley, bedside. Ashley stated she would speak to her father regarding the accepting facilities.   [facility].    CM to call family on Tuesday, 1/21/25.    Electronically signed by ANA [place] on 1/20/2025 at 3:27 PM     
Discharge Planning:     (CM) spoke with , Teo, [phone], bedside.  has decided on the following for SNF:    [facility]  [address] [place]  [address]  Phone: [phone]  Fax:[phone]       CM called and spoke to Nallely [phone], admissions liaison, to inform of  choice.    [facility] is starting pre-cert this afternoon.     Electronically signed by ANA [place] on 1/22/2025 at 3:25 PM   
Discharge Planning:     (IVANNA) called Nallely [phone] [place] [address]  Phone: [phone]  Fax: [phone]    And LVM checking on the pre-cert. IVANNA provided call back number and asked for a phone call back.       Electronically signed by SHAWNA Pitts on 1/23/2025 at 9:21 AM      UPDATE :     CM rec'd a VM back from Nallely advising the pre-cert is approved until 1/28.      Electronically signed by SHAWNA Pitts on 1/23/2025 at 2:17 PM    
UPDATE:    CM spoke to , Teo, bedside. Teo stated that he will tour [facility] and [facility] today.    CM team following.    Electronically signed by ANA [place]entonon 1/21/2025 at 12:00 PM     Discharge Planning:     (IVANNA) has had conversations with family regarding SNF placement.  One conversation with the son, Parag Brown, [phone], via telephone, where son stated that his father is final decision maker, however, father, Teo, is hesitant to make decision because he does not understand the process.  CM spoke to Teo Brown, [phone],  father of Parag and  of patient, bedside. Teo stated that he really did not think his wife was ready and was not sure about how the facilities could handle his wife as she is NWB.  CM explained that facilities have irving lifts and are highly trained to handle patients post operatively.  CM was able to get patient to pick SNFs at that point and referrals were sent.    We now have accepting facilities. CM spoke to patient's daughter, Ashley, bedside, and she stated that she would let her father know that he has to make a decision as they want to tour facilities first.    CM left a VM for Teo this morning asking for a return call and left call back information.  CM called and spoke to Parag, son, this morning. Parag stated that his father stated that he is still not ready to tour facilities. IVANNA explained to Parag that [employer] Medicare does not have a fast turn around for authorizing SNF placement and to please help his father to tour facilities so a decision can be made and a pre-cert can be started.    CM awaiting a response from Teo,  of patient and Parag, son of patient.     CM team following.     Electronically signed by ANA [place] on 1/21/2025 at 9:07 AM       
Housework, Shopping, Mobility  Current functional level: Assistance with the following:, Bathing, Dressing, Toileting, Cooking, Housework, Shopping, Mobility    PT AM-PAC:   /24  OT AM-PAC:   /24    Family can provide assistance at DC: Yes  Would you like Case Management to discuss the discharge plan with any other family members/significant others, and if so, who? Yes  Plans to Return to Present Housing: Unknown at present  Other Identified Issues/Barriers to RETURNING to current housing:   Potential Assistance needed at discharge: Other (Comment)            Potential DME:    Patient expects to discharge to: House  Plan for transportation at discharge:      Financial    Payor: [employer] MEDICARE / Plan: [employer] MEDICARE ADVANTAGE HMO / Product Type: Medicare /     Does insurance require precert for SNF: Yes    Potential assistance Purchasing Medications: No  Meds-to-Beds request: Yes      [id number] - [place], [place] - [address] - P [phone] - F [phone]  [address]  [facility] [address]  Phone: [phone] Fax: [phone]      Notes:    Factors facilitating achievement of predicted outcomes: Family support, Motivated, Cooperative, and Pleasant    Barriers to discharge: Lower extremity weakness    Additional Case Management Notes: SINGH spoke with the patient and patient zechariah Tuttle. Parag informed the CM that the patient was discharged form  [facility] about 2 weeks ago with home health care services through [facility]. CM called [facility] with [facility] and confirmed  the patient is active with their services. Patient lives at home with her  and has a supportive family    Patient has a surgery scheduled on 1/16/2025 and PT/OT is pending.    SINGH provided  patient zechariah Ji with a SNF list to review in case therpay were to recommend SNF.     The Plan for Transition of Care is related to the following treatment goals of Fall against object [W18.00XA]  Closed

## 2025-01-26 NOTE — PLAN OF CARE
Problem: Discharge Planning  Goal: Discharge to home or other facility with appropriate resources  1/26/2025 0802 by Adrian Schaefer RN  Outcome: Progressing  1/25/2025 1928 by Pretty Mccall RN  Outcome: Progressing     Problem: Pain  Goal: Verbalizes/displays adequate comfort level or baseline comfort level  1/26/2025 0802 by Adrian Schaefer RN  Outcome: Progressing  1/25/2025 1928 by Pretty Mccall RN  Outcome: Progressing     Problem: Safety - Adult  Goal: Free from fall injury  1/26/2025 0802 by Adrian Schaefer RN  Outcome: Progressing  1/25/2025 1928 by Pretty Mccall RN  Outcome: Progressing     Problem: ABCDS Injury Assessment  Goal: Absence of physical injury  1/26/2025 0802 by Adrian Schaefer RN  Outcome: Progressing  1/25/2025 1928 by Pretty Mccall RN  Outcome: Progressing     Problem: Skin/Tissue Integrity  Goal: Absence of new skin breakdown  Description: 1.  Monitor for areas of redness and/or skin breakdown  2.  Assess vascular access sites hourly  3.  Every 4-6 hours minimum:  Change oxygen saturation probe site  4.  Every 4-6 hours:  If on nasal continuous positive airway pressure, respiratory therapy assess nares and determine need for appliance change or resting period.  1/26/2025 0802 by Adrian Schaefer RN  Outcome: Progressing  1/25/2025 1928 by Pretty Mccall RN  Outcome: Progressing     Problem: Nutrition Deficit:  Goal: Optimize nutritional status  1/26/2025 0802 by Adrian Schaefer RN  Outcome: Progressing  1/25/2025 1928 by Pretty Mccall RN  Outcome: Progressing

## 2025-01-26 NOTE — PLAN OF CARE
Problem: Discharge Planning  Goal: Discharge to home or other facility with appropriate resources  1/25/2025 1928 by Pretty Mccall RN  Outcome: Progressing  1/25/2025 0752 by Adrian Schaefer RN  Outcome: Progressing     Problem: Pain  Goal: Verbalizes/displays adequate comfort level or baseline comfort level  1/25/2025 1928 by Pretty Mccall RN  Outcome: Progressing  1/25/2025 0752 by Adrian Schaefer RN  Outcome: Progressing     Problem: Safety - Adult  Goal: Free from fall injury  1/25/2025 1928 by Pretty Mccall RN  Outcome: Progressing  1/25/2025 0752 by Adrian Schaefer RN  Outcome: Progressing     Problem: ABCDS Injury Assessment  Goal: Absence of physical injury  1/25/2025 1928 by Pretty Mccall RN  Outcome: Progressing  1/25/2025 0752 by Adrian Schaefer RN  Outcome: Progressing     Problem: Skin/Tissue Integrity  Goal: Absence of new skin breakdown  Description: 1.  Monitor for areas of redness and/or skin breakdown  2.  Assess vascular access sites hourly  3.  Every 4-6 hours minimum:  Change oxygen saturation probe site  4.  Every 4-6 hours:  If on nasal continuous positive airway pressure, respiratory therapy assess nares and determine need for appliance change or resting period.  1/25/2025 1928 by Pretty Mccall RN  Outcome: Progressing  1/25/2025 0752 by Adrian Schaefer RN  Outcome: Progressing     Problem: Nutrition Deficit:  Goal: Optimize nutritional status  1/25/2025 1928 by Pretty Mccall RN  Outcome: Progressing  1/25/2025 0752 by Adrian Schaefer RN  Outcome: Progressing

## 2025-01-26 NOTE — PROGRESS NOTES
Infectious Diseases   Progress Note      Admission Date: 1/12/2025  Hospital Day: Hospital Day: 4   Attending: Benito Stafford MD  Date of service: 1/15/2025     Chief complaint/ Reason for consult:     Complicated ESBL Klebsiella urinary tract infection  Septic shock with lactic acidosis, bacteremia, hypotension, hypothermia  Left lower lobe atelectasis on chest x-ray  Fall at home  Need for contact isolation    Microbiology:      I have reviewed allavailable micro lab data and cultures    Results       Procedure Component Value Units Date/Time    MRSA DNA Probe, Nasal [[id number]]     Order Status: No result Specimen: Nares     MRSA DNA Probe, Nasal [[id number]] Collected: 01/14/25 0230    Order Status: Completed Specimen: Nasopharyngeal Swab from Nares Updated: 01/15/25 0122     MRSA SCREEN RT-PCR --     Negative  MRSA DNA not detected.  Normal Range: Not detected      Narrative:      ORDER#: [id number]                          ORDERED BY: MONIKA GÓMEZ  SOURCE: Nares                              COLLECTED:  01/14/25 02:30  ANTIBIOTICS AT DAVID.:                      RECEIVED :  01/14/25 09:17    Pneumonia Panel, Molecular [[id number]]     Order Status: Sent Specimen: Sputum     Culture, Respiratory (with Gram Stain) [[id number]]     Order Status: Sent Specimen: Sputum Expectorated     Respiratory Panel, Molecular, with COVID-19 (Restricted: peds pts or suitable admitted adults) [[id number]]  (Abnormal) Collected: 01/13/25 1220    Order Status: Completed Specimen: Nasopharyngeal Swab Updated: 01/13/25 2242     Organism Influenza A H3 detected by PCR     Respiratory Panel PCR --     POSITIVE FOR  See additional report for complete Respiratory Pathogen Panel      Narrative:      ORDER#: [id number]                          ORDERED BY: MONIKA GÓMEZ  SOURCE: Nasopharyngeal                     COLLECTED:  01/13/25 12:20  ANTIBIOTICS AT DAVID.:                      RECEIVED :  01/13/25 21:16    Respiratory Panel Film 
   01/13/25 0442   NIV Type   $NIV $Daily Charge   NIV Started/Stopped On   Equipment Type v60   Mode Bilevel   Mask Type Full face mask   Mask Size Medium   Assessment   Pulse (!) 115   Respirations 20   SpO2 96 %   Level of Consciousness 0   Comfort Level Good   Using Accessory Muscles No   Mask Compliance Good   Skin Assessment Clean, dry, & intact   Settings/Measurements   PIP Observed 13 cm H20   IPAP 12 cmH20   CPAP/EPAP 6 cmH2O   Vt (Measured) 553 mL   Rate Ordered 10   Insp Rise Time (%) 3 %   FiO2  100 %   I Time/ I Time % 0.9 s   Mask Leak (lpm) 26 lpm   Patient's Home Machine No   Alarm Settings   Alarms On Y     Pt. Placed on Bipap,  due to Inc. SOB. Rapid Called, ABG done, Neb given.   
   01/13/25 1957   NIV Type   Ventilator ID [id number]   NIV Started/Stopped On   Equipment Type V60   Mode Bilevel   Mask Type Full face mask   Mask Size Medium   Breath Sounds   Respiratory Pattern Regular   Breath Sounds Bilateral Diminished   Settings/Measurements   PIP Observed 13 cm H20   IPAP 12 cmH20   CPAP/EPAP 6 cmH2O   Vt (Measured) 466 mL   Rate Ordered 10   FiO2  30 %   I Time/ I Time % 0.9 s   Minute Volume (L/min) 6.1 Liters   Mask Leak (lpm) 0 lpm   Patient's Home Machine No   Alarm Settings   Alarms On Y       
   01/14/25 1345   RT Protocol   History Pulmonary Disease 0   Respiratory pattern 0   Breath sounds 2   Cough 0   Bronchodilator Assessment Score 2       
   01/20/25 1533   RT Protocol   History Pulmonary Disease 0   Respiratory pattern 0   Breath sounds 2   Cough 1   Bronchodilator Assessment Score 3       
  [facility] - Inpatient Rehabilitation Department   Phone: [phone]    Occupational Therapy    [] Initial Evaluation            [x] Daily Treatment Note         [] Discharge Summary      Patient: Antonette Brown   : 1940   MRN: [id number]   Date of Service:  2025    Admitting Diagnosis:  Fall against object  Current Admission Summary: \"84 y.o. female with medical hx including hypertension, hyperlipidemia, previous right hip fracture admitted to the hospital after a fall. Patient reported that she was walking to the bathroom lost her balance fell landing on her right hip, found to have R femoral fracture.\"     OPEN REDUCTION INTERNAL FIXATION RIGHT FEMUR WITH CABLES AND C-ARM - 25     In ICU post-op with hypotension and acute respiratory failure due to Flu.   Past Medical History:  has a past medical history of AR (allergic rhinitis), Arthritis of knee, Colitis, Depression, Erosive gastritis, GERD (gastroesophageal reflux disease), Hyperlipidemia, Hypertension, Internal hemorrhoids, MI (myocardial infarction) (HCC), Overweight(278.02), and Viral meningitis.  Past Surgical History:  has a past surgical history that includes Cholecystectomy, laparoscopic (); Total abdominal hysterectomy w/ bilateral salpingoophorectomy (); Colonoscopy (); Colonoscopy (12/3/13); Upper gastrointestinal endoscopy (12/3/13); Breast biopsy; Hysterectomy; hip surgery (Left, 2022); Colonoscopy (N/A, 2024); and hip surgery (Right, 10/20/2024).    Discharge Recommendations: Antonette Brown scored a 6/24 on the AM-PAC ADL Inpatient form. Current research shows that an AM-PAC score of 17 or less is typically not associated with a discharge to the patient's home setting. Based on the patient's AM-PAC score and their current ADL deficits, it is recommended that the patient have 3-5 sessions per week of Occupational Therapy at d/c to increase the patient's independence.  Please see assessment 
  [facility] - Inpatient Rehabilitation Department   Phone: [phone]    Physical Therapy    [] Initial Evaluation            [x] Daily Treatment Note         [] Discharge Summary      Patient: Antonette Brown   : 1940   MRN: [id number]   Date of Service:  2025  Admitting Diagnosis: Fall against object  Current Admission Summary: \"84 y.o. female with medical hx including hypertension, hyperlipidemia, previous right hip fracture admitted to the hospital after a fall. Patient reported that she was walking to the bathroom lost her balance fell landing on her right hip, found to have R femoral fracture.\"    OPEN REDUCTION INTERNAL FIXATION RIGHT FEMUR WITH CABLES AND C-ARM - 25    In ICU post-op with hypotension and acute respiratory failure due to Flu.      Past Medical History:  has a past medical history of AR (allergic rhinitis), Arthritis of knee, Colitis, Depression, Erosive gastritis, GERD (gastroesophageal reflux disease), Hyperlipidemia, Hypertension, Internal hemorrhoids, MI (myocardial infarction) (HCC), Overweight(278.02), and Viral meningitis.  Past Surgical History:  has a past surgical history that includes Cholecystectomy, laparoscopic (); Total abdominal hysterectomy w/ bilateral salpingoophorectomy (); Colonoscopy (); Colonoscopy (12/3/13); Upper gastrointestinal endoscopy (12/3/13); Breast biopsy; Hysterectomy; hip surgery (Left, 2022); Colonoscopy (N/A, 2024); and hip surgery (Right, 10/20/2024).  Discharge Recommendations: Antonette Brown scored a 6/24 on the AM-PAC short mobility form. Current research shows that an AM-PAC score of 17 or less is typically not associated with a discharge to the patient's home setting. Based on the patient's AM-PAC score and their current functional mobility deficits, it is recommended that the patient have 3-5 sessions per week of Physical Therapy at d/c to increase the patient's independence.  Please see 
  [facility] - Inpatient Rehabilitation Department   Phone: [phone]    Physical Therapy    [x] Initial Evaluation            [] Daily Treatment Note         [] Discharge Summary      Patient: Antonette Brown   : 1940   MRN: [id number]   Date of Service:  2025  Admitting Diagnosis: Fall against object  Current Admission Summary: \"84 y.o. female with medical hx including hypertension, hyperlipidemia, previous right hip fracture admitted to the hospital after a fall. Patient reported that she was walking to the bathroom lost her balance fell landing on her right hip, found to have R femoral fracture.\"    OPEN REDUCTION INTERNAL FIXATION RIGHT FEMUR WITH CABLES AND C-ARM - 25    In ICU post-op with hypotension and acute respiratory failure due to Flu.      Past Medical History:  has a past medical history of AR (allergic rhinitis), Arthritis of knee, Colitis, Depression, Erosive gastritis, GERD (gastroesophageal reflux disease), Hyperlipidemia, Hypertension, Internal hemorrhoids, MI (myocardial infarction) (HCC), Overweight(278.02), and Viral meningitis.  Past Surgical History:  has a past surgical history that includes Cholecystectomy, laparoscopic (); Total abdominal hysterectomy w/ bilateral salpingoophorectomy (); Colonoscopy (); Colonoscopy (12/3/13); Upper gastrointestinal endoscopy (12/3/13); Breast biopsy; Hysterectomy; hip surgery (Left, 2022); Colonoscopy (N/A, 2024); and hip surgery (Right, 10/20/2024).  Discharge Recommendations: Antonette Brown scored a 6/24 on the AM-PAC short mobility form. Current research shows that an AM-PAC score of 17 or less is typically not associated with a discharge to the patient's home setting. Based on the patient's AM-PAC score and their current functional mobility deficits, it is recommended that the patient have 3-5 sessions per week of Physical Therapy at d/c to increase the patient's independence.  Please see 
  [facility] - Inpatient Rehabilitation Department   Phone: [phone]    Physical Therapy    [] Initial Evaluation            [x] Daily Treatment Note         [] Discharge Summary      Patient: Antonette Brown   : 1940   MRN: [id number]   Date of Service:  2025  Admitting Diagnosis: Fall  Current Admission Summary: \"84 y.o. female with medical hx including hypertension, hyperlipidemia, previous right hip fracture admitted to the hospital after a fall. Patient reported that she was walking to the bathroom lost her balance fell landing on her right hip, found to have R femoral fracture.\"    OPEN REDUCTION INTERNAL FIXATION RIGHT FEMUR WITH CABLES AND C-ARM - 25    In ICU post-op with hypotension and acute respiratory failure due to Flu.      Past Medical History:  has a past medical history of AR (allergic rhinitis), Arthritis of knee, Colitis, Depression, Erosive gastritis, GERD (gastroesophageal reflux disease), Hyperlipidemia, Hypertension, Internal hemorrhoids, MI (myocardial infarction) (HCC), Overweight(278.02), and Viral meningitis.  Past Surgical History:  has a past surgical history that includes Cholecystectomy, laparoscopic (); Total abdominal hysterectomy w/ bilateral salpingoophorectomy (); Colonoscopy (); Colonoscopy (12/3/13); Upper gastrointestinal endoscopy (12/3/13); Breast biopsy; Hysterectomy; hip surgery (Left, 2022); Colonoscopy (N/A, 2024); hip surgery (Right, 10/20/2024); and Femur Surgery (Right, 2025).    Discharge Recommendations: Antonette Brown scored a 6/24 on the AM-PAC short mobility form. Current research shows that an AM-PAC score of 17 or less is typically not associated with a discharge to the patient's home setting. Based on the patient's AM-PAC score and their current functional mobility deficits, it is recommended that the patient have 3-5 sessions per week of Physical Therapy at d/c to increase the patient's 
  [facility] - Inpatient Rehabilitation Department   Phone: [phone]    Physical Therapy    [] Initial Evaluation            [x] Daily Treatment Note         [] Discharge Summary      Patient: Antonette Brown   : 1940   MRN: [id number]   Date of Service:  2025  Admitting Diagnosis: Fall  Current Admission Summary: \"84 y.o. female with medical hx including hypertension, hyperlipidemia, previous right hip fracture admitted to the hospital after a fall. Patient reported that she was walking to the bathroom lost her balance fell landing on her right hip, found to have R femoral fracture.\"    OPEN REDUCTION INTERNAL FIXATION RIGHT FEMUR WITH CABLES AND C-ARM - 25    In ICU post-op with hypotension and acute respiratory failure due to Flu.      Past Medical History:  has a past medical history of AR (allergic rhinitis), Arthritis of knee, Colitis, Depression, Erosive gastritis, GERD (gastroesophageal reflux disease), Hyperlipidemia, Hypertension, Internal hemorrhoids, MI (myocardial infarction) (HCC), Overweight(278.02), and Viral meningitis.  Past Surgical History:  has a past surgical history that includes Cholecystectomy, laparoscopic (); Total abdominal hysterectomy w/ bilateral salpingoophorectomy (); Colonoscopy (); Colonoscopy (12/3/13); Upper gastrointestinal endoscopy (12/3/13); Breast biopsy; Hysterectomy; hip surgery (Left, 2022); Colonoscopy (N/A, 2024); hip surgery (Right, 10/20/2024); and Femur Surgery (Right, 2025).  Discharge Recommendations: Antonette Brown scored a 6/24 on the AM-PAC short mobility form. Current research shows that an AM-PAC score of 17 or less is typically not associated with a discharge to the patient's home setting. Based on the patient's AM-PAC score and their current functional mobility deficits, it is recommended that the patient have 3-5 sessions per week of Physical Therapy at d/c to increase the patient's independence.  
  [facility]    Hospitalist Progress Note:      Name:  Antonette Brown /Age/Sex: 1940  (84 y.o. female)   MRN & CSN:  [id number] & [id number] Encounter Date: 2025    Location:  [facility]-4457/4457-02 PCP: Erica Nava MD     Attending:Nataliia Birmingham MD  Admission Date: 2025      Hospital Day: 13    Chief Complain/Reason for Admission:  Chief Complaint   Patient presents with    Fall     Pt arrived via [facility] from home w c/o R leg pain d/t mechanical fall in a bathroom. Pt w visible R  leg shortening.        Assessment:  84 y.o. female with medical hx including hypertension, hyperlipidemia, previous right hip fracture admitted to the hospital after a fall. Patient reported that she was walking to the bathroom lost her balance fell landing on her right hip, found to have R femoral fracture.  Acute hypoxic respiratory failure, multifactorial: currently on 2 L/min  Volume overload with BLE edema: Echo with LVEF 70-75%, negative venous duplex.  Right femur fracture, comminuted: s/p ORIF   Persistent dysuria: History of recurrent UTI with MDRO  Acute blood loss Anemia d/t R thigh hemorrhage/hematoma: s/p 2 units of prbc. H&H stable  Shock, hemorrhagic +/- urosepsis: R thigh hematoma 2/2 R femur fracture, UTI: resolved  Sepsis likely due to ESBL UTI and influenza infection: Leukocytosis, elevated lactate, tachycardia. Influenza positive  Hx of black tarry stool. FOBT negative.   CAD, hx of MI  Hypertension  Hyperlipidemia  History of anxiety and depression  Generalized deconditioning    Plan:   Cont IV Merrem till 25 per ID team  Resumed IV lasix; transition to oral on discharge.  Monitor renal f'n and lytes stable. Monitor urine output and wt.  s/p ORIF, cont postop wound care and activity per Ortho. PT/OT recommending SNF  Wean oxygen for O2 sat > 90%  Monitor H&H, transfuse prn  Trial of vaginal estrogen/fluconazole   Optimize pain regimen  DC planning      Diet ADULT 
  HOSPITALIST  RAPID RESPONSE (RR) NOTE    1/13/2025 4:43 AM    Name: Antonette Brown            Reason for RR: Hypoxemia  Subjective:      RRT called for hypoxemia.  Nursing reports she has had rhonchorous breath sounds with downward trending oxygen saturation.  Blood sugar was in the 110s.  SpO2 near 100% on nonrebreather I did an extensive review of the chart at the bedside.  Patient has a history of HFpEF & bilateral pneumonitis.  She had a similar episode back in November.  She was treated with antibiotics steroids & diuretics.  Patient did receive 4 mg IV morphine approximately 40 minutes ago.  Her breathing is in mild distress.  She was placed on BiPAP.  ABG showed a pH of around 7.4 PaO2 was markedly elevated.  Electrolytes within normal limits.  Hgb 7.2 I personally reviewed chest x-ray which was ordered earlier it does appear to have a retrocardiac infiltrate.  I ordered 40 IV Lasix.  Patient is already on Merrem for possible ESBL Klebsiella pneumoniae UTI.        Objective:  BP (!) 141/42   Pulse (!) 121   Temp 98.9 °F (37.2 °C) (Oral)   Resp 22   Ht 1.524 m (5')   Wt 83.5 kg (184 lb)   SpO2 95%   BMI 35.94 kg/m²     Intake/Output Summary (Last 24 hours) at 1/13/2025 0443  Last data filed at 1/13/2025 0039  Gross per 24 hour   Intake 240 ml   Output 900 ml   Net -660 ml      Wt Readings from Last 3 Encounters:   01/12/25 83.5 kg (184 lb)   11/04/24 90.7 kg (200 lb)   10/24/24 87 kg (191 lb 12.8 oz)       Physical Exam  Constitutional:       General: She is in acute distress.      Appearance: She is obese. She is ill-appearing. She is not toxic-appearing or diaphoretic.   HENT:      Head: Normocephalic and atraumatic.   Eyes:      General:         Right eye: No discharge.         Left eye: No discharge.      Conjunctiva/sclera: Conjunctivae normal.   Cardiovascular:      Rate and Rhythm: Regular rhythm. Tachycardia present.   Pulmonary:      Effort: Respiratory distress present.      Breath sounds: 
  [facility] - Inpatient Rehabilitation Department   Phone: [phone]    Occupational Therapy    [x] Initial Evaluation            [] Daily Treatment Note         [] Discharge Summary      Patient: Antonette Brown   : 1940   MRN: [id number]   Date of Service:  2025    Admitting Diagnosis:  Fall against object  Current Admission Summary: \"84 y.o. female with medical hx including hypertension, hyperlipidemia, previous right hip fracture admitted to the hospital after a fall. Patient reported that she was walking to the bathroom lost her balance fell landing on her right hip, found to have R femoral fracture.\"     OPEN REDUCTION INTERNAL FIXATION RIGHT FEMUR WITH CABLES AND C-ARM - 25     In ICU post-op with hypotension and acute respiratory failure due to Flu.   Past Medical History:  has a past medical history of AR (allergic rhinitis), Arthritis of knee, Colitis, Depression, Erosive gastritis, GERD (gastroesophageal reflux disease), Hyperlipidemia, Hypertension, Internal hemorrhoids, MI (myocardial infarction) (HCC), Overweight(278.02), and Viral meningitis.  Past Surgical History:  has a past surgical history that includes Cholecystectomy, laparoscopic (); Total abdominal hysterectomy w/ bilateral salpingoophorectomy (); Colonoscopy (); Colonoscopy (12/3/13); Upper gastrointestinal endoscopy (12/3/13); Breast biopsy; Hysterectomy; hip surgery (Left, 2022); Colonoscopy (N/A, 2024); and hip surgery (Right, 10/20/2024).    Discharge Recommendations: Antonette Brown scored a 6/24 on the AM-PAC ADL Inpatient form. Current research shows that an AM-PAC score of 17 or less is typically not associated with a discharge to the patient's home setting. Based on the patient's AM-PAC score and their current ADL deficits, it is recommended that the patient have 3-5 sessions per week of Occupational Therapy at d/c to increase the patient's independence.  Please see assessment 
  [facility] - Inpatient Rehabilitation Department   Phone: [phone]    Physical Therapy    [] Initial Evaluation            [x] Daily Treatment Note         [] Discharge Summary      Patient: Antonette Brown   : 1940   MRN: [id number]   Date of Service:  2025  Admitting Diagnosis: Fall  Current Admission Summary: \"84 y.o. female with medical hx including hypertension, hyperlipidemia, previous right hip fracture admitted to the hospital after a fall. Patient reported that she was walking to the bathroom lost her balance fell landing on her right hip, found to have R femoral fracture.\"    OPEN REDUCTION INTERNAL FIXATION RIGHT FEMUR WITH CABLES AND C-ARM - 25    In ICU post-op with hypotension and acute respiratory failure due to Flu.  Past Medical History:  has a past medical history of AR (allergic rhinitis), Arthritis of knee, Colitis, Depression, Erosive gastritis, GERD (gastroesophageal reflux disease), Hyperlipidemia, Hypertension, Internal hemorrhoids, MI (myocardial infarction) (HCC), Overweight(278.02), and Viral meningitis.  Past Surgical History:  has a past surgical history that includes Cholecystectomy, laparoscopic (); Total abdominal hysterectomy w/ bilateral salpingoophorectomy (); Colonoscopy (); Colonoscopy (12/3/13); Upper gastrointestinal endoscopy (12/3/13); Breast biopsy; Hysterectomy; hip surgery (Left, 2022); Colonoscopy (N/A, 2024); hip surgery (Right, 10/20/2024); and Femur Surgery (Right, 2025).    Discharge Recommendations: Antonette Brown scored a 6/24 on the AM-PAC short mobility form. Current research shows that an AM-PAC score of 17 or less is typically not associated with a discharge to the patient's home setting. Based on the patient's AM-PAC score and their current functional mobility deficits, it is recommended that the patient have 3-5 sessions per week of Physical Therapy at d/c to increase the patient's independence.  
  [facility]    Hospitalist Progress Note:      Name:  Antonette Brown /Age/Sex: 1940  (84 y.o. female)   MRN & CSN:  [id number] & [id number] Encounter Date: 2025    [facility]  [facility]457[facility]4457-02 PCP: Erica Nava MD     Attending:Nataliia Birmingham MD  Admission Date: 2025      Hospital Day: 12    Chief Complain/Reason for Admission:  Chief Complaint   Patient presents with    Fall     Pt arrived via [facility] from home w c/o R leg pain d/t mechanical fall in a bathroom. Pt w visible R  leg shortening.        Assessment:  84 y.o. female with medical hx including hypertension, hyperlipidemia, previous right hip fracture admitted to the hospital after a fall. Patient reported that she was walking to the bathroom lost her balance fell landing on her right hip, found to have R femoral fracture.  Acute hypoxic respiratory failure, multifactorial: currently  on 2--> 1 L via NC  Volume overload: improved. Echo with LVEF 70-75%,  Right femur fracture, comminuted: s/p ORIF   Abnormal UA,likely acute UTI: History of recurrent UTI with MDRO  Acute blood loss Anemia d/t R thigh hemorrhage/hematoma: S/p  2 units of prbc. H&H stable  Shock, hemorrhagic +/- urosepsis: R thigh hematoma 2/2 R femur fracture, UTI: resolved  Sepsis likely due to ESBL UTI and influenza infection: Leukocytosis, elevated lactate, tachycardia. Influenza positive  Hx of black tarry stool. FOBT negative. F/u GI team as outpt  CAD, hx of MI  Hypertension  Hyperlipidemia  History of anxiety and depression  Generalized deconditioning    Plan:   Cont IV ertapenem till 25 per ID team  DC IV lasix. Monitor renal f'n and lytes stable. Monitor urine output and wt.  S/p ORIF, cont postop wound care and activity per Ortho. PT/OT - consideration for ARU  Wean oxygen for O2 sat > 90%  Monitor H&H, transfuse prn  Optimize pain regimen  DC planning  Current meds reviewed, adjusted.   See orders    Diet ADULT ORAL NUTRITION 
  [facility] - Inpatient Rehabilitation Department   Phone: [phone]    Occupational Therapy    [] Initial Evaluation            [x] Daily Treatment Note         [] Discharge Summary      Patient: Antonette Brown   : 1940   MRN: [id number]   Date of Service:  2025    Admitting Diagnosis:  Fall  Current Admission Summary: \"84 y.o. female with medical hx including hypertension, hyperlipidemia, previous right hip fracture admitted to the hospital after a fall. Patient reported that she was walking to the bathroom lost her balance fell landing on her right hip, found to have R femoral fracture.\"     OPEN REDUCTION INTERNAL FIXATION RIGHT FEMUR WITH CABLES AND C-ARM - 25     In ICU post-op with hypotension and acute respiratory failure due to Flu.   Past Medical History:  has a past medical history of AR (allergic rhinitis), Arthritis of knee, Colitis, Depression, Erosive gastritis, GERD (gastroesophageal reflux disease), Hyperlipidemia, Hypertension, Internal hemorrhoids, MI (myocardial infarction) (HCC), Overweight(278.02), and Viral meningitis.  Past Surgical History:  has a past surgical history that includes Cholecystectomy, laparoscopic (); Total abdominal hysterectomy w/ bilateral salpingoophorectomy (); Colonoscopy (); Colonoscopy (12/3/13); Upper gastrointestinal endoscopy (12/3/13); Breast biopsy; Hysterectomy; hip surgery (Left, 2022); Colonoscopy (N/A, 2024); hip surgery (Right, 10/20/2024); and Femur Surgery (Right, 2025).    Discharge Recommendations: Antonette Brown scored a / on the AM-PAC ADL Inpatient form. Current research shows that an AM-PAC score of 17 or less is typically not associated with a discharge to the patient's home setting. Based on the patient's AM-PAC score and their current ADL deficits, it is recommended that the patient have 3-5 sessions per week of Occupational Therapy at d/c to increase the patient's independence.  
  [facility] Daily Progress Note      Admit Date:  1/12/2025    Chief Complaint:  Right hip fracture    Subjective:  Ms. Brown is resting in bed this morning. She denies pain while lying flat. However, she does have pain when her hip is touched. No CP or dyspnea at rest    Objective:   BP (!) 133/49   Pulse 87   Temp 97.8 °F (36.6 °C) (Temporal)   Resp 13   Ht 1.524 m (5')   Wt 87.4 kg (192 lb 10.9 oz)   SpO2 98%   BMI 37.63 kg/m²     Intake/Output Summary (Last 24 hours) at 1/14/2025 0954  Last data filed at 1/14/2025 0600  Gross per 24 hour   Intake 3120.41 ml   Output 485 ml   Net 2635.41 ml       Physical Exam:  General:  Awake, alert, NAD  Skin:  Warm and dry  Neck:  JVD not visualized  Chest:  CTAB, Comfortable   Cardiovascular:  RRR S1S2, no S3, No murmur  Abdomen:  Soft, ND, NT, No HSM  Extremities:  R Hip tender, edematous; 2+ BLE edema    Medications:    potassium chloride  20 mEq IntraVENous Once    pantoprazole  40 mg IntraVENous BID    ferric gluconate  125 mg IntraVENous Daily    [Held by provider] aspirin  81 mg Oral Daily    atorvastatin  10 mg Oral Once per day on Sunday Tuesday Thursday Saturday    buPROPion  150 mg Oral Daily before dinner    Vitamin D  2,000 Units Oral Daily with breakfast    hydroxychloroquine  200 mg Oral BID    [Held by provider] losartan  50 mg Oral BID    pregabalin  200 mg Oral BID    sucralfate  1 g Oral TID AC    vilazodone HCl  20 mg Oral Daily    sodium chloride flush  10 mL IntraVENous 2 times per day    meropenem  1,000 mg IntraVENous Q12H      norepinephrine 6 mcg/min (01/14/25 0600)    sodium chloride      sodium chloride      sodium chloride 75 mL/hr at 01/14/25 0600    sodium chloride      sodium chloride       calcium carbonate, LORazepam, sodium chloride, sodium chloride, sodium chloride, iopamidol, sodium chloride flush, sodium chloride, ondansetron **OR** ondansetron, polyethylene glycol, acetaminophen **OR** acetaminophen, morphine, 
  [facility] - Inpatient Rehabilitation Department   Phone: [phone]    Occupational Therapy    [] Initial Evaluation            [x] Daily Treatment Note         [] Discharge Summary      Patient: Antonette Brown   : 1940   MRN: [id number]   Date of Service:  2025    Admitting Diagnosis:  Fall  Current Admission Summary: \"84 y.o. female with medical hx including hypertension, hyperlipidemia, previous right hip fracture admitted to the hospital after a fall. Patient reported that she was walking to the bathroom lost her balance fell landing on her right hip, found to have R femoral fracture.\"     OPEN REDUCTION INTERNAL FIXATION RIGHT FEMUR WITH CABLES AND C-ARM - 25     In ICU post-op with hypotension and acute respiratory failure due to Flu.   Past Medical History:  has a past medical history of AR (allergic rhinitis), Arthritis of knee, Colitis, Depression, Erosive gastritis, GERD (gastroesophageal reflux disease), Hyperlipidemia, Hypertension, Internal hemorrhoids, MI (myocardial infarction) (HCC), Overweight(278.02), and Viral meningitis.  Past Surgical History:  has a past surgical history that includes Cholecystectomy, laparoscopic (); Total abdominal hysterectomy w/ bilateral salpingoophorectomy (); Colonoscopy (); Colonoscopy (12/3/13); Upper gastrointestinal endoscopy (12/3/13); Breast biopsy; Hysterectomy; hip surgery (Left, 2022); Colonoscopy (N/A, 2024); hip surgery (Right, 10/20/2024); and Femur Surgery (Right, 2025).    Discharge Recommendations: Antonette Brown scored a 8/24 on the AM-PAC ADL Inpatient form. Current research shows that an AM-PAC score of 17 or less is typically not associated with a discharge to the patient's home setting. Based on the patient's AM-PAC score and their current ADL deficits, it is recommended that the patient have 3-5 sessions per week of Occupational Therapy at d/c to increase the patient's independence.  
  [facility] - Inpatient Rehabilitation Department   Phone: [phone]    Occupational Therapy    [] Initial Evaluation            [x] Daily Treatment Note         [] Discharge Summary      Patient: Antonette Brown   : 1940   MRN: [id number]   Date of Service:  2025    Admitting Diagnosis:  Fall  Current Admission Summary: \"84 y.o. female with medical hx including hypertension, hyperlipidemia, previous right hip fracture admitted to the hospital after a fall. Patient reported that she was walking to the bathroom lost her balance fell landing on her right hip, found to have R femoral fracture.\"     OPEN REDUCTION INTERNAL FIXATION RIGHT FEMUR WITH CABLES AND C-ARM - 25     In ICU post-op with hypotension and acute respiratory failure due to Flu.   Past Medical History:  has a past medical history of AR (allergic rhinitis), Arthritis of knee, Colitis, Depression, Erosive gastritis, GERD (gastroesophageal reflux disease), Hyperlipidemia, Hypertension, Internal hemorrhoids, MI (myocardial infarction) (HCC), Overweight(278.02), and Viral meningitis.  Past Surgical History:  has a past surgical history that includes Cholecystectomy, laparoscopic (); Total abdominal hysterectomy w/ bilateral salpingoophorectomy (); Colonoscopy (); Colonoscopy (12/3/13); Upper gastrointestinal endoscopy (12/3/13); Breast biopsy; Hysterectomy; hip surgery (Left, 2022); Colonoscopy (N/A, 2024); and hip surgery (Right, 10/20/2024).    Discharge Recommendations: Antonette Brown scored a 8/24 on the AM-PAC ADL Inpatient form. Current research shows that an AM-PAC score of 17 or less is typically not associated with a discharge to the patient's home setting. Based on the patient's AM-PAC score and their current ADL deficits, it is recommended that the patient have 3-5 sessions per week of Occupational Therapy at d/c to increase the patient's independence.  Please see assessment section for 
  [facility] - Inpatient Rehabilitation Department   Phone: [phone]    Occupational Therapy    [] Initial Evaluation            [x] Daily Treatment Note         [] Discharge Summary      Patient: Antonette Brown   : 1940   MRN: [id number]   Date of Service:  2025    Admitting Diagnosis:  Fall  Current Admission Summary: \"84 y.o. female with medical hx including hypertension, hyperlipidemia, previous right hip fracture admitted to the hospital after a fall. Patient reported that she was walking to the bathroom lost her balance fell landing on her right hip, found to have R femoral fracture.\"     OPEN REDUCTION INTERNAL FIXATION RIGHT FEMUR WITH CABLES AND C-ARM - 25     In ICU post-op with hypotension and acute respiratory failure due to Flu.   Past Medical History:  has a past medical history of AR (allergic rhinitis), Arthritis of knee, Colitis, Depression, Erosive gastritis, GERD (gastroesophageal reflux disease), Hyperlipidemia, Hypertension, Internal hemorrhoids, MI (myocardial infarction) (HCC), Overweight(278.02), and Viral meningitis.  Past Surgical History:  has a past surgical history that includes Cholecystectomy, laparoscopic (); Total abdominal hysterectomy w/ bilateral salpingoophorectomy (); Colonoscopy (); Colonoscopy (12/3/13); Upper gastrointestinal endoscopy (12/3/13); Breast biopsy; Hysterectomy; hip surgery (Left, 2022); Colonoscopy (N/A, 2024); hip surgery (Right, 10/20/2024); and Femur Surgery (Right, 2025).    Discharge Recommendations: Antonette Brown scored a 8/24 on the AM-PAC ADL Inpatient form. Current research shows that an AM-PAC score of 17 or less is typically not associated with a discharge to the patient's home setting. Based on the patient's AM-PAC score and their current ADL deficits, it is recommended that the patient have 3-5 sessions per week of Occupational Therapy at d/c to increase the patient's independence.  
  Speech Language Pathology  Attempt Note     Name: Antonette Brown  : 1940  Medical Diagnosis: Fall against object [W18.00XA]  Closed displaced comminuted fracture of shaft of right femur, initial encounter ([facility]) [S72.351A]  Fall, initial encounter [W19.XXXA]  Urinary tract infection without hematuria, site unspecified [N39.0]      SLP attempted to see pt for dysphagia tx and cognitive-linguistic tx. Treatment unable to be completed due to pt working with PT/OT. SLP to re-attempt as pt's condition and schedule allows. No charges.    Thank you,    Tiesha Isaac MA CCC-SLP #[id number]  Speech Language Pathologist     
  Speech Language Pathology  [facility] - Inpatient Rehabilitation Services  [phone]  SLP Dysphagia and Speech Language Cognitive Treatment       Patient: Antonette Brown   : 1940   MRN: [id number]      Evaluation Date: 2025      Admitting Dx: Fall against object [W18.00XA]  Closed displaced comminuted fracture of shaft of right femur, initial encounter ([facility]) [S72.351A]  Fall, initial encounter [W19.XXXA]  Urinary tract infection without hematuria, site unspecified [N39.0]  Treatment Diagnosis: Oropharyngeal Dysphagia   Pain: Reported pain in knee, RN notified ; RN gave pain medication just prior to SLP entry    Recommendations      Recommended Diet and Intervention 2025:  Diet Solids Recommendation:  Dysphagia I Pureed    Liquid Consistency Recommendation:  Thin liquids (no straws) Recommended form of Meds:   Meds whole in puree and/or crushed               **Recommend to downgrade pt to NPO if increased difficulty is noted with po and/ or pt demonstrates increased respiratory compromise     Compensatory strategies: Alternate solids/liquids , Upright as possible with all PO intake , Small bites/sips , Eat/feed slowly, Remain upright 30-45 min , Cough/re-swallow , Aspiration Precautions , GERD precautions     Discharge Recommendations:  Discharge recommendations to be determined pending ongoing follow-up during acute care stay    History/Course of Treatment     H&P:   84 y.o. female with PMHx significant for hypertension, hyperlipidemia, previous right hip fracture admitted to the hospital after a fall  Patient reports that she was walking to the bathroom lost her balance fell landing on her right hip  Complains of pain in the right hip rating it 8/10 worse with any movement  She denies any loss of consciousness no chest pain no palpitations  Has been having some dysuria ongoing for the last 7 days  No fevers no sweats    Imaging:  Chest X-ray:   IMPRESSION:  1. Possible left basilar 
  Speech Language Pathology  [facility] - Inpatient Rehabilitation Services  [phone]  SLP Dysphagia Treatment and Speech Language Cognitive Assessment       Patient: Antonette Brown   : 1940   MRN: [id number]      Evaluation Date: 2025      Admitting Dx: Fall against object [W18.00XA]  Closed displaced comminuted fracture of shaft of right femur, initial encounter ([facility]) [S72.351A]  Fall, initial encounter [W19.XXXA]  Urinary tract infection without hematuria, site unspecified [N39.0]  Treatment Diagnosis: Oropharyngeal Dysphagia   Pain: Reported pain, 7/10 in her upper abdomen and pt reported groin pain, RN notified                                Recommendations      Recommended Diet and Intervention 2025:  Diet Solids Recommendation:  NPO  Liquid Consistency Recommendation:  NPO  Recommended form of Meds: Meds whole in puree     Allow ice chips following oral care   Recommend to hold pt NPO if increased difficulty is noted with po and/ or pt demonstrates increased respiratory compromise     Compensatory strategies: Alternate solids/liquids , Upright as possible with all PO intake , Small bites/sips , Eat/feed slowly, Remain upright 30-45 min , Cough/re-swallow , Aspiration Precautions , GERD precautions     Discharge Recommendations:  Discharge recommendations to be determined pending ongoing follow-up during acute care stay    History/Course of Treatment     H&P: 84 y.o. female with PMHx significant for hypertension, hyperlipidemia, previous right hip fracture admitted to the hospital after a fall  Patient reports that she was walking to the bathroom lost her balance fell landing on her right hip  Complains of pain in the right hip rating it 8/10 worse with any movement  She denies any loss of consciousness no chest pain no palpitations  Has been having some dysuria ongoing for the last 7 days  No fevers no sweats    Imaging:  Chest X-ray:   IMPRESSION:  1. Possible left basilar 
  Speech Language Pathology  [facility] - Inpatient Rehabilitation Services  [phone]  SLP Dysphagia and Speech Language Cognitive Treatment       Patient: Antonette Brown   : 1940   MRN: [id number]      Evaluation Date: 2025      Admitting Dx: Fall against object [W18.00XA]  Closed displaced comminuted fracture of shaft of right femur, initial encounter ([facility]) [S72.351A]  Fall, initial encounter [W19.XXXA]  Urinary tract infection without hematuria, site unspecified [N39.0]  Treatment Diagnosis: Oropharyngeal Dysphagia   Pain: Reported burning in her groin area, RN/ MD aware                        Recommendations      Recommended Diet and Intervention 2025:  Diet Solids Recommendation:  Soft and Bite Sized Diet    Liquid Consistency Recommendation:  Thin liquids (no straws) Recommended form of Meds:   Meds whole with water or whole in puree pending tolerance                **Recommend to downgrade pt to NPO if increased difficulty is noted with po and/ or pt demonstrates increased respiratory compromise     Compensatory strategies: Alternate solids/liquids , Upright as possible with all PO intake , Small bites/sips , Eat/feed slowly, Remain upright 30-45 min , Cough/re-swallow , Aspiration Precautions , GERD precautions     Discharge Recommendations:  Discharge recommendations to be determined pending ongoing follow-up during acute care stay    History/Course of Treatment     H&P:   84 y.o. female with PMHx significant for hypertension, hyperlipidemia, previous right hip fracture admitted to the hospital after a fall  Patient reports that she was walking to the bathroom lost her balance fell landing on her right hip  Complains of pain in the right hip rating it 8/10 worse with any movement  She denies any loss of consciousness no chest pain no palpitations  Has been having some dysuria ongoing for the last 7 days  No fevers no sweats    Imaging:  Chest X-ray:   IMPRESSION:  1. Possible 
  Speech Language Pathology  [facility] - Inpatient Rehabilitation Services  [phone]  SLP Dysphagia Treatment and Speech Language Cognitive Assessment       Patient: Antonette Brown   : 1940   MRN: [id number]      Evaluation Date: 2025      Admitting Dx: Fall against object [W18.00XA]  Closed displaced comminuted fracture of shaft of right femur, initial encounter ([facility]) [S72.351A]  Fall, initial encounter [W19.XXXA]  Urinary tract infection without hematuria, site unspecified [N39.0]  Treatment Diagnosis: Oropharyngeal Dysphagia   Pain: Reported pain, 7/10 in her upper abdomen and pt reported groin pain, RN notified                                Recommendations      Recommended Diet and Intervention 2025:  Diet Solids Recommendation:  Dysphagia II (Minced and moist)   Liquid Consistency Recommendation:  Thin liquids (no straws) Recommended form of Meds:   Meds whole in puree and/or crushed               **Recommend to downgrade pt to NPO if increased difficulty is noted with po and/ or pt demonstrates increased respiratory compromise     Compensatory strategies: Alternate solids/liquids , Upright as possible with all PO intake , Small bites/sips , Eat/feed slowly, Remain upright 30-45 min , Cough/re-swallow , Aspiration Precautions , GERD precautions     Discharge Recommendations:  Discharge recommendations to be determined pending ongoing follow-up during acute care stay    History/Course of Treatment     H&P:   84 y.o. female with PMHx significant for hypertension, hyperlipidemia, previous right hip fracture admitted to the hospital after a fall  Patient reports that she was walking to the bathroom lost her balance fell landing on her right hip  Complains of pain in the right hip rating it 8/10 worse with any movement  She denies any loss of consciousness no chest pain no palpitations  Has been having some dysuria ongoing for the last 7 days  No fevers no sweats    Imaging:  Chest 
  Speech Language Pathology  [facility] - Inpatient Rehabilitation Services  [phone]  SLP Dysphagia Treatment and Speech Language Cognitive Assessment       Patient: Antonette Brown   : 1940   MRN: [id number]      Evaluation Date: 2025      Admitting Dx: Fall against object [W18.00XA]  Closed displaced comminuted fracture of shaft of right femur, initial encounter ([facility]) [S72.351A]  Fall, initial encounter [W19.XXXA]  Urinary tract infection without hematuria, site unspecified [N39.0]  Treatment Diagnosis: Oropharyngeal Dysphagia   Pain: Reported pain, 7/10 in her upper abdomen and pt reported groin pain, RN notified                                Recommendations      Recommended Diet and Intervention 2025:  Diet Solids Recommendation:  Dysphagia I Pureed    Liquid Consistency Recommendation:  Thin liquids (no straws) Recommended form of Meds:   Meds whole in puree and/or crushed               **Recommend to downgrade pt to NPO if increased difficulty is noted with po and/ or pt demonstrates increased respiratory compromise     Compensatory strategies: Alternate solids/liquids , Upright as possible with all PO intake , Small bites/sips , Eat/feed slowly, Remain upright 30-45 min , Cough/re-swallow , Aspiration Precautions , GERD precautions     Discharge Recommendations:  Discharge recommendations to be determined pending ongoing follow-up during acute care stay    History/Course of Treatment     H&P:   84 y.o. female with PMHx significant for hypertension, hyperlipidemia, previous right hip fracture admitted to the hospital after a fall  Patient reports that she was walking to the bathroom lost her balance fell landing on her right hip  Complains of pain in the right hip rating it 8/10 worse with any movement  She denies any loss of consciousness no chest pain no palpitations  Has been having some dysuria ongoing for the last 7 days  No fevers no sweats    Imaging:  Chest X-ray: 
  Speech Language Pathology  [facility] - Inpatient Rehabilitation Services  [phone]  SLP Dysphagia and Speech Language Cognitive Treatment       Patient: Antonette Brown   : 1940   MRN: [id number]      Evaluation Date: 2025      Admitting Dx: Fall against object [W18.00XA]  Closed displaced comminuted fracture of shaft of right femur, initial encounter ([facility]) [S72.351A]  Fall, initial encounter [W19.XXXA]  Urinary tract infection without hematuria, site unspecified [N39.0]  Treatment Diagnosis: Oropharyngeal Dysphagia   Pain: Reported pain, 7/10 in her upper abdomen and pt reported groin pain, RN notified                                Recommendations      Recommended Diet and Intervention 2025:  Diet Solids Recommendation:  Dysphagia II (Minced and moist)   Liquid Consistency Recommendation:  Thin liquids (no straws) Recommended form of Meds:   Meds whole in puree and/or crushed               **Recommend to downgrade pt to NPO if increased difficulty is noted with po and/ or pt demonstrates increased respiratory compromise     Compensatory strategies: Alternate solids/liquids , Upright as possible with all PO intake , Small bites/sips , Eat/feed slowly, Remain upright 30-45 min , Cough/re-swallow , Aspiration Precautions , GERD precautions     Discharge Recommendations:  Discharge recommendations to be determined pending ongoing follow-up during acute care stay    History/Course of Treatment     H&P:   84 y.o. female with PMHx significant for hypertension, hyperlipidemia, previous right hip fracture admitted to the hospital after a fall  Patient reports that she was walking to the bathroom lost her balance fell landing on her right hip  Complains of pain in the right hip rating it 8/10 worse with any movement  She denies any loss of consciousness no chest pain no palpitations  Has been having some dysuria ongoing for the last 7 days  No fevers no sweats    Imaging:  Chest X-ray: 
  Speech Language Pathology  [facility] - Inpatient Rehabilitation Services  [phone]  SLP Clinical Swallow Evaluation       Patient: Antonette Brown   : 1940   MRN: [id number]      Evaluation Date: 2025      Admitting Dx: Fall against object [W18.00XA]  Closed displaced comminuted fracture of shaft of right femur, initial encounter ([facility]) [S72.351A]  Fall, initial encounter [W19.XXXA]  Urinary tract infection without hematuria, site unspecified [N39.0]  Treatment Diagnosis: Oropharyngeal Dysphagia   Pain: Denies                                  Recommendations      Recommended Diet and Intervention 2025:  If PO diet is desired recommend trial Dysphagia III Soft and Bite-Sized with thin liquids, meds with puree vs NPO with allowance of small sips of thin water for comfort, pills with puree and potential Modified Barium Swallow as pt is able to tolerate.     Compensatory strategies: Alternate solids/liquids , Upright as possible with all PO intake , Small bites/sips , Eat/feed slowly, Remain upright 30-45 min , Cough/re-swallow , Aspiration Precautions     Discharge Recommendations:  Discharge recommendations to be determined pending ongoing follow-up during acute care stay    History/Course of Treatment     H&P: 84 y.o. female with PMHx significant for hypertension, hyperlipidemia, previous right hip fracture admitted to the hospital after a fall  Patient reports that she was walking to the bathroom lost her balance fell landing on her right hip  Complains of pain in the right hip rating it 8/10 worse with any movement  She denies any loss of consciousness no chest pain no palpitations  Has been having some dysuria ongoing for the last 7 days  No fevers no sweats    Imaging:  Chest X-ray:   IMPRESSION:  1. Possible left basilar airspace opacity could be due to summation overlying  tissues but could also be seen with atelectasis, aspiration, or pneumonia.  2. Interstitial prominence 
  [facility] - Inpatient Rehabilitation Department   Phone: [phone]    Physical Therapy    [] Initial Evaluation            [x] Daily Treatment Note         [] Discharge Summary      Patient: Antonette Brown   : 1940   MRN: [id number]   Date of Service:  2025  Admitting Diagnosis: Fall against object  Current Admission Summary: \"84 y.o. female with medical hx including hypertension, hyperlipidemia, previous right hip fracture admitted to the hospital after a fall. Patient reported that she was walking to the bathroom lost her balance fell landing on her right hip, found to have R femoral fracture.\"    OPEN REDUCTION INTERNAL FIXATION RIGHT FEMUR WITH CABLES AND C-ARM - 25    In ICU post-op with hypotension and acute respiratory failure due to Flu.      Past Medical History:  has a past medical history of AR (allergic rhinitis), Arthritis of knee, Colitis, Depression, Erosive gastritis, GERD (gastroesophageal reflux disease), Hyperlipidemia, Hypertension, Internal hemorrhoids, MI (myocardial infarction) (HCC), Overweight(278.02), and Viral meningitis.  Past Surgical History:  has a past surgical history that includes Cholecystectomy, laparoscopic (); Total abdominal hysterectomy w/ bilateral salpingoophorectomy (); Colonoscopy (); Colonoscopy (12/3/13); Upper gastrointestinal endoscopy (12/3/13); Breast biopsy; Hysterectomy; hip surgery (Left, 2022); Colonoscopy (N/A, 2024); and hip surgery (Right, 10/20/2024).  Discharge Recommendations: Antonette Brown scored a 6/24 on the AM-PAC short mobility form. Current research shows that an AM-PAC score of 17 or less is typically not associated with a discharge to the patient's home setting. Based on the patient's AM-PAC score and their current functional mobility deficits, it is recommended that the patient have 3-5 sessions per week of Physical Therapy at d/c to increase the patient's independence.  Please see 
  [facility] - Inpatient Rehabilitation Department   Phone: [phone]    Physical Therapy    [] Initial Evaluation            [x] Daily Treatment Note         [] Discharge Summary      Patient: Antonette Brown   : 1940   MRN: [id number]   Date of Service:  2025  Admitting Diagnosis: Fall  Current Admission Summary: \"84 y.o. female with medical hx including hypertension, hyperlipidemia, previous right hip fracture admitted to the hospital after a fall. Patient reported that she was walking to the bathroom lost her balance fell landing on her right hip, found to have R femoral fracture.\"    OPEN REDUCTION INTERNAL FIXATION RIGHT FEMUR WITH CABLES AND C-ARM - 25    In ICU post-op with hypotension and acute respiratory failure due to Flu.      Past Medical History:  has a past medical history of AR (allergic rhinitis), Arthritis of knee, Colitis, Depression, Erosive gastritis, GERD (gastroesophageal reflux disease), Hyperlipidemia, Hypertension, Internal hemorrhoids, MI (myocardial infarction) (HCC), Overweight(278.02), and Viral meningitis.  Past Surgical History:  has a past surgical history that includes Cholecystectomy, laparoscopic (); Total abdominal hysterectomy w/ bilateral salpingoophorectomy (); Colonoscopy (); Colonoscopy (12/3/13); Upper gastrointestinal endoscopy (12/3/13); Breast biopsy; Hysterectomy; hip surgery (Left, 2022); Colonoscopy (N/A, 2024); and hip surgery (Right, 10/20/2024).  Discharge Recommendations: Antonette Brown scored a 6/24 on the AM-PAC short mobility form. Current research shows that an AM-PAC score of 17 or less is typically not associated with a discharge to the patient's home setting. Based on the patient's AM-PAC score and their current functional mobility deficits, it is recommended that the patient have 3-5 sessions per week of Physical Therapy at d/c to increase the patient's independence.  Please see assessment section for 
 [facility] Orthopedic Surgery   Progress Note    CHIEF COMPLAINT/DIAGNOSIS: S/p right distal femur ORIF     SUBJECTIVE: The patient is seen sitting up in the chair; describes moderate pain.  Denies paresthesias.  Worn out. ON room air.  IV lasix stopped yesterday,    OBJECTIVE  Physical    VITALS:  /68   Pulse 95   Temp 98 °F (36.7 °C) (Axillary)   Resp 16   Ht 1.524 m (5')   Wt 93.5 kg (206 lb 3.2 oz)   SpO2 91%   BMI 40.27 kg/m²     GENERAL: Alert and oriented x3, in no acute distress.   MUSCULOSKELETAL: Intact DF/PF in operative leg.   INCISION:  Covered with post-op dressing; clean, dry and intact.  Swelling as expected; compartments remain easily compressible and reasonably supple.  ROM: To operative knee deferred.   Sensory:  Intact to light touch in tibial and peroneal distributions.   Vascular:   2+ DP pulse with brisk cap refill.    Data    ALL MEDICATIONS HAVE BEEN REVIEWED    CBC:   Recent Labs     01/22/25  1032 01/23/25  0528   WBC 5.6 4.7   HGB 9.0* 7.9*   HCT 26.6* 24.2*    174     BMP:   Recent Labs     01/22/25  1032 01/23/25  0528    140   K 3.6 3.5   CL 97* 97*   CO2 38* 39*   BUN 35* 40*   CREATININE 0.8 0.8     INR: No results for input(s): \"INR\" in the last 72 hours.    ASSESSMENT:  S/p ORIF right distal femur (1/16/25), POD#7  ABLA    PLAN:   - WB status:  Toe touch weight bearing through operative leg; OK for knee ROM; Reviewed post op precautions.  - DVT prophylaxis: Lovenox; ASA 81mg BID x 30 days at dc  - PT/OT  - Pain Control: Rx for dc in chart.  Due to orthopaedic surgical procedure/condition, patient may require pain medication for up to 6-8 weeks.  - Expected acute blood loss anemia: H/H today: 7.9/24.2   - ID:  complete daniel-op abx  - Dispo: per primary team; ok to d/c from our end once medically stable and dispo needs met.     Follow-up with Dr. Masterson in 2-3 weeks.  Office # [phone]  No future appointments.    CIERRA Ascencio - SAUD  1/23/2025  10:14 
0954: Shift assessment done, VSS, A/O. Neuro checks WNL. Reports pain 9/10  at  this time. Meds given per MAR. Swollen legs bilateral, Ace wrapped and elevated legs. Initiated new dressing change. Cleansed with soap and water covered with Silicone pad on Right Thigh. standard safety measures in place. The care plan and education has been reviewed and mutually agreed upon with the patient. Spouse at the bedside.       
Arrived to place PICC after chart review. Pre-procedure and timeout done with [facility]. Discussed limitations of placement and allergies. Consent confirmed. Procedure explained to pt, including risks and benefits. All questions answered. Pt verbalized understanding.      Vessels easily collapsible upon assessment. No difficulty accessing Basilic vein. Blood free flowing and non-pulsatile. Guidewire, introducer, and catheter all easily inserted. PICC placement verified using 3CG technology as evidenced by peaked P-waves with no initial deflection. Line has brisk blood return and flushes easily.     OK to use PICC. Please use new IV tubing when connecting to the newly placed central line.      Patient tolerated sterile procedure well. Bed left in low position with belongings and call light within reach. Educated on line care. Handoff to bedside RN.      Please call with any questions or concerns. The  will direct you to the PICC RN that is on call.      [phone]           
Attempted to take patient off of BiPAP. Patient SpO2 dropped to 81% within 1 minute and patient was very anxious and tachypneic. I coached her with her breathing and SpO2 went up to 87%. Placed patient back on BiPAP.  
Attepted to call Echo lab for ETA for stat echo. No answer. VM left with RN's call back information.   
Dr Osuna inserted right radial arterial line, patient tolerated procedure well. Patient/ family educated for reason for it, drsg dry & intact.  
[facility] Pulmonary/CCM Progress note      Admit Date: 1/12/2025    Chief Complaint: Fall with right hip fracture and hypotension    Subjective:     Interval History: Patient is now status post ORIF for distal right hip fracture 1/16.  No significant blood loss noted in OR.  Off vasopressors, hemoglobin 7.3.  Appears drowsy but alert enough to answer simple questions.  Has significant right thigh pain.    Scheduled Meds:   acetaminophen  650 mg Oral TID    enoxaparin  40 mg SubCUTAneous Daily    oseltamivir  30 mg Oral BID    pantoprazole  40 mg IntraVENous Daily    atorvastatin  10 mg Oral Once per day on Sunday Tuesday Thursday Saturday    aspirin  81 mg Oral Daily    buPROPion  150 mg Oral Daily before dinner    Vitamin D  2,000 Units Oral Daily with breakfast    hydroxychloroquine  200 mg Oral BID    [Held by provider] losartan  50 mg Oral BID    pregabalin  200 mg Oral BID    sucralfate  1 g Oral TID AC    vilazodone HCl  20 mg Oral Daily    sodium chloride flush  10 mL IntraVENous 2 times per day    meropenem  1,000 mg IntraVENous Q12H     Continuous Infusions:   sodium chloride      sodium chloride 100 mL/hr at 01/16/25 2308    norepinephrine 5 mcg/min (01/17/25 0323)    sodium chloride      sodium chloride      sodium chloride      sodium chloride       PRN Meds:sodium chloride, oxyCODONE, morphine, albuterol, calcium carbonate, LORazepam, sodium chloride, sodium chloride, sodium chloride, iopamidol, sodium chloride flush, sodium chloride, ondansetron **OR** ondansetron, polyethylene glycol, acetaminophen **OR** acetaminophen, phenol    Review of Systems  Constitutional: Fatigue and malaise  Ears, nose, mouth, throat: negative for ear drainage, epistaxis, hoarseness, nasal congestion, sore throat and voice change  Respiratory: negative except for shortness of breath  Cardiovascular: negative for chest pain, chest pressure/discomfort, irregular heart beat, lower extremity edema and palpitations  Gastrointestinal: 
Hgb 7.6. Dr. Fowler notified. 1 unit of PBRC ordered.   
[facility] - Clinical Pharmacy Note - Renal Dosing and Extended Infusion Beta-Lactam Adjustment    Meropenem ordered for treatment of ESBL UTI. Per [facility] Renal Dose Adjustment Policy and Extended Infusion Beta-Lactam Policy, 1g Q12H will be changed to 1g Q8H.     Estimated Creatinine Clearance: Estimated Creatinine Clearance: 69 mL/min (based on SCr of 0.6 mg/dL).  Dialysis Status, HILTON, CKD: HILTON resolved  BMI: Body mass index is 40.13 kg/m².    Rationale for Adjustment: Agent is renally eliminated and demonstrates time-dependent effect on bacterial eradication. Extended-infusion dosing strategy aims to enhance microbiologic and clinical efficacy.    Pharmacy will continue to monitor renal function, cultures and sensitivities (where available) and adjust dose as necessary.      Please call with any questions.  Eric Maya, PharmD  PGY1 Pharmacy Resident   Wireless: [phone]  01/20/25       
Medicated with oxy 5 mg and tylenol crushed in puree. Pt placed on low air loss alt pressure mattress using maxi netta. Tolerated well. Bed alarm armed call light in reach.    
[facility] Pulmonary/CCM Progress note      Admit Date: 1/12/2025    Chief Complaint: Fall with right hip fracture and hypotension    Subjective:     Interval History: Patient is now status post ORIF for distal right hip fracture.  No significant blood loss noted in OR.  Hypotension has improved-now off vasopressors.  Received 1 unit PRBC yesterday for hemoglobin of 7.6-improved to 9.4.  Appears to be somewhat sedated, continues to have right hip pain.    Scheduled Meds:   acetaminophen  650 mg Oral TID    oseltamivir  30 mg Oral BID    pantoprazole  40 mg IntraVENous Daily    atorvastatin  10 mg Oral Once per day on Sunday Tuesday Thursday Saturday    ferric gluconate  125 mg IntraVENous Daily    [Held by provider] aspirin  81 mg Oral Daily    buPROPion  150 mg Oral Daily before dinner    Vitamin D  2,000 Units Oral Daily with breakfast    hydroxychloroquine  200 mg Oral BID    [Held by provider] losartan  50 mg Oral BID    pregabalin  200 mg Oral BID    sucralfate  1 g Oral TID AC    vilazodone HCl  20 mg Oral Daily    sodium chloride flush  10 mL IntraVENous 2 times per day    meropenem  1,000 mg IntraVENous Q12H     Continuous Infusions:   sodium chloride 100 mL/hr at 01/16/25 1134    norepinephrine Stopped (01/16/25 1254)    sodium chloride      sodium chloride      sodium chloride      sodium chloride       PRN Meds:oxyCODONE, morphine, albuterol, calcium carbonate, LORazepam, sodium chloride, sodium chloride, sodium chloride, iopamidol, sodium chloride flush, sodium chloride, ondansetron **OR** ondansetron, polyethylene glycol, acetaminophen **OR** acetaminophen, phenol    Review of Systems  Constitutional: Fatigue and malaise  Ears, nose, mouth, throat: negative for ear drainage, epistaxis, hoarseness, nasal congestion, sore throat and voice change  Respiratory: negative except for shortness of breath  Cardiovascular: negative for chest pain, chest pressure/discomfort, irregular heart beat, lower extremity edema 
Nutrition Note    RECOMMENDATIONS  PO Diet: Pureed, cardiac, 2000 ml/day fluids  ONS: Chocolate Ensure BID  Nutrition Support: N/A     NUTRITION ASSESSMENT   Nutrition intervention triggered for LOS.  Pt receives a cardiac, pureed diet with 2000 ml/day fluid restriction.  Pt reports is eating fairly well.  PO intake 1-50% of meals per EMR.  Pt likes Ensure supplements, requesting chocolate flavor only.  No wt loss per hx.  Will monitor & encourage po/supp intake to promote healing & strengthening.     Nutrition Related Findings: +BS; +3 pitting edema RLE; phos 2.3; -5L  Wounds: Surgical Incision  Nutrition Education:  Education/Counseling not indicated   Nutrition Goals: PO intake 50% or greater, by next RD assessment     MALNUTRITION ASSESSMENT   Acute Illness  Malnutrition Status: At risk for malnutrition    NUTRITION DIAGNOSIS   Increased nutrient needs related to acute injury/trauma as evidenced by s/p surgery, variable po intake    CURRENT NUTRITION THERAPIES  ADULT ORAL NUTRITION SUPPLEMENT; Breakfast, Dinner; Standard High Calorie/High Protein Oral Supplement  ADULT DIET; Dysphagia - Pureed; Low Fat/Low Chol/High Fiber/KAREN; No Added Salt (3-4 gm); 2000 ml     PO Intake: 1-25%, 26-50%   PO Supplement Intake:26-50%, % (per pt)    ANTHROPOMETRICS  Current Height: 152.4 cm (5')  Current Weight - Scale: 93.2 kg (205 lb 7.5 oz)    Ideal Body Weight (IBW): 100 lbs  (45 kg)        BMI: 40      COMPARATIVE STANDARDS  Total Energy Requirements (kcals/day): 1812     Protein (g):  91g       Fluid (mL/day):  2000 ml/day    The patient will be monitored per nutrition standards of care. Consult dietitian if additional nutrition interventions are needed prior to RD reassessment.     Radha Morelos RD, LD    Contact: [phone]      
[facility] - Clinical Pharmacy Note - Renal Dosing and Extended Infusion Beta-Lactam Adjustment    Oseltamivir ordered for treatment of URI. Per [facility] Renal Dose Adjustment Policy and Extended Infusion Beta-Lactam Policy, 30mg twice daily will be changed to 75mg twice daily.     Estimated Creatinine Clearance: Estimated Creatinine Clearance: 69 mL/min (based on SCr of 0.6 mg/dL).  Dialysis Status, HILTON, CKD: HILTON resolved  BMI: Body mass index is 40.13 kg/m².    Rationale for Adjustment: Agent is renally eliminated and demonstrates time-dependent effect on bacterial eradication. Extended-infusion dosing strategy aims to enhance microbiologic and clinical efficacy.    Pharmacy will continue to monitor renal function, cultures and sensitivities (where available) and adjust dose as necessary.      Please call with any questions.  Eric Maya, PharmD  PGY1 Pharmacy Resident   Wireless: [phone]  01/20/25       
Patient and family educated about Sepsis, sepsis information given to patient's spouse  
Patient is resting comfortably on BiPAP. There are no skin integrity issues at this time.  
Patient transferred to the unit, alert but confused. VSS on 2L oxygen, spouse at the bedside, will continue to monitor.   
[facility] to ask if it is okay for patient to has a diet,now that surgery is cancelled for today.  
Physical/Occupational Therapy  Antonette Brown    PT/OT orders noted and appreciated. Pt currently with R femur shaft and supracondylar fracture, with plans to undergo ORIF today @ 1000. PT/OT will follow-up with pt as schedule allows.    Thank you,  Zhang Lockwood, PT, DPT, [id number]  Rolando Castellanos OTR/L  [id number]      
Pt BP low. Dr. Fowler notified.    ml/hr. 1L  Levo restarted at 1618  H&H repeated. Sent and pending.   Pt alert and oriented  Family at bedside.               Vitals:    01/15/25 1530 01/15/25 1531 01/15/25 1600 01/15/25 1630   BP: (!) 81/42 (!) 93/43 (!) 71/62 90/73   Pulse: 81 81 84 80   Resp: 12 11 16 13   Temp:   98 °F (36.7 °C)    TempSrc:   Temporal    SpO2: 99% 100% 99%    Weight:       Height:          
Pt arrived to floor from ED accompanied by family members.  Used slide board to transfer pt.  Muriel placed in ED.  Oriented to room.  Call light and table within reach.  
Pt placed in droplet isolation. Son arrived at bedside. Isolation precautions explained to family. Masks and gloves available if needed. Son said he understood but did not want to wear a mask. Respiratory culture collected and sent to lab.   
Pt resting in bed. Has active cough with audible congestion. Contacted RT for breathing tx. Pt states she has 3/10 pain with generalized body aches. Reports she has been unable to stand. Reports prior to admission she was ambulatory at home with a wheelchair. Dressing to right leg is clean dry and intact. Took meds crushed in applesauce well. Will obtain order for specialty mattress as pt is at increased risk for skin breakdown due to decreased mobility. Llamas in place draining yellow urine. PICC to right arm infusing well. Pt is axox4 and able to answer questions and follow commands throughout assessment.  
Pt transfer to T, VSS,  at bedside, and updated. Report given to bedside Rn, Will continue to monitor.  
Pt working with SLP. Spouse and family at bedside. Spoke with PT per family request. Will be seen today.   
Rapid Response Quick Summary    Room: [facility]9488/3318-01    Assessment of concern / patient:  pt with increased O2 needs. Pt was found with O2 sat in 50's pt was placed on non-rebreather mask with O2 sat up to 100%. CXR had already been completed. Lungs with rhonchi. Pt alert and answering questions appropriately.     Physician involved:  Dr Oliver    Interventions:  pt placed on bipap. ABG done. Lasix given. Labs ordered - awaiting phlebotomist to draw.     Disposition:  pt to remain at current level of care at this time.  
Resting quietly in bed, sleeping.  
Shift assessment complete, A&O x4, VSS, amos in place draining, IV Lasix infusing, incision to right hip CDI. Intermittent cough with congestion, non-productive.  The care plan and education has been reviewed and mutually agreed upon with the patient.     
Shift assessment complete. Loss of IV access. PICC orders placed to continue IV lasix. Pitting edema BUE, BLE. Pt is able to move her feet to command. Laxative given at pt request. Call light within reach.   Electronically signed by Herminia Hernandez RN on 1/19/2025 at 6:24 AM    
Shift assessment completed, /71, other VSS on RA. Patient A&Ox4 in bed, enjoying breakfast. Dressing to Rt hip CDI, denies any pain at this time. Scheduled med given per MAR, POC and education reviewed with the patient. Safety measures provided. All needs met at this time, call light in reach, will continue to monitor.   
Shift assessment completed, /71, other VSS on RA. Patient A&Ox4, enjoying breakfast at this time. Dressing to right hip and thigh CDI, c/o  pain 9/10. Scheduled and PRN med given per MAR. POC and education reviewed with the patient. Safety measures provided. All needs met at this time, call light in reach, will continue to monitor.     1030: Spoke to both the patient and the spouse about the discharge today, patient agreeable. Discharge instructions given, all questions answered.     1155: Patient discharge  via transport to [facility].    1158: Called [facility] and spoke with the nurse Mylene to give report. All questions answered.   
Shift assessment completed, VSS on 2L oxygen. Patient noted alert, uncomfortable in the bed, pt is more depressed, crying at this time. Spouse at the bedside. Dressing to Rt hip CDI, c/o pain 8/10. Scheduled and PRN med given per MAR. POC and education reviewed with the patient and the spouse. Safety measures provided. All needs met at this time, call light in reach, will continue to monitor.     1600: Pt's spouse and son mentioned that the patient should not be on carb-control diet because pt is not diabetic according to them. MD notified and the diet has been changed per MD's verbal order.   
Shift assessment completed, VSS on RA, pt A&Ox4 up in chair. Dressing to Rt hip CDI, c/o pain 4/10. Scheduled and PRN med given per MAR, POC and education reviewed with the patient. Safety measures provided. Spouse at the bedside. All needs met at this time, call light in reach, will continue to monitor.   
Shift assessment completed. VSS. Alert and oriented x 4. Dressing to right hip clean, dry and intact. Medications given per MAR. Llamas catheter removed at this time. Purewick in place. The care plan and education have been reviewed and mutually agreed upon with the patient. Call light in reach.  
Shift assessment completed. VSS. Alert, sitting up in recliner. Dressing to right hip clean, dry and intact. Medications given per MAR. Llamas catheter in place. The care plan and education have been reviewed and mutually agreed upon with the patient. Call light in reach.  
Speech Language Pathology  Attempt     Antonette Brown   1940     Attempted to see pt for dysphagia therapy follow-up. Pt currently off the floor for procedure. Will attempt to follow-up as therapy schedule allows and as pt is able to participate.    Caridad Camarena MA CCC-SLP #[id number]  Speech Language Pathologist    
Speech Language Pathology  Attempt    Antonette Brown   1940    SLP evaluation orders received to assess swallow function due to concerns for possible aspiration. Per RN, pt unable to tolerate being off BiPAP at this time. Will hold evaluation and re-attempt to complete as schedule allows and as pt is able to participate.    Thanks,  Caridad Camarena MA CCC-SLP #[id number]  Speech Language Pathologist    
[facility] Pulmonary/CCM Progress note      Admit Date: 1/12/2025    Chief Complaint: Fall with right hip fracture and hypotension    Subjective:     Interval History: Patient's son at bedside today.  Patient appears to be more hemodynamically stable now, more comfortable.  On 1 to 2 L O2, O2 desaturations are typical during sleep.  Denies significant shortness of breath.  Persistent right leg swelling and externally rotated.  Hemoglobin 8.3, no further PRBC requirements.    Scheduled Meds:   oseltamivir  30 mg Oral BID    atorvastatin  10 mg Oral Once per day on Sunday Tuesday Thursday Saturday    pantoprazole  40 mg IntraVENous BID    ferric gluconate  125 mg IntraVENous Daily    [Held by provider] aspirin  81 mg Oral Daily    buPROPion  150 mg Oral Daily before dinner    Vitamin D  2,000 Units Oral Daily with breakfast    hydroxychloroquine  200 mg Oral BID    [Held by provider] losartan  50 mg Oral BID    pregabalin  200 mg Oral BID    sucralfate  1 g Oral TID AC    vilazodone HCl  20 mg Oral Daily    sodium chloride flush  10 mL IntraVENous 2 times per day    meropenem  1,000 mg IntraVENous Q12H     Continuous Infusions:   norepinephrine 2 mcg/min (01/15/25 0818)    sodium chloride      sodium chloride      sodium chloride      sodium chloride       PRN Meds:morphine, albuterol, calcium carbonate, LORazepam, sodium chloride, sodium chloride, sodium chloride, iopamidol, sodium chloride flush, sodium chloride, ondansetron **OR** ondansetron, polyethylene glycol, acetaminophen **OR** acetaminophen, phenol    Review of Systems  Constitutional: Fatigue and malaise  Ears, nose, mouth, throat: negative for ear drainage, epistaxis, hoarseness, nasal congestion, sore throat and voice change  Respiratory: negative except for shortness of breath  Cardiovascular: negative for chest pain, chest pressure/discomfort, irregular heart beat, lower extremity edema and palpitations  Gastrointestinal: negative for abdominal pain, 
Swallow screen completed at bedside, Patient coughing during and after attempts to drink water. Patient failed swallow screen, NPO diet ordered per protocol.    
Teaching / education initiated regarding perioperative experience, expectations, and pain management during stay. Patient verbalized understanding.  
Updated ortho on CT results of hematoma. Per MOODY Dias ortho no need for immediate surgical intervention tonight. Will evaulate again in AM. Continue to trend h/h and neurovascular checks of leg, inform if any changes. CAROL AUGUSTINE, RN, BSN, CCRN.     
Please see assessment section for further patient specific details.    If patient discharges prior to next session this note will serve as a discharge summary.  Please see below for the latest assessment towards goals.     DME Required For Discharge: DME to be determined pending patient progress  Precautions/Restrictions: high fall risk, contact precautions, weight bearing  Weight Bearing Restrictions: non weight bearing   [] Right Upper Extremity  [] Left Upper Extremity [x] Right Lower Extremity  [] Left Lower Extremity     Required Braces/Orthotics: no braces required   [] Right  [] Left  Positional Restrictions: no positional restrictions    Pre-Admission Information   Lives With: spouse                  Type of Home: house  Home Layout: two level, laundry in basement, stair lift to get upstairs, lives on main level  Home Access:  2 step to enter without rails   Bathroom Layout: walk in shower  Bathroom Equipment: grab bars in shower, grab bars around toilet, hand held shower head, bedside commode, BSC goes over toilet on main level   Toilet Height: elevated height, bedside commode  Home Equipment: rolling walker, rollator - 4 wheeled walker, lift chair, reacher, sock aide, sleeps in recliner on main level; uses rollator  Transfer Assistance: modified independent with use of rollator  Ambulation Assistance:modified independent with use of rollator  ADL Assistance: requires assistance with dressing, needs assistance for getting socks on   IADL Assistance: requires assistance with laundry, requires assistance with shopping  Active :        [] Yes                 [x] No  Hand Dominance: [] Left                 [x] Right  Current Employment: retired.  Occupation: dental hygienist    Hobbies: read , walking, babysitting   Recent Falls: fall prior to sx; walking in garage and fell   Available Assistance at Discharge: 24 hr physical assistance available,  can help    Examination   Vision:   Vision 
SUPPLEMENT; Breakfast, Dinner; Standard High Calorie/High Protein Oral Supplement  ADULT DIET; Dysphagia - Minced and Moist; Low Fat/Low Chol/High Fiber/KAREN; No Added Salt (3-4 gm); 2000 ml; No Drinking Straws   DVT Prophylaxis [x] Lovenox, []  Heparin, [] SCDs, [] Ambulation,  [] Eliquis, [] Xarelto  [] Coumadin   Code Status Full Code   Disposition Expected Disposition: ECF   Estimated Date of Discharge:  1-2 days  Reason for continued admission: sepsis, femur fx     Subjective:  Pt. seen and examined at bedside.    Overall symptoms are improving  States feeling mild pain at R hip area but better.  Denies any CP/SOB/Cough/Dizzziness/Abd pain/diarrhea.    Objective:  Vital Signs:  /80   Pulse 85   Temp 98.1 °F (36.7 °C) (Axillary)   Resp 16   Ht 1.524 m (5')   Wt 94.3 kg (207 lb 14.4 oz)   SpO2 92%   BMI 40.60 kg/m²     Diet: ADULT ORAL NUTRITION SUPPLEMENT; Breakfast, Dinner; Standard High Calorie/High Protein Oral Supplement  ADULT DIET; Dysphagia - Minced and Moist; Low Fat/Low Chol/High Fiber/KAREN; No Added Salt (3-4 gm); 2000 ml; No Drinking Straws    Intake/Output Summary (Last 24 hours) at 1/22/2025 1348  Last data filed at 1/22/2025 0701  Gross per 24 hour   Intake --   Output 450 ml   Net -450 ml     In: -   Out: 450 [Urine:450]  Wt Readings from Last 3 Encounters:   01/22/25 94.3 kg (207 lb 14.4 oz)   11/04/24 90.7 kg (200 lb)   10/24/24 87 kg (191 lb 12.8 oz)       Physical Examination:   General: Alert, Awake, Cooperative. No distress. Obese body habitus.  Generalized weakness and physical deconditioning.  HEENT: Oral mucosa moist. No JVD  Respiratory: Breath sounds decreased bilaterally in LLs. No rales/wheezes.  Cardiovascular: S1 S2, RRR  Gastrointestinal: BS+. Soft, ND, NT  Musculoskeletal/ Extermities: R thigh surgical dressing intact without any overt bleeding or discharge. Distal neurovascular status intact. Trace edema legs bilaterally  Neurologic: Face symmetrical, Speech clear, 
extremity edema and palpitations  Gastrointestinal: negative for abdominal pain, constipation, diarrhea, jaundice, melena, odynophagia, reflux symptoms and vomiting  Hematologic/lymphatic: negative for bleeding, easy bruising, lymphadenopathy and petechiae  Musculoskeletal:negative for arthralgias, bone pain, muscle weakness, neck pain and stiff joints  Neurological: negative for dizziness, gait problems, headaches, seizures, speech problems, tremors and weakness  Behavioral/Psych: negative for anxiety, behavior problems, depression, fatigue and sleep disturbance  Endocrine: negative for diabetic symptoms including none, neuropathy, polyphagia, polyuria, polydipsia, vomiting and diarrhea and temperature intolerance  Allergic/Immunologic: negative for anaphylaxis, angioedema, hay fever and urticaria    Objective:     Patient Vitals for the past 8 hrs:   BP Temp Temp src Pulse Resp SpO2 Height Weight   01/13/25 1751 (!) 123/41 (!) 96.5 °F (35.8 °C) -- 98 16 97 % -- --   01/13/25 1700 -- -- -- 93 17 100 % -- --   01/13/25 1659 (!) 113/46 (!) 96.3 °F (35.7 °C) -- -- -- -- -- --   01/13/25 1658 -- -- -- 94 17 98 % -- --   01/13/25 1523 (!) 98/37 96.9 °F (36.1 °C) Temporal -- 15 -- -- --   01/13/25 1515 (!) 98/44 96.9 °F (36.1 °C) Temporal 93 20 95 % -- --   01/13/25 1505 -- -- -- -- 18 95 % -- --   01/13/25 1333 (!) 76/37 (!) 101.2 °F (38.4 °C) Axillary (!) 101 -- 97 % -- --   01/13/25 1320 (!) 70/40 -- -- -- -- -- -- --   01/13/25 1256 (!) 93/58 -- -- -- -- -- 1.524 m (5') 83.5 kg (184 lb)   01/13/25 1200 (!) 95/53 -- -- (!) 103 -- -- -- --   01/13/25 1120 (!) 75/30 -- -- (!) 109 -- -- -- --   01/13/25 1111 -- (!) 102.2 °F (39 °C) Axillary (!) 107 18 95 % -- --     I/O last 3 completed shifts:  In: 240 [P.O.:240]  Out: 900 [Urine:900]  I/O this shift:  In: 16 [Blood:16]  Out: -     General Appearance: alert and oriented to person, place and time, well developed and well- nourished, mild respiratory distress  Skin: warm 
felt to be from hemorrhagic shock and sepsis/UTI.     Right femur fracture     UTI    Discussed with Dr. Alee Stoner, PA-C  [facility]    I have personally performed a face to face diagnostic evaluation on this patient.  I have interviewed and examined the patient and I agree with the findings and recommended plan of care.  In summary, my findings and plan are the following:  pt with acute blood loss anemia with normal stool. + femur fracture with expanding thigh hematoma as source of anemia/blood loss.  Ab nt.  Will sign off.       Yunior Vickers MD  [facility]      
summation overlying  tissues but could also be seen with atelectasis, aspiration, or pneumonia.  2. Interstitial prominence potentially due to pulmonary vascular congestion,  chronic changes, and/or central vascular crowding.  Head CT:   IMPRESSION:  No acute intracranial abnormality.      History/Prior Level of Function:   Living Status: home   Prior Dysphagia History: none noted in chart review    Reason for referral: SLP evaluation orders received due to concern for aspiration .      Evaluation/Treatment   Subjective:  Pt found resting in bed, able to be awakened provided with verbal cues. Currently on 2L of O2 via NC.     Dysphagia Treatment:     Assessment of Texture Tolerance:  Diet level prior to treatment: Dysphagia III Soft and bite sized , Thin liquids   Tolerance of Current Diet Level:Per chart, no noted difficulty with current diet level  RN reported pt appears to be tolerating current diet level  and meds provided whole in puree. RN reports pt consumed ~50% of lunch meal this date with no noted difficulty.      -Impressions: Pt was positioned slightly reclined in bed as she could not tolerate full upright positioning due to stomach/ groin pain (RN aware), awake and alert. Currently on  2L O2 via nasal cannula . Pt oriented to self and location, was confused about current situation/events and environment asking about things that weren't present in current room. Pt with congested coughing prior to po trials with intermittent wheezing noted throughout. Minimal trials of thin liquids and regular solids  were provided to assess swallow function. Pt took a couple bites of leroy cracker commenting how dry the texture was, able to clear with use of liquid rinses with no overt clinical s/s of aspiration. Pt presented with an audible wheeze that did not appear to worsen with minimal po trials. Pt demonstrates increased risk for aspiration due to cognitive state , co morbidities , deconditioning , respiratory 
SUSCEPTIBILITY PANEL BY BLANCA      ampicillin >=32 mcg/mL Resistant      ampicillin-sulbactam >=32 mcg/mL Resistant      ceFAZolin >=64 mcg/mL Resistant      cefepime >=32 mcg/mL Resistant      cefOXitin <=4 mcg/mL Sensitive      cefTRIAXone >=64 mcg/mL Resistant      cefuroxime >=64 mcg/mL Resistant  [1]       ciprofloxacin >=4 mcg/mL Resistant      ertapenem <=0.12 mcg/mL Sensitive      gentamicin >=16 mcg/mL Resistant      levofloxacin >=8 mcg/mL Resistant      meropenem <=0.25 mcg/mL Sensitive      nitrofurantoin 128 mcg/mL Resistant      tobramycin >=16 mcg/mL Resistant      trimethoprim-sulfamethoxazole >=320 mcg/mL Resistant                   [1]  Cefuroxime breakpoint interpretations are based on Parenteral usage.  If using an oral drug, the breakpoints are as follows:  BLANCA of <=4  is Sensitive  BLANCA of 8-16 is Intermediate  BLANCA of  >=32 is Resistant                      Culture, Urine [[id number]]  (Abnormal)  (Susceptibility) Collected: 01/10/25 1248    Order Status: Completed Specimen: Urine, clean catch Updated: 01/13/25 0626     Urine Culture, Routine <10,000 CFU/ml mixed skin/urogenital maicol. No further workup     Organism Klebsiella pneumoniae ESBL     Urine Culture, Routine --     >100,000 CFU/ml  CONTACT PRECAUTIONS INDICATED  Carbapenem antibiotics are the best option for infections caused  by ESBL producing organisms.  Other antibiotic classes are  likely to result in treatment failure, even for organisms  demonstrating in vitro susceptibility.      Narrative:      ORDER#: [id number]                          ORDERED BY: FRED HUNG  SOURCE: Urine Clean Catch                  COLLECTED:  01/10/25 12:48  ANTIBIOTICS AT [facility].:                      RECEIVED :  01/10/25 22:29  CALL  doctor   tel. [phone],    Susceptibility        Klebsiella pneumoniae ESBL      BACTERIAL SUSCEPTIBILITY PANEL BY BLANCA      ampicillin >=32 mcg/mL Resistant      ampicillin-sulbactam >=32 mcg/mL Resistant      
agitation. Appropriate attention span and concentration      Current medications:  Medications:    potassium phosphate IVPB (PERIPHERAL LINE)  10 mmol IntraVENous Once    meropenem  1,000 mg IntraVENous Q8H    sennosides-docusate sodium  1 tablet Oral BID    sodium chloride flush  5-40 mL IntraVENous 2 times per day    lidocaine 1 % injection  50 mg IntraDERmal Once    acetaminophen  650 mg Oral TID    enoxaparin  40 mg SubCUTAneous Daily    pantoprazole  40 mg IntraVENous Daily    atorvastatin  10 mg Oral Once per day on Sunday Tuesday Thursday Saturday    aspirin  81 mg Oral Daily    buPROPion  150 mg Oral Daily before dinner    Vitamin D  2,000 Units Oral Daily with breakfast    hydroxychloroquine  200 mg Oral BID    [Held by provider] losartan  50 mg Oral BID    pregabalin  200 mg Oral BID    sucralfate  1 g Oral TID AC    vilazodone HCl  20 mg Oral Daily    sodium chloride flush  10 mL IntraVENous 2 times per day      Infusions:    furosemide (LASIX) 100 mg in sodium chloride 0.9 % 100 mL infusion 5 mg/hr (01/19/25 1178)    sodium chloride      norepinephrine 5 mcg/min (01/17/25 0323)    sodium chloride      sodium chloride      sodium chloride         PRN Meds: guaiFENesin-dextromethorphan, 5 mL, Q4H PRN  sodium chloride flush, 5-40 mL, PRN  sodium chloride, , PRN  oxyCODONE, 5 mg, Q4H PRN  morphine, 4 mg, Q3H PRN  albuterol, 2.5 mg, Q6H PRN  calcium carbonate, 500 mg, TID PRN  sodium chloride, , PRN  sodium chloride, , PRN  iopamidol, 75 mL, ONCE PRN  sodium chloride flush, 10 mL, PRN  sodium chloride, , PRN  ondansetron, 4 mg, Q8H PRN   Or  ondansetron, 4 mg, Q6H PRN  polyethylene glycol, 17 g, Daily PRN  acetaminophen, 650 mg, Q6H PRN   Or  acetaminophen, 650 mg, Q6H PRN  phenol, 1 spray, Q2H PRN        LABS:  CBC:   Recent Labs     01/18/25  0600 01/19/25  0816 01/20/25  0506   WBC 4.7 3.6* 3.7*   HGB 7.8* 8.2* 7.9*   PLT 91* 118* 146     BMP:    Recent Labs     01/18/25  0600 01/19/25  0816 01/20/25  0509 
conjunctivae normal  ENT: external ear and ear canal normal bilaterally, nose without deformity, nasal mucosa and turbinates normal  Neck: supple and non-tender without mass, no cervical lymphadenopathy  Pulmonary/Chest: rales present-bilateral  Cardiovascular: normal rate, regular rhythm,  no murmurs, rubs, distal pulses intact, no carotid bruits  Abdomen: soft, non-tender, non-distended, normal bowel sounds, no masses or organomegaly  Lymph Nodes: Cervical, supraclavicular normal  Extremities: no cyanosis, clubbing or edema  Musculoskeletal: Right leg rotated, short with acute pain  Neurologic: alert, no focal neurologic deficits    Data Review:  CBC:   Lab Results   Component Value Date/Time    WBC 9.8 01/14/2025 04:50 AM    RBC 2.89 01/14/2025 04:50 AM     BMP:   Lab Results   Component Value Date/Time    GLUCOSE 99 01/14/2025 04:50 AM    CO2 23 01/14/2025 04:50 AM    BUN 35 01/14/2025 04:50 AM    CREATININE 1.5 01/14/2025 04:50 AM    CALCIUM 7.2 01/14/2025 04:50 AM     ABG:   Lab Results   Component Value Date/Time    CYH9HWE 22.9 01/13/2025 05:05 AM    BEART -2 01/13/2025 05:05 AM    M0YJATQF 100 01/13/2025 05:05 AM    PHART 7.395 01/13/2025 05:05 AM    MHH7UQQ 37.3 01/13/2025 05:05 AM    PO2ART 297.6 01/13/2025 05:05 AM    HYC8QWV 24 01/13/2025 05:05 AM       Radiology: All pertinent images / reports were reviewed as a part of this visit.    EXAMINATION:  ONE XRAY VIEW OF THE CHEST     1/13/2025 4:10 pm     COMPARISON:  01/13/2025.     HISTORY:  ORDERING SYSTEM PROVIDED HISTORY: central line placement  TECHNOLOGIST PROVIDED HISTORY:  Reason for exam:->central line placement  Reason for Exam: central line placement     FINDINGS:  The cardiac silhouette is enlarged and stable.  Aortic vascular  calcification.  Stable mild increase in the bilateral perihilar markings.  No  focal consolidation or significant pleural fluid.  Right IJ catheter tip in  the proximal right atrium about 3-4 cm beyond the cavoatrial 
144   K 4.1 3.7 3.3*   * 114* 111*   CO2 22 22 26   BUN 29* 24* 21*   CREATININE 0.9 0.6 0.6   GLUCOSE 98 74 105*     Hepatic:   No results for input(s): \"AST\", \"ALT\", \"BILITOT\", \"ALKPHOS\" in the last 72 hours.    Invalid input(s): \"ALB\"    Lipids:   Lab Results   Component Value Date/Time    CHOL 124 03/27/2024 01:29 PM    HDL 79 03/27/2024 01:29 PM    HDL 62 02/13/2012 08:08 AM    TRIG 61 03/27/2024 01:29 PM     Hemoglobin A1C:   Lab Results   Component Value Date/Time    LABA1C 5.3 07/24/2023 02:53 PM     TSH:   Lab Results   Component Value Date/Time    TSH 2.72 07/24/2023 02:53 PM     Troponin: No results found for: \"TROPONINT\"  Lactic Acid:   No results for input(s): \"LACTA\" in the last 72 hours.    BNP:   No results for input(s): \"PROBNP\" in the last 72 hours.    UA:  Lab Results   Component Value Date/Time    NITRU POSITIVE 01/12/2025 06:58 AM    COLORU Yellow 01/12/2025 06:58 AM    PHUR 5.5 01/12/2025 06:58 AM    PHUR 5.5 01/10/2025 12:48 PM    PHUR 6.0 12/26/2023 07:53 PM    WBCUA 685 01/12/2025 06:58 AM    RBCUA 0 01/12/2025 06:58 AM    MUCUS Present 12/26/2023 07:53 PM    BACTERIA 4+ 01/12/2025 06:58 AM    CLARITYU CLOUDY 01/12/2025 06:58 AM    SPECGRAV 1.015 01/10/2025 12:48 PM    LEUKOCYTESUR LARGE 01/12/2025 06:58 AM    UROBILINOGEN 0.2 01/12/2025 06:58 AM    BILIRUBINUR Negative 01/12/2025 06:58 AM    BLOODU TRACE 01/12/2025 06:58 AM    GLUCOSEU Negative 01/12/2025 06:58 AM    GLUCOSEU NEGATIVE 06/01/2012 06:20 AM    KETUA Negative 01/12/2025 06:58 AM       Imaging Studies:  Reports reviewed.  XR CHEST PORTABLE    Result Date: 1/13/2025  EXAMINATION: ONE XRAY VIEW OF THE CHEST 1/13/2025 4:32 am COMPARISON: Chest, abdomen, pelvis CT 01/12/2025; chest radiograph 10/20/2024 HISTORY: ORDERING SYSTEM PROVIDED HISTORY: drop in sats to 50%, better on re-breather TECHNOLOGIST PROVIDED HISTORY: Reason for exam:->drop in sats to 50%, better on re-breather FINDINGS: Cholecystectomy clips.  Overlying heart 
Interventions    Functional Outcomes  AM-PAC Inpatient Daily Activity Raw Score: 6      Cognition  Overall Cognitive Status: Impaired  Arousal/Alertness: delayed responses to stimuli, inconsistent responses to stimuli  Following Commands: follows one step commands with repetition, follows one step commands with increased time  Attention Span: difficulty attending to directions, difficulty dividing attention  Memory: decreased recall of biographical information, decreased recall of precautions, decreased recall of recent events, decreased short term memory  Safety Judgement: decreased awareness of need for assistance, decreased awareness of need for safety  Problem Solving: assistance required to generate solutions, assistance required to implement solutions  Insights: not aware of deficits  Initiation: requires cues for all  Sequencing: requires cues for all  Orientation:    oriented to person, oriented to place, oriented to situation, and disoriented to time , however Pt first stated she broke her leg and later stated she broke her arm  Command Following:   impaired     Education  Barriers To Learning: cognition, emotional, and physical  Patient Education: patient educated on OT role and benefits, plan of care, weight-bearing education, orientation, disease specific education, pressure relief, transfer training, discharge recommendations  Learning Assessment:  patient will require reinforcement due to cognitive deficits, patient is not an independent learner    Assessment  Activity Tolerance: Fair, limited by pain and decreased cognition  Impairments Requiring Therapeutic Intervention: decreased functional mobility, decreased ADL status, decreased cognition, decreased balance, increased pain  Prognosis: poor  Clinical Assessment: The patient is a 84 y.o. female who presents below their baseline level of function due to above deficits, associated with Fall against object. Typically, pt is mod I with 4WRW and was 
require 2 person assistance with bed mobility and significant assistance to maintain sitting balance while on edge of bed; unsuccessful maintaining sitting on EOB today due to pt leaning back..  Lift equipment utilized again this date for safe transfer from bed to chair.  Continue with present POC.   Safety Interventions: patient left in chair, chair alarm in place, call light within reach, and nurse notified    Plan  Frequency: 5-7 x/week   Current Treatment Recommendations: balance training, transfer training, patient/caregiver education, ADL/self-care training, cognitive reorientation, safety education, equipment evaluation/education, and positioning    Goals  Patient Goals: none stated   Short Term Goals:  Time Frame: discharge  Patient will complete upper body ADL at maximum assistance   Patient will complete grooming at maximum assistance   Patient will complete functional transfers at maximum assistance   Patient will increase functional sitting balance to mod A for improved ADL completion  Patient will complete bed mobility at maximum assistance     Above goals reviewed on 1/23/2025.  All goals are ongoing at this time unless indicated above.       Therapy Session Time     Individual Group Co-treatment   Time In    0834   Time Out    0930   Minutes    56        Timed Code Treatment Minutes:   56 min  Total Treatment Minutes:  56 min    Electronically Signed By:SEBASTIAN Bejarano Ed., OTR/L, [id number]             
technique.  No acute fracture.     1. Possible left basilar airspace opacity could be due to summation overlying tissues but could also be seen with atelectasis, aspiration, or pneumonia. 2. Interstitial prominence potentially due to pulmonary vascular congestion, chronic changes, and/or central vascular crowding.     CT CHEST ABDOMEN PELVIS W CONTRAST Additional Contrast? None    Result Date: 1/12/2025  EXAMINATION: CT OF THE THORACIC SPINE WITHOUT CONTRAST; CTA OF THE ABDOMEN AND PELVIS WITH CONTRAST; CT OF THE LUMBAR SPINE WITHOUT CONTRAST; CT OF THE CHEST, ABDOMEN, AND PELVIS WITH CONTRAST 1/12/2025 7:44 am; 1/12/2025 8:19 am; 1/12/2025 7:45 am TECHNIQUE: CT of the thoracic spine was performed without the administration of intravenous contrast. Multiplanar reformatted images are provided for review. Automated exposure control, iterative reconstruction, and/or weight based adjustment of the mA/kV was utilized to reduce the radiation dose to as low as reasonably achievable.; CTA of the abdomen and pelvis was performed with the administration of intravenous contrast. Multiplanar reformatted images are provided for review.  MIP images are provided for review. Automated exposure control, iterative reconstruction, and/or weight based adjustment of the mA/kV was utilized to reduce the radiation dose to as low as reasonably achievable.; CT of the lumbar spine was performed without the administration of intravenous contrast. Multiplanar reformatted images are provided for review.  Adjustment of mA and/or kV according to patient size was utilized. Automated exposure control, iterative reconstruction, and/or weight based adjustment of the mA/kV was utilized to reduce the radiation dose to as low as reasonably achievable.; CT of the chest, abdomen and pelvis was performed with the administration of intravenous contrast. Multiplanar reformatted images are provided for review. Automated exposure control, iterative 
(no units)   Date Value   09/13/2022 Not Detected     SARS-COV-2, RdRp gene (no units)   Date Value   11/14/2023 Negative            Assessment:     The patient is a 84 y.o. old female who  has a past medical history of AR (allergic rhinitis), Arthritis of knee, Colitis, Depression, Erosive gastritis (10/28/2019), GERD (gastroesophageal reflux disease), Hyperlipidemia, Hypertension, Internal hemorrhoids, MI (myocardial infarction) ([facility]), Overweight(278.02), and Viral meningitis (05/2012). with following problems:    Complicated ESBL Klebsiella urinary tract infection-covered currently with meropenem  Influenza A infection-covered with Tamiflu  Septic shock with lactic acidosis, bacteremia, hypotension, hypothermia-improving slowly  Left lower lobe atelectasis on chest x-ray-should slowly improve  Right femur fracture, status post ORIF on January 16, 2025  Fall at home  Need for contact isolation-continue  Essential hypertension-BP controlled  Mixed hyperlipidemia  Coronary artery disease  Chronic compression fracture of T7 vertebra      Discussion:      The patient is afebrile.  White cell count is 5500.    2 sets of blood cultures on January 13 remain negative.    Serum creatinine 0.9 today.    Hemoglobin was 7.3 and platelet count was 102,000 yesterday.          Plan:     Diagnostic Workup:      Continue to follow  fever curve, WBC count and blood cultures.  Continue to monitor blood counts, liver and renal function.    Antimicrobials:    Continue IV meropenem at a renally adjusted dose of 1 g every 12 hour for ESBL UTI  Continue Tamiflu 30 mg every 12 hour for influenza pneumonia to complete a 5-day course.  Today is day 3  Today is day 5 of IV meropenem.  Contact isolation for ESBL  Plan for a midline or PICC line placement at discharge.  Plan to switch IV meropenem to IV ertapenem at discharge for at least 1 more week  Continue close vitals monitoring.  Unfortunately will remain at risk of recurrent UTIs due to 
and fever.   HENT:  Negative for ear discharge, ear pain, postnasal drip, rhinorrhea, sinus pressure, sinus pain and sore throat.    Eyes:  Negative for discharge and redness.   Respiratory:  Negative for cough, shortness of breath and wheezing.    Cardiovascular:  Negative for chest pain and leg swelling.   Gastrointestinal:  Negative for abdominal pain, constipation, diarrhea and nausea.   Endocrine: Negative for cold intolerance, heat intolerance and polydipsia.   Genitourinary:  Negative for dysuria, flank pain, frequency, hematuria and urgency.   Musculoskeletal:  Positive for arthralgias (Right thigh area). Negative for back pain and myalgias.   Skin:  Negative for rash.   Allergic/Immunologic: Negative for immunocompromised state.   Neurological:  Negative for dizziness, seizures and headaches.   Hematological:  Does not bruise/bleed easily.   Psychiatric/Behavioral:  Negative for agitation, hallucinations and suicidal ideas. The patient is not nervous/anxious.    All other systems reviewed and are negative.        Past Medical History: All past medical history reviewed today.    Past Medical History:   Diagnosis Date    AR (allergic rhinitis)     Arthritis of knee     Pruis    Colitis     Depression     Erosive gastritis 10/28/2019    EGD October 2019, he did with PPI    GERD (gastroesophageal reflux disease)     Hyperlipidemia     Hypertension     Internal hemorrhoids     MI (myocardial infarction) (HCC)     Overweight(278.02)     Viral meningitis 05/2012       Past Surgical History: All past surgical history was reviewed today.    Past Surgical History:   Procedure Laterality Date    BREAST BIOPSY      CHOLECYSTECTOMY, LAPAROSCOPIC  2003    COLONOSCOPY  8/06    normal; Ortega    COLONOSCOPY  12/3/13    Ortega- polyps    COLONOSCOPY N/A 2/21/2024    COLONOSCOPY WITH BIOPSY performed by Marcelo Jean MD at [facility] ENDOSCOPY    HIP SURGERY Left 5/16/2022    LEFT HIP HEMIARTHROPLASTY performed by Parker 
including an exophytic left lower pole 6.4 x 5.6 cm cyst.  No additional follow-up is required.  No evidence of hydronephrosis.  No evidence of renal mass. GI/Bowel: There is a small hiatal hernia.  Stomach and duodenal sweep demonstrate no acute abnormality. There is no evidence of bowel obstruction. No evidence of abnormal bowel wall thickening or distension.  No acute traumatic injury of the bowel. Pelvis: Llamas catheter noted in the bladder which is decompressed.  There is beam hardening artifact limiting evaluation of the pelvis.  No evidence of pelvic hematoma. Peritoneum/Retroperitoneum: No evidence of ascites or free air.  No retroperitoneal hematoma.  No evidence of lymphadenopathy. Bones/Soft Tissues: There is no evidence of an acute traumatic pelvic fracture.  There is a left total hip arthroplasty.  Remote ORIF of the right femoral neck.  Sacrum and coccyx are grossly intact. THORACIC SPINE: BONES/ALIGNMENT: There is mild dextroconvex curvature of the thoracic spine. There is a compression fracture of T7 unchanged since 09/13/2022 MRI consistent with a chronic compression fracture.  The other thoracic vertebra demonstrate normal height and alignment.  No evidence of an acute compression fracture or traumatic malalignment. DEGENERATIVE CHANGES: Mild-to-moderate multilevel thoracic spine disc degenerative changes noted throughout the cervical spine. SOFT TISSUES: No evidence of paraspinal mass or hematoma. LUMBAR SPINE: BONES/ALIGNMENT: Levoconvex curvature of the lumbar spine, chronic.  Thoracic vertebra demonstrate normal height.  No evidence of focal traumatic malalignment or listhesis. DEGENERATIVE CHANGES: Moderate multilevel disc and facet changes noted throughout the lumbar spine with multilevel mild-to-moderate canal and foraminal narrowing. SOFT TISSUES: No paraspinal mass or hematoma.     No acute traumatic injury of the chest, abdomen or pelvis. No acute traumatic injury of the abdominal aorta.  
of the bowel. Pelvis: Llamas catheter noted in the bladder which is decompressed.  There is beam hardening artifact limiting evaluation of the pelvis.  No evidence of pelvic hematoma. Peritoneum/Retroperitoneum: No evidence of ascites or free air.  No retroperitoneal hematoma.  No evidence of lymphadenopathy. Bones/Soft Tissues: There is no evidence of an acute traumatic pelvic fracture.  There is a left total hip arthroplasty.  Remote ORIF of the right femoral neck.  Sacrum and coccyx are grossly intact. THORACIC SPINE: BONES/ALIGNMENT: There is mild dextroconvex curvature of the thoracic spine. There is a compression fracture of T7 unchanged since 09/13/2022 MRI consistent with a chronic compression fracture.  The other thoracic vertebra demonstrate normal height and alignment.  No evidence of an acute compression fracture or traumatic malalignment. DEGENERATIVE CHANGES: Mild-to-moderate multilevel thoracic spine disc degenerative changes noted throughout the cervical spine. SOFT TISSUES: No evidence of paraspinal mass or hematoma. LUMBAR SPINE: BONES/ALIGNMENT: Levoconvex curvature of the lumbar spine, chronic.  Thoracic vertebra demonstrate normal height.  No evidence of focal traumatic malalignment or listhesis. DEGENERATIVE CHANGES: Moderate multilevel disc and facet changes noted throughout the lumbar spine with multilevel mild-to-moderate canal and foraminal narrowing. SOFT TISSUES: No paraspinal mass or hematoma.     No acute traumatic injury of the chest, abdomen or pelvis. No acute traumatic injury of the abdominal aorta.  No evidence of active extravasation of contrast in the abdomen or pelvis. No evidence of acute traumatic injury of the thoracic or lumbar spine.  If there is persistent clinical concern for a thoracic spine subtle nondisplaced fracture, MRI may be helpful for further evaluation if clinically indicated. Chronic compression fracture of T7. 4 mm right solid pulmonary nodule within the upper 
hydroxychloroquine  200 mg Oral BID    [Held by provider] losartan  50 mg Oral BID    pregabalin  200 mg Oral BID    sucralfate  1 g Oral TID AC    vilazodone HCl  20 mg Oral Daily    sodium chloride flush  10 mL IntraVENous 2 times per day        furosemide (LASIX) 100 mg in sodium chloride 0.9 % 100 mL infusion 5 mg/hr (01/19/25 5242)    sodium chloride      norepinephrine 5 mcg/min (01/17/25 3783)    sodium chloride      sodium chloride      sodium chloride         guaiFENesin-dextromethorphan, sodium chloride flush, sodium chloride, oxyCODONE, morphine, albuterol, calcium carbonate, sodium chloride, sodium chloride, iopamidol, sodium chloride flush, sodium chloride, ondansetron **OR** ondansetron, polyethylene glycol, acetaminophen **OR** acetaminophen, phenol      Problem list:       Patient Active Problem List   Diagnosis Code    AR (allergic rhinitis) J30.9    Arthritis of knee M17.10    High fever R50.9    Postmenopausal Z78.0    Essential hypertension I10    Erosive gastritis K29.60    Left displaced femoral neck fracture ([facility]) S72.002A    Fall at home W19.XXXA, Y92.009    Dyslipidemia E78.5    Class 1 obesity due to excess calories with body mass index (BMI) of 30.0 to 30.9 in adult E66.811, E66.09, Z68.30    History of depression Z86.59    Bilateral lower leg cellulitis L03.116, L03.115    Peripheral edema R60.0    Redness and swelling of lower leg M79.89, R23.8    Weight loss counseling, encounter for Z71.3    NSTEMI (non-ST elevated myocardial infarction) ([facility]) I21.4    Hyperlipidemia E78.5    Other chest pain R07.89    Lower extremity edema R60.0    Leg wound, left S81.802A    Neuropathy G62.9    Osteopenia of hip M85.859    Colitis K52.9    Severe episode of recurrent major depressive disorder, without psychotic features ([facility]) F33.2    Hip fracture, right, closed, initial encounter ([facility]) S72.001A    Acute blood loss anemia D62    Complicated UTI (urinary tract infection) N39.0    HILTON (acute kidney 
ascites or free air.  No retroperitoneal hematoma.  No evidence of lymphadenopathy. Bones/Soft Tissues: There is no evidence of an acute traumatic pelvic fracture.  There is a left total hip arthroplasty.  Remote ORIF of the right femoral neck.  Sacrum and coccyx are grossly intact. THORACIC SPINE: BONES/ALIGNMENT: There is mild dextroconvex curvature of the thoracic spine. There is a compression fracture of T7 unchanged since 09/13/2022 MRI consistent with a chronic compression fracture.  The other thoracic vertebra demonstrate normal height and alignment.  No evidence of an acute compression fracture or traumatic malalignment. DEGENERATIVE CHANGES: Mild-to-moderate multilevel thoracic spine disc degenerative changes noted throughout the cervical spine. SOFT TISSUES: No evidence of paraspinal mass or hematoma. LUMBAR SPINE: BONES/ALIGNMENT: Levoconvex curvature of the lumbar spine, chronic.  Thoracic vertebra demonstrate normal height.  No evidence of focal traumatic malalignment or listhesis. DEGENERATIVE CHANGES: Moderate multilevel disc and facet changes noted throughout the lumbar spine with multilevel mild-to-moderate canal and foraminal narrowing. SOFT TISSUES: No paraspinal mass or hematoma.     No acute traumatic injury of the chest, abdomen or pelvis. No acute traumatic injury of the abdominal aorta.  No evidence of active extravasation of contrast in the abdomen or pelvis. No evidence of acute traumatic injury of the thoracic or lumbar spine.  If there is persistent clinical concern for a thoracic spine subtle nondisplaced fracture, MRI may be helpful for further evaluation if clinically indicated. Chronic compression fracture of T7. 4 mm right solid pulmonary nodule within the upper lobe.  No specific follow-up required as per Fleischner Society guidelines.  This may represent a benign intrapulmonary lymph node. Additional chronic findings as described above.     CT HEAD WO CONTRAST    Result Date: 
fracture.  There is a left total hip arthroplasty.  Remote ORIF of the right femoral neck.  Sacrum and coccyx are grossly intact. THORACIC SPINE: BONES/ALIGNMENT: There is mild dextroconvex curvature of the thoracic spine. There is a compression fracture of T7 unchanged since 09/13/2022 MRI consistent with a chronic compression fracture.  The other thoracic vertebra demonstrate normal height and alignment.  No evidence of an acute compression fracture or traumatic malalignment. DEGENERATIVE CHANGES: Mild-to-moderate multilevel thoracic spine disc degenerative changes noted throughout the cervical spine. SOFT TISSUES: No evidence of paraspinal mass or hematoma. LUMBAR SPINE: BONES/ALIGNMENT: Levoconvex curvature of the lumbar spine, chronic.  Thoracic vertebra demonstrate normal height.  No evidence of focal traumatic malalignment or listhesis. DEGENERATIVE CHANGES: Moderate multilevel disc and facet changes noted throughout the lumbar spine with multilevel mild-to-moderate canal and foraminal narrowing. SOFT TISSUES: No paraspinal mass or hematoma.     No acute traumatic injury of the chest, abdomen or pelvis. No acute traumatic injury of the abdominal aorta.  No evidence of active extravasation of contrast in the abdomen or pelvis. No evidence of acute traumatic injury of the thoracic or lumbar spine.  If there is persistent clinical concern for a thoracic spine subtle nondisplaced fracture, MRI may be helpful for further evaluation if clinically indicated. Chronic compression fracture of T7. 4 mm right solid pulmonary nodule within the upper lobe.  No specific follow-up required as per Fleischner Society guidelines.  This may represent a benign intrapulmonary lymph node. Additional chronic findings as described above.     CT HEAD WO CONTRAST    Result Date: 1/12/2025  EXAMINATION: CT OF THE HEAD WITHOUT CONTRAST  1/12/2025 7:44 am TECHNIQUE: CT of the head was performed without the administration of intravenous 
painful, rotated hip after fall. FINDINGS: There is a left total hip arthroplasty without evidence of acute hardware failure.  Normal alignment.  The pelvic ring is intact without evidence of fracture. There is evidence of prior open reduction internal fixation of the right femoral neck with dynamic hip screw and intramedullary long stem femoral enio. There is an acute traumatic displaced and angulated fracture of the distal right femoral diaphysis near the tip of the femoral enio.  There is anterior and lateral angulation of the distal fracture fragments. No definite fracture line is seen on radiographs extending to the knee joint. There is a large suprapatellar joint effusion noted of the knee.  Moderate tricompartmental osteoarthritis of the right knee.  The patella, proximal tibia and proximal fibula are intact.  No evidence of dislocation of the knee.     Acute traumatic displaced and angulated fracture of the distal right femoral diaphysis near the tip of the long stem femoral enio.  Remote ORIF of the right hip. Large suprapatellar joint effusion of the right knee. Moderate tricompartmental osteoarthritis of the right knee. Left total hip arthroplasty without evidence of acute hardware complication.     XR KNEE RIGHT (1-2 VIEWS)    Result Date: 1/12/2025  EXAMINATION: TWO X-RAY VIEWS OF THE RIGHT HIP;   X-RAY VIEWS OF THE RIGHT KNEE 1/12/2025 6:54 am COMPARISON: 12/09/2024 HISTORY: ORDERING SYSTEM PROVIDED HISTORY:  Shortened, painful, rotated hip after fall. TECHNOLOGIST PROVIDED HISTORY: Reason for Exam:  Shortened, painful, rotated hip after fall. FINDINGS: There is a left total hip arthroplasty without evidence of acute hardware failure.  Normal alignment.  The pelvic ring is intact without evidence of fracture. There is evidence of prior open reduction internal fixation of the right femoral neck with dynamic hip screw and intramedullary long stem femoral enio. There is an acute traumatic displaced and 
Large suprapatellar joint effusion of the right knee. Moderate tricompartmental osteoarthritis of the right knee. Left total hip arthroplasty without evidence of acute hardware complication.       Cultures:  Organism:   Lab Results   Component Value Date/Time    ORG Influenza A H3 detected by PCR 01/13/2025 12:20 PM       Blood Cultures: Reviewed. No growth so far.  Urine Cultures: Reviewed. > 100 k Klebsiella    DISCLAIMER: Please note that some or all of this record was generated using voice recognition software. Efforts were made by me to ensure accuracy, however some errors may be present due to limitations of this technology and occasionally words are not transcribed correctly. If there are any questions about the content of this document, please contact the author.    Electronically signed by: Electronically signed by Benito Stafford MD on 1/16/2025 at 9:28 AM, 1/16/2025 9:28 AM

## 2025-01-26 NOTE — PLAN OF CARE
Problem: Discharge Planning  Goal: Discharge to home or other facility with appropriate resources  1/26/2025 1200 by Adrian Schaefer RN  Outcome: Completed  1/26/2025 0802 by Adrian Schaefer RN  Outcome: Progressing     Problem: Pain  Goal: Verbalizes/displays adequate comfort level or baseline comfort level  1/26/2025 1200 by Adrian Schaefer RN  Outcome: Completed  1/26/2025 0802 by Adrian Schaefer RN  Outcome: Progressing     Problem: Safety - Adult  Goal: Free from fall injury  1/26/2025 1200 by Adrian Schaefer RN  Outcome: Completed  1/26/2025 0802 by Adrian Schaefer RN  Outcome: Progressing     Problem: ABCDS Injury Assessment  Goal: Absence of physical injury  1/26/2025 1200 by Adrian Schaefer RN  Outcome: Completed  1/26/2025 0802 by Adrian Schaefer RN  Outcome: Progressing     Problem: Skin/Tissue Integrity  Goal: Absence of new skin breakdown  Description: 1.  Monitor for areas of redness and/or skin breakdown  2.  Assess vascular access sites hourly  3.  Every 4-6 hours minimum:  Change oxygen saturation probe site  4.  Every 4-6 hours:  If on nasal continuous positive airway pressure, respiratory therapy assess nares and determine need for appliance change or resting period.  1/26/2025 1200 by Adrian Schaefer RN  Outcome: Completed  1/26/2025 0802 by Adrian Schaefer RN  Outcome: Progressing     Problem: Nutrition Deficit:  Goal: Optimize nutritional status  1/26/2025 1200 by Adrian Schaefer RN  Outcome: Completed  1/26/2025 0802 by Adrian Schaefer RN  Outcome: Progressing

## 2025-01-27 ENCOUNTER — TELEPHONE (OUTPATIENT)
Dept: ORTHOPEDIC SURGERY | Age: 85
End: 2025-01-27

## 2025-01-27 NOTE — TELEPHONE ENCOUNTER
Medical Facility Question     Facility Name: [facility]    Contact Number: [phone]  Request or Information: NEEDING UPDATED WEIGHT BEARING STATUS.

## 2025-01-27 NOTE — TELEPHONE ENCOUNTER
Marlo with facility     POSTOPERATIVE PLAN: The patient will be readmitted as an inpatient. She can start the toe-touch weightbearing for transfers only for about 6 weeks. Likely, she will need a rehab placement.

## 2025-01-28 ENCOUNTER — CARE COORDINATION (OUTPATIENT)
Dept: CARE COORDINATION | Age: 85
End: 2025-01-28

## 2025-01-28 NOTE — CARE COORDINATION
Ambulatory Care Coordination Note     1/28/2025 1:38 PM        ACM: Ivone Abarca, RN     Care Summary Note: Care coordination episode closed due to patient going to [facility] following hospitalization.     PCP/Specialist follow up:   Future Appointments         Provider Specialty Dept Phone    1/31/2025 10:30 AM Belen Alfonso APRN - CNP Orthopedic Surgery [phone]

## 2025-01-29 ENCOUNTER — TELEPHONE (OUTPATIENT)
Dept: FAMILY MEDICINE CLINIC | Age: 85
End: 2025-01-29

## 2025-01-29 NOTE — TELEPHONE ENCOUNTER
Care Transitions Initial Follow Up Call    Outreach made within 2 business days of discharge: Yes by care coordinator the patient is in a SNF unit we will f/u after discharge from the facility     Patient: Antonette Brown Patient : 1940   MRN: [id number]  Reason for Admission: flu,fall, lactic acidosis, right distal femur fracture, septic shock, acute cystitis without hematuria  Discharge Date: 25       Spoke with:     Discharge department/facility: [facility]    Additional needs identified to be addressed with provider  No needs identified             Scheduled appointment with PCP within 7-14 days    Follow Up  Future Appointments   Date Time Provider Department Center   2025  1:15 PM Belen Alfonso, APRN - CNP [facility]O [facility] RN

## 2025-02-05 ENCOUNTER — OFFICE VISIT (OUTPATIENT)
Dept: ORTHOPEDIC SURGERY | Age: 85
End: 2025-02-05

## 2025-02-05 VITALS — BODY MASS INDEX: 39.39 KG/M2 | HEIGHT: 60 IN

## 2025-02-05 DIAGNOSIS — S72.351A CLOSED DISPLACED COMMINUTED FRACTURE OF SHAFT OF RIGHT FEMUR, INITIAL ENCOUNTER (HCC): Primary | ICD-10-CM

## 2025-02-05 DIAGNOSIS — S72.491A OTHER CLOSED FRACTURE OF DISTAL END OF RIGHT FEMUR, INITIAL ENCOUNTER (HCC): ICD-10-CM

## 2025-02-05 PROCEDURE — 99024 POSTOP FOLLOW-UP VISIT: CPT | Performed by: NURSE PRACTITIONER

## 2025-02-06 ENCOUNTER — TELEPHONE (OUTPATIENT)
Dept: ORTHOPEDIC SURGERY | Age: 85
End: 2025-02-06

## 2025-02-06 NOTE — TELEPHONE ENCOUNTER
Medical Facility Question     Facility Name: [facility]  Contact Name: YEFRI  Contact Number: FAX# [phone]  Request or Information: REQUESTED NOTES FROM 2/5 VISIT BE FAXED TO PUT IN PATIENT CHART THERE

## 2025-02-06 NOTE — PROGRESS NOTES
DIAGNOSIS:  Right supracondylar femur comminuted periprosthetic fracture, status post ORIF locking plate and cables .    DATE OF SURGERY:  1/16/2025 .    HISTORY OF PRESENT ILLNESS: Ms. Brown 84 y.o.  female  who came in today for 2 weeks postoperative visit.  The patient denies any significant pain in the right knee. Rates pain a 5/10 VAS moderate, aching, intermittent and are improving. Aggravating factors movement. Alleviating factors rest. She has been working on ROM and NWB with PT.  No numbness or tingling sensation. No fever or Chills.  She is in a rehab center working with PT.     PHYSICAL EXAMINATION:  The incision is healed well.  No signs of any erythema or drainage.  She has no pain with the active or passive range of motion of the right knee.  She has intact sensation, distally, and  is neurovascularly intact.    IMAGING:  Xray taken today in the office 4 views right knee, showed anatomic alignment of the supracondylar femur fracture, locking plate and cables  in good position, no loosening, or hardware failure.      IMPRESSION:  2 weeks out from right supracondylar femur comminuted fracture ORIF with a locking plate and cables,  and doing very well.    PLAN:  I have told the patient to work on ROM with  PT, TTWB for transfers only for 4 weeks as well as strengthening exercises.  On 2/27, she can start gradual WB. The patient will come back for a follow up in 6 weeks.  At that time, we will take xray 4 views right knee.    As this patient has demonstrated risk factors for osteoporosis, such as age greater than fifty years and evidence of a fracture, I have referred the patient back to the primary care physician for evaluation for osteoporosis, including consideration for DEXA scanning, if this is felt to be clinically indicated.  The patient is advised to contact the primary care physician to follow-up for further evaluation.       Belen Alfonso, CIERRA - CNP

## 2025-02-13 ENCOUNTER — TELEPHONE (OUTPATIENT)
Dept: ORTHOPEDIC SURGERY | Age: 85
End: 2025-02-13

## 2025-02-13 PROBLEM — J10.1 INFLUENZA A: Status: RESOLVED | Noted: 2025-01-14 | Resolved: 2025-02-13

## 2025-02-13 NOTE — TELEPHONE ENCOUNTER
Vincent Harper. [facility], [phone].  Needs clarification on weight bearing status, and further explanation of \"gradual\"

## 2025-02-19 ENCOUNTER — TELEPHONE (OUTPATIENT)
Dept: ORTHOPEDIC SURGERY | Age: 85
End: 2025-02-19
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
## 2025-02-19 NOTE — TELEPHONE ENCOUNTER
Other A NURSE WITH [facility] IS CALLING REQUESTING A CALL BACK REGARDING WB STATUS. WANTS TO KNOW AT WHAT PERCENTAGE SHOULD PATIENT INCREASE PER WEEK IN PHYSICAL THERAPY. [phone]

## 2025-03-13 ENCOUNTER — TELEPHONE (OUTPATIENT)
Dept: PHARMACY | Facility: CLINIC | Age: 85
End: 2025-03-13

## 2025-03-13 NOTE — TELEPHONE ENCOUNTER
[facility] CLINICAL PHARMACY: ADHERENCE REVIEW  Identified care gap per [employer]: fills at [facility]: Statin adherence    ASSESSMENT  STATIN ADHERENCE    Insurance Records claims through 3/7/25 (Prior Year PDC = not reported; YTD PDC = 43%; Potential Fail Date: 25):   ATORVASTATIN TAB 10MG last filled on 25 for 30 day supply. Next refill due: 25    Prescribed si tablet four times a week    Per Insurer Portal: last filled on 25 for 84 day supply.   Lab Results   Component Value Date    CHOL 124 2024    TRIG 61 2024    HDL 79 (H) 2024     Lab Results   Component Value Date    LDL 33 2024      ALT   Date Value Ref Range Status   2025 12 10 - 40 U/L Final     AST   Date Value Ref Range Status   2025 23 15 - 37 U/L Final     The ASCVD Risk score (Zahida MARROQUIN, et al., 2019) failed to calculate for the following reasons:    The 2019 ASCVD risk score is only valid for ages 40 to 79    Risk score cannot be calculated because patient has a medical history suggesting prior/existing ASCVD     The following are interventions that have been identified:   Patient eligible for 90 day supply of ATORVASTATIN TAB 10MG    No patient outreach planned at this time.    Patient picked up 84 day supply on 25    Last Visit:   Next Visit: none    Joanie Singh CPhT  [facility] Clinical   [facility]  Toll Free: [phone] Option 1    For Pharmacy Admin Tracking Only    Program: Agent Partner  CPA in place:  No  Gap Closed?: Yes   Time Spent (min): 5

## 2025-04-04 ENCOUNTER — CARE COORDINATION (OUTPATIENT)
Dept: CASE MANAGEMENT | Age: 85
End: 2025-04-04

## 2025-04-04 NOTE — CARE COORDINATION
Care Transitions Note    Initial Call - Call within 2 business days of discharge: Yes    Patient Current Location:  Home: [address] [place] [id number]    Post Acute Care Manager contacted the caregiver, FN at [facility]  by telephone to perform post hospital discharge assessment, verified name and  as identifiers.  Provided introduction to self, and explanation of the Post Acute Care Manager role.    Patient: Antonette Brown    Patient : 1940   MRN: [id number]    Reason for Admission: R femur fx  Discharge Date: 25  RURS: Readmission Risk Score: 18.3      Last Discharge Facility       Date Complaint Diagnosis Description Type Department Provider    25 Fall Closed displaced comminuted fracture of shaft of right femur, initial encounter ... [facility]; Usama Campo...            Was this an external facility discharge? Yes. Discharge Date: 4/3. Facility Name:   [facility]    Additional needs identified to be addressed with provider   No needs identified             Method of communication with provider: none.    Patients top risk factors for readmission: medical condition-recent R femur fx    Interventions to address risk factors:   Review of patient management of conditions/medications:      Care Summary Note: Called facility nurse who stated pt doing well and adjusting. Will fax me medlsit.         Advance Care Planning:   Does patient have an Advance Directive: reviewed and current.    Medication Reconciliation:  Medication reconciliation was not performed during this call, no medlist. Requested. .         Assessments:  Discharge Assessemnt        Closed.     Brianna De Leon, DRISSN, RN   Post Acute Care Transition Nurse  [facility]   [phone]

## 2025-04-07 ENCOUNTER — CARE COORDINATION (OUTPATIENT)
Dept: CARE COORDINATION | Age: 85
End: 2025-04-07

## 2025-04-07 NOTE — CARE COORDINATION
Ambulatory Care Coordination Note     2025 3:03 PM     Patient Current Location:  [place]     This patient was received as a referral from Care Transition Nurse.    ACM contacted the spouse/partner by telephone. Verified name and  with spouse/partner as identifiers. Provided introduction to self, and explanation of the ACM role.   Spouse/Partner declined care management services at this time.          ACM: Ivone Abarca RN     Challenges to be reviewed by the provider   Additional needs identified to be addressed with provider No  none               Method of communication with provider: none.    Utilization: Initial Call - N/A    Care Summary Note:     ACM made care coordination outreach and spoke with patient's , Teo. Teo reported that his wife is now at [facility]. Reported that she is not walking. Having a lot of pain in her knees. Going to start seeing a provider at the facility and will no longer be seeing Dr. Nava. Declined any other questions or concerns at this time. Provided with ACM phone number for any future care coordination needs.     PCP/Specialist follow up:

## 2025-06-23 ENCOUNTER — TELEPHONE (OUTPATIENT)
Dept: FAMILY MEDICINE CLINIC | Age: 85
End: 2025-06-23

## 2025-06-23 NOTE — TELEPHONE ENCOUNTER
Antonette was in the hospital she was released and it now living in a assisted living home permanently.    She is unable to walk now. As of right now she does not need an appointment.

## 2025-06-23 NOTE — TELEPHONE ENCOUNTER
LVM  Dr swift wants to know if pt is in the hospital if not maybe just schedule a ED follow up with dr swift

## (undated) DEVICE — 3M™ STERI-DRAPE™ U-DRAPE 1015: Brand: STERI-DRAPE™

## (undated) DEVICE — 6619 2 PTNT ISO SYS INCISE AREA&LT;(&GT;&&LT;)&GT;P: Brand: STERI-DRAPE™ IOBAN™ 2

## (undated) DEVICE — GLOVE SURG SZ 65 L12IN THK75MIL DK GRN LTX FREE

## (undated) DEVICE — APPLICATOR MEDICATED 26 CC SOLUTION HI LT ORNG CHLORAPREP

## (undated) DEVICE — SUTURE VICRYL + SZ 0 L18IN ABSRB UD L36MM CT-1 1/2 CIR VCP840D

## (undated) DEVICE — BIT DRL L L266MM DIA16MM FEM FLX CANN QUIK CPL

## (undated) DEVICE — STANDARD HYPODERMIC NEEDLE,POLYPROPYLENE HUB: Brand: MONOJECT

## (undated) DEVICE — UNTHREADED GUIDE WIRE: Brand: FIXOS

## (undated) DEVICE — ORIF & ANTERIOR HIP: Brand: MEDLINE INDUSTRIES, INC.

## (undated) DEVICE — 3D ISLAND DRESSING 4IN X 12IN: Brand: THERABOND 3D ANTIMICROBIAL BARRIER SYSTEMS

## (undated) DEVICE — SCREW TFNA FEN 100MM
Type: IMPLANTABLE DEVICE | Site: HIP | Status: NON-FUNCTIONAL
Removed: 2024-10-20

## (undated) DEVICE — AIR/WATER CLEANING ADAPTER FOR OLYMPUS® GI ENDOSCOPE: Brand: BULLDOG®

## (undated) DEVICE — SYRINGE MED 30ML STD CLR PLAS LUERLOCK TIP N CTRL DISP

## (undated) DEVICE — BONE PREPARATION KIT: Brand: BIOPREP

## (undated) DEVICE — ENDOSCOPIC KIT 6X3/16 FT COLON W/ 1.1 OZ 2 GWN W/O BRSH

## (undated) DEVICE — BIT DRILL K WIRE 2.0MM L200MM

## (undated) DEVICE — BIT DRL L330MM DIA4.2MM CALIB L100MM ST 3 FLUT QUIK CPL FOR

## (undated) DEVICE — STERILE POLYISOPRENE POWDER-FREE SURGICAL GLOVES WITH EMOLLIENT COATING: Brand: PROTEXIS

## (undated) DEVICE — Device

## (undated) DEVICE — VITAL SIGNS™ GAS SAMPLING INTERFACE: Brand: VITAL SIGNS™

## (undated) DEVICE — NEEDLE,22GX1.5",REG,BEVEL: Brand: MEDLINE

## (undated) DEVICE — DRESSING,GAUZE,XEROFORM,CURAD,1"X8",ST: Brand: CURAD

## (undated) DEVICE — SUTURE VICRYL + SZ 3-0 L18IN ABSRB UD SH 1/2 CIR TAPERCUT NDL VCP864D

## (undated) DEVICE — CALIBRATED DRILL BIT , AO: Brand: AXSOS

## (undated) DEVICE — GAUZE,SPONGE,4"X4",8PLY,STRL,LF,10/TRAY: Brand: MEDLINE

## (undated) DEVICE — K-WIRE DRILL TIP
Type: IMPLANTABLE DEVICE | Site: FEMUR | Status: NON-FUNCTIONAL
Brand: AXSOS
Removed: 2025-01-16

## (undated) DEVICE — DRESSING CNTCT 4X12IN IS THERABOND 3D

## (undated) DEVICE — SYRINGE, LUER LOCK, 10ML: Brand: MEDLINE

## (undated) DEVICE — DRAPE,HIP,W/POUCHES,STERILE: Brand: MEDLINE

## (undated) DEVICE — 3M™ IOBAN™ 2 ANTIMICROBIAL INCISE DRAPE 6651EZ: Brand: IOBAN™ 2

## (undated) DEVICE — YANKAUER,BULB TIP,W/O VENT,RIGID,STERILE: Brand: MEDLINE

## (undated) DEVICE — INTENDED FOR TISSUE SEPARATION, AND OTHER PROCEDURES THAT REQUIRE A SHARP SURGICAL BLADE TO PUNCTURE OR CUT.: Brand: BARD-PARKER ® STAINLESS STEEL BLADES

## (undated) DEVICE — DRAPE,SPLIT ,77X120: Brand: MEDLINE

## (undated) DEVICE — SUTURE VCRL + SZ 2-0 L18IN ABSRB UD CT1 L36MM 1/2 CIR VCP839D

## (undated) DEVICE — SOLUTION IV IRRIG WATER 500ML POUR BRL ST 2F7113

## (undated) DEVICE — BLADE SAGITAL 18X90X1.27MM

## (undated) DEVICE — 450 ML BOTTLE OF 0.05% CHLORHEXIDINE GLUCONATE IN 99.95% STERILE WATER FOR IRRIGATION, USP AND APPLICATOR.: Brand: IRRISEPT ANTIMICROBIAL WOUND LAVAGE

## (undated) DEVICE — COVER,MAYO STAND,XL,STERILE: Brand: MEDLINE

## (undated) DEVICE — STRIP,CLOSURE,WOUND,MEDI-STRIP,1/2X4: Brand: MEDLINE

## (undated) DEVICE — SOLUTION IV 1000ML 0.9% SOD CHL PH 5 INJ USP VIAFLX PLAS

## (undated) DEVICE — GLOVE ORTHO 8   MSG9480

## (undated) DEVICE — INTENDED TO AID IN THE PASSING OF SUTURES THROUGH BONE AND SOFT TISSUE DURING ORTHOPEDIC SURGERY: Brand: HOFFEE SUTURE RETRIEVER

## (undated) DEVICE — GLOVE ORANGE PI 8   MSG9080

## (undated) DEVICE — STERILE LATEX POWDER-FREE SURGICAL GLOVESWITH NITRILE COATING: Brand: PROTEXIS

## (undated) DEVICE — HANDPIECE SET WITH HIGH FLOW TIP AND SUCTION TUBE: Brand: INTERPULSE

## (undated) DEVICE — YANKAUER OPEN TIP, NO VENT: Brand: ARGYLE

## (undated) DEVICE — ELECTRODE PT RET AD L9FT HI MOIST COND ADH HYDRGEL CORDED

## (undated) DEVICE — C-ARMOR C-ARM EQUIPMENT COVERS CLEAR STERILE UNIVERSAL FIT 12 PER CASE: Brand: C-ARMOR

## (undated) DEVICE — ROD RMR L950MM DIA3MM W/ STR BALL TIP

## (undated) DEVICE — VALVE SUCTION AIR H2O SET ORCA POD + DISP

## (undated) DEVICE — 3M™ COBAN™ NL STERILE NON-LATEX SELF-ADHERENT WRAP, 2084S, 4 IN X 5 YD (10 CM X 4,5 M), 18 ROLLS/CASE: Brand: 3M™ COBAN™

## (undated) DEVICE — 3M™ IOBAN™ 2 ANTIMICROBIAL INCISE DRAPE 6650EZ: Brand: IOBAN™ 2

## (undated) DEVICE — STAPLER SKIN H3.9MM WIRE DIA0.58MM CRWN 6.9MM 35 STPL ROT

## (undated) DEVICE — CONTAINER,SPECIMEN,OR STERILE,4OZ: Brand: MEDLINE

## (undated) DEVICE — SOLUTION IRRIG 500ML 0.9% SOD CHLO USP POUR PLAS BTL

## (undated) DEVICE — SUTURE ETHBND EXCEL SZ 5 L30IN NONABSORBABLE GRN L48MM V-40 MB46G

## (undated) DEVICE — Device: Brand: STABLECUT®

## (undated) DEVICE — SHEET,DRAPE,40X58,STERILE: Brand: MEDLINE

## (undated) DEVICE — PADDING CAST N ADH 12X6 IN CRIMPED FINISH 100% COTTON WBRLII

## (undated) DEVICE — SUTURE VCRL + SZ 1 L18IN ABSRB UD L36MM CT-1 1/2 CIR VCP841D

## (undated) DEVICE — SYRINGE MED 10ML TRNSLUC BRL PLUNG BLK MRK POLYPR CTRL

## (undated) DEVICE — GOWN SIRUS NONREIN LG W/TWL: Brand: MEDLINE INDUSTRIES, INC.

## (undated) DEVICE — SUTURE MONOCRYL + SZ 4-0 L27IN ABSRB UD L19MM PS-2 3/8 CIR MCP426H

## (undated) DEVICE — MERCY FAIRFIELD TURNOVER KIT: Brand: MEDLINE INDUSTRIES, INC.

## (undated) DEVICE — HIP PILLOW, ABDUCTION: Brand: DEROYAL

## (undated) DEVICE — DRAPE,U/ SHT,SPLIT,PLAS,STERIL: Brand: MEDLINE

## (undated) DEVICE — SYSTEM SKIN CLSR 22CM DERMBND PRINEO

## (undated) DEVICE — GUIDEWIRE ORTH L400MM DIA3.2MM FOR TFN

## (undated) DEVICE — MASTISOL ADHESIVE LIQ 2/3ML

## (undated) DEVICE — SUTURE VICRYL + SZ 2-0 L18IN ABSRB UD CT1 L36MM 1/2 CIR VCP839D

## (undated) DEVICE — SYRINGE MEDICAL 3ML CLEAR PLASTIC STANDARD NON CONTROL LUERLOCK TIP DISPOSABLE

## (undated) DEVICE — 3M™ TEGADERM™ TRANSPARENT FILM DRESSING FRAME STYLE, 1626W, 4 IN X 4-3/4 IN (10 CM X 12 CM), 50/CT 4CT/CASE: Brand: 3M™ TEGADERM™

## (undated) DEVICE — SPONGE LAP W18XL18IN WHT COT 4 PLY FLD STRUNG RADPQ DISP ST 2 PER PACK

## (undated) DEVICE — 3M™ IOBAN™ 2 ANTIMICROBIAL INCISE DRAPE 6648EZ: Brand: IOBAN™ 2

## (undated) DEVICE — PENCIL SMK EVAC TELSCP 3 M TBNG

## (undated) DEVICE — 3 BONE CEMENT MIXER: Brand: MIXEVAC

## (undated) DEVICE — GOWN,AURORA,NONREINF,RAGLAN,XXL,STERILE: Brand: MEDLINE

## (undated) DEVICE — FORCEPS BX L240CM WRK CHN 2.8MM STD CAP W/ NDL MIC MESH

## (undated) DEVICE — BIPOLAR SEALER 23-112-1 AQM 6.0: Brand: AQUAMANTYS ®

## (undated) DEVICE — SUTURE MCRYL + SZ 3-0 L27IN ABSRB UD PS1 L24MM 3/8 CIR REV MCP936H

## (undated) DEVICE — GLOVE SURG SZ 65 THK91MIL LTX FREE SYN POLYISOPRENE

## (undated) DEVICE — SHEET,DRAPE,53X77,STERILE: Brand: MEDLINE

## (undated) DEVICE — GOWN AURORA NONREINF LG: Brand: MEDLINE INDUSTRIES, INC.

## (undated) DEVICE — 3M™ BAIR HUGGER™ MULTI-POSITION UPPER BODY BLANKET, 10 PER CASE, 62200: Brand: BAIR HUGGER™

## (undated) DEVICE — HYPODERMIC SAFETY NEEDLE: Brand: MAGELLAN

## (undated) DEVICE — BW-412T DISP COMBO CLEANING BRUSH: Brand: SINGLE USE COMBINATION CLEANING BRUSH

## (undated) DEVICE — DECANTER FLD 9IN ST BG FOR ASEP TRNSF OF FLD

## (undated) DEVICE — BOWL AND CEMENT CARTRIDGE WITH BREAKAWAY FEMORAL NOZZLE AND MEDIUM PRESSURIZER: Brand: ACM

## (undated) DEVICE — TOTAL BASIC PK

## (undated) DEVICE — BANDAGE COMPR W6INXL10YD ST M E WHITE/BEIGE

## (undated) DEVICE — SUTURE STRATAFIX SZ 1 L14IN ABSRB VLT L36MM MO-4 TAPERPOINT SXPD2B400

## (undated) DEVICE — HOOD: Brand: FLYTE

## (undated) DEVICE — STOCKINETTE,IMPERVIOUS,12X48,STERILE: Brand: MEDLINE

## (undated) DEVICE — SPONGE LAP W18XL18IN WHT COT 4 PLY FLD STRUNG RADPQ DISP ST

## (undated) DEVICE — BIT DRL L300MM DIA10MM CANN TAPR L QUIK CPL FOR DH DC TFN